# Patient Record
Sex: FEMALE | Race: WHITE | NOT HISPANIC OR LATINO | Employment: OTHER | URBAN - METROPOLITAN AREA
[De-identification: names, ages, dates, MRNs, and addresses within clinical notes are randomized per-mention and may not be internally consistent; named-entity substitution may affect disease eponyms.]

---

## 2017-01-03 ENCOUNTER — TELEPHONE (OUTPATIENT)
Dept: INTERNAL MEDICINE | Facility: CLINIC | Age: 67
End: 2017-01-03

## 2017-01-03 DIAGNOSIS — E55.9 VITAMIN D DEFICIENCY: ICD-10-CM

## 2017-01-03 DIAGNOSIS — Z12.31 ENCOUNTER FOR SCREENING MAMMOGRAM FOR BREAST CANCER: ICD-10-CM

## 2017-01-03 DIAGNOSIS — E78.5 DYSLIPIDEMIA: ICD-10-CM

## 2017-01-03 DIAGNOSIS — Z83.3 FAMILY HISTORY OF TYPE 2 DIABETES MELLITUS: ICD-10-CM

## 2017-01-03 DIAGNOSIS — E03.8 SUBCLINICAL HYPOTHYROIDISM: Primary | ICD-10-CM

## 2017-01-03 NOTE — TELEPHONE ENCOUNTER
----- Message from Misa Mcgowan sent at 1/3/2017  8:30 AM CST -----  Contact: self  [phone]  Doctor appointment and lab have been scheduled.  Please link lab orders to the lab appointment.  Date of doctor appointment:  01/11  Physical or EP:  EPP  Date of lab appointment:  01/05    Comments: Pt would also like to know if you can put in an order for her yearly mammogram

## 2017-01-05 ENCOUNTER — TELEPHONE (OUTPATIENT)
Dept: INTERNAL MEDICINE | Facility: CLINIC | Age: 67
End: 2017-01-05

## 2017-01-05 ENCOUNTER — PATIENT MESSAGE (OUTPATIENT)
Dept: INTERNAL MEDICINE | Facility: CLINIC | Age: 67
End: 2017-01-05

## 2017-01-05 ENCOUNTER — LAB VISIT (OUTPATIENT)
Dept: LAB | Facility: HOSPITAL | Age: 67
End: 2017-01-05
Attending: INTERNAL MEDICINE
Payer: MEDICARE

## 2017-01-05 DIAGNOSIS — E78.5 DYSLIPIDEMIA: ICD-10-CM

## 2017-01-05 DIAGNOSIS — E03.8 SUBCLINICAL HYPOTHYROIDISM: ICD-10-CM

## 2017-01-05 DIAGNOSIS — R73.03 PRE-DIABETES: Primary | ICD-10-CM

## 2017-01-05 DIAGNOSIS — E55.9 VITAMIN D DEFICIENCY: ICD-10-CM

## 2017-01-05 LAB
25(OH)D3+25(OH)D2 SERPL-MCNC: 25 NG/ML
ALBUMIN SERPL BCP-MCNC: 3.9 G/DL
ALP SERPL-CCNC: 79 U/L
ALT SERPL W/O P-5'-P-CCNC: 19 U/L
ANION GAP SERPL CALC-SCNC: 12 MMOL/L
AST SERPL-CCNC: 24 U/L
BASOPHILS # BLD AUTO: 0.08 K/UL
BASOPHILS NFR BLD: 1.5 %
BILIRUB SERPL-MCNC: 0.6 MG/DL
BUN SERPL-MCNC: 9 MG/DL
CALCIUM SERPL-MCNC: 9.4 MG/DL
CHLORIDE SERPL-SCNC: 104 MMOL/L
CHOLEST/HDLC SERPL: 5 {RATIO}
CO2 SERPL-SCNC: 23 MMOL/L
CREAT SERPL-MCNC: 0.7 MG/DL
DIFFERENTIAL METHOD: ABNORMAL
EOSINOPHIL # BLD AUTO: 0.5 K/UL
EOSINOPHIL NFR BLD: 9.4 %
ERYTHROCYTE [DISTWIDTH] IN BLOOD BY AUTOMATED COUNT: 12.7 %
EST. GFR  (AFRICAN AMERICAN): >60 ML/MIN/1.73 M^2
EST. GFR  (NON AFRICAN AMERICAN): >60 ML/MIN/1.73 M^2
GLUCOSE SERPL-MCNC: 93 MG/DL
HCT VFR BLD AUTO: 41.4 %
HDL/CHOLESTEROL RATIO: 19.8 %
HDLC SERPL-MCNC: 222 MG/DL
HDLC SERPL-MCNC: 44 MG/DL
HGB BLD-MCNC: 13.5 G/DL
LDLC SERPL CALC-MCNC: 152 MG/DL
LYMPHOCYTES # BLD AUTO: 2 K/UL
LYMPHOCYTES NFR BLD: 37.8 %
MCH RBC QN AUTO: 28.1 PG
MCHC RBC AUTO-ENTMCNC: 32.6 %
MCV RBC AUTO: 86 FL
MONOCYTES # BLD AUTO: 0.4 K/UL
MONOCYTES NFR BLD: 6.9 %
NEUTROPHILS # BLD AUTO: 2.4 K/UL
NEUTROPHILS NFR BLD: 44.4 %
NONHDLC SERPL-MCNC: 178 MG/DL
PLATELET # BLD AUTO: 200 K/UL
PMV BLD AUTO: 13.2 FL
POTASSIUM SERPL-SCNC: 3.8 MMOL/L
PROT SERPL-MCNC: 7.7 G/DL
RBC # BLD AUTO: 4.8 M/UL
SODIUM SERPL-SCNC: 139 MMOL/L
TRIGL SERPL-MCNC: 130 MG/DL
TSH SERPL DL<=0.005 MIU/L-ACNC: 3.1 UIU/ML
WBC # BLD AUTO: 5.4 K/UL

## 2017-01-05 PROCEDURE — 36415 COLL VENOUS BLD VENIPUNCTURE: CPT | Mod: PO

## 2017-01-05 PROCEDURE — 84443 ASSAY THYROID STIM HORMONE: CPT

## 2017-01-05 PROCEDURE — 80053 COMPREHEN METABOLIC PANEL: CPT

## 2017-01-05 PROCEDURE — 82306 VITAMIN D 25 HYDROXY: CPT

## 2017-01-05 PROCEDURE — 80061 LIPID PANEL: CPT

## 2017-01-05 PROCEDURE — 85025 COMPLETE CBC W/AUTO DIFF WBC: CPT

## 2017-01-05 NOTE — TELEPHONE ENCOUNTER
----- Message from Christine Lund sent at 1/5/2017  9:19 AM CST -----  Contact: Self/[phone]  Patient is calling to office to request orders for a B 12 and A1 C test. She states she is currently at [facility]. She would like to speak with someone in the office. Please call and advise.    Thank you!

## 2017-01-05 NOTE — TELEPHONE ENCOUNTER
Medicare won't pay for these tests with no medical necessity. Will wait and see what the standard labs show first.

## 2017-03-17 ENCOUNTER — OFFICE VISIT (OUTPATIENT)
Dept: INTERNAL MEDICINE | Facility: CLINIC | Age: 67
End: 2017-03-17
Payer: MEDICARE

## 2017-03-17 VITALS
BODY MASS INDEX: 23.32 KG/M2 | DIASTOLIC BLOOD PRESSURE: 65 MMHG | HEIGHT: 62 IN | HEART RATE: 77 BPM | WEIGHT: 126.75 LBS | SYSTOLIC BLOOD PRESSURE: 102 MMHG | OXYGEN SATURATION: 95 %

## 2017-03-17 DIAGNOSIS — E03.8 SUBCLINICAL HYPOTHYROIDISM: ICD-10-CM

## 2017-03-17 DIAGNOSIS — M81.0 OSTEOPOROSIS, UNSPECIFIED: Primary | ICD-10-CM

## 2017-03-17 DIAGNOSIS — E07.9 DISORDER OF THYROID: ICD-10-CM

## 2017-03-17 DIAGNOSIS — R73.02 IMPAIRED GLUCOSE TOLERANCE: ICD-10-CM

## 2017-03-17 DIAGNOSIS — E55.9 VITAMIN D INSUFFICIENCY: ICD-10-CM

## 2017-03-17 DIAGNOSIS — Z00.00 ANNUAL PHYSICAL EXAM: ICD-10-CM

## 2017-03-17 DIAGNOSIS — Z78.9 VEGETARIAN DIET: ICD-10-CM

## 2017-03-17 DIAGNOSIS — R73.03 PREDIABETES: ICD-10-CM

## 2017-03-17 DIAGNOSIS — Z12.31 ENCOUNTER FOR SCREENING MAMMOGRAM FOR BREAST CANCER: ICD-10-CM

## 2017-03-17 DIAGNOSIS — Z12.39 BREAST CANCER SCREENING: ICD-10-CM

## 2017-03-17 PROCEDURE — 99999 PR PBB SHADOW E&M-EST. PATIENT-LVL III: CPT | Mod: PBBFAC,,, | Performed by: INTERNAL MEDICINE

## 2017-03-17 PROCEDURE — 1157F ADVNC CARE PLAN IN RCRD: CPT | Mod: S$GLB,,, | Performed by: INTERNAL MEDICINE

## 2017-03-17 PROCEDURE — 99499 UNLISTED E&M SERVICE: CPT | Mod: S$GLB,,, | Performed by: INTERNAL MEDICINE

## 2017-03-17 PROCEDURE — 90471 IMMUNIZATION ADMIN: CPT | Mod: S$GLB,,, | Performed by: INTERNAL MEDICINE

## 2017-03-17 PROCEDURE — 1159F MED LIST DOCD IN RCRD: CPT | Mod: S$GLB,,, | Performed by: INTERNAL MEDICINE

## 2017-03-17 PROCEDURE — 1160F RVW MEDS BY RX/DR IN RCRD: CPT | Mod: S$GLB,,, | Performed by: INTERNAL MEDICINE

## 2017-03-17 PROCEDURE — 1126F AMNT PAIN NOTED NONE PRSNT: CPT | Mod: S$GLB,,, | Performed by: INTERNAL MEDICINE

## 2017-03-17 PROCEDURE — 99214 OFFICE O/P EST MOD 30 MIN: CPT | Mod: 25,S$GLB,, | Performed by: INTERNAL MEDICINE

## 2017-03-17 PROCEDURE — 90715 TDAP VACCINE 7 YRS/> IM: CPT | Mod: S$GLB,,, | Performed by: INTERNAL MEDICINE

## 2017-03-17 NOTE — PROGRESS NOTES
Portions of this note are generated with voice recognition software. Typographical errors may exist.     Subjective:      Patient ID: Koe Frias is a 66 y.o. female.    Chief Complaint: Annual Exam; Establish Care; and Mammogram    HPI:   66-year-old lady here for an annual physical examination and also to establish care with me.  Last seen in 2015  Was diagnosed with the prediabetes.  Patient says she is having adequate dietary control.  Also cause for regular walks.  Was also diagnosed with vitamin D insufficiency.  She takes calcium plus vitamin D3 supplements everyday.  She is a which he didn't but she says she has adequate intake of milk products.  No history of numbness or tingling in lower extremity.  No history of loss of balance.  No history of dizziness or chest pain.  Was diagnosed with subclinical hypothyroidism.  Not on any replacement therapy.  Had refused colonoscopy before.  Does still refuses colonoscopy.  Was diagnosed with osteoporosis  in 2014 when she had a DEXA scan.  She refuses prophylactic of osteoporosis.  She is on calcium and vitamin D.  Due for a DTaP vaccine.  Otherwise up-to-date on vaccination.  Shirlene a mammogram.    Family history positive for diabetes mellitus in her father.  Patient denies any chest pain or shortness of breath.  No history of dizziness or vertigo.  No history of nausea or vomiting.  Has a history of migraine headaches.  Not on any medications.  No history of hematemesis or melena.  No history of hematochezia.  No history of loss of strength or loss of sensation in any extremity.  No history of dysphagia or dysarthria.  No history of acute blurring of vision or diplopia.  No history of tinnitus or ear pain.  No history of sore throat or swollen glands.        Review of patient's allergies indicates:   Allergen Reactions    Sulfa (sulfonamide antibiotics) Rash     Past Medical History:   Diagnosis Date    Anemia     Osteoporosis     Subclinical  hypothyroidism      Past Surgical History:   Procedure Laterality Date    DILATION AND CURETTAGE OF UTERUS      postmenopausal bleeding    TUBAL LIGATION       Family History   Problem Relation Age of Onset    Diabetes Brother     Hypertension Brother     Glaucoma Brother     Lung cancer Mother     Uterine cancer Maternal Grandmother     Glaucoma Father      Social History     Social History    Marital status:      Spouse name: N/A    Number of children: 2    Years of education: N/A     Occupational History     [employer]     Social History Main Topics    Smoking status: Never Smoker    Smokeless tobacco: Not on file    Alcohol use No    Drug use: No    Sexual activity: Yes     Partners: Male     Birth control/ protection: None     Other Topics Concern    Not on file     Social History Narrative         Review of Systemsas above   Objective:     Physical Exam   Constitutional: She is oriented to person, place, and time. No distress.   HENT:   Head: Normocephalic and atraumatic.   Mouth/Throat: No oropharyngeal exudate.   Eyes: EOM are normal. Pupils are equal, round, and reactive to light. Right eye exhibits no discharge. Left eye exhibits no discharge.   Neck: Normal range of motion. Neck supple. No thyromegaly present.   Cardiovascular: Normal rate and regular rhythm.  Exam reveals no friction rub.    No murmur heard.  Pulmonary/Chest: Effort normal and breath sounds normal. No respiratory distress. She has no wheezes. She has no rales.   Abdominal: Soft. Bowel sounds are normal. She exhibits no distension. There is no tenderness.   Musculoskeletal: Normal range of motion. She exhibits no edema or deformity.   Neurological: She is alert and oriented to person, place, and time. No cranial nerve deficit.   Position sense normal     Skin: Skin is warm and dry. She is not diaphoretic.     Assessment:       Labs:  No results found for this or any previous visit (from the past  1008 hour(s)).  1. Osteoporosis, unspecified    2. Vegetarian diet    3. Subclinical hypothyroidism    4. Breast cancer screening    5. Annual physical exam    6. Disorder of thyroid     7. Subclinical hypothyroidism     8. Prediabetes    9. Impaired glucose tolerance     10. Encounter for screening mammogram for breast cancer     11. Vitamin D insufficiency      Patient refuses to be started biphosphonate's.  She is satisfied with taking her calcium and vitamin D.  We'll repeat a DEXA scan.  Has adequate intake of vitamin D12 through diet.  Will recheck TSH.  Ordered a mammogram.  We'll check the following labs for screening.  Check A1c said she had prediabetes.  Check vitamin D levels.  Follow-up in 3 months or earlier if required.  Orders Placed This Encounter   Procedures    Mammo Digital Screening Bilat with CAD    DXA Bone Density Spine And Hip    Tdap Vaccine    Comprehensive metabolic panel    TSH    T4, free    HEMOGLOBIN A1C    Vitamin D     50 minutes spent during this visit of which greater than 50% devoted to face-face counseling and coordination of care regarding diagnosis and management plan

## 2017-03-29 ENCOUNTER — HOSPITAL ENCOUNTER (OUTPATIENT)
Dept: RADIOLOGY | Facility: HOSPITAL | Age: 67
Discharge: HOME | End: 2017-03-29
Attending: INTERNAL MEDICINE
Payer: MEDICARE

## 2017-03-29 DIAGNOSIS — Z00.00 ANNUAL PHYSICAL EXAM: ICD-10-CM

## 2017-03-29 DIAGNOSIS — Z12.31 ENCOUNTER FOR SCREENING MAMMOGRAM FOR BREAST CANCER: ICD-10-CM

## 2017-03-29 PROCEDURE — 77067 SCR MAMMO BI INCL CAD: CPT | Mod: 26,,, | Performed by: RADIOLOGY

## 2017-03-29 PROCEDURE — 77067 SCR MAMMO BI INCL CAD: CPT | Mod: TC

## 2017-04-28 ENCOUNTER — HOSPITAL ENCOUNTER (OUTPATIENT)
Dept: RADIOLOGY | Facility: HOSPITAL | Age: 67
Discharge: HOME | End: 2017-04-28
Attending: INTERNAL MEDICINE
Payer: MEDICARE

## 2017-04-28 DIAGNOSIS — M81.0 OSTEOPOROSIS: ICD-10-CM

## 2017-06-14 ENCOUNTER — PATIENT OUTREACH (OUTPATIENT)
Dept: ADMINISTRATIVE | Facility: HOSPITAL | Age: 67
End: 2017-06-14

## 2017-08-23 ENCOUNTER — OFFICE VISIT (OUTPATIENT)
Dept: FAMILY MEDICINE | Facility: CLINIC | Age: 67
End: 2017-08-23
Payer: MEDICARE

## 2017-08-23 ENCOUNTER — LAB VISIT (OUTPATIENT)
Dept: LAB | Facility: HOSPITAL | Age: 67
End: 2017-08-23
Attending: FAMILY MEDICINE
Payer: MEDICARE

## 2017-08-23 VITALS
DIASTOLIC BLOOD PRESSURE: 83 MMHG | SYSTOLIC BLOOD PRESSURE: 125 MMHG | BODY MASS INDEX: 23.24 KG/M2 | OXYGEN SATURATION: 99 % | WEIGHT: 126.31 LBS | HEIGHT: 62 IN | HEART RATE: 63 BPM

## 2017-08-23 DIAGNOSIS — R20.0 NUMBNESS ON LEFT SIDE: ICD-10-CM

## 2017-08-23 DIAGNOSIS — R29.818 TRANSIENT NEUROLOGICAL SYMPTOMS: ICD-10-CM

## 2017-08-23 DIAGNOSIS — I95.9 HYPOTENSION, UNSPECIFIED HYPOTENSION TYPE: ICD-10-CM

## 2017-08-23 DIAGNOSIS — Z78.9 VEGETARIAN DIET: ICD-10-CM

## 2017-08-23 DIAGNOSIS — R79.89 ABNORMAL TSH: ICD-10-CM

## 2017-08-23 DIAGNOSIS — R20.0 NUMBNESS ON LEFT SIDE: Primary | ICD-10-CM

## 2017-08-23 DIAGNOSIS — G43.109 MIGRAINE WITH AURA AND WITHOUT STATUS MIGRAINOSUS, NOT INTRACTABLE: ICD-10-CM

## 2017-08-23 LAB
ALBUMIN SERPL BCP-MCNC: 3.7 G/DL
ALP SERPL-CCNC: 81 U/L
ALT SERPL W/O P-5'-P-CCNC: 16 U/L
ANION GAP SERPL CALC-SCNC: 7 MMOL/L
AST SERPL-CCNC: 18 U/L
BASOPHILS # BLD AUTO: 0.07 K/UL
BASOPHILS NFR BLD: 1.4 %
BILIRUB SERPL-MCNC: 0.6 MG/DL
BUN SERPL-MCNC: 9 MG/DL
CALCIUM SERPL-MCNC: 9.5 MG/DL
CHLORIDE SERPL-SCNC: 102 MMOL/L
CO2 SERPL-SCNC: 29 MMOL/L
CREAT SERPL-MCNC: 0.7 MG/DL
DIFFERENTIAL METHOD: ABNORMAL
EOSINOPHIL # BLD AUTO: 0.5 K/UL
EOSINOPHIL NFR BLD: 10.1 %
ERYTHROCYTE [DISTWIDTH] IN BLOOD BY AUTOMATED COUNT: 13.8 %
EST. GFR  (AFRICAN AMERICAN): >60 ML/MIN/1.73 M^2
EST. GFR  (NON AFRICAN AMERICAN): >60 ML/MIN/1.73 M^2
GLUCOSE SERPL-MCNC: 86 MG/DL
HCT VFR BLD AUTO: 39.6 %
HGB BLD-MCNC: 13 G/DL
LYMPHOCYTES # BLD AUTO: 2 K/UL
LYMPHOCYTES NFR BLD: 41 %
MCH RBC QN AUTO: 27.1 PG
MCHC RBC AUTO-ENTMCNC: 32.8 G/DL
MCV RBC AUTO: 83 FL
MONOCYTES # BLD AUTO: 0.3 K/UL
MONOCYTES NFR BLD: 6.6 %
NEUTROPHILS # BLD AUTO: 2 K/UL
NEUTROPHILS NFR BLD: 40.7 %
PLATELET # BLD AUTO: 186 K/UL
PMV BLD AUTO: 11.6 FL
POTASSIUM SERPL-SCNC: 4.1 MMOL/L
PROT SERPL-MCNC: 7.7 G/DL
RBC # BLD AUTO: 4.79 M/UL
SODIUM SERPL-SCNC: 138 MMOL/L
T4 FREE SERPL-MCNC: 1.05 NG/DL
THYROPEROXIDASE IGG SERPL-ACNC: <6 IU/ML
TSH SERPL DL<=0.005 MIU/L-ACNC: 4.16 UIU/ML
VIT B12 SERPL-MCNC: 498 PG/ML
WBC # BLD AUTO: 4.83 K/UL

## 2017-08-23 PROCEDURE — 99499 UNLISTED E&M SERVICE: CPT | Mod: S$GLB,,, | Performed by: FAMILY MEDICINE

## 2017-08-23 PROCEDURE — 1126F AMNT PAIN NOTED NONE PRSNT: CPT | Mod: S$GLB,,, | Performed by: FAMILY MEDICINE

## 2017-08-23 PROCEDURE — 80053 COMPREHEN METABOLIC PANEL: CPT

## 2017-08-23 PROCEDURE — 82607 VITAMIN B-12: CPT

## 2017-08-23 PROCEDURE — 36415 COLL VENOUS BLD VENIPUNCTURE: CPT

## 2017-08-23 PROCEDURE — 99215 OFFICE O/P EST HI 40 MIN: CPT | Mod: S$GLB,,, | Performed by: FAMILY MEDICINE

## 2017-08-23 PROCEDURE — 86376 MICROSOMAL ANTIBODY EACH: CPT

## 2017-08-23 PROCEDURE — 1159F MED LIST DOCD IN RCRD: CPT | Mod: S$GLB,,, | Performed by: FAMILY MEDICINE

## 2017-08-23 PROCEDURE — 85025 COMPLETE CBC W/AUTO DIFF WBC: CPT

## 2017-08-23 PROCEDURE — 3008F BODY MASS INDEX DOCD: CPT | Mod: S$GLB,,, | Performed by: FAMILY MEDICINE

## 2017-08-23 PROCEDURE — 99999 PR PBB SHADOW E&M-EST. PATIENT-LVL III: CPT | Mod: PBBFAC,,, | Performed by: FAMILY MEDICINE

## 2017-08-23 PROCEDURE — 84439 ASSAY OF FREE THYROXINE: CPT

## 2017-08-23 PROCEDURE — 84443 ASSAY THYROID STIM HORMONE: CPT

## 2017-08-23 RX ORDER — ALPRAZOLAM 1 MG/1
TABLET ORAL
Qty: 2 TABLET | Refills: 0 | Status: SHIPPED | OUTPATIENT
Start: 2017-08-23 | End: 2018-07-24

## 2017-08-23 NOTE — PROGRESS NOTES
" Office Visit    Patient Name: Keo Frias    : 1950  MRN: [id number]    Subjective:  Keo TURENR is a 67 y.o. female who presents today for:    Extremity Weakness (states she gets weakness in hands and feet intermittently)    67-year-old previous patient of Dr. Brynn Merino in last seen for annual physical 3/17/2017.  Relation at that time was overall unremarkable other than showing a TSH of 4.8 with normal T4 on blood work.  Has previous history of osteoporosis noted on DEXA scan from  but it seems that follow-up study was refused as she declines to treat osteoporosis with medication such as bisphosphonates.     She reports 2 episodes this year of 2 hours  candida of tingling and weakness either on the right or left side of her body, associated muscle weakness and potentially low blood pressure. No history of stroke.  Grandmother and aunt with history of stroke.  No symptoms at this time. Occasionally gets blurry vision-- eye exam from -- early cataracts but otherwise unremarkable. Has a history of migraines but no headaches associated with these episodes. Does feel a bit"out of it" during these episodes but overall can function-- no AMS.  Resting for a few hours tends to allow the symptoms to self resolve.     Today feels normal-- no complaints.      Past Medical History  Past Medical History:   Diagnosis Date    Anemia     Subclinical hypothyroidism        Past Surgical History  Past Surgical History:   Procedure Laterality Date    DILATION AND CURETTAGE OF UTERUS      postmenopausal bleeding    TUBAL LIGATION         Family History  Family History   Problem Relation Age of Onset    Diabetes Brother     Hypertension Brother     Glaucoma Brother     Glaucoma Father     Lung cancer Mother     Uterine cancer Maternal Grandmother        Social History  Social History     Social History    Marital status:      Spouse name: N/A    Number of children: 2    Years of education: N/A " "    Occupational History     [employer]     Social History Main Topics    Smoking status: Never Smoker    Smokeless tobacco: Never Used    Alcohol use No    Drug use: No    Sexual activity: Yes     Partners: Male     Birth control/ protection: None     Other Topics Concern    Not on file     Social History Narrative    No narrative on file       Current Medications  Medications reviewed and updated.     Allergies   Review of patient's allergies indicates:   Allergen Reactions    Sulfa (sulfonamide antibiotics) Rash       Review of Systems (Pertinent positives)  Review of Systems   Respiratory: Negative for shortness of breath.    Cardiovascular: Negative for chest pain.   Neurological: Positive for numbness and headaches.   Psychiatric/Behavioral: Negative for confusion.       /83   Pulse 63   Ht 5' 2" (1.575 m)   Wt 57.3 kg (126 lb 5.2 oz)   SpO2 99%   BMI 23.10 kg/m²     Physical Exam   Constitutional: She is oriented to person, place, and time. She appears well-developed and well-nourished. No distress.   HENT:   Head: Normocephalic and atraumatic.   Eyes: Conjunctivae and EOM are normal. Pupils are equal, round, and reactive to light.   Cardiovascular: Normal rate, regular rhythm and normal heart sounds.    Pulmonary/Chest: Effort normal and breath sounds normal.   Musculoskeletal: She exhibits no edema.   Neurological: She is alert and oriented to person, place, and time. She has normal strength and normal reflexes. No cranial nerve deficit. She displays a negative Romberg sign. Coordination (normal heel to shin, rapid alternating movements and finger to nose) and gait normal.   Skin: Skin is warm and dry.   Psychiatric: She has a normal mood and affect.   Vitals reviewed.        Assessment/Plan:  Keo Frias is a 67 y.o. female who presents today for :    Keo TURNER was seen today for extremity weakness.    Diagnoses and all orders for this visit:    Numbness on left " side  Comments:  rule out stroke and MS with MRI of brain and cervical spine with and without contrast  Orders:  -     MRI Brain W WO Contrast; Future  -     Comprehensive metabolic panel; Future  -     CBC auto differential; Future  -     MRI Cervical Spine W WO Cont; Future  -     Vitamin B12; Future    Transient neurological symptoms  -     MRI Brain W WO Contrast; Future  -     Comprehensive metabolic panel; Future  -     CBC auto differential; Future  -     MRI Cervical Spine W WO Cont; Future  -     Vitamin B12; Future    Abnormal TSH  Comments:  recheck today  Orders:  -     TSH; Future  -     T4, free; Future  -     THYROID PEROXIDASE ANTIBODY; Future    Hypotension, unspecified hypotension type  Comments:  intermittent, stay hydrated and check blood counts    Migraine with aura and without status migrainosus, not intractable    Vegetarian diet  Comments:  previous B12 deficiency but recently normal and will recheck today    Other orders  -     alprazolam (XANAX) 1 MG tablet; Take 1 mg 20 min prior to MRI            ICD-10-CM ICD-9-CM    1. Numbness on left side R20.0 782.0 MRI Brain W WO Contrast      Comprehensive metabolic panel      CBC auto differential      MRI Cervical Spine W WO Cont      Vitamin B12    rule out stroke and MS with MRI of brain and cervical spine with and without contrast   2. Transient neurological symptoms R29.818 781.99 MRI Brain W WO Contrast      Comprehensive metabolic panel      CBC auto differential      MRI Cervical Spine W WO Cont      Vitamin B12   3. Abnormal TSH R94.6 790.6 TSH      T4, free      THYROID PEROXIDASE ANTIBODY    recheck today   4. Hypotension, unspecified hypotension type I95.9 458.9     intermittent, stay hydrated and check blood counts   5. Migraine with aura and without status migrainosus, not intractable G43.109 346.00    6. Vegetarian diet Z78.9 V49.89     previous B12 deficiency but recently normal and will recheck today       Return for will follow  up with results of blood work and MRI, annual physicals in 3/2017.

## 2017-08-24 ENCOUNTER — TELEPHONE (OUTPATIENT)
Dept: FAMILY MEDICINE | Facility: CLINIC | Age: 67
End: 2017-08-24

## 2017-08-24 NOTE — TELEPHONE ENCOUNTER
----- Message from Keshia Matilda sent at 8/24/2017  3:12 PM CDT -----  Contact: self, [phone]  Patient called in returning your call. Please advise.

## 2017-08-24 NOTE — TELEPHONE ENCOUNTER
Called patient to review results: Labs all look good and don't indicate a cause for her numbness. Thyroid level looks fine and we will continue to monitor-- medication will likely NOT be needed. I will look for her upcoming MRI reports. Left a message requesting a call back.

## 2017-08-28 ENCOUNTER — HOSPITAL ENCOUNTER (OUTPATIENT)
Dept: RADIOLOGY | Facility: HOSPITAL | Age: 67
Discharge: HOME | End: 2017-08-28
Attending: FAMILY MEDICINE
Payer: MEDICARE

## 2017-08-28 ENCOUNTER — TELEPHONE (OUTPATIENT)
Dept: FAMILY MEDICINE | Facility: CLINIC | Age: 67
End: 2017-08-28

## 2017-08-28 DIAGNOSIS — R20.0 NUMBNESS ON LEFT SIDE: ICD-10-CM

## 2017-08-28 DIAGNOSIS — R29.818 TRANSIENT NEUROLOGICAL SYMPTOMS: ICD-10-CM

## 2017-08-28 PROCEDURE — 70553 MRI BRAIN STEM W/O & W/DYE: CPT | Mod: 26,,, | Performed by: RADIOLOGY

## 2017-08-28 PROCEDURE — 72156 MRI NECK SPINE W/O & W/DYE: CPT | Mod: TC

## 2017-08-28 PROCEDURE — 72156 MRI NECK SPINE W/O & W/DYE: CPT | Mod: 26,,, | Performed by: RADIOLOGY

## 2017-08-28 PROCEDURE — 70553 MRI BRAIN STEM W/O & W/DYE: CPT | Mod: TC

## 2017-08-28 PROCEDURE — A9585 GADOBUTROL INJECTION: HCPCS | Performed by: FAMILY MEDICINE

## 2017-08-28 PROCEDURE — 25500020 PHARM REV CODE 255: Performed by: FAMILY MEDICINE

## 2017-08-28 RX ORDER — GADOBUTROL 604.72 MG/ML
5.5 INJECTION INTRAVENOUS
Status: COMPLETED | OUTPATIENT
Start: 2017-08-28 | End: 2017-08-28

## 2017-08-28 RX ADMIN — GADOBUTROL 5.5 ML: 604.72 INJECTION INTRAVENOUS at 04:08

## 2017-08-28 NOTE — TELEPHONE ENCOUNTER
----- Message from Dora Carrera sent at 8/28/2017  8:13 AM CDT -----  Contact: [phone]  Patient states she has a schedule mri  Today and has questions

## 2017-08-28 NOTE — TELEPHONE ENCOUNTER
Patient calling to say she may not take her sedative for her MRI. I explained to her she does NOT have to take it if she chooses not to.

## 2017-08-30 ENCOUNTER — TELEPHONE (OUTPATIENT)
Dept: FAMILY MEDICINE | Facility: CLINIC | Age: 67
End: 2017-08-30

## 2017-08-30 DIAGNOSIS — R29.818 TRANSIENT NEUROLOGICAL SYMPTOMS: Primary | ICD-10-CM

## 2017-08-30 DIAGNOSIS — G45.9 TRANSIENT CEREBRAL ISCHEMIA, UNSPECIFIED TYPE: ICD-10-CM

## 2017-08-31 ENCOUNTER — TELEPHONE (OUTPATIENT)
Dept: FAMILY MEDICINE | Facility: CLINIC | Age: 67
End: 2017-08-31

## 2017-08-31 DIAGNOSIS — Z12.11 COLON CANCER SCREENING: ICD-10-CM

## 2017-08-31 NOTE — TELEPHONE ENCOUNTER
----- Message from Barbara Becerril sent at 8/31/2017  3:53 PM CDT -----  Contact: [phone] / self  Patient called in returning your call. Please advise.

## 2017-09-20 ENCOUNTER — CLINICAL SUPPORT (OUTPATIENT)
Dept: LAB | Facility: HOSPITAL | Age: 67
End: 2017-09-20
Attending: FAMILY MEDICINE
Payer: MEDICARE

## 2017-09-20 ENCOUNTER — HOSPITAL ENCOUNTER (OUTPATIENT)
Dept: RADIOLOGY | Facility: HOSPITAL | Age: 67
Discharge: HOME | End: 2017-09-20
Attending: FAMILY MEDICINE
Payer: MEDICARE

## 2017-09-20 DIAGNOSIS — G45.9 TRANSIENT CEREBRAL ISCHEMIA, UNSPECIFIED TYPE: ICD-10-CM

## 2017-09-20 DIAGNOSIS — R29.818 TRANSIENT NEUROLOGICAL SYMPTOMS: ICD-10-CM

## 2017-09-20 PROCEDURE — 93010 ELECTROCARDIOGRAM REPORT: CPT | Mod: ,,, | Performed by: INTERNAL MEDICINE

## 2017-09-20 PROCEDURE — 93005 ELECTROCARDIOGRAM TRACING: CPT

## 2017-09-20 PROCEDURE — 93010 EKG 12-LEAD: ICD-10-PCS | Mod: ,,, | Performed by: INTERNAL MEDICINE

## 2017-09-20 PROCEDURE — 93880 EXTRACRANIAL BILAT STUDY: CPT | Mod: TC

## 2017-09-20 PROCEDURE — 93880 EXTRACRANIAL BILAT STUDY: CPT | Mod: 26,,, | Performed by: RADIOLOGY

## 2017-09-22 ENCOUNTER — TELEPHONE (OUTPATIENT)
Dept: FAMILY MEDICINE | Facility: CLINIC | Age: 67
End: 2017-09-22

## 2017-09-22 NOTE — TELEPHONE ENCOUNTER
Called patient to review results: Please notify that EKG and carotid ultrasound are both unremarkable-- no concerns.-- Patient verbalized understanding.

## 2017-12-20 ENCOUNTER — TELEPHONE (OUTPATIENT)
Dept: FAMILY MEDICINE | Facility: CLINIC | Age: 67
End: 2017-12-20

## 2017-12-20 NOTE — TELEPHONE ENCOUNTER
----- Message from Malina Shine sent at 12/20/2017 12:49 PM CST -----  Contact: Self [phone]  Patient is calling to talk to nurse concerning questions on her medication and she is also traveling and she wants to know if she needs any injections. Please advice

## 2017-12-21 ENCOUNTER — TELEPHONE (OUTPATIENT)
Dept: FAMILY MEDICINE | Facility: CLINIC | Age: 67
End: 2017-12-21

## 2017-12-21 NOTE — TELEPHONE ENCOUNTER
She is due for HEP A, FLU and also we can send TYPHOID capsules and medicine for MALARIA - let me know if she wants now or wait till Dr. GIL morales

## 2017-12-21 NOTE — TELEPHONE ENCOUNTER
----- Message from Juliana Quezada sent at 12/20/2017  4:12 PM CST -----  Contact: [phone]/self  Pt states she's returning your call.  Please call and advise

## 2017-12-21 NOTE — TELEPHONE ENCOUNTER
Returned patient's call. She said she is going to [facility] 01/21/18. She would like to know what vaccines are required for leaving the country. Please advise.

## 2017-12-21 NOTE — TELEPHONE ENCOUNTER
----- Message from Tanya Sandoval sent at 12/21/2017  2:43 PM CST -----  Contact: [phone]/self  Patient returning their call. Please call and advise

## 2018-01-15 ENCOUNTER — PES CALL (OUTPATIENT)
Dept: ADMINISTRATIVE | Facility: CLINIC | Age: 68
End: 2018-01-15

## 2018-03-08 ENCOUNTER — PES CALL (OUTPATIENT)
Dept: ADMINISTRATIVE | Facility: CLINIC | Age: 68
End: 2018-03-08

## 2018-03-12 ENCOUNTER — OFFICE VISIT (OUTPATIENT)
Dept: FAMILY MEDICINE | Facility: CLINIC | Age: 68
End: 2018-03-12
Payer: MEDICARE

## 2018-03-12 VITALS
WEIGHT: 121.94 LBS | OXYGEN SATURATION: 96 % | DIASTOLIC BLOOD PRESSURE: 66 MMHG | BODY MASS INDEX: 22.44 KG/M2 | TEMPERATURE: 99 F | SYSTOLIC BLOOD PRESSURE: 109 MMHG | HEART RATE: 76 BPM | HEIGHT: 62 IN

## 2018-03-12 DIAGNOSIS — J30.2 ACUTE SEASONAL ALLERGIC RHINITIS, UNSPECIFIED TRIGGER: ICD-10-CM

## 2018-03-12 DIAGNOSIS — R19.7 DIARRHEA, UNSPECIFIED TYPE: ICD-10-CM

## 2018-03-12 DIAGNOSIS — J06.9 UPPER RESPIRATORY TRACT INFECTION, UNSPECIFIED TYPE: Primary | ICD-10-CM

## 2018-03-12 PROCEDURE — 99999 PR PBB SHADOW E&M-EST. PATIENT-LVL III: CPT | Mod: PBBFAC,,, | Performed by: FAMILY MEDICINE

## 2018-03-12 PROCEDURE — 99214 OFFICE O/P EST MOD 30 MIN: CPT | Mod: S$GLB,,, | Performed by: FAMILY MEDICINE

## 2018-03-12 RX ORDER — FLUTICASONE PROPIONATE 50 MCG
1 SPRAY, SUSPENSION (ML) NASAL DAILY
Qty: 1 BOTTLE | Refills: 2 | Status: SHIPPED | OUTPATIENT
Start: 2018-03-12 | End: 2018-07-24

## 2018-03-12 RX ORDER — MINERAL OIL
180 ENEMA (ML) RECTAL DAILY
Qty: 30 TABLET | Refills: 11 | Status: SHIPPED | OUTPATIENT
Start: 2018-03-12 | End: 2018-07-24

## 2018-03-12 NOTE — PROGRESS NOTES
Office Visit    Patient Name: Keo Frias    : 1950  MRN: [id number]    Subjective:  Keo TURNER is a 67 y.o. female who presents today for:    Nasal Congestion (stated that she went out of country 6 weeks ago and got on last Tuesday and after 2 days, symptoms started.); Chills; Cough; Diarrhea (stool is soft.); and Fatigue    67-year-old generally healthy patient of mine with no known history of underlying severe ALLERGIC rhinitis or chronic lung disease here today secondary to upper respiratory symptoms ongoing for about 1 week.  Of note, she recently returned 1 week ago from being out of the country for a prolonged time..  She went to [facility].  Her symptoms including nasal congestion, cough, chills, and nasal drip started shortly after arriving back home here in [place]. She is also having some mild diarrhea with soft stools (3 soft stools daily) and fatigue, but no n/v/f/c. Labs previously through me 2017 unremarkable. She did have some diarrhea that was more severe in [place] but no blood in stool.     She has tried some over the counter mucinex and herbal products with mild relief. Zyrtec seemed to help.     Past Medical History  Past Medical History:   Diagnosis Date    Anemia     Subclinical hypothyroidism        Past Surgical History  Past Surgical History:   Procedure Laterality Date    DILATION AND CURETTAGE OF UTERUS      postmenopausal bleeding    TUBAL LIGATION         Family History  Family History   Problem Relation Age of Onset    Diabetes Brother     Hypertension Brother     Glaucoma Brother     Glaucoma Father     Lung cancer Mother     Uterine cancer Maternal Grandmother        Social History  Social History     Social History    Marital status:      Spouse name: N/A    Number of children: 2    Years of education: N/A     Occupational History     [employer]     Social History Main Topics    Smoking status: Never Smoker    Smokeless tobacco: Never Used     "Alcohol use No    Drug use: No    Sexual activity: Yes     Partners: Male     Birth control/ protection: None     Other Topics Concern    Not on file     Social History Narrative    No narrative on file       Current Medications  Medications reviewed and updated.     Allergies   Review of patient's allergies indicates:   Allergen Reactions    Sulfa (sulfonamide antibiotics) Rash       Review of Systems (Pertinent positives)  Review of Systems   Constitutional: Negative for chills and fever.   HENT: Positive for congestion and postnasal drip.    Respiratory: Positive for cough. Negative for shortness of breath.    Cardiovascular: Negative for chest pain.   Gastrointestinal: Positive for diarrhea (soft stools). Negative for blood in stool, constipation, nausea and vomiting.   Allergic/Immunologic: Positive for environmental allergies.       /66 (BP Location: Left arm, Patient Position: Sitting)   Pulse 76   Temp 98.9 °F (37.2 °C) (Oral)   Ht 5' 2" (1.575 m)   Wt 55.3 kg (121 lb 14.6 oz)   SpO2 96%   BMI 22.30 kg/m²     Physical Exam   Constitutional: She is oriented to person, place, and time. She appears well-developed and well-nourished. No distress.   HENT:   Head: Normocephalic and atraumatic.   Right Ear: Tympanic membrane is scarred, perforated (chronic) and retracted. Tympanic membrane is not erythematous and not bulging.   Left Ear: Tympanic membrane normal. Tympanic membrane is not erythematous and not bulging.   Nose: Mucosal edema and rhinorrhea present.   Mouth/Throat: Posterior oropharyngeal erythema (mild with some clear PND) present. No oropharyngeal exudate or posterior oropharyngeal edema.   Eyes: Conjunctivae are normal.   Cardiovascular: Normal rate and regular rhythm.    Pulmonary/Chest: Effort normal and breath sounds normal.   Abdominal: Soft. Bowel sounds are normal. There is no tenderness. There is no rebound and no guarding.   Musculoskeletal: She exhibits no edema. "   Neurological: She is alert and oriented to person, place, and time.   Skin: Skin is warm and dry.   Psychiatric: She has a normal mood and affect.   Vitals reviewed.        Assessment/Plan:  Keo Frias is a 67 y.o. female who presents today for :    Keo TURNER was seen today for nasal congestion, chills, cough, diarrhea and fatigue.    Diagnoses and all orders for this visit:    Upper respiratory tract infection, unspecified type  -     fexofenadine (ALLEGRA) 180 MG tablet; Take 1 tablet (180 mg total) by mouth once daily.  -     fluticasone (FLONASE) 50 mcg/actuation nasal spray; 1 spray (50 mcg total) by Each Nare route once daily.    Acute seasonal allergic rhinitis, unspecified trigger  -     fexofenadine (ALLEGRA) 180 MG tablet; Take 1 tablet (180 mg total) by mouth once daily.  -     fluticasone (FLONASE) 50 mcg/actuation nasal spray; 1 spray (50 mcg total) by Each Nare route once daily.    Diarrhea, unspecified type  Comments:  mild symptoms, hydration and trial of probiotics            ICD-10-CM ICD-9-CM    1. Upper respiratory tract infection, unspecified type J06.9 465.9 fexofenadine (ALLEGRA) 180 MG tablet      fluticasone (FLONASE) 50 mcg/actuation nasal spray   2. Acute seasonal allergic rhinitis, unspecified trigger J30.2 477.8 fexofenadine (ALLEGRA) 180 MG tablet      fluticasone (FLONASE) 50 mcg/actuation nasal spray   3. Diarrhea, unspecified type R19.7 787.91     mild symptoms, hydration and trial of probiotics       There are no Patient Instructions on file for this visit.      Follow-up for return as needed for new concerns, annual exam august 2018.

## 2018-05-04 ENCOUNTER — TELEPHONE (OUTPATIENT)
Dept: FAMILY MEDICINE | Facility: CLINIC | Age: 68
End: 2018-05-04

## 2018-05-07 ENCOUNTER — TELEPHONE (OUTPATIENT)
Dept: FAMILY MEDICINE | Facility: CLINIC | Age: 68
End: 2018-05-07

## 2018-05-07 NOTE — TELEPHONE ENCOUNTER
----- Message from Mai Gage sent at 5/7/2018  8:33 AM CDT -----  No. [phone]   Patient returned your call.  She rescheduled her appointment for 7/24/18.   If possible, she would like the appointment sooner.    Please call.

## 2018-05-07 NOTE — TELEPHONE ENCOUNTER
Advised patient that unfortunately this is the soonest Dr Ogden has for annual.  Patient verbalized understanding.

## 2018-06-05 ENCOUNTER — PES CALL (OUTPATIENT)
Dept: ADMINISTRATIVE | Facility: CLINIC | Age: 68
End: 2018-06-05

## 2018-07-24 ENCOUNTER — OFFICE VISIT (OUTPATIENT)
Dept: FAMILY MEDICINE | Facility: CLINIC | Age: 68
End: 2018-07-24
Payer: MEDICARE

## 2018-07-24 ENCOUNTER — LAB VISIT (OUTPATIENT)
Dept: LAB | Facility: HOSPITAL | Age: 68
End: 2018-07-24
Attending: FAMILY MEDICINE
Payer: MEDICARE

## 2018-07-24 VITALS
WEIGHT: 126.13 LBS | OXYGEN SATURATION: 97 % | HEART RATE: 81 BPM | SYSTOLIC BLOOD PRESSURE: 110 MMHG | BODY MASS INDEX: 23.21 KG/M2 | DIASTOLIC BLOOD PRESSURE: 73 MMHG | HEIGHT: 62 IN

## 2018-07-24 DIAGNOSIS — H26.9 CATARACT OF BOTH EYES, UNSPECIFIED CATARACT TYPE: ICD-10-CM

## 2018-07-24 DIAGNOSIS — E55.9 VITAMIN D DEFICIENCY: ICD-10-CM

## 2018-07-24 DIAGNOSIS — G43.109 MIGRAINE WITH AURA AND WITHOUT STATUS MIGRAINOSUS, NOT INTRACTABLE: ICD-10-CM

## 2018-07-24 DIAGNOSIS — Z78.0 POSTMENOPAUSAL: ICD-10-CM

## 2018-07-24 DIAGNOSIS — M81.0 POSTMENOPAUSAL OSTEOPOROSIS: ICD-10-CM

## 2018-07-24 DIAGNOSIS — Z12.31 ENCOUNTER FOR SCREENING MAMMOGRAM FOR BREAST CANCER: ICD-10-CM

## 2018-07-24 DIAGNOSIS — J30.89 ALLERGIC RHINITIS DUE TO OTHER ALLERGIC TRIGGER, UNSPECIFIED SEASONALITY: ICD-10-CM

## 2018-07-24 DIAGNOSIS — Z79.899 MEDICATION MANAGEMENT: ICD-10-CM

## 2018-07-24 DIAGNOSIS — Z00.00 ROUTINE GENERAL MEDICAL EXAMINATION AT A HEALTH CARE FACILITY: ICD-10-CM

## 2018-07-24 DIAGNOSIS — Z12.11 COLON CANCER SCREENING: ICD-10-CM

## 2018-07-24 DIAGNOSIS — E03.8 SUBCLINICAL HYPOTHYROIDISM: ICD-10-CM

## 2018-07-24 DIAGNOSIS — H90.5 SENSORINEURAL HEARING LOSS (SNHL), UNSPECIFIED LATERALITY: ICD-10-CM

## 2018-07-24 DIAGNOSIS — Z00.00 ROUTINE GENERAL MEDICAL EXAMINATION AT A HEALTH CARE FACILITY: Primary | ICD-10-CM

## 2018-07-24 LAB
25(OH)D3+25(OH)D2 SERPL-MCNC: 30 NG/ML
ALBUMIN SERPL BCP-MCNC: 3.8 G/DL
ALP SERPL-CCNC: 75 U/L
ALT SERPL W/O P-5'-P-CCNC: 18 U/L
ANION GAP SERPL CALC-SCNC: 7 MMOL/L
AST SERPL-CCNC: 20 U/L
BASOPHILS # BLD AUTO: 0.08 K/UL
BASOPHILS NFR BLD: 1.6 %
BILIRUB SERPL-MCNC: 0.4 MG/DL
BUN SERPL-MCNC: 10 MG/DL
CALCIUM SERPL-MCNC: 9.1 MG/DL
CHLORIDE SERPL-SCNC: 107 MMOL/L
CHOLEST SERPL-MCNC: 221 MG/DL
CHOLEST/HDLC SERPL: 5 {RATIO}
CO2 SERPL-SCNC: 26 MMOL/L
CREAT SERPL-MCNC: 0.6 MG/DL
DIFFERENTIAL METHOD: ABNORMAL
EOSINOPHIL # BLD AUTO: 0.5 K/UL
EOSINOPHIL NFR BLD: 9.3 %
ERYTHROCYTE [DISTWIDTH] IN BLOOD BY AUTOMATED COUNT: 12.9 %
EST. GFR  (AFRICAN AMERICAN): >60 ML/MIN/1.73 M^2
EST. GFR  (NON AFRICAN AMERICAN): >60 ML/MIN/1.73 M^2
ESTIMATED AVG GLUCOSE: 120 MG/DL
GLUCOSE SERPL-MCNC: 90 MG/DL
HBA1C MFR BLD HPLC: 5.8 %
HCT VFR BLD AUTO: 42.1 %
HDLC SERPL-MCNC: 44 MG/DL
HDLC SERPL: 19.9 %
HGB BLD-MCNC: 13.4 G/DL
LDLC SERPL CALC-MCNC: 149 MG/DL
LYMPHOCYTES # BLD AUTO: 2.1 K/UL
LYMPHOCYTES NFR BLD: 42.3 %
MCH RBC QN AUTO: 27.3 PG
MCHC RBC AUTO-ENTMCNC: 31.8 G/DL
MCV RBC AUTO: 86 FL
MONOCYTES # BLD AUTO: 0.3 K/UL
MONOCYTES NFR BLD: 6.4 %
NEUTROPHILS # BLD AUTO: 2 K/UL
NEUTROPHILS NFR BLD: 40.2 %
NONHDLC SERPL-MCNC: 177 MG/DL
PLATELET # BLD AUTO: 187 K/UL
PMV BLD AUTO: 12.5 FL
POTASSIUM SERPL-SCNC: 3.9 MMOL/L
PROT SERPL-MCNC: 7.3 G/DL
RBC # BLD AUTO: 4.91 M/UL
SODIUM SERPL-SCNC: 140 MMOL/L
TRIGL SERPL-MCNC: 140 MG/DL
TSH SERPL DL<=0.005 MIU/L-ACNC: 3.67 UIU/ML
WBC # BLD AUTO: 4.85 K/UL

## 2018-07-24 PROCEDURE — 82306 VITAMIN D 25 HYDROXY: CPT

## 2018-07-24 PROCEDURE — 80053 COMPREHEN METABOLIC PANEL: CPT

## 2018-07-24 PROCEDURE — 85025 COMPLETE CBC W/AUTO DIFF WBC: CPT

## 2018-07-24 PROCEDURE — 99397 PER PM REEVAL EST PAT 65+ YR: CPT | Mod: S$GLB,,, | Performed by: FAMILY MEDICINE

## 2018-07-24 PROCEDURE — 80061 LIPID PANEL: CPT

## 2018-07-24 PROCEDURE — 99999 PR PBB SHADOW E&M-EST. PATIENT-LVL IV: CPT | Mod: PBBFAC,,, | Performed by: FAMILY MEDICINE

## 2018-07-24 PROCEDURE — 36415 COLL VENOUS BLD VENIPUNCTURE: CPT

## 2018-07-24 PROCEDURE — 84443 ASSAY THYROID STIM HORMONE: CPT

## 2018-07-24 PROCEDURE — 83036 HEMOGLOBIN GLYCOSYLATED A1C: CPT

## 2018-07-24 NOTE — PROGRESS NOTES
Office Visit    Patient Name: Keo Frias    : 1950  MRN: [id number]    Subjective:  Keo TURNER is a 68 y.o. female who presents today for:    Annual Exam    Keo Frias presents today for annual wellness exam and monitoring of chronic conditions: subclinical hypothyroidism, h/o anemia and vitamin D deficiency, migraine headaches, allergic rhinitis, postmenopausal osteoporosis.     She is postmenopausal.  She has previously seen GYN dr Christine and no longer needs paps (> 65 and no h/o abnormal).    They have been feeling overall well.  Still having some intermittent weakness/ numbness episodes, previously extensively evaluated with MRIs etc, and she reports no progressive neurologic symptoms or deficits. Also c/o some post nasal drainage, especially in the morning-- hasn't been taking her allegra    General lifestyle habits are as follows:  Diet is described as good-- good variety overall and avoids junk food, exercise is described as fair-- walks over 10,000 steps daily, sleep is described as GOOD, AT LEAST 7 HOURS AND no concerns. Weight is stable over the last year.     Immunizations: PREVNAR 13 2015, Pneumovax 23 3/25/2014, TDaP 3/17/2017, Zoster 2010    Screening Tests: mammogram 3/29/2017-- repeat ordered, DEXA ordered, Colonoscopy--ordered  , PAP no longer needed, Hep C screening  2015     Eye/Dental: Optometry [place] 2016 , Dental UTD-- due to go            Past Medical History  Past Medical History:   Diagnosis Date    Anemia     Subclinical hypothyroidism        Past Surgical History  Past Surgical History:   Procedure Laterality Date    DILATION AND CURETTAGE OF UTERUS      postmenopausal bleeding    TUBAL LIGATION         Family History  Family History   Problem Relation Age of Onset    Diabetes Brother     Hypertension Brother     Glaucoma Brother     Glaucoma Father     Lung cancer Mother     Uterine cancer Maternal Grandmother        Social History  Social History  "    Social History    Marital status:      Spouse name: N/A    Number of children: 2    Years of education: N/A     Occupational History     [employer] [employer]     Social History Main Topics    Smoking status: Never Smoker    Smokeless tobacco: Never Used    Alcohol use No    Drug use: No    Sexual activity: Yes     Partners: Male     Birth control/ protection: None     Other Topics Concern    Not on file     Social History Narrative    No narrative on file       Current Medications  Medications reviewed and updated.     Allergies   Review of patient's allergies indicates:   Allergen Reactions    Sulfa (sulfonamide antibiotics) Rash       Review of Systems (Pertinent positives)  Review of Systems   Constitutional: Negative for chills, fever and unexpected weight change.   HENT: Positive for postnasal drip. Negative for trouble swallowing.    Eyes: Negative for visual disturbance.   Respiratory: Negative for shortness of breath.    Cardiovascular: Negative for chest pain.   Gastrointestinal: Negative for blood in stool, constipation and diarrhea.   Genitourinary: Negative for dysuria.   Musculoskeletal: Negative for arthralgias and joint swelling.   Allergic/Immunologic: Positive for environmental allergies.   Neurological: Positive for weakness, numbness and headaches. Negative for syncope.   Psychiatric/Behavioral: The patient is nervous/anxious.        /73 (BP Location: Left arm, Patient Position: Sitting)   Pulse 81   Ht 5' 2" (1.575 m)   Wt 57.2 kg (126 lb 1.7 oz)   SpO2 97%   BMI 23.06 kg/m²     Physical Exam   Constitutional: She is oriented to person, place, and time. She appears well-developed and well-nourished. No distress.   HENT:   Head: Normocephalic and atraumatic.   Right Ear: Ear canal normal. Tympanic membrane is not erythematous and not bulging.   Left Ear: Ear canal normal. Tympanic membrane is not erythematous and not bulging.   Mouth/Throat: No oropharyngeal " exudate.   Eyes: Conjunctivae are normal.   Neck: Carotid bruit is not present. No thyroid mass and no thyromegaly present.   Cardiovascular: Normal rate, regular rhythm and normal heart sounds.    No murmur heard.  Pulses:       Dorsalis pedis pulses are 2+ on the right side, and 2+ on the left side.   Pulmonary/Chest: Effort normal and breath sounds normal. No respiratory distress.   Abdominal: Soft. Bowel sounds are normal. She exhibits no distension and no mass. There is no hepatosplenomegaly. There is no tenderness.   Musculoskeletal: Normal range of motion.   Lymphadenopathy:     She has no cervical adenopathy.   Neurological: She is alert and oriented to person, place, and time.   Skin: Skin is warm and dry. No rash noted.   Psychiatric: She has a normal mood and affect.   Vitals reviewed.        Assessment/Plan:  Keo Frias is a 68 y.o. female who presents today for :    Keo TURNER was seen today for annual exam.    Diagnoses and all orders for this visit:    Routine general medical examination at a health care facility  Comments:  Health Maintenance Reviewed: Labs, Mammogram, bone density colonoscopy all ordered. advised on diet/exercise, eye/dental exams  Orders:  -     Hemoglobin A1c; Future  -     Comprehensive metabolic panel; Future  -     Lipid panel; Future  -     CBC auto differential; Future  -     TSH; Future  -     Vitamin D; Future  -     DXA Bone Density Spine And Hip; Future  -     Mammo Digital Screening Bilat with CAD; Future  -     Case request GI: COLONOSCOPY    BMI 22.0-22.9, adult    Subclinical hypothyroidism  Comments:  negative antibodies, recheck tsh  Orders:  -     TSH; Future    Vitamin D deficiency  -     Vitamin D; Future    Sensorineural hearing loss (SNHL), unspecified laterality    Postmenopausal osteoporosis  Comments:  check vitamin D level and bone density    Migraine with aura and without status migrainosus, not intractable  Comments:  OCCULAR MIGRAINE SYMPTOMS-- self  limited, not needing medication, due for routine eye exam    Medication management  -     Hemoglobin A1c; Future  -     Comprehensive metabolic panel; Future  -     Lipid panel; Future  -     CBC auto differential; Future  -     TSH; Future  -     Vitamin D; Future    Allergic rhinitis due to other allergic trigger, unspecified seasonality    Encounter for screening mammogram for breast cancer  -     Mammo Digital Screening Bilat with CAD; Future    Postmenopausal  -     DXA Bone Density Spine And Hip; Future    Colon cancer screening  -     Case request GI: COLONOSCOPY    Cataract of both eyes, unspecified cataract type  -     Ambulatory referral to Optometry            ICD-10-CM ICD-9-CM    1. Routine general medical examination at a health care facility Z00.00 V70.0 Hemoglobin A1c      Comprehensive metabolic panel      Lipid panel      CBC auto differential      TSH      Vitamin D      DXA Bone Density Spine And Hip      Mammo Digital Screening Bilat with CAD      Case request GI: COLONOSCOPY    Health Maintenance Reviewed: Labs, Mammogram, bone density colonoscopy all ordered. advised on diet/exercise, eye/dental exams   2. BMI 22.0-22.9, adult Z68.22 V85.1    3. Subclinical hypothyroidism E03.9 244.8 TSH    negative antibodies, recheck tsh   4. Vitamin D deficiency E55.9 268.9 Vitamin D   5. Sensorineural hearing loss (SNHL), unspecified laterality H90.5 389.10    6. Postmenopausal osteoporosis M81.0 733.01     check vitamin D level and bone density   7. Migraine with aura and without status migrainosus, not intractable G43.109 346.00     OCCULAR MIGRAINE SYMPTOMS-- self limited, not needing medication, due for routine eye exam   8. Medication management Z79.899 V58.69 Hemoglobin A1c      Comprehensive metabolic panel      Lipid panel      CBC auto differential      TSH      Vitamin D   9. Allergic rhinitis due to other allergic trigger, unspecified seasonality J30.89 477.8    10. Encounter for screening  mammogram for breast cancer Z12.31 V76.12 Mammo Digital Screening Bilat with CAD   11. Postmenopausal Z78.0 V49.81 DXA Bone Density Spine And Hip   12. Colon cancer screening Z12.11 V76.51 Case request GI: COLONOSCOPY   13. Cataract of both eyes, unspecified cataract type H26.9 366.9 Ambulatory referral to Optometry       There are no Patient Instructions on file for this visit.      Follow-up in about 1 year (around 7/24/2019) for return as needed for new concerns.

## 2018-08-14 ENCOUNTER — OFFICE VISIT (OUTPATIENT)
Dept: OPTOMETRY | Facility: CLINIC | Age: 68
End: 2018-08-14
Payer: MEDICARE

## 2018-08-14 DIAGNOSIS — H52.7 REFRACTIVE ERROR: ICD-10-CM

## 2018-08-14 DIAGNOSIS — H25.13 NUCLEAR SCLEROSIS OF BOTH EYES: Primary | ICD-10-CM

## 2018-08-14 PROCEDURE — 92014 COMPRE OPH EXAM EST PT 1/>: CPT | Mod: S$GLB,,, | Performed by: OPTOMETRIST

## 2018-08-14 PROCEDURE — 99999 PR PBB SHADOW E&M-EST. PATIENT-LVL II: CPT | Mod: PBBFAC,,, | Performed by: OPTOMETRIST

## 2018-08-14 PROCEDURE — 92015 DETERMINE REFRACTIVE STATE: CPT | Mod: S$GLB,,, | Performed by: OPTOMETRIST

## 2018-08-14 NOTE — LETTER
August 14, 2018      Sharron Ogden MD  [address]  [address]  [place] [address]           [place] [facility]  [address] [facility] [address]  [place] [address]  Phone: [phone]  Fax: [phone]          Patient: Keo Frias   MR Number: [id number]   YOB: 1950   Date of Visit: 8/14/2018       Dear Dr. Sharron Ogden:    Thank you for referring Keo Frias to me for evaluation. Attached you will find relevant portions of my assessment and plan of care.    If you have questions, please do not hesitate to call me. I look forward to following Keo Frias along with you.    Sincerely,    Malick Mo, OD    Enclosure  CC:  No Recipients    If you would like to receive this communication electronically, please contact [email or web address] or [phone] to request more information on [facility] access.    For providers and/or their staff who would like to refer a patient to [facility], please contact us through our one-stop-shop provider referral line, [facility], at [phone].    If you feel you have received this communication in error or would no longer like to receive these types of communications, please e-mail [email or web address]

## 2018-08-14 NOTE — PROGRESS NOTES
JUAN FRANCISCO BRUMFIELD 01/2016 Wears OTC +1.50 readers, seem to work fine.  Distance seems   fine without glasses. Some trouble with night driving. Patient defers   refraction today.  Not using any drops.    Last edited by Brittanie Kemp on 8/14/2018  8:56 AM. (History)            Assessment /Plan     For exam results, see Encounter Report.    Nuclear sclerosis of both eyes    Refractive error      1. Educated pt on presence of cataracts and effects on vision. No surgery at this time. Recheck in one year.  2. Spec Rx given. Different lens options discussed with patient. RTC 1 year full exam.

## 2018-08-15 ENCOUNTER — HOSPITAL ENCOUNTER (OUTPATIENT)
Dept: RADIOLOGY | Facility: HOSPITAL | Age: 68
Discharge: HOME | End: 2018-08-15
Attending: FAMILY MEDICINE
Payer: MEDICARE

## 2018-08-15 DIAGNOSIS — Z00.00 ROUTINE GENERAL MEDICAL EXAMINATION AT A HEALTH CARE FACILITY: ICD-10-CM

## 2018-08-15 DIAGNOSIS — Z78.0 POSTMENOPAUSAL: ICD-10-CM

## 2018-08-15 DIAGNOSIS — Z12.31 ENCOUNTER FOR SCREENING MAMMOGRAM FOR BREAST CANCER: ICD-10-CM

## 2018-08-15 PROCEDURE — 77067 SCR MAMMO BI INCL CAD: CPT | Mod: TC

## 2018-08-15 PROCEDURE — 77067 SCR MAMMO BI INCL CAD: CPT | Mod: 26,,, | Performed by: RADIOLOGY

## 2018-08-15 PROCEDURE — 77080 DXA BONE DENSITY AXIAL: CPT | Mod: TC

## 2018-08-15 PROCEDURE — 77080 DXA BONE DENSITY AXIAL: CPT | Mod: 26,,, | Performed by: RADIOLOGY

## 2018-08-16 ENCOUNTER — TELEPHONE (OUTPATIENT)
Dept: FAMILY MEDICINE | Facility: CLINIC | Age: 68
End: 2018-08-16

## 2018-08-17 ENCOUNTER — TELEPHONE (OUTPATIENT)
Dept: GASTROENTEROLOGY | Facility: CLINIC | Age: 68
End: 2018-08-17

## 2018-08-20 ENCOUNTER — TELEPHONE (OUTPATIENT)
Dept: GASTROENTEROLOGY | Facility: CLINIC | Age: 68
End: 2018-08-20

## 2018-08-20 NOTE — TELEPHONE ENCOUNTER
Spoke with patient about scheduling a Colonoscopy. Patient want the procedure done in November. Patient is going to call back in late September early October.

## 2018-08-20 NOTE — TELEPHONE ENCOUNTER
----- Message from Malina Shine sent at 8/20/2018  8:47 AM CDT -----  Contact: Self [phone]  Patient Returning Your Phone Call

## 2018-10-31 ENCOUNTER — TELEPHONE (OUTPATIENT)
Dept: ENDOSCOPY | Facility: HOSPITAL | Age: 68
End: 2018-10-31

## 2018-10-31 DIAGNOSIS — Z12.11 SPECIAL SCREENING FOR MALIGNANT NEOPLASMS, COLON: Primary | ICD-10-CM

## 2018-10-31 RX ORDER — POLYETHYLENE GLYCOL 3350, SODIUM SULFATE ANHYDROUS, SODIUM BICARBONATE, SODIUM CHLORIDE, POTASSIUM CHLORIDE 236; 22.74; 6.74; 5.86; 2.97 G/4L; G/4L; G/4L; G/4L; G/4L
4 POWDER, FOR SOLUTION ORAL ONCE
Qty: 4000 ML | Refills: 0 | Status: SHIPPED | OUTPATIENT
Start: 2018-10-31 | End: 2018-10-31

## 2018-11-19 ENCOUNTER — IMMUNIZATION (OUTPATIENT)
Dept: PHARMACY | Facility: CLINIC | Age: 68
End: 2018-11-19
Payer: MEDICARE

## 2018-12-07 ENCOUNTER — ANESTHESIA EVENT (OUTPATIENT)
Dept: ENDOSCOPY | Facility: HOSPITAL | Age: 68
End: 2018-12-07
Payer: MEDICARE

## 2018-12-07 ENCOUNTER — ANESTHESIA (OUTPATIENT)
Dept: ENDOSCOPY | Facility: HOSPITAL | Age: 68
End: 2018-12-07
Payer: MEDICARE

## 2018-12-07 ENCOUNTER — HOSPITAL ENCOUNTER (OUTPATIENT)
Facility: HOSPITAL | Age: 68
Discharge: HOME | End: 2018-12-07
Attending: INTERNAL MEDICINE | Admitting: INTERNAL MEDICINE
Payer: MEDICARE

## 2018-12-07 VITALS
BODY MASS INDEX: 23.37 KG/M2 | RESPIRATION RATE: 18 BRPM | DIASTOLIC BLOOD PRESSURE: 84 MMHG | WEIGHT: 127 LBS | HEIGHT: 62 IN | SYSTOLIC BLOOD PRESSURE: 166 MMHG | TEMPERATURE: 98 F | OXYGEN SATURATION: 100 % | HEART RATE: 74 BPM

## 2018-12-07 DIAGNOSIS — Z12.11 COLON CANCER SCREENING: Primary | ICD-10-CM

## 2018-12-07 DIAGNOSIS — Z12.11 SCREENING FOR MALIGNANT NEOPLASM OF COLON: ICD-10-CM

## 2018-12-07 PROCEDURE — G0121 COLON CA SCRN NOT HI RSK IND: HCPCS | Performed by: INTERNAL MEDICINE

## 2018-12-07 PROCEDURE — 25000003 PHARM REV CODE 250: Performed by: INTERNAL MEDICINE

## 2018-12-07 PROCEDURE — G0121 COLON CA SCRN NOT HI RSK IND: HCPCS | Mod: ,,, | Performed by: INTERNAL MEDICINE

## 2018-12-07 PROCEDURE — 37000009 HC ANESTHESIA EA ADD 15 MINS: Performed by: INTERNAL MEDICINE

## 2018-12-07 PROCEDURE — E9220 PRA ENDO ANESTHESIA: HCPCS | Mod: ,,, | Performed by: NURSE ANESTHETIST, CERTIFIED REGISTERED

## 2018-12-07 PROCEDURE — 37000008 HC ANESTHESIA 1ST 15 MINUTES: Performed by: INTERNAL MEDICINE

## 2018-12-07 PROCEDURE — 63600175 PHARM REV CODE 636 W HCPCS: Performed by: NURSE ANESTHETIST, CERTIFIED REGISTERED

## 2018-12-07 RX ORDER — PROPOFOL 10 MG/ML
VIAL (ML) INTRAVENOUS
Status: DISCONTINUED | OUTPATIENT
Start: 2018-12-07 | End: 2018-12-07

## 2018-12-07 RX ORDER — SODIUM CHLORIDE 9 MG/ML
INJECTION, SOLUTION INTRAVENOUS CONTINUOUS
Status: DISCONTINUED | OUTPATIENT
Start: 2018-12-07 | End: 2018-12-07 | Stop reason: HOSPADM

## 2018-12-07 RX ORDER — LIDOCAINE HCL/PF 100 MG/5ML
SYRINGE (ML) INTRAVENOUS
Status: DISCONTINUED | OUTPATIENT
Start: 2018-12-07 | End: 2018-12-07

## 2018-12-07 RX ADMIN — PROPOFOL 100 MG: 10 INJECTION, EMULSION INTRAVENOUS at 09:12

## 2018-12-07 RX ADMIN — PROPOFOL 50 MG: 10 INJECTION, EMULSION INTRAVENOUS at 09:12

## 2018-12-07 RX ADMIN — SODIUM CHLORIDE: 0.9 INJECTION, SOLUTION INTRAVENOUS at 07:12

## 2018-12-07 RX ADMIN — LIDOCAINE HYDROCHLORIDE 100 MG: 20 INJECTION, SOLUTION INTRAVENOUS at 09:12

## 2018-12-07 NOTE — H&P
Short Stay Endoscopy History and Physical    PCP - Sharron Ogden MD    Procedure - Colonoscopy  Sedation: GA  ASA - per anesthesia  Mallampati - per anesthesia  History of Anesthesia problems - no  Family history Anesthesia problems -  no     HPI:  This is a 68 y.o. female here for evaluation of : Screening for CRC    Reflux - no  Dysphagia - no  Abdominal pain - no  Diarrhea - no    ROS:  Constitutional: No fevers, chills, No weight loss  ENT: No allergies  CV: No chest pain  Pulm: No cough, No shortness of breath  Ophtho: No vision changes  GI: see HPI  Medical History:  has a past medical history of Anemia, Cataract, and Subclinical hypothyroidism.    Surgical History:  has a past surgical history that includes Tubal ligation and Dilation and curettage of uterus.    Family History: family history includes Cancer in her mother; Cataracts in her father; Diabetes in her brother; Glaucoma in her brother and father; Hypertension in her brother; Lung cancer in her mother; Retinal detachment in her father; Stroke in her maternal grandmother and paternal grandmother; Uterine cancer in her maternal grandmother.. Otherwise no colon cancer, inflammatory bowel disease, or GI malignancies.    Social History:  reports that  has never smoked. she has never used smokeless tobacco. She reports that she does not drink alcohol or use drugs.    Review of patient's allergies indicates:   Allergen Reactions    Sulfa (sulfonamide antibiotics) Rash       Medications:   Medications Prior to Admission   Medication Sig Dispense Refill Last Dose    ERGOCALCIFEROL, VITAMIN D2, (VITAMIN D ORAL) Take by mouth.   Past Week at Unknown time    multivitamin-iron-folic acid Tab Take 1 tablet by mouth once daily.   Past Week at Unknown time    influenza (FLUZONE HIGH-DOSE) 180 mcg/0.5 mL vaccine Inject into the muscle by [facility] 0.5 mL 0        Objective Findings:    Vital Signs: Per nursing notes.    Physical Exam:  General Appearance: Well  appearing in no acute distress  Head:   Normocephalic, without obvious abnormality  Eyes:    No scleral icterus  Airway: Open  Neck: No restriction in mobility  Lungs: CTA bilaterally in anterior and posterior fields, no wheezes, no crackles.  Heart:  Regular rate and rhythm, S1, S2 normal, no murmurs heard  Abdomen: Soft, non tender, non distended      Labs:  Lab Results   Component Value Date    WBC 4.85 07/24/2018    HGB 13.4 07/24/2018    HCT 42.1 07/24/2018     07/24/2018    CHOL 221 (H) 07/24/2018    TRIG 140 07/24/2018    HDL 44 07/24/2018    ALT 18 07/24/2018    AST 20 07/24/2018     07/24/2018    K 3.9 07/24/2018     07/24/2018    CREATININE 0.6 07/24/2018    BUN 10 07/24/2018    CO2 26 07/24/2018    TSH 3.665 07/24/2018    HGBA1C 5.8 (H) 07/24/2018         I have explained the risks and benefits of endoscopy procedures to the patient including but not limited to bleeding, perforation, infection, and death.    Thank you so much for allowing me to participate in the care of Keo Gaston MD

## 2018-12-07 NOTE — ANESTHESIA PREPROCEDURE EVALUATION
12/07/2018  Keo Frias is a 68 y.o., female.    Anesthesia Evaluation    I have reviewed the Patient Summary Reports.     I have reviewed the Medications.     Review of Systems  Anesthesia Hx:  Denies Hx of Anesthetic complications  Neg history of prior surgery. Denies Family Hx of Anesthesia complications.   Denies Personal Hx of Anesthesia complications.   Social:  Non-Smoker, No Alcohol Use    Hematology/Oncology:  Hematology Normal   Oncology Normal     EENT/Dental:EENT/Dental Normal   Cardiovascular:  Cardiovascular Normal Exercise tolerance: good     Pulmonary:  Pulmonary Normal    Renal/:  Renal/ Normal     Hepatic/GI:  Hepatic/GI Normal Bowel Prep.    Musculoskeletal:  Musculoskeletal Normal    Neurological:  Neurology Normal    Endocrine:  Endocrine Normal    Dermatological:  Skin Normal    Psych:  Psychiatric Normal           Physical Exam  General:  Well nourished    Airway/Jaw/Neck:  Airway Findings: Mouth Opening: Normal Tongue: Normal  General Airway Assessment: Adult  Mallampati: III  Improves to II with phonation.  TM Distance: Normal, at least 6 cm     Eyes/Ears/Nose:  EYES/EARS/NOSE FINDINGS: Normal   Dental:  Dental Findings: In tact   Chest/Lungs:  Chest/Lungs Findings: Clear to auscultation, Normal Respiratory Rate     Heart/Vascular:  Heart Findings: Rate: Normal  Rhythm: Regular Rhythm  Sounds: Normal  Heart murmur: negative       Mental Status:  Mental Status Findings:  Cooperative, Alert and Oriented         Anesthesia Plan  Type of Anesthesia, risks & benefits discussed:  Anesthesia Type:  general  Patient's Preference: General  Intra-op Monitoring Plan: standard ASA monitors  Intra-op Monitoring Plan Comments:   Post Op Pain Control Plan:   Post Op Pain Control Plan Comments:   Induction:   IV  Beta Blocker:  Patient is not currently on a Beta-Blocker (No further documentation  required).       Informed Consent: Patient understands risks and agrees with Anesthesia plan.  Questions answered. Anesthesia consent signed with patient.  ASA Score: 2     Day of Surgery Review of History & Physical:    H&P update referred to the surgeon.         Ready For Surgery From Anesthesia Perspective.

## 2018-12-07 NOTE — [employer] PATIENT INSTRUCTIONS
Discharge Summary/Instructions after an Endoscopic Procedure  Patient Name: Keo Frias  Patient MRN: [id number]  Patient YOB: 1950 Friday, December 07, 2018  Bhargav Gaston MD  RESTRICTIONS:  During your procedure today, you received medications for sedation.  These   medications may affect your judgment, balance and coordination.  Therefore,   for 24 hours, you have the following restrictions:   - DO NOT drive a car, operate machinery, make legal/financial decisions,   sign important papers or drink alcohol.    ACTIVITY:  Today: no heavy lifting, straining or running due to procedural   sedation/anesthesia.  The following day: return to full activity including work.  DIET:  Eat and drink normally unless instructed otherwise.     TREATMENT FOR COMMON SIDE EFFECTS:  - Mild abdominal pain, nausea, belching, bloating or excessive gas:  rest,   eat lightly and use a heating pad.  - Sore Throat: treat with throat lozenges and/or gargle with warm salt   water.  - Because air was used during the procedure, expelling large amounts of air   from your rectum or belching is normal.  - If a bowel prep was taken, you may not have a bowel movement for 1-3 days.    This is normal.  SYMPTOMS TO WATCH FOR AND REPORT TO YOUR PHYSICIAN:  1. Abdominal pain or bloating, other than gas cramps.  2. Chest pain.  3. Back pain.  4. Signs of infection such as: chills or fever occurring within 24 hours   after the procedure.  5. Rectal bleeding, which would show as bright red, maroon, or black stools.   (A tablespoon of blood from the rectum is not serious, especially if   hemorrhoids are present.)  6. Vomiting.  7. Weakness or dizziness.  GO DIRECTLY TO THE NEAREST EMERGENCY ROOM IF YOU HAVE ANY OF THE FOLLOWING:      Difficulty breathing              Chills and/or fever over 101 F   Persistent vomiting and/or vomiting blood   Severe abdominal pain   Severe chest pain   Black, tarry stools   Bleeding- more than one  tablespoon   Any other symptom or condition that you feel may need urgent attention  Your doctor recommends these additional instructions:  If any biopsies were taken, your doctors clinic will contact you in 1 to 2   weeks with any results.  - Patient has a contact number available for emergencies.  The signs and   symptoms of potential delayed complications were discussed with the   patient.  Return to normal activities tomorrow.  Written discharge   instructions were provided to the patient.   - Discharge patient to home.   - Resume previous diet.   - Continue present medications.   - Repeat colonoscopy is not recommended for screening purposes.   For questions, problems or results please call your physician - Bhargav Gaston MD at Work:  [phone].  [facility], EMERGENCY ROOM PHONE NUMBER: [phone]  IF A COMPLICATION OR EMERGENCY SITUATION ARISES AND YOU ARE UNABLE TO REACH   YOUR PHYSICIAN - GO DIRECTLY TO THE EMERGENCY ROOM.  Bhargav Gaston MD  12/7/2018 9:54:31 AM  This report has been verified and signed electronically.  [employer]

## 2018-12-07 NOTE — PLAN OF CARE
Plan of care discussed with patient. All questions and concerns addressed and answered. Free of pain or distress. PIV removed without complications. VS stable. Procedure report and discharge instructions gone over and patient aware of restrictions and when to resume medications. Patient in agreement.

## 2018-12-07 NOTE — TRANSFER OF CARE
"Anesthesia Transfer of Care Note    Patient: Keo Frias    Procedure(s) Performed: Procedure(s) (LRB):  COLONOSCOPY (N/A)    Patient location: PACU    Anesthesia Type: general    Transport from OR: Transported from OR on room air with adequate spontaneous ventilation    Post pain: adequate analgesia    Post assessment: no apparent anesthetic complications and tolerated procedure well    Post vital signs: stable    Level of consciousness: responds to stimulation and sedated    Nausea/Vomiting: no nausea/vomiting    Complications: none    Transfer of care protocol was followed      Last vitals:   Visit Vitals  BP (!) 146/65 (BP Location: Left arm, Patient Position: Lying)   Pulse 71   Temp 36.5 °C (97.7 °F) (Temporal)   Resp 18   Ht 5' 2" (1.575 m)   Wt 57.6 kg (127 lb)   LMP  (LMP Unknown)   SpO2 100%   Breastfeeding? No   BMI 23.23 kg/m²     "

## 2018-12-07 NOTE — ANESTHESIA POSTPROCEDURE EVALUATION
"Anesthesia Post Evaluation    Patient: Keo Frias    Procedure(s) Performed: Procedure(s) (LRB):  COLONOSCOPY (N/A)    Final Anesthesia Type: general  Patient location during evaluation: PACU  Patient participation: Yes- Able to Participate  Level of consciousness: awake and alert and oriented  Post-procedure vital signs: reviewed and stable  Pain management: adequate  Airway patency: patent  PONV status at discharge: No PONV  Anesthetic complications: no      Cardiovascular status: blood pressure returned to baseline  Respiratory status: unassisted, room air and spontaneous ventilation  Hydration status: euvolemic  Follow-up not needed.        Visit Vitals  /70   Pulse 69   Temp 36.4 °C (97.5 °F) (Tympanic)   Resp 16   Ht 5' 2" (1.575 m)   Wt 57.6 kg (127 lb)   LMP  (LMP Unknown)   SpO2 99%   Breastfeeding? No   BMI 23.23 kg/m²       Pain/Kapil Score: Pain Assessment Performed: Yes (12/7/2018 10:10 AM)  Presence of Pain: denies (12/7/2018 10:10 AM)  Kapil Score: 10 (12/7/2018 10:10 AM)        "

## 2018-12-14 ENCOUNTER — TELEPHONE (OUTPATIENT)
Dept: ENDOSCOPY | Facility: HOSPITAL | Age: 68
End: 2018-12-14

## 2019-01-11 ENCOUNTER — OFFICE VISIT (OUTPATIENT)
Dept: FAMILY MEDICINE | Facility: CLINIC | Age: 69
End: 2019-01-11
Payer: MEDICARE

## 2019-01-11 VITALS
WEIGHT: 123.25 LBS | BODY MASS INDEX: 22.68 KG/M2 | OXYGEN SATURATION: 99 % | HEART RATE: 63 BPM | HEIGHT: 62 IN | DIASTOLIC BLOOD PRESSURE: 78 MMHG | TEMPERATURE: 98 F | SYSTOLIC BLOOD PRESSURE: 159 MMHG

## 2019-01-11 DIAGNOSIS — I49.9 IRREGULAR HEART BEAT: ICD-10-CM

## 2019-01-11 DIAGNOSIS — R03.0 ELEVATED BLOOD PRESSURE READING: Primary | ICD-10-CM

## 2019-01-11 DIAGNOSIS — R09.89 LABILE BLOOD PRESSURE: ICD-10-CM

## 2019-01-11 DIAGNOSIS — R73.09 ELEVATED HEMOGLOBIN A1C: ICD-10-CM

## 2019-01-11 PROCEDURE — 99214 PR OFFICE/OUTPT VISIT, EST, LEVL IV, 30-39 MIN: ICD-10-PCS | Mod: S$GLB,,, | Performed by: FAMILY MEDICINE

## 2019-01-11 PROCEDURE — 99999 PR PBB SHADOW E&M-EST. PATIENT-LVL III: ICD-10-PCS | Mod: PBBFAC,,, | Performed by: FAMILY MEDICINE

## 2019-01-11 PROCEDURE — 99214 OFFICE O/P EST MOD 30 MIN: CPT | Mod: S$GLB,,, | Performed by: FAMILY MEDICINE

## 2019-01-11 PROCEDURE — 1101F PT FALLS ASSESS-DOCD LE1/YR: CPT | Mod: CPTII,S$GLB,, | Performed by: FAMILY MEDICINE

## 2019-01-11 PROCEDURE — 99999 PR PBB SHADOW E&M-EST. PATIENT-LVL III: CPT | Mod: PBBFAC,,, | Performed by: FAMILY MEDICINE

## 2019-01-11 PROCEDURE — 1101F PR PT FALLS ASSESS DOC 0-1 FALLS W/OUT INJ PAST YR: ICD-10-PCS | Mod: CPTII,S$GLB,, | Performed by: FAMILY MEDICINE

## 2019-01-11 NOTE — PROGRESS NOTES
Office Visit    Patient Name: Keo Frias    : 1950  MRN: [id number]    Subjective:  Keo TURNER is a 68 y.o. female who presents today for:    Hypertension    69 yo female last seen by me 2018 for annual exam and advised 1 year follow up, sooner if concerns, here today for UC visit secondary to concern for elevated blood pressure readings.  She does not have a history of hypertension and has never been on blood pressure medications.  Her blood pressure was noted to be elevated 2018 when she presented for her colonoscopy and it is again elevated to 159/78 today .  Her colonoscopy was ultimately unremarkable in she does not need further colonoscopies for screening purposes.    Morning blood pressure readings are generally 135-160 and evening readings are generally / 50s systolic. She overall feels physically the same throughout the day, but sometimes when pressures are low she is more fatigued.  No headaches, CP, SOB, no lightheadedness or dizziness. No leg swelling.  Good exercise tolerance. Walks most days for 30- minutes at a mild- moderate pace with her dog.  Feels good with exertion in general.  She takes her blood pressures on a home machine and of note, her home machine is starting to at times teller she has a irregular heartbeat.  She has never had this alert on her machine previously, and her  is continuing to use the same machine and not having this come up.  She started getting the irregular heartbeat notification is a around the same time that she started noticing some increased lability of her blood pressures.  She is not physically aware of any palpitations, however, at times she is as per above fatigued.    She does snore at night but does not awake with headaches and has good energy level.  There has been no previous concern for sleep apnea.        Past Medical History  Past Medical History:   Diagnosis Date    Anemia     Cataract     Subclinical hypothyroidism         Past Surgical History  Past Surgical History:   Procedure Laterality Date    COLONOSCOPY N/A 12/7/2018    Performed by Bhargav Gaston MD at [facility] (4TH FLR)    DILATION AND CURETTAGE OF UTERUS      postmenopausal bleeding    TUBAL LIGATION         Family History  Family History   Problem Relation Age of Onset    Diabetes Brother     Hypertension Brother     Glaucoma Brother     Glaucoma Father     Cataracts Father     Retinal detachment Father     Lung cancer Mother     Cancer Mother     Uterine cancer Maternal Grandmother     Stroke Maternal Grandmother     Stroke Paternal Grandmother     Amblyopia Neg Hx     Blindness Neg Hx     Macular degeneration Neg Hx     Strabismus Neg Hx     Thyroid disease Neg Hx        Social History  Social History     Socioeconomic History    Marital status:      Spouse name: Not on file    Number of children: 2    Years of education: Not on file    Highest education level: Not on file   Social Needs    Financial resource strain: Not on file    Food insecurity - worry: Not on file    Food insecurity - inability: Not on file    Transportation needs - medical: Not on file    Transportation needs - non-medical: Not on file   Occupational History     Employer: [employer]   Tobacco Use    Smoking status: Never Smoker    Smokeless tobacco: Never Used   Substance and Sexual Activity    Alcohol use: No    Drug use: No    Sexual activity: Yes     Partners: Male     Birth control/protection: None   Other Topics Concern    Not on file   Social History Narrative    Not on file       Current Medications  Medications reviewed and updated.     Allergies   Review of patient's allergies indicates:   Allergen Reactions    Sulfa (sulfonamide antibiotics) Rash       Review of Systems (Pertinent positives)  Review of Systems   Constitutional: Positive for fatigue. Negative for unexpected weight change.   Eyes: Negative for visual  "disturbance.   Respiratory: Negative for chest tightness and shortness of breath.    Cardiovascular: Negative for chest pain, palpitations and leg swelling.   Neurological: Negative for dizziness, light-headedness and headaches.       BP (!) 159/78 (BP Location: Left arm, Patient Position: Sitting, BP Method: Medium (Automatic))   Pulse 63   Temp 98.1 °F (36.7 °C) (Oral)   Ht 5' 2" (1.575 m)   Wt 55.9 kg (123 lb 3.8 oz)   LMP  (LMP Unknown)   SpO2 99%   BMI 22.54 kg/m²     Physical Exam   Constitutional: She is oriented to person, place, and time. She appears well-developed and well-nourished. No distress.   HENT:   Head: Normocephalic and atraumatic.   Eyes: Conjunctivae are normal.   Neck: Carotid bruit is not present.   Cardiovascular: Normal rate and regular rhythm.   Pulmonary/Chest: Effort normal and breath sounds normal.   Musculoskeletal: She exhibits no edema.   Neurological: She is alert and oriented to person, place, and time.   Skin: Skin is warm and dry.   Psychiatric: She has a normal mood and affect.   Vitals reviewed.        Assessment/Plan:  Keo Frias is a 68 y.o. female who presents today for :    Keo TURNER was seen today for hypertension.    Diagnoses and all orders for this visit:    Elevated blood pressure reading  Comments:  Morning blood pressures tend to be elevated, but on the same machine her evening blood pressures are very low.  Concern for starting BP agent given lability    Labile blood pressure  Comments:  Check morning cortisol as part of investigation for high morning BP readings.  Holter monitor given recent concern for irregular heartbeats.  Ongoing BP log.  Orders:  -     Holter monitor - 24 hour; Future    Irregular heart beat  Comments:  Check labs and 24 Holter monitor.  Normal sinus rhythm on exam today, but has been getting irregular heartbeat alerts on her blood pressure machine  Orders:  -     Holter monitor - 24 hour; Future  -     CORTISOL, 8AM; Future  -     " Comprehensive metabolic panel; Future  -     CBC auto differential; Future  -     TSH; Future  -     Magnesium; Future    Elevated hemoglobin A1c  Comments:  Patient wondering if labile sugars could be contributing to labile pressures.  She states she has had hypoglycemia in the past.  Most recent A1c 5.8  Orders:  -     CORTISOL, 8AM; Future  -     Hemoglobin A1c; Future            ICD-10-CM ICD-9-CM    1. Elevated blood pressure reading R03.0 796.2     Morning blood pressures tend to be elevated, but on the same machine her evening blood pressures are very low.  Concern for starting BP agent given lability   2. Labile blood pressure R09.89 796.2 Holter monitor - 24 hour    Check morning cortisol as part of investigation for high morning BP readings.  Holter monitor given recent concern for irregular heartbeats.  Ongoing BP log.   3. Irregular heart beat I49.9 427.9 Holter monitor - 24 hour      CORTISOL, 8AM      Comprehensive metabolic panel      CBC auto differential      TSH      Magnesium    Check labs and 24 Holter monitor.  Normal sinus rhythm on exam today, but has been getting irregular heartbeat alerts on her blood pressure machine   4. Elevated hemoglobin A1c R73.09 790.29 CORTISOL, 8AM      Hemoglobin A1c    Patient wondering if labile sugars could be contributing to labile pressures.  She states she has had hypoglycemia in the past.  Most recent A1c 5.8       There are no Patient Instructions on file for this visit.      Follow-up for 2 weeks-- BRING BLOOD PRESSURE LOG with morning and evening readings, to follow up on lab results.

## 2019-01-15 ENCOUNTER — HOSPITAL ENCOUNTER (OUTPATIENT)
Dept: CARDIOLOGY | Facility: HOSPITAL | Age: 69
Discharge: HOME | End: 2019-01-15
Attending: FAMILY MEDICINE
Payer: MEDICARE

## 2019-01-15 DIAGNOSIS — R09.89 LABILE BLOOD PRESSURE: ICD-10-CM

## 2019-01-15 DIAGNOSIS — I49.9 IRREGULAR HEART BEAT: ICD-10-CM

## 2019-01-15 PROCEDURE — 93226 XTRNL ECG REC<48 HR SCAN A/R: CPT

## 2019-01-15 PROCEDURE — 93227 XTRNL ECG REC<48 HR R&I: CPT | Mod: ,,, | Performed by: STUDENT IN AN ORGANIZED HEALTH CARE EDUCATION/TRAINING PROGRAM

## 2019-01-15 PROCEDURE — 93227 HOLTER MONITOR - 24 HOUR (CUPID ONLY): ICD-10-PCS | Mod: ,,, | Performed by: STUDENT IN AN ORGANIZED HEALTH CARE EDUCATION/TRAINING PROGRAM

## 2019-01-17 LAB
OHS CV EVENT MONITOR DAY: 23
OHS CV HOLTER LENGTH DECIMAL HOURS: 611
OHS CV HOLTER LENGTH HOURS: 59
OHS CV HOLTER LENGTH MINUTES: 0

## 2019-01-29 ENCOUNTER — OFFICE VISIT (OUTPATIENT)
Dept: FAMILY MEDICINE | Facility: CLINIC | Age: 69
End: 2019-01-29
Payer: MEDICARE

## 2019-01-29 VITALS
BODY MASS INDEX: 19.21 KG/M2 | WEIGHT: 122.38 LBS | DIASTOLIC BLOOD PRESSURE: 79 MMHG | OXYGEN SATURATION: 97 % | TEMPERATURE: 88 F | SYSTOLIC BLOOD PRESSURE: 119 MMHG | HEIGHT: 67 IN

## 2019-01-29 DIAGNOSIS — R06.83 SNORING: ICD-10-CM

## 2019-01-29 DIAGNOSIS — G47.19 EXCESSIVE DAYTIME SLEEPINESS: ICD-10-CM

## 2019-01-29 DIAGNOSIS — R09.89 LABILE BLOOD PRESSURE: Primary | ICD-10-CM

## 2019-01-29 PROCEDURE — 99214 PR OFFICE/OUTPT VISIT, EST, LEVL IV, 30-39 MIN: ICD-10-PCS | Mod: S$GLB,,, | Performed by: FAMILY MEDICINE

## 2019-01-29 PROCEDURE — 99999 PR PBB SHADOW E&M-EST. PATIENT-LVL III: ICD-10-PCS | Mod: PBBFAC,,, | Performed by: FAMILY MEDICINE

## 2019-01-29 PROCEDURE — 1101F PR PT FALLS ASSESS DOC 0-1 FALLS W/OUT INJ PAST YR: ICD-10-PCS | Mod: CPTII,S$GLB,, | Performed by: FAMILY MEDICINE

## 2019-01-29 PROCEDURE — 1101F PT FALLS ASSESS-DOCD LE1/YR: CPT | Mod: CPTII,S$GLB,, | Performed by: FAMILY MEDICINE

## 2019-01-29 PROCEDURE — 99999 PR PBB SHADOW E&M-EST. PATIENT-LVL III: CPT | Mod: PBBFAC,,, | Performed by: FAMILY MEDICINE

## 2019-01-29 PROCEDURE — 99214 OFFICE O/P EST MOD 30 MIN: CPT | Mod: S$GLB,,, | Performed by: FAMILY MEDICINE

## 2019-01-29 NOTE — PROGRESS NOTES
Office Visit    Patient Name: Keo Frias    : 1950  MRN: [id number]    Subjective:  Keo TURNER is a 68 y.o. female who presents today for:    Follow-up (2wk f/u, student)    67 yo generally healthy patient of mine with no significant past medical history here for follow up of office visit 2019. At that time she present ed with concerns for labile blood pressure-- had noted some elevated morning readings that dropped to low in the evenings-- and possible irregular heart beat. Her home BP machine has started to report irregular heart beat around the time that she started noting the labile blood pressures.  Labs were drawn on 1/15/2019 including AM cortisol, TSH, CMP, CBC, A1c, TSH, Magnesium and 24 hr holter monitor completed 1/15/19 all WNL.     Today she brings in an updated home BP log:  Continues to have high BPs in the AM up to 170s/90s but then by the evening BP drops to 100-110 systolic over 60s diastolic and one reading was 83/47.     She has been feeling overall well and not feeling much different regardless of if BP is high or low. No CP/SOB/ dizziness/headaches/ blurred vision. Exercise tolerance is good. Does report some snoring a takes a nap during the day but OTW no concern for sleep apnea.  No morning headaches or fatigue.     Annual Exam 18-- advised 1 yr f/u, sooner if concerns    Past Medical History  Past Medical History:   Diagnosis Date    Anemia     Cataract     Subclinical hypothyroidism        Past Surgical History  Past Surgical History:   Procedure Laterality Date    COLONOSCOPY N/A 2018    Performed by [facility]av Gaston MD at [facility] (4TH FLR)    DILATION AND CURETTAGE OF UTERUS      postmenopausal bleeding    TUBAL LIGATION         Family History  Family History   Problem Relation Age of Onset    Diabetes Brother     Hypertension Brother     Glaucoma Brother     Glaucoma Father     Cataracts Father     Retinal detachment Father     Lung cancer Mother      "Cancer Mother     Uterine cancer Maternal Grandmother     Stroke Maternal Grandmother     Stroke Paternal Grandmother     Amblyopia Neg Hx     Blindness Neg Hx     Macular degeneration Neg Hx     Strabismus Neg Hx     Thyroid disease Neg Hx        Social History  Social History     Socioeconomic History    Marital status:      Spouse name: Not on file    Number of children: 2    Years of education: Not on file    Highest education level: Not on file   Social Needs    Financial resource strain: Not on file    Food insecurity - worry: Not on file    Food insecurity - inability: Not on file    Transportation needs - medical: Not on file    Transportation needs - non-medical: Not on file   Occupational History     Employer: [employer]   Tobacco Use    Smoking status: Never Smoker    Smokeless tobacco: Never Used   Substance and Sexual Activity    Alcohol use: No    Drug use: No    Sexual activity: Yes     Partners: Male     Birth control/protection: None   Other Topics Concern    Not on file   Social History Narrative    Not on file       Current Medications  Medications reviewed and updated.     Allergies   Review of patient's allergies indicates:   Allergen Reactions    Sulfa (sulfonamide antibiotics) Rash       Review of Systems (Pertinent positives)  Review of Systems   Constitutional: Positive for fatigue (mild, takes naps during the day).   HENT:        +snoring   Eyes: Negative for visual disturbance.   Respiratory: Negative for shortness of breath.    Cardiovascular: Negative for chest pain.   Neurological: Negative for dizziness.   Psychiatric/Behavioral: Negative for sleep disturbance.       /79   Temp (!) 87.8 °F (31 °C)   Ht 5' 7" (1.702 m)   Wt 55.5 kg (122 lb 5.7 oz)   LMP  (LMP Unknown)   SpO2 97%   BMI 19.16 kg/m²     Physical Exam   Constitutional: She is oriented to person, place, and time. She appears well-developed and well-nourished. No " distress.   HENT:   Head: Normocephalic and atraumatic.   Eyes: Conjunctivae are normal.   Cardiovascular: Normal rate and regular rhythm.   Pulmonary/Chest: Effort normal and breath sounds normal.   Musculoskeletal: She exhibits no edema.   Neurological: She is alert and oriented to person, place, and time.   Skin: Skin is warm and dry.   Psychiatric: She has a normal mood and affect.   Vitals reviewed.        Assessment/Plan:  Keo Frias is a 68 y.o. female who presents today for :    Keo TURNER was seen today for follow-up.    Diagnoses and all orders for this visit:    Labile blood pressure  -     Ambulatory consult to Sleep Disorders    Snoring  -     Ambulatory consult to Sleep Disorders    Excessive daytime sleepiness  -     Ambulatory consult to Sleep Disorders            ICD-10-CM ICD-9-CM    1. Labile blood pressure R09.89 796.2 Ambulatory consult to Sleep Disorders   2. Snoring R06.83 786.09 Ambulatory consult to Sleep Disorders   3. Excessive daytime sleepiness G47.19 780.54 Ambulatory consult to Sleep Disorders       Patient Instructions   Referral to sleep medicine for rule out of obstructive sleep apnea contributing to elevated morning pressures. Continue home monitoring and follow up if numbers/ readings are worsening or any concerns.  Will recheck cholesterol at time of annual wellness visit.         Follow-up in about 6 months (around 7/29/2019) for return as needed for new concerns.

## 2019-03-18 ENCOUNTER — OFFICE VISIT (OUTPATIENT)
Dept: SLEEP MEDICINE | Facility: CLINIC | Age: 69
End: 2019-03-18
Payer: MEDICARE

## 2019-03-18 VITALS
SYSTOLIC BLOOD PRESSURE: 136 MMHG | WEIGHT: 122.19 LBS | HEART RATE: 74 BPM | DIASTOLIC BLOOD PRESSURE: 74 MMHG | BODY MASS INDEX: 19.18 KG/M2 | HEIGHT: 67 IN

## 2019-03-18 DIAGNOSIS — G47.30 SLEEP APNEA, UNSPECIFIED TYPE: Primary | ICD-10-CM

## 2019-03-18 PROCEDURE — 99999 PR PBB SHADOW E&M-EST. PATIENT-LVL III: CPT | Mod: PBBFAC,HCNC,, | Performed by: PSYCHIATRY & NEUROLOGY

## 2019-03-18 PROCEDURE — 99999 PR PBB SHADOW E&M-EST. PATIENT-LVL III: ICD-10-PCS | Mod: PBBFAC,HCNC,, | Performed by: PSYCHIATRY & NEUROLOGY

## 2019-03-18 PROCEDURE — 1101F PT FALLS ASSESS-DOCD LE1/YR: CPT | Mod: HCNC,CPTII,S$GLB, | Performed by: PSYCHIATRY & NEUROLOGY

## 2019-03-18 PROCEDURE — 99204 PR OFFICE/OUTPT VISIT, NEW, LEVL IV, 45-59 MIN: ICD-10-PCS | Mod: HCNC,S$GLB,, | Performed by: PSYCHIATRY & NEUROLOGY

## 2019-03-18 PROCEDURE — 1101F PR PT FALLS ASSESS DOC 0-1 FALLS W/OUT INJ PAST YR: ICD-10-PCS | Mod: HCNC,CPTII,S$GLB, | Performed by: PSYCHIATRY & NEUROLOGY

## 2019-03-18 PROCEDURE — 99204 OFFICE O/P NEW MOD 45 MIN: CPT | Mod: HCNC,S$GLB,, | Performed by: PSYCHIATRY & NEUROLOGY

## 2019-03-18 NOTE — PROGRESS NOTES
"   Keo Frias  was seen at the request of  Sharron Ogden MD for sleep evaluation.    03/18/2019 INITIAL HISTORY OF PRESENT ILLNESS:  Keo Frias is a 68 y.o. female is here to be evaluated for a sleep disorder.       CHIEF COMPLAINT:      The patient's complaints include occasional snoring - sleeps alone; excessive daytime sleepiness  interrupted sleep since 6 months ago.    Keo Frias denied   witnessed breathing pauses and  gasping for air in sleep.     No significant recent change in activity or lifestyle.  \  BP recent elevations to 160/80  in AM. Not on medications.    Reports  excessive daytime sleepiness since mid - twenties - "fell asleep standing"  then sleepiness has improved.    EPWORTH SLEEPINESS SCALE 3/18/2019   Sitting and reading 3   Watching TV 2   Sitting, inactive in a public place (e.g. a theatre or a meeting) 2   As a passenger in a car for an hour without a break 1   Lying down to rest in the afternoon when circumstances permit 3   Sitting and talking to someone 1   Sitting quietly after a lunch without alcohol 2   In a car, while stopped for a few minutes in traffic 1   Total score 15       PHQ9 10/23/2014   Total Score 1     No flowsheet data found.      SLEEP ROUTINE AND LIFESTYLE 03/18/2019 :  Sleep Clinic New Patient 3/18/2019   What time do you go to bed on a week day? (Give a range) 10:30 PM   What time do you go to bed on a day off? (Give a range) 10:30 PM   How long does it take you to fall asleep? (Give a range) Immediately   On average, how many times per night do you wake up? 0-1   How long does it take you to fall back into sleep? (Give a range) 1 hr   What time do you wake up to start your day on a week day? (Give a range) 4:00 AM   What time do you wake up to start your day on a day off? (Give a range) 4:00 AM   What time do you get out of bed? (Give a range) 4:05 AM   On average, how many hours do you sleep? 8 hrs   On average, how many naps do you take per day? 1 "   Rate your sleep quality from 0 to 5 (0-poor, 5-great). 4   Have you experienced:  Weight loss?   How much weight have you lost or gained (in lbs.) in the last year? 5-6 lbs   On average, how many times per night do you go to the bathroom?  1   Have you ever had a sleep study/CPAP machine/surgery for sleep apnea? No   Have you ever had a CPAP machine for sleep apnea? No   Have you ever had surgery for sleep apnea? No       Sleep Clinic ROS  3/18/2019   Difficulty breathing through the nose?  No   Sore throat or dry mouth in the morning? Yes   Irregular or very fast heart beat?  Sometimes   Shortness of breath?  Sometimes   Acid reflux? Sometimes   Body aches and pains?  No   Morning headaches? Sometimes   Dizziness? Sometimes   Mood changes?  Sometimes   Do you exercise?  Yes   Do you feel like moving your legs a lot?  Sometimes          Denies symptoms concerning for parasomnia    Social History:      Occupation:retired - used to working  [employer] lab  Bed partner: no  Exercise routine: walking in the mornings  Caffeine:  none  No smoking, not drinking ETOH     PREVIOUS SLEEP STUDIES:     None      DME:       PAST MEDICAL HISTORY:    Active Ambulatory Problems     Diagnosis Date Noted    Postmenopausal osteoporosis 03/25/2014    Migraine headache with aura 10/23/2014    Vitamin D deficiency 10/23/2014    Vegetarian diet 10/23/2014    Otalgia of both ears 08/16/2016    Sensorineural hearing loss 08/16/2016     Resolved Ambulatory Problems     Diagnosis Date Noted    Iron deficiency anemia 03/25/2014    Subclinical hypothyroidism 03/25/2014     Past Medical History:   Diagnosis Date    Anemia     Cataract     Subclinical hypothyroidism                 PAST SURGICAL HISTORY:    Past Surgical History:   Procedure Laterality Date    COLONOSCOPY N/A 12/7/2018    [facility] at [facility] ([facility])    DILATION AND CURETTAGE OF UTERUS      postmenopausal bleeding    TUBAL LIGATION    "        FAMILY HISTORY:                Family History   Problem Relation Age of Onset    Diabetes Brother     Hypertension Brother     Glaucoma Brother     Glaucoma Father     Cataracts Father     Retinal detachment Father     Lung cancer Mother     Cancer Mother     Uterine cancer Maternal Grandmother     Stroke Maternal Grandmother     Stroke Paternal Grandmother     Amblyopia Neg Hx     Blindness Neg Hx     Macular degeneration Neg Hx     Strabismus Neg Hx     Thyroid disease Neg Hx        SOCIAL HISTORY:          Tobacco:   Social History     Tobacco Use   Smoking Status Never Smoker   Smokeless Tobacco Never Used       alcohol use:    Social History     Substance and Sexual Activity   Alcohol Use No                   ALLERGIES:    Review of patient's allergies indicates:   Allergen Reactions    Sulfa (sulfonamide antibiotics) Rash       CURRENT MEDICATIONS:    Current Outpatient Medications   Medication Sig Dispense Refill    ERGOCALCIFEROL, VITAMIN D2, (VITAMIN D ORAL) Take by mouth.      multivitamin-iron-folic acid Tab Take 1 tablet by mouth once daily.       No current facility-administered medications for this visit.                       PHYSICAL EXAM:  /74   Pulse 74   Ht 5' 7" (1.702 m)   Wt 55.4 kg (122 lb 3.2 oz)   LMP  (LMP Unknown)   BMI 19.14 kg/m²   GENERAL: Normal development, well groomed.  HEENT:   HEENT:  Conjunctivae are non-erythematous; Pupils equal, round, and reactive to light; Nose is symmetrical; Nasal mucosa is pink and moist; Septum is midline; Inferior turbinates are hypertrophied; Nasal airflow is normal; Posterior pharynx is pink; Modified Mallampati:III-IV; Posterior palate is low; Tonsils not visualized; Uvula is wide and elongated;Tongue is enlarged; Dentition is fair; No TMJ tenderness; Jaw opening and protrusion without click and without discomfort.  NECK: Supple. Neck circumference is 15 inches. No thyromegaly. No palpable nodes.     SKIN: On " "face and neck: No abrasions, no rashes, no lesions.  No subcutaneous nodules are palpable.  RESPIRATORY: Chest is clear to auscultation.  Normal chest expansion and non-labored breathing at rest.  CARDIOVASCULAR: Normal S1, S2.  No murmurs, gallops or rubs. No carotid bruits bilaterally.  No edema. No clubbing. No cyanosis.    NEURO: Oriented to time, place and person. Normal attention span and concentration. Gait normal.    PSYCH: Affect is full. Mood is normal  MUSCULOSKELETAL: Moves 4 extremities. Gait normal.         Using My [facility]: not actively      ASSESSMENT:    1. Sleep Apnea NEC. The patient symptomatically has  excessive daytime sleepiness, snoring and interrupted sleep  with exam findings of "a crowded oral airway and elevated body mass index. The patient has medical co-morbidities of migraine, which can be worsened by KUNAL. This warrants further investigation for possible obstructive sleep apnea.          PLAN:    Diagnostic: Polysomnogram in lab. The nature of this procedure and its indication was discussed with the patient. she would  like to come discuss PSG results.    Weight loss strategies were discussed in detail          More than 15 minutes of this 30 minutes visit was spent in counseling: during our discussion today, we talked about the etiology of  KUNAL as well as the potential ramifications of untreated sleep apnea, which could include daytime sleepiness, hypertension, heart disease and/or stroke.  We discussed potential treatment options, which could include weight loss, body positioning, continuous positive airway pressure (CPAP), or referral for surgical consideration. Meanwhile, she  is urged to avoid supine sleep, weight gain and alcoholic beverages since all of these can worsen KUNAL.     Precautions: The patient was advised to abstain from driving should he feel sleepy or drowsy.    Follow up: MD/NP  after the sleep study has been completed.     Thank you for allowing me the opportunity " to participate in the care of your patient.    This visit summary will be sent to referring provider via [employer]

## 2019-03-18 NOTE — LETTER
March 20, 2019      Sharron Ogden MD  [address]  [address]  [facility] [address]           [facility]  [address] [facility] [address]  Phone: [phone]  Fax: [phone]          Patient: Keo Frias   MR Number: [id number]   YOB: 1950   Date of Visit: 3/18/2019       Dear Dr. Sharron Ogden:    Thank you for referring Keo Frias to me for evaluation. Attached you will find relevant portions of my assessment and plan of care.    If you have questions, please do not hesitate to call me. I look forward to following Keo Frias along with you.    Sincerely,    Yasmin Vallejo MD    Enclosure  CC:  No Recipients    If you would like to receive this communication electronically, please contact [email or web address] or [phone] to request more information on [employer] access.    For providers and/or their staff who would like to refer a patient to [facility], please contact us through our one-stop-shop provider referral line, [facility], at [phone].    If you feel you have received this communication in error or would no longer like to receive these types of communications, please e-mail [email or web address]

## 2019-03-18 NOTE — PATIENT INSTRUCTIONS
Consider Migrelief for migraine prevention/    SLEEP LAB ([facility] or [facility]) will contact you to schedulethe sleep study. Their number is [phone] (ext 2). Please call them if you do not hear from them in 10 business days from now.  The [facility] is located on 7th floor of the [facility]; [facility] is located in [facility].    SLEEP CLINIC (my assistant) will call you when the sleep study results are ready - if you have not heard from us by 2 weeks from the date of the study, please call [phone] (ext 1) or you can use [facility] to contact me.    You are advised to abstain from driving should you feel sleepy or drowsy.]

## 2019-03-22 ENCOUNTER — TELEPHONE (OUTPATIENT)
Dept: SLEEP MEDICINE | Facility: OTHER | Age: 69
End: 2019-03-22

## 2019-03-26 ENCOUNTER — HOSPITAL ENCOUNTER (OUTPATIENT)
Dept: SLEEP MEDICINE | Facility: HOSPITAL | Age: 69
Discharge: HOME | End: 2019-03-26
Attending: PSYCHIATRY & NEUROLOGY
Payer: MEDICARE

## 2019-03-26 DIAGNOSIS — G47.30 SLEEP APNEA, UNSPECIFIED TYPE: ICD-10-CM

## 2019-03-26 DIAGNOSIS — G47.20 DYSFUNCTIONS OF SLEEP STAGES OR AROUSAL FROM SLEEP: ICD-10-CM

## 2019-03-26 PROCEDURE — 95810 POLYSOM 6/> YRS 4/> PARAM: CPT | Mod: 26,52,, | Performed by: PSYCHIATRY & NEUROLOGY

## 2019-03-26 PROCEDURE — 95810 POLYSOM 6/> YRS 4/> PARAM: CPT | Mod: HCNC

## 2019-03-26 PROCEDURE — 95810 PR POLYSOMNOGRAPHY, 4 OR MORE: ICD-10-PCS | Mod: 26,52,, | Performed by: PSYCHIATRY & NEUROLOGY

## 2019-03-27 NOTE — PROGRESS NOTES
This is a Screen study for 68 year old Keo Frias  The procedure was explained to the patient upon arrival. This included the set-up process and what to expect during the night.  It was also explained to the patient that the test will be interpreted by a physician and the results will be sent to her PCP.  Her EKG appeared to be NSR.  Occasional soft snoring observed. Sleep disorder breathing most significant in supine sleep.  She did not meet the medicare criteria for a split night study.   A thank you letter was given to the patient upon leaving the sleep lab that explains the next steps and the treatment if needed.

## 2019-04-09 ENCOUNTER — TELEPHONE (OUTPATIENT)
Dept: SLEEP MEDICINE | Facility: CLINIC | Age: 69
End: 2019-04-09

## 2019-04-09 NOTE — TELEPHONE ENCOUNTER
[place],    Please inform the patient that her study was negative for sleep apnea.  If she is still feeling tired/sleepy during the day, please schedule follow up to discuss other potential caused of excessive daytime sleepiness.      Thanks for catching her missing study on the list!

## 2019-04-09 NOTE — TELEPHONE ENCOUNTER
----- Message from Freddy Davidson sent at 4/9/2019 10:49 AM CDT -----  Contact: RACHAEL GRIGGS [[id number]]  Name of Who is Calling: RACHAEL GRIGGS [[id number]]      What is the request in detail: Patient would like to speak with staff in regards to sleep study results. Please advise      Can the clinic reply by [facility]: no      What Number to Call Back if not in [facility]: [phone]

## 2019-04-10 NOTE — TELEPHONE ENCOUNTER
No problem!    Talked to pt and she said she's still having a little daytime sleepiness, but she's going to continue to do what you instructed her to do and if she have any problems than she will schedule a f/u.

## 2019-05-07 DIAGNOSIS — I50.9 ACUTE HEART FAILURE, UNSPECIFIED HEART FAILURE TYPE: Primary | ICD-10-CM

## 2019-06-17 ENCOUNTER — OFFICE VISIT (OUTPATIENT)
Dept: URGENT CARE | Facility: CLINIC | Age: 69
End: 2019-06-17
Payer: MEDICARE

## 2019-06-17 VITALS
OXYGEN SATURATION: 99 % | HEIGHT: 67 IN | BODY MASS INDEX: 19.15 KG/M2 | WEIGHT: 122 LBS | RESPIRATION RATE: 18 BRPM | TEMPERATURE: 98 F | HEART RATE: 74 BPM | DIASTOLIC BLOOD PRESSURE: 70 MMHG | SYSTOLIC BLOOD PRESSURE: 122 MMHG

## 2019-06-17 DIAGNOSIS — H92.01 OTALGIA OF RIGHT EAR: ICD-10-CM

## 2019-06-17 DIAGNOSIS — H72.91 TYMPANIC MEMBRANE PERFORATION, RIGHT: Primary | ICD-10-CM

## 2019-06-17 PROCEDURE — 99214 PR OFFICE/OUTPT VISIT, EST, LEVL IV, 30-39 MIN: ICD-10-PCS | Mod: S$GLB,,, | Performed by: PHYSICIAN ASSISTANT

## 2019-06-17 PROCEDURE — 99214 OFFICE O/P EST MOD 30 MIN: CPT | Mod: S$GLB,,, | Performed by: PHYSICIAN ASSISTANT

## 2019-06-17 PROCEDURE — 1101F PR PT FALLS ASSESS DOC 0-1 FALLS W/OUT INJ PAST YR: ICD-10-PCS | Mod: CPTII,S$GLB,, | Performed by: PHYSICIAN ASSISTANT

## 2019-06-17 PROCEDURE — 1101F PT FALLS ASSESS-DOCD LE1/YR: CPT | Mod: CPTII,S$GLB,, | Performed by: PHYSICIAN ASSISTANT

## 2019-06-17 NOTE — PATIENT INSTRUCTIONS
Eardrum Rupture (Perforation)    Your eardrum is a thin membrane between your outer and middle ear. Sound waves entering your ear cause the membrane to vibrate. This helps you hear. An injury or infection can cause your eardrum to tear (rupture). This creates a hole (perforation) that may affect your hearing.  Causes of eardrum perforation  Causes of a ruptured eardrum include:  · Pressure from an ear infection  · Putting an object such as a cotton swab or pencil into the ear  · A very loud noise such as a gunshot close to the ear  · Rapid changes in air pressure. These can happen during scuba diving or traveling at high altitudes.  · A slap or blow to the ear  When to go to the emergency room (ER)  Seek medical care right away if you:  · Have severe pain, bleeding, or ringing in your ear.  · Lose your hearing suddenly.  · Become very dizzy for no reason.  · Have an object lodged in your ear.  A ruptured eardrum from an ear infection usually isn't an emergency. In fact, the rupture often relieves pressure and pain. It usually heals within hours or days. But you should have the ear looked at by a healthcare provider within 24 hours.  What to expect in the ER  Your ear will be examined. Treatment will depend on how severe the damage is. Small holes often heal on their own. A small patch may be placed over a minor eardrum tear. Large tears may need to be repaired during an operation. If you are very dizzy or have severe hearing loss, you are likely to stay in the hospital for treatment for one or more days.  Don't clean inside the ear canal with cotton swabs or any other object.   Date Last Reviewed: 10/1/2016  © 3933-9708 [employer]. [address] [place] [address], [place] [address]. All rights reserved. This information is not intended as a substitute for professional medical care. Always follow your healthcare professional's instructions.      Follow-up with ENT as scheduled    Please follow up with your  Primary care provider within 2-5 days if your signs and symptoms have not resolved or worsen.     If your condition worsens or fails to improve we recommend that you receive another evaluation at the emergency room immediately or contact your primary medical clinic to discuss your concerns.   You must understand that you have received an Urgent Care treatment only and that you may be released before all of your medical problems are known or treated. You, the patient, will arrange for follow up care as instructed.     RED FLAGS/WARNING SYMPTOMS DISCUSSED WITH PATIENT THAT WOULD WARRANT EMERGENT MEDICAL ATTENTION. PATIENT VERBALIZED UNDERSTANDING.

## 2019-06-17 NOTE — PROGRESS NOTES
"Subjective:       Patient ID: Keo Frias is a 68 y.o. female.    Vitals:  height is 5' 7" (1.702 m) and weight is 55.3 kg (122 lb). Her temperature is 98.4 °F (36.9 °C). Her blood pressure is 122/70 and her pulse is 74. Her respiration is 18 and oxygen saturation is 99%.     Chief Complaint: Otalgia (rt ear pain)    Patient reports right ear pain for the past week.  She states that this is a recurring problem, and a former doctor of hers told her to use a 50/50 solution of vinegar to alcohol.  She tried this recently and says that it did not help relieve her symptoms, although it does usually relieved her symptoms.  She denies any fever, coughing, or sore throat.    Otalgia    There is pain in the right ear. This is a new problem. The current episode started 1 to 4 weeks ago. The problem occurs every few hours. The problem has been unchanged. There has been no fever. The pain is at a severity of 2/10. The pain is mild. Pertinent negatives include no coughing, diarrhea, headaches, rash, sore throat or vomiting. She has tried ear drops for the symptoms. The treatment provided mild relief.       Constitution: Negative for chills, fatigue and fever.   HENT: Positive for ear pain. Negative for congestion and sore throat.    Neck: Negative for painful lymph nodes.   Cardiovascular: Negative for chest pain and leg swelling.   Eyes: Negative for double vision and blurred vision.   Respiratory: Negative for cough and shortness of breath.    Gastrointestinal: Negative for nausea, vomiting and diarrhea.   Genitourinary: Negative for dysuria, frequency, urgency and history of kidney stones.   Musculoskeletal: Negative for joint pain, joint swelling, muscle cramps and muscle ache.   Skin: Negative for color change, pale, rash and bruising.   Allergic/Immunologic: Negative for seasonal allergies.   Neurological: Negative for dizziness, history of vertigo, light-headedness, passing out and headaches.   Hematologic/Lymphatic: " Negative for swollen lymph nodes.   Psychiatric/Behavioral: Negative for nervous/anxious, sleep disturbance and depression. The patient is not nervous/anxious.        Objective:      Physical Exam   Constitutional: She is oriented to person, place, and time. She appears well-developed and well-nourished. She is cooperative.  Non-toxic appearance. She does not appear ill. No distress.   HENT:   Head: Normocephalic and atraumatic.   Right Ear: Hearing, external ear and ear canal normal. There is tenderness. Tympanic membrane is perforated. Tympanic membrane is not injected and not erythematous.   Left Ear: Hearing, tympanic membrane, external ear and ear canal normal.   Ears:    Nose: Nose normal. No mucosal edema, rhinorrhea or nasal deformity. No epistaxis. Right sinus exhibits no maxillary sinus tenderness and no frontal sinus tenderness. Left sinus exhibits no maxillary sinus tenderness and no frontal sinus tenderness.   Mouth/Throat: Uvula is midline, oropharynx is clear and moist and mucous membranes are normal. No trismus in the jaw. Normal dentition. No uvula swelling. No oropharyngeal exudate or posterior oropharyngeal erythema. Tonsils are 1+ on the right. Tonsils are 1+ on the left. No tonsillar exudate.   One area of perforation are noticed of the right tympanic membrane.  No drainage or oozing seen present.  No injection or erythema noted. No tenderness of tragus or pinna to palpation.   Eyes: Conjunctivae and lids are normal. Right eye exhibits no discharge. Left eye exhibits no discharge. No scleral icterus.   Sclera clear bilat   Neck: Trachea normal, normal range of motion, full passive range of motion without pain and phonation normal. Neck supple.   Cardiovascular: Normal rate, regular rhythm, normal heart sounds, intact distal pulses and normal pulses. Exam reveals no gallop and no friction rub.   No murmur heard.  Pulmonary/Chest: Effort normal and breath sounds normal. No stridor. No respiratory  distress. She has no wheezes. She has no rales. She exhibits no tenderness.   Abdominal: Normal appearance. She exhibits no pulsatile midline mass.   Musculoskeletal: Normal range of motion. She exhibits no edema or deformity.   Lymphadenopathy:        Head (right side): No submental, no submandibular, no tonsillar, no preauricular, no posterior auricular and no occipital adenopathy present.        Head (left side): No submental, no submandibular, no tonsillar, no preauricular, no posterior auricular and no occipital adenopathy present.     She has no cervical adenopathy.        Right cervical: No superficial cervical, no deep cervical and no posterior cervical adenopathy present.       Left cervical: No superficial cervical, no deep cervical and no posterior cervical adenopathy present.   Neurological: She is alert and oriented to person, place, and time. She exhibits normal muscle tone. Coordination normal.   Skin: Skin is warm, dry and intact. She is not diaphoretic. No pallor.   Psychiatric: She has a normal mood and affect. Her speech is normal and behavior is normal. Judgment and thought content normal. Cognition and memory are normal.   Nursing note and vitals reviewed.      Assessment:       1. Tympanic membrane perforation, right    2. Otalgia of right ear        Plan:         Tympanic membrane perforation, right  -     Ambulatory referral to ENT    Otalgia of right ear  -     Ambulatory referral to ENT          Eardrum Rupture (Perforation)    Your eardrum is a thin membrane between your outer and middle ear. Sound waves entering your ear cause the membrane to vibrate. This helps you hear. An injury or infection can cause your eardrum to tear (rupture). This creates a hole (perforation) that may affect your hearing.  Causes of eardrum perforation  Causes of a ruptured eardrum include:  · Pressure from an ear infection  · Putting an object such as a cotton swab or pencil into the ear  · A very loud noise  such as a gunshot close to the ear  · Rapid changes in air pressure. These can happen during scuba diving or traveling at high altitudes.  · A slap or blow to the ear  When to go to the emergency room (ER)  Seek medical care right away if you:  · Have severe pain, bleeding, or ringing in your ear.  · Lose your hearing suddenly.  · Become very dizzy for no reason.  · Have an object lodged in your ear.  A ruptured eardrum from an ear infection usually isn't an emergency. In fact, the rupture often relieves pressure and pain. It usually heals within hours or days. But you should have the ear looked at by a healthcare provider within 24 hours.  What to expect in the ER  Your ear will be examined. Treatment will depend on how severe the damage is. Small holes often heal on their own. A small patch may be placed over a minor eardrum tear. Large tears may need to be repaired during an operation. If you are very dizzy or have severe hearing loss, you are likely to stay in the hospital for treatment for one or more days.  Don't clean inside the ear canal with cotton swabs or any other object.   Date Last Reviewed: 10/1/2016  © 0620-3888 [employer]. [address] [place] [address], [place] [address]. All rights reserved. This information is not intended as a substitute for professional medical care. Always follow your healthcare professional's instructions.      Follow-up with ENT as scheduled    Please follow up with your Primary care provider within 2-5 days if your signs and symptoms have not resolved or worsen.     If your condition worsens or fails to improve we recommend that you receive another evaluation at the emergency room immediately or contact your primary medical clinic to discuss your concerns.   You must understand that you have received an Urgent Care treatment only and that you may be released before all of your medical problems are known or treated. You, the patient, will arrange for follow up care as  instructed.     RED FLAGS/WARNING SYMPTOMS DISCUSSED WITH PATIENT THAT WOULD WARRANT EMERGENT MEDICAL ATTENTION. PATIENT VERBALIZED UNDERSTANDING.

## 2019-06-20 ENCOUNTER — OFFICE VISIT (OUTPATIENT)
Dept: OTOLARYNGOLOGY | Facility: CLINIC | Age: 69
End: 2019-06-20
Payer: MEDICARE

## 2019-06-20 ENCOUNTER — CLINICAL SUPPORT (OUTPATIENT)
Dept: AUDIOLOGY | Facility: CLINIC | Age: 69
End: 2019-06-20
Payer: MEDICARE

## 2019-06-20 VITALS — BODY MASS INDEX: 19.13 KG/M2 | WEIGHT: 122.13 LBS

## 2019-06-20 DIAGNOSIS — H90.A22 SENSORINEURAL HEARING LOSS (SNHL) OF LEFT EAR WITH RESTRICTED HEARING OF RIGHT EAR: ICD-10-CM

## 2019-06-20 DIAGNOSIS — H66.91 CHRONIC OTITIS MEDIA OF RIGHT EAR: ICD-10-CM

## 2019-06-20 DIAGNOSIS — H60.8X3 CHRONIC ECZEMATOUS OTITIS EXTERNA OF BOTH EARS: ICD-10-CM

## 2019-06-20 DIAGNOSIS — H90.A31 MIXED CONDUCTIVE AND SENSORINEURAL HEARING LOSS OF RIGHT EAR WITH RESTRICTED HEARING OF LEFT EAR: Primary | ICD-10-CM

## 2019-06-20 DIAGNOSIS — H91.13 PRESBYCUSIS OF BOTH EARS: Primary | ICD-10-CM

## 2019-06-20 PROCEDURE — 99214 PR OFFICE/OUTPT VISIT, EST, LEVL IV, 30-39 MIN: ICD-10-PCS | Mod: 25,HCNC,S$GLB, | Performed by: OTOLARYNGOLOGY

## 2019-06-20 PROCEDURE — 92557 PR COMPREHENSIVE HEARING TEST: ICD-10-PCS | Mod: HCNC,S$GLB,, | Performed by: AUDIOLOGIST

## 2019-06-20 PROCEDURE — 99999 PR PBB SHADOW E&M-EST. PATIENT-LVL II: CPT | Mod: PBBFAC,HCNC,, | Performed by: OTOLARYNGOLOGY

## 2019-06-20 PROCEDURE — 1101F PT FALLS ASSESS-DOCD LE1/YR: CPT | Mod: HCNC,CPTII,S$GLB, | Performed by: OTOLARYNGOLOGY

## 2019-06-20 PROCEDURE — 99214 OFFICE O/P EST MOD 30 MIN: CPT | Mod: 25,HCNC,S$GLB, | Performed by: OTOLARYNGOLOGY

## 2019-06-20 PROCEDURE — 99999 PR PBB SHADOW E&M-EST. PATIENT-LVL I: CPT | Mod: PBBFAC,HCNC,,

## 2019-06-20 PROCEDURE — G0268 REMOVAL OF IMPACTED WAX MD: HCPCS | Mod: HCNC,S$GLB,, | Performed by: OTOLARYNGOLOGY

## 2019-06-20 PROCEDURE — 92557 COMPREHENSIVE HEARING TEST: CPT | Mod: HCNC,S$GLB,, | Performed by: AUDIOLOGIST

## 2019-06-20 PROCEDURE — 92567 PR TYMPA2METRY: ICD-10-PCS | Mod: HCNC,S$GLB,, | Performed by: AUDIOLOGIST

## 2019-06-20 PROCEDURE — 92567 TYMPANOMETRY: CPT | Mod: HCNC,S$GLB,, | Performed by: AUDIOLOGIST

## 2019-06-20 PROCEDURE — G0268 PR REMOVAL OF IMPACTED WAX MD: ICD-10-PCS | Mod: HCNC,S$GLB,, | Performed by: OTOLARYNGOLOGY

## 2019-06-20 PROCEDURE — 99999 PR PBB SHADOW E&M-EST. PATIENT-LVL II: ICD-10-PCS | Mod: PBBFAC,HCNC,, | Performed by: OTOLARYNGOLOGY

## 2019-06-20 PROCEDURE — 99999 PR PBB SHADOW E&M-EST. PATIENT-LVL I: ICD-10-PCS | Mod: PBBFAC,HCNC,,

## 2019-06-20 PROCEDURE — 1101F PR PT FALLS ASSESS DOC 0-1 FALLS W/OUT INJ PAST YR: ICD-10-PCS | Mod: HCNC,CPTII,S$GLB, | Performed by: OTOLARYNGOLOGY

## 2019-06-20 NOTE — LETTER
June 20, 2019      Janae Marquez PA-C  [address]  [place] [id number]           [facility] - Otorhinolaryngology  [address]  [facility] [address]  Phone: [phone]  Fax: [phone]          Patient: Keo Frias   MR Number: [id number]   YOB: 1950   Date of Visit: 6/20/2019       Dear Janae Marquez:    Thank you for referring Keo Frias to me for evaluation. Attached you will find relevant portions of my assessment and plan of care.    If you have questions, please do not hesitate to call me. I look forward to following Keo Frias along with you.    Sincerely,    Brandon Lema MD    Enclosure  CC:  No Recipients    If you would like to receive this communication electronically, please contact [email or web address] or [phone] to request more information on [facility] Link access.    For providers and/or their staff who would like to refer a patient to [facility], please contact us through our one-stop-shop provider referral line, [facility], at [phone].    If you feel you have received this communication in error or would no longer like to receive these types of communications, please e-mail [email or web address]

## 2019-06-20 NOTE — PROGRESS NOTES
Ms. Frias was seen in the clinic today for a hearing evaluation.      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 A speech reception threshold was obtained at 50 dBHL in the right ear and 20 dBHL in the left ear. Speech discrimination was 92% in the right ear and 96% in the left ear.    Recommendations:  1. Otologic evaluation  2. Annual hearing evaluation  3. Noise protection  4. Hearing aid consultation pending medical intervention

## 2019-06-20 NOTE — PROGRESS NOTES
Subjective:       Patient ID: Keo Frias is a 68 y.o. female.    Chief Complaint: Otalgia (bilateral)    HPI: Hx of B ear itching, HL AD>AS.    Using hero/alc qtts AD with pain.    ? Hx of R TM perf.    Past Medical History: Patient has a past medical history of Anemia, Cataract, Sleep apnea, and Subclinical hypothyroidism.    Past Surgical History: Patient has a past surgical history that includes Tubal ligation; Dilation and curettage of uterus; and Colonoscopy (N/A, 12/7/2018).    Social History: Patient reports that she has never smoked. She has never used smokeless tobacco. She reports that she does not drink alcohol or use drugs.    Family History: family history includes Cancer in her mother; Cataracts in her father; Diabetes in her brother; Glaucoma in her brother and father; Hypertension in her brother; Lung cancer in her mother; Retinal detachment in her father; Stroke in her maternal grandmother and paternal grandmother; Uterine cancer in her maternal grandmother.    Medications:   Current Outpatient Medications   Medication Sig    ERGOCALCIFEROL, VITAMIN D2, (VITAMIN D ORAL) Take by mouth.    multivitamin-iron-folic acid Tab Take 1 tablet by mouth once daily.     No current facility-administered medications for this visit.        Allergies: Patient is allergic to sulfa (sulfonamide antibiotics).    Review of Systems   Constitutional: Negative for appetite change, chills, fatigue and fever.   HENT: Positive for ear pain and hearing loss. Negative for congestion, ear discharge, facial swelling, postnasal drip, rhinorrhea, sore throat, tinnitus and trouble swallowing.    Eyes: Negative for photophobia, pain, discharge, redness, itching and visual disturbance.   Respiratory: Negative for apnea, cough, choking, chest tightness, shortness of breath, wheezing and stridor.    Cardiovascular: Negative for chest pain and palpitations.   Gastrointestinal: Negative for abdominal pain, constipation, diarrhea,  nausea and vomiting.   Musculoskeletal: Negative for arthralgias, gait problem, joint swelling, myalgias, neck pain and neck stiffness.   Skin: Negative for color change, pallor, rash and wound.   Neurological: Negative for dizziness, tremors, seizures, syncope, facial asymmetry, speech difficulty, weakness, light-headedness, numbness and headaches.   Hematological: Negative for adenopathy. Does not bruise/bleed easily.   Psychiatric/Behavioral: Negative for agitation, behavioral problems, confusion, decreased concentration, dysphoric mood, hallucinations, sleep disturbance and suicidal ideas. The patient is not nervous/anxious and is not hyperactive.        Objective:      Physical Exam   Constitutional: She appears well-developed and well-nourished. No distress.   HENT:   Right Ear: External ear normal. No lacerations. No drainage, swelling or tenderness. No foreign bodies. No mastoid tenderness. Tympanic membrane is scarred and perforated. Tympanic membrane is not injected, not erythematous, not retracted and not bulging. Tympanic membrane mobility is abnormal. No middle ear effusion. No hemotympanum. Decreased hearing is noted.   Left Ear: External ear normal. No lacerations. No drainage, swelling or tenderness. No foreign bodies. No mastoid tenderness. Tympanic membrane is not injected, not scarred, not perforated, not erythematous, not retracted and not bulging. Tympanic membrane mobility is normal.  No middle ear effusion. No hemotympanum. Decreased hearing is noted.   Ears:        B CI.    Procedure Note:    Patient was brought to the minor procedure room and using the operating microscope the right ear canal  was cleaned of ceruminous debris. There was a significant cerumen impaction.  The forceps and suction were both used to perform this. Tympanic membrane intact. Pt tolerated well. There were no complications.    Procedure Note:    The patient was brought to the minor procedure room and placed under the  operating microscope. Using a combination of suction, curettes and cup forceps the patient's cerumen impaction was removed. The tympanic membrane was evaluated and was unremarkable. The patient tolerated the procedure well. There were no complications.    R TM with perf with debris adhered to it, ? Sec aq chol.    B NAHUM.           Eyes: Right eye exhibits normal extraocular motion and no nystagmus. Left eye exhibits normal extraocular motion and no nystagmus.   Neurological: She has normal strength. No cranial nerve deficit or sensory deficit. She displays a negative Romberg sign. Coordination and gait normal.   Skin: She is not diaphoretic.               Assessment:       1. Presbycusis of both ears    2. Chronic otitis media of right ear     3. B NAHUM  Plan:         cortaid cream AU.    Needs R T plasty/ declined      Wants to follow for now.      RTC 3 mos.

## 2019-06-25 ENCOUNTER — LAB VISIT (OUTPATIENT)
Dept: LAB | Facility: HOSPITAL | Age: 69
End: 2019-06-25
Attending: INTERNAL MEDICINE
Payer: MEDICARE

## 2019-06-25 DIAGNOSIS — I50.9 ACUTE HEART FAILURE, UNSPECIFIED HEART FAILURE TYPE: ICD-10-CM

## 2019-06-25 LAB
ALBUMIN SERPL BCP-MCNC: 3.7 G/DL (ref 3.5–5.2)
ALP SERPL-CCNC: 71 U/L (ref 55–135)
ALT SERPL W/O P-5'-P-CCNC: 16 U/L (ref 10–44)
ANION GAP SERPL CALC-SCNC: 7 MMOL/L (ref 8–16)
AST SERPL-CCNC: 21 U/L (ref 10–40)
BILIRUB SERPL-MCNC: 0.6 MG/DL (ref 0.1–1)
BNP SERPL-MCNC: 58 PG/ML (ref 0–99)
BUN SERPL-MCNC: 10 MG/DL (ref 8–23)
CALCIUM SERPL-MCNC: 9.4 MG/DL (ref 8.7–10.5)
CHLORIDE SERPL-SCNC: 104 MMOL/L (ref 95–110)
CO2 SERPL-SCNC: 27 MMOL/L (ref 23–29)
CREAT SERPL-MCNC: 0.7 MG/DL (ref 0.5–1.4)
EST. GFR  (AFRICAN AMERICAN): >60 ML/MIN/1.73 M^2
EST. GFR  (NON AFRICAN AMERICAN): >60 ML/MIN/1.73 M^2
GLUCOSE SERPL-MCNC: 88 MG/DL (ref 70–110)
MAGNESIUM SERPL-MCNC: 1.9 MG/DL (ref 1.6–2.6)
POTASSIUM SERPL-SCNC: 4.2 MMOL/L (ref 3.5–5.1)
PROT SERPL-MCNC: 7.1 G/DL (ref 6–8.4)
SODIUM SERPL-SCNC: 138 MMOL/L (ref 136–145)

## 2019-06-25 PROCEDURE — 83880 ASSAY OF NATRIURETIC PEPTIDE: CPT | Mod: HCNC

## 2019-06-25 PROCEDURE — 83735 ASSAY OF MAGNESIUM: CPT | Mod: HCNC

## 2019-06-25 PROCEDURE — 36415 COLL VENOUS BLD VENIPUNCTURE: CPT | Mod: HCNC

## 2019-06-25 PROCEDURE — 80053 COMPREHEN METABOLIC PANEL: CPT | Mod: HCNC

## 2019-06-28 ENCOUNTER — OFFICE VISIT (OUTPATIENT)
Dept: TRANSPLANT | Facility: CLINIC | Age: 69
End: 2019-06-28
Payer: MEDICARE

## 2019-06-28 VITALS
HEIGHT: 62 IN | BODY MASS INDEX: 22.45 KG/M2 | WEIGHT: 122 LBS | SYSTOLIC BLOOD PRESSURE: 110 MMHG | HEART RATE: 66 BPM | DIASTOLIC BLOOD PRESSURE: 68 MMHG

## 2019-06-28 DIAGNOSIS — G43.109 MIGRAINE WITH AURA AND WITHOUT STATUS MIGRAINOSUS, NOT INTRACTABLE: ICD-10-CM

## 2019-06-28 DIAGNOSIS — R03.0 WHITE COAT SYNDROME WITHOUT HYPERTENSION: ICD-10-CM

## 2019-06-28 PROCEDURE — 99499 RISK ADDL DX/OHS AUDIT: ICD-10-PCS | Mod: HCNC,S$GLB,, | Performed by: INTERNAL MEDICINE

## 2019-06-28 PROCEDURE — 99999 PR PBB SHADOW E&M-EST. PATIENT-LVL III: ICD-10-PCS | Mod: PBBFAC,HCNC,, | Performed by: INTERNAL MEDICINE

## 2019-06-28 PROCEDURE — 1101F PR PT FALLS ASSESS DOC 0-1 FALLS W/OUT INJ PAST YR: ICD-10-PCS | Mod: HCNC,CPTII,S$GLB, | Performed by: INTERNAL MEDICINE

## 2019-06-28 PROCEDURE — 99499 UNLISTED E&M SERVICE: CPT | Mod: HCNC,S$GLB,, | Performed by: INTERNAL MEDICINE

## 2019-06-28 PROCEDURE — 99999 PR PBB SHADOW E&M-EST. PATIENT-LVL III: CPT | Mod: PBBFAC,HCNC,, | Performed by: INTERNAL MEDICINE

## 2019-06-28 PROCEDURE — 99204 PR OFFICE/OUTPT VISIT, NEW, LEVL IV, 45-59 MIN: ICD-10-PCS | Mod: HCNC,S$GLB,, | Performed by: INTERNAL MEDICINE

## 2019-06-28 PROCEDURE — 99204 OFFICE O/P NEW MOD 45 MIN: CPT | Mod: HCNC,S$GLB,, | Performed by: INTERNAL MEDICINE

## 2019-06-28 PROCEDURE — 1101F PT FALLS ASSESS-DOCD LE1/YR: CPT | Mod: HCNC,CPTII,S$GLB, | Performed by: INTERNAL MEDICINE

## 2019-06-28 NOTE — PROGRESS NOTES
"Subjective:    Patient ID:  Keo Frias is a 68 y.o. female who presents for evaluation of Congestive Heart Failure    HPI   Healthy [place] female with history of migranes since age 13  who comes in with asymptomatic hypotension after she eats for few weeks in April 2019 Since that time the symptoms have resolved Separately she has had episodes of paroxsymal HTN in clinic (white coat?)     Review of Systems   Constitution: Negative for decreased appetite, weight gain and weight loss.   Eyes: Positive for blurred vision (associated with migranes ).   Cardiovascular: Negative for chest pain, dyspnea on exertion, leg swelling, near-syncope, orthopnea and palpitations.   Respiratory: Negative for cough and shortness of breath.    Musculoskeletal: Negative for myalgias.   Gastrointestinal: Negative for jaundice.        Objective:    Physical Exam   Constitutional: She appears well-developed and well-nourished. No distress.   /68 (BP Location: Left arm, Patient Position: Sitting, BP Method: Medium (Automatic))   Pulse 66   Ht 5' 2" (1.575 m)   Wt 55.3 kg (122 lb 0.4 oz)   LMP  (LMP Unknown)   BMI 22.32 kg/m²      HENT:   Head: Normocephalic and atraumatic. Head is without abrasion and without contusion.   Right Ear: External ear normal.   Left Ear: External ear normal.   Nose: Nose normal. No epistaxis.   Mouth/Throat: Oropharynx is clear and moist. Mucous membranes are not cyanotic.   Eyes: Pupils are equal, round, and reactive to light. Conjunctivae and EOM are normal.   Neck: Normal range of motion. Neck supple. No tracheal deviation present.   Cardiovascular: Normal rate, regular rhythm, normal heart sounds and normal pulses. Exam reveals no gallop.   No murmur heard.  Pulmonary/Chest: Effort normal and breath sounds normal. No stridor. No respiratory distress. She has no wheezes.   Abdominal: Soft. Normal appearance, normal aorta and bowel sounds are normal. She exhibits no distension. There is no " tenderness.   Musculoskeletal: She exhibits no edema or tenderness.   Neurological: She is alert. She has normal strength and normal reflexes. She exhibits normal muscle tone.   Skin: Skin is warm. No rash noted. No erythema.   Psychiatric: She has a normal mood and affect. Her speech is normal and behavior is normal. Judgment and thought content normal. Cognition and memory are normal.     Lab Results   Component Value Date    BNP 58 06/25/2019     06/25/2019    K 4.2 06/25/2019    MG 1.9 06/25/2019     06/25/2019    CO2 27 06/25/2019    BUN 10 06/25/2019    CREATININE 0.7 06/25/2019    GLU 88 06/25/2019    HGBA1C 5.7 (H) 01/15/2019    AST 21 06/25/2019    ALT 16 06/25/2019    ALBUMIN 3.7 06/25/2019    PROT 7.1 06/25/2019    BILITOT 0.6 06/25/2019    CHOL 221 (H) 07/24/2018    HDL 44 07/24/2018    LDLCALC 149.0 07/24/2018    TRIG 140 07/24/2018       Magnesium   Date Value Ref Range Status   06/25/2019 1.9 1.6 - 2.6 mg/dL Final       Lab Results   Component Value Date    WBC 4.90 01/15/2019    HGB 13.1 01/15/2019    HCT 41.4 01/15/2019    MCV 86 01/15/2019     01/15/2019         BNP   Date Value Ref Range Status   06/25/2019 58 0 - 99 pg/mL Final     Comment:     Values of less than 100 pg/ml are consistent with non-CHF populations.             Assessment:       1. White coat syndrome without hypertension    2. Migraine with aura and without status migrainosus, not intractable         Plan:       Problem List Items Addressed This Visit     Migraine headache with aura    Overview     Associated with blurry vision, lasts ~30minutes. Relieved with rest and darkness (pt does not use any medications for headaches)         Current Assessment & Plan     Continue to manage symptomatic seems to be improving over time          White coat syndrome without hypertension    Current Assessment & Plan     Consider referral to digital HTN program

## 2019-07-01 DIAGNOSIS — I10 WHITE COAT SYNDROME WITH HYPERTENSION: Primary | ICD-10-CM

## 2019-07-05 ENCOUNTER — PATIENT MESSAGE (OUTPATIENT)
Dept: ADMINISTRATIVE | Facility: OTHER | Age: 69
End: 2019-07-05

## 2019-07-09 ENCOUNTER — TELEPHONE (OUTPATIENT)
Dept: FAMILY MEDICINE | Facility: CLINIC | Age: 69
End: 2019-07-09

## 2019-07-09 ENCOUNTER — OFFICE VISIT (OUTPATIENT)
Dept: URGENT CARE | Facility: CLINIC | Age: 69
End: 2019-07-09
Payer: MEDICARE

## 2019-07-09 VITALS
HEART RATE: 72 BPM | BODY MASS INDEX: 22.45 KG/M2 | TEMPERATURE: 98 F | WEIGHT: 122 LBS | SYSTOLIC BLOOD PRESSURE: 200 MMHG | HEIGHT: 62 IN | OXYGEN SATURATION: 99 % | RESPIRATION RATE: 16 BRPM | DIASTOLIC BLOOD PRESSURE: 100 MMHG

## 2019-07-09 DIAGNOSIS — I10 WHITE COAT SYNDROME WITH HYPERTENSION: Primary | ICD-10-CM

## 2019-07-09 DIAGNOSIS — I10 ESSENTIAL HYPERTENSION: Primary | ICD-10-CM

## 2019-07-09 DIAGNOSIS — R09.89 LABILE BLOOD PRESSURE: ICD-10-CM

## 2019-07-09 PROCEDURE — 3080F DIAST BP >= 90 MM HG: CPT | Mod: CPTII,S$GLB,, | Performed by: FAMILY MEDICINE

## 2019-07-09 PROCEDURE — 99213 PR OFFICE/OUTPT VISIT, EST, LEVL III, 20-29 MIN: ICD-10-PCS | Mod: S$GLB,,, | Performed by: FAMILY MEDICINE

## 2019-07-09 PROCEDURE — 1101F PR PT FALLS ASSESS DOC 0-1 FALLS W/OUT INJ PAST YR: ICD-10-PCS | Mod: CPTII,S$GLB,, | Performed by: FAMILY MEDICINE

## 2019-07-09 PROCEDURE — 3077F PR MOST RECENT SYSTOLIC BLOOD PRESSURE >= 140 MM HG: ICD-10-PCS | Mod: CPTII,S$GLB,, | Performed by: FAMILY MEDICINE

## 2019-07-09 PROCEDURE — 3077F SYST BP >= 140 MM HG: CPT | Mod: CPTII,S$GLB,, | Performed by: FAMILY MEDICINE

## 2019-07-09 PROCEDURE — 1101F PT FALLS ASSESS-DOCD LE1/YR: CPT | Mod: CPTII,S$GLB,, | Performed by: FAMILY MEDICINE

## 2019-07-09 PROCEDURE — 3080F PR MOST RECENT DIASTOLIC BLOOD PRESSURE >= 90 MM HG: ICD-10-PCS | Mod: CPTII,S$GLB,, | Performed by: FAMILY MEDICINE

## 2019-07-09 PROCEDURE — 99213 OFFICE O/P EST LOW 20 MIN: CPT | Mod: S$GLB,,, | Performed by: FAMILY MEDICINE

## 2019-07-09 RX ORDER — LISINOPRIL 5 MG/1
5 TABLET ORAL DAILY
Qty: 90 TABLET | Refills: 3 | Status: SHIPPED | OUTPATIENT
Start: 2019-07-09 | End: 2019-07-24

## 2019-07-09 NOTE — PROGRESS NOTES
"Subjective:       Patient ID: Keo Frias is a 68 y.o. female.    Vitals:  height is 5' 2" (1.575 m) and weight is 55.3 kg (122 lb). Her temperature is 97.5 °F (36.4 °C). Her blood pressure is 200/100 (abnormal) and her pulse is 72. Her respiration is 16 and oxygen saturation is 99%.     Chief Complaint: Hypertension    Pt with no Hx of HTN has c/o high B/P, states has hx of migraine, and gets occasional HA, this am was walking with DOG and started having aura, came back home and checked BP was 200/110.no associated nausea, vomiting or dizzyness, no weakness or chest pain  Reviewing chart shows, BP in good range      Hypertension   This is a new problem. The current episode started today. The problem has been gradually worsening since onset. The problem is uncontrolled. Associated symptoms include blurred vision and headaches. Pertinent negatives include no anxiety, chest pain, malaise/fatigue, neck pain, orthopnea, palpitations, peripheral edema, PND, shortness of breath or sweats. There are no associated agents to hypertension. There are no known risk factors for coronary artery disease. Past treatments include nothing. There are no compliance problems.  There is no history of angina, kidney disease, CAD/MI, CVA, heart failure, left ventricular hypertrophy, PVD or retinopathy. There is no history of chronic renal disease, coarctation of the aorta, hyperaldosteronism, hypercortisolism, hyperparathyroidism, a hypertension causing med, pheochromocytoma, renovascular disease, sleep apnea or a thyroid problem.       Constitution: Negative for chills, fatigue and fever.   HENT: Negative for congestion and sore throat.    Neck: Negative for neck pain and painful lymph nodes.   Cardiovascular: Negative for chest pain, leg swelling and palpitations.   Eyes: Positive for blurred vision. Negative for double vision.   Respiratory: Negative for cough and shortness of breath.    Gastrointestinal: Negative for nausea, vomiting " and diarrhea.   Genitourinary: Negative for dysuria, frequency, urgency and history of kidney stones.   Musculoskeletal: Negative for joint pain, joint swelling, muscle cramps and muscle ache.   Skin: Negative for color change, pale, rash and bruising.   Allergic/Immunologic: Negative for seasonal allergies.   Neurological: Positive for headaches. Negative for dizziness, history of vertigo, light-headedness and passing out.   Hematologic/Lymphatic: Negative for swollen lymph nodes.   Psychiatric/Behavioral: Negative for nervous/anxious, sleep disturbance and depression. The patient is not nervous/anxious.        Objective:      Physical Exam   Constitutional: She is oriented to person, place, and time. She appears well-developed and well-nourished. She is cooperative.  Non-toxic appearance. She does not appear ill.   HENT:   Head: Normocephalic and atraumatic.   Right Ear: Hearing, tympanic membrane, external ear and ear canal normal.   Left Ear: Hearing, tympanic membrane, external ear and ear canal normal.   Nose: Nose normal. No mucosal edema, rhinorrhea or nasal deformity. No epistaxis. Right sinus exhibits no maxillary sinus tenderness and no frontal sinus tenderness. Left sinus exhibits no maxillary sinus tenderness and no frontal sinus tenderness.   Mouth/Throat: Uvula is midline, oropharynx is clear and moist and mucous membranes are normal. No trismus in the jaw. Normal dentition. No uvula swelling. No posterior oropharyngeal erythema.   Eyes: Conjunctivae and lids are normal. Right eye exhibits no discharge. Left eye exhibits no discharge. No scleral icterus.   Sclera clear bilat   Neck: Trachea normal, normal range of motion, full passive range of motion without pain and phonation normal. Neck supple. No JVD present. No tracheal deviation present.   Cardiovascular: Normal rate, regular rhythm, normal heart sounds, intact distal pulses and normal pulses. Exam reveals no gallop and no friction rub.   No  murmur heard.  Pulmonary/Chest: Effort normal and breath sounds normal. No stridor. No respiratory distress. She has no wheezes. She has no rales.   Abdominal: Soft. Normal appearance and bowel sounds are normal. She exhibits no distension, no pulsatile midline mass and no mass. There is no tenderness.   Musculoskeletal: Normal range of motion. She exhibits no edema or deformity.   Lymphadenopathy:     She has no cervical adenopathy.   Neurological: She is alert and oriented to person, place, and time. She exhibits normal muscle tone. Coordination normal.   Skin: Skin is warm, dry and intact. She is not diaphoretic. No pallor.   Psychiatric: She has a normal mood and affect. Her speech is normal and behavior is normal. Judgment and thought content normal. Cognition and memory are normal.   Nursing note and vitals reviewed.      Assessment:       1. Essential hypertension        Plan:         Essential hypertension    Other orders  -     lisinopril (PRINIVIL,ZESTRIL) 5 MG tablet; Take 1 tablet (5 mg total) by mouth once daily.  Dispense: 90 tablet; Refill: 3        Since BP recheck, manual 195/90 will start on low dose of Lisinopril and follow in one week, RTC if increase headache or dizzyness or chest pain

## 2019-07-11 ENCOUNTER — PATIENT MESSAGE (OUTPATIENT)
Dept: ADMINISTRATIVE | Facility: OTHER | Age: 69
End: 2019-07-11

## 2019-07-11 ENCOUNTER — PATIENT OUTREACH (OUTPATIENT)
Dept: OTHER | Facility: OTHER | Age: 69
End: 2019-07-11

## 2019-07-11 NOTE — PROGRESS NOTES
1st attempt to complete enrollment call. No answer, left voicemail.       Last 5 Patient Entered Readings                                      Current 30 Day Average: 116/72     Recent Readings 7/11/2019 7/11/2019 7/11/2019 7/11/2019 7/11/2019    SBP (mmHg) 125 125 142 133 125    DBP (mmHg) 79 73 80 77 74    Pulse 62 62 64 61 60

## 2019-07-11 NOTE — PROGRESS NOTES
Last 5 Patient Entered Readings                                      Current 30 Day Average: 116/72     Recent Readings 7/11/2019 7/11/2019 7/11/2019 7/11/2019 7/11/2019    SBP (mmHg) 125 125 142 133 125    DBP (mmHg) 79 73 80 77 74    Pulse 62 62 64 61 60

## 2019-07-11 NOTE — PROGRESS NOTES
Digital Medicine Enrollment Call    Introduced Mrs. Keo Frias to Digital Medicine.     Discussed program expectations and requirements.    Introduced digital medicine care team.     Reviewed the importance of self-monitoring for digital medicine participation.     Reviewed that the Digital Medicine team is not available for emergencies and instructed the patient to call 911 or [facility] ([phone] or [phone]) if one arises.          Last 5 Patient Entered Readings                                      Current 30 Day Average: 116/72     Recent Readings 7/11/2019 7/11/2019 7/11/2019 7/11/2019 7/11/2019    SBP (mmHg) 125 125 142 133 125    DBP (mmHg) 79 73 80 77 74    Pulse 62 62 64 61 60

## 2019-07-11 NOTE — LETTER
July 11, 2019     Keo Frias  [address]  [place] [address]       Dear Keo TURNER,    Welcome to [facility]! Our goal is to make care effective, proactive and convenient by using data you send us from home to better treat your chronic conditions.          My name is Kiki Ventura, and I am your dedicated Digital Medicine clinician. As an expert in medication management, I will help ensure that the medications you are taking continue to provide the intended benefits and help you reach your goals. You can reach me directly at [phone] or by sending me a message directly through your [facility] account.      I am Pao Beck and I will be your health . My job is to help you identify lifestyle changes to improve your disease control. We will talk about nutrition, exercise, and other ways you may be able to adjust your current habits to better your health. Additionally, we will help ensure you are completing the tests and screenings that are necessary to help manage your conditions. You can reach me directly at [phone] or by sending me a message directly through your [facility] account.    Most importantly, YOU are at the center of this team. Together, we will work to improve your overall health and encourage you to meet your goals for a healthier lifestyle.     What we expect from YOU:  · Please take frequent home blood pressure measurements. We ask that you take at least 1 blood pressure reading per week, but more information will better help us get you know you. Be sure you rest for a few minutes before taking the reading in a quiet, comfortable place.     Be available to receive phone calls or [facility] messages, when appropriate, from your care team. Please let us know if there are any specific days or times that work best for us to reach you via phone.     Complete routine tests and screenings. Dont worry, we will help keep you on track!           What you should  expect from your Digital Medicine Care Team:   We will work with you to create a personalized plan of care and provide you with encouragement and education, including regarding lifestyle changes, that could help you manage your disease states.     We will adjust your current medications, if needed, and continue to monitor your long-term progress.     We will provide you and your physician with monthly progress reports after you have been in the program for more than 30 days.     We will send you reminders through [facility] and text messages to help ensure you do not miss any testing deadlines to help manage your disease states.    You will be able to reach us by phone or through your [facility] account by clicking our names under Care Team on the right side of the home screen.    I look forward to working with you to achieve your blood pressure goals!    We look forward to working with you to help manage your health,    Sincerely,    Your Digital Medicine Team    Please visit our websites to learn more:   · Hypertension: [email or web address]      Remember, we are not available for emergencies. If you have an emergency, please contact your doctors office directly or call [facility] ([phone] or [phone]) or [phone].

## 2019-07-18 ENCOUNTER — PATIENT OUTREACH (OUTPATIENT)
Dept: OTHER | Facility: OTHER | Age: 69
End: 2019-07-18

## 2019-07-18 ENCOUNTER — TELEPHONE (OUTPATIENT)
Dept: FAMILY MEDICINE | Facility: CLINIC | Age: 69
End: 2019-07-18

## 2019-07-18 NOTE — PROGRESS NOTES
HPI:  Called Ms. Keo Frias to introduce myself as clinician for hypertension digital medicine program. HC received low blood pressure alert and stated that patient was concerned about blood pressure and whether or not to take medication.    Patient reports compliance to medication regimen with no complaints. Concerned today that blood pressure is low, unsure if she should take lisinopril.     No changes to correlate to low reading. Patient feels that she is well hydrated, drinks 4-5 12 oz glasses of water daily. She cooks with cinnamon, cloves and other [place] spices.    Last 5 Patient Entered Readings                                      Current 30 Day Average: 108/62     Recent Readings 7/18/2019 7/18/2019 7/18/2019 7/18/2019 7/18/2019    SBP (mmHg) 87 92 100 87 91    DBP (mmHg) 54 52 60 52 53    Pulse 64 64 66 74 72        Patient denies s/s of hypotension (lightheadedness, dizziness, nausea, fatigue) associated with low readings. Instructed patient to inform me if this occurs, patient confirms understanding.    Assessment:  Patient's current 30-day average is at goal of <130/80 mmHg.  Patient's blood pressure is occasionally running low - Advised to measure blood pressure first before taking medication. If systolic is greater than 110, okay to take. Today's latest blood pressure reading is 87/54.    Plan:  Do not take lisinopril today. Re-measure tomorrow and resume when systolic blood pressure is greater than 110. Consider reducing dose of lisinopril if low blood pressure continues to occur.  Patients health , Pao Beck, will follow-up as scheduled.    I will continue to monitor regularly and will follow-up in 4 weeks, sooner if blood pressure begins to trend upward or downward.     Current medication regimen:  Hypertension Medications             lisinopril (PRINIVIL,ZESTRIL) 5 MG tablet Take 1 tablet (5 mg total) by mouth once daily.        Patient has my contact information and knows to call  with any concerns or clinical changes.

## 2019-07-18 NOTE — PROGRESS NOTES
"Last 5 Patient Entered Readings                                      Current 30 Day Average: 107/64     Recent Readings 7/18/2019 7/18/2019 7/18/2019 7/18/2019 7/18/2019    SBP (mmHg) 79 77 79 86 84    DBP (mmHg) 43 49 48 52 53    Pulse 77 81 68 68 70        Digital Medicine: Health  Introduction    Introduced Mrs. Keo Frias to Digital Medicine. Discussed health  role and recommended lifestyle modifications.    Lifestyle Assessment:  Current Dietary Habits(i.e. low sodium, food labels, dining out): Mrs. Frias did not have any questions regarding her diet routine. Encouraged patient to have a salty snack and wait a few minutes before retaking low BP reading. Agreed to discuss dietary habits and goals at next outreach.    Exercise: Deferred. Agreed to discuss activity goals with patient at next outreach.    Alcohol/Tobacco: Deferred    Medication Adherence: has been compliant with the medicaiton regimen. Ms. Crowley is "feeling okay" despite her low BP readings but has questions whether she should be taking her medication or not. She reports her BP is usually "high in the mornings and low in the evenings after dinner" but is concerned with her recent low BP readings. Discussed proper BP reading technique with patient and will reach out to MAXIME Hester regarding her medication routine. Will discuss the size of her BP cuff at next outreach along with continuing to monitor low BP readings and symptoms.     Reviewed AHA/AACE recommendations:  Limit sodium intake to <2000mg/day  Recommended CHO intake, 45-65% of daily caloric intake  Perform 150 minutes of physical activity per week    Reviewed the importance of self-monitoring, medication adherence, and that the health  can be used as a resource for lifestyle modifications to help reduce or maintain a healthy lifestyle.  Reviewed that the Digital Medicine team is not available for emergencies and instructed the patient to call 911 or [facility] " ([phone] or [phone]) if one arises.

## 2019-07-18 NOTE — TELEPHONE ENCOUNTER
----- Message from Shanon Calvillo sent at 7/18/2019  4:07 PM CDT -----  Contact: Self/ [phone]  Patient is requesting a callback regarding low BP for the last 2 days.    Please call.

## 2019-07-19 ENCOUNTER — TELEPHONE (OUTPATIENT)
Dept: FAMILY MEDICINE | Facility: CLINIC | Age: 69
End: 2019-07-19

## 2019-07-19 NOTE — TELEPHONE ENCOUNTER
----- Message from Shanon Calvillo sent at 7/18/2019  4:07 PM CDT -----  Contact: Self/ [phone]  Patient is requesting a callback regarding low BP for the last 2 days.    Please call.

## 2019-07-19 NOTE — TELEPHONE ENCOUNTER
----- Message from Shanon Calvillo sent at 7/18/2019  4:07 PM CDT -----  Contact: Self/ [phone]  Patient is requesting a callback regarding low BP for the last 2 days.    Please call.

## 2019-07-19 NOTE — TELEPHONE ENCOUNTER
Returned pt phone call, pt stated that she was having low blood pressure 79/45 yesterday but didn't have any symptoms. This morning it was 145/65. Pt stated that she is wondering how would you like for her to take her medication beings that last night they told her not to take it due to the low pressure. Please Advise.

## 2019-07-20 NOTE — TELEPHONE ENCOUNTER
I sent this to patient on my chart on Saturday-- please touch base with her Monday to ensure she got this massage/ see how she is doing thanks. The message I sent her is below:       Bharath Crowley- I would advise that you try taking 2.5 mg of lisinopril or 1/2 pill in the morning and 1/2 at night.    Please cut your pill in 1/2 and let us know how this is working. I know it's hard to cut the pills so if the 1/2 in the morning and 1/2 in the evening helps you then I can change your prescription to lisinopril 2.5 mg twice daily, dr Ogden

## 2019-07-22 ENCOUNTER — TELEPHONE (OUTPATIENT)
Dept: FAMILY MEDICINE | Facility: CLINIC | Age: 69
End: 2019-07-22

## 2019-07-22 NOTE — TELEPHONE ENCOUNTER
Called pt she stated she got the message, she stated that she started taking half last night and it seems to be working good.

## 2019-07-24 ENCOUNTER — PATIENT OUTREACH (OUTPATIENT)
Dept: OTHER | Facility: OTHER | Age: 69
End: 2019-07-24

## 2019-07-24 DIAGNOSIS — I10 WHITE COAT SYNDROME WITH HYPERTENSION: Primary | ICD-10-CM

## 2019-07-24 RX ORDER — LISINOPRIL 5 MG/1
TABLET ORAL
Qty: 90 TABLET | Refills: 1
Start: 2019-07-24 | End: 2019-07-26 | Stop reason: ALTCHOICE

## 2019-07-24 NOTE — PROGRESS NOTES
Last 5 Patient Entered Readings                                      Current 30 Day Average: 108/62     Recent Readings 7/24/2019 7/24/2019 7/24/2019 7/24/2019 7/24/2019    SBP (mmHg) 123 96 94 71 73    DBP (mmHg) 66 54 57 44 42    Pulse 63 70 70 74 78        Received low blood pressure alert today of 71/44. Called patient to check-in. Patient reports that she feels fine. Blood pressure was fine this morning then dropped after she ate breakfast. She reports that blood pressure drops after every meal and states that she has post-prandial hypotension. Blood pressure was back to normal 2 hours later, resolved by eating a salty pickle and drinking water. She has had multiple episodes of low blood pressure in the past week. Now, taking 1.25 mg lisinopril at night. Will continue to monitor readings.

## 2019-07-26 ENCOUNTER — PATIENT OUTREACH (OUTPATIENT)
Dept: OTHER | Facility: OTHER | Age: 69
End: 2019-07-26

## 2019-07-26 ENCOUNTER — TELEPHONE (OUTPATIENT)
Dept: FAMILY MEDICINE | Facility: CLINIC | Age: 69
End: 2019-07-26

## 2019-07-26 NOTE — TELEPHONE ENCOUNTER
----- Message from Kacie LoD sent at 7/26/2019 12:26 PM CDT -----  Hi Dr. Ogden,    Keo Frias continues to have low BP readings. Patient states she is well hydrated with last check-in. She reduced her lisinopril down to 1.25 mg. BP today looks fine but haven't been able to connect with her to see if she took the low dose of lisinopril.     How do you feel about stopping the lisinopril completely due to the repeated low BP readings?    Kind regards,  Kiki Venutra, PharmD

## 2019-07-26 NOTE — PROGRESS NOTES
HPI:  Ms. Keo Frias returned call for hypertension digital medicine follow-up. Received low BP alert for patient again today, submitted a reading of 86/53 at 7/25/2019  9:11 PM. . Patient has repeated low blood pressure alerts. She reports that her blood pressure drops after she eats a meal. Due to several low BP alerts over the past weeks and in reviewing her readings, Dr. Ogden and I agreed that lisinopril is not needed for her BP management at this time. She should continue to measure and be monitored by program.    Patient reports she had not taken lisinopril yet for today. Her last reported reading is 97/54. She was advised to stop taking lisinopril for her blood pressure but to continue to measure her BP daily for monitoring in the program. If needed, medication can be restarted.    She did express concerns for BP if she has a migraine headache and BP rises. She was advised to seek medical care at ED or Urgent Care if BP >180/110 and accompanied by hypertensive s/s or migraine. Patient expressed understanding with repeat back.    Last 5 Patient Entered Readings                                      Current 30 Day Average: 107/62     Recent Readings 7/26/2019 7/26/2019 7/26/2019 7/26/2019 7/26/2019    SBP (mmHg) 97 99 111 131 132    DBP (mmHg) 54 59 58 70 70    Pulse 64 62 62 66 59        Patient denies s/s of hypotension (lightheadedness, dizziness, nausea, fatigue) associated with low readings. Instructed patient to inform me if this continues to occur, patient confirms understanding.    Assessment:  Patient's current 30-day average is at goal of <130/80 mmHg. Episodes of hypertension believed to be 2/2 migraines. Lisinopril is being discontinued due to multiple hypotensive alerts.     Plan:  Stop lisinopril 1.25 mg daily.   Patient will continue to measure readings for monitoring by program. Medication can be restarted if needed.   Patients health , Pao Beck, will follow-up as scheduled.     I will continue to monitor regularly and will follow-up in 2 weeks, sooner if blood pressure begins to trend upward or downward.     Current medication regimen:   None    Patient has my contact information and knows to call with any concerns or clinical changes.

## 2019-07-26 NOTE — TELEPHONE ENCOUNTER
I am fine with stopping the lisinopril-I was in favor of not starting her on blood pressure medications due to how labile her readings are with at times very low readings. Shes was advised on starting something by another doctor who evaluated her due to some recent elevated readings, and I agreed to a trial.     I would like to monitor and see how she does off of medication and only reconsider starting at if she has persistent elevation in the future, thank you

## 2019-07-29 ENCOUNTER — PES CALL (OUTPATIENT)
Dept: ADMINISTRATIVE | Facility: CLINIC | Age: 69
End: 2019-07-29

## 2019-08-08 ENCOUNTER — PATIENT OUTREACH (OUTPATIENT)
Dept: OTHER | Facility: OTHER | Age: 69
End: 2019-08-08

## 2019-08-08 NOTE — PROGRESS NOTES
Last 5 Patient Entered Readings                                      Current 30 Day Average: 111/65     Recent Readings 8/8/2019 8/8/2019 8/8/2019 8/7/2019 8/7/2019    SBP (mmHg) 103 137 140 135 144    DBP (mmHg) 63 77 76 68 76    Pulse 78 61 61 66 74      Returned phone call to patient. Patient left voice mail to check-in to see if there were any concerns with her blood pressure readings. On 7/26, we discontinued lisinopril 1.25 mg due to frequent episodes of hypotension with s/s. Patient was scheduled for a phone call on tomorrow for 2 week check-in. Reviewed readings, no major concerns noted. Her blood pressure is higher in the morning upon wakening, systolic ranging between 130's to 160. Patient states that she is calm and not stressed or anxious but she often mentions family history of stroke and high blood pressure during calls. She measures blood pressure after morning meditation. She has not had any episodes of extremely low hypotension since stopping lisinopril. She also reports that she has not had any headaches or migraines since stopping medication. And her episodes of postprandial hypotension that she previously reported has also improved. Encouraged patient to continue measuring blood pressure and submitting readings for monitoring in the program. If we see a need to start medication again, we can do so but consider low dose of a different medication. She has an appointment with her PCP on 9/6/19 for her annual wellness exam.

## 2019-08-13 ENCOUNTER — PATIENT OUTREACH (OUTPATIENT)
Dept: ADMINISTRATIVE | Facility: OTHER | Age: 69
End: 2019-08-13

## 2019-08-15 ENCOUNTER — HOSPITAL ENCOUNTER (EMERGENCY)
Facility: HOSPITAL | Age: 69
Discharge: HOME | End: 2019-08-15
Attending: EMERGENCY MEDICINE
Payer: MEDICARE

## 2019-08-15 ENCOUNTER — OFFICE VISIT (OUTPATIENT)
Dept: OTOLARYNGOLOGY | Facility: CLINIC | Age: 69
End: 2019-08-15
Payer: MEDICARE

## 2019-08-15 VITALS
SYSTOLIC BLOOD PRESSURE: 201 MMHG | WEIGHT: 120 LBS | RESPIRATION RATE: 16 BRPM | HEIGHT: 62 IN | DIASTOLIC BLOOD PRESSURE: 91 MMHG | TEMPERATURE: 98 F | OXYGEN SATURATION: 99 % | BODY MASS INDEX: 22.08 KG/M2 | HEART RATE: 72 BPM

## 2019-08-15 VITALS
WEIGHT: 124.13 LBS | HEART RATE: 64 BPM | HEIGHT: 62 IN | SYSTOLIC BLOOD PRESSURE: 120 MMHG | DIASTOLIC BLOOD PRESSURE: 70 MMHG | BODY MASS INDEX: 22.84 KG/M2

## 2019-08-15 DIAGNOSIS — R03.0 ELEVATED BLOOD PRESSURE, SITUATIONAL: ICD-10-CM

## 2019-08-15 DIAGNOSIS — H60.8X3 CHRONIC ECZEMATOUS OTITIS EXTERNA OF BOTH EARS: Primary | ICD-10-CM

## 2019-08-15 DIAGNOSIS — I10 ASYMPTOMATIC HYPERTENSION: Primary | ICD-10-CM

## 2019-08-15 DIAGNOSIS — H66.91 CHRONIC OTITIS MEDIA OF RIGHT EAR: ICD-10-CM

## 2019-08-15 DIAGNOSIS — H72.91 TYMPANIC MEMBRANE PERFORATION, RIGHT: ICD-10-CM

## 2019-08-15 PROCEDURE — 93010 EKG 12-LEAD: ICD-10-PCS | Mod: HCNC,,, | Performed by: INTERNAL MEDICINE

## 2019-08-15 PROCEDURE — 1101F PR PT FALLS ASSESS DOC 0-1 FALLS W/OUT INJ PAST YR: ICD-10-PCS | Mod: HCNC,CPTII,S$GLB, | Performed by: OTOLARYNGOLOGY

## 2019-08-15 PROCEDURE — 99999 PR PBB SHADOW E&M-EST. PATIENT-LVL III: ICD-10-PCS | Mod: PBBFAC,HCNC,, | Performed by: OTOLARYNGOLOGY

## 2019-08-15 PROCEDURE — 93005 ELECTROCARDIOGRAM TRACING: CPT | Mod: HCNC

## 2019-08-15 PROCEDURE — 92504 EAR MICROSCOPY EXAMINATION: CPT | Mod: HCNC,S$GLB,, | Performed by: OTOLARYNGOLOGY

## 2019-08-15 PROCEDURE — 99214 OFFICE O/P EST MOD 30 MIN: CPT | Mod: 25,HCNC,S$GLB, | Performed by: OTOLARYNGOLOGY

## 2019-08-15 PROCEDURE — 99999 PR PBB SHADOW E&M-EST. PATIENT-LVL III: CPT | Mod: PBBFAC,HCNC,, | Performed by: OTOLARYNGOLOGY

## 2019-08-15 PROCEDURE — 25000003 PHARM REV CODE 250: Mod: HCNC | Performed by: EMERGENCY MEDICINE

## 2019-08-15 PROCEDURE — 99214 PR OFFICE/OUTPT VISIT, EST, LEVL IV, 30-39 MIN: ICD-10-PCS | Mod: 25,HCNC,S$GLB, | Performed by: OTOLARYNGOLOGY

## 2019-08-15 PROCEDURE — 93010 ELECTROCARDIOGRAM REPORT: CPT | Mod: HCNC,,, | Performed by: INTERNAL MEDICINE

## 2019-08-15 PROCEDURE — 92504 PR EAR MICROSCOPY EXAMINATION: ICD-10-PCS | Mod: HCNC,S$GLB,, | Performed by: OTOLARYNGOLOGY

## 2019-08-15 PROCEDURE — 99283 EMERGENCY DEPT VISIT LOW MDM: CPT | Mod: 25,HCNC

## 2019-08-15 PROCEDURE — 1101F PT FALLS ASSESS-DOCD LE1/YR: CPT | Mod: HCNC,CPTII,S$GLB, | Performed by: OTOLARYNGOLOGY

## 2019-08-15 RX ORDER — HYDRALAZINE HYDROCHLORIDE 25 MG/1
25 TABLET, FILM COATED ORAL
Status: DISCONTINUED | OUTPATIENT
Start: 2019-08-15 | End: 2019-08-16 | Stop reason: HOSPADM

## 2019-08-15 RX ORDER — LISINOPRIL 10 MG/1
10 TABLET ORAL
Status: COMPLETED | OUTPATIENT
Start: 2019-08-15 | End: 2019-08-15

## 2019-08-15 RX ORDER — LISINOPRIL 5 MG/1
5 TABLET ORAL DAILY PRN
COMMUNITY
End: 2019-08-21 | Stop reason: DRUGHIGH

## 2019-08-15 RX ORDER — NYSTATIN AND TRIAMCINOLONE ACETONIDE 100000; 1 [USP'U]/G; MG/G
OINTMENT TOPICAL DAILY
Qty: 15 G | Refills: 0 | Status: SHIPPED | OUTPATIENT
Start: 2019-08-15 | End: 2019-09-03

## 2019-08-15 RX ADMIN — LISINOPRIL 10 MG: 10 TABLET ORAL at 11:08

## 2019-08-15 NOTE — PROGRESS NOTES
Chief Complaint   Patient presents with    Ear Fullness     itching in bilateral ears    Perforated Tympanic Membrane     right ear   .     HPI: Keo Frias is 69 y.o. female who presents for evaluation of bilateral ear ithcing.  Symptoms include itching in both ears and mild ache which is intermittent.  Symptoms began several months ago and are gradually worsening since that time. Patient denies otorrhea or post auricular pain. She admits to to hearing loss. She did see Dr. Lema for similar complaints who noted right TM Perforation and recommended tympanoplasty; however, she declined this option.       Past Medical History:   Diagnosis Date    Anemia     Cataract     Sleep apnea     Subclinical hypothyroidism     White coat syndrome with hypertension      Social History     Socioeconomic History    Marital status:      Spouse name: Not on file    Number of children: 2    Years of education: Not on file    Highest education level: Not on file   Occupational History     Employer: [employer]   Social Needs    Financial resource strain: Not on file    Food insecurity:     Worry: Not on file     Inability: Not on file    Transportation needs:     Medical: Not on file     Non-medical: Not on file   Tobacco Use    Smoking status: Never Smoker    Smokeless tobacco: Never Used   Substance and Sexual Activity    Alcohol use: No    Drug use: No    Sexual activity: Yes     Partners: Male     Birth control/protection: None   Lifestyle    Physical activity:     Days per week: Not on file     Minutes per session: Not on file    Stress: Not on file   Relationships    Social connections:     Talks on phone: Not on file     Gets together: Not on file     Attends Mandaen service: Not on file     Active member of club or organization: Not on file     Attends meetings of clubs or organizations: Not on file     Relationship status: Not on file   Other Topics Concern    Not on file    Social History Narrative    Not on file     Past Surgical History:   Procedure Laterality Date    COLONOSCOPY N/A 12/7/2018    Performed by Bhargav Gaston [facility] at [facility] (4[facility] FLR)    DILATION AND CURETTAGE OF UTERUS      postmenopausal bleeding    TUBAL LIGATION       Family History   Problem Relation Age of Onset    Diabetes Brother     Hypertension Brother     Glaucoma Brother     Glaucoma Father     Cataracts Father     Retinal detachment Father     Lung cancer Mother     Cancer Mother     Uterine cancer Maternal Grandmother     Stroke Maternal Grandmother     Stroke Paternal Grandmother     Amblyopia Neg Hx     Blindness Neg Hx     Macular degeneration Neg Hx     Strabismus Neg Hx     Thyroid disease Neg Hx            Review of Systems  General: negative for chills, fever or weight loss  Psychological: negative for mood changes or depression  Ophthalmic: negative for blurry vision, photophobia or eye pain  ENT: see HPI  Respiratory: no cough, shortness of breath, or wheezing  Cardiovascular: no chest pain or dyspnea on exertion  Gastrointestinal: no abdominal pain, change in bowel habits, or black/ bloody stools  Musculoskeletal: negative for gait disturbance or muscular weakness  Neurological: no syncope or seizures; no ataxia  Dermatological: negative for puritis,  rash and jaundice  Hematologic/lymphatic: no easy bruising, no new lumps or bumps      Physical Exam:    Vitals:    08/15/19 1019   BP: 120/70   Pulse: 64       Constitutional: Well appearing / communicating without difficutly.  NAD.  Eyes: EOM I Bilaterally  Head/Face: Normocephalic.  Negative paranasal sinus pressure/tenderness.  Salivary glands WNL.  House Brackmann I Bilaterally.    Right Ear: Auricle normal appearance. External Auditory Canal within normal limits no lesions or masses,TM with perforation and debris present. TM mobility noted. Decreased hearing is noted.   Left Ear: Auricle normal appearance. External  Auditory Canal within normal limits no lesions or masses,TM w/o masses/lesions/perforations. TM mobility noted. Decreased hearing is noted.   Nose: No gross nasal septal deviation. Inferior Turbinates 3+ bilaterally. No septal perforation. No masses/lesions. External nasal skin appears normal without masses/lesions.  Oral Cavity: Gingiva/lips within normal limits.  Dentition/gingiva healthy appearing. Mucus membranes moist. Floor of mouth soft, no masses palpated. Oral Tongue mobile. Hard Palate appears normal.    Oropharynx: Base of tongue appears normal. No masses/lesions noted. Tonsillar fossa/pharyngeal wall without lesions. Posterior oropharynx WNL.  Soft palate without masses. Midline uvula.   Neck/Lymphatic: No LAD I-VI bilaterally.  No thyromegaly.  No masses noted on exam.    Mirror laryngoscopy/nasopharyngoscopy: Active gag reflex.  Unable to perform.    Neuro/Psychiatric: AOx3.  Normal mood and affect.   Cardiovascular: Normal carotid pulses bilaterally, no increasing jugular venous distention noted at cervical region bilaterally.    Respiratory: Normal respiratory effort, no stridor, no retractions noted.    See separate procedure note for binocular microscopy.     Diagnostic testing reviewed:       Assessment:    ICD-10-CM ICD-9-CM    1. Chronic eczematous otitis externa of both ears H60.8X3 380.23    2. Chronic otitis media of right ear H66.91 382.9    3. Tympanic membrane perforation, right H72.91 384.20      The primary encounter diagnosis was Chronic eczematous otitis externa of both ears. Diagnoses of Chronic otitis media of right ear and Tympanic membrane perforation, right were also pertinent to this visit.      Plan:  No orders of the defined types were placed in this encounter.    mycolog ointment to bilateral ears daily.       She is reconsidering having tympanoplasty. Recommend follow up with Dr. Lema for right tympanoplasty.       Taina Ramsey MD

## 2019-08-15 NOTE — PROCEDURES
Procedures     Binocular Microscopy    Indication:  Tympanic membrane perforation    Additional detail was required for the otoscopic examination of the right ear.  The operating microscope was used to directly visualize the tympanic membrane and middle ear landmarks.  Findings:  Canal skin:  cerumen and debris present which was debrided.   TM:  dry perforation present   Ossicles:  normal   Effusion:  none     The patient tolerated the procedure well.

## 2019-08-16 NOTE — ED PROVIDER NOTES
Encounter Date: 8/15/2019       History     Chief Complaint   Patient presents with    Hypertension     Pt. c/o elevated BP at home in the 190s systolic. Pt. only takes her BP medications when her BP is over 150 systolic. Denies c/o headache or dizziness.      69-year-old female presents emergency department for evaluation of hypertension.  Denies any symptoms.  States she was checking her blood pressure at home and noticed it was elevated.  States she has blood pressure medicine prescribed, but her blood pressure sometimes goes too low so she was told to stop taking it.  She presents for advice on what to do about her blood pressure.  Denies any headache, lightheadedness, dizziness, chest pain, shortness of breath, focal numbness or weakness, vision changes, nausea, vomiting, fever.        Review of patient's allergies indicates:   Allergen Reactions    Sulfa (sulfonamide antibiotics) Rash     Past Medical History:   Diagnosis Date    Anemia     Cataract     Sleep apnea     Subclinical hypothyroidism     White coat syndrome with hypertension      Past Surgical History:   Procedure Laterality Date    COLONOSCOPY N/A 12/7/2018    Performed by [facility] at [facility] (4TH FLR)    DILATION AND CURETTAGE OF UTERUS      postmenopausal bleeding    TUBAL LIGATION       Family History   Problem Relation Age of Onset    Diabetes Brother     Hypertension Brother     Glaucoma Brother     Glaucoma Father     Cataracts Father     Retinal detachment Father     Lung cancer Mother     Cancer Mother     Uterine cancer Maternal Grandmother     Stroke Maternal Grandmother     Stroke Paternal Grandmother     Amblyopia Neg Hx     Blindness Neg Hx     Macular degeneration Neg Hx     Strabismus Neg Hx     Thyroid disease Neg Hx      Social History     Tobacco Use    Smoking status: Never Smoker    Smokeless tobacco: Never Used   Substance Use Topics    Alcohol use: No    Drug use: No     Review of  Systems   Constitutional: Negative for chills, fatigue and fever.   HENT: Negative for congestion, sore throat and voice change.    Eyes: Negative for photophobia, pain and redness.   Respiratory: Negative for cough, choking and shortness of breath.    Cardiovascular: Negative for chest pain, palpitations and leg swelling.   Gastrointestinal: Negative for abdominal pain, diarrhea, nausea and vomiting.   Genitourinary: Negative for dysuria, frequency and urgency.   Musculoskeletal: Negative for back pain, neck pain and neck stiffness.   Neurological: Negative for light-headedness, numbness and headaches.   All other systems reviewed and are negative.      Physical Exam     Initial Vitals [08/15/19 2234]   BP Pulse Resp Temp SpO2   (!) 198/98 70 16 98.5 °F (36.9 °C) 98 %      MAP       --         Physical Exam    Nursing note and vitals reviewed.  Constitutional: She appears well-developed and well-nourished. No distress.   HENT:   Head: Normocephalic and atraumatic.   Mouth/Throat: Oropharynx is clear and moist.   Eyes: Conjunctivae and EOM are normal.   Neck: Normal range of motion. Neck supple. No tracheal deviation present.   Cardiovascular: Normal rate and intact distal pulses.   Pulmonary/Chest: No respiratory distress.   Musculoskeletal: Normal range of motion. She exhibits no tenderness.   Neurological: She is alert and oriented to person, place, and time. She has normal strength. GCS score is 15. GCS eye subscore is 4. GCS verbal subscore is 5. GCS motor subscore is 6.   Skin: Skin is warm and dry. Capillary refill takes less than 2 seconds.         ED Course   Procedures  Labs Reviewed - No data to display  EKG Readings: (Independently Interpreted)   Initial Reading: No STEMI. Previous EKG: Compared with most recent EKG Previous EKG Date: 9/20/17 (Minimal change). Rhythm: Normal Sinus Rhythm. Heart Rate: 77. Ectopy: No Ectopy. Conduction: Normal. ST Segments: Normal ST Segments. T Waves: Normal. Axis:  Normal.       Imaging Results    None          Medical Decision Making:   Initial Assessment:   69-year-old female presents emergency department complaining of hypertension  Differential Diagnosis:   Asymptomatic versus urgent versus emergent hypertension  ED Management:  Patient has no symptoms.  I have given her an extra dose of her lisinopril and instructed her on keeping track of her blood pressure, prompt follow-up with her primary care physician, reasons to return to the emergency room.  She and her  expressed good understanding and are comfortable with discharge at this time.                      Clinical Impression:   .    ICD-10-CM ICD-9-CM   1. Asymptomatic hypertension I10 401.9   2. Elevated blood pressure, situational R03.0 796.2         Disposition:   Disposition: Discharged                        Wilfrido Singleton MD  08/15/19 2357       Wilfrido Singleton MD  08/16/19 0000

## 2019-08-16 NOTE — ED TRIAGE NOTES
Pt. To the ER with c/o elevated BP in the 190 s systolic at home tonight. Pt. Denies c/o chest pain, dizziness or shortness of breath. Respirations are even and non labored. Skin is PWD.

## 2019-08-19 ENCOUNTER — PATIENT OUTREACH (OUTPATIENT)
Dept: OTHER | Facility: OTHER | Age: 69
End: 2019-08-19

## 2019-08-19 NOTE — PROGRESS NOTES
Last 5 Patient Entered Readings                                      Current 30 Day Average: 111/65     Recent Readings 8/19/2019 8/19/2019 8/19/2019 8/19/2019 8/18/2019    SBP (mmHg) 111 135 132 141 95    DBP (mmHg) 67 68 70 79 55    Pulse 62 60 60 64 72        Received low blood pressure alert for reading of 88/51 at 8/17/2019  8:26 PM. Patient reports that she is feeling fine today. On Thursday, she experienced an elevated blood pressure before bedtime with no s/sx of SOB, CP, headache, blurred vision. She took half-tablet (2.5 mg) lisinopril which did not resolve her blood pressure so she took another quarter-tablet (1.25 mg) lisinopril which was also not effective. She decided to seek ED for care. At the ED, she was given 5 mg lisinopril and discharged. She has not taken lisinopril since Thursday's event. Her BP ran low Friday through Sunday after taking lisinopril (8.75 mg total). Counseled her to continue taking lisinopril as needed only: Take 1 tablet if SBP is 150-160 or DBP is >110. Will continue to monitor readings and request PCP to refer her to cardiology for evaluation.    Hypertension Medications             lisinopril (PRINIVIL,ZESTRIL) 5 MG tablet Take 5 mg by mouth once daily. Patient taking differently: Take 1 tablet if systolic pressure (top number) is greater than 150-160, or if diastolic pressure (bottom number) is greater than 110.

## 2019-08-19 NOTE — PROGRESS NOTES
Last 5 Patient Entered Readings                                      Current 30 Day Average: 111/65     Recent Readings 8/19/2019 8/19/2019 8/19/2019 8/19/2019 8/18/2019    SBP (mmHg) 111 135 132 141 95    DBP (mmHg) 67 68 70 79 55    Pulse 62 60 60 64 72      Digital Medicine: Health  Follow Up  Left voicemail to follow up with Mrs. Keo Frias.  Current BP average 111/65 mmHg is at goal <130/80

## 2019-08-20 ENCOUNTER — PATIENT OUTREACH (OUTPATIENT)
Dept: OTHER | Facility: OTHER | Age: 69
End: 2019-08-20

## 2019-08-20 ENCOUNTER — TELEPHONE (OUTPATIENT)
Dept: FAMILY MEDICINE | Facility: CLINIC | Age: 69
End: 2019-08-20

## 2019-08-20 DIAGNOSIS — R09.89 LABILE BLOOD PRESSURE: ICD-10-CM

## 2019-08-20 DIAGNOSIS — I10 HYPERTENSION, UNSPECIFIED TYPE: Primary | ICD-10-CM

## 2019-08-20 NOTE — TELEPHONE ENCOUNTER
----- Message from Malina Kimbrough sent at 8/20/2019  9:05 AM CDT -----  Contact: Self [phone]  Patient is calling to talk to nurse in regards to her blood pressure is elevated and she was seen in the ER last Thursday.

## 2019-08-20 NOTE — TELEPHONE ENCOUNTER
Returned pt phone call, pt stated that her blood pressures have been high. Pt states that in the morning around 6am it was normal range 126, but by 7AM it spiked up to 176 top number. Pt denies any symptoms. Please Advise.

## 2019-08-20 NOTE — TELEPHONE ENCOUNTER
----- Message from Kiki Ventura PharmD sent at 8/20/2019  9:55 AM CDT -----  Alvina Ogden,    Keo Frias had another episode this morning. She took 5 mg of lisinopril and reports feeling fine. Spiked BP resolved to last reading of 108/60. She expressed being interested in seeing a cardiologist at [facility] and wants to see Ayah Faust MD located at [facility] main [facility] on [facility].     Are you able to refer her to Dr. Faust for an appointment?    Thanks,  adg

## 2019-08-20 NOTE — PROGRESS NOTES
Last 5 Patient Entered Readings                                      Current 30 Day Average: 112/65     Recent Readings 8/20/2019 8/20/2019 8/20/2019 8/20/2019 8/20/2019    SBP (mmHg) 108 115 147 178 128    DBP (mmHg) 60 68 77 91 72    Pulse 77 76 65 68 62        Patient called to say she took lisinopril 5 mg tablet today. She had pinch in head and eye that made her measure her blood pressure to find it was elevated. She took lisinopril 5 mg and blood pressure spike resolved to last reading of 108/60. Discussed concerns with random blood pressure spikes with patient. Counseled that she can take up to 40 mg of lisinopril for blood pressure if needed. Advised that if blood pressure is >180/110 with headaches, blurred vision, SOB and CP she should seek ED for care. Patient reports that she feels fine, denies s/sx of hypertension. She also shared that she is interested in seeing a cardiologist named Ayah Faust MD located at [facility] main [facility] on [facility]. I will work with patient to try and obtain her an appointment. I will continue to monitor readings and f/u in 4 weeks or sooner if needed.

## 2019-08-21 ENCOUNTER — TELEPHONE (OUTPATIENT)
Dept: FAMILY MEDICINE | Facility: CLINIC | Age: 69
End: 2019-08-21

## 2019-08-21 DIAGNOSIS — I10 WHITE COAT SYNDROME WITH HYPERTENSION: Primary | ICD-10-CM

## 2019-08-21 RX ORDER — LISINOPRIL 2.5 MG/1
2.5 TABLET ORAL 2 TIMES DAILY
Qty: 60 TABLET | Refills: 11 | Status: SHIPPED | OUTPATIENT
Start: 2019-08-21 | End: 2019-08-28 | Stop reason: ALTCHOICE

## 2019-08-21 NOTE — TELEPHONE ENCOUNTER
I would advise Lisinopril 2.5 mg AM and  2.5 mg PM.     Continue home log     I have placed a referral to Cardiology Dr Ayah Faust per her request.

## 2019-08-21 NOTE — TELEPHONE ENCOUNTER
Returned pt phone call, to let her know dr vega, pt states that she would need a new RX for 2.5 MG because when she breaks her current 5mg it turned into dust. Please Advise.

## 2019-08-22 ENCOUNTER — TELEPHONE (OUTPATIENT)
Dept: FAMILY MEDICINE | Facility: CLINIC | Age: 69
End: 2019-08-22

## 2019-08-22 NOTE — TELEPHONE ENCOUNTER
That sounds good, she should generally take the 2.5 mg twice daily but should not take it if the top number is <100. Thanks

## 2019-08-22 NOTE — TELEPHONE ENCOUNTER
Pt called wanted to let us know that around this time her bp is 94/52 and wanted to know if she should take it. I let her know not to and asked if she was feeling okay. Pt stated she feels fine.

## 2019-08-22 NOTE — TELEPHONE ENCOUNTER
----- Message from Maricarmen Woodward sent at 8/22/2019  9:38 AM CDT -----  Contact: [phone]-self  Patient requesting a callback concerning directions of her medication. Please call.

## 2019-08-23 ENCOUNTER — PATIENT OUTREACH (OUTPATIENT)
Dept: ADMINISTRATIVE | Facility: HOSPITAL | Age: 69
End: 2019-08-23

## 2019-08-23 ENCOUNTER — PATIENT MESSAGE (OUTPATIENT)
Dept: OTHER | Facility: OTHER | Age: 69
End: 2019-08-23

## 2019-08-23 DIAGNOSIS — E78.5 HYPERLIPIDEMIA, UNSPECIFIED HYPERLIPIDEMIA TYPE: Primary | ICD-10-CM

## 2019-08-23 DIAGNOSIS — Z12.31 SCREENING MAMMOGRAM, ENCOUNTER FOR: ICD-10-CM

## 2019-08-23 DIAGNOSIS — Z13.220 SCREENING CHOLESTEROL LEVEL: ICD-10-CM

## 2019-08-25 ENCOUNTER — NURSE TRIAGE (OUTPATIENT)
Dept: ADMINISTRATIVE | Facility: CLINIC | Age: 69
End: 2019-08-25

## 2019-08-25 ENCOUNTER — HOSPITAL ENCOUNTER (EMERGENCY)
Facility: HOSPITAL | Age: 69
Discharge: HOME | End: 2019-08-25
Attending: EMERGENCY MEDICINE
Payer: MEDICARE

## 2019-08-25 VITALS
BODY MASS INDEX: 22.45 KG/M2 | DIASTOLIC BLOOD PRESSURE: 86 MMHG | HEART RATE: 79 BPM | TEMPERATURE: 98 F | HEIGHT: 62 IN | SYSTOLIC BLOOD PRESSURE: 192 MMHG | OXYGEN SATURATION: 100 % | WEIGHT: 122 LBS | RESPIRATION RATE: 40 BRPM

## 2019-08-25 DIAGNOSIS — R20.2 TINGLING OF FACE: ICD-10-CM

## 2019-08-25 DIAGNOSIS — R51.9 ACUTE NONINTRACTABLE HEADACHE, UNSPECIFIED HEADACHE TYPE: Primary | ICD-10-CM

## 2019-08-25 DIAGNOSIS — R00.2 PALPITATIONS: ICD-10-CM

## 2019-08-25 DIAGNOSIS — R07.9 CHEST PAIN: ICD-10-CM

## 2019-08-25 DIAGNOSIS — R09.89 LABILE BLOOD PRESSURE: ICD-10-CM

## 2019-08-25 LAB
ALBUMIN SERPL BCP-MCNC: 4 G/DL (ref 3.5–5.2)
ALP SERPL-CCNC: 74 U/L (ref 55–135)
ALT SERPL W/O P-5'-P-CCNC: 16 U/L (ref 10–44)
ANION GAP SERPL CALC-SCNC: 12 MMOL/L (ref 8–16)
AST SERPL-CCNC: 24 U/L (ref 10–40)
BASOPHILS # BLD AUTO: 0.04 K/UL (ref 0–0.2)
BASOPHILS NFR BLD: 0.6 % (ref 0–1.9)
BILIRUB SERPL-MCNC: 0.6 MG/DL (ref 0.1–1)
BNP SERPL-MCNC: 94 PG/ML (ref 0–99)
BUN SERPL-MCNC: 8 MG/DL (ref 8–23)
CALCIUM SERPL-MCNC: 9.5 MG/DL (ref 8.7–10.5)
CHLORIDE SERPL-SCNC: 100 MMOL/L (ref 95–110)
CO2 SERPL-SCNC: 23 MMOL/L (ref 23–29)
CREAT SERPL-MCNC: 0.6 MG/DL (ref 0.5–1.4)
DIFFERENTIAL METHOD: ABNORMAL
EOSINOPHIL # BLD AUTO: 0.3 K/UL (ref 0–0.5)
EOSINOPHIL NFR BLD: 4.6 % (ref 0–8)
ERYTHROCYTE [DISTWIDTH] IN BLOOD BY AUTOMATED COUNT: 13.3 % (ref 11.5–14.5)
EST. GFR  (AFRICAN AMERICAN): >60 ML/MIN/1.73 M^2
EST. GFR  (NON AFRICAN AMERICAN): >60 ML/MIN/1.73 M^2
GLUCOSE SERPL-MCNC: 95 MG/DL (ref 70–110)
HCT VFR BLD AUTO: 41.8 % (ref 37–48.5)
HGB BLD-MCNC: 13.9 G/DL (ref 12–16)
LYMPHOCYTES # BLD AUTO: 1.7 K/UL (ref 1–4.8)
LYMPHOCYTES NFR BLD: 25.8 % (ref 18–48)
MCH RBC QN AUTO: 28 PG (ref 27–31)
MCHC RBC AUTO-ENTMCNC: 33.3 G/DL (ref 32–36)
MCV RBC AUTO: 84 FL (ref 82–98)
MONOCYTES # BLD AUTO: 0.3 K/UL (ref 0.3–1)
MONOCYTES NFR BLD: 3.7 % (ref 4–15)
NEUTROPHILS # BLD AUTO: 4.4 K/UL (ref 1.8–7.7)
NEUTROPHILS NFR BLD: 65.3 % (ref 38–73)
PLATELET # BLD AUTO: 189 K/UL (ref 150–350)
PMV BLD AUTO: 12.2 FL (ref 9.2–12.9)
POTASSIUM SERPL-SCNC: 4.1 MMOL/L (ref 3.5–5.1)
PROT SERPL-MCNC: 7.5 G/DL (ref 6–8.4)
RBC # BLD AUTO: 4.96 M/UL (ref 4–5.4)
SODIUM SERPL-SCNC: 135 MMOL/L (ref 136–145)
TROPONIN I SERPL DL<=0.01 NG/ML-MCNC: <0.006 NG/ML (ref 0–0.03)
WBC # BLD AUTO: 6.75 K/UL (ref 3.9–12.7)

## 2019-08-25 PROCEDURE — 84484 ASSAY OF TROPONIN QUANT: CPT | Mod: HCNC

## 2019-08-25 PROCEDURE — 83880 ASSAY OF NATRIURETIC PEPTIDE: CPT | Mod: HCNC

## 2019-08-25 PROCEDURE — 25000003 PHARM REV CODE 250: Mod: HCNC | Performed by: NURSE PRACTITIONER

## 2019-08-25 PROCEDURE — 80053 COMPREHEN METABOLIC PANEL: CPT | Mod: HCNC

## 2019-08-25 PROCEDURE — 93005 ELECTROCARDIOGRAM TRACING: CPT | Mod: HCNC

## 2019-08-25 PROCEDURE — 85025 COMPLETE CBC W/AUTO DIFF WBC: CPT | Mod: HCNC

## 2019-08-25 PROCEDURE — 99285 EMERGENCY DEPT VISIT HI MDM: CPT | Mod: 25,HCNC

## 2019-08-25 RX ORDER — ALPRAZOLAM 0.25 MG/1
0.25 TABLET ORAL
Status: COMPLETED | OUTPATIENT
Start: 2019-08-25 | End: 2019-08-25

## 2019-08-25 RX ORDER — ONDANSETRON 2 MG/ML
4 INJECTION INTRAMUSCULAR; INTRAVENOUS
Status: DISCONTINUED | OUTPATIENT
Start: 2019-08-25 | End: 2019-08-25 | Stop reason: HOSPADM

## 2019-08-25 RX ORDER — ALPRAZOLAM 0.25 MG/1
0.25 TABLET ORAL 2 TIMES DAILY PRN
Qty: 8 TABLET | Refills: 0 | Status: SHIPPED | OUTPATIENT
Start: 2019-08-25 | End: 2019-08-28 | Stop reason: DRUGHIGH

## 2019-08-25 RX ORDER — ASPIRIN 325 MG
325 TABLET ORAL
Status: COMPLETED | OUTPATIENT
Start: 2019-08-25 | End: 2019-08-25

## 2019-08-25 RX ADMIN — ALPRAZOLAM 0.25 MG: 0.25 TABLET ORAL at 02:08

## 2019-08-25 RX ADMIN — ASPIRIN 325 MG ORAL TABLET 325 MG: 325 PILL ORAL at 11:08

## 2019-08-25 NOTE — ED NOTES
Pt reports chest pain that started around 0645 this morning; associated symptoms were lip tingling and an indigestion type feeling in her chest. She reports that she normally takes BP medicine daily and that her BP's last night and today were very labile. She reports that when her BP was elevated she took an extra dose of her medication today and was advised to go to the ED for evaluation. Pt denies any SOB or chest pain at this time.

## 2019-08-25 NOTE — ED NOTES
Pt refused ondansetron and states that she is no longer nauseous at this time. Pt also updated on plan of care and awaiting Dr. Resendiz's assessment and repeat Troponin.

## 2019-08-25 NOTE — ED PROVIDER NOTES
"Encounter Date: 8/25/2019    SCRIBE #1 NOTE: I, Reji Sorensen, am scribing for, and in the presence of,  Dr. Resendiz. I have scribed the following portions of the note - the APC attestation.       History     Chief Complaint   Patient presents with    Hypertension      palpitations. pt took lisinipril this morning; denies shortness of breath.    Headache     pressure that started today at 0645     The patient is a 69 year-old female with a past medical history of subclinical hypothyroidism, anemia, sleep apnea, and white coat hypertension who presents to the ED complaining of elevated blood pressure readings at home beginning this morning at 0645.  She reports intermittent tingling to her lips and intermittent palpitations that she has never experienced in the past.  Those symptoms have since resolved.  She reports that the palpitations lasted for approximately 30 minutes.  She also began to feel a headache that she characterized as "pressure and heaviness" at the top of her head. The headache symptoms are worse with activity.  No other numbness, weakness, or tingling.  No visual changes.  She does not take any blood thinning medication.  No fevers, chills, shortness of breath, nausea, vomiting, or neck pain. She reports that she did have some fleeting "chest discomfort that felt like acid" that has also since resolved. She has not seen cardiology in the past, but has an appointment to see a cardiologist for the first time on 08/12/19.  She reports taking her prescribed lisinopril this morning, but no other medications.    The history is provided by the patient and a relative.     Review of patient's allergies indicates:   Allergen Reactions    Sulfa (sulfonamide antibiotics) Rash     Past Medical History:   Diagnosis Date    Anemia     Cataract     Sleep apnea     Subclinical hypothyroidism     White coat syndrome with hypertension      Past Surgical History:   Procedure Laterality Date    COLONOSCOPY N/A " "12/7/2018    Performed by Bhargav Gaston MD at [facility] (4TH FLR)    DILATION AND CURETTAGE OF UTERUS      postmenopausal bleeding    TUBAL LIGATION       Family History   Problem Relation Age of Onset    Diabetes Brother     Hypertension Brother     Glaucoma Brother     Glaucoma Father     Cataracts Father     Retinal detachment Father     Lung cancer Mother     Cancer Mother     Uterine cancer Maternal Grandmother     Stroke Maternal Grandmother     Stroke Paternal Grandmother     Amblyopia Neg Hx     Blindness Neg Hx     Macular degeneration Neg Hx     Strabismus Neg Hx     Thyroid disease Neg Hx      Social History     Tobacco Use    Smoking status: Never Smoker    Smokeless tobacco: Never Used   Substance Use Topics    Alcohol use: No    Drug use: No     Review of Systems   Constitutional: Negative for chills and fever.   HENT: Negative for sore throat.    Eyes: Negative for visual disturbance.   Respiratory: Negative for shortness of breath.    Cardiovascular: Positive for palpitations. Negative for chest pain.        Chest discomfort   Gastrointestinal: Negative for abdominal pain, nausea and vomiting.   Genitourinary: Negative for dysuria.   Musculoskeletal: Negative for back pain and gait problem.   Skin: Negative for rash.   Neurological: Positive for headaches. Negative for weakness.        "Tingling to lips"   Hematological: Does not bruise/bleed easily.       Physical Exam     Initial Vitals [08/25/19 1049]   BP Pulse Resp Temp SpO2   (!) 199/81 75 20 99.1 °F (37.3 °C) 98 %      MAP       --         Physical Exam    Nursing note and vitals reviewed.  Constitutional: She appears well-developed and well-nourished.  Non-toxic appearance. No distress.   The patient is alert, oriented, and speaking in complete sentences.  No apparent distress.   HENT:   Head: Normocephalic and atraumatic.   Right Ear: Hearing, tympanic membrane, external ear and ear canal normal.   Left Ear: " Hearing, tympanic membrane, external ear and ear canal normal.   Nose: Nose normal.   Mouth/Throat: Uvula is midline, oropharynx is clear and moist and mucous membranes are normal.   Eyes: Conjunctivae and EOM are normal. Pupils are equal, round, and reactive to light. Right eye exhibits normal extraocular motion. Left eye exhibits normal extraocular motion. Right pupil is round and reactive. Left pupil is round and reactive. Pupils are equal.   Neck: Full passive range of motion without pain. Neck supple. No neck rigidity.   No meningismus   Cardiovascular: Normal rate, normal heart sounds and normal pulses. Exam reveals no gallop and no friction rub.    No murmur heard.  Pulses:       Radial pulses are 2+ on the right side, and 2+ on the left side.        Dorsalis pedis pulses are 2+ on the right side, and 2+ on the left side.   No lower extremity edema   Pulmonary/Chest: Effort normal. No respiratory distress. She has decreased breath sounds (Slightly decreased bilaterally). She has no wheezes. She has no rhonchi. She has no rales.   Abdominal: Soft. Bowel sounds are normal. She exhibits no distension. There is no tenderness. There is no rigidity, no rebound and no guarding.   Musculoskeletal: Normal range of motion.   Neurological: She is alert and oriented to person, place, and time. She has normal strength. No cranial nerve deficit. She exhibits normal muscle tone. She displays no seizure activity. Gait normal. GCS eye subscore is 4. GCS verbal subscore is 5. GCS motor subscore is 6.   Patient reports slight sensory deficit to left lower extremity that is of unknown chronicity.  Muscular strength is normal and equal bilaterally. No pronator drift.  Patient is ambulatory with a steady gait.  Finger-to-nose and heel-to-shin are both within normal limits.   Skin: Skin is warm, dry and intact. Capillary refill takes less than 2 seconds. No rash noted.   Psychiatric: She has a normal mood and affect. Her speech is  normal and behavior is normal. Judgment and thought content normal. Cognition and memory are normal.         ED Course   Procedures  Labs Reviewed   CBC W/ AUTO DIFFERENTIAL - Abnormal; Notable for the following components:       Result Value    Mono% 3.7 (*)     All other components within normal limits   COMPREHENSIVE METABOLIC PANEL - Abnormal; Notable for the following components:    Sodium 135 (*)     All other components within normal limits   TROPONIN I   B-TYPE NATRIURETIC PEPTIDE   TROPONIN I     EKG Readings: (Independently Interpreted)   Initial Reading: No STEMI. Rhythm: Normal Sinus Rhythm. Heart Rate: 71. Ectopy: No Ectopy. Conduction: Normal. ST Segments: Normal ST Segments.       Imaging Results          X-Ray Chest AP Portable (Final result)  Result time 08/25/19 13:15:53    Final result by Vijay Montalvo MD (08/25/19 13:15:53)                 Impression:      No acute findings.      Electronically signed by: Vijay Montalvo MD  Date:    08/25/2019  Time:    13:15             Narrative:    EXAMINATION:  XR CHEST AP PORTABLE    CLINICAL HISTORY:  Chest Pain;    TECHNIQUE:  Single frontal view of the chest was performed.    COMPARISON:  Chest x-ray from 11/06/2006    FINDINGS:  EKG leads overlie chest obscuring detail.  Lungs are hyperexpanded similar to the prior study.  No focal consolidation.  Faint opacity projecting about the right lower lung zone is felt to relate to confluence of vascular shadows.  Aortic atherosclerosis is noted.  Cardiomediastinal silhouette is not enlarged and stable in appearance.  Osseous structures are unchanged.                               CT Head Without Contrast (Final result)  Result time 08/25/19 12:20:30    Final result by Markos Rios MD (08/25/19 12:20:30)                 Impression:      No acute abnormality.      Electronically signed by: Markos Rios MD  Date:    08/25/2019  Time:    12:20             Narrative:    EXAMINATION:  CT HEAD WITHOUT  CONTRAST    CLINICAL HISTORY:  Focal neuro deficit, new, fixed or worsening, <6 hours;    TECHNIQUE:  Low dose axial CT images obtained throughout the head without intravenous contrast. Sagittal and coronal reconstructions were performed.    COMPARISON:  August 28, 2017    FINDINGS:  Intracranial compartment:    Ventricles and sulci are normal in size for age without evidence of hydrocephalus. No extra-axial blood or fluid collections.    The brain parenchyma appears normal. No parenchymal mass, hemorrhage, edema or major vascular distribution infarct.    Skull/extracranial contents (limited evaluation): No fracture. Mastoid air cells and paranasal sinuses are essentially clear.                                 Medical Decision Making:   History:   Old Medical Records: I decided to obtain old medical records.  Clinical Tests:   Lab Tests: Ordered and Reviewed  Radiological Study: Ordered and Reviewed  Medical Tests: Ordered and Reviewed  ED Management:  ECG does not demonstrate evidence of acute ischemia or dysrhythmia.    I independently reviewed and interpreted labs which are unrevealing.  No leukocytosis.  Troponin and BNP are both negative.  All labs are essentially within normal limits.    Chest x-ray is clear.    CT of head is negative.    Based on history and physical exam, as well as diagnostic results, I considered but do not suspect CVA, ICH, meningitis, pneumonia, pneumothorax, STEMI, or other emergent cause of the patient's symptoms at this time.  We have discussed this case with Dr. Bob who will review this documentation and may be able to see her in clinic sooner than 3 weeks which is when they are currently scheduled to see Cardiology.  The patient is amenable to this plan.  All questions answered.  Strict return precautions have been discussed.  Case discussed with supervising physician who agrees with plan.              Attending Attestation:     Physician Attestation Statement for NP/PA:   I  have conducted a face to face encounter with this patient in addition to the NP/PA, due to Medical Complexity    Other NP/PA Attestation Additions:      Medical Decision Making: Saw the patient in conjunction with Vanita Bautista.  There is no evience of any CVA-like symptoms, and the patient has a history of anxiety and frets about her BP. Physical exam is unremarkable; no focal neurological deficits. Discussed with Dr. Bob, who will see the patient in the morning.                    Clinical Impression:       ICD-10-CM ICD-9-CM   1. Acute nonintractable headache, unspecified headache type R51 784.0   2. Palpitations R00.2 785.1   3. Chest pain R07.9 786.50   4. Tingling of face R20.2 782.0   5. Labile blood pressure R09.89 796.2                                Vanita Bautista NP  08/25/19 [id number]

## 2019-08-25 NOTE — DISCHARGE INSTRUCTIONS
Thank you for allowing me to care for you today.  I hope our treatment plan will make you feel better in the next few days.  In order for me to take better care of my future patients and improve our Emergency Department, I would appreciate if you can provide us with feedback.  In the next few days, you may receive a survey in the mail.  If you do, it would mean a great deal to me if you would please take the time to complete it.    Thank you and I hope you feel better.  Vanita Bautista NP

## 2019-08-25 NOTE — TELEPHONE ENCOUNTER
Reason for Disposition   [1] Systolic BP  >= 160 OR Diastolic >= 100 AND [2] cardiac or neurologic symptoms (e.g., chest pain, difficulty breathing, unsteady gait, blurred vision)    Additional Information   Negative: Difficult to awaken or acting confused (e.g., disoriented, slurred speech)   Negative: Severe difficulty breathing (e.g., struggling for each breath, speaks in single words)   Negative: [1] Weakness of the face, arm or leg on one side of the body AND [2] new onset   Negative: [1] Numbness (i.e., loss of sensation) of the face, arm or leg on one side of the body AND [2] new onset   Negative: [1] Chest pain lasts > 5 minutes AND [2] history of heart disease  (i.e., heart attack, bypass surgery, angina, angioplasty, CHF)   Negative: [1] Chest pain AND [2] took nitrogylcerin AND [3] pain was not relieved   Negative: Sounds like a life-threatening emergency to the triager    Protocols used: HIGH BLOOD PRESSURE-A-AH    Pt states this am BP spiked at approx 6 am this morning of 190/98. Pt states she took her lisinopril 2.5 mg twice this am, last time at approx 8 am this morning. Current IK=750/88. Pt advised per protocol and pt verbalizes understanding. While trying to book appointment tomorrow pt states she began to feel dizzy and ZN=739/96. Pt advised to ED per protocol and pt verbalizes understanding.

## 2019-08-26 ENCOUNTER — OFFICE VISIT (OUTPATIENT)
Dept: CARDIOLOGY | Facility: CLINIC | Age: 69
End: 2019-08-26
Payer: MEDICARE

## 2019-08-26 ENCOUNTER — PATIENT OUTREACH (OUTPATIENT)
Dept: OTHER | Facility: OTHER | Age: 69
End: 2019-08-26

## 2019-08-26 VITALS
DIASTOLIC BLOOD PRESSURE: 84 MMHG | HEART RATE: 64 BPM | OXYGEN SATURATION: 97 % | SYSTOLIC BLOOD PRESSURE: 138 MMHG | HEIGHT: 62 IN | WEIGHT: 120.56 LBS | BODY MASS INDEX: 22.19 KG/M2

## 2019-08-26 DIAGNOSIS — E78.00 HYPERCHOLESTEREMIA: ICD-10-CM

## 2019-08-26 DIAGNOSIS — R09.89 LABILE HYPERTENSION: Primary | ICD-10-CM

## 2019-08-26 PROCEDURE — 99999 PR PBB SHADOW E&M-EST. PATIENT-LVL III: ICD-10-PCS | Mod: PBBFAC,HCNC,, | Performed by: INTERNAL MEDICINE

## 2019-08-26 PROCEDURE — 99204 PR OFFICE/OUTPT VISIT, NEW, LEVL IV, 45-59 MIN: ICD-10-PCS | Mod: HCNC,S$GLB,, | Performed by: INTERNAL MEDICINE

## 2019-08-26 PROCEDURE — 1101F PR PT FALLS ASSESS DOC 0-1 FALLS W/OUT INJ PAST YR: ICD-10-PCS | Mod: HCNC,CPTII,S$GLB, | Performed by: INTERNAL MEDICINE

## 2019-08-26 PROCEDURE — 1101F PT FALLS ASSESS-DOCD LE1/YR: CPT | Mod: HCNC,CPTII,S$GLB, | Performed by: INTERNAL MEDICINE

## 2019-08-26 PROCEDURE — 99999 PR PBB SHADOW E&M-EST. PATIENT-LVL III: CPT | Mod: PBBFAC,HCNC,, | Performed by: INTERNAL MEDICINE

## 2019-08-26 PROCEDURE — 99204 OFFICE O/P NEW MOD 45 MIN: CPT | Mod: HCNC,S$GLB,, | Performed by: INTERNAL MEDICINE

## 2019-08-26 RX ORDER — CAPTOPRIL 12.5 MG/1
6.25 TABLET ORAL 2 TIMES DAILY PRN
Qty: 45 TABLET | Refills: 3 | Status: SHIPPED | OUTPATIENT
Start: 2019-08-26 | End: 2019-08-26 | Stop reason: SDUPTHER

## 2019-08-26 RX ORDER — CAPTOPRIL 12.5 MG/1
6.25 TABLET ORAL
Qty: 45 TABLET | Refills: 3 | Status: SHIPPED | OUTPATIENT
Start: 2019-08-26 | End: 2019-12-06

## 2019-08-26 NOTE — PROGRESS NOTES
Last 5 Patient Entered Readings                                      Current 30 Day Average: 112/67     Recent Readings 8/26/2019 8/26/2019 8/26/2019 8/26/2019 8/25/2019    SBP (mmHg) 126 151 135 118 117    DBP (mmHg) 77 64 67 66 66    Pulse 84 72 71 64 74        8/26/19: Received ALERT for high blood pressure reading of 196/98 at 8/25/2019  7:58 AM. Patient visited ED due to high blood pressure and headache. ED gave her alprazolam and discharged.

## 2019-08-26 NOTE — PROGRESS NOTES
"Subjective:   Patient ID:  Keo Frias is a 69 y.o. female is a new patient who presents for evaluation of Palpitations and Hospital Follow Up    HPI:   Non smoker  Brother had MI and CABG in 60s, another.     The 10-year ASCVD risk score (Vassarsusan LANGSTON Jr., et al., 2013) is: 15.6%    Values used to calculate the score:      Age: 69 years      Sex: Female      Is Non- : No      Diabetic: No      Tobacco smoker: No      Systolic Blood Pressure: 138 mmHg      Is BP treated: Yes      HDL Cholesterol: 44 mg/dL      Total Cholesterol: 221 mg/dL      Patient Active Problem List   Diagnosis    Postmenopausal osteoporosis    Migraine headache with aura    Vitamin D deficiency    Vegetarian diet    Otalgia of both ears    Sensorineural hearing loss    Dysfunctions of sleep stages or arousal from sleep    White coat syndrome with hypertension     /84   Pulse 64   Ht 5' 2" (1.575 m)   Wt 54.7 kg (120 lb 9.5 oz)   LMP  (LMP Unknown)   SpO2 97%   BMI 22.06 kg/m²   Body mass index is 22.06 kg/m².  Estimated Creatinine Clearance: 69 mL/min (based on SCr of 0.6 mg/dL).    Lab Results   Component Value Date     (L) 08/25/2019    K 4.1 08/25/2019     08/25/2019    CO2 23 08/25/2019    BUN 8 08/25/2019    CREATININE 0.6 08/25/2019    GLU 95 08/25/2019    HGBA1C 5.7 (H) 01/15/2019    MG 1.9 06/25/2019    AST 24 08/25/2019    ALT 16 08/25/2019    ALBUMIN 4.0 08/25/2019    PROT 7.5 08/25/2019    BILITOT 0.6 08/25/2019    WBC 6.75 08/25/2019    HGB 13.9 08/25/2019    HCT 41.8 08/25/2019    MCV 84 08/25/2019     08/25/2019    TSH 3.285 01/15/2019    CHOL 221 (H) 07/24/2018    HDL 44 07/24/2018    LDLCALC 149.0 07/24/2018    TRIG 140 07/24/2018       Current Outpatient Medications   Medication Sig    ALPRAZolam (XANAX) 0.25 MG tablet Take 1 tablet (0.25 mg total) by mouth 2 (two) times daily as needed for Anxiety.    ERGOCALCIFEROL, VITAMIN D2, (VITAMIN D ORAL) Take by mouth.    " lisinopril (PRINIVIL,ZESTRIL) 2.5 MG tablet Take 1 tablet (2.5 mg total) by mouth 2 (two) times daily. (Patient taking differently: Take 2.5 mg by mouth 2 (two) times daily. Pt States she takes 1 tab a day)    multivitamin-iron-folic acid Tab Take 1 tablet by mouth once daily.    nystatin-triamcinolone (MYCOLOG) ointment Apply topically once daily.     No current facility-administered medications for this visit.        ROS    Objective:   Physical Exam    Assessment:     No diagnosis found.    Plan:

## 2019-08-27 ENCOUNTER — LAB VISIT (OUTPATIENT)
Dept: LAB | Facility: HOSPITAL | Age: 69
End: 2019-08-27
Attending: INTERNAL MEDICINE
Payer: MEDICARE

## 2019-08-27 DIAGNOSIS — E78.00 HYPERCHOLESTEREMIA: ICD-10-CM

## 2019-08-27 LAB
CHOLEST SERPL-MCNC: 202 MG/DL (ref 120–199)
CHOLEST/HDLC SERPL: 4 {RATIO} (ref 2–5)
HDLC SERPL-MCNC: 51 MG/DL (ref 40–75)
HDLC SERPL: 25.2 % (ref 20–50)
LDLC SERPL CALC-MCNC: 133 MG/DL (ref 63–159)
NONHDLC SERPL-MCNC: 151 MG/DL
TRIGL SERPL-MCNC: 90 MG/DL (ref 30–150)

## 2019-08-27 PROCEDURE — 80061 LIPID PANEL: CPT | Mod: HCNC

## 2019-08-27 PROCEDURE — 36415 COLL VENOUS BLD VENIPUNCTURE: CPT | Mod: HCNC

## 2019-08-28 ENCOUNTER — OFFICE VISIT (OUTPATIENT)
Dept: PSYCHIATRY | Facility: CLINIC | Age: 69
End: 2019-08-28
Payer: MEDICARE

## 2019-08-28 VITALS
WEIGHT: 121.25 LBS | HEART RATE: 80 BPM | SYSTOLIC BLOOD PRESSURE: 140 MMHG | DIASTOLIC BLOOD PRESSURE: 68 MMHG | BODY MASS INDEX: 22.18 KG/M2

## 2019-08-28 DIAGNOSIS — F41.9 ANXIETY DISORDER, UNSPECIFIED TYPE: Primary | ICD-10-CM

## 2019-08-28 PROCEDURE — 99999 PR PBB SHADOW E&M-EST. PATIENT-LVL II: CPT | Mod: PBBFAC,HCNC,, | Performed by: PSYCHIATRY & NEUROLOGY

## 2019-08-28 PROCEDURE — 99499 UNLISTED E&M SERVICE: CPT | Mod: HCNC,S$GLB,, | Performed by: PSYCHIATRY & NEUROLOGY

## 2019-08-28 PROCEDURE — 90791 PSYCH DIAGNOSTIC EVALUATION: CPT | Mod: HCNC,S$GLB,, | Performed by: PSYCHIATRY & NEUROLOGY

## 2019-08-28 PROCEDURE — 90791 PR PSYCHIATRIC DIAGNOSTIC EVALUATION: ICD-10-PCS | Mod: HCNC,S$GLB,, | Performed by: PSYCHIATRY & NEUROLOGY

## 2019-08-28 PROCEDURE — 99499 RISK ADDL DX/OHS AUDIT: ICD-10-PCS | Mod: HCNC,S$GLB,, | Performed by: PSYCHIATRY & NEUROLOGY

## 2019-08-28 PROCEDURE — 99999 PR PBB SHADOW E&M-EST. PATIENT-LVL II: ICD-10-PCS | Mod: PBBFAC,HCNC,, | Performed by: PSYCHIATRY & NEUROLOGY

## 2019-08-28 RX ORDER — BUSPIRONE HYDROCHLORIDE 5 MG/1
5 TABLET ORAL 2 TIMES DAILY
Qty: 60 TABLET | Refills: 3 | Status: SHIPPED | OUTPATIENT
Start: 2019-08-28 | End: 2019-12-19

## 2019-08-28 RX ORDER — ALPRAZOLAM 0.25 MG/1
0.25 TABLET ORAL DAILY PRN
Qty: 15 TABLET | Refills: 0 | Status: SHIPPED | OUTPATIENT
Start: 2019-08-28 | End: 2019-09-17

## 2019-08-28 NOTE — PROGRESS NOTES
"Last 5 Patient Entered Readings                                      Current 30 Day Average: 114/68     Recent Readings 8/28/2019 8/27/2019 8/27/2019 8/27/2019 8/27/2019    SBP (mmHg) 156 124 151 153 143    DBP (mmHg) 82 70 87 82 75    Pulse 79 86 76 73 95        Patient reports she visited ED on 8/25/19 for blood pressure spike. In ED blood pressure was recorded as 199/81. She was given alprazolam 0.25 mg and discharged. On 8/26, she had an office visit with cardiology, Thelma Sesay MD. Her blood pressure at this time was 138/84. The Cardiologist prescribed captopril 6.25 mg as needed for BP>180 mmHg, 3 tablets max per day. Per the patient, she also recommended patient see Psychologist as she feels her blood pressure issues are anxiety-related. Confirmed she has that appointment today at 2pm. She is walking regularly for exercise and anxiety relief. She reports that she took lisinopril 2.5 mg yesterday morning and states "It did not do anything for my blood pressure." She has not had either medication today. Counseled patient to not take both medications as it is duplicate therapy. Lisinopril will be discontinued from her medication regimen. Extra tablets should be discarded. Also requested patient reduce frequency of measuring blood pressure in order to help decrease some of her anxiety. Asked that she measure every 2-3 days and if she doesn't feel. Will continue to monitor readings and follow-up with patient. Current 30-day average of 114/68 meets goal of <130/80 mmHg.    Hypertension Medications             captopril (CAPOTEN) 12.5 MG tablet Take 0.5 tablets (6.25 mg total) by mouth as needed. Take 0.5 tablet as needed for BP> 180 mmHg. Can take 3 tablets in a day maximum.         "

## 2019-08-28 NOTE — PROGRESS NOTES
Outpatient Psychiatry Initial Visit (MD/NP)    8/28/2019    Keo Frias, a 69 y.o. female, for initial evaluation visit.  Patient is referred by Sharron Ogden MD       Reason for Encounter: Referral for treatment    Complaints:  She has been experiencing generalized anxiety. Patient was referred due to concerns re her rise in blood pressure, which is partially due to intermittent anxiety episodes that come unpredictably. She had been treated for a similar problem in 1990, successfully at the time with a tricyclic medication. Her primary care physician thought it could be related to anxiety as a cause of her spikes in blood pressure. Ms Frias denies depressive symptoms of any kind. She has no history of psych hospitalizations. She is in excellent self control and hopeful re the future. She is originally from [facility] and has been in the  for forty years. She has worked as a  at [employer]. She is  and has two children. She is treated for hypertension. She denies any thoughts or intent of harming herself or others and has never tried to harm herself. She has no signs of symptoms of psychosis, has a sense of humor and is able to enjoy herself.    Current Medications:  Medication List with Changes/Refills   New Medications    ALPRAZOLAM (XANAX) 0.25 MG TABLET    Take 1 tablet (0.25 mg total) by mouth daily as needed for Anxiety.    BUSPIRONE (BUSPAR) 5 MG TAB    Take 1 tablet (5 mg total) by mouth 2 (two) times daily.   Current Medications    CAPTOPRIL (CAPOTEN) 12.5 MG TABLET    Take 0.5 tablets (6.25 mg total) by mouth as needed. Take 0.5 tablet as needed for BP> 180 mmHg. Can take 3 tablets in a day maximum.    ERGOCALCIFEROL, VITAMIN D2, (VITAMIN D ORAL)    Take by mouth.    MULTIVITAMIN-IRON-FOLIC ACID TAB    Take 1 tablet by mouth once daily.    NYSTATIN-TRIAMCINOLONE (MYCOLOG) OINTMENT    Apply topically once daily.   Discontinued Medications    ALPRAZOLAM (XANAX) 0.25 MG TABLET    Take 1  "tablet (0.25 mg total) by mouth 2 (two) times daily as needed for Anxiety.        Stressors:  Concerns re "spikes"  In her blood pressure.    History:     Past Psychiatric History:   Previous therapy: yes  Previous psychiatric treatment and medication trials: yes  Previous psychiatric hospitalizations: no.  Previous diagnoses: yes Anxiety related.  Previous suicide attempts: no  History of violence: no  Currently in treatment with myself.Education: technical college  Other pertinent history: None  Depression screening was performed with standardized tool: Not Screened - Not Eligible/Appropriate    Substance Abuse History:  Recreational drugs: Does not use recreational drugs  Use of alcohol: denied  Use of caffeine: denies use  Tobacco use: no  Legal consequences of chemical use: no  Patient feels she ought to cut down on drinking and/or drug use: no  Patient has been annoyed by others criticizing her drinking or drug use: no  Patient has felt bad or guilty about drinking or drug use:no  Patient has had a drink or used drugs as an eye opener first thing in the morning to steady nerves, get rid of a hangover or get the day started: no  Use of OTC medications: vitamins    The following portions of the patient's history were reviewed and updated as appropriate: allergies, current medications, past family history, past medical history, past social history, past surgical history and problem list.    Record Review: moderate      Review Of Systems:     Medical Review Of Systems:  ROS     Psychiatric Review Of Systems:  Sleep: yes trouble falling asleep at timesAppetite changes: no  Weight changes: no  Energy: no  Interest/pleasure/anhedonia: no  Somatic symptoms: no  Libido: no  Anxiety/panic: yes  Guilty/hopeless: no  Self-injurious behavior/risky behavior: no  Any drugs: no  Alcohol: no     Current Evaluation:       Mental Status Evaluation:  Appearance:  unremarkable, age appropriate, well dressed, neatly groomed "   Behavior:  normal, cooperative   Speech:  no latency; no press, spontaneous   Mood:  euthymic, anxious   Affect:  congruent and appropriate   Thought Process:  normal and logical   Thought Content:  normal, no suicidality, no homicidality, delusions, or paranoia   Sensorium:  grossly intact   Cognition:  grossly intact   Insight:  intact   Judgment:  behavior is adequate to circumstances     SIGECAPS  Sleep:   Interest:   Guilt:   Energy:   Concentration:   Appetite:   Psychomotor agitation:   Suicidal intentions: no  Suicidal plan: no    Physical/Somatic Complaints  The patient lists: Spikes of blood pressure    Functioning in Relationships:  Spouse/partner:   Peers:   Employers:       Assessment - Diagnosis - Goals:     No diagnosis found.    Treatment Goals:  Specify outcomes written in observable, behavioral terms:   Anxiety: reducing physical symptoms of anxiety Help stabilize blood pressure with anti-anxiety medication    Treatment Plan/Recommendations: No additional recommendations at this time.  Follow-up plan for depression was discussed with patient. Patient agreed to continue prn use of 0.25mg Xanax tabs,and to start buspirone 5mg bid. I reviewed with her the side effects of her medications and advised she could call if she had problems with her meds or clinical condition. She agreed to her next appointment in about three weeks.    Review with patient: Treatment plan reviewed with the patient.  Medication risks/benefit reviewed with the patient    Markos Saab Jr

## 2019-08-29 ENCOUNTER — LAB VISIT (OUTPATIENT)
Dept: LAB | Facility: HOSPITAL | Age: 69
End: 2019-08-29
Attending: FAMILY MEDICINE
Payer: MEDICARE

## 2019-08-29 ENCOUNTER — TELEPHONE (OUTPATIENT)
Dept: CARDIOLOGY | Facility: CLINIC | Age: 69
End: 2019-08-29

## 2019-08-29 DIAGNOSIS — R09.89 LABILE HYPERTENSION: ICD-10-CM

## 2019-08-29 PROCEDURE — 83835 ASSAY OF METANEPHRINES: CPT | Mod: HCNC

## 2019-08-29 NOTE — TELEPHONE ENCOUNTER
----- Message from Thelma Sesay MD sent at 8/29/2019  2:52 PM CDT -----  Cholesterol levels are ok but they can be better. Continue dietary modification. plz explain to the patient to call answering service in the evening in cases of questions and there is a doctor on call always.

## 2019-09-01 LAB
COLLECT DURATION TIME SPEC: 24 HR
CREAT 24H UR-MRATE: 656 MG/D (ref 500–1400)
CREAT UR-MCNC: 15 MG/DL
METANEPH 24H UR-MCNC: 20 UG/L
METANEPH 24H UR-MRATE: 88 UG/D (ref 39–143)
METANEPH+NORMETANEPH UR-IMP: NORMAL
METANEPH/CREAT 24H UR: 133 UG/G CRT (ref 0–300)
NORMETANEPHRINE 24H UR-MCNC: 41 UG/L
NORMETANEPHRINE 24H UR-MRATE: 179 UG/D (ref 109–393)
NORMETANEPHRINE/CREAT 24H UR: 273 UG/G CRT (ref 0–400)
SPECIMEN VOL ?TM UR: 4375 ML

## 2019-09-03 ENCOUNTER — PATIENT OUTREACH (OUTPATIENT)
Dept: OTHER | Facility: OTHER | Age: 69
End: 2019-09-03

## 2019-09-03 ENCOUNTER — NURSE TRIAGE (OUTPATIENT)
Dept: ADMINISTRATIVE | Facility: CLINIC | Age: 69
End: 2019-09-03

## 2019-09-03 ENCOUNTER — TELEPHONE (OUTPATIENT)
Dept: FAMILY MEDICINE | Facility: CLINIC | Age: 69
End: 2019-09-03

## 2019-09-03 ENCOUNTER — HOSPITAL ENCOUNTER (EMERGENCY)
Facility: HOSPITAL | Age: 69
Discharge: HOME | End: 2019-09-03
Attending: EMERGENCY MEDICINE
Payer: MEDICARE

## 2019-09-03 ENCOUNTER — TELEPHONE (OUTPATIENT)
Dept: CARDIOLOGY | Facility: CLINIC | Age: 69
End: 2019-09-03

## 2019-09-03 VITALS
HEART RATE: 70 BPM | DIASTOLIC BLOOD PRESSURE: 82 MMHG | OXYGEN SATURATION: 99 % | TEMPERATURE: 98 F | BODY MASS INDEX: 22.26 KG/M2 | HEIGHT: 62 IN | WEIGHT: 121 LBS | SYSTOLIC BLOOD PRESSURE: 152 MMHG | RESPIRATION RATE: 16 BRPM

## 2019-09-03 DIAGNOSIS — I10 HYPERTENSION: ICD-10-CM

## 2019-09-03 PROCEDURE — 99284 EMERGENCY DEPT VISIT MOD MDM: CPT | Mod: 25,HCNC

## 2019-09-03 PROCEDURE — 25000003 PHARM REV CODE 250: Mod: HCNC | Performed by: EMERGENCY MEDICINE

## 2019-09-03 PROCEDURE — 93005 ELECTROCARDIOGRAM TRACING: CPT | Mod: HCNC

## 2019-09-03 RX ORDER — AMLODIPINE BESYLATE 5 MG/1
2.5 TABLET ORAL
Qty: 1 TABLET | Refills: 0 | Status: SHIPPED | OUTPATIENT
Start: 2019-09-03 | End: 2019-09-04 | Stop reason: DRUGHIGH

## 2019-09-03 RX ORDER — AMLODIPINE BESYLATE 5 MG/1
5 TABLET ORAL
Status: COMPLETED | OUTPATIENT
Start: 2019-09-03 | End: 2019-09-03

## 2019-09-03 RX ADMIN — AMLODIPINE BESYLATE 5 MG: 5 TABLET ORAL at 06:09

## 2019-09-03 NOTE — ED PROVIDER NOTES
Encounter Date: 9/3/2019    SCRIBE #1 NOTE: I, Alberta Corral, am scribing for, and in the presence of,  Dr. Hernadez. I have scribed the entire note.       History     Chief Complaint   Patient presents with    Hypertension     c/o htn since 1200 today, with bp 180/90. states bp has been varying for the past few days. takes bp medications as needed. Pt also c/o mild headache and facial discomfort that has been intermittent today     Keo Frias is a 69 y.o. female who  has a past medical history of Anemia, Anxiety disorder (8/28/2019), Cataract, Sleep apnea, Subclinical hypothyroidism, and White coat syndrome with hypertension.    The patient presents to the ED due to HTN.   Patient reports she took Captopril for HTN at 0800 today, which she is prescribed to take as needed.  She noted that by 1000 her blood pressure was 98/59, but at 1200 it spiked to 170 systolic.  Earlier she reports to have had a headache which is gone now.  Other negative symptoms include CP, SOB, n/v, and no notable pain.  She has not experienced any recent stress or anxiety.  The patient also took Buspirone before taking her blood pressure at 1000.    The history is provided by a relative, the spouse and the patient.     Review of patient's allergies indicates:   Allergen Reactions    Sulfa (sulfonamide antibiotics) Rash     Past Medical History:   Diagnosis Date    Anemia     Anxiety disorder 8/28/2019    Cataract     Sleep apnea     Subclinical hypothyroidism     White coat syndrome with hypertension      Past Surgical History:   Procedure Laterality Date    COLONOSCOPY N/A 12/7/2018    Performed by Bhargav Gaston [facility] at [facility] ([facility] FLR)    DILATION AND CURETTAGE OF UTERUS      postmenopausal bleeding    TUBAL LIGATION       Family History   Problem Relation Age of Onset    Diabetes Brother     Hypertension Brother     Glaucoma Brother     Glaucoma Father     Cataracts Father     Retinal detachment Father     Lung cancer  Mother     Cancer Mother     Uterine cancer Maternal Grandmother     Stroke Maternal Grandmother     Stroke Paternal Grandmother     Amblyopia Neg Hx     Blindness Neg Hx     Macular degeneration Neg Hx     Strabismus Neg Hx     Thyroid disease Neg Hx      Social History     Tobacco Use    Smoking status: Never Smoker    Smokeless tobacco: Never Used   Substance Use Topics    Alcohol use: No    Drug use: No     Review of Systems   Constitutional: Negative for chills and fever.   HENT: Negative for sore throat.    Respiratory: Negative for cough and shortness of breath.    Cardiovascular: Negative for chest pain.   Gastrointestinal: Negative for nausea and vomiting.   Genitourinary: Negative for dysuria, frequency and urgency.   Musculoskeletal: Negative for back pain, neck pain and neck stiffness.   Skin: Negative for rash and wound.   Neurological: Positive for headaches. Negative for syncope and weakness.   Hematological: Does not bruise/bleed easily.   Psychiatric/Behavioral: Negative for agitation, behavioral problems and confusion. The patient is not nervous/anxious.        Physical Exam     Initial Vitals [09/03/19 1652]   BP Pulse Resp Temp SpO2   (!) 154/81 87 20 99 °F (37.2 °C) 97 %      MAP       --         Physical Exam    Constitutional:  Non-toxic appearance. No distress.   HENT:   Head: Normocephalic and atraumatic.   Eyes: Conjunctivae and EOM are normal. Pupils are equal, round, and reactive to light. Right eye exhibits no nystagmus. Left eye exhibits no nystagmus.   Neck: Neck supple.   Cardiovascular: Regular rhythm, S1 normal and S2 normal.   No murmur heard.  Pulmonary/Chest: Breath sounds normal. No respiratory distress. She has no wheezes. She has no rales.   Abdominal: Soft. She exhibits no distension. There is no tenderness.   Neurological: She is alert. No cranial nerve deficit. GCS eye subscore is 4. GCS verbal subscore is 5. GCS motor subscore is 6.   CN 2-12 intact  Finger to  nose within normal limits  Negative romberg  Gait normal  Equal strength to bilateral upper extremities, SILT  Equal strength to bilateral lower extremities, SILT     Skin: Skin is warm. No rash noted.   Psychiatric: Her behavior is normal.   Appears anxious         ED Course   Procedures  Labs Reviewed - No data to display  EKG Readings: (Independently Interpreted)   Initial Reading: No STEMI. Rhythm: Normal Sinus Rhythm. Heart Rate: 79.       Imaging Results          CT Head Without Contrast (Final result)  Result time 09/03/19 19:39:32    Final result by Lily Barreto MD (09/03/19 19:39:32)                 Impression:      No acute intracranial abnormality detected.      Electronically signed by: Lily Barreto  Date:    09/03/2019  Time:    19:39             Narrative:    EXAMINATION:  CT OF THE HEAD WITHOUT    CLINICAL HISTORY:  Headache, acute, norm neuro exam;    TECHNIQUE:  5 mm unenhanced axial images were obtained from the skull base to the vertex.    COMPARISON:  08/25/2019    FINDINGS:  The ventricles, basal cisterns, and cortical sulci are within normal limits for patient's stated age. There is no acute intracranial hemorrhage, territorial infarct or mass effect, or midline shift. In the visualized paranasal sinuses and mastoid air cells, there is mild mucoperiosteal thickening.  There is hyperostosis frontalis                                 Medical Decision Making:   Differential Diagnosis:   Differential Diagnosis includes, but is not limited to:  Hypertensive encephalopathy, CVA/TIA, intracranial hemorrhage, MI/ACS, aortic/carotid artery dissection, AAA, hypertensive nephropathy, medication non-compliance, anxiety, drug abuse/intoxication, essential hypertension    Clinical Tests:   Medical Tests: Ordered and Reviewed  ED Management:  68 yo female with labile blood pressure and intermitent headache    CT head negative for acute pathology today    Blood pressure improved after  amlodipine    Will try 2.5mg amlodpine daily for sustained blood pressure control and recommend she reduce captopril     Patient with pcp follow up tomorrow. I do not suspect acute life threatening illness today.    Return precautions for numbness weakness headache fever or any other concern.    After taking into careful account the historical factors and physical exam findings of the patient's presentation today, in conjunction with the empirical and objective data obtained on ED workup, no acute emergent medical condition has been identified. The patient appears to be low risk for an emergent medical condition and I feel it is safe and appropriate at this time for the patient to be discharged to follow-up as detailed in their discharge instructions for reevaluation and possible continued outpatient workup and management. I have discussed the specifics of the workup with the patient and the patient has verbalized understanding of the details of the workup, the diagnosis, the treatment plan, and the need for outpatient follow-up.  Although the patient has no emergent etiology today this does not preclude the development of an emergent condition so in addition, I have advised the patient that they can return to the ED and/or activate EMS at any time with worsening of their symptoms, change of their symptoms, or with any other medical complaint.  The patient remained comfortable and stable during their visit in the ED.  Discharge and follow-up instructions discussed with the patient who expressed understanding and willingness to comply with my recommendations.                       ED Course as of Sep 05 1336   Tue Sep 03, 2019   0518 This is a 69-year-old female who presents with high blood pressure.  She is currently asymptomatic her neuro exam is nonfocal.  She has had a history of labile blood pressures.  She is overall well-appearing her blood pressures normalized here.    [RN]   7599 Patient reports having  increased blood pressure feeling weak and dizzy.  She is requesting CT head to evaluate for acute intracranial abnormality given her recent headache and recurrence of dizziness with standing    [RN]      ED Course User Index  [RN] Levi Hernadez Jr., MD     Clinical Impression:       ICD-10-CM ICD-9-CM   1. Hypertension I10 401.9               Scribe attestation I, Levi Hernadez,  personally performed the services described in this documentation. All medical record entries made by the scribe were at my direction and in my presence.  I have reviewed the chart and agree that the record reflects my personal performance and is accurate and complete. Levi Hernadez M.D. 1:39 PM09/05/2019                   Levi Hernadez Jr., MD  09/05/19 1339       Levi Hernadez Jr., MD  09/05/19 1341

## 2019-09-03 NOTE — TELEPHONE ENCOUNTER
Pt states her BP has been fluctuating, she states it is now 183/90, she states she has taken her medication and had lightheadedness and her lips felt as if she were having an allergic reaction, advised her to go to the ER for assessment, caller said okay but I am not sure she will go as she was told the same thing a couple of hours ago.    Reason for Disposition   Systolic BP >= 160 OR Diastolic >= 100, and any cardiac or neurologic symptoms (e.g., chest pain, difficulty breathing, unsteady gait, blurred vision)    Additional Information   Negative: Pregnant > 20 weeks and new hand or face swelling   Negative: Pregnant > 20 weeks and BP > 140/90   Negative: Sounds like a life-threatening emergency to the triager    Protocols used: HIGH BLOOD PRESSURE-A-OH

## 2019-09-03 NOTE — ED NOTES
Pt reported that she had a headache earlier today toward the occipital lobe when her BP was elevated. Pt denied any headache or vision changes during assessment. Dr. Hernadez at bedside for reassessment and pt states that when she stood up to walk to the bathroom she felt weak and had a slight headache to the left side of her head.

## 2019-09-03 NOTE — TELEPHONE ENCOUNTER
Blood pressure 180/90's, not feeling well, very nauseated and dizzy/lightheaded.  Advised ED, dtr refused, they have been going to the ED on a weekly basis, and ED does nothing, sends her home and tells her to f/u with PCP.  dtr feels meds need to be adjusted.  They would like to be seen in the office today, please.    Reason for Disposition   Systolic BP >= 160 OR Diastolic >= 100, and any cardiac or neurologic symptoms (e.g., chest pain, difficulty breathing, unsteady gait, blurred vision)    Additional Information   Negative: Sounds like a life-threatening emergency to the triager   Negative: Pregnant > 20 weeks and new hand or face swelling   Negative: Pregnant > 20 weeks and BP > 140/90    Protocols used: HIGH BLOOD PRESSURE-A-OH

## 2019-09-03 NOTE — TELEPHONE ENCOUNTER
----- Message from Madison Kovacs sent at 9/3/2019  9:11 AM CDT -----  Contact: pt [phone]  Pt would like a call from the nurse to talk about her Rx Catopril 6.25 mg and her pressure spiking on Sunday. Her pressure was 187/99 heart rate 99  LOV 8/26/19 Dr. brasher    Thanks

## 2019-09-03 NOTE — TELEPHONE ENCOUNTER
141-170 BP running this week, Sunday night /99 and HR 99. Patient took Captopril post and as well as yesterday. Lisinopril discontinued at last visit per patient. Would like approval to take 1/2 tablet twice day regularly or other recommendation.  Message routed to Dr. Sesay. Patient aware Dr. Sesay out of office today. Would prefer call back from Dr. Sesay.

## 2019-09-03 NOTE — PROGRESS NOTES
"Last 5 Patient Entered Readings                                      Current 30 Day Average: 119/69     Recent Readings 9/3/2019 9/3/2019 9/3/2019 9/3/2019 9/3/2019    SBP (mmHg) 179 171 98 122 142    DBP (mmHg) 88 93 59 70 84    Pulse 96 69 71 77 68      [facility]: Health  Follow Up  Spoke with patient's daughter because patient is currently experiencing Hypertensive symptoms. Patient's daughter states she is feeling light headed and fatigued and "laying in bed with a rag on her head." Informed patient to call [facility] on-call or go to the Emergency Room. Patient's daughter states she does not want to take her to the emergency room or urgent care due to long waits in the waiting room. Patient will call [facility] on-call.  Will follow-up next week about hypertensive symptoms.       "

## 2019-09-03 NOTE — ED NOTES
Pt is anxious about labile BP. Pt reports that she does have a doctor's appt tomorrow with her PCP and was going to follow up with MD concerning her current BP issue. Pt was recently seen in the ED for labile BP's and was d/c home with a prescription for alprazolam to help control anxiety. Pt denies any chest pain, SOB, headache, or vision changes.

## 2019-09-03 NOTE — TELEPHONE ENCOUNTER
Advised patient that Dr Ogden is not here on Tuesday afternoons.  Scheduled patient for 9/4/2019 at 3:40om.  Patient verbalized understanding.

## 2019-09-04 ENCOUNTER — OFFICE VISIT (OUTPATIENT)
Dept: FAMILY MEDICINE | Facility: CLINIC | Age: 69
End: 2019-09-04
Payer: MEDICARE

## 2019-09-04 VITALS
BODY MASS INDEX: 21.91 KG/M2 | WEIGHT: 119.06 LBS | DIASTOLIC BLOOD PRESSURE: 86 MMHG | SYSTOLIC BLOOD PRESSURE: 142 MMHG | HEART RATE: 74 BPM | OXYGEN SATURATION: 98 % | HEIGHT: 62 IN

## 2019-09-04 DIAGNOSIS — F41.9 ANXIETY DISORDER, UNSPECIFIED TYPE: ICD-10-CM

## 2019-09-04 DIAGNOSIS — I10 WHITE COAT SYNDROME WITH HYPERTENSION: Primary | ICD-10-CM

## 2019-09-04 DIAGNOSIS — R09.89 LABILE BLOOD PRESSURE: ICD-10-CM

## 2019-09-04 DIAGNOSIS — Z79.899 MEDICATION MANAGEMENT: ICD-10-CM

## 2019-09-04 PROCEDURE — 99999 PR PBB SHADOW E&M-EST. PATIENT-LVL IV: CPT | Mod: PBBFAC,HCNC,, | Performed by: FAMILY MEDICINE

## 2019-09-04 PROCEDURE — 3077F PR MOST RECENT SYSTOLIC BLOOD PRESSURE >= 140 MM HG: ICD-10-PCS | Mod: HCNC,CPTII,S$GLB, | Performed by: FAMILY MEDICINE

## 2019-09-04 PROCEDURE — 99999 PR PBB SHADOW E&M-EST. PATIENT-LVL IV: ICD-10-PCS | Mod: PBBFAC,HCNC,, | Performed by: FAMILY MEDICINE

## 2019-09-04 PROCEDURE — 3079F DIAST BP 80-89 MM HG: CPT | Mod: HCNC,CPTII,S$GLB, | Performed by: FAMILY MEDICINE

## 2019-09-04 PROCEDURE — 3079F PR MOST RECENT DIASTOLIC BLOOD PRESSURE 80-89 MM HG: ICD-10-PCS | Mod: HCNC,CPTII,S$GLB, | Performed by: FAMILY MEDICINE

## 2019-09-04 PROCEDURE — 1101F PT FALLS ASSESS-DOCD LE1/YR: CPT | Mod: HCNC,CPTII,S$GLB, | Performed by: FAMILY MEDICINE

## 2019-09-04 PROCEDURE — 3077F SYST BP >= 140 MM HG: CPT | Mod: HCNC,CPTII,S$GLB, | Performed by: FAMILY MEDICINE

## 2019-09-04 PROCEDURE — 99214 OFFICE O/P EST MOD 30 MIN: CPT | Mod: HCNC,S$GLB,, | Performed by: FAMILY MEDICINE

## 2019-09-04 PROCEDURE — 99214 PR OFFICE/OUTPT VISIT, EST, LEVL IV, 30-39 MIN: ICD-10-PCS | Mod: HCNC,S$GLB,, | Performed by: FAMILY MEDICINE

## 2019-09-04 PROCEDURE — 1101F PR PT FALLS ASSESS DOC 0-1 FALLS W/OUT INJ PAST YR: ICD-10-PCS | Mod: HCNC,CPTII,S$GLB, | Performed by: FAMILY MEDICINE

## 2019-09-04 RX ORDER — AMLODIPINE BESYLATE 2.5 MG/1
2.5 TABLET ORAL NIGHTLY
Qty: 30 TABLET | Refills: 11 | Status: SHIPPED | OUTPATIENT
Start: 2019-09-04 | End: 2019-09-17

## 2019-09-04 NOTE — PATIENT INSTRUCTIONS
1. Start nightly amlodipine 2.5 mg, and ok to also take captopril 6.25 mg as needed for BP >200 or >180 with symptoms    2. Keep blood pressure log on OMRON cuff with AM and PM pressures written out to bring to appointment with Cardiology Dr Faust    3. Have ultrasound of the heart and kidneys (call our office by Friday if have not been contacted to schedule.    4. Will advise further regarding anxiety medication after message sent to psychiatrist.     5.  Schedule annual exam in 3 months.  Will update health maintenance at that time after blood pressures have hopefully stabilized.

## 2019-09-04 NOTE — PROGRESS NOTES
Office Visit    Patient Name: Keo Frias    : 1950  MRN: [id number]    Subjective:  Keo is a 69 y.o. female who presents today for:    Hypertension (x3 months blood pressure has been fluctuating up and down. highest reading documented in ER notes.)    69-year-old patient of mine with past medical history of white coat syndrome with hypertension who presents today for issues with increasingly labile blood pressures.    She has been followed by me, Cardiology, digital hypertension monitoring program and has been noted to have vastly variable SBP readings that range from + SBP.  She was seen in the ER yesterday due to elevated systolic blood pressure over 200 accompanied by headaches, malaise.    CT head 9/3/2019: No acute intracranial abnormality detected.    She had a TIA workup by me in 2017 that included an unremarkable MRI of the brain, unremarkable carotid ultrasound, unremarkable EKG at that time.  Multiple EKGs including since including unremarkable EKG during ER evaluation 2019.  Has had an a.m. cortisol checked by me which was normal.  Recently had urine catecholamines which were normal.  Has not had an ultrasound of her renal arteries or CT scan of the abdomen and pelvis for further the evaluation of the adrenals though workup thus far has not pointed to adrenal cause of her blood pressure abnormalities.    She was started on amlodipine 2.5 mg daily and does feel a bit better on this medication compared to what she was feeling on the lisinopril.  She today she denies chest pain/shortness of breath/dizziness/lightheadedness.  She is not having any headaches currently.  She is wondering what blood pressure cuff she should use to check her blood pressure as she seems to get different readings depending on if she uses her OMRON cuff from the pharmacy vs her digital hypertension cuff from the [facility].     She did see a psychiatrist (Dr Saab 19) who started her on BuSpar b.i.d. along with  Xanax p.r.n to address potential underlying anxiety component either contributing to her elevated blood pressures or as a reaction from her elevated blood pressures., though she really does not take the Xanax.  She feels that the BuSpar is potentially starting to have some affect but she feels like it is not a stable 1-notes some lability of her mood and she is not sure if this is related to her blood pressure spikes or to mood spikes correlating with BuSpar dosing.    Past Medical History  Past Medical History:   Diagnosis Date    Anemia     Anxiety disorder 8/28/2019    Cataract     Sleep apnea     Subclinical hypothyroidism     White coat syndrome with hypertension        Past Surgical History  Past Surgical History:   Procedure Laterality Date    COLONOSCOPY N/A 12/7/2018    Performed by [facility] at [facility] END[facility] (4TH FLR)    DILATION AND CURETTAGE OF UTERUS      postmenopausal bleeding    TUBAL LIGATION         Family History  Family History   Problem Relation Age of Onset    Diabetes Brother     Hypertension Brother     Glaucoma Brother     Glaucoma Father     Cataracts Father     Retinal detachment Father     Lung cancer Mother     Cancer Mother     Uterine cancer Maternal Grandmother     Stroke Maternal Grandmother     Stroke Paternal Grandmother     Amblyopia Neg Hx     Blindness Neg Hx     Macular degeneration Neg Hx     Strabismus Neg Hx     Thyroid disease Neg Hx        Social History  Social History     Socioeconomic History    Marital status:      Spouse name: Not on file    Number of children: 2    Years of education: Not on file    Highest education level: Not on file   Occupational History     Employer: [employer]   Social Needs    Financial resource strain: Not on file    Food insecurity:     Worry: Not on file     Inability: Not on file    Transportation needs:     Medical: Not on file     Non-medical: Not on file   Tobacco Use     "Smoking status: Never Smoker    Smokeless tobacco: Never Used   Substance and Sexual Activity    Alcohol use: No    Drug use: No    Sexual activity: Yes     Partners: Male     Birth control/protection: None   Lifestyle    Physical activity:     Days per week: Not on file     Minutes per session: Not on file    Stress: Not on file   Relationships    Social connections:     Talks on phone: Not on file     Gets together: Not on file     Attends Synagogue service: Not on file     Active member of club or organization: Not on file     Attends meetings of clubs or organizations: Not on file     Relationship status: Not on file   Other Topics Concern    Not on file   Social History Narrative    Not on file       Current Medications  Medications reviewed and updated.     Allergies   Review of patient's allergies indicates:   Allergen Reactions    Sulfa (sulfonamide antibiotics) Rash       Review of Systems (Pertinent positives)  Review of Systems   Constitutional: Positive for fatigue.   Respiratory: Negative for cough and shortness of breath.    Cardiovascular: Negative for chest pain, palpitations and leg swelling.   Genitourinary: Negative for urgency.   Neurological: Positive for headaches (off and on-- at time scorrelating with BP elevation ). Negative for dizziness and syncope.       BP (!) 142/86 (BP Location: Left arm, Patient Position: Sitting)   Pulse 74   Ht 5' 2" (1.575 m)   Wt 54 kg (119 lb 0.8 oz)   LMP  (LMP Unknown)   SpO2 98%   BMI 21.77 kg/m²     Physical Exam   Constitutional: She is oriented to person, place, and time. She appears well-developed and well-nourished. No distress.   HENT:   Head: Normocephalic and atraumatic.   Eyes: Conjunctivae are normal.   Cardiovascular: Normal rate and regular rhythm.   Pulmonary/Chest: Effort normal and breath sounds normal.   Musculoskeletal: She exhibits no edema.   Neurological: She is alert and oriented to person, place, and time.   Skin: Skin is " warm and dry.   Psychiatric: She has a normal mood and affect.   Vitals reviewed.        Assessment/Plan:  Keo Frias is a 69 y.o. female who presents today for :    Keo was seen today for hypertension.    Diagnoses and all orders for this visit:    White coat syndrome with hypertension  Comments:  ongoing HTN w/u. s/p unremarkable head CT, EKGs, troponin, urine catecholamines, ordering heart echo & renal artery ultrasound. Blood pressures extremely labile  Orders:  -     Echo Color Flow Doppler? Yes; Future  -     amLODIPine (NORVASC) 2.5 MG tablet; Take 1 tablet (2.5 mg total) by mouth every evening.  -     Cancel: VAS US Renal Artery Stenosis Comp; Future  -     US Renal Artery Stenosis Hyperten (xpd); Future    Labile blood pressure  Comments:  Trial of changing to amlodipine 2.5QHS.  Keep BP log of evening and a.m. blood pressures to bring to cardiology visit.  Can still take captopril 6.25 mg prn   Orders:  -     Echo Color Flow Doppler? Yes; Future  -     amLODIPine (NORVASC) 2.5 MG tablet; Take 1 tablet (2.5 mg total) by mouth every evening.  -     Cancel: VAS US Renal Artery Stenosis Comp; Future  -     US Renal Artery Stenosis Hyperten (xpd); Future    Medication management    Anxiety disorder, unspecified type  Comments:  Interesting patient was started on BuSpar over an SSRI-will message Psychiatry as she is noticing some mood lability on BuSpar about possibly changing to SSRI            ICD-10-CM ICD-9-CM    1. White coat syndrome with hypertension I10 401.9 Echo Color Flow Doppler? Yes      amLODIPine (NORVASC) 2.5 MG tablet      US Renal Artery Stenosis Hyperten (xpd)      CANCELED: VAS US Renal Artery Stenosis Comp    ongoing HTN w/u. s/p unremarkable head CT, EKGs, troponin, urine catecholamines, ordering heart echo & renal artery ultrasound. Blood pressures extremely labile   2. Labile blood pressure R09.89 796.2 Echo Color Flow Doppler? Yes      amLODIPine (NORVASC) 2.5 MG tablet      US Renal  Artery Stenosis Hyperten (xpd)      CANCELED: VAS US Renal Artery Stenosis Comp    Trial of changing to amlodipine 2.5QHS.  Keep BP log of evening and a.m. blood pressures to bring to cardiology visit.  Can still take captopril 6.25 mg prn    3. Medication management Z79.899 V58.69    4. Anxiety disorder, unspecified type F41.9 300.00     Interesting patient was started on BuSpar over an SSRI-will message Psychiatry as she is noticing some mood lability on BuSpar about possibly changing to SSRI       Patient Instructions   1. Start nightly amlodipine 2.5 mg, and ok to also take captopril 6.25 mg as needed for BP >200 or >180 with symptoms    2. Keep blood pressure log on OMRON cuff with AM and PM pressures written out to bring to appointment with Cardiology Dr Faust    3. Have ultrasound of the heart and kidneys (call our office by Friday if have not been contacted to schedule.    4. Will advise further regarding anxiety medication after message sent to psychiatrist.     5.  Schedule annual exam in 3 months.  Will update health maintenance at that time after blood pressures have hopefully stabilized.        Follow up in about 3 months (around 12/4/2019) for return as needed for new concerns.

## 2019-09-05 ENCOUNTER — TELEPHONE (OUTPATIENT)
Dept: CARDIOLOGY | Facility: CLINIC | Age: 69
End: 2019-09-05

## 2019-09-05 NOTE — TELEPHONE ENCOUNTER
----- Message from Thelma Sesay MD sent at 9/5/2019  8:48 AM CDT -----  plz let pt. Know that urine test was normal.

## 2019-09-05 NOTE — TELEPHONE ENCOUNTER
Patient advised by Digital medicine to go to ER. Patient follow up with PCP post ER with med changes made.

## 2019-09-06 ENCOUNTER — TELEPHONE (OUTPATIENT)
Dept: FAMILY MEDICINE | Facility: CLINIC | Age: 69
End: 2019-09-06

## 2019-09-06 NOTE — TELEPHONE ENCOUNTER
----- Message from Maricarmen Woodward sent at 9/6/2019 12:01 PM CDT -----  Contact: [phone]-SELF  Patient is requesting a callback to reschedule test. Please call.

## 2019-09-10 ENCOUNTER — TELEPHONE (OUTPATIENT)
Dept: FAMILY MEDICINE | Facility: CLINIC | Age: 69
End: 2019-09-10

## 2019-09-10 NOTE — TELEPHONE ENCOUNTER
----- Message from Juliana Quezada sent at 9/10/2019  1:21 PM CDT -----  Contact: [phone]/SELF  Patient calling to speak with you concerning her heart ultrasound orders being incorrect   Please call back to assist at [phone]

## 2019-09-11 ENCOUNTER — TELEPHONE (OUTPATIENT)
Dept: FAMILY MEDICINE | Facility: CLINIC | Age: 69
End: 2019-09-11

## 2019-09-11 DIAGNOSIS — I10 WHITE COAT SYNDROME WITH HYPERTENSION: Primary | ICD-10-CM

## 2019-09-11 NOTE — TELEPHONE ENCOUNTER
----- Message from Jazz Ogden sent at 9/11/2019  9:38 AM CDT -----  Contact: [phone]/self  Patient called in returning your call. Please advise.

## 2019-09-11 NOTE — TELEPHONE ENCOUNTER
Please have Ying talk or message  me about this as I have recently entered echocardiogram orders that looked just like this in te [facility] issues, I do not know how to change them to anything that would work better, I will for this to Ying , thank you

## 2019-09-12 ENCOUNTER — TELEPHONE (OUTPATIENT)
Dept: FAMILY MEDICINE | Facility: CLINIC | Age: 69
End: 2019-09-12

## 2019-09-12 NOTE — TELEPHONE ENCOUNTER
Called pt to ask what was wrong with the order. We were able to figure it out, pt was confused that the echo and heart ultrasound were the same.

## 2019-09-12 NOTE — PROGRESS NOTES
Digital Medicine: Clinician Follow-Up    Called patient for hypertension follow-up. Patient reports that she feels much better and readings are doing much better. She started amlodipine 2.5 mg once every evening as instructed on 9/4/19. Hypotension after meal time has stopped. She reports she has not had to take Captopril but did take total dose of 5 mg in amlodipine with higher reading on 9/10/19.  She does express concerns that iHealth blood cuff reads higher than Omron blood pressure cuff that she is also using at home. Patient was instructed to measure blood pressure using Omron blood pressure cuff by PCP after reporting concerns of variations in her readings. She is seeing Psychiatry for anxiety disorder and reports that this is going well also.    The history is provided by the patient. No  was used.   Hypertension   This is a chronic problem. The current episode started more than 1 year ago. The problem occurs intermittently. The problem has been waxing and waning. Associated symptoms include headaches. The symptoms are aggravated by stress. She has tried relaxation for the symptoms. The treatment provided no relief.           Sleep Apnea  Patient not previously diagnosed with KUNAL and     Medication Affordability  Patient is currently not having problems affording medications      INTERVENTION(S)  reviewed monitoring technique and encouragement/support    PLAN  patient verbalizes understanding and patient amenable to changes    Reviewed blood pressure readings: Requested she no longer take back to back to back readings for blood pressure anymore. Limit to taking 2 readings at a time if she feels an additional reading is needed on iHealth device.  Instructed patient to take iHealth device to [facility] for evaluation to determine if it should be exchange for another one due to complaint of higher readings.   She is also measuring blood pressure using Omron device. Asked her to place those  measurements into the daniel manually to record noting that they are from the Omron device.   Continue amlodipine 2.5 mg once every evening.  Will follow-up in 2 weeks.      There are no preventive care reminders to display for this patient.    Last 5 Patient Entered Readings                                      Current 30 Day Average: 123/69     Recent Readings 9/12/2019 9/12/2019 9/12/2019 9/12/2019 9/12/2019    SBP (mmHg) 127 140 110 99 101    DBP (mmHg) 70 72 66 53 64    Pulse 68 70 70 69 71             Hypertension Medications             amLODIPine (NORVASC) 2.5 MG tablet Take 1 tablet (2.5 mg total) by mouth every evening.    captopril (CAPOTEN) 12.5 MG tablet Take 0.5 tablets (6.25 mg total) by mouth as needed. Take 0.5 tablet as needed for BP> 180 mmHg. Can take 3 tablets in a day maximum.

## 2019-09-13 ENCOUNTER — HOSPITAL ENCOUNTER (OUTPATIENT)
Dept: CARDIOLOGY | Facility: CLINIC | Age: 69
Discharge: HOME | End: 2019-09-13
Attending: FAMILY MEDICINE
Payer: MEDICARE

## 2019-09-13 ENCOUNTER — HOSPITAL ENCOUNTER (OUTPATIENT)
Dept: RADIOLOGY | Facility: HOSPITAL | Age: 69
Discharge: HOME | End: 2019-09-13
Attending: FAMILY MEDICINE
Payer: MEDICARE

## 2019-09-13 VITALS
BODY MASS INDEX: 21.9 KG/M2 | DIASTOLIC BLOOD PRESSURE: 82 MMHG | HEIGHT: 62 IN | HEART RATE: 81 BPM | SYSTOLIC BLOOD PRESSURE: 158 MMHG | WEIGHT: 119 LBS

## 2019-09-13 DIAGNOSIS — I10 WHITE COAT SYNDROME WITH HYPERTENSION: ICD-10-CM

## 2019-09-13 DIAGNOSIS — R09.89 LABILE BLOOD PRESSURE: ICD-10-CM

## 2019-09-13 LAB
ASCENDING AORTA: 2.32 CM
AV INDEX (PROSTH): 0.84
AV MEAN GRADIENT: 4 MMHG
AV PEAK GRADIENT: 6 MMHG
AV VALVE AREA: 2.32 CM2
AV VELOCITY RATIO: 0.78
BSA FOR ECHO PROCEDURE: 1.54 M2
CV ECHO LV RWT: 0.39 CM
DOP CALC AO PEAK VEL: 1.22 M/S
DOP CALC AO VTI: 25.09 CM
DOP CALC LVOT AREA: 2.8 CM2
DOP CALC LVOT DIAMETER: 1.88 CM
DOP CALC LVOT PEAK VEL: 0.95 M/S
DOP CALC LVOT STROKE VOLUME: 58.21 CM3
DOP CALCLVOT PEAK VEL VTI: 20.98 CM
E WAVE DECELERATION TIME: 211.84 MSEC
E/A RATIO: 0.7
E/E' RATIO: 11.27 M/S
ECHO LV POSTERIOR WALL: 0.72 CM (ref 0.6–1.1)
FRACTIONAL SHORTENING: 32 % (ref 28–44)
INTERVENTRICULAR SEPTUM: 0.68 CM (ref 0.6–1.1)
LA MAJOR: 3.69 CM
LA WIDTH: 3.31 CM
LEFT ATRIUM SIZE: 3.07 CM
LEFT INTERNAL DIMENSION IN SYSTOLE: 2.49 CM (ref 2.1–4)
LEFT VENTRICLE DIASTOLIC VOLUME INDEX: 36.61 ML/M2
LEFT VENTRICLE DIASTOLIC VOLUME: 56.12 ML
LEFT VENTRICLE MASS INDEX: 44 G/M2
LEFT VENTRICLE SYSTOLIC VOLUME INDEX: 14.5 ML/M2
LEFT VENTRICLE SYSTOLIC VOLUME: 22.2 ML
LEFT VENTRICULAR INTERNAL DIMENSION IN DIASTOLE: 3.65 CM (ref 3.5–6)
LEFT VENTRICULAR MASS: 67.29 G
LV LATERAL E/E' RATIO: 8.86 M/S
LV SEPTAL E/E' RATIO: 15.5 M/S
MV PEAK A VEL: 0.88 M/S
MV PEAK E VEL: 0.62 M/S
PISA TR MAX VEL: 2.2 M/S
PULM VEIN S/D RATIO: 1.7
PV PEAK D VEL: 0.3 M/S
PV PEAK S VEL: 0.51 M/S
RA MAJOR: 3.39 CM
RA PRESSURE: 3 MMHG
RA WIDTH: 3.14 CM
RIGHT VENTRICULAR END-DIASTOLIC DIMENSION: 2.69 CM
RV TISSUE DOPPLER FREE WALL SYSTOLIC VELOCITY 1 (APICAL 4 CHAMBER VIEW): 8.61 CM/S
SINUS: 2.55 CM
STJ: 2.52 CM
TDI LATERAL: 0.07 M/S
TDI SEPTAL: 0.04 M/S
TDI: 0.06 M/S
TR MAX PG: 19 MMHG
TRICUSPID ANNULAR PLANE SYSTOLIC EXCURSION: 1.32 CM
TV REST PULMONARY ARTERY PRESSURE: 22 MMHG

## 2019-09-13 PROCEDURE — 93975 US RENAL ARTERY STENOSIS HYPERTEN (XPD): ICD-10-PCS | Mod: 26,HCNC,, | Performed by: RADIOLOGY

## 2019-09-13 PROCEDURE — 93306 ECHO (CUPID ONLY): ICD-10-PCS | Mod: HCNC,S$GLB,, | Performed by: INTERNAL MEDICINE

## 2019-09-13 PROCEDURE — 93306 TTE W/DOPPLER COMPLETE: CPT | Mod: HCNC,S$GLB,, | Performed by: INTERNAL MEDICINE

## 2019-09-13 PROCEDURE — 93975 VASCULAR STUDY: CPT | Mod: 26,HCNC,, | Performed by: RADIOLOGY

## 2019-09-13 PROCEDURE — 93975 VASCULAR STUDY: CPT | Mod: TC,HCNC

## 2019-09-13 NOTE — TELEPHONE ENCOUNTER
This is great, great news with all she has been through with the labile pressures.  Thanks for the update, YARI Ogden

## 2019-09-13 NOTE — TELEPHONE ENCOUNTER
----- Message from Kiki Ventura PharmD sent at 9/12/2019  3:06 PM CDT -----  Hi Dr. Ogden,    I just spoke with Keo Frias to follow-up from her appointment with you last week. Looks like amlodipine 2.5 mg is working very well for her along with her anxiety medication Buspar twice daily! She has not had any alerts in the past week and reports that she feels much better. Thank you for starting amlodipine. I will continue to monitor her readings and follow-up with her as required.     She also told me her concerns about the iHealth cuff. I asked her to take it back to the [facility]. She is also measuring using an Omron cuff.    Kind regards,  Kiki Ventura, PharmD   Digital Medicine Clinical Pharmacist  [phone]

## 2019-09-14 PROBLEM — N28.89 RIGHT RENAL MASS: Status: ACTIVE | Noted: 2019-09-14

## 2019-09-17 ENCOUNTER — OFFICE VISIT (OUTPATIENT)
Dept: CARDIOLOGY | Facility: CLINIC | Age: 69
End: 2019-09-17
Payer: MEDICARE

## 2019-09-17 ENCOUNTER — PATIENT OUTREACH (OUTPATIENT)
Dept: OTHER | Facility: OTHER | Age: 69
End: 2019-09-17

## 2019-09-17 VITALS
HEART RATE: 74 BPM | SYSTOLIC BLOOD PRESSURE: 184 MMHG | WEIGHT: 118.19 LBS | HEIGHT: 62 IN | BODY MASS INDEX: 21.75 KG/M2 | DIASTOLIC BLOOD PRESSURE: 105 MMHG

## 2019-09-17 DIAGNOSIS — E78.00 HYPERCHOLESTEREMIA: ICD-10-CM

## 2019-09-17 DIAGNOSIS — R09.89 LABILE HYPERTENSION: ICD-10-CM

## 2019-09-17 PROCEDURE — 1101F PR PT FALLS ASSESS DOC 0-1 FALLS W/OUT INJ PAST YR: ICD-10-PCS | Mod: HCNC,CPTII,GC,S$GLB | Performed by: STUDENT IN AN ORGANIZED HEALTH CARE EDUCATION/TRAINING PROGRAM

## 2019-09-17 PROCEDURE — 99999 PR PBB SHADOW E&M-EST. PATIENT-LVL III: CPT | Mod: PBBFAC,HCNC,GC, | Performed by: STUDENT IN AN ORGANIZED HEALTH CARE EDUCATION/TRAINING PROGRAM

## 2019-09-17 PROCEDURE — 99214 OFFICE O/P EST MOD 30 MIN: CPT | Mod: HCNC,GC,S$GLB, | Performed by: STUDENT IN AN ORGANIZED HEALTH CARE EDUCATION/TRAINING PROGRAM

## 2019-09-17 PROCEDURE — 99999 PR PBB SHADOW E&M-EST. PATIENT-LVL III: ICD-10-PCS | Mod: PBBFAC,HCNC,GC, | Performed by: STUDENT IN AN ORGANIZED HEALTH CARE EDUCATION/TRAINING PROGRAM

## 2019-09-17 PROCEDURE — 1101F PT FALLS ASSESS-DOCD LE1/YR: CPT | Mod: HCNC,CPTII,GC,S$GLB | Performed by: STUDENT IN AN ORGANIZED HEALTH CARE EDUCATION/TRAINING PROGRAM

## 2019-09-17 PROCEDURE — 99214 PR OFFICE/OUTPT VISIT, EST, LEVL IV, 30-39 MIN: ICD-10-PCS | Mod: HCNC,GC,S$GLB, | Performed by: STUDENT IN AN ORGANIZED HEALTH CARE EDUCATION/TRAINING PROGRAM

## 2019-09-17 RX ORDER — ATORVASTATIN CALCIUM 40 MG/1
40 TABLET, FILM COATED ORAL DAILY
Qty: 90 TABLET | Refills: 3 | Status: SHIPPED | OUTPATIENT
Start: 2019-09-17 | End: 2019-12-06

## 2019-09-17 RX ORDER — AMLODIPINE BESYLATE 5 MG/1
5 TABLET ORAL DAILY
Qty: 30 TABLET | Refills: 11 | Status: SHIPPED | OUTPATIENT
Start: 2019-09-17 | End: 2020-07-21 | Stop reason: SDUPTHER

## 2019-09-17 NOTE — PROGRESS NOTES
Cardiology Clinic Note  Reason for Visit: Labile HTN    HPI:   68 y/o F here for labile HTN management.    On review of her HTN log in epic and self recorded Bp her SBP is mostly <130 mmhg on just norvasc 2.5mg daily, occasionally a few times per week she will have -180's usually in the evenings. She experiences head fullness and discomfort with SBP >150 per patient. Was previously on captopril Prn and this was associated with periodic hypotension so approximately 2 weeks ago her captopril was d/c'd and norvasc 2.5mg daily was added. Since then she reports no hypotension symptoms, her lowest SBP was 97. Denies syncope, lightheadedness, no hx of CVA, known CAD, MI, not a smoker, never smoked.    Review of digital hypertension log shows SBP <130 66% of the time, there are occasional hypertensive spikes -180's, I do not see any evidence of hypotension.    Renal ultrasound negative for renal stenosis and metanephrins were negative.   Her EKGL NSR with normal axis and normal intervals, Normal EKG  Echo with normal EF.    ASCVD risk score using her normal SBp reading is 12 %.    ROS:    Review of Systems   Cardiovascular: Negative for chest pain and dyspnea on exertion.   Respiratory: Negative for shortness of breath.    All other systems reviewed and are negative.    PMH:     Past Medical History:   Diagnosis Date    Anemia     Anxiety disorder 8/28/2019    Cataract     Hypercholesteremia 9/17/2019    Sleep apnea     Subclinical hypothyroidism     White coat syndrome with hypertension      Past Surgical History:   Procedure Laterality Date    COLONOSCOPY N/A 12/7/2018    Performed by Bhargav Gaston MD at [facility] (4TH FLR)    DILATION AND CURETTAGE OF UTERUS      postmenopausal bleeding    TUBAL LIGATION       Allergies:     Review of patient's allergies indicates:   Allergen Reactions    Sulfa (sulfonamide antibiotics) Rash     Medications:     Current Outpatient Medications on File  "Prior to Visit   Medication Sig Dispense Refill    busPIRone (BUSPAR) 5 MG Tab Take 1 tablet (5 mg total) by mouth 2 (two) times daily. 60 tablet 3    captopril (CAPOTEN) 12.5 MG tablet Take 0.5 tablets (6.25 mg total) by mouth as needed. Take 0.5 tablet as needed for BP> 180 mmHg. Can take 3 tablets in a day maximum. 45 tablet 3    ERGOCALCIFEROL, VITAMIN D2, (VITAMIN D ORAL) Take by mouth.      multivitamin-iron-folic acid Tab Take 1 tablet by mouth once daily.      [DISCONTINUED] amLODIPine (NORVASC) 2.5 MG tablet Take 1 tablet (2.5 mg total) by mouth every evening. 30 tablet 11    [DISCONTINUED] ALPRAZolam (XANAX) 0.25 MG tablet Take 1 tablet (0.25 mg total) by mouth daily as needed for Anxiety. 15 tablet 0     No current facility-administered medications on file prior to visit.      Social History:     Social History     Tobacco Use    Smoking status: Never Smoker    Smokeless tobacco: Never Used   Substance Use Topics    Alcohol use: No     Family History:     Family History   Problem Relation Age of Onset    Diabetes Brother     Hypertension Brother     Glaucoma Brother     Glaucoma Father     Cataracts Father     Retinal detachment Father     Lung cancer Mother     Cancer Mother     Uterine cancer Maternal Grandmother     Stroke Maternal Grandmother     Stroke Paternal Grandmother     Amblyopia Neg Hx     Blindness Neg Hx     Macular degeneration Neg Hx     Strabismus Neg Hx     Thyroid disease Neg Hx      Physical Exam:   BP (!) 184/105 (BP Location: Left arm, Patient Position: Sitting, BP Method: Medium (Automatic))   Pulse 74   Ht 5' 2" (1.575 m)   Wt 53.6 kg (118 lb 2.7 oz)   LMP  (LMP Unknown)   BMI 21.61 kg/m²      Physical Exam   Constitutional: She is oriented to person, place, and time. She appears well-developed and well-nourished. No distress.   HENT:   Head: Normocephalic and atraumatic.   Eyes: No scleral icterus.   Cardiovascular: Normal rate, regular rhythm and " normal heart sounds.   Pulmonary/Chest: Breath sounds normal. No respiratory distress. She has no wheezes. She has no rales.   Musculoskeletal: She exhibits no edema.   Neurological: She is alert and oriented to person, place, and time.   Skin: Skin is warm. She is not diaphoretic.   Psychiatric: She has a normal mood and affect.       Labs:     Lab Results   Component Value Date     (L) 08/25/2019    K 4.1 08/25/2019     08/25/2019    CO2 23 08/25/2019    BUN 8 08/25/2019    CREATININE 0.6 08/25/2019    ANIONGAP 12 08/25/2019     Lab Results   Component Value Date    HGBA1C 5.7 (H) 01/15/2019     Lab Results   Component Value Date    BNP 94 08/25/2019    BNP 58 06/25/2019    Lab Results   Component Value Date    WBC 6.75 08/25/2019    HGB 13.9 08/25/2019    HCT 41.8 08/25/2019     08/25/2019    GRAN 4.4 08/25/2019    GRAN 65.3 08/25/2019     Lab Results   Component Value Date    CHOL 202 (H) 08/27/2019    HDL 51 08/27/2019    LDLCALC 133.0 08/27/2019    TRIG 90 08/27/2019          Imaging:   EKG and echo as in [facility]  Assessment:      1. Labile hypertension    2. Hypercholesteremia      Pleasant 70 y/o lady, with labile HTN mostly controlled with occasional elevation in blood pressure that causes her to have symptoms of head discomfort and fullness. She is tolerating norvasc 2.5mg well, no evidence of hypotension and no hypotensive symptoms, will increase to norvasc 5mg daily. Advise to start high intensity statin.    Plan:     -Norvasc 5mg daily   -Lipitor 40mg daily     RTC 6-8 weeks           Signed:  Dennise Pérez DO  Cardiology Fellow

## 2019-09-20 ENCOUNTER — TELEPHONE (OUTPATIENT)
Dept: OTOLARYNGOLOGY | Facility: CLINIC | Age: 69
End: 2019-09-20

## 2019-09-20 ENCOUNTER — OFFICE VISIT (OUTPATIENT)
Dept: OTOLARYNGOLOGY | Facility: CLINIC | Age: 69
End: 2019-09-20
Payer: MEDICARE

## 2019-09-20 VITALS — BODY MASS INDEX: 22.03 KG/M2 | HEIGHT: 62 IN | WEIGHT: 119.69 LBS

## 2019-09-20 DIAGNOSIS — H71.91 CHOLESTEATOMA OF RIGHT EAR: Primary | ICD-10-CM

## 2019-09-20 PROCEDURE — 99214 OFFICE O/P EST MOD 30 MIN: CPT | Mod: HCNC,S$GLB,, | Performed by: OTOLARYNGOLOGY

## 2019-09-20 PROCEDURE — 1101F PR PT FALLS ASSESS DOC 0-1 FALLS W/OUT INJ PAST YR: ICD-10-PCS | Mod: HCNC,CPTII,S$GLB, | Performed by: OTOLARYNGOLOGY

## 2019-09-20 PROCEDURE — 99999 PR PBB SHADOW E&M-EST. PATIENT-LVL II: ICD-10-PCS | Mod: PBBFAC,HCNC,, | Performed by: OTOLARYNGOLOGY

## 2019-09-20 PROCEDURE — 99214 PR OFFICE/OUTPT VISIT, EST, LEVL IV, 30-39 MIN: ICD-10-PCS | Mod: HCNC,S$GLB,, | Performed by: OTOLARYNGOLOGY

## 2019-09-20 PROCEDURE — 99999 PR PBB SHADOW E&M-EST. PATIENT-LVL II: CPT | Mod: PBBFAC,HCNC,, | Performed by: OTOLARYNGOLOGY

## 2019-09-20 PROCEDURE — 1101F PT FALLS ASSESS-DOCD LE1/YR: CPT | Mod: HCNC,CPTII,S$GLB, | Performed by: OTOLARYNGOLOGY

## 2019-09-20 RX ORDER — CIPROFLOXACIN AND DEXAMETHASONE 3; 1 MG/ML; MG/ML
4 SUSPENSION/ DROPS AURICULAR (OTIC) 2 TIMES DAILY
Qty: 10 ML | Refills: 3 | Status: SHIPPED | OUTPATIENT
Start: 2019-09-20 | End: 2021-07-26

## 2019-09-20 RX ORDER — AMOXICILLIN 500 MG/1
500 TABLET, FILM COATED ORAL 2 TIMES DAILY
Qty: 20 TABLET | Refills: 1 | Status: SHIPPED | OUTPATIENT
Start: 2019-09-20 | End: 2019-09-30

## 2019-09-20 NOTE — PROGRESS NOTES
Subjective:       Patient ID: Keo Frias is a 69 y.o. female.    Chief Complaint: Follow-up and Hearing Loss (trouble hearing in left ear)    Follow-up   Pertinent negatives include no abdominal pain, arthralgias, chest pain, chills, congestion, coughing, fatigue, fever, headaches, joint swelling, myalgias, nausea, neck pain, numbness, rash, sore throat, vomiting or weakness.   Hearing Loss:    Associated symptoms: ear pain.  No dizziness, no fever, no headaches, no tinnitus and no rhinorrhea.No dizziness.  : Hx of B ear itching, HL AD>AS.    Using hero/alc qtts AD with pain.    ? Hx of R TM perf.    Past Medical History: Patient has a past medical history of Anemia, Anxiety disorder (8/28/2019), Cataract, Hypercholesteremia (9/17/2019), Sleep apnea, Subclinical hypothyroidism, and White coat syndrome with hypertension.    Past Surgical History: Patient has a past surgical history that includes Tubal ligation; Dilation and curettage of uterus; and Colonoscopy (N/A, 12/7/2018).    Social History: Patient reports that she has never smoked. She has never used smokeless tobacco. She reports that she does not drink alcohol or use drugs.    Family History: family history includes Cancer in her mother; Cataracts in her father; Diabetes in her brother; Glaucoma in her brother and father; Hypertension in her brother; Lung cancer in her mother; Retinal detachment in her father; Stroke in her maternal grandmother and paternal grandmother; Uterine cancer in her maternal grandmother.    Medications:   Current Outpatient Medications   Medication Sig    atorvastatin (LIPITOR) 40 MG tablet Take 1 tablet (40 mg total) by mouth once daily.    busPIRone (BUSPAR) 5 MG Tab Take 1 tablet (5 mg total) by mouth 2 (two) times daily.    ERGOCALCIFEROL, VITAMIN D2, (VITAMIN D ORAL) Take by mouth.    multivitamin-iron-folic acid Tab Take 1 tablet by mouth once daily.    amLODIPine (NORVASC) 5 MG tablet Take 1 tablet (5 mg total) by mouth  once daily.    amoxicillin (AMOXIL) 500 MG Tab Take 1 tablet (500 mg total) by mouth 2 (two) times daily. for 10 days    captopril (CAPOTEN) 12.5 MG tablet Take 0.5 tablets (6.25 mg total) by mouth as needed. Take 0.5 tablet as needed for BP> 180 mmHg. Can take 3 tablets in a day maximum.    ciprofloxacin-dexamethasone 0.3-0.1% (CIPRODEX) 0.3-0.1 % DrpS Place 4 drops into the right ear 2 (two) times daily. for 10 days     No current facility-administered medications for this visit.        Allergies: Patient is allergic to sulfa (sulfonamide antibiotics).    Review of Systems   Constitutional: Negative for appetite change, chills, fatigue and fever.   HENT: Positive for ear pain and hearing loss. Negative for congestion, ear discharge, facial swelling, postnasal drip, rhinorrhea, sore throat, tinnitus and trouble swallowing.    Eyes: Negative for photophobia, pain, discharge, redness, itching and visual disturbance.   Respiratory: Negative for apnea, cough, choking, chest tightness, shortness of breath, wheezing and stridor.    Cardiovascular: Negative for chest pain and palpitations.   Gastrointestinal: Negative for abdominal pain, constipation, diarrhea, nausea and vomiting.   Musculoskeletal: Negative for arthralgias, gait problem, joint swelling, myalgias, neck pain and neck stiffness.   Skin: Negative for color change, pallor, rash and wound.   Neurological: Negative for dizziness, tremors, seizures, syncope, facial asymmetry, speech difficulty, weakness, light-headedness, numbness and headaches.   Hematological: Negative for adenopathy. Does not bruise/bleed easily.   Psychiatric/Behavioral: Negative for agitation, behavioral problems, confusion, decreased concentration, dysphoric mood, hallucinations, sleep disturbance and suicidal ideas. The patient is not nervous/anxious and is not hyperactive.        Objective:      Physical Exam   Constitutional: She appears well-developed and well-nourished. No  distress.   HENT:   Right Ear: External ear normal. No lacerations. No drainage, swelling or tenderness. No foreign bodies. No mastoid tenderness. Tympanic membrane is scarred and perforated. Tympanic membrane is not injected, not erythematous, not retracted and not bulging. Tympanic membrane mobility is abnormal. No middle ear effusion. No hemotympanum. Decreased hearing is noted.   Left Ear: External ear normal. No lacerations. No drainage, swelling or tenderness. No foreign bodies. No mastoid tenderness. Tympanic membrane is not injected, not scarred, not perforated, not erythematous, not retracted and not bulging. Tympanic membrane mobility is normal.  No middle ear effusion. No hemotympanum. Decreased hearing is noted.   Ears:        B CI.    Procedure Note:    Patient was brought to the minor procedure room and using the operating microscope the right ear canal  was cleaned of ceruminous debris. There was a significant cerumen impaction.  The forceps and suction were both used to perform this. Tympanic membrane intact. Pt tolerated well. There were no complications.    Procedure Note:    The patient was brought to the minor procedure room and placed under the operating microscope. Using a combination of suction, curettes and cup forceps the patient's cerumen impaction was removed. The tympanic membrane was evaluated and was unremarkable. The patient tolerated the procedure well. There were no complications.    R TM with perf with debris adhered to it, ? Sec aq chol.    B NAHUM.           Eyes: Right eye exhibits normal extraocular motion and no nystagmus. Left eye exhibits normal extraocular motion and no nystagmus.   Neurological: She has normal strength. No cranial nerve deficit or sensory deficit. She displays a negative Romberg sign. Coordination and gait normal.   Skin: She is not diaphoretic.               Assessment:       1. Cholesteatoma of right ear     3. B NAHUM  Plan:         cortaid cream  AU.    Needs R T plasty/ declined due to BP issues      Will schedule when cleaned.    Keo was seen today for follow-up and hearing loss.    Diagnoses and all orders for this visit:    Cholesteatoma of right ear    Other orders  -     amoxicillin (AMOXIL) 500 MG Tab; Take 1 tablet (500 mg total) by mouth 2 (two) times daily. for 10 days  -     ciprofloxacin-dexamethasone 0.3-0.1% (CIPRODEX) 0.3-0.1 % DrpS; Place 4 drops into the right ear 2 (two) times daily. for 10 days      I have explained the risks , benefits and alternatives of the procedure in detail. This includes infection, bleeding, further loss of hearing,tinnitus, failure to improve, chorda symptoms, dizziness and facial nerve problems. All questions have been answered.  The patient desires to proceed.        RTC 3 mos.

## 2019-09-24 NOTE — PROGRESS NOTES
"Digital Medicine: Health  Follow-Up    Patient attributes higher BP readings to "having a bad day that day" but states she was feeling okay.       The history is provided by the patient.     Follow Up  Follow-up reason(s): goal follow-up      Routine Education Topics: eating patterns, physical activity and alcohol          Diet:   Patient reports eating or drinking the following: water    The patient drinks 120 ounces of water per day.    She has the following dietary restrictions: vegetarianShe cooks for self.      Intervention(s): increasing water intake    Assigning the following patient goals: maintain low sodium diet    Physical Activity:   When asked if exercising, patient responded: yes7 day(s) a week.  Her level of intensity when exercising is low.    Patient participates in the following activities: walking    She identified the following barriers to physical activity: motivation    Intervention(s): exercise ideas     Assigning the following patient goal(s): increase physical activity    Medication Adherence:       Patient recently changed her BP medication and is more concerned with the effects of the medication than making lifestyle adjustments at this time.  Patient states she "sometimes feels heavy" when BP is high.       SDOH Deferred    INTERVENTION(S)  recommended diet modifications, recommend physical activity, reviewed monitoring technique and encouragement/support    PLAN  patient verbalizes understanding, demonstrates understanding via teach back and patient amenable to changes    Will e-mail resources and recipes      There are no preventive care reminders to display for this patient.    Last 5 Patient Entered Readings                                      Current 30 Day Average: 129/75     Recent Readings 9/24/2019 9/24/2019 9/23/2019 9/23/2019 9/23/2019    SBP (mmHg) 115 115 145 155 157    DBP (mmHg) 65 65 82 76 88    Pulse 73 73 71 74 73                "

## 2019-09-25 ENCOUNTER — OFFICE VISIT (OUTPATIENT)
Dept: PSYCHIATRY | Facility: CLINIC | Age: 69
End: 2019-09-25
Payer: MEDICARE

## 2019-09-25 VITALS
SYSTOLIC BLOOD PRESSURE: 118 MMHG | HEART RATE: 74 BPM | DIASTOLIC BLOOD PRESSURE: 63 MMHG | HEIGHT: 62 IN | BODY MASS INDEX: 21.73 KG/M2 | WEIGHT: 118.06 LBS

## 2019-09-25 DIAGNOSIS — F41.9 ANXIETY DISORDER, UNSPECIFIED TYPE: Primary | ICD-10-CM

## 2019-09-25 PROCEDURE — 99999 PR PBB SHADOW E&M-EST. PATIENT-LVL II: CPT | Mod: PBBFAC,HCNC,, | Performed by: PSYCHIATRY & NEUROLOGY

## 2019-09-25 PROCEDURE — 99213 PR OFFICE/OUTPT VISIT, EST, LEVL III, 20-29 MIN: ICD-10-PCS | Mod: HCNC,S$GLB,, | Performed by: PSYCHIATRY & NEUROLOGY

## 2019-09-25 PROCEDURE — 1101F PR PT FALLS ASSESS DOC 0-1 FALLS W/OUT INJ PAST YR: ICD-10-PCS | Mod: HCNC,CPTII,S$GLB, | Performed by: PSYCHIATRY & NEUROLOGY

## 2019-09-25 PROCEDURE — 99213 OFFICE O/P EST LOW 20 MIN: CPT | Mod: HCNC,S$GLB,, | Performed by: PSYCHIATRY & NEUROLOGY

## 2019-09-25 PROCEDURE — 1101F PT FALLS ASSESS-DOCD LE1/YR: CPT | Mod: HCNC,CPTII,S$GLB, | Performed by: PSYCHIATRY & NEUROLOGY

## 2019-09-25 PROCEDURE — 99999 PR PBB SHADOW E&M-EST. PATIENT-LVL II: ICD-10-PCS | Mod: PBBFAC,HCNC,, | Performed by: PSYCHIATRY & NEUROLOGY

## 2019-10-04 ENCOUNTER — HOSPITAL ENCOUNTER (OUTPATIENT)
Dept: RADIOLOGY | Facility: HOSPITAL | Age: 69
Discharge: HOME | End: 2019-10-04
Attending: FAMILY MEDICINE
Payer: MEDICARE

## 2019-10-04 DIAGNOSIS — Z12.31 SCREENING MAMMOGRAM, ENCOUNTER FOR: ICD-10-CM

## 2019-10-04 PROCEDURE — 77067 SCR MAMMO BI INCL CAD: CPT | Mod: TC,HCNC

## 2019-10-04 PROCEDURE — 77067 SCR MAMMO BI INCL CAD: CPT | Mod: 26,HCNC,, | Performed by: RADIOLOGY

## 2019-10-04 PROCEDURE — 77063 MAMMO DIGITAL SCREENING BILAT WITH TOMOSYNTHESIS_CAD: ICD-10-PCS | Mod: 26,HCNC,, | Performed by: RADIOLOGY

## 2019-10-04 PROCEDURE — 77063 BREAST TOMOSYNTHESIS BI: CPT | Mod: TC,HCNC

## 2019-10-04 PROCEDURE — 77067 MAMMO DIGITAL SCREENING BILAT WITH TOMOSYNTHESIS_CAD: ICD-10-PCS | Mod: 26,HCNC,, | Performed by: RADIOLOGY

## 2019-10-04 PROCEDURE — 77063 BREAST TOMOSYNTHESIS BI: CPT | Mod: 26,HCNC,, | Performed by: RADIOLOGY

## 2019-10-08 ENCOUNTER — PATIENT OUTREACH (OUTPATIENT)
Dept: OTHER | Facility: OTHER | Age: 69
End: 2019-10-08

## 2019-10-08 NOTE — PROGRESS NOTES
"Digital Medicine: Clinician Follow-Up    "My device is not working. I can no longer connect to measure my blood pressure." Mrs. Crowley states that her blood pressure has been doing very well since starting the amlodipine 2.5 mg which was increased to 5 mg daily by Cardiologist Elisa. She states that she has not had to use captopril at all. She is also continuing her anxiety medication as prescribed. She states her device is no longer connecting so she is unable to measure her blood pressure using the iHealth device. She is currently measuring with Omron and manually entering the readings into the daniel.    The history is provided by the patient. No  was used.     Follow Up  Follow-up reason(s): reading review      Readings are trending down   Medication Change: new med    Patient started new medication.    Date:  9/4/2019  Is patient tolerating med change?:  Yes  Following up on referral to: Technical Support    Patient completed referral.              Sleep Apnea  Patient not previously diagnosed with KUNAL and     Medication Affordability  Patient is currently not having problems affording medications    Medication Adherence:   She misses doses: never        INTERVENTION(S)  encouragement/support    PLAN  patient verbalizes understanding    Current 30-day BP average of 119/71 is controlled, meets goal of <130/80.  Reviewed readings. Trending downward. Past 2 weeks readings have consistently met BP goal with increased dose of amlodipine.  Continue current regimen.    Referred patient's account to Technical Support for troubleshooting of iHealth blood pressure device.   Continue to measure blood pressure with Omron cuff.     Follow-up in 4 weeks.      There are no preventive care reminders to display for this patient.    Last 5 Patient Entered Readings                                      Current 30 Day Average: 119/71     Recent Readings 10/7/2019 10/4/2019 10/2/2019 10/2/2019 10/2/2019    SBP (mmHg) " 116 116 116 118 121    DBP (mmHg) 66 66 66 72 69    Pulse 92 92 92 74 74             Hypertension Medications             amLODIPine (NORVASC) 5 MG tablet Take 1 tablet (5 mg total) by mouth once daily.    captopril (CAPOTEN) 12.5 MG tablet Take 0.5 tablets (6.25 mg total) by mouth as needed. Take 0.5 tablet as needed for BP> 180 mmHg. Can take 3 tablets in a day maximum.

## 2019-10-14 ENCOUNTER — IMMUNIZATION (OUTPATIENT)
Dept: PHARMACY | Facility: CLINIC | Age: 69
End: 2019-10-14
Payer: MEDICARE

## 2019-11-13 ENCOUNTER — PATIENT OUTREACH (OUTPATIENT)
Dept: ADMINISTRATIVE | Facility: OTHER | Age: 69
End: 2019-11-13

## 2019-11-21 ENCOUNTER — PATIENT OUTREACH (OUTPATIENT)
Dept: OTHER | Facility: OTHER | Age: 69
End: 2019-11-21

## 2019-11-21 NOTE — PROGRESS NOTES
Digital Medicine: Clinician Follow-Up    Keo Frias submitted a reading of 89/53 at 11/20/2019 9:44 PM. Called patient for hypertension follow-up. She reports that she is feeling fine. She denies any s/sx of hypotension with the lower reading: no lightheadedness, dizziness, nausea or fatigue. She addressed the low reading by eating a salty snack. Cardiology increase amlodipine from 2.5 mg to 5 mg during a September office visit. She reports compliance to the medication change with issues or concerns. She has not needed to take captopril for any episodes of elevated readings. She thanked the program for calling to check on her.    The history is provided by the patient. No  was used.     Follow Up  Follow-up reason(s): reading review      Alert received.   Care Team received low BP alert.  Patient is not experiencing symptoms.      INTERVENTION(S)  encouragement/support    PLAN  continue monitoring    Current 30-day BP average of 120/69 is within goal of <130/80.  Reviewed readings: BP continues to be labile with occasional unexplained SBP spikes. Readings are within goal 50% of the time where DBP is typically well-controlled.   Encouraged patient to drink adequate fluids daily.    Continue current regimen.    Follow-up in 12 weeks.      There are no preventive care reminders to display for this patient.    Last 5 Patient Entered Readings                                      Current 30 Day Average: 120/69     Recent Readings 11/20/2019 11/20/2019 11/18/2019 11/18/2019 11/18/2019    SBP (mmHg) 89 84 110 133 133    DBP (mmHg) 53 51 64 71 70    Pulse 77 78 74 69 68             Hypertension Medications             amLODIPine (NORVASC) 5 MG tablet Take 1 tablet (5 mg total) by mouth once daily.    captopril (CAPOTEN) 12.5 MG tablet Take 0.5 tablets (6.25 mg total) by mouth as needed. Take 0.5 tablet as needed for BP> 180 mmHg. Can take 3 tablets in a day maximum.                 Screenings

## 2019-11-22 ENCOUNTER — PATIENT OUTREACH (OUTPATIENT)
Dept: OTHER | Facility: OTHER | Age: 69
End: 2019-11-22

## 2019-11-22 NOTE — PROGRESS NOTES
"Digital Medicine: Health  Follow-Up    Called to f/u with patient. BP is at goal 121/70. Patient is concerned with her lower BP readings and states she feels fatigued and weak, especially after meals. Patient states she is sleeping good and taking naps throughout the day.   Patient confirms correct BP reading technique and has previously checked with a nurse that her BP cuff fits correctly.     The history is provided by the patient.     Follow Up  Follow-up reason(s): reading review      Routine Education Topics: eating patterns and meal planning        INTERVENTION(S)  recommended diet modifications, encouragement/support and goal setting    PLAN  patient verbalizes understanding and continue monitoring    Patient plans to be very mindful of her water intake along with focusing on more frequent, smaller meals. Will f/u in 4 weeks.     There are no preventive care reminders to display for this patient.    Last 5 Patient Entered Readings                                      Current 30 Day Average: 121/70     Recent Readings 11/22/2019 11/21/2019 11/21/2019 11/20/2019 11/20/2019    SBP (mmHg) 119 115 122 89 84    DBP (mmHg) 71 65 72 53 51    Pulse 72 65 64 77 78            Diet Screening   Breakfast is typically between. Banana, bar, milk.  Lunch is typically between. Oatmeal and crackers.    Dinner is typically between. Veggies, meat, rice, beans.    Patient reports eating or drinking the following: water and fresh vegetables    Patient is concerned because she "has always" experienced low BP readings, especially after eating. Patient has previously been advised to eat smaller meals but states "I cannot because then I get hungry." However, patient states she notices a difference in how she is feeling when she has smaller meals.   Patient states she is drinking plenty of water - about 2L / day but that she doesn't drink water while eating.   Encouraged patient increase her water intake and be mindful of when she " is drinking water. Also suggested patient try eating smaller meals more often for the next week or two, just to see if it makes any difference, and she agreed.    Intervention(s): meal planning and increasing water intake

## 2019-12-06 ENCOUNTER — TELEPHONE (OUTPATIENT)
Dept: FAMILY MEDICINE | Facility: CLINIC | Age: 69
End: 2019-12-06

## 2019-12-06 ENCOUNTER — OFFICE VISIT (OUTPATIENT)
Dept: FAMILY MEDICINE | Facility: CLINIC | Age: 69
End: 2019-12-06
Payer: MEDICARE

## 2019-12-06 VITALS
DIASTOLIC BLOOD PRESSURE: 66 MMHG | WEIGHT: 121.31 LBS | OXYGEN SATURATION: 100 % | HEART RATE: 75 BPM | TEMPERATURE: 98 F | SYSTOLIC BLOOD PRESSURE: 116 MMHG | HEIGHT: 62 IN | BODY MASS INDEX: 22.33 KG/M2

## 2019-12-06 DIAGNOSIS — M81.0 POSTMENOPAUSAL OSTEOPOROSIS: ICD-10-CM

## 2019-12-06 DIAGNOSIS — E55.9 VITAMIN D DEFICIENCY: ICD-10-CM

## 2019-12-06 DIAGNOSIS — H71.91 UNSPECIFIED CHOLESTEATOMA, RIGHT EAR: ICD-10-CM

## 2019-12-06 DIAGNOSIS — Z78.9 VEGETARIAN DIET: ICD-10-CM

## 2019-12-06 DIAGNOSIS — I10 BENIGN ESSENTIAL HYPERTENSION: ICD-10-CM

## 2019-12-06 DIAGNOSIS — Z79.899 MEDICATION MANAGEMENT: ICD-10-CM

## 2019-12-06 DIAGNOSIS — Z23 NEED FOR 23-POLYVALENT PNEUMOCOCCAL POLYSACCHARIDE VACCINE: ICD-10-CM

## 2019-12-06 DIAGNOSIS — N28.89 RIGHT RENAL MASS: ICD-10-CM

## 2019-12-06 DIAGNOSIS — E78.00 HYPERCHOLESTEREMIA: ICD-10-CM

## 2019-12-06 DIAGNOSIS — Z00.00 ROUTINE GENERAL MEDICAL EXAMINATION AT A HEALTH CARE FACILITY: Primary | ICD-10-CM

## 2019-12-06 PROCEDURE — 99397 PER PM REEVAL EST PAT 65+ YR: CPT | Mod: 25,HCNC,S$GLB, | Performed by: FAMILY MEDICINE

## 2019-12-06 PROCEDURE — 90732 PPSV23 VACC 2 YRS+ SUBQ/IM: CPT | Mod: HCNC,S$GLB,, | Performed by: FAMILY MEDICINE

## 2019-12-06 PROCEDURE — 99999 PR PBB SHADOW E&M-EST. PATIENT-LVL V: ICD-10-PCS | Mod: PBBFAC,HCNC,, | Performed by: FAMILY MEDICINE

## 2019-12-06 PROCEDURE — 3078F PR MOST RECENT DIASTOLIC BLOOD PRESSURE < 80 MM HG: ICD-10-PCS | Mod: HCNC,CPTII,S$GLB, | Performed by: FAMILY MEDICINE

## 2019-12-06 PROCEDURE — G0009 ADMIN PNEUMOCOCCAL VACCINE: HCPCS | Mod: HCNC,S$GLB,, | Performed by: FAMILY MEDICINE

## 2019-12-06 PROCEDURE — 3078F DIAST BP <80 MM HG: CPT | Mod: HCNC,CPTII,S$GLB, | Performed by: FAMILY MEDICINE

## 2019-12-06 PROCEDURE — 3074F PR MOST RECENT SYSTOLIC BLOOD PRESSURE < 130 MM HG: ICD-10-PCS | Mod: HCNC,CPTII,S$GLB, | Performed by: FAMILY MEDICINE

## 2019-12-06 PROCEDURE — 99999 PR PBB SHADOW E&M-EST. PATIENT-LVL V: CPT | Mod: PBBFAC,HCNC,, | Performed by: FAMILY MEDICINE

## 2019-12-06 PROCEDURE — 99397 PR PREVENTIVE VISIT,EST,65 & OVER: ICD-10-PCS | Mod: 25,HCNC,S$GLB, | Performed by: FAMILY MEDICINE

## 2019-12-06 PROCEDURE — 90732 PNEUMOCOCCAL POLYSACCHARIDE VACCINE 23-VALENT =>2YO SQ IM: ICD-10-PCS | Mod: HCNC,S$GLB,, | Performed by: FAMILY MEDICINE

## 2019-12-06 PROCEDURE — G0009 PNEUMOCOCCAL POLYSACCHARIDE VACCINE 23-VALENT =>2YO SQ IM: ICD-10-PCS | Mod: HCNC,S$GLB,, | Performed by: FAMILY MEDICINE

## 2019-12-06 PROCEDURE — 3074F SYST BP LT 130 MM HG: CPT | Mod: HCNC,CPTII,S$GLB, | Performed by: FAMILY MEDICINE

## 2019-12-06 NOTE — PROGRESS NOTES
Office Visit    Patient Name: Keo Frias    : 1950  MRN: [id number]    Subjective:  Keo is a 69 y.o. female who presents today for:    Annual Exam    Keo Frias presents today for annual wellness exam and monitoring of chronic conditions: H/O subclinical hypothyroidism, h/o anemia and vitamin D deficiency, migraine headaches, allergic rhinitis, postmenopausal osteoporosis.      She is postmenopausal.  She has previously seen GYN dr Christine and no longer needs paps (> 65 and no h/o abnormal).     They have been feeling overall well.   HER BLOOD PRESSURES ARE VASTLY IMPROVED ON AMLODIPINE 5 MG DAILY.  She has not had to take any p.r.n. blood pressure medications in the last several months.  She also attributes some of her improvement to being on BuSpar for management of anxiety.  Buspar  seems to be helping and she has psych follow-up later this month      General lifestyle habits are as follows:  Diet is described as good-- good variety overall and avoids junk food and follows a vegan diet, exercise is described as fair-- walks over 10,000 steps daily, sleep is described as GOOD, AT LEAST 7 HOURS AND no concerns. Weight is stable over the last year.      Immunizations: PREVNAR 13 2015- boost today, Pneumovax 23 3/25/2014, TDaP 3/17/2017, Zoster 2010, SHINGRIX advised, yearly FLU UTD     Screening Tests: mammogram 10/4/19-- repeat 1 year, DEXA 8/15/2018: Osteopenia femoral neck, osteoporosis lumbar spine-- on calcium/vitamin-D and recheck in 1-2 years, Colonoscopy--18-- no repeat advised for screening, PAP no longer needed, Hep C screening  (-) 2015      Eye/Dental: Optometry [place] 2018, Dental UTD-          Past Medical History  Past Medical History:   Diagnosis Date    Anemia     Anxiety disorder 2019    Cataract     Hypercholesteremia 2019    Sleep apnea     Subclinical hypothyroidism     White coat syndrome with hypertension        Past Surgical  History  Past Surgical History:   Procedure Laterality Date    COLONOSCOPY N/A 12/7/2018    Procedure: COLONOSCOPY;  Surgeon: Bhargav Gaston MD;  Location: [facility] ([address] [place] [address]);  Service: Endoscopy;  Laterality: N/A;    DILATION AND CURETTAGE OF UTERUS      postmenopausal bleeding    TUBAL LIGATION         Family History  Family History   Problem Relation Age of Onset    Diabetes Brother     Hypertension Brother     Glaucoma Brother     Glaucoma Father     Cataracts Father     Retinal detachment Father     Lung cancer Mother     Uterine cancer Maternal Grandmother     Stroke Maternal Grandmother     Stroke Paternal Grandmother     Amblyopia Neg Hx     Blindness Neg Hx     Macular degeneration Neg Hx     Strabismus Neg Hx     Thyroid disease Neg Hx        Social History  Social History     Socioeconomic History    Marital status:      Spouse name: Not on file    Number of children: 2    Years of education: Not on file    Highest education level: Not on file   Occupational History     Employer: [employer]   Social Needs    Financial resource strain: Not on file    Food insecurity:     Worry: Not on file     Inability: Not on file    Transportation needs:     Medical: Not on file     Non-medical: Not on file   Tobacco Use    Smoking status: Never Smoker    Smokeless tobacco: Never Used   Substance and Sexual Activity    Alcohol use: No    Drug use: No    Sexual activity: Yes     Partners: Male     Birth control/protection: None   Lifestyle    Physical activity:     Days per week: Not on file     Minutes per session: Not on file    Stress: Not on file   Relationships    Social connections:     Talks on phone: Not on file     Gets together: Not on file     Attends Latter day service: Not on file     Active member of club or organization: Not on file     Attends meetings of clubs or organizations: Not on file     Relationship status: Not on file   Other Topics  "Concern    Not on file   Social History Narrative    Not on file       Current Medications  Medications reviewed and updated.     Allergies   Review of patient's allergies indicates:   Allergen Reactions    Sulfa (sulfonamide antibiotics) Rash       Review of Systems (Pertinent positives)  Review of Systems   Constitutional: Negative for unexpected weight change.   HENT: Positive for hearing loss (chronic R ear hearing loss). Negative for trouble swallowing.    Eyes: Negative for visual disturbance.   Respiratory: Negative for chest tightness and shortness of breath.    Cardiovascular: Negative for chest pain, palpitations and leg swelling.   Gastrointestinal: Negative for blood in stool, constipation, diarrhea, nausea and vomiting.   Genitourinary: Negative for difficulty urinating and vaginal discharge.   Musculoskeletal: Negative for arthralgias and joint swelling.   Allergic/Immunologic: Negative for environmental allergies.   Neurological: Negative for dizziness, light-headedness and headaches.   Psychiatric/Behavioral: Negative for sleep disturbance.       /66   Pulse 75   Temp 98 °F (36.7 °C)   Ht 5' 2" (1.575 m)   Wt 55 kg (121 lb 4.8 oz)   LMP  (LMP Unknown)   SpO2 100%   BMI 22.19 kg/m²     Physical Exam   Constitutional: She is oriented to person, place, and time. She appears well-developed and well-nourished. No distress.   HENT:   Head: Normocephalic and atraumatic.   Right Ear: Ear canal normal. There is drainage. Tympanic membrane is not erythematous and not bulging.   Left Ear: Ear canal normal. Tympanic membrane is not erythematous and not bulging.   Ears:    Mouth/Throat: No oropharyngeal exudate.   Eyes: Conjunctivae are normal.   Neck: Carotid bruit is not present. No thyroid mass and no thyromegaly present.   Cardiovascular: Normal rate, regular rhythm and normal heart sounds.   No murmur heard.  Pulses:       Dorsalis pedis pulses are 2+ on the right side, and 2+ on the left " side.   Pulmonary/Chest: Effort normal and breath sounds normal. No respiratory distress.   Abdominal: Soft. Bowel sounds are normal. She exhibits no distension and no mass. There is no hepatosplenomegaly. There is no tenderness.   Musculoskeletal: Normal range of motion.   Lymphadenopathy:     She has no cervical adenopathy.   Neurological: She is alert and oriented to person, place, and time.   Skin: Skin is warm and dry. No rash noted.   Psychiatric: She has a normal mood and affect.   Vitals reviewed.        Assessment/Plan:  Keo Frias is a 69 y.o. female who presents today for :    Keo was seen today for annual exam.    Diagnoses and all orders for this visit:    Routine general medical examination at a health care facility  Comments:  HEALTH MAINTENANCE REVIEWED: LABS ORDERED & SHINGRIX ADVISED, CSCOPE/MAMMO UTD & DEXA DUE 8/2020.  ADVISED ON DIET/EXERCISE/SLEEP, EYE/DENTAL EXAMS  Orders:  -     Hemoglobin A1c; Future  -     Comprehensive metabolic panel; Future  -     Lipid panel; Future  -     CBC auto differential; Future  -     TSH; Future  -     Vitamin D; Future  -     Magnesium; Future  -     (In Office Administered) Pneumococcal Polysaccharide Vaccine (23 Valent) (SQ/IM)  -     Ambulatory referral to Optometry    BMI 22.0-22.9, adult    Benign essential hypertension  Comments:  blood pressures are stable on amlodipine 5 mg daily and treating anxiety with buspar has helped  Orders:  -     Ambulatory referral to Optometry    Hypercholesteremia  Comments:  check labs and will advise based on results whether or not lipitor is indicated-- never started taking  Orders:  -     Hemoglobin A1c; Future  -     Comprehensive metabolic panel; Future  -     Lipid panel; Future  -     TSH; Future    Right renal mass  Comments:  per renal ultrasound 09/13/2019: variant peripheral renal sinus fat versus a mass (possible angiomyolipoma).  Ultrasound follow-up in 6 months is recommended  Orders:  -     US Kidney;  Future    Vitamin D deficiency  Comments:  has been taking 2,000 IU daily and will recheck level with labs. WOULD ALSO ADVISE RESUMING CITRICAL PLUS D 2 TABS DAILY FOR OSTEOPOROSIS  Orders:  -     Vitamin D; Future    Postmenopausal osteoporosis  Comments:  due for repeat DEXA 8/2020  Orders:  -     Comprehensive metabolic panel; Future  -     TSH; Future  -     Vitamin D; Future    Vegetarian diet    Medication management  -     Hemoglobin A1c; Future  -     Comprehensive metabolic panel; Future  -     Lipid panel; Future  -     CBC auto differential; Future  -     TSH; Future  -     Vitamin D; Future  -     Magnesium; Future    Need for 23-polyvalent pneumococcal polysaccharide vaccine  -     (In Office Administered) Pneumococcal Polysaccharide Vaccine (23 Valent) (SQ/IM)    Unspecified cholesteatoma, right ear  Comments:  needs to follow up with ENT Dr Lema  Orders:  -     Ambulatory referral to ENT            ICD-10-CM ICD-9-CM    1. Routine general medical examination at a health care facility Z00.00 V70.0 Hemoglobin A1c      Comprehensive metabolic panel      Lipid panel      CBC auto differential      TSH      Vitamin D      Magnesium      (In Office Administered) Pneumococcal Polysaccharide Vaccine (23 Valent) (SQ/IM)      Ambulatory referral to Optometry    HEALTH MAINTENANCE REVIEWED: LABS ORDERED & SHINGRIX ADVISED, CSCOPE/MAMMO UTD & DEXA DUE 8/2020.  ADVISED ON DIET/EXERCISE/SLEEP, EYE/DENTAL EXAMS   2. BMI 22.0-22.9, adult Z68.22 V85.1    3. Benign essential hypertension I10 401.1 Ambulatory referral to Optometry    blood pressures are stable on amlodipine 5 mg daily and treating anxiety with buspar has helped   4. Hypercholesteremia E78.00 272.0 Hemoglobin A1c      Comprehensive metabolic panel      Lipid panel      TSH    check labs and will advise based on results whether or not lipitor is indicated-- never started taking   5. Right renal mass N28.89 593.9 US Kidney    per renal ultrasound  09/13/2019: variant peripheral renal sinus fat versus a mass (possible angiomyolipoma).  Ultrasound follow-up in 6 months is recommended   6. Vitamin D deficiency E55.9 268.9 Vitamin D    has been taking 2,000 IU daily and will recheck level with labs. WOULD ALSO ADVISE RESUMING CITRICAL PLUS D 2 TABS DAILY FOR OSTEOPOROSIS   7. Postmenopausal osteoporosis M81.0 733.01 Comprehensive metabolic panel      TSH      Vitamin D    due for repeat DEXA 8/2020   8. Vegetarian diet Z78.9 V49.89    9. Medication management Z79.899 V58.69 Hemoglobin A1c      Comprehensive metabolic panel      Lipid panel      CBC auto differential      TSH      Vitamin D      Magnesium   10. Need for 23-polyvalent pneumococcal polysaccharide vaccine Z23 V03.82 (In Office Administered) Pneumococcal Polysaccharide Vaccine (23 Valent) (SQ/IM)   11. Unspecified cholesteatoma, right ear H71.91 385.30 Ambulatory referral to ENT    needs to follow up with ENT Dr Lema       Patient Instructions   Has been taking 2,000 IU daily and will recheck level with labs.     WOULD ALSO ADVISE RESUMING CITRICAL PLUS D 2TABS DAILY FOR OSTEOPOROSIS    ADVISE LOOKING INTO NEW 2-PART, NON-LIVE SHINGRIX SHINGLES VACCINE THROUGH YOUR LOCAL PHARMACY.           Follow up in about 6 months (around 6/6/2020) for to follow up on lab results, return as needed for new concerns.

## 2019-12-06 NOTE — PATIENT INSTRUCTIONS
Has been taking 2,000 IU daily and will recheck level with labs.     WOULD ALSO ADVISE RESUMING CITRICAL PLUS D 2TABS DAILY FOR OSTEOPOROSIS    ADVISE LOOKING INTO NEW 2-PART, NON-LIVE SHINGRIX SHINGLES VACCINE THROUGH YOUR LOCAL PHARMACY.

## 2019-12-13 ENCOUNTER — LAB VISIT (OUTPATIENT)
Dept: LAB | Facility: HOSPITAL | Age: 69
End: 2019-12-13
Attending: FAMILY MEDICINE
Payer: MEDICARE

## 2019-12-13 DIAGNOSIS — Z79.899 MEDICATION MANAGEMENT: ICD-10-CM

## 2019-12-13 DIAGNOSIS — E55.9 VITAMIN D DEFICIENCY: ICD-10-CM

## 2019-12-13 DIAGNOSIS — E78.00 HYPERCHOLESTEREMIA: ICD-10-CM

## 2019-12-13 DIAGNOSIS — M81.0 POSTMENOPAUSAL OSTEOPOROSIS: ICD-10-CM

## 2019-12-13 DIAGNOSIS — Z00.00 ROUTINE GENERAL MEDICAL EXAMINATION AT A HEALTH CARE FACILITY: ICD-10-CM

## 2019-12-13 LAB
25(OH)D3+25(OH)D2 SERPL-MCNC: 41 NG/ML (ref 30–96)
ALBUMIN SERPL BCP-MCNC: 3.6 G/DL (ref 3.5–5.2)
ALP SERPL-CCNC: 78 U/L (ref 55–135)
ALT SERPL W/O P-5'-P-CCNC: 16 U/L (ref 10–44)
ANION GAP SERPL CALC-SCNC: 8 MMOL/L (ref 8–16)
AST SERPL-CCNC: 20 U/L (ref 10–40)
BASOPHILS # BLD AUTO: 0.05 K/UL (ref 0–0.2)
BASOPHILS NFR BLD: 1 % (ref 0–1.9)
BILIRUB SERPL-MCNC: 0.5 MG/DL (ref 0.1–1)
BUN SERPL-MCNC: 7 MG/DL (ref 8–23)
CALCIUM SERPL-MCNC: 9.1 MG/DL (ref 8.7–10.5)
CHLORIDE SERPL-SCNC: 104 MMOL/L (ref 95–110)
CHOLEST SERPL-MCNC: 182 MG/DL (ref 120–199)
CHOLEST/HDLC SERPL: 3.7 {RATIO} (ref 2–5)
CO2 SERPL-SCNC: 27 MMOL/L (ref 23–29)
CREAT SERPL-MCNC: 0.6 MG/DL (ref 0.5–1.4)
DIFFERENTIAL METHOD: ABNORMAL
EOSINOPHIL # BLD AUTO: 0.4 K/UL (ref 0–0.5)
EOSINOPHIL NFR BLD: 7.9 % (ref 0–8)
ERYTHROCYTE [DISTWIDTH] IN BLOOD BY AUTOMATED COUNT: 13 % (ref 11.5–14.5)
EST. GFR  (AFRICAN AMERICAN): >60 ML/MIN/1.73 M^2
EST. GFR  (NON AFRICAN AMERICAN): >60 ML/MIN/1.73 M^2
ESTIMATED AVG GLUCOSE: 120 MG/DL (ref 68–131)
GLUCOSE SERPL-MCNC: 85 MG/DL (ref 70–110)
HBA1C MFR BLD HPLC: 5.8 % (ref 4–5.6)
HCT VFR BLD AUTO: 38.6 % (ref 37–48.5)
HDLC SERPL-MCNC: 49 MG/DL (ref 40–75)
HDLC SERPL: 26.9 % (ref 20–50)
HGB BLD-MCNC: 12.3 G/DL (ref 12–16)
LDLC SERPL CALC-MCNC: 115 MG/DL (ref 63–159)
LYMPHOCYTES # BLD AUTO: 1.9 K/UL (ref 1–4.8)
LYMPHOCYTES NFR BLD: 37.6 % (ref 18–48)
MAGNESIUM SERPL-MCNC: 1.9 MG/DL (ref 1.6–2.6)
MCH RBC QN AUTO: 27.7 PG (ref 27–31)
MCHC RBC AUTO-ENTMCNC: 31.9 G/DL (ref 32–36)
MCV RBC AUTO: 87 FL (ref 82–98)
MONOCYTES # BLD AUTO: 0.4 K/UL (ref 0.3–1)
MONOCYTES NFR BLD: 8.7 % (ref 4–15)
NEUTROPHILS # BLD AUTO: 2.3 K/UL (ref 1.8–7.7)
NEUTROPHILS NFR BLD: 44.8 % (ref 38–73)
NONHDLC SERPL-MCNC: 133 MG/DL
PLATELET # BLD AUTO: 228 K/UL (ref 150–350)
PMV BLD AUTO: 11.2 FL (ref 9.2–12.9)
POTASSIUM SERPL-SCNC: 3.8 MMOL/L (ref 3.5–5.1)
PROT SERPL-MCNC: 7 G/DL (ref 6–8.4)
RBC # BLD AUTO: 4.44 M/UL (ref 4–5.4)
SODIUM SERPL-SCNC: 139 MMOL/L (ref 136–145)
TRIGL SERPL-MCNC: 90 MG/DL (ref 30–150)
TSH SERPL DL<=0.005 MIU/L-ACNC: 2.92 UIU/ML (ref 0.4–4)
WBC # BLD AUTO: 5.05 K/UL (ref 3.9–12.7)

## 2019-12-13 PROCEDURE — 36415 COLL VENOUS BLD VENIPUNCTURE: CPT | Mod: HCNC

## 2019-12-13 PROCEDURE — 82306 VITAMIN D 25 HYDROXY: CPT | Mod: HCNC

## 2019-12-13 PROCEDURE — 80061 LIPID PANEL: CPT | Mod: HCNC

## 2019-12-13 PROCEDURE — 83036 HEMOGLOBIN GLYCOSYLATED A1C: CPT | Mod: HCNC

## 2019-12-13 PROCEDURE — 83735 ASSAY OF MAGNESIUM: CPT | Mod: HCNC

## 2019-12-13 PROCEDURE — 84443 ASSAY THYROID STIM HORMONE: CPT | Mod: HCNC

## 2019-12-13 PROCEDURE — 85025 COMPLETE CBC W/AUTO DIFF WBC: CPT | Mod: HCNC

## 2019-12-13 PROCEDURE — 80053 COMPREHEN METABOLIC PANEL: CPT | Mod: HCNC

## 2019-12-15 ENCOUNTER — TELEPHONE (OUTPATIENT)
Dept: FAMILY MEDICINE | Facility: CLINIC | Age: 69
End: 2019-12-15

## 2019-12-15 DIAGNOSIS — E55.9 VITAMIN D DEFICIENCY: ICD-10-CM

## 2019-12-15 DIAGNOSIS — I10 BENIGN ESSENTIAL HYPERTENSION: Primary | ICD-10-CM

## 2019-12-15 DIAGNOSIS — R79.89 ABNORMAL TSH: ICD-10-CM

## 2019-12-15 PROBLEM — R09.89 LABILE HYPERTENSION: Status: RESOLVED | Noted: 2019-09-17 | Resolved: 2019-12-15

## 2019-12-16 ENCOUNTER — TELEPHONE (OUTPATIENT)
Dept: FAMILY MEDICINE | Facility: CLINIC | Age: 69
End: 2019-12-16

## 2019-12-16 NOTE — TELEPHONE ENCOUNTER
----- Message from Sharron Ogden MD sent at 12/15/2019  7:22 AM CST -----  Keo- your labs all look good and there are no concerns. I would advise continuing current medications and vitamins and Ana Cristina will contact you to schedule your next set of labs for in 6 months, prior to a 6 month follow up, dr Ogden

## 2019-12-19 RX ORDER — BUSPIRONE HYDROCHLORIDE 5 MG/1
TABLET ORAL
Qty: 60 TABLET | Refills: 3 | Status: SHIPPED | OUTPATIENT
Start: 2019-12-19 | End: 2019-12-26 | Stop reason: SDUPTHER

## 2019-12-23 ENCOUNTER — PATIENT OUTREACH (OUTPATIENT)
Dept: OTHER | Facility: OTHER | Age: 69
End: 2019-12-23

## 2019-12-26 ENCOUNTER — OFFICE VISIT (OUTPATIENT)
Dept: PSYCHIATRY | Facility: CLINIC | Age: 69
End: 2019-12-26
Payer: MEDICARE

## 2019-12-26 VITALS
BODY MASS INDEX: 22.78 KG/M2 | DIASTOLIC BLOOD PRESSURE: 66 MMHG | SYSTOLIC BLOOD PRESSURE: 121 MMHG | HEART RATE: 80 BPM | WEIGHT: 124.56 LBS

## 2019-12-26 DIAGNOSIS — F41.9 ANXIETY DISORDER, UNSPECIFIED TYPE: Primary | ICD-10-CM

## 2019-12-26 PROCEDURE — 99213 OFFICE O/P EST LOW 20 MIN: CPT | Mod: HCNC,S$GLB,, | Performed by: PSYCHIATRY & NEUROLOGY

## 2019-12-26 PROCEDURE — 3074F PR MOST RECENT SYSTOLIC BLOOD PRESSURE < 130 MM HG: ICD-10-PCS | Mod: HCNC,CPTII,S$GLB, | Performed by: PSYCHIATRY & NEUROLOGY

## 2019-12-26 PROCEDURE — 3078F DIAST BP <80 MM HG: CPT | Mod: HCNC,CPTII,S$GLB, | Performed by: PSYCHIATRY & NEUROLOGY

## 2019-12-26 PROCEDURE — 99999 PR PBB SHADOW E&M-EST. PATIENT-LVL II: CPT | Mod: PBBFAC,HCNC,, | Performed by: PSYCHIATRY & NEUROLOGY

## 2019-12-26 PROCEDURE — 1159F MED LIST DOCD IN RCRD: CPT | Mod: HCNC,S$GLB,, | Performed by: PSYCHIATRY & NEUROLOGY

## 2019-12-26 PROCEDURE — 3074F SYST BP LT 130 MM HG: CPT | Mod: HCNC,CPTII,S$GLB, | Performed by: PSYCHIATRY & NEUROLOGY

## 2019-12-26 PROCEDURE — 99213 PR OFFICE/OUTPT VISIT, EST, LEVL III, 20-29 MIN: ICD-10-PCS | Mod: HCNC,S$GLB,, | Performed by: PSYCHIATRY & NEUROLOGY

## 2019-12-26 PROCEDURE — 1101F PR PT FALLS ASSESS DOC 0-1 FALLS W/OUT INJ PAST YR: ICD-10-PCS | Mod: HCNC,CPTII,S$GLB, | Performed by: PSYCHIATRY & NEUROLOGY

## 2019-12-26 PROCEDURE — 3078F PR MOST RECENT DIASTOLIC BLOOD PRESSURE < 80 MM HG: ICD-10-PCS | Mod: HCNC,CPTII,S$GLB, | Performed by: PSYCHIATRY & NEUROLOGY

## 2019-12-26 PROCEDURE — 1101F PT FALLS ASSESS-DOCD LE1/YR: CPT | Mod: HCNC,CPTII,S$GLB, | Performed by: PSYCHIATRY & NEUROLOGY

## 2019-12-26 PROCEDURE — 1159F PR MEDICATION LIST DOCUMENTED IN MEDICAL RECORD: ICD-10-PCS | Mod: HCNC,S$GLB,, | Performed by: PSYCHIATRY & NEUROLOGY

## 2019-12-26 PROCEDURE — 99999 PR PBB SHADOW E&M-EST. PATIENT-LVL II: ICD-10-PCS | Mod: PBBFAC,HCNC,, | Performed by: PSYCHIATRY & NEUROLOGY

## 2019-12-26 RX ORDER — BUSPIRONE HYDROCHLORIDE 5 MG/1
5 TABLET ORAL 2 TIMES DAILY
Qty: 180 TABLET | Refills: 1 | Status: SHIPPED | OUTPATIENT
Start: 2019-12-26 | End: 2020-06-23 | Stop reason: SDUPTHER

## 2019-12-26 NOTE — PROGRESS NOTES
"Outpatient Psychiatry Follow-Up Visit (MD/NP)    12/26/2019    Clinical Status of Patient:  Outpatient (Ambulatory)    Chief Complaint:  Ms Frias is a 69 y.o. female who presents today for follow-up of her anxiety.     Met with patient at the office.      Interval History and Content of Current Session:  General impression:As on her last visit, Ms Frias continues to do quite well. She is upbeat, cheerful, and thinking very positively. She is in good self control and has no thoughts of harming herself or others, and has no signs or symptoms of saundra, depression, or psychosis. She is pleased with he current condition and looking forward to the new year. She may have a minor surgery on her right ear early next year, but stated more than once during the interview that " I am much better."      Target symptoms:anxiety    Musculoskeletal: Ms Frias has a normal gait and no ataxia. She has no tremors or dyskinesia.        Compliance:  Pt reports she is taking medications as directed    Side effects:  None at this time.    Risk Parameters:  Patient reports no suicidal ideation  Patient reports no homicidal ideation  Patient reports no self-injurious behavior  Patient reports no violent behavior    Patient's Response to Intervention:  The patient's response to intervention is accepting.    Progress Toward Goals and Other Mental Status Changes:  The patient's progress toward goals is good.    Vitals: Most recent vital signs, dated today werereviewed.     Mental Status Evaluation     Appearance:  Neatly dressed and groomed   Behavior:  cooperative   Speech:  normal   Mood:  upbeat   Affect:  Consistent with mood   Thought Process:  logical   Thought Content:  organized   Sensorium:  Grossly intact   Attention Span & Concentration Good focus   Cognition:  Grossly intact   Insight:  Understands condition   Judgment:  Appropriate to her condition         Diagnosis:Anxiety Disorder, Unspecified        Plan:(Medication and Therapy " Recommendation)  · Continue Buspar 5 mg bid. I reviewed the side effects of her medicine and advised she could call if she had problems with her medicine or clinical condition.    Additional Notes: Supportive psychotherapy provided during this session.    Return to Clinic: 6 months

## 2020-01-06 ENCOUNTER — OFFICE VISIT (OUTPATIENT)
Dept: OTOLARYNGOLOGY | Facility: CLINIC | Age: 70
End: 2020-01-06
Payer: MEDICARE

## 2020-01-06 ENCOUNTER — CLINICAL SUPPORT (OUTPATIENT)
Dept: AUDIOLOGY | Facility: CLINIC | Age: 70
End: 2020-01-06
Payer: MEDICARE

## 2020-01-06 VITALS — BODY MASS INDEX: 22.71 KG/M2 | HEIGHT: 62 IN | WEIGHT: 123.44 LBS

## 2020-01-06 DIAGNOSIS — H66.91 CHRONIC OTITIS MEDIA OF RIGHT EAR: ICD-10-CM

## 2020-01-06 DIAGNOSIS — H90.A11 CONDUCTIVE HEARING LOSS OF RIGHT EAR WITH RESTRICTED HEARING OF LEFT EAR: ICD-10-CM

## 2020-01-06 DIAGNOSIS — H90.A31 MIXED CONDUCTIVE AND SENSORINEURAL HEARING LOSS OF RIGHT EAR WITH RESTRICTED HEARING OF LEFT EAR: Primary | ICD-10-CM

## 2020-01-06 DIAGNOSIS — H71.91 CHOLESTEATOMA OF RIGHT EAR: Primary | ICD-10-CM

## 2020-01-06 PROCEDURE — 99999 PR PBB SHADOW E&M-EST. PATIENT-LVL II: CPT | Mod: PBBFAC,HCNC,, | Performed by: OTOLARYNGOLOGY

## 2020-01-06 PROCEDURE — 1101F PT FALLS ASSESS-DOCD LE1/YR: CPT | Mod: HCNC,CPTII,S$GLB, | Performed by: OTOLARYNGOLOGY

## 2020-01-06 PROCEDURE — 1101F PR PT FALLS ASSESS DOC 0-1 FALLS W/OUT INJ PAST YR: ICD-10-PCS | Mod: HCNC,CPTII,S$GLB, | Performed by: OTOLARYNGOLOGY

## 2020-01-06 PROCEDURE — 1159F PR MEDICATION LIST DOCUMENTED IN MEDICAL RECORD: ICD-10-PCS | Mod: HCNC,S$GLB,, | Performed by: OTOLARYNGOLOGY

## 2020-01-06 PROCEDURE — 99213 OFFICE O/P EST LOW 20 MIN: CPT | Mod: HCNC,S$GLB,, | Performed by: OTOLARYNGOLOGY

## 2020-01-06 PROCEDURE — 99999 PR PBB SHADOW E&M-EST. PATIENT-LVL II: ICD-10-PCS | Mod: PBBFAC,HCNC,, | Performed by: OTOLARYNGOLOGY

## 2020-01-06 PROCEDURE — 92557 PR COMPREHENSIVE HEARING TEST: ICD-10-PCS | Mod: HCNC,S$GLB,, | Performed by: AUDIOLOGIST

## 2020-01-06 PROCEDURE — 1126F PR PAIN SEVERITY QUANTIFIED, NO PAIN PRESENT: ICD-10-PCS | Mod: HCNC,S$GLB,, | Performed by: OTOLARYNGOLOGY

## 2020-01-06 PROCEDURE — 1159F MED LIST DOCD IN RCRD: CPT | Mod: HCNC,S$GLB,, | Performed by: OTOLARYNGOLOGY

## 2020-01-06 PROCEDURE — 92557 COMPREHENSIVE HEARING TEST: CPT | Mod: HCNC,S$GLB,, | Performed by: AUDIOLOGIST

## 2020-01-06 PROCEDURE — 1126F AMNT PAIN NOTED NONE PRSNT: CPT | Mod: HCNC,S$GLB,, | Performed by: OTOLARYNGOLOGY

## 2020-01-06 PROCEDURE — 99213 PR OFFICE/OUTPT VISIT, EST, LEVL III, 20-29 MIN: ICD-10-PCS | Mod: HCNC,S$GLB,, | Performed by: OTOLARYNGOLOGY

## 2020-01-06 NOTE — PROGRESS NOTES
Subjective:       Patient ID: Keo Frias is a 69 y.o. female.    Chief Complaint: Follow-up (Trouble hearing out of the right ear)    Follow-up   Pertinent negatives include no abdominal pain, arthralgias, chest pain, chills, congestion, coughing, fatigue, fever, headaches, joint swelling, myalgias, nausea, neck pain, numbness, rash, sore throat, vomiting or weakness.   Hearing Loss:    Associated symptoms: ear pain.  No dizziness, no fever, no headaches, no tinnitus and no rhinorrhea.No dizziness.  : Hx of B ear itching, HL AD>AS.    Using hero/alc qtts AD with pain.    ? Hx of R TM perf.    Past Medical History: Patient has a past medical history of Anemia, Anxiety disorder (8/28/2019), Cataract, Hypercholesteremia (9/17/2019), Sleep apnea, Subclinical hypothyroidism, and White coat syndrome with hypertension.    Past Surgical History: Patient has a past surgical history that includes Tubal ligation; Dilation and curettage of uterus; and Colonoscopy (N/A, 12/7/2018).    Social History: Patient reports that she has never smoked. She has never used smokeless tobacco. She reports that she does not drink alcohol or use drugs.    Family History: family history includes Cataracts in her father; Diabetes in her brother; Glaucoma in her brother and father; Hypertension in her brother; Lung cancer in her mother; Retinal detachment in her father; Stroke in her maternal grandmother and paternal grandmother; Uterine cancer in her maternal grandmother.    Medications:   Current Outpatient Medications   Medication Sig    amLODIPine (NORVASC) 5 MG tablet Take 1 tablet (5 mg total) by mouth once daily.    busPIRone (BUSPAR) 5 MG Tab Take 1 tablet (5 mg total) by mouth 2 (two) times daily.    ERGOCALCIFEROL, VITAMIN D2, (VITAMIN D ORAL) Take by mouth.    multivitamin-iron-folic acid Tab Take 1 tablet by mouth once daily.     No current facility-administered medications for this visit.        Allergies: Patient is allergic to  sulfa (sulfonamide antibiotics).    Review of Systems   Constitutional: Negative for appetite change, chills, fatigue and fever.   HENT: Positive for ear pain and hearing loss. Negative for congestion, ear discharge, facial swelling, postnasal drip, rhinorrhea, sore throat, tinnitus and trouble swallowing.    Eyes: Negative for photophobia, pain, discharge, redness, itching and visual disturbance.   Respiratory: Negative for apnea, cough, choking, chest tightness, shortness of breath, wheezing and stridor.    Cardiovascular: Negative for chest pain and palpitations.   Gastrointestinal: Negative for abdominal pain, constipation, diarrhea, nausea and vomiting.   Musculoskeletal: Negative for arthralgias, gait problem, joint swelling, myalgias, neck pain and neck stiffness.   Skin: Negative for color change, pallor, rash and wound.   Neurological: Negative for dizziness, tremors, seizures, syncope, facial asymmetry, speech difficulty, weakness, light-headedness, numbness and headaches.   Hematological: Negative for adenopathy. Does not bruise/bleed easily.   Psychiatric/Behavioral: Negative for agitation, behavioral problems, confusion, decreased concentration, dysphoric mood, hallucinations, sleep disturbance and suicidal ideas. The patient is not nervous/anxious and is not hyperactive.        Objective:      Physical Exam   Constitutional: She appears well-developed and well-nourished. No distress.   HENT:   Right Ear: External ear normal. No lacerations. No drainage, swelling or tenderness. No foreign bodies. No mastoid tenderness. Tympanic membrane is scarred and perforated. Tympanic membrane is not injected, not erythematous, not retracted and not bulging. Tympanic membrane mobility is abnormal. No middle ear effusion. No hemotympanum. Decreased hearing is noted.   Left Ear: External ear normal. No lacerations. No drainage, swelling or tenderness. No foreign bodies. No mastoid tenderness. Tympanic membrane is not  injected, not scarred, not perforated, not erythematous, not retracted and not bulging. Tympanic membrane mobility is normal.  No middle ear effusion. No hemotympanum. Decreased hearing is noted.   Ears:        B CI.    Procedure Note:    Patient was brought to the minor procedure room and using the operating microscope the right ear canal  was cleaned of ceruminous debris. There was a significant cerumen impaction.  The forceps and suction were both used to perform this. Tympanic membrane intact. Pt tolerated well. There were no complications.    Procedure Note:    The patient was brought to the minor procedure room and placed under the operating microscope. Using a combination of suction, curettes and cup forceps the patient's cerumen impaction was removed. The tympanic membrane was evaluated and was unremarkable. The patient tolerated the procedure well. There were no complications.    R TM with perf with debris adhered to it, ? Sec aq chol.    B NAHUM.           Eyes: Right eye exhibits normal extraocular motion and no nystagmus. Left eye exhibits normal extraocular motion and no nystagmus.   Neurological: She has normal strength. No cranial nerve deficit or sensory deficit. She displays a negative Romberg sign. Coordination and gait normal.   Skin: She is not diaphoretic.                   Assessment:       1. Cholesteatoma of right ear    2. Chronic otitis media of right ear    3. Conductive hearing loss of right ear with restricted hearing of left ear     3. B NAHUM  Plan:         cortaid cream AU.    Discussed Right Tympanomastoidectomy with patient. Intact canal wall vs. Canal wall down depending on the findings at surgery. Discussed possibility of long term tube placement, need for cavity, meatoplasty, cavity care, long term follow up, need for secondary procedures. Risks, complications, alternatives and goals reviewed including possible infection, bleeding, hearing loss, chronic drainage, facial nerve  problems, dural injury and other potential problems.         Will schedule when cleaned.    Keo was seen today for follow-up.    Diagnoses and all orders for this visit:    Cholesteatoma of right ear    Chronic otitis media of right ear    Conductive hearing loss of right ear with restricted hearing of left ear      I have explained the risks , benefits and alternatives of the procedure in detail. This includes infection, bleeding, further loss of hearing,tinnitus, failure to improve, chorda symptoms, dizziness and facial nerve problems. All questions have been answered.  The patient desires to proceed.

## 2020-01-06 NOTE — PROGRESS NOTES
Keo Frias was seen today for a post op hearing evaluation.     Pure tone audiometry revealed a moderately severe mixed hearing loss for the right ear; mild high frequency for the left ear  SRT and PTA are in good agreement bilaterally.  Good speech discrimination for the right ear: Excellent for the left ear   Tympanometry: DNT    Recommendations:  1. Otologic Evaluation  2. Repeat audiogram as needed  3. Hearing Protection  4. Hearing Aid Consult pending medical management of the right ear

## 2020-01-08 NOTE — PROGRESS NOTES
Digital Medicine: Health  Follow-Up    Patient is feeling good at this time with no questions concerning her BP readings, medication or lifestyle routine. Patient is pleased with her BP readings and states she has not made any changes to her diet habits or exercise routine. Encouraged lifestyle habits and agreed to f/u in 6-8 weeks. Patient confirms that she has my phone number if she needs anything.    The history is provided by the patient.     Follow Up  Follow-up reason(s): reading review        INTERVENTION(S)  encouragement/support, denied further coaching, denied resources, denied support and denied questions    PLAN  patient verbalizes understanding and continue monitoring    There are no preventive care reminders to display for this patient.    Last 5 Patient Entered Readings                                      Current 30 Day Average: 113/67     Recent Readings 1/7/2020 1/6/2020 1/6/2020 1/6/2020 1/6/2020    SBP (mmHg) 115 125 126 143 140    DBP (mmHg) 68 66 62 51 74    Pulse 79 72 75 76 79            Diet Screening   She has the following dietary restrictions: low sodium diet

## 2020-02-27 ENCOUNTER — PES CALL (OUTPATIENT)
Dept: ADMINISTRATIVE | Facility: CLINIC | Age: 70
End: 2020-02-27

## 2020-03-04 ENCOUNTER — PATIENT OUTREACH (OUTPATIENT)
Dept: OTHER | Facility: OTHER | Age: 70
End: 2020-03-04

## 2020-03-24 ENCOUNTER — PATIENT OUTREACH (OUTPATIENT)
Dept: OTHER | Facility: OTHER | Age: 70
End: 2020-03-24

## 2020-03-24 NOTE — PROGRESS NOTES
"Digital Medicine: Clinician Follow-Up    Patient returned missed phone call for hypertension follow-up. Keo Frias submitted a reading of 87/52 at 3/23/2020 10:46 PM. Mrs. Crowley denies any s/sx of hypotension with lower reading - no lightheadedness, nausea or fatigue. "I think I had a little less salt on yesterday. I ate a few baked potato chips to bring it back up." She confirms taking amlodipine 5 mg daily for blood pressure management. No additional questions or concerns at this time.     The history is provided by the patient. No  was used.     Follow Up  Follow-up reason(s): reading review and routine education      Alert received.   Care Team received low BP alert.  Patient is not experiencing symptoms.      INTERVENTION(S)  encouragement/support    PLAN  continue monitoring    Current 30-day BP avg of 105/63 is well-controlled and meets goal of <130/80.    Continue current regimen.    Follow-up in 12 weeks or sooner if needed.       There are no preventive care reminders to display for this patient.    Last 5 Patient Entered Readings                                      Current 30 Day Average: 105/63     Recent Readings 3/24/2020 3/24/2020 3/24/2020 3/23/2020 3/23/2020    SBP (mmHg) 101 87 99 86 87    DBP (mmHg) 55 47 56 51 52    Pulse 73 71 72 74 75             Hypertension Medications             amLODIPine (NORVASC) 5 MG tablet Take 1 tablet (5 mg total) by mouth once daily.                 Screenings  "

## 2020-03-31 NOTE — PROGRESS NOTES
Digital Medicine: Health  Follow-Up    Quick check-in with Ms. Crowley. BP at goal 109/62. Patient is doing good at this time and does not have any questions concerning BP readings, medication or lifestyle routine.    Ms. Crowley confirms she's staying home and feeling good during this COVID situation.    The history is provided by the patient.     Follow Up  Follow-up reason(s): reading review      Readings are trending down   Routine Education Topics: eating patterns      INTERVENTION(S)  recommended diet modifications, encouragement/support, denied further coaching and denied questions    PLAN  patient verbalizes understanding and continue monitoring      There are no preventive care reminders to display for this patient.    Last 5 Patient Entered Readings                                      Current 30 Day Average: 109/62     Recent Readings 3/31/2020 3/29/2020 3/28/2020 3/27/2020 3/25/2020    SBP (mmHg) 113 109 118 113 113    DBP (mmHg) 68 64 71 63 67    Pulse 62 78 71 69 69            Diet Screening   No change to diet.  She has the following dietary restrictions: low sodium diet    Reminded patient to stay hydrated to avoid low BP readings and she confirms understanding.    Intervention(s): increasing water intake    Physical Activity Screening   No change to exercise routine.

## 2020-05-31 PROCEDURE — 99454 PR REMOTE MNTR, PHYS PARAM, INITIAL, EA 30 DAYS: ICD-10-PCS | Mod: S$GLB,,, | Performed by: FAMILY MEDICINE

## 2020-05-31 PROCEDURE — 99454 REM MNTR PHYSIOL PARAM 16-30: CPT | Mod: S$GLB,,, | Performed by: FAMILY MEDICINE

## 2020-06-01 ENCOUNTER — PATIENT OUTREACH (OUTPATIENT)
Dept: OTHER | Facility: OTHER | Age: 70
End: 2020-06-01

## 2020-06-03 ENCOUNTER — PATIENT OUTREACH (OUTPATIENT)
Dept: ADMINISTRATIVE | Facility: HOSPITAL | Age: 70
End: 2020-06-03

## 2020-06-05 ENCOUNTER — TELEPHONE (OUTPATIENT)
Dept: FAMILY MEDICINE | Facility: CLINIC | Age: 70
End: 2020-06-05

## 2020-06-05 NOTE — TELEPHONE ENCOUNTER
----- Message from Malina Kimbrough sent at 6/5/2020  9:18 AM CDT -----  Contact: Self [phone]  Patient is calling to talk to nurse in regards to her appointment in June.

## 2020-06-12 ENCOUNTER — OFFICE VISIT (OUTPATIENT)
Dept: PSYCHIATRY | Facility: CLINIC | Age: 70
End: 2020-06-12
Payer: MEDICARE

## 2020-06-12 DIAGNOSIS — F41.9 ANXIETY DISORDER, UNSPECIFIED TYPE: Primary | ICD-10-CM

## 2020-06-12 PROCEDURE — 1159F PR MEDICATION LIST DOCUMENTED IN MEDICAL RECORD: ICD-10-PCS | Mod: HCNC,95,, | Performed by: PSYCHIATRY & NEUROLOGY

## 2020-06-12 PROCEDURE — 1101F PR PT FALLS ASSESS DOC 0-1 FALLS W/OUT INJ PAST YR: ICD-10-PCS | Mod: HCNC,CPTII,95, | Performed by: PSYCHIATRY & NEUROLOGY

## 2020-06-12 PROCEDURE — 99212 PR OFFICE/OUTPT VISIT, EST, LEVL II, 10-19 MIN: ICD-10-PCS | Mod: HCNC,95,, | Performed by: PSYCHIATRY & NEUROLOGY

## 2020-06-12 PROCEDURE — 99212 OFFICE O/P EST SF 10 MIN: CPT | Mod: HCNC,95,, | Performed by: PSYCHIATRY & NEUROLOGY

## 2020-06-12 PROCEDURE — 1101F PT FALLS ASSESS-DOCD LE1/YR: CPT | Mod: HCNC,CPTII,95, | Performed by: PSYCHIATRY & NEUROLOGY

## 2020-06-12 PROCEDURE — 1159F MED LIST DOCD IN RCRD: CPT | Mod: HCNC,95,, | Performed by: PSYCHIATRY & NEUROLOGY

## 2020-06-12 NOTE — PROGRESS NOTES
"Outpatient Psychiatry Follow-Up Visit (MD/NP)    06/12/2020 The patient location is: home  The chief complaint leading to consultation is: anxiety    Visit type: audiovisual    Face to Face time with patient: 10"  20 minutes of total time spent on the encounter, which includes face to face time and non-face to face time preparing to see the patient (eg, review of tests), Obtaining and/or reviewing separately obtained history, Documenting clinical information in the electronic or other health record, Independently interpreting results (not separately reported) and communicating results to the patient/family/caregiver, or Care coordination (not separately reported).         Each patient to whom he or she provides medical services by telemedicine is:  (1) informed of the relationship between the physician and patient and the respective role of any other health care provider with respect to management of the patient; and (2) notified that he or she may decline to receive medical services by telemedicine and may withdraw from such care at any time.    Notes: See below    Clinical Status of Patient:  Outpatient (Ambulatory)    Chief Complaint: Patient seen for anxiety.     Interval History and Content of Current Session: Patient is doing quite well at this time. She is upbeat and pleased with her current life, and doing well dealing with the virus pandemic. Patient is in good self control, and has no thoughts of harming herself or others at this time. She repeated "I am doing great". She has no symptoms of depression, saundra, or psychosis and is sleeping and eating well.She walks twice daily for exercise. She asked to taper off the Buspar and was given a schedule to do so. She indicated she would make another appointment in the future if her anxiety returned.    Compliance:  good    Side effects: none  Musculoskeletal: patient had no abnormal motor movements of any kind.     Risk Parameters:not danger to self or others at " this time.    Patient's Response to Intervention:accepting    Progress Toward Goals and Other Mental Status Changes:quite good   Vital signs this date were not reviewed.     Mental Status Evaluation     Appearance:  Neatly dressed and groomed   Behavior:  cooperative                             Speech normal   Mood:  euthymic   Affect:  euthymic   Thought Process:  linear, logical   Thought Content:  organizednormal   Sensorium:  alert and oriented to person, place, time and situation   Attention Span & Concentration able to focus   Cognition:  Grossly intactgrossly intact   Insight:  has awareness of illness   Judgment:  behavior is adequate to circumstances       Diagnosis:    Anxiety disorder, unspecified type [F41.9]      I reviewed the side effects of the patient's medicines and advised a call if the patient had problems with the medicine or clinical condition.    Plan:(Medication and Therapy Recommendation)  Additional Notes: Patient agrees to taper off the Buspar as scheduled, and to make another appointment in the future if the anxiety returns.  Return to Clinic:As needed

## 2020-06-23 RX ORDER — BUSPIRONE HYDROCHLORIDE 5 MG/1
5 TABLET ORAL 2 TIMES DAILY
Qty: 180 TABLET | Refills: 1 | Status: SHIPPED | OUTPATIENT
Start: 2020-06-23 | End: 2021-03-10

## 2020-06-23 NOTE — PROGRESS NOTES
06/23/2020: Patient called to say that her migraine headaches returned when she tapered off the Buspar. I advised to return to Buspar 5 mg once or twice daily again,and reviewed the side effects of the medicine. Patient is in good self control and has no thoughts of harming herself or others at this time.

## 2020-07-07 ENCOUNTER — PATIENT OUTREACH (OUTPATIENT)
Dept: ADMINISTRATIVE | Facility: HOSPITAL | Age: 70
End: 2020-07-07

## 2020-07-20 ENCOUNTER — TELEPHONE (OUTPATIENT)
Dept: FAMILY MEDICINE | Facility: CLINIC | Age: 70
End: 2020-07-20

## 2020-07-20 NOTE — TELEPHONE ENCOUNTER
----- Message from Keshia Grey sent at 7/20/2020  9:18 AM CDT -----  Patient requests to speak with you about rescheduling tomorrow's virtual appt otherwise states she will skip it. Please advise, no appt available until 1/13/2021.    Phone: [phone]

## 2020-07-20 NOTE — TELEPHONE ENCOUNTER
Spoke to pt and stated that she will keep her appt with Dr. Ogden on tomorrow. Did not want to wait until January to be seen.

## 2020-07-21 ENCOUNTER — OFFICE VISIT (OUTPATIENT)
Dept: FAMILY MEDICINE | Facility: CLINIC | Age: 70
End: 2020-07-21
Payer: MEDICARE

## 2020-07-21 DIAGNOSIS — F41.9 ANXIETY DISORDER, UNSPECIFIED TYPE: ICD-10-CM

## 2020-07-21 DIAGNOSIS — M81.0 POSTMENOPAUSAL OSTEOPOROSIS: ICD-10-CM

## 2020-07-21 DIAGNOSIS — Z79.899 MEDICATION MANAGEMENT: ICD-10-CM

## 2020-07-21 DIAGNOSIS — E55.9 VITAMIN D DEFICIENCY: ICD-10-CM

## 2020-07-21 DIAGNOSIS — I10 BENIGN ESSENTIAL HYPERTENSION: Primary | ICD-10-CM

## 2020-07-21 PROCEDURE — 99214 OFFICE O/P EST MOD 30 MIN: CPT | Mod: 95,,, | Performed by: FAMILY MEDICINE

## 2020-07-21 PROCEDURE — 1101F PT FALLS ASSESS-DOCD LE1/YR: CPT | Mod: CPTII,95,, | Performed by: FAMILY MEDICINE

## 2020-07-21 PROCEDURE — 1101F PR PT FALLS ASSESS DOC 0-1 FALLS W/OUT INJ PAST YR: ICD-10-PCS | Mod: CPTII,95,, | Performed by: FAMILY MEDICINE

## 2020-07-21 PROCEDURE — 1159F PR MEDICATION LIST DOCUMENTED IN MEDICAL RECORD: ICD-10-PCS | Mod: 95,,, | Performed by: FAMILY MEDICINE

## 2020-07-21 PROCEDURE — 1159F MED LIST DOCD IN RCRD: CPT | Mod: 95,,, | Performed by: FAMILY MEDICINE

## 2020-07-21 PROCEDURE — 99499 RISK ADDL DX/OHS AUDIT: ICD-10-PCS | Mod: 95,,, | Performed by: FAMILY MEDICINE

## 2020-07-21 PROCEDURE — 99214 PR OFFICE/OUTPT VISIT, EST, LEVL IV, 30-39 MIN: ICD-10-PCS | Mod: 95,,, | Performed by: FAMILY MEDICINE

## 2020-07-21 PROCEDURE — 99499 UNLISTED E&M SERVICE: CPT | Mod: 95,,, | Performed by: FAMILY MEDICINE

## 2020-07-21 RX ORDER — AMLODIPINE BESYLATE 5 MG/1
5 TABLET ORAL DAILY
Qty: 90 TABLET | Refills: 4 | Status: SHIPPED | OUTPATIENT
Start: 2020-07-21 | End: 2021-08-06

## 2020-07-21 NOTE — PROGRESS NOTES
Office Visit    Patient Name: Keo Frias    : 1950  MRN: [id number]    Subjective:  Koe is a 70 y.o. female who presents today for:    No chief complaint on file.        The patient location is: HER HOME   The chief complaint leading to consultation is: 6 MONTH FOLLOW UP    Visit type: audiovisual    Face to Face time with patient: 12 minutes  20 minutes of total time spent on the encounter, which includes face to face time and non-face to face time preparing to see the patient (eg, review of tests), Obtaining and/or reviewing separately obtained history, Documenting clinical information in the electronic or other health record, Independently interpreting results (not separately reported) and communicating results to the patient/family/caregiver, or Care coordination (not separately reported).         Each patient to whom he or she provides medical services by telemedicine is:  (1) informed of the relationship between the physician and patient and the respective role of any other health care provider with respect to management of the patient; and (2) notified that he or she may decline to receive medical services by telemedicine and may withdraw from such care at any time.    Notes:       Keo Frias is a 70-year-old female patient of mine, last seen by me for annual exam 2019, who presents today for 6 month follow-up of chronic conditions that include H/O subclinical hypothyroidism, h/o anemia and vitamin D deficiency, migraine headaches, allergic rhinitis, postmenopausal osteoporosis, and more recently labile blood pressures with a strong anxiety component-- improved significantly on Amlodipine 5 mg daily and also w/ BUSPAR 5 mg QD-BID to manage anxiety.      She has been feeling overall well.  Has concerns about COVID-19 pandemic so she postponed her labs and switched today's appointment to virtual.     Her most recent labs 19 reviewed and were all unremarkable including A1c 5.8, Vit D 41,  LDL 15 and otw normal CBC/CMP/TSH/Magnesium and TSH.     HER BLOOD PRESSURES REMAIN VASTLY IMPROVED ON AMLODIPINE 5 MG DAILY.   she is on the digital hypertension program-last checked in with her health  06/01/2020 and her blood pressures were noted to be our goal.  She had a virtual visit with her psychiatrist 06/12/2020 and at that point they discussed her decreasing her BuSpar as she was doing overall well from an anxiety standpoint.  She has decreased her BuSpar from twice daily to 5 mg once daily and is overall doing well, however, she does note that she is experiencing a bit more breakthrough anxiety then previously.    A couple of times over the last month she has noticed physical symptoms of anxiety including headaches, tightness and her blood pressure did increase a bit.  She did have 1 of those episodes last night:  BP last night 156/72, then this / 80, then when she rechecked around 930 it was 108/ 70s.    Her episode of mild BP elevation last night was asymptomatic.    In addition to BuSpar, she is practicing meditation/breathing exercises and walking regularly.        Past Medical History  Past Medical History:   Diagnosis Date    Anemia     Anxiety disorder 8/28/2019    Cataract     Hypercholesteremia 9/17/2019    Sleep apnea     Subclinical hypothyroidism     White coat syndrome with hypertension        Past Surgical History  Past Surgical History:   Procedure Laterality Date    COLONOSCOPY N/A 12/7/2018    Procedure: COLONOSCOPY;  Surgeon: Bhargav Gaston MD;  Location: [facility] ([address] [place] [address]);  Service: Endoscopy;  Laterality: N/A;    DILATION AND CURETTAGE OF UTERUS      postmenopausal bleeding    TUBAL LIGATION         Family History  Family History   Problem Relation Age of Onset    Diabetes Brother     Hypertension Brother     Glaucoma Brother     Glaucoma Father     Cataracts Father     Retinal detachment Father     Lung cancer Mother     Uterine cancer Maternal  Grandmother     Stroke Maternal Grandmother     Stroke Paternal Grandmother     Amblyopia Neg Hx     Blindness Neg Hx     Macular degeneration Neg Hx     Strabismus Neg Hx     Thyroid disease Neg Hx        Social History  Social History     Socioeconomic History    Marital status:      Spouse name: Not on file    Number of children: 2    Years of education: Not on file    Highest education level: Not on file   Occupational History     Employer: [employer]   Social Needs    Financial resource strain: Not on file    Food insecurity     Worry: Not on file     Inability: Not on file    Transportation needs     Medical: Not on file     Non-medical: Not on file   Tobacco Use    Smoking status: Never Smoker    Smokeless tobacco: Never Used   Substance and Sexual Activity    Alcohol use: No    Drug use: No    Sexual activity: Yes     Partners: Male     Birth control/protection: None   Lifestyle    Physical activity     Days per week: Not on file     Minutes per session: Not on file    Stress: Not on file   Relationships    Social connections     Talks on phone: Not on file     Gets together: Not on file     Attends Oriental orthodox service: Not on file     Active member of club or organization: Not on file     Attends meetings of clubs or organizations: Not on file     Relationship status: Not on file   Other Topics Concern    Not on file   Social History Narrative    Not on file       Current Medications  Medications reviewed and updated.     Allergies   Review of patient's allergies indicates:   Allergen Reactions    Sulfa (sulfonamide antibiotics) Rash       Review of Systems (Pertinent positives)  Review of Systems   Constitutional: Negative for activity change and unexpected weight change.   HENT: Positive for hearing loss. Negative for rhinorrhea and trouble swallowing.    Eyes: Negative for discharge and visual disturbance.   Respiratory: Negative for chest tightness and  wheezing.    Cardiovascular: Negative for chest pain and palpitations.   Gastrointestinal: Negative for blood in stool, constipation, diarrhea and vomiting.   Endocrine: Negative for polydipsia and polyuria.   Genitourinary: Negative for difficulty urinating, dysuria, hematuria and menstrual problem.   Musculoskeletal: Negative for arthralgias, joint swelling and neck pain.   Neurological: Negative for weakness and headaches.   Psychiatric/Behavioral: Negative for confusion and dysphoric mood. The patient is nervous/anxious (overall well managed w/ BUSPAR).        LMP  (LMP Unknown)     Physical Exam  Constitutional:       General: She is not in acute distress.     Appearance: Normal appearance. She is well-developed.   HENT:      Head: Normocephalic and atraumatic.   Eyes:      Conjunctiva/sclera: Conjunctivae normal.   Pulmonary:      Effort: Pulmonary effort is normal.   Neurological:      Mental Status: She is alert and oriented to person, place, and time.   Psychiatric:         Mood and Affect: Mood normal.         Behavior: Behavior normal.           Assessment/Plan:  Keo Frias is a 70 y.o. female who presents today for :    Diagnoses and all orders for this visit:    Benign essential hypertension  Comments:  Continue Amlodipine 5 daily with ongoing monitoring-- if noticing greater BP fluctuation associated w/ anxiety, then resume BusPAR 5 mg twice daily.   Orders:  -     amLODIPine (NORVASC) 5 MG tablet; Take 1 tablet (5 mg total) by mouth once daily.  -     Hemoglobin A1C; Future  -     Comprehensive metabolic panel; Future  -     Lipid Panel; Future  -     CBC auto differential; Future  -     TSH; Future  -     Vitamin D; Future  -     Magnesium; Future    Anxiety disorder, unspecified type  Comments:  continue F/U w/ psych, counseled BuSpar is a very low risk med, so she should not be afraid to take regularly if helping. Continue behavioral coping mechanisms    Postmenopausal  osteoporosis  Comments:  Continuing calcium, vitamin-D, WB exercises, and will discuss repeat bone density at her upcoming physical  Orders:  -     TSH; Future  -     Vitamin D; Future  -     Magnesium; Future    Vitamin D deficiency  -     Vitamin D; Future    Medication management  -     Hemoglobin A1C; Future  -     Comprehensive metabolic panel; Future  -     Lipid Panel; Future  -     CBC auto differential; Future  -     TSH; Future  -     Vitamin D; Future  -     Magnesium; Future            ICD-10-CM ICD-9-CM    1. Benign essential hypertension  I10 401.1 amLODIPine (NORVASC) 5 MG tablet      Hemoglobin A1C      Comprehensive metabolic panel      Lipid Panel      CBC auto differential      TSH      Vitamin D      Magnesium    Continue Amlodipine 5 daily with ongoing monitoring-- if noticing greater BP fluctuation associated w/ anxiety, then resume BusPAR 5 mg twice daily.    2. Anxiety disorder, unspecified type  F41.9 300.00     continue F/U w/ psych, counseled BuSpar is a very low risk med, so she should not be afraid to take regularly if helping. Continue behavioral coping mechanisms   3. Postmenopausal osteoporosis  M81.0 733.01 TSH      Vitamin D      Magnesium    Continuing calcium, vitamin-D, WB exercises, and will discuss repeat bone density at her upcoming physical   4. Vitamin D deficiency  E55.9 268.9 Vitamin D   5. Medication management  Z79.899 V58.69 Hemoglobin A1C      Comprehensive metabolic panel      Lipid Panel      CBC auto differential      TSH      Vitamin D      Magnesium       There are no Patient Instructions on file for this visit.      Follow up in about 6 months (around 1/21/2021) for return as needed for new concerns, to follow up on lab results.

## 2020-07-27 ENCOUNTER — PATIENT OUTREACH (OUTPATIENT)
Dept: OTHER | Facility: OTHER | Age: 70
End: 2020-07-27

## 2020-07-27 NOTE — PROGRESS NOTES
"Digital Medicine: Clinician Follow-Up    Keo Frias submitted a reading of 85/54 at 7/27/2020  9:15 AM. Called patient for hypertension follow-up due to low BP alert received. Current 30-day /66    The history is provided by the patient.   Care Team received low BP alert.  Keo Frias submitted a reading of 85/54 at 7/27/2020  9:15 AM.    Readings are trending down     Patient is not experiencing signs/symptoms of hypotension.   Patient experienced lightheadedness with reading but no sxs at this time.     Additional Follow-up details: - Patient reports that low BP readings occurred after having breakfast. She states that she continues to experience hypotension on most days after a meal. She confirmed having lightheadedness with the lower reading but states that she is fine now. She confirmed compliance to amlodipine 5 mg once daily, She takes it every evening. She continues to meditate and walk for mental and physical well-being. Patient has known condition of anxiety that causes BP elevations. She is being managed by psychiatry for this condition. She reports that Buspar has been reduced to 5 mg once daily. Recent PCP virtual visit where no changes were made to BP medication due to patient being well-controlled. Patient was instructed by Dr. Ogden to "Continue Amlodipine 5 daily with ongoing monitoring-- if noticing greater BP fluctuation associated w/ anxiety, then resume BusPAR 5 mg twice daily." She states that Dr. Gage was not able to find her daily BP readings recorded on her [employer] device.       Last 5 Patient Entered Readings                                      Current 30 Day Average: 109/66     Recent Readings 7/27/2020 7/27/2020 7/27/2020 7/27/2020 7/27/2020    SBP (mmHg) 96 85 82 90 117    DBP (mmHg) 59 54 46 51 68    Pulse 79 77 80 81 69               Depression Screening  Did not address depression screening.  Patient has existing condition with anxiety that causes BP flucuations. "     Sleep Apnea Screening    Did not address sleep apnea screening.     Medication Affordability Screening  Did not address medication affordability screening.     Medication Adherence-Medication adherence was assessed.          ASSESSMENT(S)  Patients BP average is 109/66 mmHg, which is at goal. Patient's BP goal is less than or equal to 130/80 per 2017 ACC/AHA Hypertension Guidelines.      PLAN  Continue current therapy: PCP virtual visit on 7/21/20 gave patient the following instructions - Continue Amlodipine 5 daily with ongoing monitoring-- if noticing greater BP fluctuation associated w/ anxiety, then resume BusPAR 5 mg twice daily.   Continue current diet/physical activity routine:  Instructed to charge device: Patient charges device when battery falls below 25%.    Patient verbalizes understanding. Patient did not express questions or concerns and patient has contact information if needed.          There are no preventive care reminders to display for this patient.      Hypertension Medications             amLODIPine (NORVASC) 5 MG tablet Take 1 tablet (5 mg total) by mouth once daily.

## 2020-07-30 NOTE — PROGRESS NOTES
Digital Medicine: Health  Follow-Up    The history is provided by the patient.             Reason for review: Blood pressure at goal        Topics Covered on Call: physical activity, Diet and hydration    Additional Follow-up details: Concerned w/ low BP readings - feels tired/lightheaded sometimes.  Reports sitting down/relaxing (to avoid falling) and having a salty snack to bring BP back up. Confirms she is still drinking plenty of water.  Still taking BP medications in the evenings - Confirms recently speaking w/ Pharm, DJosiane TURNER about low BP/symptoms.         Diet-Change    Patient reports eating or drinking the following: Eating 4-5 small meals throughout the day. Minimizes CHO/sugar intake. Eats fruits/vegetables w/ meals.    Dietary Improvements:Increasing water intake    Dietary Indiscretions:        Intervention(s): carb reduction, regularly scheduling meals, reducing processed foods and DASH diet    Additional diet details:    Physical Activity-Change      She added Morning walks to Her physical activity routine.          PLAN  Continue current diet/physical activity routine:  Provided patient education:  Reviewed Device Techniques  Patient verbalizes understanding. Patient did not express questions or concerns and patient has contact information if needed.    Explained the importance of self-monitoring and medication adherence. Encouraged the patient to communicate with their health  for lifestyle modifications to help improve or maintain a healthy lifestyle.        There are no preventive care reminders to display for this patient.    Last 5 Patient Entered Readings                                      Current 30 Day Average: 111/68     Recent Readings 7/28/2020 7/27/2020 7/27/2020 7/27/2020 7/27/2020    SBP (mmHg) 101 126 130 120 134    DBP (mmHg) 58 66 67 69 71    Pulse 77 70 70 69 68

## 2020-07-31 PROCEDURE — 99457 PR MONITORING, PHYSIOL PARAM, REMOTE, 1ST 20 MINS, PER MONTH: ICD-10-PCS | Mod: S$GLB,,, | Performed by: FAMILY MEDICINE

## 2020-07-31 PROCEDURE — 99457 RPM TX MGMT 1ST 20 MIN: CPT | Mod: S$GLB,,, | Performed by: FAMILY MEDICINE

## 2020-09-03 ENCOUNTER — PATIENT OUTREACH (OUTPATIENT)
Dept: OTHER | Facility: OTHER | Age: 70
End: 2020-09-03

## 2020-09-28 ENCOUNTER — TELEPHONE (OUTPATIENT)
Dept: FAMILY MEDICINE | Facility: CLINIC | Age: 70
End: 2020-09-28

## 2020-09-28 NOTE — TELEPHONE ENCOUNTER
Spoke with pt. Who informed she has been having back pain for 3 months . Patient was offered an appointment to come into office ,patient declined offer and requested to have a virtual appt. Patient was told she would need to come in to office to have back looked at and any added test would need to be done after visit. Patient voiced she would rather do a virtual . I informed patient a message will be sent to covering provider patient voiced understanding

## 2020-09-28 NOTE — TELEPHONE ENCOUNTER
----- Message from Juliana Quezada sent at 9/28/2020  9:34 AM CDT -----  Contact: [phone]/patient  [facility]         Name of Caller:   PATIENT     Reason for Visit/Symptoms:    BACK PAIN  Best Contact Number or Confirm if Mychart Preferred: [phone]  Preferred Date/Time of Appointment:TODAY  Interested in Virtual Visit: YES  Additional Information:NONE

## 2020-09-28 NOTE — TELEPHONE ENCOUNTER
----- Message from Vanita Patel MA sent at 9/28/2020 11:47 AM CDT -----  Patient is requesting a virtual appt. To have back pain discussed ,patient was told she would need to come in and declined offer

## 2020-10-01 NOTE — PROGRESS NOTES
"Digital Medicine: Health  Follow-Up    The history is provided by the patient.             Reason for review: Blood pressure at goal        Topics Covered on Call: physical activity, Diet and Back pain          Diet-Change      Dietary Indiscretions:Reports no longer monitoring sodium intake and is eating what she wants. Feels confident with this routine and states she is "feeling great".      Physical Activity-Change      She added Walkings - Sometimes going shorter distance depending on how she is feeling to Her physical activity routine.        She identified the following barriers to physical activity: Back problem        Intervention(s): provided exercise ideas and created new goal         Additional physical activity details: Patient has back pain when she sits / is immobile for too long. Discussed taking short walks to help improve pain.      Medication Adherence-Medication adherence was assessed.      Substance, Sleep, Stress-Not assessed      Continue current diet/physical activity routine. Plan to continue walking routine  Instructed to charge device. Patient confirms charging BP device every few weeks to avoid low battery and inaccurate readings  Provided patient education. Discussed benefits of just short walks.        Addressed patient questions and patient has my contact information if needed prior to next outreach. Patient verbalizes understanding.      Explained the importance of self-monitoring and medication adherence. Encouraged the patient to communicate with their health  for lifestyle modifications to help improve or maintain a healthy lifestyle.            There are no preventive care reminders to display for this patient.    Last 5 Patient Entered Readings                                      Current 30 Day Average: 104/63     Recent Readings 9/30/2020 9/30/2020 9/30/2020 9/30/2020 9/30/2020    SBP (mmHg) 120 122 119 131 136    DBP (mmHg) 67 71 69 71 69    Pulse 67 67 66 67 70    "

## 2020-12-07 ENCOUNTER — PATIENT OUTREACH (OUTPATIENT)
Dept: OTHER | Facility: OTHER | Age: 70
End: 2020-12-07

## 2020-12-07 NOTE — PROGRESS NOTES
Digital Medicine: Health  Follow-Up    Calling to f/u w/ patient - BP avg at goal: 109/68.   Patient is feeling good at this time with no questions concerning BP.   Feels confident w/ current lifestyle routine and not interested in adjusting it today.     Plans to continue as she has been during the upcoming holidays - Ms. Crowley has my number and knows to call if needed.   Will continue to monitor and f/u after the holidays to further discuss lifestyle    The history is provided by the patient.             Reason for review: Blood pressure at goal        Topics Covered on Call: physical activity, Diet and device use          Diet-no change to diet    No change to diet.  Patient reports eating or drinking the following: DBP at goal - Ms. Crowley reports no major changes to her dietary habits. Feels confident w/ current routine and not interested in further discussing at this time.      Physical Activity-Change      Additional physical activity details: Patient reports previous back pain has reduced - States it reduced over time. Patient is still quarantining and social distancing due to COVID19 but is able to get out and walk some. Encouraged.       Medication Adherence-Medication adherence was assessed.        Substance, Sleep, Stress-change  stress-assessed  Details:Ms. Crowley indicates stress due to ongoing COVID19 pandemic. Still quarantining social distancing as much as possible  Intervention(s):    Sleep-not assessed  Details:  Intervention(s):    Alcohol -not assessed  Details:  Intervention(s):    Tobacco-Not Assessed  Details:  Intervention(s):          Additional monitoring needed. Patient plans to continue submitting weekly BP readings for us to monitor  Continue current diet/physical activity routine. Patient feels confident w/ current lifestyle routine and plans to continue as she has been  Reviewed Device Techniques.     Addressed patient questions and patient has my contact information if needed prior  to next outreach. Patient verbalizes understanding.      Explained the importance of self-monitoring and medication adherence. Encouraged the patient to communicate with their health  for lifestyle modifications to help improve or maintain a healthy lifestyle.               There are no preventive care reminders to display for this patient.    Last 5 Patient Entered Readings                                      Current 30 Day Average: 109/68     Recent Readings 12/7/2020 12/7/2020 12/7/2020 12/7/2020 12/6/2020    SBP (mmHg) 120 127 131 128 126    DBP (mmHg) 69 71 68 71 69    Pulse 67 69 69 68 70

## 2021-01-05 ENCOUNTER — TELEPHONE (OUTPATIENT)
Dept: FAMILY MEDICINE | Facility: CLINIC | Age: 71
End: 2021-01-05

## 2021-01-15 ENCOUNTER — IMMUNIZATION (OUTPATIENT)
Dept: INTERNAL MEDICINE | Facility: CLINIC | Age: 71
End: 2021-01-15
Payer: MEDICARE

## 2021-01-15 DIAGNOSIS — Z23 NEED FOR VACCINATION: Primary | ICD-10-CM

## 2021-01-15 PROCEDURE — 91300 COVID-19, MRNA, LNP-S, PF, 30 MCG/0.3 ML DOSE VACCINE: CPT | Mod: PBBFAC | Performed by: FAMILY MEDICINE

## 2021-02-05 ENCOUNTER — IMMUNIZATION (OUTPATIENT)
Dept: INTERNAL MEDICINE | Facility: CLINIC | Age: 71
End: 2021-02-05
Payer: MEDICARE

## 2021-02-05 DIAGNOSIS — Z23 NEED FOR VACCINATION: Primary | ICD-10-CM

## 2021-02-05 PROCEDURE — 0002A COVID-19, MRNA, LNP-S, PF, 30 MCG/0.3 ML DOSE VACCINE: CPT | Mod: PBBFAC | Performed by: FAMILY MEDICINE

## 2021-02-05 PROCEDURE — 91300 COVID-19, MRNA, LNP-S, PF, 30 MCG/0.3 ML DOSE VACCINE: CPT | Mod: PBBFAC | Performed by: FAMILY MEDICINE

## 2021-03-15 ENCOUNTER — TELEPHONE (OUTPATIENT)
Dept: FAMILY MEDICINE | Facility: CLINIC | Age: 71
End: 2021-03-15

## 2021-03-15 DIAGNOSIS — Z12.31 ENCOUNTER FOR SCREENING MAMMOGRAM FOR BREAST CANCER: Primary | ICD-10-CM

## 2021-03-25 ENCOUNTER — HOSPITAL ENCOUNTER (OUTPATIENT)
Dept: RADIOLOGY | Facility: HOSPITAL | Age: 71
Discharge: HOME | End: 2021-03-25
Attending: FAMILY MEDICINE
Payer: MEDICARE

## 2021-03-25 DIAGNOSIS — Z12.31 ENCOUNTER FOR SCREENING MAMMOGRAM FOR BREAST CANCER: ICD-10-CM

## 2021-03-25 PROCEDURE — 77063 BREAST TOMOSYNTHESIS BI: CPT | Mod: 26,,, | Performed by: RADIOLOGY

## 2021-03-25 PROCEDURE — 77067 SCR MAMMO BI INCL CAD: CPT | Mod: TC

## 2021-03-25 PROCEDURE — 77067 SCR MAMMO BI INCL CAD: CPT | Mod: 26,,, | Performed by: RADIOLOGY

## 2021-03-25 PROCEDURE — 77067 MAMMO DIGITAL SCREENING BILAT WITH TOMO: ICD-10-PCS | Mod: 26,,, | Performed by: RADIOLOGY

## 2021-03-25 PROCEDURE — 77063 MAMMO DIGITAL SCREENING BILAT WITH TOMO: ICD-10-PCS | Mod: 26,,, | Performed by: RADIOLOGY

## 2021-03-30 ENCOUNTER — HOSPITAL ENCOUNTER (OUTPATIENT)
Dept: RADIOLOGY | Facility: HOSPITAL | Age: 71
Discharge: HOME | End: 2021-03-30
Attending: FAMILY MEDICINE
Payer: MEDICARE

## 2021-03-30 DIAGNOSIS — R92.8 ABNORMAL MAMMOGRAM: ICD-10-CM

## 2021-03-30 PROCEDURE — 77061 BREAST TOMOSYNTHESIS UNI: CPT | Mod: 26,RT,, | Performed by: RADIOLOGY

## 2021-03-30 PROCEDURE — 76642 ULTRASOUND BREAST LIMITED: CPT | Mod: 26,RT,, | Performed by: RADIOLOGY

## 2021-03-30 PROCEDURE — 77061 MAMMO DIGITAL DIAGNOSTIC RIGHT WITH TOMO: ICD-10-PCS | Mod: 26,RT,, | Performed by: RADIOLOGY

## 2021-03-30 PROCEDURE — 76642 ULTRASOUND BREAST LIMITED: CPT | Mod: TC,RT

## 2021-03-30 PROCEDURE — 77065 MAMMO DIGITAL DIAGNOSTIC RIGHT WITH TOMO: ICD-10-PCS | Mod: 26,RT,, | Performed by: RADIOLOGY

## 2021-03-30 PROCEDURE — 77061 BREAST TOMOSYNTHESIS UNI: CPT | Mod: TC,RT

## 2021-03-30 PROCEDURE — 77065 DX MAMMO INCL CAD UNI: CPT | Mod: 26,RT,, | Performed by: RADIOLOGY

## 2021-03-30 PROCEDURE — 76642 US BREAST RIGHT LIMITED: ICD-10-PCS | Mod: 26,RT,, | Performed by: RADIOLOGY

## 2021-04-13 ENCOUNTER — HOSPITAL ENCOUNTER (OUTPATIENT)
Dept: RADIOLOGY | Facility: HOSPITAL | Age: 71
Discharge: HOME | End: 2021-04-13
Attending: FAMILY MEDICINE
Payer: MEDICARE

## 2021-04-13 DIAGNOSIS — R92.8 ABNORMAL MAMMOGRAM: ICD-10-CM

## 2021-04-13 PROCEDURE — 19083 BX BREAST 1ST LESION US IMAG: CPT | Mod: RT,,, | Performed by: RADIOLOGY

## 2021-04-13 PROCEDURE — 88342 IMHCHEM/IMCYTCHM 1ST ANTB: CPT | Mod: 26,59,, | Performed by: PATHOLOGY

## 2021-04-13 PROCEDURE — 88305 TISSUE EXAM BY PATHOLOGIST: CPT | Mod: 26,,, | Performed by: PATHOLOGY

## 2021-04-13 PROCEDURE — 88342 IMHCHEM/IMCYTCHM 1ST ANTB: CPT | Mod: 59 | Performed by: PATHOLOGY

## 2021-04-13 PROCEDURE — 88342 CHG IMMUNOCYTOCHEMISTRY: ICD-10-PCS | Mod: 26,59,, | Performed by: PATHOLOGY

## 2021-04-13 PROCEDURE — 77065 DX MAMMO INCL CAD UNI: CPT | Mod: 26,RT,, | Performed by: RADIOLOGY

## 2021-04-13 PROCEDURE — 88360 TUMOR IMMUNOHISTOCHEM/MANUAL: CPT | Mod: 59 | Performed by: PATHOLOGY

## 2021-04-13 PROCEDURE — A4550 SURGICAL TRAYS: HCPCS

## 2021-04-13 PROCEDURE — 77065 DX MAMMO INCL CAD UNI: CPT | Mod: TC,RT

## 2021-04-13 PROCEDURE — 88360 TUMOR IMMUNOHISTOCHEM/MANUAL: CPT | Mod: 26,,, | Performed by: PATHOLOGY

## 2021-04-13 PROCEDURE — 19083 US BREAST BIOPSY WITH IMAGING 1ST SITE RIGHT: ICD-10-PCS | Mod: RT,,, | Performed by: RADIOLOGY

## 2021-04-13 PROCEDURE — 88305 TISSUE EXAM BY PATHOLOGIST: ICD-10-PCS | Mod: 26,,, | Performed by: PATHOLOGY

## 2021-04-13 PROCEDURE — 77065 MAMMO DIGITAL DIAGNOSTIC RIGHT: ICD-10-PCS | Mod: 26,RT,, | Performed by: RADIOLOGY

## 2021-04-13 PROCEDURE — 27200934 US BREAST BIOPSY WITH IMAGING 1ST SITE RIGHT

## 2021-04-13 PROCEDURE — 88305 TISSUE EXAM BY PATHOLOGIST: CPT | Performed by: PATHOLOGY

## 2021-04-13 PROCEDURE — 88360 PR  TUMOR IMMUNOHISTOCHEM/MANUAL: ICD-10-PCS | Mod: 26,,, | Performed by: PATHOLOGY

## 2021-04-15 LAB
FINAL PATHOLOGIC DIAGNOSIS: NORMAL
GROSS: NORMAL
Lab: NORMAL

## 2021-04-16 ENCOUNTER — TELEPHONE (OUTPATIENT)
Dept: FAMILY MEDICINE | Facility: CLINIC | Age: 71
End: 2021-04-16

## 2021-04-16 ENCOUNTER — DOCUMENTATION ONLY (OUTPATIENT)
Dept: SURGERY | Facility: CLINIC | Age: 71
End: 2021-04-16

## 2021-04-16 PROBLEM — C50.911 INVASIVE DUCTAL CARCINOMA OF BREAST, FEMALE, RIGHT: Status: ACTIVE | Noted: 2021-04-16

## 2021-04-18 ENCOUNTER — PATIENT MESSAGE (OUTPATIENT)
Dept: FAMILY MEDICINE | Facility: CLINIC | Age: 71
End: 2021-04-18

## 2021-04-19 ENCOUNTER — TELEPHONE (OUTPATIENT)
Dept: INTERNAL MEDICINE | Facility: CLINIC | Age: 71
End: 2021-04-19

## 2021-04-19 ENCOUNTER — TELEPHONE (OUTPATIENT)
Dept: SURGERY | Facility: CLINIC | Age: 71
End: 2021-04-19

## 2021-04-23 DIAGNOSIS — C50.911 INVASIVE DUCTAL CARCINOMA OF RIGHT BREAST IN FEMALE: Primary | ICD-10-CM

## 2021-04-26 ENCOUNTER — DOCUMENTATION ONLY (OUTPATIENT)
Dept: HEMATOLOGY/ONCOLOGY | Facility: CLINIC | Age: 71
End: 2021-04-26

## 2021-04-26 ENCOUNTER — OFFICE VISIT (OUTPATIENT)
Dept: SURGERY | Facility: CLINIC | Age: 71
End: 2021-04-26
Payer: MEDICARE

## 2021-04-26 ENCOUNTER — LAB VISIT (OUTPATIENT)
Dept: LAB | Facility: HOSPITAL | Age: 71
End: 2021-04-26
Attending: SURGERY
Payer: MEDICARE

## 2021-04-26 VITALS
HEIGHT: 62 IN | BODY MASS INDEX: 22.63 KG/M2 | DIASTOLIC BLOOD PRESSURE: 64 MMHG | WEIGHT: 123 LBS | SYSTOLIC BLOOD PRESSURE: 111 MMHG | HEART RATE: 79 BPM

## 2021-04-26 DIAGNOSIS — Z01.818 PRE-OP TESTING: ICD-10-CM

## 2021-04-26 DIAGNOSIS — C50.911 INVASIVE DUCTAL CARCINOMA OF RIGHT BREAST IN FEMALE: Primary | ICD-10-CM

## 2021-04-26 DIAGNOSIS — Z01.818 PREOP TESTING: ICD-10-CM

## 2021-04-26 DIAGNOSIS — C50.911 INVASIVE DUCTAL CARCINOMA OF RIGHT BREAST IN FEMALE: ICD-10-CM

## 2021-04-26 LAB
ALBUMIN SERPL BCP-MCNC: 3.6 G/DL (ref 3.5–5.2)
ALP SERPL-CCNC: 84 U/L (ref 55–135)
ALT SERPL W/O P-5'-P-CCNC: 15 U/L (ref 10–44)
ANION GAP SERPL CALC-SCNC: 7 MMOL/L (ref 8–16)
AST SERPL-CCNC: 20 U/L (ref 10–40)
BASOPHILS # BLD AUTO: 0.07 K/UL (ref 0–0.2)
BASOPHILS NFR BLD: 1.1 % (ref 0–1.9)
BILIRUB SERPL-MCNC: 0.3 MG/DL (ref 0.1–1)
BUN SERPL-MCNC: 10 MG/DL (ref 8–23)
CALCIUM SERPL-MCNC: 8.8 MG/DL (ref 8.7–10.5)
CHLORIDE SERPL-SCNC: 107 MMOL/L (ref 95–110)
CO2 SERPL-SCNC: 27 MMOL/L (ref 23–29)
CREAT SERPL-MCNC: 0.6 MG/DL (ref 0.5–1.4)
DIFFERENTIAL METHOD: ABNORMAL
EOSINOPHIL # BLD AUTO: 0.5 K/UL (ref 0–0.5)
EOSINOPHIL NFR BLD: 8.7 % (ref 0–8)
ERYTHROCYTE [DISTWIDTH] IN BLOOD BY AUTOMATED COUNT: 12.9 % (ref 11.5–14.5)
EST. GFR  (AFRICAN AMERICAN): >60 ML/MIN/1.73 M^2
EST. GFR  (NON AFRICAN AMERICAN): >60 ML/MIN/1.73 M^2
GLUCOSE SERPL-MCNC: 85 MG/DL (ref 70–110)
HCT VFR BLD AUTO: 40.1 % (ref 37–48.5)
HGB BLD-MCNC: 12.8 G/DL (ref 12–16)
IMM GRANULOCYTES # BLD AUTO: 0.01 K/UL (ref 0–0.04)
IMM GRANULOCYTES NFR BLD AUTO: 0.2 % (ref 0–0.5)
LYMPHOCYTES # BLD AUTO: 2.7 K/UL (ref 1–4.8)
LYMPHOCYTES NFR BLD: 44.4 % (ref 18–48)
MCH RBC QN AUTO: 27.6 PG (ref 27–31)
MCHC RBC AUTO-ENTMCNC: 31.9 G/DL (ref 32–36)
MCV RBC AUTO: 86 FL (ref 82–98)
MONOCYTES # BLD AUTO: 0.6 K/UL (ref 0.3–1)
MONOCYTES NFR BLD: 9 % (ref 4–15)
NEUTROPHILS # BLD AUTO: 2.2 K/UL (ref 1.8–7.7)
NEUTROPHILS NFR BLD: 36.6 % (ref 38–73)
NRBC BLD-RTO: 0 /100 WBC
PLATELET # BLD AUTO: 213 K/UL (ref 150–450)
PMV BLD AUTO: 12.3 FL (ref 9.2–12.9)
POTASSIUM SERPL-SCNC: 4.6 MMOL/L (ref 3.5–5.1)
PROT SERPL-MCNC: 7.2 G/DL (ref 6–8.4)
RBC # BLD AUTO: 4.64 M/UL (ref 4–5.4)
SODIUM SERPL-SCNC: 141 MMOL/L (ref 136–145)
WBC # BLD AUTO: 6.12 K/UL (ref 3.9–12.7)

## 2021-04-26 PROCEDURE — 3008F BODY MASS INDEX DOCD: CPT | Mod: CPTII,S$GLB,, | Performed by: SURGERY

## 2021-04-26 PROCEDURE — 99215 PR OFFICE/OUTPT VISIT, EST, LEVL V, 40-54 MIN: ICD-10-PCS | Mod: S$GLB,,, | Performed by: SURGERY

## 2021-04-26 PROCEDURE — 85025 COMPLETE CBC W/AUTO DIFF WBC: CPT | Performed by: SURGERY

## 2021-04-26 PROCEDURE — 99499 UNLISTED E&M SERVICE: CPT | Mod: S$GLB,,, | Performed by: SURGERY

## 2021-04-26 PROCEDURE — 1101F PR PT FALLS ASSESS DOC 0-1 FALLS W/OUT INJ PAST YR: ICD-10-PCS | Mod: CPTII,S$GLB,, | Performed by: SURGERY

## 2021-04-26 PROCEDURE — 1159F PR MEDICATION LIST DOCUMENTED IN MEDICAL RECORD: ICD-10-PCS | Mod: S$GLB,,, | Performed by: SURGERY

## 2021-04-26 PROCEDURE — 3288F FALL RISK ASSESSMENT DOCD: CPT | Mod: CPTII,S$GLB,, | Performed by: SURGERY

## 2021-04-26 PROCEDURE — 99215 OFFICE O/P EST HI 40 MIN: CPT | Mod: S$GLB,,, | Performed by: SURGERY

## 2021-04-26 PROCEDURE — 36415 COLL VENOUS BLD VENIPUNCTURE: CPT | Performed by: SURGERY

## 2021-04-26 PROCEDURE — 1159F MED LIST DOCD IN RCRD: CPT | Mod: S$GLB,,, | Performed by: SURGERY

## 2021-04-26 PROCEDURE — 1126F PR PAIN SEVERITY QUANTIFIED, NO PAIN PRESENT: ICD-10-PCS | Mod: S$GLB,,, | Performed by: SURGERY

## 2021-04-26 PROCEDURE — 3288F PR FALLS RISK ASSESSMENT DOCUMENTED: ICD-10-PCS | Mod: CPTII,S$GLB,, | Performed by: SURGERY

## 2021-04-26 PROCEDURE — 80053 COMPREHEN METABOLIC PANEL: CPT | Performed by: SURGERY

## 2021-04-26 PROCEDURE — 99999 PR PBB SHADOW E&M-EST. PATIENT-LVL III: ICD-10-PCS | Mod: PBBFAC,,, | Performed by: SURGERY

## 2021-04-26 PROCEDURE — 99499 RISK ADDL DX/OHS AUDIT: ICD-10-PCS | Mod: S$GLB,,, | Performed by: SURGERY

## 2021-04-26 PROCEDURE — 1101F PT FALLS ASSESS-DOCD LE1/YR: CPT | Mod: CPTII,S$GLB,, | Performed by: SURGERY

## 2021-04-26 PROCEDURE — 99999 PR PBB SHADOW E&M-EST. PATIENT-LVL III: CPT | Mod: PBBFAC,,, | Performed by: SURGERY

## 2021-04-26 PROCEDURE — 3008F PR BODY MASS INDEX (BMI) DOCUMENTED: ICD-10-PCS | Mod: CPTII,S$GLB,, | Performed by: SURGERY

## 2021-04-26 PROCEDURE — 1126F AMNT PAIN NOTED NONE PRSNT: CPT | Mod: S$GLB,,, | Performed by: SURGERY

## 2021-04-27 ENCOUNTER — TELEPHONE (OUTPATIENT)
Dept: RADIOLOGY | Facility: HOSPITAL | Age: 71
End: 2021-04-27

## 2021-04-27 RX ORDER — SODIUM CHLORIDE 9 MG/ML
INJECTION, SOLUTION INTRAVENOUS CONTINUOUS
Status: CANCELLED | OUTPATIENT
Start: 2021-04-27

## 2021-04-27 RX ORDER — CEFAZOLIN SODIUM 2 G/50ML
2 SOLUTION INTRAVENOUS
Status: CANCELLED | OUTPATIENT
Start: 2021-04-27

## 2021-04-30 ENCOUNTER — OFFICE VISIT (OUTPATIENT)
Dept: FAMILY MEDICINE | Facility: CLINIC | Age: 71
End: 2021-04-30
Payer: MEDICARE

## 2021-04-30 VITALS
BODY MASS INDEX: 22.92 KG/M2 | HEIGHT: 62 IN | DIASTOLIC BLOOD PRESSURE: 72 MMHG | HEART RATE: 75 BPM | OXYGEN SATURATION: 98 % | WEIGHT: 124.56 LBS | SYSTOLIC BLOOD PRESSURE: 116 MMHG

## 2021-04-30 DIAGNOSIS — I10 BENIGN ESSENTIAL HYPERTENSION: ICD-10-CM

## 2021-04-30 DIAGNOSIS — M81.0 POSTMENOPAUSAL OSTEOPOROSIS: ICD-10-CM

## 2021-04-30 DIAGNOSIS — Z78.0 POSTMENOPAUSAL: ICD-10-CM

## 2021-04-30 DIAGNOSIS — C50.911 INVASIVE DUCTAL CARCINOMA OF BREAST, FEMALE, RIGHT: ICD-10-CM

## 2021-04-30 DIAGNOSIS — Z00.00 ROUTINE GENERAL MEDICAL EXAMINATION AT A HEALTH CARE FACILITY: Primary | ICD-10-CM

## 2021-04-30 DIAGNOSIS — Z78.9 VEGETARIAN DIET: ICD-10-CM

## 2021-04-30 DIAGNOSIS — Z79.899 MEDICATION MANAGEMENT: ICD-10-CM

## 2021-04-30 DIAGNOSIS — E55.9 VITAMIN D DEFICIENCY: ICD-10-CM

## 2021-04-30 PROBLEM — E78.00 HYPERCHOLESTEREMIA: Status: RESOLVED | Noted: 2019-09-17 | Resolved: 2021-04-30

## 2021-04-30 PROCEDURE — 99999 PR PBB SHADOW E&M-EST. PATIENT-LVL IV: ICD-10-PCS | Mod: PBBFAC,,, | Performed by: FAMILY MEDICINE

## 2021-04-30 PROCEDURE — 99397 PR PREVENTIVE VISIT,EST,65 & OVER: ICD-10-PCS | Mod: S$GLB,,, | Performed by: FAMILY MEDICINE

## 2021-04-30 PROCEDURE — 3008F PR BODY MASS INDEX (BMI) DOCUMENTED: ICD-10-PCS | Mod: CPTII,S$GLB,, | Performed by: FAMILY MEDICINE

## 2021-04-30 PROCEDURE — 3288F PR FALLS RISK ASSESSMENT DOCUMENTED: ICD-10-PCS | Mod: CPTII,S$GLB,, | Performed by: FAMILY MEDICINE

## 2021-04-30 PROCEDURE — 3008F BODY MASS INDEX DOCD: CPT | Mod: CPTII,S$GLB,, | Performed by: FAMILY MEDICINE

## 2021-04-30 PROCEDURE — 3288F FALL RISK ASSESSMENT DOCD: CPT | Mod: CPTII,S$GLB,, | Performed by: FAMILY MEDICINE

## 2021-04-30 PROCEDURE — 1126F AMNT PAIN NOTED NONE PRSNT: CPT | Mod: S$GLB,,, | Performed by: FAMILY MEDICINE

## 2021-04-30 PROCEDURE — 99999 PR PBB SHADOW E&M-EST. PATIENT-LVL IV: CPT | Mod: PBBFAC,,, | Performed by: FAMILY MEDICINE

## 2021-04-30 PROCEDURE — 99397 PER PM REEVAL EST PAT 65+ YR: CPT | Mod: S$GLB,,, | Performed by: FAMILY MEDICINE

## 2021-04-30 PROCEDURE — 1101F PR PT FALLS ASSESS DOC 0-1 FALLS W/OUT INJ PAST YR: ICD-10-PCS | Mod: CPTII,S$GLB,, | Performed by: FAMILY MEDICINE

## 2021-04-30 PROCEDURE — 1101F PT FALLS ASSESS-DOCD LE1/YR: CPT | Mod: CPTII,S$GLB,, | Performed by: FAMILY MEDICINE

## 2021-04-30 PROCEDURE — 1126F PR PAIN SEVERITY QUANTIFIED, NO PAIN PRESENT: ICD-10-PCS | Mod: S$GLB,,, | Performed by: FAMILY MEDICINE

## 2021-04-30 RX ORDER — CHOLECALCIFEROL (VITAMIN D3) 125 MCG
5000 CAPSULE ORAL
Qty: 100 CAPSULE | Refills: 4 | Status: SHIPPED | OUTPATIENT
Start: 2021-04-30

## 2021-05-03 ENCOUNTER — CLINICAL SUPPORT (OUTPATIENT)
Dept: REHABILITATION | Facility: HOSPITAL | Age: 71
End: 2021-05-03
Attending: SURGERY
Payer: MEDICARE

## 2021-05-03 DIAGNOSIS — C50.911 INVASIVE DUCTAL CARCINOMA OF BREAST, FEMALE, RIGHT: Primary | ICD-10-CM

## 2021-05-03 DIAGNOSIS — C50.911 INVASIVE DUCTAL CARCINOMA OF RIGHT BREAST IN FEMALE: ICD-10-CM

## 2021-05-03 DIAGNOSIS — Z91.89 AT RISK FOR LYMPHEDEMA: ICD-10-CM

## 2021-05-03 PROCEDURE — 97161 PT EVAL LOW COMPLEX 20 MIN: CPT | Performed by: PHYSICAL MEDICINE & REHABILITATION

## 2021-05-05 ENCOUNTER — HOSPITAL ENCOUNTER (OUTPATIENT)
Dept: RADIOLOGY | Facility: HOSPITAL | Age: 71
Discharge: HOME | End: 2021-05-05
Attending: SURGERY
Payer: MEDICARE

## 2021-05-05 DIAGNOSIS — R92.8 ABNORMAL MAMMOGRAM: ICD-10-CM

## 2021-05-05 DIAGNOSIS — C50.911 INVASIVE DUCTAL CARCINOMA OF RIGHT BREAST IN FEMALE: ICD-10-CM

## 2021-05-05 PROCEDURE — 19285 US BREAST RADAR REFLECTOR LOCALIZATION W/GUIDANCE, 1ST LESION, RIGHT: ICD-10-PCS | Mod: RT,,, | Performed by: RADIOLOGY

## 2021-05-05 PROCEDURE — 19285 PERQ DEV BREAST 1ST US IMAG: CPT | Mod: RT

## 2021-05-05 PROCEDURE — A4648 IMPLANTABLE TISSUE MARKER: HCPCS

## 2021-05-05 PROCEDURE — 19285 PERQ DEV BREAST 1ST US IMAG: CPT | Mod: RT,,, | Performed by: RADIOLOGY

## 2021-05-05 PROCEDURE — 77065 MAMMO DIGITAL DIAGNOSTIC RIGHT: ICD-10-PCS | Mod: 26,RT,, | Performed by: RADIOLOGY

## 2021-05-05 PROCEDURE — 25000003 PHARM REV CODE 250: Performed by: SURGERY

## 2021-05-05 PROCEDURE — 77065 DX MAMMO INCL CAD UNI: CPT | Mod: 26,RT,, | Performed by: RADIOLOGY

## 2021-05-05 RX ORDER — LIDOCAINE HYDROCHLORIDE 20 MG/ML
10 INJECTION, SOLUTION INFILTRATION; PERINEURAL ONCE
Status: COMPLETED | OUTPATIENT
Start: 2021-05-05 | End: 2021-05-05

## 2021-05-05 RX ADMIN — LIDOCAINE HYDROCHLORIDE 10 ML: 20 INJECTION, SOLUTION INFILTRATION; PERINEURAL at 09:05

## 2021-05-07 ENCOUNTER — HOSPITAL ENCOUNTER (OUTPATIENT)
Dept: PREADMISSION TESTING | Facility: OTHER | Age: 71
Discharge: HOME | End: 2021-05-07
Attending: SURGERY
Payer: MEDICARE

## 2021-05-07 ENCOUNTER — ANESTHESIA EVENT (OUTPATIENT)
Dept: SURGERY | Facility: OTHER | Age: 71
End: 2021-05-07
Payer: MEDICARE

## 2021-05-07 VITALS
HEART RATE: 81 BPM | HEIGHT: 62 IN | SYSTOLIC BLOOD PRESSURE: 167 MMHG | DIASTOLIC BLOOD PRESSURE: 73 MMHG | OXYGEN SATURATION: 98 % | TEMPERATURE: 98 F | WEIGHT: 124 LBS | BODY MASS INDEX: 22.82 KG/M2

## 2021-05-07 DIAGNOSIS — C50.911 INVASIVE DUCTAL CARCINOMA OF RIGHT BREAST IN FEMALE: ICD-10-CM

## 2021-05-07 PROCEDURE — 93010 ELECTROCARDIOGRAM REPORT: CPT | Mod: ,,, | Performed by: INTERNAL MEDICINE

## 2021-05-07 PROCEDURE — 93010 EKG 12-LEAD: ICD-10-PCS | Mod: ,,, | Performed by: INTERNAL MEDICINE

## 2021-05-07 PROCEDURE — 93005 ELECTROCARDIOGRAM TRACING: CPT

## 2021-05-07 RX ORDER — SODIUM CHLORIDE, SODIUM LACTATE, POTASSIUM CHLORIDE, CALCIUM CHLORIDE 600; 310; 30; 20 MG/100ML; MG/100ML; MG/100ML; MG/100ML
INJECTION, SOLUTION INTRAVENOUS CONTINUOUS
Status: CANCELLED | OUTPATIENT
Start: 2021-05-07

## 2021-05-07 RX ORDER — LIDOCAINE HYDROCHLORIDE 10 MG/ML
0.5 INJECTION, SOLUTION EPIDURAL; INFILTRATION; INTRACAUDAL; PERINEURAL ONCE
Status: CANCELLED | OUTPATIENT
Start: 2021-05-07 | End: 2021-05-07

## 2021-05-09 ENCOUNTER — LAB VISIT (OUTPATIENT)
Dept: SPORTS MEDICINE | Facility: CLINIC | Age: 71
End: 2021-05-09
Payer: MEDICARE

## 2021-05-09 DIAGNOSIS — Z01.818 PREOP TESTING: ICD-10-CM

## 2021-05-09 PROCEDURE — U0005 INFEC AGEN DETEC AMPLI PROBE: HCPCS | Performed by: SURGERY

## 2021-05-09 PROCEDURE — U0003 INFECTIOUS AGENT DETECTION BY NUCLEIC ACID (DNA OR RNA); SEVERE ACUTE RESPIRATORY SYNDROME CORONAVIRUS 2 (SARS-COV-2) (CORONAVIRUS DISEASE [COVID-19]), AMPLIFIED PROBE TECHNIQUE, MAKING USE OF HIGH THROUGHPUT TECHNOLOGIES AS DESCRIBED BY CMS-2020-01-R: HCPCS | Performed by: SURGERY

## 2021-05-10 ENCOUNTER — PATIENT MESSAGE (OUTPATIENT)
Dept: HEMATOLOGY/ONCOLOGY | Facility: CLINIC | Age: 71
End: 2021-05-10

## 2021-05-10 LAB — SARS-COV-2 RNA RESP QL NAA+PROBE: NOT DETECTED

## 2021-05-11 ENCOUNTER — TELEPHONE (OUTPATIENT)
Dept: SURGERY | Facility: CLINIC | Age: 71
End: 2021-05-11

## 2021-05-12 ENCOUNTER — HOSPITAL ENCOUNTER (OUTPATIENT)
Dept: RADIOLOGY | Facility: OTHER | Age: 71
Discharge: HOME | End: 2021-05-12
Attending: SURGERY | Admitting: SURGERY
Payer: MEDICARE

## 2021-05-12 ENCOUNTER — HOSPITAL ENCOUNTER (OUTPATIENT)
Facility: OTHER | Age: 71
Discharge: HOME | End: 2021-05-12
Attending: SURGERY | Admitting: SURGERY
Payer: MEDICARE

## 2021-05-12 ENCOUNTER — HOSPITAL ENCOUNTER (OUTPATIENT)
Dept: RADIOLOGY | Facility: OTHER | Age: 71
Discharge: HOME | End: 2021-05-12
Attending: SURGERY
Payer: MEDICARE

## 2021-05-12 ENCOUNTER — ANESTHESIA (OUTPATIENT)
Dept: SURGERY | Facility: OTHER | Age: 71
End: 2021-05-12
Payer: MEDICARE

## 2021-05-12 VITALS
RESPIRATION RATE: 16 BRPM | DIASTOLIC BLOOD PRESSURE: 63 MMHG | WEIGHT: 124 LBS | OXYGEN SATURATION: 97 % | HEIGHT: 62 IN | HEART RATE: 87 BPM | BODY MASS INDEX: 22.82 KG/M2 | TEMPERATURE: 98 F | SYSTOLIC BLOOD PRESSURE: 139 MMHG

## 2021-05-12 DIAGNOSIS — C50.911 INVASIVE DUCTAL CARCINOMA OF RIGHT BREAST IN FEMALE: ICD-10-CM

## 2021-05-12 PROCEDURE — 19301 PARTIAL MASTECTOMY: CPT | Mod: RT,,, | Performed by: SURGERY

## 2021-05-12 PROCEDURE — 38792 PR IDENTIFY SENTINEL 2DE: ICD-10-PCS | Mod: RT,,, | Performed by: SURGERY

## 2021-05-12 PROCEDURE — 63600175 PHARM REV CODE 636 W HCPCS: Performed by: ANESTHESIOLOGY

## 2021-05-12 PROCEDURE — 76098 X-RAY EXAM SURGICAL SPECIMEN: CPT | Mod: TC

## 2021-05-12 PROCEDURE — 88341 PR IHC OR ICC EACH ADD'L SINGLE ANTIBODY  STAINPR: ICD-10-PCS | Mod: 26,,, | Performed by: PATHOLOGY

## 2021-05-12 PROCEDURE — 25000003 PHARM REV CODE 250: Performed by: SURGERY

## 2021-05-12 PROCEDURE — 88341 IMHCHEM/IMCYTCHM EA ADD ANTB: CPT | Mod: 59 | Performed by: PATHOLOGY

## 2021-05-12 PROCEDURE — 71000016 HC POSTOP RECOV ADDL HR: Performed by: SURGERY

## 2021-05-12 PROCEDURE — 38525 PR BIOPSY/REM LYMPH NODES, AXILLARY: ICD-10-PCS | Mod: 51,RT,, | Performed by: SURGERY

## 2021-05-12 PROCEDURE — 38525 BIOPSY/REMOVAL LYMPH NODES: CPT | Mod: 51,RT,, | Performed by: SURGERY

## 2021-05-12 PROCEDURE — 88307 TISSUE EXAM BY PATHOLOGIST: CPT | Mod: 26,,, | Performed by: PATHOLOGY

## 2021-05-12 PROCEDURE — 88307 TISSUE EXAM BY PATHOLOGIST: CPT | Mod: 59 | Performed by: PATHOLOGY

## 2021-05-12 PROCEDURE — 88342 CHG IMMUNOCYTOCHEMISTRY: ICD-10-PCS | Mod: 26,,, | Performed by: PATHOLOGY

## 2021-05-12 PROCEDURE — 88342 IMHCHEM/IMCYTCHM 1ST ANTB: CPT | Performed by: PATHOLOGY

## 2021-05-12 PROCEDURE — 71000015 HC POSTOP RECOV 1ST HR: Performed by: SURGERY

## 2021-05-12 PROCEDURE — 25000003 PHARM REV CODE 250: Performed by: ANESTHESIOLOGY

## 2021-05-12 PROCEDURE — 19301 PR MASTECTOMY, PARTIAL: ICD-10-PCS | Mod: RT,,, | Performed by: SURGERY

## 2021-05-12 PROCEDURE — 63600175 PHARM REV CODE 636 W HCPCS: Performed by: SURGERY

## 2021-05-12 PROCEDURE — 38792 RA TRACER ID OF SENTINL NODE: CPT | Mod: RT,,, | Performed by: SURGERY

## 2021-05-12 PROCEDURE — 76098 MAMMO BREAST SPECIMEN: ICD-10-PCS | Mod: 26,,, | Performed by: RADIOLOGY

## 2021-05-12 PROCEDURE — 37000009 HC ANESTHESIA EA ADD 15 MINS: Performed by: SURGERY

## 2021-05-12 PROCEDURE — 71000039 HC RECOVERY, EACH ADD'L HOUR: Performed by: SURGERY

## 2021-05-12 PROCEDURE — 71000033 HC RECOVERY, INTIAL HOUR: Performed by: SURGERY

## 2021-05-12 PROCEDURE — 76098 X-RAY EXAM SURGICAL SPECIMEN: CPT | Mod: 26,,, | Performed by: RADIOLOGY

## 2021-05-12 PROCEDURE — 36000707: Performed by: SURGERY

## 2021-05-12 PROCEDURE — 88342 IMHCHEM/IMCYTCHM 1ST ANTB: CPT | Mod: 26,,, | Performed by: PATHOLOGY

## 2021-05-12 PROCEDURE — 88307 PR  SURG PATH,LEVEL V: ICD-10-PCS | Mod: 26,,, | Performed by: PATHOLOGY

## 2021-05-12 PROCEDURE — 63600175 PHARM REV CODE 636 W HCPCS: Performed by: NURSE ANESTHETIST, CERTIFIED REGISTERED

## 2021-05-12 PROCEDURE — A9520 TC99 TILMANOCEPT DIAG 0.5MCI: HCPCS

## 2021-05-12 PROCEDURE — 25000003 PHARM REV CODE 250: Performed by: NURSE ANESTHETIST, CERTIFIED REGISTERED

## 2021-05-12 PROCEDURE — 37000008 HC ANESTHESIA 1ST 15 MINUTES: Performed by: SURGERY

## 2021-05-12 PROCEDURE — 36000706: Performed by: SURGERY

## 2021-05-12 PROCEDURE — 88341 IMHCHEM/IMCYTCHM EA ADD ANTB: CPT | Mod: 26,,, | Performed by: PATHOLOGY

## 2021-05-12 RX ORDER — MIDAZOLAM HYDROCHLORIDE 1 MG/ML
INJECTION INTRAMUSCULAR; INTRAVENOUS
Status: DISCONTINUED | OUTPATIENT
Start: 2021-05-12 | End: 2021-05-12

## 2021-05-12 RX ORDER — SODIUM CHLORIDE 0.9 % (FLUSH) 0.9 %
3 SYRINGE (ML) INJECTION
Status: DISCONTINUED | OUTPATIENT
Start: 2021-05-12 | End: 2021-05-12 | Stop reason: HOSPADM

## 2021-05-12 RX ORDER — PROPOFOL 10 MG/ML
VIAL (ML) INTRAVENOUS
Status: DISCONTINUED | OUTPATIENT
Start: 2021-05-12 | End: 2021-05-12

## 2021-05-12 RX ORDER — IBUPROFEN 600 MG/1
600 TABLET ORAL EVERY 6 HOURS PRN
Qty: 15 TABLET | Refills: 0 | Status: SHIPPED | OUTPATIENT
Start: 2021-05-12 | End: 2021-05-24

## 2021-05-12 RX ORDER — LIDOCAINE HYDROCHLORIDE 10 MG/ML
0.5 INJECTION, SOLUTION EPIDURAL; INFILTRATION; INTRACAUDAL; PERINEURAL ONCE
Status: DISCONTINUED | OUTPATIENT
Start: 2021-05-12 | End: 2021-05-12 | Stop reason: HOSPADM

## 2021-05-12 RX ORDER — PHENYLEPHRINE HYDROCHLORIDE 10 MG/ML
INJECTION INTRAVENOUS
Status: DISCONTINUED | OUTPATIENT
Start: 2021-05-12 | End: 2021-05-12

## 2021-05-12 RX ORDER — SODIUM CHLORIDE, SODIUM LACTATE, POTASSIUM CHLORIDE, CALCIUM CHLORIDE 600; 310; 30; 20 MG/100ML; MG/100ML; MG/100ML; MG/100ML
INJECTION, SOLUTION INTRAVENOUS CONTINUOUS
Status: DISCONTINUED | OUTPATIENT
Start: 2021-05-12 | End: 2021-05-12 | Stop reason: HOSPADM

## 2021-05-12 RX ORDER — HYDROMORPHONE HYDROCHLORIDE 2 MG/ML
0.4 INJECTION, SOLUTION INTRAMUSCULAR; INTRAVENOUS; SUBCUTANEOUS EVERY 5 MIN PRN
Status: DISCONTINUED | OUTPATIENT
Start: 2021-05-12 | End: 2021-05-12 | Stop reason: HOSPADM

## 2021-05-12 RX ORDER — BUPIVACAINE HYDROCHLORIDE 2.5 MG/ML
INJECTION, SOLUTION EPIDURAL; INFILTRATION; INTRACAUDAL
Status: DISCONTINUED | OUTPATIENT
Start: 2021-05-12 | End: 2021-05-12 | Stop reason: HOSPADM

## 2021-05-12 RX ORDER — SODIUM CHLORIDE 9 MG/ML
INJECTION, SOLUTION INTRAVENOUS CONTINUOUS
Status: DISCONTINUED | OUTPATIENT
Start: 2021-05-12 | End: 2021-05-12 | Stop reason: HOSPADM

## 2021-05-12 RX ORDER — FENTANYL CITRATE 50 UG/ML
INJECTION, SOLUTION INTRAMUSCULAR; INTRAVENOUS
Status: DISCONTINUED | OUTPATIENT
Start: 2021-05-12 | End: 2021-05-12

## 2021-05-12 RX ORDER — DEXAMETHASONE SODIUM PHOSPHATE 4 MG/ML
INJECTION, SOLUTION INTRA-ARTICULAR; INTRALESIONAL; INTRAMUSCULAR; INTRAVENOUS; SOFT TISSUE
Status: DISCONTINUED | OUTPATIENT
Start: 2021-05-12 | End: 2021-05-12

## 2021-05-12 RX ORDER — OXYCODONE HYDROCHLORIDE 5 MG/1
5 TABLET ORAL
Status: DISCONTINUED | OUTPATIENT
Start: 2021-05-12 | End: 2021-05-12 | Stop reason: HOSPADM

## 2021-05-12 RX ORDER — LIDOCAINE HYDROCHLORIDE 20 MG/ML
INJECTION INTRAVENOUS
Status: DISCONTINUED | OUTPATIENT
Start: 2021-05-12 | End: 2021-05-12

## 2021-05-12 RX ORDER — DIPHENHYDRAMINE HYDROCHLORIDE 50 MG/ML
12.5 INJECTION INTRAMUSCULAR; INTRAVENOUS EVERY 30 MIN PRN
Status: DISCONTINUED | OUTPATIENT
Start: 2021-05-12 | End: 2021-05-12 | Stop reason: HOSPADM

## 2021-05-12 RX ORDER — ONDANSETRON 2 MG/ML
INJECTION INTRAMUSCULAR; INTRAVENOUS
Status: DISCONTINUED | OUTPATIENT
Start: 2021-05-12 | End: 2021-05-12

## 2021-05-12 RX ORDER — ONDANSETRON 2 MG/ML
4 INJECTION INTRAMUSCULAR; INTRAVENOUS DAILY PRN
Status: DISCONTINUED | OUTPATIENT
Start: 2021-05-12 | End: 2021-05-12 | Stop reason: HOSPADM

## 2021-05-12 RX ORDER — CEFAZOLIN SODIUM 1 G/3ML
2 INJECTION, POWDER, FOR SOLUTION INTRAMUSCULAR; INTRAVENOUS
Status: COMPLETED | OUTPATIENT
Start: 2021-05-12 | End: 2021-05-12

## 2021-05-12 RX ADMIN — SODIUM CHLORIDE, SODIUM LACTATE, POTASSIUM CHLORIDE, AND CALCIUM CHLORIDE: 600; 310; 30; 20 INJECTION, SOLUTION INTRAVENOUS at 11:05

## 2021-05-12 RX ADMIN — ONDANSETRON HYDROCHLORIDE 4 MG: 2 INJECTION INTRAMUSCULAR; INTRAVENOUS at 11:05

## 2021-05-12 RX ADMIN — PROPOFOL 140 MG: 10 INJECTION, EMULSION INTRAVENOUS at 11:05

## 2021-05-12 RX ADMIN — FENTANYL CITRATE 50 MCG: 50 INJECTION, SOLUTION INTRAMUSCULAR; INTRAVENOUS at 11:05

## 2021-05-12 RX ADMIN — LIDOCAINE HYDROCHLORIDE 30 MG: 20 INJECTION, SOLUTION INTRAVENOUS at 11:05

## 2021-05-12 RX ADMIN — CEFAZOLIN 2 G: 330 INJECTION, POWDER, FOR SOLUTION INTRAMUSCULAR; INTRAVENOUS at 11:05

## 2021-05-12 RX ADMIN — PHENYLEPHRINE HYDROCHLORIDE 100 MCG: 10 INJECTION INTRAVENOUS at 11:05

## 2021-05-12 RX ADMIN — OXYCODONE 5 MG: 5 TABLET ORAL at 01:05

## 2021-05-12 RX ADMIN — MIDAZOLAM HYDROCHLORIDE 2 MG: 1 INJECTION, SOLUTION INTRAMUSCULAR; INTRAVENOUS at 11:05

## 2021-05-12 RX ADMIN — DEXAMETHASONE SODIUM PHOSPHATE 8 MG: 4 INJECTION, SOLUTION INTRAMUSCULAR; INTRAVENOUS at 11:05

## 2021-05-14 ENCOUNTER — TELEPHONE (OUTPATIENT)
Dept: SURGERY | Facility: CLINIC | Age: 71
End: 2021-05-14

## 2021-05-18 LAB
COMMENT: NORMAL
FINAL PATHOLOGIC DIAGNOSIS: NORMAL
GROSS: NORMAL
Lab: NORMAL

## 2021-05-24 ENCOUNTER — OFFICE VISIT (OUTPATIENT)
Dept: SURGERY | Facility: CLINIC | Age: 71
End: 2021-05-24
Payer: MEDICARE

## 2021-05-24 ENCOUNTER — DOCUMENTATION ONLY (OUTPATIENT)
Dept: HEMATOLOGY/ONCOLOGY | Facility: CLINIC | Age: 71
End: 2021-05-24

## 2021-05-24 VITALS
HEIGHT: 62 IN | SYSTOLIC BLOOD PRESSURE: 115 MMHG | HEART RATE: 70 BPM | DIASTOLIC BLOOD PRESSURE: 64 MMHG | WEIGHT: 121 LBS | BODY MASS INDEX: 22.26 KG/M2

## 2021-05-24 DIAGNOSIS — C50.911 INVASIVE DUCTAL CARCINOMA OF RIGHT BREAST IN FEMALE: Primary | ICD-10-CM

## 2021-05-24 PROCEDURE — 99024 POSTOP FOLLOW-UP VISIT: CPT | Mod: S$GLB,,, | Performed by: SURGERY

## 2021-05-24 PROCEDURE — 3008F PR BODY MASS INDEX (BMI) DOCUMENTED: ICD-10-PCS | Mod: CPTII,S$GLB,, | Performed by: SURGERY

## 2021-05-24 PROCEDURE — 1101F PT FALLS ASSESS-DOCD LE1/YR: CPT | Mod: CPTII,S$GLB,, | Performed by: SURGERY

## 2021-05-24 PROCEDURE — 99999 PR PBB SHADOW E&M-EST. PATIENT-LVL III: ICD-10-PCS | Mod: PBBFAC,,, | Performed by: SURGERY

## 2021-05-24 PROCEDURE — 3288F FALL RISK ASSESSMENT DOCD: CPT | Mod: CPTII,S$GLB,, | Performed by: SURGERY

## 2021-05-24 PROCEDURE — 99999 PR PBB SHADOW E&M-EST. PATIENT-LVL III: CPT | Mod: PBBFAC,,, | Performed by: SURGERY

## 2021-05-24 PROCEDURE — 1125F AMNT PAIN NOTED PAIN PRSNT: CPT | Mod: S$GLB,,, | Performed by: SURGERY

## 2021-05-24 PROCEDURE — 99024 PR POST-OP FOLLOW-UP VISIT: ICD-10-PCS | Mod: S$GLB,,, | Performed by: SURGERY

## 2021-05-24 PROCEDURE — 1125F PR PAIN SEVERITY QUANTIFIED, PAIN PRESENT: ICD-10-PCS | Mod: S$GLB,,, | Performed by: SURGERY

## 2021-05-24 PROCEDURE — 3008F BODY MASS INDEX DOCD: CPT | Mod: CPTII,S$GLB,, | Performed by: SURGERY

## 2021-05-24 PROCEDURE — 1101F PR PT FALLS ASSESS DOC 0-1 FALLS W/OUT INJ PAST YR: ICD-10-PCS | Mod: CPTII,S$GLB,, | Performed by: SURGERY

## 2021-05-24 PROCEDURE — 3288F PR FALLS RISK ASSESSMENT DOCUMENTED: ICD-10-PCS | Mod: CPTII,S$GLB,, | Performed by: SURGERY

## 2021-06-01 ENCOUNTER — DOCUMENTATION ONLY (OUTPATIENT)
Dept: HEMATOLOGY/ONCOLOGY | Facility: CLINIC | Age: 71
End: 2021-06-01

## 2021-06-01 ENCOUNTER — TUMOR BOARD CONFERENCE (OUTPATIENT)
Dept: SURGERY | Facility: CLINIC | Age: 71
End: 2021-06-01

## 2021-06-01 ENCOUNTER — PATIENT MESSAGE (OUTPATIENT)
Dept: SURGERY | Facility: CLINIC | Age: 71
End: 2021-06-01

## 2021-06-02 ENCOUNTER — DOCUMENTATION ONLY (OUTPATIENT)
Dept: HEMATOLOGY/ONCOLOGY | Facility: CLINIC | Age: 71
End: 2021-06-02

## 2021-06-02 ENCOUNTER — TELEPHONE (OUTPATIENT)
Dept: HEMATOLOGY/ONCOLOGY | Facility: CLINIC | Age: 71
End: 2021-06-02

## 2021-06-02 ENCOUNTER — OFFICE VISIT (OUTPATIENT)
Dept: HEMATOLOGY/ONCOLOGY | Facility: CLINIC | Age: 71
End: 2021-06-02
Payer: MEDICARE

## 2021-06-02 VITALS
SYSTOLIC BLOOD PRESSURE: 136 MMHG | HEART RATE: 74 BPM | RESPIRATION RATE: 18 BRPM | WEIGHT: 123.69 LBS | DIASTOLIC BLOOD PRESSURE: 77 MMHG | BODY MASS INDEX: 22.76 KG/M2 | HEIGHT: 62 IN

## 2021-06-02 DIAGNOSIS — C50.411 MALIGNANT NEOPLASM OF UPPER-OUTER QUADRANT OF RIGHT BREAST IN FEMALE, ESTROGEN RECEPTOR POSITIVE: Primary | ICD-10-CM

## 2021-06-02 DIAGNOSIS — Z17.0 MALIGNANT NEOPLASM OF UPPER-OUTER QUADRANT OF RIGHT BREAST IN FEMALE, ESTROGEN RECEPTOR POSITIVE: Primary | ICD-10-CM

## 2021-06-02 PROCEDURE — 3008F BODY MASS INDEX DOCD: CPT | Mod: CPTII,S$GLB,, | Performed by: INTERNAL MEDICINE

## 2021-06-02 PROCEDURE — 99999 PR PBB SHADOW E&M-EST. PATIENT-LVL III: CPT | Mod: PBBFAC,,, | Performed by: INTERNAL MEDICINE

## 2021-06-02 PROCEDURE — 1159F PR MEDICATION LIST DOCUMENTED IN MEDICAL RECORD: ICD-10-PCS | Mod: S$GLB,,, | Performed by: INTERNAL MEDICINE

## 2021-06-02 PROCEDURE — 3008F PR BODY MASS INDEX (BMI) DOCUMENTED: ICD-10-PCS | Mod: CPTII,S$GLB,, | Performed by: INTERNAL MEDICINE

## 2021-06-02 PROCEDURE — 1101F PT FALLS ASSESS-DOCD LE1/YR: CPT | Mod: CPTII,S$GLB,, | Performed by: INTERNAL MEDICINE

## 2021-06-02 PROCEDURE — 99205 OFFICE O/P NEW HI 60 MIN: CPT | Mod: S$GLB,,, | Performed by: INTERNAL MEDICINE

## 2021-06-02 PROCEDURE — 1159F MED LIST DOCD IN RCRD: CPT | Mod: S$GLB,,, | Performed by: INTERNAL MEDICINE

## 2021-06-02 PROCEDURE — 3288F PR FALLS RISK ASSESSMENT DOCUMENTED: ICD-10-PCS | Mod: CPTII,S$GLB,, | Performed by: INTERNAL MEDICINE

## 2021-06-02 PROCEDURE — 99205 PR OFFICE/OUTPT VISIT, NEW, LEVL V, 60-74 MIN: ICD-10-PCS | Mod: S$GLB,,, | Performed by: INTERNAL MEDICINE

## 2021-06-02 PROCEDURE — 1101F PR PT FALLS ASSESS DOC 0-1 FALLS W/OUT INJ PAST YR: ICD-10-PCS | Mod: CPTII,S$GLB,, | Performed by: INTERNAL MEDICINE

## 2021-06-02 PROCEDURE — 3288F FALL RISK ASSESSMENT DOCD: CPT | Mod: CPTII,S$GLB,, | Performed by: INTERNAL MEDICINE

## 2021-06-02 PROCEDURE — 1126F PR PAIN SEVERITY QUANTIFIED, NO PAIN PRESENT: ICD-10-PCS | Mod: S$GLB,,, | Performed by: INTERNAL MEDICINE

## 2021-06-02 PROCEDURE — 99999 PR PBB SHADOW E&M-EST. PATIENT-LVL III: ICD-10-PCS | Mod: PBBFAC,,, | Performed by: INTERNAL MEDICINE

## 2021-06-02 PROCEDURE — 1126F AMNT PAIN NOTED NONE PRSNT: CPT | Mod: S$GLB,,, | Performed by: INTERNAL MEDICINE

## 2021-06-04 ENCOUNTER — TELEPHONE (OUTPATIENT)
Dept: SURGERY | Facility: CLINIC | Age: 71
End: 2021-06-04

## 2021-06-11 ENCOUNTER — OFFICE VISIT (OUTPATIENT)
Dept: RADIATION ONCOLOGY | Facility: CLINIC | Age: 71
End: 2021-06-11
Payer: MEDICARE

## 2021-06-11 VITALS
TEMPERATURE: 98 F | HEIGHT: 62 IN | DIASTOLIC BLOOD PRESSURE: 67 MMHG | BODY MASS INDEX: 22.84 KG/M2 | SYSTOLIC BLOOD PRESSURE: 144 MMHG | HEART RATE: 71 BPM | WEIGHT: 124.13 LBS | RESPIRATION RATE: 16 BRPM

## 2021-06-11 DIAGNOSIS — C50.411 MALIGNANT NEOPLASM OF UPPER-OUTER QUADRANT OF RIGHT BREAST IN FEMALE, ESTROGEN RECEPTOR POSITIVE: Primary | ICD-10-CM

## 2021-06-11 DIAGNOSIS — Z17.0 MALIGNANT NEOPLASM OF UPPER-OUTER QUADRANT OF RIGHT BREAST IN FEMALE, ESTROGEN RECEPTOR POSITIVE: Primary | ICD-10-CM

## 2021-06-11 PROCEDURE — 1126F PR PAIN SEVERITY QUANTIFIED, NO PAIN PRESENT: ICD-10-PCS | Mod: S$GLB,,, | Performed by: RADIOLOGY

## 2021-06-11 PROCEDURE — 3288F PR FALLS RISK ASSESSMENT DOCUMENTED: ICD-10-PCS | Mod: CPTII,S$GLB,, | Performed by: RADIOLOGY

## 2021-06-11 PROCEDURE — 99204 OFFICE O/P NEW MOD 45 MIN: CPT | Mod: S$GLB,,, | Performed by: RADIOLOGY

## 2021-06-11 PROCEDURE — 99204 PR OFFICE/OUTPT VISIT, NEW, LEVL IV, 45-59 MIN: ICD-10-PCS | Mod: S$GLB,,, | Performed by: RADIOLOGY

## 2021-06-11 PROCEDURE — 1101F PR PT FALLS ASSESS DOC 0-1 FALLS W/OUT INJ PAST YR: ICD-10-PCS | Mod: CPTII,S$GLB,, | Performed by: RADIOLOGY

## 2021-06-11 PROCEDURE — 1101F PT FALLS ASSESS-DOCD LE1/YR: CPT | Mod: CPTII,S$GLB,, | Performed by: RADIOLOGY

## 2021-06-11 PROCEDURE — 1126F AMNT PAIN NOTED NONE PRSNT: CPT | Mod: S$GLB,,, | Performed by: RADIOLOGY

## 2021-06-11 PROCEDURE — 3008F BODY MASS INDEX DOCD: CPT | Mod: CPTII,S$GLB,, | Performed by: RADIOLOGY

## 2021-06-11 PROCEDURE — 1159F MED LIST DOCD IN RCRD: CPT | Mod: S$GLB,,, | Performed by: RADIOLOGY

## 2021-06-11 PROCEDURE — 1159F PR MEDICATION LIST DOCUMENTED IN MEDICAL RECORD: ICD-10-PCS | Mod: S$GLB,,, | Performed by: RADIOLOGY

## 2021-06-11 PROCEDURE — 3288F FALL RISK ASSESSMENT DOCD: CPT | Mod: CPTII,S$GLB,, | Performed by: RADIOLOGY

## 2021-06-11 PROCEDURE — 99999 PR PBB SHADOW E&M-EST. PATIENT-LVL III: ICD-10-PCS | Mod: PBBFAC,,, | Performed by: RADIOLOGY

## 2021-06-11 PROCEDURE — 3008F PR BODY MASS INDEX (BMI) DOCUMENTED: ICD-10-PCS | Mod: CPTII,S$GLB,, | Performed by: RADIOLOGY

## 2021-06-11 PROCEDURE — 99999 PR PBB SHADOW E&M-EST. PATIENT-LVL III: CPT | Mod: PBBFAC,,, | Performed by: RADIOLOGY

## 2021-06-24 ENCOUNTER — HOSPITAL ENCOUNTER (OUTPATIENT)
Dept: RADIATION THERAPY | Facility: HOSPITAL | Age: 71
Discharge: HOME | End: 2021-06-24
Attending: RADIOLOGY
Payer: MEDICARE

## 2021-06-24 PROCEDURE — 77334 RADIATION TREATMENT AID(S): CPT | Mod: 26,,, | Performed by: RADIOLOGY

## 2021-06-24 PROCEDURE — 77263 THER RADIOLOGY TX PLNG CPLX: CPT | Mod: ,,, | Performed by: RADIOLOGY

## 2021-06-24 PROCEDURE — 77290 THER RAD SIMULAJ FIELD CPLX: CPT | Mod: 26,,, | Performed by: RADIOLOGY

## 2021-06-24 PROCEDURE — 77014 HC CT GUIDANCE RADIATION THERAPY FLDS PLACEMENT: CPT | Mod: TC | Performed by: RADIOLOGY

## 2021-06-24 PROCEDURE — 77334 PR  RADN TREATMENT AID(S) COMPLX: ICD-10-PCS | Mod: 26,,, | Performed by: RADIOLOGY

## 2021-06-24 PROCEDURE — 77290 THER RAD SIMULAJ FIELD CPLX: CPT | Mod: TC | Performed by: RADIOLOGY

## 2021-06-24 PROCEDURE — 77290 PR  SET RADN THERAPY FIELD COMPLEX: ICD-10-PCS | Mod: 26,,, | Performed by: RADIOLOGY

## 2021-06-24 PROCEDURE — 77334 RADIATION TREATMENT AID(S): CPT | Mod: TC | Performed by: RADIOLOGY

## 2021-06-24 PROCEDURE — 77263 PR  RADIATION THERAPY PLAN COMPLEX: ICD-10-PCS | Mod: ,,, | Performed by: RADIOLOGY

## 2021-06-29 PROCEDURE — 77334 PR  RADN TREATMENT AID(S) COMPLX: ICD-10-PCS | Mod: 26,,, | Performed by: RADIOLOGY

## 2021-06-29 PROCEDURE — 77300 PR RADIATION THERAPY,DOSIMETRY PLAN: ICD-10-PCS | Mod: 26,,, | Performed by: RADIOLOGY

## 2021-06-29 PROCEDURE — 77295 PR 3D RADIOTHERAPY PLAN: ICD-10-PCS | Mod: 26,,, | Performed by: RADIOLOGY

## 2021-06-29 PROCEDURE — 77295 3-D RADIOTHERAPY PLAN: CPT | Mod: TC | Performed by: RADIOLOGY

## 2021-06-29 PROCEDURE — 77334 RADIATION TREATMENT AID(S): CPT | Mod: TC | Performed by: RADIOLOGY

## 2021-06-29 PROCEDURE — 77300 RADIATION THERAPY DOSE PLAN: CPT | Mod: TC | Performed by: RADIOLOGY

## 2021-06-29 PROCEDURE — 77334 RADIATION TREATMENT AID(S): CPT | Mod: 26,,, | Performed by: RADIOLOGY

## 2021-06-29 PROCEDURE — 77295 3-D RADIOTHERAPY PLAN: CPT | Mod: 26,,, | Performed by: RADIOLOGY

## 2021-06-29 PROCEDURE — 77300 RADIATION THERAPY DOSE PLAN: CPT | Mod: 26,,, | Performed by: RADIOLOGY

## 2021-07-01 ENCOUNTER — APPOINTMENT (OUTPATIENT)
Dept: RADIATION THERAPY | Facility: OTHER | Age: 71
End: 2021-07-01
Attending: RADIOLOGY
Payer: MEDICARE

## 2021-07-01 ENCOUNTER — HOSPITAL ENCOUNTER (OUTPATIENT)
Dept: RADIATION THERAPY | Facility: HOSPITAL | Age: 71
Discharge: HOME | End: 2021-07-01
Attending: RADIOLOGY
Payer: MEDICARE

## 2021-07-06 PROCEDURE — G6002 PR STEREOSCOPIC XRAY GUIDE FOR RADIATION TX DELIV: ICD-10-PCS | Mod: 26,,, | Performed by: RADIOLOGY

## 2021-07-06 PROCEDURE — 77417 THER RADIOLOGY PORT IMAGE(S): CPT | Performed by: RADIOLOGY

## 2021-07-06 PROCEDURE — 77412 RADIATION TX DELIVERY LVL 3: CPT | Performed by: RADIOLOGY

## 2021-07-06 PROCEDURE — 77387 GUIDANCE FOR RADJ TX DLVR: CPT | Mod: TC | Performed by: RADIOLOGY

## 2021-07-06 PROCEDURE — G6002 STEREOSCOPIC X-RAY GUIDANCE: HCPCS | Mod: 26,,, | Performed by: RADIOLOGY

## 2021-07-07 ENCOUNTER — DOCUMENTATION ONLY (OUTPATIENT)
Dept: RADIATION ONCOLOGY | Facility: CLINIC | Age: 71
End: 2021-07-07

## 2021-07-07 PROCEDURE — G6002 PR STEREOSCOPIC XRAY GUIDE FOR RADIATION TX DELIV: ICD-10-PCS | Mod: 26,,, | Performed by: RADIOLOGY

## 2021-07-07 PROCEDURE — 77387 GUIDANCE FOR RADJ TX DLVR: CPT | Mod: TC | Performed by: RADIOLOGY

## 2021-07-07 PROCEDURE — 77412 RADIATION TX DELIVERY LVL 3: CPT | Performed by: RADIOLOGY

## 2021-07-07 PROCEDURE — G6002 STEREOSCOPIC X-RAY GUIDANCE: HCPCS | Mod: 26,,, | Performed by: RADIOLOGY

## 2021-07-08 PROCEDURE — G6002 STEREOSCOPIC X-RAY GUIDANCE: HCPCS | Mod: 26,,, | Performed by: RADIOLOGY

## 2021-07-08 PROCEDURE — 77412 RADIATION TX DELIVERY LVL 3: CPT | Performed by: RADIOLOGY

## 2021-07-08 PROCEDURE — G6002 PR STEREOSCOPIC XRAY GUIDE FOR RADIATION TX DELIV: ICD-10-PCS | Mod: 26,,, | Performed by: RADIOLOGY

## 2021-07-08 PROCEDURE — 77387 GUIDANCE FOR RADJ TX DLVR: CPT | Mod: TC | Performed by: RADIOLOGY

## 2021-07-09 PROCEDURE — G6002 STEREOSCOPIC X-RAY GUIDANCE: HCPCS | Mod: 26,,, | Performed by: RADIOLOGY

## 2021-07-09 PROCEDURE — 77412 RADIATION TX DELIVERY LVL 3: CPT | Performed by: RADIOLOGY

## 2021-07-09 PROCEDURE — 77387 GUIDANCE FOR RADJ TX DLVR: CPT | Mod: TC | Performed by: RADIOLOGY

## 2021-07-09 PROCEDURE — G6002 PR STEREOSCOPIC XRAY GUIDE FOR RADIATION TX DELIV: ICD-10-PCS | Mod: 26,,, | Performed by: RADIOLOGY

## 2021-07-12 PROCEDURE — 77412 RADIATION TX DELIVERY LVL 3: CPT | Performed by: RADIOLOGY

## 2021-07-12 PROCEDURE — 77387 GUIDANCE FOR RADJ TX DLVR: CPT | Mod: TC | Performed by: RADIOLOGY

## 2021-07-12 PROCEDURE — G6002 STEREOSCOPIC X-RAY GUIDANCE: HCPCS | Mod: 26,,, | Performed by: RADIOLOGY

## 2021-07-12 PROCEDURE — 77336 RADIATION PHYSICS CONSULT: CPT | Performed by: RADIOLOGY

## 2021-07-12 PROCEDURE — G6002 PR STEREOSCOPIC XRAY GUIDE FOR RADIATION TX DELIV: ICD-10-PCS | Mod: 26,,, | Performed by: RADIOLOGY

## 2021-07-13 ENCOUNTER — DOCUMENTATION ONLY (OUTPATIENT)
Dept: RADIATION ONCOLOGY | Facility: CLINIC | Age: 71
End: 2021-07-13

## 2021-07-13 PROCEDURE — 77417 THER RADIOLOGY PORT IMAGE(S): CPT | Performed by: RADIOLOGY

## 2021-07-13 PROCEDURE — 77412 RADIATION TX DELIVERY LVL 3: CPT | Performed by: RADIOLOGY

## 2021-07-14 PROCEDURE — G6002 PR STEREOSCOPIC XRAY GUIDE FOR RADIATION TX DELIV: ICD-10-PCS | Mod: 26,,, | Performed by: RADIOLOGY

## 2021-07-14 PROCEDURE — G6002 STEREOSCOPIC X-RAY GUIDANCE: HCPCS | Mod: 26,,, | Performed by: RADIOLOGY

## 2021-07-14 PROCEDURE — 77412 RADIATION TX DELIVERY LVL 3: CPT | Performed by: RADIOLOGY

## 2021-07-14 PROCEDURE — 77387 GUIDANCE FOR RADJ TX DLVR: CPT | Mod: TC | Performed by: RADIOLOGY

## 2021-07-15 PROCEDURE — 77412 RADIATION TX DELIVERY LVL 3: CPT | Performed by: RADIOLOGY

## 2021-07-16 PROCEDURE — 77412 RADIATION TX DELIVERY LVL 3: CPT | Performed by: RADIOLOGY

## 2021-07-16 PROCEDURE — 77387 GUIDANCE FOR RADJ TX DLVR: CPT | Mod: TC | Performed by: RADIOLOGY

## 2021-07-16 PROCEDURE — G6002 PR STEREOSCOPIC XRAY GUIDE FOR RADIATION TX DELIV: ICD-10-PCS | Mod: 26,,, | Performed by: RADIOLOGY

## 2021-07-16 PROCEDURE — G6002 STEREOSCOPIC X-RAY GUIDANCE: HCPCS | Mod: 26,,, | Performed by: RADIOLOGY

## 2021-07-19 PROCEDURE — G6002 STEREOSCOPIC X-RAY GUIDANCE: HCPCS | Mod: 26,,, | Performed by: RADIOLOGY

## 2021-07-19 PROCEDURE — G6002 PR STEREOSCOPIC XRAY GUIDE FOR RADIATION TX DELIV: ICD-10-PCS | Mod: 26,,, | Performed by: RADIOLOGY

## 2021-07-19 PROCEDURE — 77412 RADIATION TX DELIVERY LVL 3: CPT | Performed by: RADIOLOGY

## 2021-07-19 PROCEDURE — 77387 GUIDANCE FOR RADJ TX DLVR: CPT | Mod: TC | Performed by: RADIOLOGY

## 2021-07-19 PROCEDURE — 77336 RADIATION PHYSICS CONSULT: CPT | Performed by: RADIOLOGY

## 2021-07-20 ENCOUNTER — DOCUMENTATION ONLY (OUTPATIENT)
Dept: RADIATION ONCOLOGY | Facility: CLINIC | Age: 71
End: 2021-07-20

## 2021-07-20 PROCEDURE — G6002 STEREOSCOPIC X-RAY GUIDANCE: HCPCS | Mod: 26,,, | Performed by: RADIOLOGY

## 2021-07-20 PROCEDURE — 77412 RADIATION TX DELIVERY LVL 3: CPT | Performed by: RADIOLOGY

## 2021-07-20 PROCEDURE — G6002 PR STEREOSCOPIC XRAY GUIDE FOR RADIATION TX DELIV: ICD-10-PCS | Mod: 26,,, | Performed by: RADIOLOGY

## 2021-07-20 PROCEDURE — 77387 GUIDANCE FOR RADJ TX DLVR: CPT | Mod: TC | Performed by: RADIOLOGY

## 2021-07-21 PROCEDURE — 77417 THER RADIOLOGY PORT IMAGE(S): CPT | Performed by: RADIOLOGY

## 2021-07-21 PROCEDURE — 77412 RADIATION TX DELIVERY LVL 3: CPT | Performed by: RADIOLOGY

## 2021-07-21 PROCEDURE — 77387 GUIDANCE FOR RADJ TX DLVR: CPT | Mod: TC | Performed by: RADIOLOGY

## 2021-07-21 PROCEDURE — G6002 STEREOSCOPIC X-RAY GUIDANCE: HCPCS | Mod: 26,,, | Performed by: RADIOLOGY

## 2021-07-21 PROCEDURE — G6002 PR STEREOSCOPIC XRAY GUIDE FOR RADIATION TX DELIV: ICD-10-PCS | Mod: 26,,, | Performed by: RADIOLOGY

## 2021-07-22 PROCEDURE — 77412 RADIATION TX DELIVERY LVL 3: CPT | Performed by: RADIOLOGY

## 2021-07-22 PROCEDURE — 77387 GUIDANCE FOR RADJ TX DLVR: CPT | Mod: TC | Performed by: RADIOLOGY

## 2021-07-22 PROCEDURE — G6002 STEREOSCOPIC X-RAY GUIDANCE: HCPCS | Mod: 26,,, | Performed by: RADIOLOGY

## 2021-07-22 PROCEDURE — G6002 PR STEREOSCOPIC XRAY GUIDE FOR RADIATION TX DELIV: ICD-10-PCS | Mod: 26,,, | Performed by: RADIOLOGY

## 2021-07-23 PROCEDURE — 77412 RADIATION TX DELIVERY LVL 3: CPT | Performed by: RADIOLOGY

## 2021-07-23 PROCEDURE — G6002 PR STEREOSCOPIC XRAY GUIDE FOR RADIATION TX DELIV: ICD-10-PCS | Mod: 26,,, | Performed by: RADIOLOGY

## 2021-07-23 PROCEDURE — 77387 GUIDANCE FOR RADJ TX DLVR: CPT | Mod: TC | Performed by: RADIOLOGY

## 2021-07-23 PROCEDURE — G6002 STEREOSCOPIC X-RAY GUIDANCE: HCPCS | Mod: 26,,, | Performed by: RADIOLOGY

## 2021-07-26 PROCEDURE — 77412 RADIATION TX DELIVERY LVL 3: CPT | Performed by: RADIOLOGY

## 2021-07-26 PROCEDURE — G6002 PR STEREOSCOPIC XRAY GUIDE FOR RADIATION TX DELIV: ICD-10-PCS | Mod: 26,,, | Performed by: RADIOLOGY

## 2021-07-26 PROCEDURE — 77336 RADIATION PHYSICS CONSULT: CPT | Performed by: RADIOLOGY

## 2021-07-26 PROCEDURE — G6002 STEREOSCOPIC X-RAY GUIDANCE: HCPCS | Mod: 26,,, | Performed by: RADIOLOGY

## 2021-07-26 PROCEDURE — 77387 GUIDANCE FOR RADJ TX DLVR: CPT | Mod: TC | Performed by: RADIOLOGY

## 2021-07-27 ENCOUNTER — DOCUMENTATION ONLY (OUTPATIENT)
Dept: RADIATION ONCOLOGY | Facility: CLINIC | Age: 71
End: 2021-07-27

## 2021-07-27 PROCEDURE — G6002 PR STEREOSCOPIC XRAY GUIDE FOR RADIATION TX DELIV: ICD-10-PCS | Mod: 26,,, | Performed by: RADIOLOGY

## 2021-07-27 PROCEDURE — G6002 STEREOSCOPIC X-RAY GUIDANCE: HCPCS | Mod: 26,,, | Performed by: RADIOLOGY

## 2021-07-27 PROCEDURE — 77412 RADIATION TX DELIVERY LVL 3: CPT | Performed by: RADIOLOGY

## 2021-07-27 PROCEDURE — 77387 GUIDANCE FOR RADJ TX DLVR: CPT | Mod: TC | Performed by: RADIOLOGY

## 2021-07-29 ENCOUNTER — PATIENT MESSAGE (OUTPATIENT)
Dept: HEMATOLOGY/ONCOLOGY | Facility: CLINIC | Age: 71
End: 2021-07-29

## 2021-07-29 ENCOUNTER — TELEPHONE (OUTPATIENT)
Dept: HEMATOLOGY/ONCOLOGY | Facility: CLINIC | Age: 71
End: 2021-07-29

## 2021-07-30 ENCOUNTER — PATIENT MESSAGE (OUTPATIENT)
Dept: HEMATOLOGY/ONCOLOGY | Facility: CLINIC | Age: 71
End: 2021-07-30

## 2021-07-30 DIAGNOSIS — Z17.0 MALIGNANT NEOPLASM OF UPPER-OUTER QUADRANT OF RIGHT BREAST IN FEMALE, ESTROGEN RECEPTOR POSITIVE: Primary | ICD-10-CM

## 2021-07-30 DIAGNOSIS — C50.411 MALIGNANT NEOPLASM OF UPPER-OUTER QUADRANT OF RIGHT BREAST IN FEMALE, ESTROGEN RECEPTOR POSITIVE: Primary | ICD-10-CM

## 2021-07-30 RX ORDER — LETROZOLE 2.5 MG/1
2.5 TABLET, FILM COATED ORAL DAILY
Qty: 30 TABLET | Refills: 11 | Status: SHIPPED | OUTPATIENT
Start: 2021-07-30 | End: 2021-08-29

## 2021-08-10 ENCOUNTER — PATIENT MESSAGE (OUTPATIENT)
Dept: PSYCHIATRY | Facility: CLINIC | Age: 71
End: 2021-08-10

## 2021-08-10 ENCOUNTER — TELEPHONE (OUTPATIENT)
Dept: RADIATION ONCOLOGY | Facility: CLINIC | Age: 71
End: 2021-08-10

## 2021-09-01 ENCOUNTER — TELEPHONE (OUTPATIENT)
Dept: RADIATION ONCOLOGY | Facility: CLINIC | Age: 71
End: 2021-09-01

## 2021-09-09 ENCOUNTER — TELEPHONE (OUTPATIENT)
Dept: HEMATOLOGY/ONCOLOGY | Facility: CLINIC | Age: 71
End: 2021-09-09

## 2021-09-13 ENCOUNTER — OFFICE VISIT (OUTPATIENT)
Dept: FAMILY MEDICINE | Facility: CLINIC | Age: 71
End: 2021-09-13
Payer: MEDICARE

## 2021-09-13 VITALS
WEIGHT: 123.44 LBS | OXYGEN SATURATION: 98 % | DIASTOLIC BLOOD PRESSURE: 64 MMHG | HEIGHT: 62 IN | BODY MASS INDEX: 22.71 KG/M2 | HEART RATE: 81 BPM | SYSTOLIC BLOOD PRESSURE: 118 MMHG

## 2021-09-13 DIAGNOSIS — Z85.3 HISTORY OF RIGHT BREAST CANCER: ICD-10-CM

## 2021-09-13 DIAGNOSIS — F41.9 ANXIETY DISORDER, UNSPECIFIED TYPE: ICD-10-CM

## 2021-09-13 DIAGNOSIS — H71.91 CHOLESTEATOMA OF RIGHT EAR: ICD-10-CM

## 2021-09-13 DIAGNOSIS — I10 BENIGN ESSENTIAL HYPERTENSION: Primary | ICD-10-CM

## 2021-09-13 DIAGNOSIS — M62.830 BACK MUSCLE SPASM: ICD-10-CM

## 2021-09-13 DIAGNOSIS — G89.29 CHRONIC RIGHT-SIDED LOW BACK PAIN WITHOUT SCIATICA: ICD-10-CM

## 2021-09-13 DIAGNOSIS — M54.50 CHRONIC RIGHT-SIDED LOW BACK PAIN WITHOUT SCIATICA: ICD-10-CM

## 2021-09-13 DIAGNOSIS — R42 VERTIGO: ICD-10-CM

## 2021-09-13 PROCEDURE — 1160F PR REVIEW ALL MEDS BY PRESCRIBER/CLIN PHARMACIST DOCUMENTED: ICD-10-PCS | Mod: CPTII,S$GLB,, | Performed by: FAMILY MEDICINE

## 2021-09-13 PROCEDURE — 3078F DIAST BP <80 MM HG: CPT | Mod: CPTII,S$GLB,, | Performed by: FAMILY MEDICINE

## 2021-09-13 PROCEDURE — 1159F PR MEDICATION LIST DOCUMENTED IN MEDICAL RECORD: ICD-10-PCS | Mod: CPTII,S$GLB,, | Performed by: FAMILY MEDICINE

## 2021-09-13 PROCEDURE — 99999 PR PBB SHADOW E&M-EST. PATIENT-LVL IV: CPT | Mod: PBBFAC,,, | Performed by: FAMILY MEDICINE

## 2021-09-13 PROCEDURE — 1159F MED LIST DOCD IN RCRD: CPT | Mod: CPTII,S$GLB,, | Performed by: FAMILY MEDICINE

## 2021-09-13 PROCEDURE — 3288F FALL RISK ASSESSMENT DOCD: CPT | Mod: CPTII,S$GLB,, | Performed by: FAMILY MEDICINE

## 2021-09-13 PROCEDURE — 1101F PR PT FALLS ASSESS DOC 0-1 FALLS W/OUT INJ PAST YR: ICD-10-PCS | Mod: CPTII,S$GLB,, | Performed by: FAMILY MEDICINE

## 2021-09-13 PROCEDURE — 99214 PR OFFICE/OUTPT VISIT, EST, LEVL IV, 30-39 MIN: ICD-10-PCS | Mod: S$GLB,,, | Performed by: FAMILY MEDICINE

## 2021-09-13 PROCEDURE — 99499 RISK ADDL DX/OHS AUDIT: ICD-10-PCS | Mod: HCNC,S$GLB,, | Performed by: FAMILY MEDICINE

## 2021-09-13 PROCEDURE — 1160F RVW MEDS BY RX/DR IN RCRD: CPT | Mod: CPTII,S$GLB,, | Performed by: FAMILY MEDICINE

## 2021-09-13 PROCEDURE — 99214 OFFICE O/P EST MOD 30 MIN: CPT | Mod: S$GLB,,, | Performed by: FAMILY MEDICINE

## 2021-09-13 PROCEDURE — 3074F PR MOST RECENT SYSTOLIC BLOOD PRESSURE < 130 MM HG: ICD-10-PCS | Mod: CPTII,S$GLB,, | Performed by: FAMILY MEDICINE

## 2021-09-13 PROCEDURE — 3044F HG A1C LEVEL LT 7.0%: CPT | Mod: CPTII,S$GLB,, | Performed by: FAMILY MEDICINE

## 2021-09-13 PROCEDURE — 99999 PR PBB SHADOW E&M-EST. PATIENT-LVL IV: ICD-10-PCS | Mod: PBBFAC,,, | Performed by: FAMILY MEDICINE

## 2021-09-13 PROCEDURE — 99499 UNLISTED E&M SERVICE: CPT | Mod: HCNC,S$GLB,, | Performed by: FAMILY MEDICINE

## 2021-09-13 PROCEDURE — 3078F PR MOST RECENT DIASTOLIC BLOOD PRESSURE < 80 MM HG: ICD-10-PCS | Mod: CPTII,S$GLB,, | Performed by: FAMILY MEDICINE

## 2021-09-13 PROCEDURE — 3008F BODY MASS INDEX DOCD: CPT | Mod: CPTII,S$GLB,, | Performed by: FAMILY MEDICINE

## 2021-09-13 PROCEDURE — 3288F PR FALLS RISK ASSESSMENT DOCUMENTED: ICD-10-PCS | Mod: CPTII,S$GLB,, | Performed by: FAMILY MEDICINE

## 2021-09-13 PROCEDURE — 1125F AMNT PAIN NOTED PAIN PRSNT: CPT | Mod: CPTII,S$GLB,, | Performed by: FAMILY MEDICINE

## 2021-09-13 PROCEDURE — 3074F SYST BP LT 130 MM HG: CPT | Mod: CPTII,S$GLB,, | Performed by: FAMILY MEDICINE

## 2021-09-13 PROCEDURE — 1125F PR PAIN SEVERITY QUANTIFIED, PAIN PRESENT: ICD-10-PCS | Mod: CPTII,S$GLB,, | Performed by: FAMILY MEDICINE

## 2021-09-13 PROCEDURE — 1101F PT FALLS ASSESS-DOCD LE1/YR: CPT | Mod: CPTII,S$GLB,, | Performed by: FAMILY MEDICINE

## 2021-09-13 PROCEDURE — 3044F PR MOST RECENT HEMOGLOBIN A1C LEVEL <7.0%: ICD-10-PCS | Mod: CPTII,S$GLB,, | Performed by: FAMILY MEDICINE

## 2021-09-13 PROCEDURE — 3008F PR BODY MASS INDEX (BMI) DOCUMENTED: ICD-10-PCS | Mod: CPTII,S$GLB,, | Performed by: FAMILY MEDICINE

## 2021-09-13 RX ORDER — AMLODIPINE BESYLATE 2.5 MG/1
2.5 TABLET ORAL DAILY
Qty: 90 TABLET | Refills: 4 | Status: SHIPPED | OUTPATIENT
Start: 2021-09-13 | End: 2022-05-06 | Stop reason: ALTCHOICE

## 2021-09-29 ENCOUNTER — PATIENT MESSAGE (OUTPATIENT)
Dept: PSYCHIATRY | Facility: CLINIC | Age: 71
End: 2021-09-29

## 2021-09-30 ENCOUNTER — IMMUNIZATION (OUTPATIENT)
Dept: INTERNAL MEDICINE | Facility: CLINIC | Age: 71
End: 2021-09-30
Payer: MEDICARE

## 2021-09-30 DIAGNOSIS — Z23 NEED FOR VACCINATION: Primary | ICD-10-CM

## 2021-09-30 PROCEDURE — 0003A COVID-19, MRNA, LNP-S, PF, 30 MCG/0.3 ML DOSE VACCINE: CPT | Mod: HCNC,PBBFAC | Performed by: FAMILY MEDICINE

## 2021-09-30 PROCEDURE — 91300 COVID-19, MRNA, LNP-S, PF, 30 MCG/0.3 ML DOSE VACCINE: CPT | Mod: HCNC,PBBFAC | Performed by: FAMILY MEDICINE

## 2021-10-20 ENCOUNTER — CLINICAL SUPPORT (OUTPATIENT)
Dept: REHABILITATION | Facility: HOSPITAL | Age: 71
End: 2021-10-20
Attending: FAMILY MEDICINE
Payer: MEDICARE

## 2021-10-20 DIAGNOSIS — M62.830 BACK MUSCLE SPASM: ICD-10-CM

## 2021-10-20 DIAGNOSIS — G89.29 CHRONIC RIGHT-SIDED LOW BACK PAIN WITHOUT SCIATICA: ICD-10-CM

## 2021-10-20 DIAGNOSIS — M62.89 MUSCLE TONE INCREASED: ICD-10-CM

## 2021-10-20 DIAGNOSIS — M54.50 CHRONIC RIGHT-SIDED LOW BACK PAIN WITHOUT SCIATICA: ICD-10-CM

## 2021-10-20 PROCEDURE — 97161 PT EVAL LOW COMPLEX 20 MIN: CPT | Mod: HCNC,PN

## 2021-11-09 ENCOUNTER — CLINICAL SUPPORT (OUTPATIENT)
Dept: REHABILITATION | Facility: HOSPITAL | Age: 71
End: 2021-11-09
Payer: MEDICARE

## 2021-11-09 DIAGNOSIS — G89.29 CHRONIC RIGHT-SIDED LOW BACK PAIN WITHOUT SCIATICA: ICD-10-CM

## 2021-11-09 DIAGNOSIS — M54.50 CHRONIC RIGHT-SIDED LOW BACK PAIN WITHOUT SCIATICA: ICD-10-CM

## 2021-11-09 DIAGNOSIS — M62.89 MUSCLE TONE INCREASED: ICD-10-CM

## 2021-11-09 PROCEDURE — 97112 NEUROMUSCULAR REEDUCATION: CPT | Mod: HCNC,PN

## 2021-11-15 ENCOUNTER — HOSPITAL ENCOUNTER (OUTPATIENT)
Dept: RADIOLOGY | Facility: HOSPITAL | Age: 71
Discharge: HOME | End: 2021-11-15
Attending: FAMILY MEDICINE
Payer: MEDICARE

## 2021-11-15 DIAGNOSIS — Z78.0 POSTMENOPAUSAL: ICD-10-CM

## 2021-11-15 PROCEDURE — 77080 DXA BONE DENSITY AXIAL: CPT | Mod: 26,HCNC,, | Performed by: RADIOLOGY

## 2021-11-15 PROCEDURE — 77080 DXA BONE DENSITY AXIAL: CPT | Mod: TC,HCNC

## 2021-11-15 PROCEDURE — 77080 DEXA BONE DENSITY SPINE HIP: ICD-10-PCS | Mod: 26,HCNC,, | Performed by: RADIOLOGY

## 2021-11-16 ENCOUNTER — OFFICE VISIT (OUTPATIENT)
Dept: HEMATOLOGY/ONCOLOGY | Facility: CLINIC | Age: 71
End: 2021-11-16
Payer: MEDICARE

## 2021-11-16 ENCOUNTER — OFFICE VISIT (OUTPATIENT)
Dept: SURGERY | Facility: CLINIC | Age: 71
End: 2021-11-16
Payer: MEDICARE

## 2021-11-16 VITALS
HEIGHT: 62 IN | SYSTOLIC BLOOD PRESSURE: 144 MMHG | WEIGHT: 121 LBS | BODY MASS INDEX: 22.26 KG/M2 | HEART RATE: 77 BPM | DIASTOLIC BLOOD PRESSURE: 66 MMHG

## 2021-11-16 VITALS
TEMPERATURE: 97 F | SYSTOLIC BLOOD PRESSURE: 167 MMHG | RESPIRATION RATE: 18 BRPM | OXYGEN SATURATION: 96 % | BODY MASS INDEX: 22.55 KG/M2 | HEART RATE: 82 BPM | DIASTOLIC BLOOD PRESSURE: 70 MMHG | WEIGHT: 122.56 LBS | HEIGHT: 62 IN

## 2021-11-16 DIAGNOSIS — Z17.0 MALIGNANT NEOPLASM OF UPPER-OUTER QUADRANT OF RIGHT BREAST IN FEMALE, ESTROGEN RECEPTOR POSITIVE: Primary | ICD-10-CM

## 2021-11-16 DIAGNOSIS — Z98.890 STATUS POST RIGHT BREAST LUMPECTOMY: ICD-10-CM

## 2021-11-16 DIAGNOSIS — C50.411 MALIGNANT NEOPLASM OF UPPER-OUTER QUADRANT OF RIGHT BREAST IN FEMALE, ESTROGEN RECEPTOR POSITIVE: Primary | ICD-10-CM

## 2021-11-16 DIAGNOSIS — Z12.31 ENCOUNTER FOR SCREENING MAMMOGRAM FOR BREAST CANCER: ICD-10-CM

## 2021-11-16 DIAGNOSIS — Z85.3 PERSONAL HISTORY OF BREAST CANCER: Primary | ICD-10-CM

## 2021-11-16 PROCEDURE — 3078F DIAST BP <80 MM HG: CPT | Mod: HCNC,CPTII,S$GLB, | Performed by: PHYSICIAN ASSISTANT

## 2021-11-16 PROCEDURE — 3077F PR MOST RECENT SYSTOLIC BLOOD PRESSURE >= 140 MM HG: ICD-10-PCS | Mod: HCNC,CPTII,S$GLB, | Performed by: INTERNAL MEDICINE

## 2021-11-16 PROCEDURE — 1101F PT FALLS ASSESS-DOCD LE1/YR: CPT | Mod: HCNC,CPTII,S$GLB, | Performed by: PHYSICIAN ASSISTANT

## 2021-11-16 PROCEDURE — 1101F PR PT FALLS ASSESS DOC 0-1 FALLS W/OUT INJ PAST YR: ICD-10-PCS | Mod: HCNC,CPTII,S$GLB, | Performed by: PHYSICIAN ASSISTANT

## 2021-11-16 PROCEDURE — 99213 PR OFFICE/OUTPT VISIT, EST, LEVL III, 20-29 MIN: ICD-10-PCS | Mod: HCNC,S$GLB,, | Performed by: INTERNAL MEDICINE

## 2021-11-16 PROCEDURE — 1159F MED LIST DOCD IN RCRD: CPT | Mod: HCNC,CPTII,S$GLB, | Performed by: PHYSICIAN ASSISTANT

## 2021-11-16 PROCEDURE — 99999 PR PBB SHADOW E&M-EST. PATIENT-LVL III: ICD-10-PCS | Mod: PBBFAC,HCNC,, | Performed by: INTERNAL MEDICINE

## 2021-11-16 PROCEDURE — 1160F PR REVIEW ALL MEDS BY PRESCRIBER/CLIN PHARMACIST DOCUMENTED: ICD-10-PCS | Mod: HCNC,CPTII,S$GLB, | Performed by: INTERNAL MEDICINE

## 2021-11-16 PROCEDURE — 3008F PR BODY MASS INDEX (BMI) DOCUMENTED: ICD-10-PCS | Mod: HCNC,CPTII,S$GLB, | Performed by: INTERNAL MEDICINE

## 2021-11-16 PROCEDURE — 3077F SYST BP >= 140 MM HG: CPT | Mod: HCNC,CPTII,S$GLB, | Performed by: PHYSICIAN ASSISTANT

## 2021-11-16 PROCEDURE — 3044F PR MOST RECENT HEMOGLOBIN A1C LEVEL <7.0%: ICD-10-PCS | Mod: HCNC,CPTII,S$GLB, | Performed by: INTERNAL MEDICINE

## 2021-11-16 PROCEDURE — 3008F PR BODY MASS INDEX (BMI) DOCUMENTED: ICD-10-PCS | Mod: HCNC,CPTII,S$GLB, | Performed by: PHYSICIAN ASSISTANT

## 2021-11-16 PROCEDURE — 1160F PR REVIEW ALL MEDS BY PRESCRIBER/CLIN PHARMACIST DOCUMENTED: ICD-10-PCS | Mod: HCNC,CPTII,S$GLB, | Performed by: PHYSICIAN ASSISTANT

## 2021-11-16 PROCEDURE — 1160F RVW MEDS BY RX/DR IN RCRD: CPT | Mod: HCNC,CPTII,S$GLB, | Performed by: PHYSICIAN ASSISTANT

## 2021-11-16 PROCEDURE — 1126F PR PAIN SEVERITY QUANTIFIED, NO PAIN PRESENT: ICD-10-PCS | Mod: HCNC,CPTII,S$GLB, | Performed by: INTERNAL MEDICINE

## 2021-11-16 PROCEDURE — 3008F BODY MASS INDEX DOCD: CPT | Mod: HCNC,CPTII,S$GLB, | Performed by: INTERNAL MEDICINE

## 2021-11-16 PROCEDURE — 3044F PR MOST RECENT HEMOGLOBIN A1C LEVEL <7.0%: ICD-10-PCS | Mod: HCNC,CPTII,S$GLB, | Performed by: PHYSICIAN ASSISTANT

## 2021-11-16 PROCEDURE — 3078F PR MOST RECENT DIASTOLIC BLOOD PRESSURE < 80 MM HG: ICD-10-PCS | Mod: HCNC,CPTII,S$GLB, | Performed by: INTERNAL MEDICINE

## 2021-11-16 PROCEDURE — 3077F PR MOST RECENT SYSTOLIC BLOOD PRESSURE >= 140 MM HG: ICD-10-PCS | Mod: HCNC,CPTII,S$GLB, | Performed by: PHYSICIAN ASSISTANT

## 2021-11-16 PROCEDURE — 1159F MED LIST DOCD IN RCRD: CPT | Mod: HCNC,CPTII,S$GLB, | Performed by: INTERNAL MEDICINE

## 2021-11-16 PROCEDURE — 3078F DIAST BP <80 MM HG: CPT | Mod: HCNC,CPTII,S$GLB, | Performed by: INTERNAL MEDICINE

## 2021-11-16 PROCEDURE — 3044F HG A1C LEVEL LT 7.0%: CPT | Mod: HCNC,CPTII,S$GLB, | Performed by: INTERNAL MEDICINE

## 2021-11-16 PROCEDURE — 1125F PR PAIN SEVERITY QUANTIFIED, PAIN PRESENT: ICD-10-PCS | Mod: HCNC,CPTII,S$GLB, | Performed by: PHYSICIAN ASSISTANT

## 2021-11-16 PROCEDURE — 1159F PR MEDICATION LIST DOCUMENTED IN MEDICAL RECORD: ICD-10-PCS | Mod: HCNC,CPTII,S$GLB, | Performed by: PHYSICIAN ASSISTANT

## 2021-11-16 PROCEDURE — 99213 OFFICE O/P EST LOW 20 MIN: CPT | Mod: HCNC,S$GLB,, | Performed by: PHYSICIAN ASSISTANT

## 2021-11-16 PROCEDURE — 99213 PR OFFICE/OUTPT VISIT, EST, LEVL III, 20-29 MIN: ICD-10-PCS | Mod: HCNC,S$GLB,, | Performed by: PHYSICIAN ASSISTANT

## 2021-11-16 PROCEDURE — 3044F HG A1C LEVEL LT 7.0%: CPT | Mod: HCNC,CPTII,S$GLB, | Performed by: PHYSICIAN ASSISTANT

## 2021-11-16 PROCEDURE — 1160F RVW MEDS BY RX/DR IN RCRD: CPT | Mod: HCNC,CPTII,S$GLB, | Performed by: INTERNAL MEDICINE

## 2021-11-16 PROCEDURE — 99999 PR PBB SHADOW E&M-EST. PATIENT-LVL III: CPT | Mod: PBBFAC,HCNC,, | Performed by: PHYSICIAN ASSISTANT

## 2021-11-16 PROCEDURE — 3288F FALL RISK ASSESSMENT DOCD: CPT | Mod: HCNC,CPTII,S$GLB, | Performed by: PHYSICIAN ASSISTANT

## 2021-11-16 PROCEDURE — 1159F PR MEDICATION LIST DOCUMENTED IN MEDICAL RECORD: ICD-10-PCS | Mod: HCNC,CPTII,S$GLB, | Performed by: INTERNAL MEDICINE

## 2021-11-16 PROCEDURE — 3288F PR FALLS RISK ASSESSMENT DOCUMENTED: ICD-10-PCS | Mod: HCNC,CPTII,S$GLB, | Performed by: PHYSICIAN ASSISTANT

## 2021-11-16 PROCEDURE — 99999 PR PBB SHADOW E&M-EST. PATIENT-LVL III: ICD-10-PCS | Mod: PBBFAC,HCNC,, | Performed by: PHYSICIAN ASSISTANT

## 2021-11-16 PROCEDURE — 1126F AMNT PAIN NOTED NONE PRSNT: CPT | Mod: HCNC,CPTII,S$GLB, | Performed by: INTERNAL MEDICINE

## 2021-11-16 PROCEDURE — 99213 OFFICE O/P EST LOW 20 MIN: CPT | Mod: HCNC,S$GLB,, | Performed by: INTERNAL MEDICINE

## 2021-11-16 PROCEDURE — 3008F BODY MASS INDEX DOCD: CPT | Mod: HCNC,CPTII,S$GLB, | Performed by: PHYSICIAN ASSISTANT

## 2021-11-16 PROCEDURE — 99999 PR PBB SHADOW E&M-EST. PATIENT-LVL III: CPT | Mod: PBBFAC,HCNC,, | Performed by: INTERNAL MEDICINE

## 2021-11-16 PROCEDURE — 3078F PR MOST RECENT DIASTOLIC BLOOD PRESSURE < 80 MM HG: ICD-10-PCS | Mod: HCNC,CPTII,S$GLB, | Performed by: PHYSICIAN ASSISTANT

## 2021-11-16 PROCEDURE — 3077F SYST BP >= 140 MM HG: CPT | Mod: HCNC,CPTII,S$GLB, | Performed by: INTERNAL MEDICINE

## 2021-11-16 PROCEDURE — 1125F AMNT PAIN NOTED PAIN PRSNT: CPT | Mod: HCNC,CPTII,S$GLB, | Performed by: PHYSICIAN ASSISTANT

## 2021-11-16 RX ORDER — LETROZOLE 2.5 MG/1
TABLET, FILM COATED ORAL
COMMUNITY
Start: 2021-10-22 | End: 2022-05-18

## 2021-12-02 ENCOUNTER — DOCUMENTATION ONLY (OUTPATIENT)
Dept: REHABILITATION | Facility: HOSPITAL | Age: 71
End: 2021-12-02
Payer: MEDICARE

## 2022-03-08 ENCOUNTER — PATIENT MESSAGE (OUTPATIENT)
Dept: HEMATOLOGY/ONCOLOGY | Facility: CLINIC | Age: 72
End: 2022-03-08
Payer: MEDICARE

## 2022-03-21 ENCOUNTER — TELEPHONE (OUTPATIENT)
Dept: HEMATOLOGY/ONCOLOGY | Facility: CLINIC | Age: 72
End: 2022-03-21
Payer: MEDICARE

## 2022-03-21 NOTE — TELEPHONE ENCOUNTER
Returned pt call. appt rescheduled per pt request. Mammogram previously scheduled to 3/30/22. Pt agreeable to plan and verbalized understanding.             ----- Message from Sima Patel sent at 3/21/2022  4:16 PM CDT -----  Regarding: Appt  Contact: [phone]  Patient Keo calling. Patient would like to schedule an appt with doctor the first week in May. No available appts are showing. Please call patient to schedule at [phone]        Thank You

## 2022-03-30 ENCOUNTER — HOSPITAL ENCOUNTER (OUTPATIENT)
Dept: RADIOLOGY | Facility: HOSPITAL | Age: 72
Discharge: HOME | End: 2022-03-30
Attending: PHYSICIAN ASSISTANT
Payer: MEDICARE

## 2022-03-30 DIAGNOSIS — Z98.890 STATUS POST RIGHT BREAST LUMPECTOMY: ICD-10-CM

## 2022-03-30 DIAGNOSIS — Z85.3 PERSONAL HISTORY OF BREAST CANCER: ICD-10-CM

## 2022-03-30 DIAGNOSIS — Z12.31 ENCOUNTER FOR SCREENING MAMMOGRAM FOR BREAST CANCER: ICD-10-CM

## 2022-03-30 PROCEDURE — 77063 MAMMO DIGITAL SCREENING BILAT WITH TOMO: ICD-10-PCS | Mod: 26,,, | Performed by: RADIOLOGY

## 2022-03-30 PROCEDURE — 77063 BREAST TOMOSYNTHESIS BI: CPT | Mod: 26,,, | Performed by: RADIOLOGY

## 2022-03-30 PROCEDURE — 77067 SCR MAMMO BI INCL CAD: CPT | Mod: 26,,, | Performed by: RADIOLOGY

## 2022-03-30 PROCEDURE — 77067 SCR MAMMO BI INCL CAD: CPT | Mod: TC

## 2022-03-30 PROCEDURE — 77067 MAMMO DIGITAL SCREENING BILAT WITH TOMO: ICD-10-PCS | Mod: 26,,, | Performed by: RADIOLOGY

## 2022-03-30 PROCEDURE — 77063 BREAST TOMOSYNTHESIS BI: CPT | Mod: TC

## 2022-04-30 ENCOUNTER — PATIENT MESSAGE (OUTPATIENT)
Dept: FAMILY MEDICINE | Facility: CLINIC | Age: 72
End: 2022-04-30
Payer: MEDICARE

## 2022-04-30 DIAGNOSIS — I10 BENIGN ESSENTIAL HYPERTENSION: ICD-10-CM

## 2022-04-30 DIAGNOSIS — M81.0 POSTMENOPAUSAL OSTEOPOROSIS: Primary | ICD-10-CM

## 2022-04-30 DIAGNOSIS — Z79.899 MEDICATION MANAGEMENT: ICD-10-CM

## 2022-05-02 NOTE — TELEPHONE ENCOUNTER
I have placed all requested lab orders except for vitamin-D-due to current restrictions on vitamin-D testing due to COVID test tube shortages, cannot order this test as her level last year looked perfect.  I have placed orders for the other labs and we will discuss at her upcoming visit.  She should continue her same dose of vitamin-D that she has been taking, thanks

## 2022-05-03 ENCOUNTER — LAB VISIT (OUTPATIENT)
Dept: LAB | Facility: HOSPITAL | Age: 72
End: 2022-05-03
Attending: FAMILY MEDICINE
Payer: MEDICARE

## 2022-05-03 DIAGNOSIS — Z79.899 MEDICATION MANAGEMENT: ICD-10-CM

## 2022-05-03 DIAGNOSIS — I10 BENIGN ESSENTIAL HYPERTENSION: ICD-10-CM

## 2022-05-03 DIAGNOSIS — M81.0 POSTMENOPAUSAL OSTEOPOROSIS: ICD-10-CM

## 2022-05-03 LAB
ALBUMIN SERPL BCP-MCNC: 4 G/DL (ref 3.5–5.2)
ALP SERPL-CCNC: 86 U/L (ref 55–135)
ALT SERPL W/O P-5'-P-CCNC: 18 U/L (ref 10–44)
ANION GAP SERPL CALC-SCNC: 13 MMOL/L (ref 8–16)
AST SERPL-CCNC: 24 U/L (ref 10–40)
BASOPHILS # BLD AUTO: 0.08 K/UL (ref 0–0.2)
BASOPHILS NFR BLD: 1.5 % (ref 0–1.9)
BILIRUB SERPL-MCNC: 0.6 MG/DL (ref 0.1–1)
BUN SERPL-MCNC: 9 MG/DL (ref 8–23)
CALCIUM SERPL-MCNC: 9.9 MG/DL (ref 8.7–10.5)
CHLORIDE SERPL-SCNC: 102 MMOL/L (ref 95–110)
CHOLEST SERPL-MCNC: 204 MG/DL (ref 120–199)
CHOLEST/HDLC SERPL: 4.4 {RATIO} (ref 2–5)
CO2 SERPL-SCNC: 23 MMOL/L (ref 23–29)
CREAT SERPL-MCNC: 0.6 MG/DL (ref 0.5–1.4)
DIFFERENTIAL METHOD: NORMAL
EOSINOPHIL # BLD AUTO: 0.4 K/UL (ref 0–0.5)
EOSINOPHIL NFR BLD: 7.7 % (ref 0–8)
ERYTHROCYTE [DISTWIDTH] IN BLOOD BY AUTOMATED COUNT: 12.2 % (ref 11.5–14.5)
EST. GFR  (AFRICAN AMERICAN): >60 ML/MIN/1.73 M^2
EST. GFR  (NON AFRICAN AMERICAN): >60 ML/MIN/1.73 M^2
ESTIMATED AVG GLUCOSE: 117 MG/DL (ref 68–131)
GLUCOSE SERPL-MCNC: 84 MG/DL (ref 70–110)
HBA1C MFR BLD: 5.7 % (ref 4–5.6)
HCT VFR BLD AUTO: 41.4 % (ref 37–48.5)
HDLC SERPL-MCNC: 46 MG/DL (ref 40–75)
HDLC SERPL: 22.5 % (ref 20–50)
HGB BLD-MCNC: 13.3 G/DL (ref 12–16)
IMM GRANULOCYTES # BLD AUTO: 0.01 K/UL (ref 0–0.04)
IMM GRANULOCYTES NFR BLD AUTO: 0.2 % (ref 0–0.5)
LDLC SERPL CALC-MCNC: 134.6 MG/DL (ref 63–159)
LYMPHOCYTES # BLD AUTO: 1.5 K/UL (ref 1–4.8)
LYMPHOCYTES NFR BLD: 28.3 % (ref 18–48)
MAGNESIUM SERPL-MCNC: 1.9 MG/DL (ref 1.6–2.6)
MCH RBC QN AUTO: 27.8 PG (ref 27–31)
MCHC RBC AUTO-ENTMCNC: 32.1 G/DL (ref 32–36)
MCV RBC AUTO: 86 FL (ref 82–98)
MONOCYTES # BLD AUTO: 0.4 K/UL (ref 0.3–1)
MONOCYTES NFR BLD: 7.7 % (ref 4–15)
NEUTROPHILS # BLD AUTO: 3 K/UL (ref 1.8–7.7)
NEUTROPHILS NFR BLD: 54.6 % (ref 38–73)
NONHDLC SERPL-MCNC: 158 MG/DL
NRBC BLD-RTO: 0 /100 WBC
PLATELET # BLD AUTO: 175 K/UL (ref 150–450)
PLATELET BLD QL SMEAR: NORMAL
PMV BLD AUTO: NORMAL FL (ref 9.2–12.9)
POTASSIUM SERPL-SCNC: 4 MMOL/L (ref 3.5–5.1)
PROT SERPL-MCNC: 7.8 G/DL (ref 6–8.4)
RBC # BLD AUTO: 4.79 M/UL (ref 4–5.4)
SODIUM SERPL-SCNC: 138 MMOL/L (ref 136–145)
TRIGL SERPL-MCNC: 117 MG/DL (ref 30–150)
TSH SERPL DL<=0.005 MIU/L-ACNC: 3.51 UIU/ML (ref 0.4–4)
VIT B12 SERPL-MCNC: 314 PG/ML (ref 210–950)
WBC # BLD AUTO: 5.44 K/UL (ref 3.9–12.7)

## 2022-05-03 PROCEDURE — 82607 VITAMIN B-12: CPT | Performed by: FAMILY MEDICINE

## 2022-05-03 PROCEDURE — 36415 COLL VENOUS BLD VENIPUNCTURE: CPT | Performed by: FAMILY MEDICINE

## 2022-05-03 PROCEDURE — 84443 ASSAY THYROID STIM HORMONE: CPT | Performed by: FAMILY MEDICINE

## 2022-05-03 PROCEDURE — 80053 COMPREHEN METABOLIC PANEL: CPT | Performed by: FAMILY MEDICINE

## 2022-05-03 PROCEDURE — 85025 COMPLETE CBC W/AUTO DIFF WBC: CPT | Performed by: FAMILY MEDICINE

## 2022-05-03 PROCEDURE — 80061 LIPID PANEL: CPT | Performed by: FAMILY MEDICINE

## 2022-05-03 PROCEDURE — 83735 ASSAY OF MAGNESIUM: CPT | Performed by: FAMILY MEDICINE

## 2022-05-03 PROCEDURE — 83036 HEMOGLOBIN GLYCOSYLATED A1C: CPT | Performed by: FAMILY MEDICINE

## 2022-05-06 ENCOUNTER — OFFICE VISIT (OUTPATIENT)
Dept: FAMILY MEDICINE | Facility: CLINIC | Age: 72
End: 2022-05-06
Payer: MEDICARE

## 2022-05-06 VITALS
WEIGHT: 121.94 LBS | OXYGEN SATURATION: 97 % | HEIGHT: 62 IN | HEART RATE: 78 BPM | BODY MASS INDEX: 22.44 KG/M2 | DIASTOLIC BLOOD PRESSURE: 71 MMHG | SYSTOLIC BLOOD PRESSURE: 112 MMHG | TEMPERATURE: 98 F

## 2022-05-06 DIAGNOSIS — Z79.899 MEDICATION MANAGEMENT: ICD-10-CM

## 2022-05-06 DIAGNOSIS — H90.5 SENSORINEURAL HEARING LOSS (SNHL), UNSPECIFIED LATERALITY: ICD-10-CM

## 2022-05-06 DIAGNOSIS — Z23 NEED FOR SHINGLES VACCINE: ICD-10-CM

## 2022-05-06 DIAGNOSIS — C50.411 MALIGNANT NEOPLASM OF UPPER-OUTER QUADRANT OF RIGHT BREAST IN FEMALE, ESTROGEN RECEPTOR POSITIVE: ICD-10-CM

## 2022-05-06 DIAGNOSIS — E55.9 VITAMIN D DEFICIENCY: ICD-10-CM

## 2022-05-06 DIAGNOSIS — M81.0 POSTMENOPAUSAL OSTEOPOROSIS: ICD-10-CM

## 2022-05-06 DIAGNOSIS — Z17.0 MALIGNANT NEOPLASM OF UPPER-OUTER QUADRANT OF RIGHT BREAST IN FEMALE, ESTROGEN RECEPTOR POSITIVE: ICD-10-CM

## 2022-05-06 DIAGNOSIS — I10 BENIGN ESSENTIAL HYPERTENSION: ICD-10-CM

## 2022-05-06 DIAGNOSIS — Z79.811 AROMATASE INHIBITOR USE: ICD-10-CM

## 2022-05-06 DIAGNOSIS — Z00.00 ROUTINE GENERAL MEDICAL EXAMINATION AT A HEALTH CARE FACILITY: Primary | ICD-10-CM

## 2022-05-06 PROCEDURE — 99397 PER PM REEVAL EST PAT 65+ YR: CPT | Mod: S$GLB,,, | Performed by: FAMILY MEDICINE

## 2022-05-06 PROCEDURE — 1159F MED LIST DOCD IN RCRD: CPT | Mod: CPTII,S$GLB,, | Performed by: FAMILY MEDICINE

## 2022-05-06 PROCEDURE — 3078F DIAST BP <80 MM HG: CPT | Mod: CPTII,S$GLB,, | Performed by: FAMILY MEDICINE

## 2022-05-06 PROCEDURE — 3044F HG A1C LEVEL LT 7.0%: CPT | Mod: CPTII,S$GLB,, | Performed by: FAMILY MEDICINE

## 2022-05-06 PROCEDURE — 3074F PR MOST RECENT SYSTOLIC BLOOD PRESSURE < 130 MM HG: ICD-10-PCS | Mod: CPTII,S$GLB,, | Performed by: FAMILY MEDICINE

## 2022-05-06 PROCEDURE — 3008F BODY MASS INDEX DOCD: CPT | Mod: CPTII,S$GLB,, | Performed by: FAMILY MEDICINE

## 2022-05-06 PROCEDURE — 3074F SYST BP LT 130 MM HG: CPT | Mod: CPTII,S$GLB,, | Performed by: FAMILY MEDICINE

## 2022-05-06 PROCEDURE — 99999 PR PBB SHADOW E&M-EST. PATIENT-LVL IV: ICD-10-PCS | Mod: PBBFAC,,, | Performed by: FAMILY MEDICINE

## 2022-05-06 PROCEDURE — 3008F PR BODY MASS INDEX (BMI) DOCUMENTED: ICD-10-PCS | Mod: CPTII,S$GLB,, | Performed by: FAMILY MEDICINE

## 2022-05-06 PROCEDURE — 3044F PR MOST RECENT HEMOGLOBIN A1C LEVEL <7.0%: ICD-10-PCS | Mod: CPTII,S$GLB,, | Performed by: FAMILY MEDICINE

## 2022-05-06 PROCEDURE — 99397 PR PREVENTIVE VISIT,EST,65 & OVER: ICD-10-PCS | Mod: S$GLB,,, | Performed by: FAMILY MEDICINE

## 2022-05-06 PROCEDURE — 1101F PR PT FALLS ASSESS DOC 0-1 FALLS W/OUT INJ PAST YR: ICD-10-PCS | Mod: CPTII,S$GLB,, | Performed by: FAMILY MEDICINE

## 2022-05-06 PROCEDURE — 99999 PR PBB SHADOW E&M-EST. PATIENT-LVL IV: CPT | Mod: PBBFAC,,, | Performed by: FAMILY MEDICINE

## 2022-05-06 PROCEDURE — 3288F FALL RISK ASSESSMENT DOCD: CPT | Mod: CPTII,S$GLB,, | Performed by: FAMILY MEDICINE

## 2022-05-06 PROCEDURE — 1160F RVW MEDS BY RX/DR IN RCRD: CPT | Mod: CPTII,S$GLB,, | Performed by: FAMILY MEDICINE

## 2022-05-06 PROCEDURE — 3078F PR MOST RECENT DIASTOLIC BLOOD PRESSURE < 80 MM HG: ICD-10-PCS | Mod: CPTII,S$GLB,, | Performed by: FAMILY MEDICINE

## 2022-05-06 PROCEDURE — 1160F PR REVIEW ALL MEDS BY PRESCRIBER/CLIN PHARMACIST DOCUMENTED: ICD-10-PCS | Mod: CPTII,S$GLB,, | Performed by: FAMILY MEDICINE

## 2022-05-06 PROCEDURE — 3288F PR FALLS RISK ASSESSMENT DOCUMENTED: ICD-10-PCS | Mod: CPTII,S$GLB,, | Performed by: FAMILY MEDICINE

## 2022-05-06 PROCEDURE — 1101F PT FALLS ASSESS-DOCD LE1/YR: CPT | Mod: CPTII,S$GLB,, | Performed by: FAMILY MEDICINE

## 2022-05-06 PROCEDURE — 1126F PR PAIN SEVERITY QUANTIFIED, NO PAIN PRESENT: ICD-10-PCS | Mod: CPTII,S$GLB,, | Performed by: FAMILY MEDICINE

## 2022-05-06 PROCEDURE — 1159F PR MEDICATION LIST DOCUMENTED IN MEDICAL RECORD: ICD-10-PCS | Mod: CPTII,S$GLB,, | Performed by: FAMILY MEDICINE

## 2022-05-06 PROCEDURE — 1126F AMNT PAIN NOTED NONE PRSNT: CPT | Mod: CPTII,S$GLB,, | Performed by: FAMILY MEDICINE

## 2022-05-06 NOTE — PATIENT INSTRUCTIONS
ADVISE LOOKING INTO NEW 2-PART, NON-LIVE SHINGRIX SHINGLES VACCINE THROUGH YOUR LOCAL PHARMACY.       ADVISE every 6 month PROLIA shots with calcium and vit D to treat Osteoporosis.    Consider adding ALIGN probiotic daily to help with gas bloating along with increased water consumption.

## 2022-05-06 NOTE — PROGRESS NOTES
Office Visit    Patient Name: Keo Frias    : 1950  MRN: [id number]    Subjective:  Keo is a 71 y.o. female who presents today for:    Annual Exam (Patient is over due for shingles vaccine, and covid vaccine booster. )    Keo Frias presents today for annual wellness exam and monitoring of chronic conditions: H/O subclinical hypothyroidism w/ (-) TPO antibodies 17, h/o anemia and vitamin D deficiency, migraine headaches, allergic rhinitis,  osteoporosis.   Recently diagnosed with invasive ductal carcinoma of the right breast and had a partial mastectomy w/ (-) SN BX May 12, 2021 per breast surgeon Dr. Cadena.      She has been feeling overall well.     BP stable on amlodipine 2.5 mg daily-- has had some recent lower readings.   She also attributes some of her improvement to being on BuSpar for management of anxiety.   No recent dizziness and migraines improved on Migraine diet.     Labs prior to office visit 2022 unremarkable including normal CBC, CMP, TSH, normal magnesium/B12, vitamin-D not checked this year secondary to lab restrictions. A1c stable at 5.7. .      General lifestyle habits are as follows:    Diet: good-- good variety overall and avoids junk food and follows a vegetarian diet. Drinks water regularly.   Exercise: fair-- walks daily about 30 minutes  Sleep: GOOD, AT LEAST 8 HOURS and no concerns.   Weight: stable over the last year-- BMI 22.      Immunizations: PREVNAR 13 2015, Pneumovax 23 2019, TDaP 3/17/2017, Zoster 2010, SHINGRIX advised, yearly FLU UTD 10/16/20 , COVID-19 VACCINE COMPLETED 21 w. PFIZER BOOSTER 21     Screening Tests: mammogram 3/30/22-- Repeat 1 year (s/p  R Partial Mastectomy 21), DEXA 11/15/21 stable significant osteoporosis lumbar spine-- PROLIA advised & repeat 2 years, Colonoscopy--18-- no repeat advised for screening, PAP no longer needed (> 65 and no h/o abnormal)., Hep C screening  (-) 2015  "     Eye/Dental: [facility]-- DUE, dental UTD but eye due-- she will schedule.            PAST MEDICAL HISTORY, SURGICAL/SOCIAL/FAMILY HISTORY REVIEWED AS PER CHART, WITH PERTINENT FINDINGS INCLUDED IN HISTORY SECTION OF NOTE.     Current Medications    Medication List with Changes/Refills   Current Medications    CALCIUM CARBONATE (CALCIUM 500 ORAL)    Take 1 tablet by mouth.    CHOLECALCIFEROL, VITAMIN D3, 125 MCG (5,000 UNIT) CAPSULE    Take 1 capsule (5,000 Units total) by mouth daily with breakfast.    CIPRODEX 0.3-0.1 % DRPS    PLACE 4 DROPS INTO THE RIGHT EAR 2 (TWO) TIMES DAILY. FOR 10 DAYS    LETROZOLE (FEMARA) 2.5 MG TAB        MULTIVITAMIN-IRON-FOLIC ACID TAB    Take 1 tablet by mouth once daily.   Discontinued Medications    AMLODIPINE (NORVASC) 2.5 MG TABLET    Take 1 tablet (2.5 mg total) by mouth once daily.       Allergies   Review of patient's allergies indicates:   Allergen Reactions    Sinus allergy Other (See Comments)     Headaches, migranes    Sulfa (sulfonamide antibiotics) Rash         Review of Systems (Pertinent positives)  Review of Systems   Constitutional: Negative for unexpected weight change.   HENT: Negative for dental problem.    Eyes: Negative for visual disturbance.   Respiratory: Negative for chest tightness and shortness of breath.    Cardiovascular: Negative for chest pain, palpitations and leg swelling.   Gastrointestinal: Negative for constipation and diarrhea.   Genitourinary: Negative for difficulty urinating.   Musculoskeletal: Positive for back pain (chronic stable).   Allergic/Immunologic: Positive for environmental allergies.   Neurological: Negative for dizziness, light-headedness and headaches.   Psychiatric/Behavioral: Negative for sleep disturbance. The patient is not nervous/anxious.        /71 (BP Location: Left arm, Patient Position: Sitting)   Pulse 78   Temp 98.4 °F (36.9 °C)   Ht 5' 2" (1.575 m)   Wt 55.3 kg (121 lb 14.6 oz)   LMP  (LMP " Unknown)   SpO2 97%   BMI 22.30 kg/m²     Physical Exam  Vitals reviewed.   Constitutional:       General: She is not in acute distress.     Appearance: Normal appearance. She is well-developed.   HENT:      Head: Normocephalic and atraumatic.      Right Ear: Ear canal normal. Tympanic membrane is not erythematous or bulging.      Left Ear: Ear canal normal. Tympanic membrane is not erythematous or bulging.      Nose: Nose normal.      Mouth/Throat:      Mouth: Mucous membranes are moist.      Pharynx: No oropharyngeal exudate.   Eyes:      Extraocular Movements: Extraocular movements intact.      Conjunctiva/sclera: Conjunctivae normal.   Neck:      Thyroid: No thyroid mass or thyromegaly.      Vascular: No carotid bruit.   Cardiovascular:      Rate and Rhythm: Normal rate and regular rhythm.      Pulses:           Dorsalis pedis pulses are 2+ on the right side and 2+ on the left side.      Heart sounds: Normal heart sounds. No murmur heard.  Pulmonary:      Effort: Pulmonary effort is normal. No respiratory distress.      Breath sounds: Normal breath sounds.   Abdominal:      General: Bowel sounds are normal. There is no distension.      Palpations: Abdomen is soft. There is no mass.      Tenderness: There is no abdominal tenderness.   Musculoskeletal:         General: Normal range of motion.      Right lower leg: No edema.      Left lower leg: No edema.   Lymphadenopathy:      Cervical: No cervical adenopathy.   Skin:     General: Skin is warm and dry.      Findings: No rash.   Neurological:      General: No focal deficit present.      Mental Status: She is alert and oriented to person, place, and time.   Psychiatric:         Mood and Affect: Mood normal.         Behavior: Behavior normal.           Assessment/Plan:  Keo Frias is a 71 y.o. female who presents today for :        ICD-10-CM ICD-9-CM    1. Routine general medical examination at a health care facility  Z00.00 V70.0    2. BMI 22.0-22.9, adult   Z68.22 V85.1    3. Malignant neoplasm of upper-outer quadrant of right breast in female, estrogen receptor positive  C50.411 174.4     Z17.0 V86.0    4. Aromatase inhibitor use  Z79.811 V07.52    5. Benign essential hypertension  I10 401.1    6. Postmenopausal osteoporosis  M81.0 733.01    7. Vitamin D deficiency  E55.9 268.9    8. Sensorineural hearing loss (SNHL), unspecified laterality  H90.5 389.10    9. Medication management  Z79.899 V58.69    10. Need for shingles vaccine  Z23 V04.89      ADVISED ON DIET/EXERCISE/SLEEP, ROUTINE EYE/DENTAL EXAMS, AND THE IMPORTANCE OF KEEPING UP WITH APPROPRIATE SCREENING TESTS BASED ON AGE AND RISK FACTORS.  Immunizations up-to-date except Shingrix advised. COVID-19 Pfizer booster 9/30/21,  DEXA 11/15/21: Osteoporosis-     HTN: Has been Controlled on Amlodipine 2.5 mg daily but BP is now low-normal and she would like to try stopping. OK TO STOP AMLODIPINE AND MONITOR BLOOD PRESSURE OFF THE MEDICINE.      ANXIETY: Previously taking BuSpar 5 mg daily-- NOW STABLE OFF BUSPAR. Also utilizes behavioral coping mechanisms like meditation.      R Breast Cancer: seen by heme/onc Dr Garcia 11/16/21.   R Lumpectomy 5/12/21, SN Bx (-) Radiation completed 7/27/21.  Now on Femara 2.5 mg daily  Unremarkable Mammogram 3/30/22-- repeat Bilateral Screening Mammo 1 year  Will follow up with Dr Garcia 7/12/22     OSTEOPOROSIS: Most recent DEXA 11/15/21 with significant OP (T Score -3.3) of the lumbar spine and moderate osteopenia of the hips.   Now on aromatase inhibitor with further risk for decreased bone density.   **PROLIA Advised & Therapy plan created-- infusion center contacted.   Vit D 72 3/25/21 on Vit D3 5,00 IU daily + Calcium 500, vit D not rechecked with labs due to current lab restrictions.     R LOW BACK PAIN: s/p PT With improvement, continue HEP and PRN topical therapy such as icy Hot, heat.     VERTIGO WITH R CHOLESTEATOMA:  no current vertigo or drainage, ENT referral declined.        Patient Instructions   ADVISE LOOKING INTO NEW 2-PART, NON-LIVE SHINGRIX SHINGLES VACCINE THROUGH YOUR LOCAL PHARMACY.       ADVISE every 6 month PROLIA shots with calcium and vit D to treat Osteoporosis.    Consider adding ALIGN probiotic daily to help with gas bloating along with increased water consumption.         Follow up in about 6 months (around 11/6/2022) for return as needed for new concerns.

## 2022-05-10 ENCOUNTER — PATIENT MESSAGE (OUTPATIENT)
Dept: FAMILY MEDICINE | Facility: CLINIC | Age: 72
End: 2022-05-10
Payer: MEDICARE

## 2022-05-10 DIAGNOSIS — I10 BENIGN ESSENTIAL HYPERTENSION: ICD-10-CM

## 2022-05-10 DIAGNOSIS — I10 BENIGN ESSENTIAL HYPERTENSION: Primary | ICD-10-CM

## 2022-05-10 RX ORDER — AMLODIPINE BESYLATE 2.5 MG/1
TABLET ORAL
Qty: 90 TABLET | Refills: 0 | OUTPATIENT
Start: 2022-05-10

## 2022-05-10 RX ORDER — AMLODIPINE BESYLATE 2.5 MG/1
2.5 TABLET ORAL DAILY
Qty: 90 TABLET | Refills: 4 | Status: SHIPPED | OUTPATIENT
Start: 2022-05-10 | End: 2022-05-19 | Stop reason: SDUPTHER

## 2022-05-10 NOTE — TELEPHONE ENCOUNTER
[facility] Note  Quick DC. Inappropriate Request   Refill request requires further review by MD: NO   Medication Therapy Plan: Pharmacy is requesting new script(s) for the following medications without required information, (sig/ frequency/qty/etc)     [facility] action(s):  Quick Discontinue      Duplicate Pended Encounter(s)/ Last Prescribed Details:    Pharmacies have been requesting medications for patients without required information, (sig, frequency, qty, etc.). In addition, requests are sent for medication(s) pt. are currently not taking, and medications patients have never taken.    We have spoken to the pharmacies about these request types and advised their teams previously that we are unable to assess these New Script requests and require all details for these requests. This is a known issue and has been reported.        Medication related problems are not assessed for QDC.   Medication Reconciliation Completed? NO Were there pending details that required adjustment? NO     Automatic Epic Generated Protocol Data Below:   Requested Prescriptions   Pending Prescriptions Disp Refills    amLODIPine (NORVASC) 2.5 MG tablet [Pharmacy Med Name: AMLODIPINE  2.5MG TAB] 90 tablet 0              Appointments      Date Provider   Last Visit   5/6/2022 Sharron Ogden MD   Next Visit   Visit date not found Sharron Ogden MD        Note composed:12:49 PM 05/10/2022

## 2022-05-16 ENCOUNTER — PATIENT MESSAGE (OUTPATIENT)
Dept: HEMATOLOGY/ONCOLOGY | Facility: CLINIC | Age: 72
End: 2022-05-16
Payer: MEDICARE

## 2022-05-16 ENCOUNTER — TELEPHONE (OUTPATIENT)
Dept: HEMATOLOGY/ONCOLOGY | Facility: CLINIC | Age: 72
End: 2022-05-16
Payer: MEDICARE

## 2022-05-17 ENCOUNTER — TELEPHONE (OUTPATIENT)
Dept: HEMATOLOGY/ONCOLOGY | Facility: CLINIC | Age: 72
End: 2022-05-17

## 2022-05-17 ENCOUNTER — OFFICE VISIT (OUTPATIENT)
Dept: HEMATOLOGY/ONCOLOGY | Facility: CLINIC | Age: 72
End: 2022-05-17
Payer: MEDICARE

## 2022-05-17 VITALS
DIASTOLIC BLOOD PRESSURE: 75 MMHG | OXYGEN SATURATION: 98 % | RESPIRATION RATE: 16 BRPM | BODY MASS INDEX: 22.23 KG/M2 | HEIGHT: 62 IN | SYSTOLIC BLOOD PRESSURE: 156 MMHG | WEIGHT: 120.81 LBS | HEART RATE: 79 BPM | TEMPERATURE: 98 F

## 2022-05-17 DIAGNOSIS — C50.411 MALIGNANT NEOPLASM OF UPPER-OUTER QUADRANT OF RIGHT BREAST IN FEMALE, ESTROGEN RECEPTOR POSITIVE: Primary | ICD-10-CM

## 2022-05-17 DIAGNOSIS — Z17.0 MALIGNANT NEOPLASM OF UPPER-OUTER QUADRANT OF RIGHT BREAST IN FEMALE, ESTROGEN RECEPTOR POSITIVE: Primary | ICD-10-CM

## 2022-05-17 DIAGNOSIS — M81.0 AGE-RELATED OSTEOPOROSIS WITHOUT CURRENT PATHOLOGICAL FRACTURE: ICD-10-CM

## 2022-05-17 PROCEDURE — 1159F MED LIST DOCD IN RCRD: CPT | Mod: CPTII,S$GLB,, | Performed by: INTERNAL MEDICINE

## 2022-05-17 PROCEDURE — 3044F PR MOST RECENT HEMOGLOBIN A1C LEVEL <7.0%: ICD-10-PCS | Mod: CPTII,S$GLB,, | Performed by: INTERNAL MEDICINE

## 2022-05-17 PROCEDURE — 99999 PR PBB SHADOW E&M-EST. PATIENT-LVL III: ICD-10-PCS | Mod: PBBFAC,,, | Performed by: INTERNAL MEDICINE

## 2022-05-17 PROCEDURE — 3008F PR BODY MASS INDEX (BMI) DOCUMENTED: ICD-10-PCS | Mod: CPTII,S$GLB,, | Performed by: INTERNAL MEDICINE

## 2022-05-17 PROCEDURE — 1159F PR MEDICATION LIST DOCUMENTED IN MEDICAL RECORD: ICD-10-PCS | Mod: CPTII,S$GLB,, | Performed by: INTERNAL MEDICINE

## 2022-05-17 PROCEDURE — 3077F PR MOST RECENT SYSTOLIC BLOOD PRESSURE >= 140 MM HG: ICD-10-PCS | Mod: CPTII,S$GLB,, | Performed by: INTERNAL MEDICINE

## 2022-05-17 PROCEDURE — 3044F HG A1C LEVEL LT 7.0%: CPT | Mod: CPTII,S$GLB,, | Performed by: INTERNAL MEDICINE

## 2022-05-17 PROCEDURE — 3008F BODY MASS INDEX DOCD: CPT | Mod: CPTII,S$GLB,, | Performed by: INTERNAL MEDICINE

## 2022-05-17 PROCEDURE — 99999 PR PBB SHADOW E&M-EST. PATIENT-LVL III: CPT | Mod: PBBFAC,,, | Performed by: INTERNAL MEDICINE

## 2022-05-17 PROCEDURE — 1101F PR PT FALLS ASSESS DOC 0-1 FALLS W/OUT INJ PAST YR: ICD-10-PCS | Mod: CPTII,S$GLB,, | Performed by: INTERNAL MEDICINE

## 2022-05-17 PROCEDURE — 3077F SYST BP >= 140 MM HG: CPT | Mod: CPTII,S$GLB,, | Performed by: INTERNAL MEDICINE

## 2022-05-17 PROCEDURE — 1126F AMNT PAIN NOTED NONE PRSNT: CPT | Mod: CPTII,S$GLB,, | Performed by: INTERNAL MEDICINE

## 2022-05-17 PROCEDURE — 3288F FALL RISK ASSESSMENT DOCD: CPT | Mod: CPTII,S$GLB,, | Performed by: INTERNAL MEDICINE

## 2022-05-17 PROCEDURE — 3078F PR MOST RECENT DIASTOLIC BLOOD PRESSURE < 80 MM HG: ICD-10-PCS | Mod: CPTII,S$GLB,, | Performed by: INTERNAL MEDICINE

## 2022-05-17 PROCEDURE — 99499 UNLISTED E&M SERVICE: CPT | Mod: S$GLB,,, | Performed by: INTERNAL MEDICINE

## 2022-05-17 PROCEDURE — 1101F PT FALLS ASSESS-DOCD LE1/YR: CPT | Mod: CPTII,S$GLB,, | Performed by: INTERNAL MEDICINE

## 2022-05-17 PROCEDURE — 1126F PR PAIN SEVERITY QUANTIFIED, NO PAIN PRESENT: ICD-10-PCS | Mod: CPTII,S$GLB,, | Performed by: INTERNAL MEDICINE

## 2022-05-17 PROCEDURE — 99499 RISK ADDL DX/OHS AUDIT: ICD-10-PCS | Mod: S$GLB,,, | Performed by: INTERNAL MEDICINE

## 2022-05-17 PROCEDURE — 3288F PR FALLS RISK ASSESSMENT DOCUMENTED: ICD-10-PCS | Mod: CPTII,S$GLB,, | Performed by: INTERNAL MEDICINE

## 2022-05-17 PROCEDURE — 99213 PR OFFICE/OUTPT VISIT, EST, LEVL III, 20-29 MIN: ICD-10-PCS | Mod: S$GLB,,, | Performed by: INTERNAL MEDICINE

## 2022-05-17 PROCEDURE — 3078F DIAST BP <80 MM HG: CPT | Mod: CPTII,S$GLB,, | Performed by: INTERNAL MEDICINE

## 2022-05-17 PROCEDURE — 99213 OFFICE O/P EST LOW 20 MIN: CPT | Mod: S$GLB,,, | Performed by: INTERNAL MEDICINE

## 2022-05-17 NOTE — PROGRESS NOTES
Subjective:       Patient ID: Keo Frias is a 71 y.o. female.    Chief Complaint: No chief complaint on file.    HPI 71-year-old female seen for diagnosis of right breast cancer.  She is on adjuvant endocrine therapy with letrozole..    She is doing well without specific complaints.    History:  Screening mammogram on March 25, 2021 showed a focal asymmetry in the upper-outer quadrant of the right breast.    Follow-up diagnostic mammogram ultrasound was performed on March 30, 2021.  The mammogram confirmed the focal asymmetry in the upper-outer quadrant of the right breast an ultrasound showed an 8 x 5 x 4 mm irregular hypoechoic mass in that area.  There was no evidence of abnormal axillary lymphadenopathy.    On April 13, 2021 biopsy was performed which showed infiltrating ductal carcinoma with lobular features and associated intermediate grade solid DCIS.  The invasive cancer was intermediate grade (histologic grade 3, nuclear grade 2, mitotic index 2).  Tumor cells were 95% ER positive, 10% MO positive HER2 was negative and Ki-67 was 20%.    On May 12, 2021 right breast lumpectomy and sentinel lymph node biopsy was performed.  That showed a 9 mm intermediate grade invasive carcinoma with extensive DCIS measuring 1.9 cm which was intermediate grade.  [place] lymph node was negative.  Final pathological stage IA-T1 B N0.    Radiation completed 7/27/21.    Letrozole started  Review of Systems   Constitutional: Negative.    HENT: Negative.    Respiratory: Negative.    Gastrointestinal: Negative.    Genitourinary: Negative.    Musculoskeletal: Positive for arthralgias (mild). Negative for back pain.   Integumentary:  Negative.   Neurological: Negative.    Psychiatric/Behavioral: The patient is not nervous/anxious.          Objective:      Physical Exam  Vitals reviewed.   Constitutional:       General: She is not in acute distress.     Appearance: Normal appearance.   Cardiovascular:      Rate and Rhythm: Normal  rate and regular rhythm.   Pulmonary:      Effort: Pulmonary effort is normal. No respiratory distress.      Breath sounds: Normal breath sounds. No wheezing or rales.   Chest:   Breasts:      Right: No mass, nipple discharge, skin change, axillary adenopathy or supraclavicular adenopathy.      Left: Normal. No axillary adenopathy or supraclavicular adenopathy.         Abdominal:      Palpations: Abdomen is soft. There is no mass.      Tenderness: There is no abdominal tenderness.   Lymphadenopathy:      Cervical: No cervical adenopathy.      Upper Body:      Right upper body: No supraclavicular or axillary adenopathy.      Left upper body: No supraclavicular or axillary adenopathy.   Skin:     Findings: No rash.   Neurological:      Mental Status: She is alert and oriented to person, place, and time.   Psychiatric:         Mood and Affect: Mood normal.         Behavior: Behavior normal.         Thought Content: Thought content normal.         Judgment: Judgment normal.         Assessment:     Mammogram 3/30/22  -negative    Labs 5/3 - unremarkable  1. Malignant neoplasm of upper-outer quadrant of right breast in female, estrogen receptor positive        Plan:    Prolia 5/23    Continue endocrine therapy.    Surgery in 3 M  RTC 6 M with CMP for Prolia      Route Chart for Scheduling    Med Onc Chart Routing      Follow up with physician 6 months. And Prolia appt here   Follow up with BRENDA    Labs CMP   Lab interval:     Imaging    Pharmacy appointment    Other referrals            Therapy Plan Information  Medications  denosumab (PROLIA) injection 60 mg  60 mg, Subcutaneous, Every 26 weeks

## 2022-05-17 NOTE — TELEPHONE ENCOUNTER
Returned pt call.  Pt noted that she was booked mistakenly for labs that she does not need on 5/31. appt cancelled, pt verbalized understanding and gratitude. Due for follow up and prolia again in 6 mo.             ----- Message from Shirlene Nogueira sent at 5/17/2022  1:36 PM CDT -----  Regarding: pt  Pt is calling to speak with the nurse pt was seen this morning pt said its a mistake pt said she spoke with the doctor today and was told she didn't need lab work can you please call pt at [phone].

## 2022-05-18 DIAGNOSIS — I10 BENIGN ESSENTIAL HYPERTENSION: ICD-10-CM

## 2022-05-18 RX ORDER — AMLODIPINE BESYLATE 2.5 MG/1
TABLET ORAL
Qty: 90 TABLET | Refills: 0 | OUTPATIENT
Start: 2022-05-18

## 2022-05-19 RX ORDER — AMLODIPINE BESYLATE 2.5 MG/1
2.5 TABLET ORAL DAILY
Qty: 90 TABLET | Refills: 4 | Status: SHIPPED | OUTPATIENT
Start: 2022-05-19 | End: 2022-12-20

## 2022-05-19 NOTE — TELEPHONE ENCOUNTER
No new care gaps identified.  [facility] Embedded Care Gaps. Reference number: [id number]. 5/19/2022   10:10:22 AM BRIENT

## 2022-05-23 ENCOUNTER — INFUSION (OUTPATIENT)
Dept: INFUSION THERAPY | Facility: HOSPITAL | Age: 72
End: 2022-05-23
Attending: FAMILY MEDICINE
Payer: MEDICARE

## 2022-05-23 VITALS — HEIGHT: 62 IN | WEIGHT: 120.81 LBS | BODY MASS INDEX: 22.23 KG/M2

## 2022-05-23 DIAGNOSIS — M81.0 AGE RELATED OSTEOPOROSIS: Primary | ICD-10-CM

## 2022-05-23 PROCEDURE — 63600175 PHARM REV CODE 636 W HCPCS: Mod: JG | Performed by: FAMILY MEDICINE

## 2022-05-23 PROCEDURE — 96372 THER/PROPH/DIAG INJ SC/IM: CPT

## 2022-05-23 RX ADMIN — DENOSUMAB 60 MG: 60 INJECTION SUBCUTANEOUS at 10:05

## 2022-10-01 ENCOUNTER — NURSE TRIAGE (OUTPATIENT)
Dept: ADMINISTRATIVE | Facility: CLINIC | Age: 72
End: 2022-10-01
Payer: MEDICARE

## 2022-10-02 NOTE — TELEPHONE ENCOUNTER
Spoke with Dr. Paige. Patient chart info obtained. Medication list shared and GFR. Dr. Paige has approved paxlovid. I added order to medications and left message on Answer machine at pharmacy and I have informed patient.

## 2022-10-02 NOTE — TELEPHONE ENCOUNTER
9/30 with sore throat + COVID today    diagnosed 9/29  positive.  Patient is requesting oral antiviral.  Message sent to Dr. Kumari on call physician:  Good evening Dr. Kumari. Mrs. Frias is calling to request oral anti viral. She tested + for COVID via home testing on today. Her symptoms began last night with a sore throat. Her ,daughter and granddaughter have also all tested positive. Please advise. Thank you [place]  After further review Dr. Kumari is not on call for this service. I did notify him of my error.     Reason for Disposition   HIGH RISK patient (e.g., age > 64 years, diabetes, heart or lung disease, weak immune system)    Additional Information   Negative: Severe difficulty breathing (e.g., struggling for each breath, speaks in single words)   Negative: Difficult to awaken or acting confused (e.g., disoriented, slurred speech)   Negative: Bluish (or gray) lips or face now   Negative: Shock suspected (e.g., cold/pale/clammy skin, too weak to stand, low BP, rapid pulse)   Negative: Sounds like a life-threatening emergency to the triager   Negative: [1] COVID-19 suspected (e.g., cough, fever, shortness of breath) AND [2] mild symptoms AND [3] public health department recommends testing   Negative: [1] COVID-19 exposure AND [2] no symptoms   Negative: COVID-19 and Breastfeeding, questions about   Negative: SEVERE or constant chest pain (Exception: mild central chest pain, present only when coughing)   Negative: MODERATE difficulty breathing (e.g., speaks in phrases, SOB even at rest, pulse 100-120)   Negative: Patient sounds very sick or weak to the triager   Negative: MILD difficulty breathing (e.g., minimal/no SOB at rest, SOB with walking, pulse <100)   Negative: Chest pain   Negative: Fever > 103 F (39.4 C)   Negative: [1] Fever > 101 F (38.3 C) AND [2] age > 60   Negative: [1] Fever > 100.0 F (37.8 C) AND [2] bedridden (e.g., nursing home patient, CVA, chronic illness,  recovering from surgery)    Protocols used: Coronavirus (COVID-19) - Diagnosed or [id number]

## 2022-10-04 ENCOUNTER — PATIENT MESSAGE (OUTPATIENT)
Dept: FAMILY MEDICINE | Facility: CLINIC | Age: 72
End: 2022-10-04
Payer: MEDICARE

## 2022-10-05 NOTE — PROGRESS NOTES
Medical Progress Note      Date Of Service: 10/5/2022    Reason for F/U: Knee pain    Subjective:  Subjective   Mr. Morales is doing okay, but continues to have pain in his left knee. He reports that he was living in a van out '[place]' on [address] [facility] prior to being at \[facility]". He is not willing to go to a homeless shelter.     ROS:   As per subjective otherwise negative.     Objective     I/O's  No intake or output data in the 24 hours ending 10/05/22 0932    Last Recorded Vitals  Blood pressure 131/74, pulse (!) 50, temperature 97.7 °F (36.5 °C), temperature source Oral, resp. rate 18, height 6' (1.829 m), weight 105.6 kg (232 lb 12.9 oz), SpO2 98 %.  Body mass index is 31.57 kg/m².  Temp:  [97.7 °F (36.5 °C)-98.2 °F (36.8 °C)] 97.7 °F (36.5 °C)  Heart Rate:  [50-63] 50  Resp:  [16-18] 18  BP: (119-131)/(70-78) 131/74     SpO2 Readings from Last 3 Encounters:   10/05/22 98%        Physical Exam  Constitutional:       General: He is not in acute distress.     Comments: Sitting up in bed   HENT:      Head: Normocephalic.      Mouth/Throat:      Mouth: Mucous membranes are moist.      Pharynx: Oropharynx is clear.   Eyes:      Extraocular Movements: Extraocular movements intact.      Conjunctiva/sclera: Conjunctivae normal.   Cardiovascular:      Rate and Rhythm: Normal rate and regular rhythm.      Heart sounds: No murmur heard.  Pulmonary:      Comments: Lungs are clear to auscultations, respirations are non-labored, breath sounds equal  Abdominal:      General: Bowel sounds are normal. There is no distension.      Palpations: Abdomen is soft.      Tenderness: There is no abdominal tenderness.   Skin:     General: Skin is warm and moist.      Findings: No rash.   Neurological:      General: No focal deficit present.      Mental Status: He is alert. Mental status is at baseline.      Comments: Speech clear and coherent   Psychiatric:      Comments: Cooperative, appropriate mood and affect         Labs  "Outpatient Psychiatry Follow-Up Visit (MD/NP)    09/25/2019    Clinical Status of Patient:  Outpatient (Ambulatory)    Chief Complaint:  Ms Frias is a 69 y.o. female who presents today for follow-up of anxiety.     Met with patient at the office.      Interval History and Content of Current Session:  General impression:Ms Frias's anxiety is significantly better. She has no current side effects from the Buspar, and she indicated she is back to herself at this time. She is cheerful, optimistic, and described herself as "doing better". She is in good self control and not depressed. She is more positive in her outlook. Also, the fact that her blood pressure is improved has also helped with her anxiety. She was quite alert and responsive. She is pleased with her current status. She has no thoughts of harming herself at this time.    Target symptoms:Continued reduction of anxiety.      Compliance:  Pt reports she is taking medications as directed    Side effects:  None reported. She had a brief interval of blurred vision in the recent past, but that is now not present.    Risk Parameters:  Patient reports no suicidal ideation  Patient reports no homicidal ideation  Patient reports no self-injurious behavior  Patient reports no violent behavior    Patient's Response to Intervention:  The patient's response to intervention is quite good and accepting..    Progress Toward Goals and Other Mental Status Changes:  The patient's progress toward goals is significantly better..    Vitals: Most recent vital sign today were checked and very stable as per the rooming report.    Mental Status Evaluation     Appearance:  Neatly dressed and groomed   Behavior:  Appropriate and good self control   Speech:  Normal volume, projection, and speed   Mood:  Upebeat   Affect:  Consistent with mood   Thought Process:  Well organized. No signs of psychosis   Thought Content:  Appropriate to inteview   Sensorium:  clear   Attention Span & "   Recent Results (from the past 24 hour(s))   Basic Metabolic Panel    Collection Time: 10/05/22  5:06 AM   Result Value Ref Range    Fasting Status      Sodium 137 135 - 145 mmol/L    Potassium 3.8 3.4 - 5.1 mmol/L    Chloride 102 97 - 110 mmol/L    Carbon Dioxide 27 21 - 32 mmol/L    Anion Gap 12 7 - 19 mmol/L    Glucose 85 70 - 99 mg/dL    BUN 17 6 - 20 mg/dL    Creatinine 0.72 0.67 - 1.17 mg/dL    Glomerular Filtration Rate >90 >=60    BUN/ Creatinine Ratio 24 7 - 25    Calcium 8.7 8.4 - 10.2 mg/dL       Imaging  XR KNEE 3 VIEWS BILATERAL   Final Result   1.    Moderate to advanced tricompartmental osteoarthritis of the bilateral   knees greatest in the medial compartments with bone-on-bone joint space   narrowing.  Small effusions bilaterally.         Electronically Signed by: ILANA SALES MD    Signed on: 9/30/2022 3:26 PM          XR WRIST MIN 3 VIEWS LEFT   Final Result   Addendum 1 of 1   Addendum:   Dictation error in the body of the report.  Images obtained are of the    left   wrist and impression should read \"narrowing in the left wrist\" rather than   the right.      Electronically Signed by: NICOLE MENDEZ MD    Signed on: 10/1/2022 6:14 AM          Final   Areas of advanced joint space narrowing in the right wrist with erosions   versus subchondral cysts and a few small osteophytes.  Findings are   nonspecific.  Differential considerations include osteoarthritis, but   cannot exclude an underlying inflammatory arthropathy, crystalline   arthropathy, or septic arthritis.  Mild diffuse soft tissue swelling about   the wrist.  No comparison exams are available to assess for change.    Correlate with the clinical presentation.         Electronically Signed by: ILANA SALES MD    Signed on: 9/30/2022 3:19 PM                Cultures  Microbiology Results  (Last 10 results in the past 7 days)    Specimen   Gram Smear   Culture Result   Status       09/30/22  7070         No organisms seen.  [P]             Concentration EXcellent   Cognition:  inrtact   Insight:  Very good   Judgment:  Appropriate to situation.         Diagnosis:Anxiety Disorder          Plan:(Medication and Therapy Recommendation)  · Continue Buspar 5mg bid. I reviewed the side effects of the medicine with her and advised she could call if she had difficulty with her medicine or clinical condition.  Additional Notes: Supportive psychotherapy provided during this session.    Return to Clinic: 3 months   Many Polymorphonuclear cells.  [P]            Slide prepared by [employer].  [P]                 [P] - Preliminary Result                  Assessment & Plan      Patient Active Problem List   Diagnosis   • Polyarthropathy       Assessment:  Mr. Morales is a 60 y/o with a history of Rheumatoid arthritis, Schizoaffective disorder and alcohol use disorder who presented with knee pain and swelling.     Left Knee Effusion  - Procedure 10/5: Knee Aspiration  - Consult Ortho, Dr. Salas, appreciate recs-- can follow up outpatient if no evidence of infection  - Culture pending   - Antibiotics discontinued yesterday  - Continue Home gabapentin  - Norco prn pain with docusate-senna for bowel regimen    Rheumatoid Arthritis with polyarthropathy  - Elevated ESR/CRP  - XR knees 9/30:   Moderate to advanced tricompartmental osteoarthritis of the bilateral  knees greatest in the medial compartments with bone-on-bone joint space narrowing.  - XR left wrist 9/30: Areas of advanced joint space narrowing in the right wrist with erosions  versus subchondral cysts and a few small osteophytes  - Consult Rheum, Dr. Mckinley, appreciate recs  - Elevated Rheumatoid Factor, and CCP antibody  - Lyme Antibodies pending  - Continue new Methotrexate with Folic acid   - Continue New Prednisone    Social  - Homeless  - Consult PT/OT -- recommend use of cane and home health    Right Knee Effusion: Elevated WBC count, predominantly neutrophils. Procedure 9/30- Aspiration. Culture- NGTD  Normocytic anemia: Stable around 10, likely anemia of chronic disease given RA.   Questionable history of Gout: Uric acid normal.  Allopurinol on hold.   Alcohol Use Disorder, in remission: Encourage continued cessation.  Schizoaffective Disorder: Doesn't seem to be taking listed Haldol, Celexa and Depakote.   Obesity:  on diet and exercise with goal of weight loss    Current Plan  - Await Cell counts from left knee    Regular diet, SLIV  Lovenox  Dispo: Continue  Inpatient care, medically ready for discharge if no evidence of septic arthritis.  Code Status    Code Status: Full Resuscitation      Laurel Cardenas MD  [facility] Hospitalist  10/5/2022 9:32 AM    Primary Care Physician  No Pcp

## 2022-10-11 ENCOUNTER — NURSE TRIAGE (OUTPATIENT)
Dept: ADMINISTRATIVE | Facility: CLINIC | Age: 72
End: 2022-10-11
Payer: MEDICARE

## 2022-10-12 NOTE — TELEPHONE ENCOUNTER
Informed pt that Dr. Ogden agree with the nurse/triage advice already given for case of Paxlovid rebound COVID, however, if her lips look blue but she is not having any shortness of breath and her oxygen level is fine, and this is not of concern. Pt verbalized understanding.

## 2022-10-12 NOTE — TELEPHONE ENCOUNTER
Pt calling reporting persistent COVID symptoms. First developed 10/1, startd paxlovid, completed 10/6. Symptoms resolved and tested negative. Then past 2 days developed runny nose and low grade fever and retested positive. Family member also with pt. Reporting her lips look blue. Spo2 97% and pt denies CP or SOB. NAD over the phone. Due to blue lips advised caller to call 911. VU but unsure what caller will do.     Also advised to re isolate, 5 full days from symptoms onset (x 2 days ago). VU

## 2022-10-12 NOTE — TELEPHONE ENCOUNTER
Pt calling reporting persistent COVID symptoms. First developed 10/1, startd paxlovid, completed 10/6. Symptoms resolved and tested negative. Then past 2 days developed runny nose and low grade fever and retested negative. Family member also with pt. Reporting her lips look blue. Spo2 97% and pt denies CP or SOB. NAD over the phone. Due to blue lips advised caller to call 911. VU but unsure what caller will do.     Also advised to re isolate, 5 full days from symptoms onset (x 2 days ago). VU

## 2022-10-12 NOTE — TELEPHONE ENCOUNTER
Has already spoken with a triage. Wanting to know if she can take nyquil for her symptoms for COVID since she is a hypertensive. States pharmacist advised her to contact her physician. On call provider Dr. Reardon contacted. MD advised one tylenol for 99.5 temp if requesting and Zyrtec for runny nose. Advised not to take nyquil. Follow up with PCP in the morning for further medication guidelines.  Reason for Disposition   [1] Caller has URGENT medicine question about med that PCP or specialist prescribed AND [2] triager unable to answer question    Protocols used: Medication Question Call-A-

## 2022-10-12 NOTE — TELEPHONE ENCOUNTER
I agree with the nurse/triage advice already given for case of Paxlovid rebound COVID, however, if her lips look blue but she is not having any shortness of breath and her oxygen level is fine, and this is not of concern.

## 2022-11-10 ENCOUNTER — LAB VISIT (OUTPATIENT)
Dept: LAB | Facility: HOSPITAL | Age: 72
End: 2022-11-10
Attending: INTERNAL MEDICINE
Payer: MEDICARE

## 2022-11-10 DIAGNOSIS — M81.0 AGE-RELATED OSTEOPOROSIS WITHOUT CURRENT PATHOLOGICAL FRACTURE: ICD-10-CM

## 2022-11-10 LAB
ALBUMIN SERPL BCP-MCNC: 4 G/DL (ref 3.5–5.2)
ALP SERPL-CCNC: 60 U/L (ref 55–135)
ALT SERPL W/O P-5'-P-CCNC: 19 U/L (ref 10–44)
ANION GAP SERPL CALC-SCNC: 11 MMOL/L (ref 8–16)
AST SERPL-CCNC: 24 U/L (ref 10–40)
BILIRUB SERPL-MCNC: 0.6 MG/DL (ref 0.1–1)
BUN SERPL-MCNC: 8 MG/DL (ref 8–23)
CALCIUM SERPL-MCNC: 9.1 MG/DL (ref 8.7–10.5)
CHLORIDE SERPL-SCNC: 106 MMOL/L (ref 95–110)
CO2 SERPL-SCNC: 24 MMOL/L (ref 23–29)
CREAT SERPL-MCNC: 0.7 MG/DL (ref 0.5–1.4)
EST. GFR  (NO RACE VARIABLE): >60 ML/MIN/1.73 M^2
GLUCOSE SERPL-MCNC: 118 MG/DL (ref 70–110)
POTASSIUM SERPL-SCNC: 3.9 MMOL/L (ref 3.5–5.1)
PROT SERPL-MCNC: 7.9 G/DL (ref 6–8.4)
SODIUM SERPL-SCNC: 141 MMOL/L (ref 136–145)

## 2022-11-10 PROCEDURE — 36415 COLL VENOUS BLD VENIPUNCTURE: CPT | Performed by: INTERNAL MEDICINE

## 2022-11-10 PROCEDURE — 80053 COMPREHEN METABOLIC PANEL: CPT | Performed by: INTERNAL MEDICINE

## 2022-11-15 ENCOUNTER — OFFICE VISIT (OUTPATIENT)
Dept: HEMATOLOGY/ONCOLOGY | Facility: CLINIC | Age: 72
End: 2022-11-15
Payer: MEDICARE

## 2022-11-15 ENCOUNTER — TELEPHONE (OUTPATIENT)
Dept: INFUSION THERAPY | Facility: HOSPITAL | Age: 72
End: 2022-11-15
Payer: MEDICARE

## 2022-11-15 VITALS
WEIGHT: 118.81 LBS | SYSTOLIC BLOOD PRESSURE: 112 MMHG | HEIGHT: 62 IN | TEMPERATURE: 98 F | DIASTOLIC BLOOD PRESSURE: 61 MMHG | RESPIRATION RATE: 16 BRPM | BODY MASS INDEX: 21.86 KG/M2 | HEART RATE: 89 BPM | OXYGEN SATURATION: 98 %

## 2022-11-15 DIAGNOSIS — M81.0 AGE-RELATED OSTEOPOROSIS WITHOUT CURRENT PATHOLOGICAL FRACTURE: ICD-10-CM

## 2022-11-15 DIAGNOSIS — C50.411 MALIGNANT NEOPLASM OF UPPER-OUTER QUADRANT OF RIGHT BREAST IN FEMALE, ESTROGEN RECEPTOR POSITIVE: Primary | ICD-10-CM

## 2022-11-15 DIAGNOSIS — Z17.0 MALIGNANT NEOPLASM OF UPPER-OUTER QUADRANT OF RIGHT BREAST IN FEMALE, ESTROGEN RECEPTOR POSITIVE: Primary | ICD-10-CM

## 2022-11-15 PROCEDURE — 1101F PT FALLS ASSESS-DOCD LE1/YR: CPT | Mod: CPTII,S$GLB,, | Performed by: INTERNAL MEDICINE

## 2022-11-15 PROCEDURE — 3288F FALL RISK ASSESSMENT DOCD: CPT | Mod: CPTII,S$GLB,, | Performed by: INTERNAL MEDICINE

## 2022-11-15 PROCEDURE — 3078F PR MOST RECENT DIASTOLIC BLOOD PRESSURE < 80 MM HG: ICD-10-PCS | Mod: CPTII,S$GLB,, | Performed by: INTERNAL MEDICINE

## 2022-11-15 PROCEDURE — 3074F PR MOST RECENT SYSTOLIC BLOOD PRESSURE < 130 MM HG: ICD-10-PCS | Mod: CPTII,S$GLB,, | Performed by: INTERNAL MEDICINE

## 2022-11-15 PROCEDURE — 1159F MED LIST DOCD IN RCRD: CPT | Mod: CPTII,S$GLB,, | Performed by: INTERNAL MEDICINE

## 2022-11-15 PROCEDURE — 3008F BODY MASS INDEX DOCD: CPT | Mod: CPTII,S$GLB,, | Performed by: INTERNAL MEDICINE

## 2022-11-15 PROCEDURE — 3044F HG A1C LEVEL LT 7.0%: CPT | Mod: CPTII,S$GLB,, | Performed by: INTERNAL MEDICINE

## 2022-11-15 PROCEDURE — 3074F SYST BP LT 130 MM HG: CPT | Mod: CPTII,S$GLB,, | Performed by: INTERNAL MEDICINE

## 2022-11-15 PROCEDURE — 3008F PR BODY MASS INDEX (BMI) DOCUMENTED: ICD-10-PCS | Mod: CPTII,S$GLB,, | Performed by: INTERNAL MEDICINE

## 2022-11-15 PROCEDURE — 99999 PR PBB SHADOW E&M-EST. PATIENT-LVL III: ICD-10-PCS | Mod: PBBFAC,,, | Performed by: INTERNAL MEDICINE

## 2022-11-15 PROCEDURE — 99999 PR PBB SHADOW E&M-EST. PATIENT-LVL III: CPT | Mod: PBBFAC,,, | Performed by: INTERNAL MEDICINE

## 2022-11-15 PROCEDURE — 1125F AMNT PAIN NOTED PAIN PRSNT: CPT | Mod: CPTII,S$GLB,, | Performed by: INTERNAL MEDICINE

## 2022-11-15 PROCEDURE — 1125F PR PAIN SEVERITY QUANTIFIED, PAIN PRESENT: ICD-10-PCS | Mod: CPTII,S$GLB,, | Performed by: INTERNAL MEDICINE

## 2022-11-15 PROCEDURE — 3044F PR MOST RECENT HEMOGLOBIN A1C LEVEL <7.0%: ICD-10-PCS | Mod: CPTII,S$GLB,, | Performed by: INTERNAL MEDICINE

## 2022-11-15 PROCEDURE — 1101F PR PT FALLS ASSESS DOC 0-1 FALLS W/OUT INJ PAST YR: ICD-10-PCS | Mod: CPTII,S$GLB,, | Performed by: INTERNAL MEDICINE

## 2022-11-15 PROCEDURE — 3288F PR FALLS RISK ASSESSMENT DOCUMENTED: ICD-10-PCS | Mod: CPTII,S$GLB,, | Performed by: INTERNAL MEDICINE

## 2022-11-15 PROCEDURE — 1159F PR MEDICATION LIST DOCUMENTED IN MEDICAL RECORD: ICD-10-PCS | Mod: CPTII,S$GLB,, | Performed by: INTERNAL MEDICINE

## 2022-11-15 PROCEDURE — 99213 PR OFFICE/OUTPT VISIT, EST, LEVL III, 20-29 MIN: ICD-10-PCS | Mod: S$GLB,,, | Performed by: INTERNAL MEDICINE

## 2022-11-15 PROCEDURE — 99213 OFFICE O/P EST LOW 20 MIN: CPT | Mod: S$GLB,,, | Performed by: INTERNAL MEDICINE

## 2022-11-15 PROCEDURE — 3078F DIAST BP <80 MM HG: CPT | Mod: CPTII,S$GLB,, | Performed by: INTERNAL MEDICINE

## 2022-11-15 NOTE — PROGRESS NOTES
Subjective:       Patient ID: Keo Frias is a 72 y.o. female.    Chief Complaint: No chief complaint on file.    HPI 72-year-old female seen for diagnosis of right breast cancer.  She is on adjuvant endocrine therapy with letrozole..  Also on Prolia for osteoporosis.    She is feeling well without any new issues.  Had a very mild case of COVID in October.    History:  Screening mammogram on March 25, 2021 showed a focal asymmetry in the upper-outer quadrant of the right breast.    Follow-up diagnostic mammogram ultrasound was performed on March 30, 2021.  The mammogram confirmed the focal asymmetry in the upper-outer quadrant of the right breast an ultrasound showed an 8 x 5 x 4 mm irregular hypoechoic mass in that area.  There was no evidence of abnormal axillary lymphadenopathy.    On April 13, 2021 biopsy was performed which showed infiltrating ductal carcinoma with lobular features and associated intermediate grade solid DCIS.  The invasive cancer was intermediate grade (histologic grade 3, nuclear grade 2, mitotic index 2).  Tumor cells were 95% ER positive, 10% AR positive HER2 was negative and Ki-67 was 20%.    On May 12, 2021 right breast lumpectomy and sentinel lymph node biopsy was performed.  That showed a 9 mm intermediate grade invasive carcinoma with extensive DCIS measuring 1.9 cm which was intermediate grade.  [place] lymph node was negative.  Final pathological stage IA-T1 B N0.    Radiation completed 7/27/21.    Letrozole started July 2021.    Mammogram 3/30/22  -negative  Review of Systems   Constitutional: Negative.    HENT: Negative.     Respiratory: Negative.     Gastrointestinal: Negative.    Genitourinary: Negative.    Musculoskeletal:  Positive for arthralgias (mild). Negative for back pain.   Integumentary:  Negative.   Neurological: Negative.    Psychiatric/Behavioral:  The patient is not nervous/anxious.        Objective:      Physical Exam  Vitals reviewed.   Constitutional:        General: She is not in acute distress.     Appearance: Normal appearance.   Cardiovascular:      Rate and Rhythm: Normal rate and regular rhythm.   Pulmonary:      Effort: Pulmonary effort is normal. No respiratory distress.      Breath sounds: Normal breath sounds. No wheezing or rales.   Chest:   Breasts:     Right: No mass, nipple discharge or skin change.      Left: Normal.       Abdominal:      Palpations: Abdomen is soft. There is no mass.      Tenderness: There is no abdominal tenderness.   Lymphadenopathy:      Cervical: No cervical adenopathy.      Upper Body:      Right upper body: No supraclavicular or axillary adenopathy.      Left upper body: No supraclavicular or axillary adenopathy.   Skin:     Findings: No rash.   Neurological:      Mental Status: She is alert and oriented to person, place, and time.   Psychiatric:         Mood and Affect: Mood normal.         Behavior: Behavior normal.         Thought Content: Thought content normal.         Judgment: Judgment normal.       Assessment:       1. Malignant neoplasm of upper-outer quadrant of right breast in female, estrogen receptor positive        Plan:    Prolia every 6 M.  Due for mammograms in March  Continue endocrine therapy.    Route Chart for Scheduling    Med Onc Chart Routing      Follow up with physician 6 months.   Follow up with BRENDA    Infusion scheduling note    Injection scheduling note due for Prolia   Labs CMP   Lab interval:     Imaging    Pharmacy appointment    Other referrals          Therapy Plan Information  Medications  denosumab (PROLIA) injection 60 mg  60 mg, Subcutaneous, Every 26 weeks

## 2022-11-25 ENCOUNTER — INFUSION (OUTPATIENT)
Dept: INFUSION THERAPY | Facility: HOSPITAL | Age: 72
End: 2022-11-25
Attending: FAMILY MEDICINE
Payer: MEDICARE

## 2022-11-25 VITALS — BODY MASS INDEX: 21.86 KG/M2 | HEIGHT: 62 IN | WEIGHT: 118.81 LBS

## 2022-11-25 DIAGNOSIS — M81.0 AGE-RELATED OSTEOPOROSIS WITHOUT CURRENT PATHOLOGICAL FRACTURE: Primary | ICD-10-CM

## 2022-11-25 PROCEDURE — 96372 THER/PROPH/DIAG INJ SC/IM: CPT

## 2022-11-25 PROCEDURE — 63600175 PHARM REV CODE 636 W HCPCS: Mod: JG | Performed by: FAMILY MEDICINE

## 2022-11-25 RX ADMIN — DENOSUMAB 60 MG: 60 INJECTION SUBCUTANEOUS at 11:11

## 2022-11-25 NOTE — NURSING
Pt tolerated Prolia injection well. Education provided on taking calcium and vitamin D supplements, along with no dental work 3 months prior to and 3 months after injection. Pt verbalized understanding. Pt will call to schedule next appointment in 6 months.

## 2022-12-18 ENCOUNTER — PATIENT MESSAGE (OUTPATIENT)
Dept: FAMILY MEDICINE | Facility: CLINIC | Age: 72
End: 2022-12-18
Payer: MEDICARE

## 2022-12-19 ENCOUNTER — PATIENT MESSAGE (OUTPATIENT)
Dept: FAMILY MEDICINE | Facility: CLINIC | Age: 72
End: 2022-12-19
Payer: MEDICARE

## 2022-12-20 ENCOUNTER — OFFICE VISIT (OUTPATIENT)
Dept: FAMILY MEDICINE | Facility: CLINIC | Age: 72
End: 2022-12-20
Payer: MEDICARE

## 2022-12-20 ENCOUNTER — TELEPHONE (OUTPATIENT)
Dept: FAMILY MEDICINE | Facility: CLINIC | Age: 72
End: 2022-12-20
Payer: MEDICARE

## 2022-12-20 VITALS
HEART RATE: 85 BPM | OXYGEN SATURATION: 99 % | WEIGHT: 118.38 LBS | DIASTOLIC BLOOD PRESSURE: 82 MMHG | HEIGHT: 62 IN | BODY MASS INDEX: 21.79 KG/M2 | SYSTOLIC BLOOD PRESSURE: 146 MMHG

## 2022-12-20 DIAGNOSIS — F06.4 ANXIETY DISORDER DUE TO KNOWN PHYSIOLOGICAL CONDITION: Primary | ICD-10-CM

## 2022-12-20 DIAGNOSIS — I10 BENIGN ESSENTIAL HYPERTENSION: ICD-10-CM

## 2022-12-20 PROCEDURE — 99214 PR OFFICE/OUTPT VISIT, EST, LEVL IV, 30-39 MIN: ICD-10-PCS | Mod: HCNC,S$GLB,, | Performed by: INTERNAL MEDICINE

## 2022-12-20 PROCEDURE — 3288F PR FALLS RISK ASSESSMENT DOCUMENTED: ICD-10-PCS | Mod: HCNC,CPTII,S$GLB, | Performed by: INTERNAL MEDICINE

## 2022-12-20 PROCEDURE — 3079F PR MOST RECENT DIASTOLIC BLOOD PRESSURE 80-89 MM HG: ICD-10-PCS | Mod: HCNC,CPTII,S$GLB, | Performed by: INTERNAL MEDICINE

## 2022-12-20 PROCEDURE — 3079F DIAST BP 80-89 MM HG: CPT | Mod: HCNC,CPTII,S$GLB, | Performed by: INTERNAL MEDICINE

## 2022-12-20 PROCEDURE — 99999 PR PBB SHADOW E&M-EST. PATIENT-LVL III: CPT | Mod: PBBFAC,HCNC,, | Performed by: INTERNAL MEDICINE

## 2022-12-20 PROCEDURE — 1101F PT FALLS ASSESS-DOCD LE1/YR: CPT | Mod: HCNC,CPTII,S$GLB, | Performed by: INTERNAL MEDICINE

## 2022-12-20 PROCEDURE — 3077F PR MOST RECENT SYSTOLIC BLOOD PRESSURE >= 140 MM HG: ICD-10-PCS | Mod: HCNC,CPTII,S$GLB, | Performed by: INTERNAL MEDICINE

## 2022-12-20 PROCEDURE — 3008F BODY MASS INDEX DOCD: CPT | Mod: HCNC,CPTII,S$GLB, | Performed by: INTERNAL MEDICINE

## 2022-12-20 PROCEDURE — 1101F PR PT FALLS ASSESS DOC 0-1 FALLS W/OUT INJ PAST YR: ICD-10-PCS | Mod: HCNC,CPTII,S$GLB, | Performed by: INTERNAL MEDICINE

## 2022-12-20 PROCEDURE — 3008F PR BODY MASS INDEX (BMI) DOCUMENTED: ICD-10-PCS | Mod: HCNC,CPTII,S$GLB, | Performed by: INTERNAL MEDICINE

## 2022-12-20 PROCEDURE — 3044F PR MOST RECENT HEMOGLOBIN A1C LEVEL <7.0%: ICD-10-PCS | Mod: HCNC,CPTII,S$GLB, | Performed by: INTERNAL MEDICINE

## 2022-12-20 PROCEDURE — 1126F PR PAIN SEVERITY QUANTIFIED, NO PAIN PRESENT: ICD-10-PCS | Mod: HCNC,CPTII,S$GLB, | Performed by: INTERNAL MEDICINE

## 2022-12-20 PROCEDURE — 1160F PR REVIEW ALL MEDS BY PRESCRIBER/CLIN PHARMACIST DOCUMENTED: ICD-10-PCS | Mod: HCNC,CPTII,S$GLB, | Performed by: INTERNAL MEDICINE

## 2022-12-20 PROCEDURE — 3288F FALL RISK ASSESSMENT DOCD: CPT | Mod: HCNC,CPTII,S$GLB, | Performed by: INTERNAL MEDICINE

## 2022-12-20 PROCEDURE — 1159F PR MEDICATION LIST DOCUMENTED IN MEDICAL RECORD: ICD-10-PCS | Mod: HCNC,CPTII,S$GLB, | Performed by: INTERNAL MEDICINE

## 2022-12-20 PROCEDURE — 3077F SYST BP >= 140 MM HG: CPT | Mod: HCNC,CPTII,S$GLB, | Performed by: INTERNAL MEDICINE

## 2022-12-20 PROCEDURE — 99214 OFFICE O/P EST MOD 30 MIN: CPT | Mod: HCNC,S$GLB,, | Performed by: INTERNAL MEDICINE

## 2022-12-20 PROCEDURE — 1160F RVW MEDS BY RX/DR IN RCRD: CPT | Mod: HCNC,CPTII,S$GLB, | Performed by: INTERNAL MEDICINE

## 2022-12-20 PROCEDURE — 1159F MED LIST DOCD IN RCRD: CPT | Mod: HCNC,CPTII,S$GLB, | Performed by: INTERNAL MEDICINE

## 2022-12-20 PROCEDURE — 3044F HG A1C LEVEL LT 7.0%: CPT | Mod: HCNC,CPTII,S$GLB, | Performed by: INTERNAL MEDICINE

## 2022-12-20 PROCEDURE — 99999 PR PBB SHADOW E&M-EST. PATIENT-LVL III: ICD-10-PCS | Mod: PBBFAC,HCNC,, | Performed by: INTERNAL MEDICINE

## 2022-12-20 PROCEDURE — 1126F AMNT PAIN NOTED NONE PRSNT: CPT | Mod: HCNC,CPTII,S$GLB, | Performed by: INTERNAL MEDICINE

## 2022-12-20 RX ORDER — BUSPIRONE HYDROCHLORIDE 5 MG/1
5 TABLET ORAL DAILY PRN
Qty: 30 TABLET | Refills: 1 | Status: SHIPPED | OUTPATIENT
Start: 2022-12-20 | End: 2023-01-25 | Stop reason: SDUPTHER

## 2022-12-20 RX ORDER — AMLODIPINE BESYLATE 2.5 MG/1
5 TABLET ORAL DAILY
Qty: 90 TABLET | Refills: 0
Start: 2022-12-20 | End: 2023-01-25 | Stop reason: SDUPTHER

## 2022-12-20 RX ORDER — BUSPIRONE HYDROCHLORIDE 5 MG/1
5 TABLET ORAL 2 TIMES DAILY
Qty: 60 TABLET | Refills: 11 | Status: SHIPPED | OUTPATIENT
Start: 2022-12-20 | End: 2022-12-20

## 2022-12-20 NOTE — PROGRESS NOTES
Subjective:       Patient ID: Keo Frias is a 72 y.o. female.    Chief Complaint: Hypertension      Hypertension    Keo Frias is a 72 y.o. female with chronic conditions of hypertension, subclinical hypothyroidism, anemia, vitamin-D deficiency, osteoporosis, anxiety, recently diagnosed with right breast cancer who presents today for concerns with episodes of high blood pressure.    Reports she has been monitoring her blood pressure and noted sometimes her blood pressure has been elevated.  Adjusted her diet as she thought may have been associated to sodium and noted again her blood pressure was elevated.  Took 1 extra dose of amlodipine that day and later her blood pressure was better.  She continue monitoring and in a couple of days her blood pressure was again elevated and decided to increase her medication to 5 mg daily which she usually takes in the evening.  Next morning her blood pressures seem okay but after breakfast went down (89/77, 90/50s).  When the blood pressure is low may feel sleepy at times.  She drank fluids and had some salt and blood pressure improved.  Continues to have low normal blood pressures in the morning and appears to feel okay.      Her random elevations of the blood pressure could be associated to some anxiety.  BuSpar helped in the past.  Denies chest pains, palpitations, shortness of breath.  When her blood pressure is high feels warm.  She is followed by the done medicine but she had questions last Friday and try calling with no answer.    Reports she has been watching her diet.  Exercises daily with walks and meditation.          Health Maintenance:  Health Maintenance   Topic Date Due    Mammogram  03/30/2023    Lipid Panel  05/03/2023    DEXA Scan  11/15/2023    TETANUS VACCINE  03/17/2027    Hepatitis C Screening  Completed       Review of Systems   Endocrine:        Reports she usually feels warm when blood pressure is elevated   All other systems reviewed and are  negative.   Past Medical History:   Diagnosis Date    Anemia     Anxiety disorder 8/28/2019    Cataract     Chronic right-sided low back pain without sciatica 10/20/2021    Hypercholesteremia 9/17/2019    Invasive ductal carcinoma of breast, female, right 4/16/2021    Migraine with vision problems     Subclinical hypothyroidism     White coat syndrome with hypertension        Past Surgical History:   Procedure Laterality Date    COLON SURGERY      COLONOSCOPY N/A 12/7/2018    Procedure: COLONOSCOPY;  Surgeon: Bhargav Gaston MD;  Location: [facility] ENDO ([address] [place] [address]);  Service: Endoscopy;  Laterality: N/A;    DILATION AND CURETTAGE OF UTERUS      postmenopausal bleeding    MASTECTOMY, PARTIAL Right 5/12/2021    Procedure: MASTECTOMY, PARTIAL-Right with radiological marker;  Surgeon: Vivian Cadena MD;  Location: [facility];  Service: General;  Laterality: Right;    SENTINEL LYMPH NODE BIOPSY Right 5/12/2021    Procedure: BIOPSY, LYMPH NODE, SENTINEL-Right;  Surgeon: Vivian Cadena MD;  Location: [facility] OR;  Service: General;  Laterality: Right;    TUBAL LIGATION         Family History   Problem Relation Age of Onset    Diabetes Brother     Hypertension Brother     Glaucoma Brother     Glaucoma Father     Cataracts Father     Retinal detachment Father     Lung cancer Mother     Uterine cancer Maternal Grandmother     Stroke Maternal Grandmother     Stroke Paternal Grandmother     Amblyopia Neg Hx     Blindness Neg Hx     Macular degeneration Neg Hx     Strabismus Neg Hx     Thyroid disease Neg Hx        Social History     Socioeconomic History    Marital status:     Number of children: 2   Occupational History     Employer: [employer]   Tobacco Use    Smoking status: Never    Smokeless tobacco: Never   Substance and Sexual Activity    Alcohol use: No    Drug use: Never    Sexual activity: Yes     Partners: Male     Birth control/protection: None       Current Outpatient Medications   Medication Sig Dispense  Refill    calcium carbonate (CALCIUM 500 ORAL) Take 1 tablet by mouth.      cholecalciferol, vitamin D3, 125 mcg (5,000 unit) capsule Take 1 capsule (5,000 Units total) by mouth daily with breakfast. 100 capsule 4    letrozole (FEMARA) 2.5 mg Tab TAKE 1 TABLET BY MOUTH EVERY DAY 90 tablet 3    multivitamin-iron-folic acid Tab Take 1 tablet by mouth once daily.      amLODIPine (NORVASC) 2.5 MG tablet Take 2 tablets (5 mg total) by mouth once daily. 90 tablet 0    busPIRone (BUSPAR) 5 MG Tab Take 1 tablet (5 mg total) by mouth daily as needed. 30 tablet 1    CIPRODEX 0.3-0.1 % DrpS PLACE 4 DROPS INTO THE RIGHT EAR 2 (TWO) TIMES DAILY. FOR 10 DAYS (Patient not taking: Reported on 12/20/2022) 7.5 mL 5    flu vac 2022 [id number],PF, (FLUAD QUAD 2022-23,65Y UP,,PF,) 60 mcg (15 mcg x 4)/0.5 mL Syrg Inject into the muscle. (Patient not taking: Reported on 12/20/2022) 0.5 mL 0     No current facility-administered medications for this visit.       Review of patient's allergies indicates:   Allergen Reactions    Sinus allergy Other (See Comments)     Headaches, migranes    Sulfa (sulfonamide antibiotics) Rash         Objective:       Last 3 sets of Vitals    Vitals - 1 value per visit 11/25/2022 12/20/2022 12/20/2022   SYSTOLIC - - 146   DIASTOLIC - - 82   Pulse - - 85   Temp - - -   Resp - - -   SPO2 - - 99   Weight (lb) 118.83 - 118.39   Weight (kg) 53.9 - 53.7   Height 62 - 62   BMI (Calculated) 21.7 - 21.6   VISIT REPORT - - -   Pain Score  0 0 -   Some encounter information is confidential and restricted. Go to Review Flowsheets activity to see all data.   Some recent data might be hidden   Physical Exam  Constitutional:       General: She is not in acute distress.  HENT:      Head: Normocephalic.      Right Ear: Tympanic membrane, ear canal and external ear normal.      Left Ear: Tympanic membrane, ear canal and external ear normal.      Nose: Nose normal.      Mouth/Throat:      Mouth: Mucous membranes are moist.    Eyes:      General: No scleral icterus.     Extraocular Movements: Extraocular movements intact.      Conjunctiva/sclera: Conjunctivae normal.   Neck:      Vascular: No carotid bruit.      Comments: No goiter.  Cardiovascular:      Rate and Rhythm: Normal rate and regular rhythm.      Pulses: Normal pulses.      Heart sounds: Normal heart sounds.   Pulmonary:      Effort: Pulmonary effort is normal.      Breath sounds: Normal breath sounds.   Abdominal:      General: Bowel sounds are normal. There is no distension.      Palpations: Abdomen is soft. There is no mass.      Tenderness: There is no abdominal tenderness.   Musculoskeletal:         General: No swelling.   Lymphadenopathy:      Cervical: No cervical adenopathy.   Skin:     General: Skin is warm and dry.   Neurological:      General: No focal deficit present.      Mental Status: She is alert and oriented to person, place, and time.   Psychiatric:         Mood and Affect: Mood normal.         Behavior: Behavior normal.         CBC:  Recent Labs   Lab 03/25/21  0705 04/26/21  1619 05/03/22  0808   WBC 5.93 6.12 5.44   RBC 4.79 4.64 4.79   Hemoglobin 13.2 12.8 13.3   Hematocrit 41.0 40.1 41.4   Platelets 199 213 175   MCV 86 86 86   MCH 27.6 27.6 27.8   MCHC 32.2 31.9 L 32.1     CMP:  Recent Labs   Lab 04/26/21  1619 05/03/22  0808 11/10/22  0756   Glucose 85 84 118 H   Calcium 8.8 9.9 9.1   Albumin 3.6 4.0 4.0   Total Protein 7.2 7.8 7.9   Sodium 141 138 141   Potassium 4.6 4.0 3.9   CO2 27 23 24   Chloride 107 102 106   BUN 10 9 8   Creatinine 0.6 0.6 0.7   Alkaline Phosphatase 84 86 60   ALT 15 18 19   AST 20 24 24   Total Bilirubin 0.3 0.6 0.6     URINALYSIS:       LIPIDS:  Recent Labs   Lab 03/25/21  0705 05/03/22  0808   TSH 6.802 H 3.512   HDL 47 46   Cholesterol 189 204 H   Triglycerides 118 117   LDL Cholesterol 118.4 134.6   HDL/Cholesterol Ratio 24.9 22.5   Non-HDL Cholesterol 142 158   Total Cholesterol/HDL Ratio 4.0 4.4     TSH:  Recent Labs    Lab 03/25/21  0705 05/03/22  0808   TSH 6.802 H 3.512       A1C:  Recent Labs   Lab 03/25/21  0705 05/03/22  0808   Hemoglobin A1C 5.7 H 5.7 H       Imaging:  Mammo Digital Screening Bilat w/ Jose  Narrative: Result:   Mammo Digital Screening Bilat w/ Jose     History:  Patient is 71 y.o. and is seen for a screening mammogram.    Films Compared:  Prior images (if available) were compared.     Findings:  This procedure was performed using tomosynthesis. Computer-aided detection   was utilized in the interpretation of this examination.  The breasts have scattered areas of fibroglandular density.     Right  There are post-surgical findings from a previous lumpectomy seen in the   right breast. There has been no interval development of a suspicious mass,   microcalcification, or architectural distortion.     Left  There is no evidence of suspicious masses, calcifications, or other   abnormal findings in the left breast.  Impression: Bilateral  There is no mammographic evidence of malignancy.    BI-RADS Category:   Overall: 2 - Benign     Recommendation:  Routine mammogram in 1 year is recommended.      Assessment:       1. Anxiety disorder due to known physiological condition    2. Benign essential hypertension              Plan:       1. Anxiety disorder due to known physiological condition  -    anxiety possibly associated to episodes of high blood pressure.  She is tolerating present treatment but blood pressure maybe sometimes in the low side she is open to start trazodone  busPIRone (BUSPAR) 5 MG Tab; Take 1 tablet (5 mg total) by mouth daily as needed.  Dispense: 30 tablet; Refill: 1    2. Benign essential hypertension  -     will increase amlodipine to 5 mg daily amLODIPine (NORVASC) 2.5 MG tablet; Take 2 tablets (5 mg total) by mouth once daily.  Dispense: 90 tablet; Refill: 0  - she is followed by the medicine.       Health Maintenance Due   Topic Date Due    Shingles Vaccine (1 of 2) 07/08/2010    COVID-19  Vaccine (4 - Booster for Pfizer series) 11/25/2021            Return to clinic as scheduled.    Ro Hernandez MD  [facility]  Disclaimer:  This note has been generated using voice-recognition software. There may be grammatical or spelling errors that have been missed during proof-reading

## 2022-12-20 NOTE — TELEPHONE ENCOUNTER
----- Message from Ramin Sherman MA sent at 12/19/2022  4:54 PM CST -----    ----- Message -----  From: Cathy Conner  Sent: 12/19/2022  10:22 AM CST  To: Melvi JOSEPH Staff    Type:  Needs Medical Advice    Who Called: Pt  Symptoms (please be specific): Fluctuating blood pressure  How long has patient had these symptoms:  two weeks  Pharmacy name and phone #:    Would the patient rather a call back or a response via [facility]? call  Best Call Back Number: [phone]  Additional Information: Pt blood pressure has been fluctuation for the past two weeks.  Would like to get a call back.

## 2022-12-20 NOTE — TELEPHONE ENCOUNTER
Called pt about her blood pressure. I asked pt if she has had Blurred vision - pt stated no. I asked pt if she has had headaches - pt stated no but her head feels heavy. I asked ot if she has had fatigue - pt stated yes when her bp gets low. I asked pt if she has had leg swelling - pt stated no. I asked pt if she could give me her bp reading from this morning. Pt stated that she took it 2x and it was 96/65 and 96/59. I asked pt if she took her bp last night. Pt stated yes and it was 182/105. I told pt that she would have to come in for an appointment. We had an availability for today at 1:30 with a colleague of Dr. Johns. Pt stated she could not come in today. I put her on hold and talked with Es. Es told me that if she could not come in today that she would have to go to the ED. I asked Es if she could speak with her about the ED. Es agreed and did. Es handed me back the phone and pt asked if she can schedule an appointment with Dr. Ogden. I told pt I would but she would not have anything available until next year. Pt stated she understood. Would be coming in today for 1:30pm but wanted a FU with Dr. Ogden. I was able to schedule her an appointment in January.

## 2022-12-20 NOTE — TELEPHONE ENCOUNTER
Spoke to pt and advised her to come in for an appt today for the elevated BP or to go to the ER. Pt stated that she would come in for an appt today.

## 2023-01-10 ENCOUNTER — PATIENT MESSAGE (OUTPATIENT)
Dept: HEMATOLOGY/ONCOLOGY | Facility: CLINIC | Age: 73
End: 2023-01-10
Payer: MEDICARE

## 2023-01-25 ENCOUNTER — OFFICE VISIT (OUTPATIENT)
Dept: FAMILY MEDICINE | Facility: CLINIC | Age: 73
End: 2023-01-25
Payer: MEDICARE

## 2023-01-25 VITALS
OXYGEN SATURATION: 98 % | HEIGHT: 62 IN | BODY MASS INDEX: 21.51 KG/M2 | DIASTOLIC BLOOD PRESSURE: 72 MMHG | SYSTOLIC BLOOD PRESSURE: 104 MMHG | HEART RATE: 75 BPM | WEIGHT: 116.88 LBS | TEMPERATURE: 98 F

## 2023-01-25 DIAGNOSIS — I10 BENIGN ESSENTIAL HYPERTENSION: Primary | ICD-10-CM

## 2023-01-25 DIAGNOSIS — R79.9 ABNORMAL BLOOD CHEMISTRY: ICD-10-CM

## 2023-01-25 DIAGNOSIS — Z78.9 VEGETARIAN DIET: ICD-10-CM

## 2023-01-25 DIAGNOSIS — F41.8 SITUATIONAL ANXIETY: ICD-10-CM

## 2023-01-25 DIAGNOSIS — Z79.899 MEDICATION MANAGEMENT: ICD-10-CM

## 2023-01-25 DIAGNOSIS — M81.0 AGE-RELATED OSTEOPOROSIS WITHOUT CURRENT PATHOLOGICAL FRACTURE: ICD-10-CM

## 2023-01-25 PROCEDURE — 99999 PR PBB SHADOW E&M-EST. PATIENT-LVL III: CPT | Mod: PBBFAC,,, | Performed by: FAMILY MEDICINE

## 2023-01-25 PROCEDURE — 1126F PR PAIN SEVERITY QUANTIFIED, NO PAIN PRESENT: ICD-10-PCS | Mod: CPTII,S$GLB,, | Performed by: FAMILY MEDICINE

## 2023-01-25 PROCEDURE — 3078F DIAST BP <80 MM HG: CPT | Mod: CPTII,S$GLB,, | Performed by: FAMILY MEDICINE

## 2023-01-25 PROCEDURE — 3008F BODY MASS INDEX DOCD: CPT | Mod: CPTII,S$GLB,, | Performed by: FAMILY MEDICINE

## 2023-01-25 PROCEDURE — 3008F PR BODY MASS INDEX (BMI) DOCUMENTED: ICD-10-PCS | Mod: CPTII,S$GLB,, | Performed by: FAMILY MEDICINE

## 2023-01-25 PROCEDURE — 3074F SYST BP LT 130 MM HG: CPT | Mod: CPTII,S$GLB,, | Performed by: FAMILY MEDICINE

## 2023-01-25 PROCEDURE — 1101F PR PT FALLS ASSESS DOC 0-1 FALLS W/OUT INJ PAST YR: ICD-10-PCS | Mod: CPTII,S$GLB,, | Performed by: FAMILY MEDICINE

## 2023-01-25 PROCEDURE — 1159F MED LIST DOCD IN RCRD: CPT | Mod: CPTII,S$GLB,, | Performed by: FAMILY MEDICINE

## 2023-01-25 PROCEDURE — 3288F FALL RISK ASSESSMENT DOCD: CPT | Mod: CPTII,S$GLB,, | Performed by: FAMILY MEDICINE

## 2023-01-25 PROCEDURE — 1126F AMNT PAIN NOTED NONE PRSNT: CPT | Mod: CPTII,S$GLB,, | Performed by: FAMILY MEDICINE

## 2023-01-25 PROCEDURE — 99999 PR PBB SHADOW E&M-EST. PATIENT-LVL III: ICD-10-PCS | Mod: PBBFAC,,, | Performed by: FAMILY MEDICINE

## 2023-01-25 PROCEDURE — 1160F PR REVIEW ALL MEDS BY PRESCRIBER/CLIN PHARMACIST DOCUMENTED: ICD-10-PCS | Mod: CPTII,S$GLB,, | Performed by: FAMILY MEDICINE

## 2023-01-25 PROCEDURE — 3078F PR MOST RECENT DIASTOLIC BLOOD PRESSURE < 80 MM HG: ICD-10-PCS | Mod: CPTII,S$GLB,, | Performed by: FAMILY MEDICINE

## 2023-01-25 PROCEDURE — 1101F PT FALLS ASSESS-DOCD LE1/YR: CPT | Mod: CPTII,S$GLB,, | Performed by: FAMILY MEDICINE

## 2023-01-25 PROCEDURE — 3288F PR FALLS RISK ASSESSMENT DOCUMENTED: ICD-10-PCS | Mod: CPTII,S$GLB,, | Performed by: FAMILY MEDICINE

## 2023-01-25 PROCEDURE — 3074F PR MOST RECENT SYSTOLIC BLOOD PRESSURE < 130 MM HG: ICD-10-PCS | Mod: CPTII,S$GLB,, | Performed by: FAMILY MEDICINE

## 2023-01-25 PROCEDURE — 99214 OFFICE O/P EST MOD 30 MIN: CPT | Mod: S$GLB,,, | Performed by: FAMILY MEDICINE

## 2023-01-25 PROCEDURE — 1159F PR MEDICATION LIST DOCUMENTED IN MEDICAL RECORD: ICD-10-PCS | Mod: CPTII,S$GLB,, | Performed by: FAMILY MEDICINE

## 2023-01-25 PROCEDURE — 99214 PR OFFICE/OUTPT VISIT, EST, LEVL IV, 30-39 MIN: ICD-10-PCS | Mod: S$GLB,,, | Performed by: FAMILY MEDICINE

## 2023-01-25 PROCEDURE — 1160F RVW MEDS BY RX/DR IN RCRD: CPT | Mod: CPTII,S$GLB,, | Performed by: FAMILY MEDICINE

## 2023-01-25 RX ORDER — AMLODIPINE BESYLATE 2.5 MG/1
2.5 TABLET ORAL DAILY
Qty: 90 TABLET | Refills: 4 | Status: SHIPPED | OUTPATIENT
Start: 2023-01-25 | End: 2023-05-20

## 2023-01-25 RX ORDER — BUSPIRONE HYDROCHLORIDE 5 MG/1
5 TABLET ORAL 3 TIMES DAILY PRN
Qty: 60 TABLET | Refills: 11 | Status: SHIPPED | OUTPATIENT
Start: 2023-01-25 | End: 2023-09-20 | Stop reason: ALTCHOICE

## 2023-01-25 NOTE — PROGRESS NOTES
Office Visit    Patient Name: Keo Frias    : 1950  MRN: [id number]    Subjective:  Keo is a 72 y.o. female who presents today for:    Follow-up (htn)    MOST RECENT OFFICE VISIT WITH ME-PHYSICAL 2022   SEEN BY DR. ROA 22- AMLODIPINE INCREASED TO 5 MG DAILY, BUSPAR 5 PRN ADDVISED      Keo Frias presents today for follow up of BP and anxiety.   Chronic conditions: H/O subclinical hypothyroidism w/ (-) TPO antibodies 17, h/o anemia and vitamin D deficiency, migraine headaches, allergic rhinitis,  osteoporosis.   Recently diagnosed with invasive ductal carcinoma of the right breast and had a partial mastectomy w/ (-) SN BX May 12, 2021 per breast surgeon Dr. Cadena.   Following with heme/onc Dr Garcia 11/15/22.      She has been feeling overall well.     Feeling better on buspirone 5 mg nightly after dinner.   Takes 2 2.5 mg amlodipine after dinner as well.   BIGGEST SOURCE OF ANXIETY IS WHEN BP ELEVATES-- SHE GETS VERY NERVOUS WITH BP ELEVATION.     LOG REVIEWED:   HIGHEST READINGS ON 5 MG AMLODIPINE ARE SBPs in the 160s but the majority are 140s or lower and at times SBP is in the low 80s-- feels tires with the low SBP readings but not orthostatic.     Has slight anxiety in the evenings  No recent dizziness and migraines improved on Migraine diet.      Labs prior to office visit 2022 unremarkable including normal CBC, CMP, TSH, normal magnesium/B12, vitamin-D not checked secondary to lab restrictions. A1c stable at 5.7. .      General lifestyle habits are as follows:    Diet: good-- good variety overall and avoids junk food and follows a vegetarian diet. Limits salt and sugar. Drinks water regularly.   Exercise: fair-- walks daily about 30 minutes  Sleep: GOOD, 6 hours nightly with nap generally during the day  Weight: slight decline from BMI 22-->21       PAST MEDICAL HISTORY, SURGICAL/SOCIAL/FAMILY HISTORY REVIEWED AS PER CHART, WITH PERTINENT FINDINGS INCLUDED IN HISTORY  SECTION OF NOTE.     Current Medications    Medication List with Changes/Refills   Current Medications    CALCIUM CARBONATE (CALCIUM 500 ORAL)    Take 1 tablet by mouth.    CHOLECALCIFEROL, VITAMIN D3, 125 MCG (5,000 UNIT) CAPSULE    Take 1 capsule (5,000 Units total) by mouth daily with breakfast.    CIPRODEX 0.3-0.1 % DRPS    PLACE 4 DROPS INTO THE RIGHT EAR 2 (TWO) TIMES DAILY. FOR 10 DAYS    LETROZOLE (FEMARA) 2.5 MG TAB    TAKE 1 TABLET BY MOUTH EVERY DAY    MULTIVITAMIN-IRON-FOLIC ACID TAB    Take 1 tablet by mouth once daily.   Changed and/or Refilled Medications    Modified Medication Previous Medication    AMLODIPINE (NORVASC) 2.5 MG TABLET amLODIPine (NORVASC) 2.5 MG tablet       Take 1 tablet (2.5 mg total) by mouth once daily.    Take 2 tablets (5 mg total) by mouth once daily.    BUSPIRONE (BUSPAR) 5 MG TAB busPIRone (BUSPAR) 5 MG Tab       Take 1 tablet (5 mg total) by mouth 3 (three) times daily as needed (anxiety).    Take 1 tablet (5 mg total) by mouth daily as needed.   Discontinued Medications    FLU VAC 2022 [id number],PF, (FLUAD QUAD 2022-23,65Y UP,,PF,) 60 MCG (15 MCG X 4)/0.5 ML SYRG    Inject into the muscle.       Allergies   Review of patient's allergies indicates:   Allergen Reactions    Sinus allergy Other (See Comments)     Headaches, migranes    Sulfa (sulfonamide antibiotics) Rash         Review of Systems (Pertinent positives)  Review of Systems   Constitutional:  Negative for unexpected weight change.   Eyes:  Negative for visual disturbance.   Respiratory:  Negative for chest tightness and shortness of breath.    Cardiovascular:  Negative for chest pain, palpitations and leg swelling.   Neurological:  Negative for dizziness, light-headedness and headaches.   Psychiatric/Behavioral:  The patient is nervous/anxious (intermittent, overall doing well from anxiety standpoint).      /72 (BP Location: Right arm, Patient Position: Sitting, BP Method: Medium (Manual))   Pulse 75    "Temp 97.9 °F (36.6 °C) (Oral)   Ht 5' 2" (1.575 m)   Wt 53 kg (116 lb 13.5 oz)   LMP  (LMP Unknown)   SpO2 98%   BMI 21.37 kg/m²     Physical Exam  Vitals reviewed.   Constitutional:       General: She is not in acute distress.     Appearance: Normal appearance. She is well-developed.   HENT:      Head: Normocephalic and atraumatic.   Eyes:      Conjunctiva/sclera: Conjunctivae normal.   Cardiovascular:      Rate and Rhythm: Normal rate and regular rhythm.   Pulmonary:      Effort: Pulmonary effort is normal.      Breath sounds: Normal breath sounds.   Musculoskeletal:      Right lower leg: No edema.      Left lower leg: No edema.   Skin:     General: Skin is warm and dry.   Neurological:      General: No focal deficit present.      Mental Status: She is alert and oriented to person, place, and time.   Psychiatric:         Mood and Affect: Mood normal.         Behavior: Behavior normal.         Assessment/Plan:  Keo Frias is a 72 y.o. female who presents today for :        ICD-10-CM ICD-9-CM    1. Benign essential hypertension  I10 401.1 amLODIPine (NORVASC) 2.5 MG tablet      Hemoglobin A1C      Comprehensive Metabolic Panel      Lipid Panel      CBC Auto Differential      TSH      Vitamin D      Vitamin B12      Magnesium      Ferritin      2. Situational anxiety  F41.8 300.09 busPIRone (BUSPAR) 5 MG Tab      3. Age-related osteoporosis without current pathological fracture  M81.0 733.01       4. Medication management  Z79.899 V58.69 Hemoglobin A1C      Comprehensive Metabolic Panel      Lipid Panel      CBC Auto Differential      TSH      Vitamin D      Vitamin B12      Magnesium      Ferritin      5. Vegetarian diet  Z78.9 V49.89 Hemoglobin A1C      Comprehensive Metabolic Panel      Lipid Panel      CBC Auto Differential      TSH      Vitamin D      Vitamin B12      Magnesium      Ferritin      6. Abnormal blood chemistry  R79.9 790.6 Ferritin        Review of blood pressure log shows blood pressure is " generally low to normal range, occasional elevation in the evenings to max SBP in the 160s.  Has several readings in the low 80s systolic.      Because the low reading have advised cutting back to 1 2.5 mg amlodipine, but have advised taking it in the morning instead of in the evening as her blood pressures tend to elevate when it seems that the amlodipine is may be starting to wear off.    Continue regular exercise, low-salt diet, blood pressure log-advise not to over check her blood pressure as this breathes more anxiety and then her blood pressure elevates as a result.      Follow-up for repeat labs, annual physical in May, sooner if concerns      There are no Patient Instructions on file for this visit.      Follow up in about 3 months (around 5/1/2023) for to follow up on lab results, return as needed for new concerns.

## 2023-02-04 ENCOUNTER — PATIENT MESSAGE (OUTPATIENT)
Dept: HEMATOLOGY/ONCOLOGY | Facility: CLINIC | Age: 73
End: 2023-02-04
Payer: MEDICARE

## 2023-02-06 DIAGNOSIS — C50.411 MALIGNANT NEOPLASM OF UPPER-OUTER QUADRANT OF RIGHT BREAST IN FEMALE, ESTROGEN RECEPTOR POSITIVE: Primary | ICD-10-CM

## 2023-02-06 DIAGNOSIS — Z12.31 ENCOUNTER FOR SCREENING MAMMOGRAM FOR MALIGNANT NEOPLASM OF BREAST: ICD-10-CM

## 2023-02-06 DIAGNOSIS — Z17.0 MALIGNANT NEOPLASM OF UPPER-OUTER QUADRANT OF RIGHT BREAST IN FEMALE, ESTROGEN RECEPTOR POSITIVE: Primary | ICD-10-CM

## 2023-02-07 DIAGNOSIS — Z00.00 ENCOUNTER FOR MEDICARE ANNUAL WELLNESS EXAM: ICD-10-CM

## 2023-02-08 ENCOUNTER — PATIENT MESSAGE (OUTPATIENT)
Dept: HEMATOLOGY/ONCOLOGY | Facility: CLINIC | Age: 73
End: 2023-02-08
Payer: MEDICARE

## 2023-02-09 DIAGNOSIS — Z00.00 ENCOUNTER FOR MEDICARE ANNUAL WELLNESS EXAM: ICD-10-CM

## 2023-03-22 ENCOUNTER — TELEPHONE (OUTPATIENT)
Dept: FAMILY MEDICINE | Facility: CLINIC | Age: 73
End: 2023-03-22
Payer: MEDICARE

## 2023-04-06 ENCOUNTER — HOSPITAL ENCOUNTER (OUTPATIENT)
Dept: RADIOLOGY | Facility: HOSPITAL | Age: 73
Discharge: HOME | End: 2023-04-06
Attending: INTERNAL MEDICINE
Payer: MEDICARE

## 2023-04-06 DIAGNOSIS — Z17.0 MALIGNANT NEOPLASM OF UPPER-OUTER QUADRANT OF RIGHT BREAST IN FEMALE, ESTROGEN RECEPTOR POSITIVE: ICD-10-CM

## 2023-04-06 DIAGNOSIS — C50.411 MALIGNANT NEOPLASM OF UPPER-OUTER QUADRANT OF RIGHT BREAST IN FEMALE, ESTROGEN RECEPTOR POSITIVE: ICD-10-CM

## 2023-04-06 DIAGNOSIS — Z12.31 ENCOUNTER FOR SCREENING MAMMOGRAM FOR MALIGNANT NEOPLASM OF BREAST: ICD-10-CM

## 2023-04-06 PROCEDURE — 77067 MAMMO DIGITAL SCREENING BILAT WITH TOMO: ICD-10-PCS | Mod: 26,HCNC,, | Performed by: RADIOLOGY

## 2023-04-06 PROCEDURE — 77063 BREAST TOMOSYNTHESIS BI: CPT | Mod: 26,HCNC,, | Performed by: RADIOLOGY

## 2023-04-06 PROCEDURE — 77067 SCR MAMMO BI INCL CAD: CPT | Mod: 26,HCNC,, | Performed by: RADIOLOGY

## 2023-04-06 PROCEDURE — 77067 SCR MAMMO BI INCL CAD: CPT | Mod: TC,HCNC

## 2023-04-06 PROCEDURE — 77063 MAMMO DIGITAL SCREENING BILAT WITH TOMO: ICD-10-PCS | Mod: 26,HCNC,, | Performed by: RADIOLOGY

## 2023-05-03 ENCOUNTER — LAB VISIT (OUTPATIENT)
Dept: LAB | Facility: HOSPITAL | Age: 73
End: 2023-05-03
Payer: MEDICARE

## 2023-05-03 DIAGNOSIS — I10 BENIGN ESSENTIAL HYPERTENSION: ICD-10-CM

## 2023-05-03 DIAGNOSIS — R79.9 ABNORMAL BLOOD CHEMISTRY: ICD-10-CM

## 2023-05-03 DIAGNOSIS — Z78.9 VEGETARIAN DIET: ICD-10-CM

## 2023-05-03 DIAGNOSIS — Z79.899 MEDICATION MANAGEMENT: ICD-10-CM

## 2023-05-03 LAB
25(OH)D3+25(OH)D2 SERPL-MCNC: 82 NG/ML (ref 30–96)
ALBUMIN SERPL BCP-MCNC: 3.8 G/DL (ref 3.5–5.2)
ALP SERPL-CCNC: 56 U/L (ref 55–135)
ALT SERPL W/O P-5'-P-CCNC: 14 U/L (ref 10–44)
ANION GAP SERPL CALC-SCNC: 9 MMOL/L (ref 8–16)
AST SERPL-CCNC: 21 U/L (ref 10–40)
BASOPHILS # BLD AUTO: 0.08 K/UL (ref 0–0.2)
BASOPHILS NFR BLD: 1.5 % (ref 0–1.9)
BILIRUB SERPL-MCNC: 0.4 MG/DL (ref 0.1–1)
BUN SERPL-MCNC: 10 MG/DL (ref 8–23)
CALCIUM SERPL-MCNC: 9.3 MG/DL (ref 8.7–10.5)
CHLORIDE SERPL-SCNC: 103 MMOL/L (ref 95–110)
CHOLEST SERPL-MCNC: 186 MG/DL (ref 120–199)
CHOLEST/HDLC SERPL: 3.8 {RATIO} (ref 2–5)
CO2 SERPL-SCNC: 26 MMOL/L (ref 23–29)
CREAT SERPL-MCNC: 0.6 MG/DL (ref 0.5–1.4)
DIFFERENTIAL METHOD: ABNORMAL
EOSINOPHIL # BLD AUTO: 0.5 K/UL (ref 0–0.5)
EOSINOPHIL NFR BLD: 10.3 % (ref 0–8)
ERYTHROCYTE [DISTWIDTH] IN BLOOD BY AUTOMATED COUNT: 12.8 % (ref 11.5–14.5)
EST. GFR  (NO RACE VARIABLE): >60 ML/MIN/1.73 M^2
ESTIMATED AVG GLUCOSE: 117 MG/DL (ref 68–131)
FERRITIN SERPL-MCNC: 53 NG/ML (ref 20–300)
GLUCOSE SERPL-MCNC: 89 MG/DL (ref 70–110)
HBA1C MFR BLD: 5.7 % (ref 4–5.6)
HCT VFR BLD AUTO: 39.8 % (ref 37–48.5)
HDLC SERPL-MCNC: 49 MG/DL (ref 40–75)
HDLC SERPL: 26.3 % (ref 20–50)
HGB BLD-MCNC: 12.9 G/DL (ref 12–16)
IMM GRANULOCYTES # BLD AUTO: 0.01 K/UL (ref 0–0.04)
IMM GRANULOCYTES NFR BLD AUTO: 0.2 % (ref 0–0.5)
LDLC SERPL CALC-MCNC: 120.8 MG/DL (ref 63–159)
LYMPHOCYTES # BLD AUTO: 2.1 K/UL (ref 1–4.8)
LYMPHOCYTES NFR BLD: 40.2 % (ref 18–48)
MAGNESIUM SERPL-MCNC: 1.9 MG/DL (ref 1.6–2.6)
MCH RBC QN AUTO: 28.2 PG (ref 27–31)
MCHC RBC AUTO-ENTMCNC: 32.4 G/DL (ref 32–36)
MCV RBC AUTO: 87 FL (ref 82–98)
MONOCYTES # BLD AUTO: 0.5 K/UL (ref 0.3–1)
MONOCYTES NFR BLD: 8.7 % (ref 4–15)
NEUTROPHILS # BLD AUTO: 2 K/UL (ref 1.8–7.7)
NEUTROPHILS NFR BLD: 39.1 % (ref 38–73)
NONHDLC SERPL-MCNC: 137 MG/DL
NRBC BLD-RTO: 0 /100 WBC
PLATELET # BLD AUTO: 200 K/UL (ref 150–450)
PMV BLD AUTO: 12.5 FL (ref 9.2–12.9)
POTASSIUM SERPL-SCNC: 4.2 MMOL/L (ref 3.5–5.1)
PROT SERPL-MCNC: 7.6 G/DL (ref 6–8.4)
RBC # BLD AUTO: 4.58 M/UL (ref 4–5.4)
SODIUM SERPL-SCNC: 138 MMOL/L (ref 136–145)
T4 FREE SERPL-MCNC: 1.08 NG/DL (ref 0.71–1.51)
TRIGL SERPL-MCNC: 81 MG/DL (ref 30–150)
TSH SERPL DL<=0.005 MIU/L-ACNC: 4.39 UIU/ML (ref 0.4–4)
VIT B12 SERPL-MCNC: 425 PG/ML (ref 210–950)
WBC # BLD AUTO: 5.17 K/UL (ref 3.9–12.7)

## 2023-05-03 PROCEDURE — 83735 ASSAY OF MAGNESIUM: CPT | Mod: HCNC | Performed by: FAMILY MEDICINE

## 2023-05-03 PROCEDURE — 82728 ASSAY OF FERRITIN: CPT | Mod: HCNC | Performed by: FAMILY MEDICINE

## 2023-05-03 PROCEDURE — 82306 VITAMIN D 25 HYDROXY: CPT | Mod: HCNC | Performed by: FAMILY MEDICINE

## 2023-05-03 PROCEDURE — 80053 COMPREHEN METABOLIC PANEL: CPT | Mod: HCNC | Performed by: FAMILY MEDICINE

## 2023-05-03 PROCEDURE — 36415 COLL VENOUS BLD VENIPUNCTURE: CPT | Mod: HCNC | Performed by: FAMILY MEDICINE

## 2023-05-03 PROCEDURE — 80061 LIPID PANEL: CPT | Mod: HCNC | Performed by: FAMILY MEDICINE

## 2023-05-03 PROCEDURE — 84439 ASSAY OF FREE THYROXINE: CPT | Mod: HCNC | Performed by: FAMILY MEDICINE

## 2023-05-03 PROCEDURE — 85025 COMPLETE CBC W/AUTO DIFF WBC: CPT | Mod: HCNC | Performed by: FAMILY MEDICINE

## 2023-05-03 PROCEDURE — 84443 ASSAY THYROID STIM HORMONE: CPT | Mod: HCNC | Performed by: FAMILY MEDICINE

## 2023-05-03 PROCEDURE — 82607 VITAMIN B-12: CPT | Mod: HCNC | Performed by: FAMILY MEDICINE

## 2023-05-03 PROCEDURE — 83036 HEMOGLOBIN GLYCOSYLATED A1C: CPT | Mod: HCNC | Performed by: FAMILY MEDICINE

## 2023-05-07 PROBLEM — Z79.811 AROMATASE INHIBITOR USE: Status: ACTIVE | Noted: 2023-05-07

## 2023-05-08 ENCOUNTER — OFFICE VISIT (OUTPATIENT)
Dept: FAMILY MEDICINE | Facility: CLINIC | Age: 73
End: 2023-05-08
Payer: MEDICARE

## 2023-05-08 ENCOUNTER — PATIENT MESSAGE (OUTPATIENT)
Dept: PHARMACY | Facility: CLINIC | Age: 73
End: 2023-05-08
Payer: MEDICARE

## 2023-05-08 ENCOUNTER — PATIENT MESSAGE (OUTPATIENT)
Dept: HEMATOLOGY/ONCOLOGY | Facility: CLINIC | Age: 73
End: 2023-05-08
Payer: MEDICARE

## 2023-05-08 VITALS
HEART RATE: 77 BPM | TEMPERATURE: 99 F | OXYGEN SATURATION: 98 % | HEIGHT: 62 IN | DIASTOLIC BLOOD PRESSURE: 46 MMHG | BODY MASS INDEX: 21.46 KG/M2 | SYSTOLIC BLOOD PRESSURE: 100 MMHG | WEIGHT: 116.63 LBS

## 2023-05-08 DIAGNOSIS — Z79.899 MEDICATION MANAGEMENT: ICD-10-CM

## 2023-05-08 DIAGNOSIS — N28.89 RIGHT RENAL MASS: ICD-10-CM

## 2023-05-08 DIAGNOSIS — H71.91 CHOLESTEATOMA OF RIGHT EAR: ICD-10-CM

## 2023-05-08 DIAGNOSIS — C50.411 MALIGNANT NEOPLASM OF UPPER-OUTER QUADRANT OF RIGHT BREAST IN FEMALE, ESTROGEN RECEPTOR POSITIVE: ICD-10-CM

## 2023-05-08 DIAGNOSIS — I10 BENIGN ESSENTIAL HYPERTENSION: ICD-10-CM

## 2023-05-08 DIAGNOSIS — Z78.9 VEGETARIAN DIET: ICD-10-CM

## 2023-05-08 DIAGNOSIS — Z79.811 AROMATASE INHIBITOR USE: ICD-10-CM

## 2023-05-08 DIAGNOSIS — F41.8 SITUATIONAL ANXIETY: ICD-10-CM

## 2023-05-08 DIAGNOSIS — M81.0 AGE-RELATED OSTEOPOROSIS WITHOUT CURRENT PATHOLOGICAL FRACTURE: ICD-10-CM

## 2023-05-08 DIAGNOSIS — Z00.00 ROUTINE GENERAL MEDICAL EXAMINATION AT A HEALTH CARE FACILITY: Primary | ICD-10-CM

## 2023-05-08 DIAGNOSIS — Z17.0 MALIGNANT NEOPLASM OF UPPER-OUTER QUADRANT OF RIGHT BREAST IN FEMALE, ESTROGEN RECEPTOR POSITIVE: ICD-10-CM

## 2023-05-08 DIAGNOSIS — E55.9 VITAMIN D DEFICIENCY: ICD-10-CM

## 2023-05-08 DIAGNOSIS — R73.03 PREDIABETES: ICD-10-CM

## 2023-05-08 DIAGNOSIS — Z01.00 ROUTINE EYE EXAM: ICD-10-CM

## 2023-05-08 DIAGNOSIS — Z23 NEED FOR SHINGLES VACCINE: ICD-10-CM

## 2023-05-08 PROCEDURE — 1159F PR MEDICATION LIST DOCUMENTED IN MEDICAL RECORD: ICD-10-PCS | Mod: HCNC,CPTII,S$GLB, | Performed by: FAMILY MEDICINE

## 2023-05-08 PROCEDURE — 99999 PR PBB SHADOW E&M-EST. PATIENT-LVL V: ICD-10-PCS | Mod: PBBFAC,HCNC,, | Performed by: FAMILY MEDICINE

## 2023-05-08 PROCEDURE — 3044F HG A1C LEVEL LT 7.0%: CPT | Mod: HCNC,CPTII,S$GLB, | Performed by: FAMILY MEDICINE

## 2023-05-08 PROCEDURE — 3044F PR MOST RECENT HEMOGLOBIN A1C LEVEL <7.0%: ICD-10-PCS | Mod: HCNC,CPTII,S$GLB, | Performed by: FAMILY MEDICINE

## 2023-05-08 PROCEDURE — 1126F PR PAIN SEVERITY QUANTIFIED, NO PAIN PRESENT: ICD-10-PCS | Mod: HCNC,CPTII,S$GLB, | Performed by: FAMILY MEDICINE

## 2023-05-08 PROCEDURE — 99999 PR PBB SHADOW E&M-EST. PATIENT-LVL V: CPT | Mod: PBBFAC,HCNC,, | Performed by: FAMILY MEDICINE

## 2023-05-08 PROCEDURE — 3288F FALL RISK ASSESSMENT DOCD: CPT | Mod: HCNC,CPTII,S$GLB, | Performed by: FAMILY MEDICINE

## 2023-05-08 PROCEDURE — 3074F SYST BP LT 130 MM HG: CPT | Mod: HCNC,CPTII,S$GLB, | Performed by: FAMILY MEDICINE

## 2023-05-08 PROCEDURE — 3008F PR BODY MASS INDEX (BMI) DOCUMENTED: ICD-10-PCS | Mod: HCNC,CPTII,S$GLB, | Performed by: FAMILY MEDICINE

## 2023-05-08 PROCEDURE — 1101F PR PT FALLS ASSESS DOC 0-1 FALLS W/OUT INJ PAST YR: ICD-10-PCS | Mod: HCNC,CPTII,S$GLB, | Performed by: FAMILY MEDICINE

## 2023-05-08 PROCEDURE — 1126F AMNT PAIN NOTED NONE PRSNT: CPT | Mod: HCNC,CPTII,S$GLB, | Performed by: FAMILY MEDICINE

## 2023-05-08 PROCEDURE — 1101F PT FALLS ASSESS-DOCD LE1/YR: CPT | Mod: HCNC,CPTII,S$GLB, | Performed by: FAMILY MEDICINE

## 2023-05-08 PROCEDURE — 99397 PER PM REEVAL EST PAT 65+ YR: CPT | Mod: HCNC,S$GLB,, | Performed by: FAMILY MEDICINE

## 2023-05-08 PROCEDURE — 3078F DIAST BP <80 MM HG: CPT | Mod: HCNC,CPTII,S$GLB, | Performed by: FAMILY MEDICINE

## 2023-05-08 PROCEDURE — 3008F BODY MASS INDEX DOCD: CPT | Mod: HCNC,CPTII,S$GLB, | Performed by: FAMILY MEDICINE

## 2023-05-08 PROCEDURE — 3288F PR FALLS RISK ASSESSMENT DOCUMENTED: ICD-10-PCS | Mod: HCNC,CPTII,S$GLB, | Performed by: FAMILY MEDICINE

## 2023-05-08 PROCEDURE — 3078F PR MOST RECENT DIASTOLIC BLOOD PRESSURE < 80 MM HG: ICD-10-PCS | Mod: HCNC,CPTII,S$GLB, | Performed by: FAMILY MEDICINE

## 2023-05-08 PROCEDURE — 1160F RVW MEDS BY RX/DR IN RCRD: CPT | Mod: HCNC,CPTII,S$GLB, | Performed by: FAMILY MEDICINE

## 2023-05-08 PROCEDURE — 3074F PR MOST RECENT SYSTOLIC BLOOD PRESSURE < 130 MM HG: ICD-10-PCS | Mod: HCNC,CPTII,S$GLB, | Performed by: FAMILY MEDICINE

## 2023-05-08 PROCEDURE — 1160F PR REVIEW ALL MEDS BY PRESCRIBER/CLIN PHARMACIST DOCUMENTED: ICD-10-PCS | Mod: HCNC,CPTII,S$GLB, | Performed by: FAMILY MEDICINE

## 2023-05-08 PROCEDURE — 99397 PR PREVENTIVE VISIT,EST,65 & OVER: ICD-10-PCS | Mod: HCNC,S$GLB,, | Performed by: FAMILY MEDICINE

## 2023-05-08 PROCEDURE — 1159F MED LIST DOCD IN RCRD: CPT | Mod: HCNC,CPTII,S$GLB, | Performed by: FAMILY MEDICINE

## 2023-05-08 NOTE — PROGRESS NOTES
Office Visit    Patient Name: Keo Frias    : 1950  MRN: [id number]    Subjective:  Keo is a 72 y.o. female who presents today for:    Annual Exam    MOST RECENT OFFICE VISIT WITH ME 23  PHYSICAL 2022   SEEN BY DR. ROA 22- AMLODIPINE INCREASED TO 5 MG DAILY, BUSPAR 5 PRN ADVISED  *SHE IS BACK ON THE AMLODIPINE 2.5 DOWN FROM 5 MG.      Keo Frias presents today for annual physical with lab review and monitoring of chronic conditions.   Chronic conditions: labile BP associated with anxiety, H/O subclinical hypothyroidism w/ (-) TPO antibodies 17, h/o anemia and vitamin D deficiency, migraine headaches, allergic rhinitis,  osteoporosis.   Recently diagnosed with invasive ductal carcinoma of the right breast and had a partial mastectomy w/ (-) SN BX May 12, 2021 per breast surgeon Dr. Cadena.   Following with heme/onc Dr Garcia 11/15/22, F/U 5/15/23     She has been feeling overall well.     Feeling better on buspirone 5 mg nightly after dinner.   Takes 2.5 mg amlodipine AROUND 11 am  BIGGEST SOURCE OF ANXIETY IS WHEN BP ELEVATES-- SHE GETS VERY NERVOUS WITH BP ELEVATION.      Overall she is feeling well, still some symptoms with BP fluctuation.      Labs prior to office visit 2023 unremarkable including normal CBC, CMP, normal magnesium/B12, vitamin-D not checked secondary to lab restrictions. A1c stable at 5.7. .  Slight TSH elevation of 4.3 but free T4 WNL and h/o (-) TPO antibodies.      General lifestyle habits are as follows:    Diet: good-- good variety overall and avoids junk food and follows a vegetarian diet. Limits salt and sugar. Drinks water regularly.   Exercise: fair-- walks daily about 30 + minutes and has added some yoga  Sleep: GOOD, 6 hours nightly with nap generally during the day  Weight: STABLE at BMI 21.      Immunizations: PREVNAR 13 2015, Pneumovax 23 2019, TDaP 3/17/2017, Zoster 2010, SHINGRIX advised, yearly FLU UTD 22,  COVID-19 VACCINE COMPLETED 2/5/21 w. PFIZER BOOSTER 9/30/21 & OMICRON BOOSTER ADVISED      Screening Tests: mammogram 4/6/23- Repeat 1 year (s/p  R Partial Mastectomy 5/12/21), DEXA 11/15/21 stable significant osteoporosis lumbar spine-- PROLIA advised & repeat 2 years, Colonoscopy--12/7/18-- no repeat advised for screening, PAP no longer needed (> 65 and no h/o abnormal)., Hep C screening  (-) 11/16/2015      Eye/Dental: [facility]-- DUE, dental UTD but eye due-- she will schedule.         PAST MEDICAL HISTORY, SURGICAL/SOCIAL/FAMILY HISTORY REVIEWED AS PER CHART, WITH PERTINENT FINDINGS INCLUDED IN HISTORY SECTION OF NOTE.     Current Medications    Medication List with Changes/Refills   Current Medications    AMLODIPINE (NORVASC) 2.5 MG TABLET    Take 1 tablet (2.5 mg total) by mouth once daily.    BUSPIRONE (BUSPAR) 5 MG TAB    Take 1 tablet (5 mg total) by mouth 3 (three) times daily as needed (anxiety).    CALCIUM CARBONATE (CALCIUM 500 ORAL)    Take 1 tablet by mouth.    CHOLECALCIFEROL, VITAMIN D3, 125 MCG (5,000 UNIT) CAPSULE    Take 1 capsule (5,000 Units total) by mouth daily with breakfast.    CIPRODEX 0.3-0.1 % DRPS    PLACE 4 DROPS INTO THE RIGHT EAR 2 (TWO) TIMES DAILY. FOR 10 DAYS    LETROZOLE (FEMARA) 2.5 MG TAB    TAKE 1 TABLET ONE TIME DAILY    MULTIVITAMIN-IRON-FOLIC ACID TAB    Take 1 tablet by mouth once daily.       Allergies   Review of patient's allergies indicates:   Allergen Reactions    Sinus allergy Other (See Comments)     Headaches, migranes    Sulfa (sulfonamide antibiotics) Rash         Review of Systems (Pertinent positives)  Review of Systems   Constitutional:  Negative for unexpected weight change.   Eyes:  Negative for visual disturbance.   Respiratory:  Negative for chest tightness and shortness of breath.    Cardiovascular:  Negative for chest pain, palpitations and leg swelling.   Gastrointestinal:  Negative for constipation and diarrhea.   Genitourinary:  Negative  "for dysuria and urgency.   Neurological:  Positive for light-headedness. Negative for dizziness and headaches.   Psychiatric/Behavioral:  Negative for sleep disturbance. The patient is nervous/anxious (stable, managed).      BP (!) 100/46 (BP Location: Right arm, Patient Position: Sitting, BP Method: Medium (Manual))   Pulse 77   Temp 99 °F (37.2 °C) (Oral)   Ht 5' 2" (1.575 m)   Wt 52.9 kg (116 lb 10 oz)   LMP  (LMP Unknown)   SpO2 98%   BMI 21.33 kg/m²     Physical Exam  Vitals reviewed.   Constitutional:       General: She is not in acute distress.     Appearance: Normal appearance. She is well-developed.   HENT:      Head: Normocephalic and atraumatic.      Right Ear: Ear canal normal. Drainage (cholesteatoma) present. Tympanic membrane is perforated. Tympanic membrane is not erythematous or bulging.      Left Ear: Tympanic membrane and ear canal normal. Tympanic membrane is not erythematous or bulging.      Nose: Nose normal.      Mouth/Throat:      Mouth: Mucous membranes are moist.      Pharynx: No oropharyngeal exudate.   Eyes:      Extraocular Movements: Extraocular movements intact.      Conjunctiva/sclera: Conjunctivae normal.   Neck:      Thyroid: No thyroid mass or thyromegaly.      Vascular: No carotid bruit.   Cardiovascular:      Rate and Rhythm: Normal rate and regular rhythm.      Pulses:           Dorsalis pedis pulses are 2+ on the right side and 2+ on the left side.      Heart sounds: Normal heart sounds. No murmur heard.  Pulmonary:      Effort: Pulmonary effort is normal. No respiratory distress.      Breath sounds: Normal breath sounds.   Abdominal:      General: Bowel sounds are normal. There is no distension.      Palpations: Abdomen is soft. There is no mass.      Tenderness: There is no abdominal tenderness.   Musculoskeletal:         General: Normal range of motion.      Right lower leg: No edema.      Left lower leg: No edema.   Lymphadenopathy:      Cervical: No cervical " adenopathy.   Skin:     General: Skin is warm and dry.      Findings: No rash.   Neurological:      General: No focal deficit present.      Mental Status: She is alert and oriented to person, place, and time.   Psychiatric:         Mood and Affect: Mood normal.         Behavior: Behavior normal.         Assessment/Plan:  Keo Frias is a 72 y.o. female who presents today for :        ICD-10-CM ICD-9-CM    1. Routine general medical examination at a health care facility  Z00.00 V70.0       2. Benign essential hypertension  I10 401.1       3. Malignant neoplasm of upper-outer quadrant of right breast in female, estrogen receptor positive  C50.411 174.4     Z17.0 V86.0       4. Aromatase inhibitor use  Z79.811 V07.52       5. Age-related osteoporosis without current pathological fracture  M81.0 733.01       6. Vitamin D deficiency  E55.9 268.9       7. Vegetarian diet  Z78.9 V49.89       8. Situational anxiety  F41.8 300.09       9. Medication management  Z79.899 V58.69         ADVISED ON DIET/EXERCISE/SLEEP, ROUTINE EYE/DENTAL EXAMS, AND THE IMPORTANCE OF KEEPING UP WITH APPROPRIATE SCREENING TESTS BASED ON AGE AND RISK FACTORS.  Immunizations up-to-date except Shingrix advised.  She reports she had the 1st dose through the [facility] pharmacy but is not on file-will go down for 2nd dose today and try to have both doses uploaded.     COVID-19 Pfizer booster 9/30/21- omicron booster advised,  DEXA 11/15/21: Osteoporosis- repeat DEXA due 11/20/2023-has been receiving Prolia through heme/Onc.  Had yearly mammogram this month May-2023.     HTN:  Still with some lability, has some postprandial hypotension, overall doing well on amlodipine 2.5 mg daily and will continue with home monitoring.     ANXIETY:  Stable on BuSpar 5 mg nighty- with good use of alternative coping mechanisms.     R Breast Cancer: followed by heme/onc Dr Garcia   R Lumpectomy 5/12/21, SN Bx (-) Radiation completed 7/27/21.  Now on Femara 2.5 mg  daily  Unremarkable Mammogram 4/6/23-- repeat Bilateral Screening Mammo 1 year  Will follow up with Dr Garcia 5/15/23      OSTEOPOROSIS: Most recent DEXA 11/15/21 with significant OP (T Score -3.3) of the lumbar spine and moderate osteopenia of the hips.   Now on aromatase inhibitor with further risk for decreased bone density.   **PROLIA Advised & Therapy plan created-- NOW RECEIVING Q.6 MONTHS PER HEME/ONC  Vit D WNL on Vit D3 5,000 IU daily + Calcium 500      CHOLESTEATOMA:  Due for follow-up with ENT     HISTORY OF SMALL RIGHT INCIDENTAL RENAL MASS:  4 mm mass previously noted, likely angiomyolipoma, will order 1 follow-up ultrasound to ensure no concerning enlargement/change.    There are no Patient Instructions on file for this visit.      No follow-ups on file.

## 2023-05-08 NOTE — PATIENT INSTRUCTIONS
ADVISE LOOKING INTO NEW 2-PART, NON-LIVE SHINGRIX SHINGLES VACCINE THROUGH YOUR LOCAL PHARMACY.     ADVISED COVID-19 BOOSTER PRIOR TO FALL 2023

## 2023-05-10 ENCOUNTER — OFFICE VISIT (OUTPATIENT)
Dept: OTOLARYNGOLOGY | Facility: CLINIC | Age: 73
End: 2023-05-10
Payer: MEDICARE

## 2023-05-10 ENCOUNTER — CLINICAL SUPPORT (OUTPATIENT)
Dept: AUDIOLOGY | Facility: CLINIC | Age: 73
End: 2023-05-10
Payer: MEDICARE

## 2023-05-10 DIAGNOSIS — H90.A31 MIXED CONDUCTIVE AND SENSORINEURAL HEARING LOSS OF RIGHT EAR WITH RESTRICTED HEARING OF LEFT EAR: Primary | ICD-10-CM

## 2023-05-10 DIAGNOSIS — H90.11 CONDUCTIVE HEARING LOSS OF RIGHT EAR WITH UNRESTRICTED HEARING OF LEFT EAR: ICD-10-CM

## 2023-05-10 DIAGNOSIS — H71.91 CHOLESTEATOMA OF RIGHT EAR: Primary | ICD-10-CM

## 2023-05-10 DIAGNOSIS — H60.8X3 CHRONIC ECZEMATOUS OTITIS EXTERNA OF BOTH EARS: ICD-10-CM

## 2023-05-10 PROCEDURE — 92567 TYMPANOMETRY: CPT | Mod: HCNC,S$GLB,,

## 2023-05-10 PROCEDURE — 99205 OFFICE O/P NEW HI 60 MIN: CPT | Mod: HCNC,S$GLB,, | Performed by: OTOLARYNGOLOGY

## 2023-05-10 PROCEDURE — 3288F PR FALLS RISK ASSESSMENT DOCUMENTED: ICD-10-PCS | Mod: HCNC,CPTII,S$GLB, | Performed by: OTOLARYNGOLOGY

## 2023-05-10 PROCEDURE — 3044F HG A1C LEVEL LT 7.0%: CPT | Mod: HCNC,CPTII,S$GLB, | Performed by: OTOLARYNGOLOGY

## 2023-05-10 PROCEDURE — 3044F PR MOST RECENT HEMOGLOBIN A1C LEVEL <7.0%: ICD-10-PCS | Mod: HCNC,CPTII,S$GLB, | Performed by: OTOLARYNGOLOGY

## 2023-05-10 PROCEDURE — 1159F MED LIST DOCD IN RCRD: CPT | Mod: HCNC,CPTII,S$GLB, | Performed by: OTOLARYNGOLOGY

## 2023-05-10 PROCEDURE — 92504 PR EAR MICROSCOPY EXAMINATION: ICD-10-PCS | Mod: HCNC,S$GLB,, | Performed by: OTOLARYNGOLOGY

## 2023-05-10 PROCEDURE — 99999 PR PBB SHADOW E&M-EST. PATIENT-LVL II: ICD-10-PCS | Mod: PBBFAC,HCNC,, | Performed by: OTOLARYNGOLOGY

## 2023-05-10 PROCEDURE — 99205 PR OFFICE/OUTPT VISIT, NEW, LEVL V, 60-74 MIN: ICD-10-PCS | Mod: HCNC,S$GLB,, | Performed by: OTOLARYNGOLOGY

## 2023-05-10 PROCEDURE — 1101F PR PT FALLS ASSESS DOC 0-1 FALLS W/OUT INJ PAST YR: ICD-10-PCS | Mod: HCNC,CPTII,S$GLB, | Performed by: OTOLARYNGOLOGY

## 2023-05-10 PROCEDURE — 92557 PR COMPREHENSIVE HEARING TEST: ICD-10-PCS | Mod: HCNC,S$GLB,,

## 2023-05-10 PROCEDURE — 3288F FALL RISK ASSESSMENT DOCD: CPT | Mod: HCNC,CPTII,S$GLB, | Performed by: OTOLARYNGOLOGY

## 2023-05-10 PROCEDURE — 92567 PR TYMPA2METRY: ICD-10-PCS | Mod: HCNC,S$GLB,,

## 2023-05-10 PROCEDURE — 99999 PR PBB SHADOW E&M-EST. PATIENT-LVL II: CPT | Mod: PBBFAC,HCNC,, | Performed by: OTOLARYNGOLOGY

## 2023-05-10 PROCEDURE — 1101F PT FALLS ASSESS-DOCD LE1/YR: CPT | Mod: HCNC,CPTII,S$GLB, | Performed by: OTOLARYNGOLOGY

## 2023-05-10 PROCEDURE — 92557 COMPREHENSIVE HEARING TEST: CPT | Mod: HCNC,S$GLB,,

## 2023-05-10 PROCEDURE — 1159F PR MEDICATION LIST DOCUMENTED IN MEDICAL RECORD: ICD-10-PCS | Mod: HCNC,CPTII,S$GLB, | Performed by: OTOLARYNGOLOGY

## 2023-05-10 PROCEDURE — 92504 EAR MICROSCOPY EXAMINATION: CPT | Mod: HCNC,S$GLB,, | Performed by: OTOLARYNGOLOGY

## 2023-05-10 NOTE — PROGRESS NOTES
Keo Frias was seen today in the clinic for an audiologic evaluation.  Patient's main complaint was decreased hearing in the right ear. Mrs. Frias reported occasional irritation in the right ear. She reported history of one occurrence of dizziness which resolved with physical therapy. She denied any history of ear surgery. Mrs. Frias denied tinnitus, otalgia, recent dizziness, and history of noise exposure.    Tympanometry revealed Type B with a large ear canal volume in the right ear and Type A in the left ear.     Audiogram results revealed a severe mixed hearing loss in the right ear and borderline normal low frequency hearing with a mild to moderate sensorineural hearing loss (SNHL) in the left ear.      Speech reception thresholds were noted at 65 dBHL in the right ear and 20 dBHL in the left ear.    Speech discrimination scores were 88% in the right ear and 96% in the left ear.    Recommendations:  Otologic evaluation  Hearing aid consultation pending medical clearance  Annual audiogram or sooner if change perceived  Hearing protection in noise

## 2023-05-10 NOTE — PROGRESS NOTES
Subjective     Patient ID: Keo Frias is a 72 y.o. female.    Chief Complaint: Follow-up    HPI: Here for F/U.    Hx of R COM, perf, sec aq chol.    Has sig HL AD.C/O itching AU    No recent DC.    No sig tinn, dizz.    Past Medical History: Patient has a past medical history of Anemia, Anxiety disorder (8/28/2019), Cataract, Chronic right-sided low back pain without sciatica (10/20/2021), Hypercholesteremia (9/17/2019), Invasive ductal carcinoma of breast, female, right (4/16/2021), Migraine with vision problems, Subclinical hypothyroidism, and White coat syndrome with hypertension.    Past Surgical History: Patient has a past surgical history that includes Tubal ligation; Dilation and curettage of uterus; Colonoscopy (N/A, 12/7/2018); Colon surgery; Mastectomy, partial (Right, 5/12/2021); and [place] lymph node biopsy (Right, 5/12/2021).    Social History: Patient reports that she has never smoked. She has never used smokeless tobacco. She reports that she does not drink alcohol and does not use drugs.    Family History: family history includes Cataracts in her father; Diabetes in her brother; Glaucoma in her brother and father; Hypertension in her brother; Lung cancer in her mother; Retinal detachment in her father; Stroke in her maternal grandmother and paternal grandmother; Uterine cancer in her maternal grandmother.    Medications:   Current Outpatient Medications   Medication Sig    amLODIPine (NORVASC) 2.5 MG tablet Take 1 tablet (2.5 mg total) by mouth once daily.    busPIRone (BUSPAR) 5 MG Tab Take 1 tablet (5 mg total) by mouth 3 (three) times daily as needed (anxiety).    calcium carbonate (CALCIUM 500 ORAL) Take 1 tablet by mouth.    cholecalciferol, vitamin D3, 125 mcg (5,000 unit) capsule Take 1 capsule (5,000 Units total) by mouth daily with breakfast.    CIPRODEX 0.3-0.1 % DrpS PLACE 4 DROPS INTO THE RIGHT EAR 2 (TWO) TIMES DAILY. FOR 10 DAYS    letrozole (FEMARA) 2.5 mg Tab TAKE 1 TABLET ONE  TIME DAILY    multivitamin-iron-folic acid Tab Take 1 tablet by mouth once daily.     No current facility-administered medications for this visit.       Allergies: Patient is allergic to sinus allergy and sulfa (sulfonamide antibiotics).    Review of Systems   Constitutional:  Negative for appetite change, chills, fatigue and fever.   HENT:  Positive for hearing loss. Negative for nasal congestion, ear discharge, facial swelling, postnasal drip, rhinorrhea, sore throat, tinnitus and trouble swallowing.    Eyes:  Negative for photophobia, pain, discharge, redness, itching and visual disturbance.   Respiratory:  Negative for apnea, cough, choking, chest tightness, shortness of breath, wheezing and stridor.    Cardiovascular:  Negative for chest pain and palpitations.   Gastrointestinal:  Negative for abdominal pain, constipation, diarrhea, nausea and vomiting.   Musculoskeletal:  Negative for arthralgias, gait problem, joint swelling, myalgias, neck pain and neck stiffness.   Integumentary:  Negative for color change, pallor, rash and wound.   Neurological:  Negative for dizziness, tremors, seizures, syncope, facial asymmetry, speech difficulty, weakness, light-headedness, numbness and headaches.   Hematological:  Negative for adenopathy. Does not bruise/bleed easily.   Psychiatric/Behavioral:  Negative for agitation, behavioral problems, confusion, decreased concentration, dysphoric mood, hallucinations, sleep disturbance and suicidal ideas. The patient is not nervous/anxious and is not hyperactive.         Objective     Physical Exam  Vitals and nursing note reviewed.   Constitutional:       General: She is not in acute distress.     Appearance: Normal appearance. She is well-developed. She is not ill-appearing, toxic-appearing or diaphoretic.   HENT:      Head: Normocephalic and atraumatic. Not macrocephalic and not microcephalic. No raccoon eyes, Clay's sign, abrasion, contusion, right periorbital erythema, left  periorbital erythema or laceration. Hair is normal.      Right Ear: Ear canal normal. Decreased hearing noted. No laceration, drainage, swelling or tenderness. No middle ear effusion. No foreign body. No mastoid tenderness. No hemotympanum. Tympanic membrane is scarred and perforated. Tympanic membrane is not injected, erythematous, retracted or bulging. Tympanic membrane has decreased mobility.      Left Ear: Ear canal normal. No decreased hearing noted. No laceration, drainage, swelling or tenderness.  No middle ear effusion. No foreign body. No mastoid tenderness. No hemotympanum. Tympanic membrane is not injected, scarred, perforated, erythematous, retracted or bulging. Tympanic membrane has normal mobility.      Ears:        Comments:     Procedure note:    The patient was brought to the minor procedure room and placed in the supine position. The operating video microscope was brought on to the field and the right ear canal, tympanic membrane and other structures were visualized and shown the the patient and family. The patient tolerated the procedure well and there were no complications.       Nose: No nasal deformity, septal deviation, laceration, mucosal edema or rhinorrhea.      Right Sinus: No maxillary sinus tenderness.      Left Sinus: No maxillary sinus tenderness.   Eyes:      General: Lids are normal.      Conjunctiva/sclera: Conjunctivae normal.      Pupils: Pupils are equal, round, and reactive to light.   Neck:      Thyroid: No thyroid mass or thyromegaly.      Vascular: No JVD.      Trachea: No tracheal tenderness or tracheal deviation.   Cardiovascular:      Rate and Rhythm: Normal rate and regular rhythm.   Pulmonary:      Effort: Pulmonary effort is normal. No tachypnea, bradypnea, accessory muscle usage or respiratory distress.      Breath sounds: No stridor.   Abdominal:      Palpations: Abdomen is soft.   Musculoskeletal:         General: Normal range of motion.      Cervical back: Normal  range of motion and neck supple. No edema, erythema or rigidity. No muscular tenderness. Normal range of motion.   Lymphadenopathy:      Head:      Right side of head: No submental, submandibular, tonsillar, preauricular or posterior auricular adenopathy.      Left side of head: No submental, submandibular, tonsillar, preauricular or posterior auricular adenopathy.      Cervical: No cervical adenopathy.      Right cervical: No superficial, deep or posterior cervical adenopathy.     Left cervical: No superficial, deep or posterior cervical adenopathy.   Skin:     General: Skin is warm and dry.      Coloration: Skin is not pale.      Findings: No abrasion, bruising, burn, ecchymosis, erythema, laceration, lesion or rash.   Neurological:      Mental Status: She is alert and oriented to person, place, and time.      Cranial Nerves: No cranial nerve deficit.      Sensory: No sensory deficit.      Motor: No tremor, atrophy, abnormal muscle tone or seizure activity.   Psychiatric:         Speech: She is communicative. Speech is not rapid and pressured or slurred.         Behavior: Behavior normal. Behavior is cooperative.         Thought Content: Thought content normal.         Judgment: Judgment normal.          Assessment and Plan     Problem List Items Addressed This Visit    None  Visit Diagnoses       Cholesteatoma of right ear    -  Primary          Pt not int in surg Rx of HAE @ this time.    RTC 1 yr.    Water prec.

## 2023-05-12 ENCOUNTER — TELEPHONE (OUTPATIENT)
Dept: HEMATOLOGY/ONCOLOGY | Facility: CLINIC | Age: 73
End: 2023-05-12
Payer: MEDICARE

## 2023-05-12 NOTE — TELEPHONE ENCOUNTER
----- Message from Marifer Munroe sent at 5/11/2023  8:04 AM CDT -----  Regarding: Later Appt time  Contact: Pt  Pt is requesting a callback in regards to rescheduling appt for Mon 5/15. Pt is requesting a later time or moved to the following day Tues 5/16. Please adv pt        Confirmed contact below:   Contact Name:Keo Frias  Phone Number: [phone]

## 2023-05-16 ENCOUNTER — OFFICE VISIT (OUTPATIENT)
Dept: HEMATOLOGY/ONCOLOGY | Facility: CLINIC | Age: 73
End: 2023-05-16
Payer: MEDICARE

## 2023-05-16 VITALS
HEART RATE: 80 BPM | RESPIRATION RATE: 16 BRPM | SYSTOLIC BLOOD PRESSURE: 117 MMHG | WEIGHT: 118.63 LBS | DIASTOLIC BLOOD PRESSURE: 55 MMHG | HEIGHT: 62 IN | OXYGEN SATURATION: 95 % | TEMPERATURE: 98 F | BODY MASS INDEX: 21.83 KG/M2

## 2023-05-16 DIAGNOSIS — Z17.0 MALIGNANT NEOPLASM OF UPPER-OUTER QUADRANT OF RIGHT BREAST IN FEMALE, ESTROGEN RECEPTOR POSITIVE: Primary | ICD-10-CM

## 2023-05-16 DIAGNOSIS — C50.411 MALIGNANT NEOPLASM OF UPPER-OUTER QUADRANT OF RIGHT BREAST IN FEMALE, ESTROGEN RECEPTOR POSITIVE: Primary | ICD-10-CM

## 2023-05-16 PROCEDURE — 1101F PR PT FALLS ASSESS DOC 0-1 FALLS W/OUT INJ PAST YR: ICD-10-PCS | Mod: HCNC,CPTII,, | Performed by: INTERNAL MEDICINE

## 2023-05-16 PROCEDURE — 99999 PR PBB SHADOW E&M-EST. PATIENT-LVL III: CPT | Mod: PBBFAC,HCNC,, | Performed by: INTERNAL MEDICINE

## 2023-05-16 PROCEDURE — 3074F SYST BP LT 130 MM HG: CPT | Mod: HCNC,CPTII,, | Performed by: INTERNAL MEDICINE

## 2023-05-16 PROCEDURE — 3078F DIAST BP <80 MM HG: CPT | Mod: HCNC,CPTII,, | Performed by: INTERNAL MEDICINE

## 2023-05-16 PROCEDURE — 1159F MED LIST DOCD IN RCRD: CPT | Mod: HCNC,CPTII,, | Performed by: INTERNAL MEDICINE

## 2023-05-16 PROCEDURE — 3074F PR MOST RECENT SYSTOLIC BLOOD PRESSURE < 130 MM HG: ICD-10-PCS | Mod: HCNC,CPTII,, | Performed by: INTERNAL MEDICINE

## 2023-05-16 PROCEDURE — 3078F PR MOST RECENT DIASTOLIC BLOOD PRESSURE < 80 MM HG: ICD-10-PCS | Mod: HCNC,CPTII,, | Performed by: INTERNAL MEDICINE

## 2023-05-16 PROCEDURE — 99213 OFFICE O/P EST LOW 20 MIN: CPT | Mod: HCNC,,, | Performed by: INTERNAL MEDICINE

## 2023-05-16 PROCEDURE — 99213 PR OFFICE/OUTPT VISIT, EST, LEVL III, 20-29 MIN: ICD-10-PCS | Mod: HCNC,,, | Performed by: INTERNAL MEDICINE

## 2023-05-16 PROCEDURE — 1159F PR MEDICATION LIST DOCUMENTED IN MEDICAL RECORD: ICD-10-PCS | Mod: HCNC,CPTII,, | Performed by: INTERNAL MEDICINE

## 2023-05-16 PROCEDURE — 3288F FALL RISK ASSESSMENT DOCD: CPT | Mod: HCNC,CPTII,, | Performed by: INTERNAL MEDICINE

## 2023-05-16 PROCEDURE — 3008F BODY MASS INDEX DOCD: CPT | Mod: HCNC,CPTII,, | Performed by: INTERNAL MEDICINE

## 2023-05-16 PROCEDURE — 3008F PR BODY MASS INDEX (BMI) DOCUMENTED: ICD-10-PCS | Mod: HCNC,CPTII,, | Performed by: INTERNAL MEDICINE

## 2023-05-16 PROCEDURE — 1126F PR PAIN SEVERITY QUANTIFIED, NO PAIN PRESENT: ICD-10-PCS | Mod: HCNC,CPTII,, | Performed by: INTERNAL MEDICINE

## 2023-05-16 PROCEDURE — 99999 PR PBB SHADOW E&M-EST. PATIENT-LVL III: ICD-10-PCS | Mod: PBBFAC,HCNC,, | Performed by: INTERNAL MEDICINE

## 2023-05-16 PROCEDURE — 3288F PR FALLS RISK ASSESSMENT DOCUMENTED: ICD-10-PCS | Mod: HCNC,CPTII,, | Performed by: INTERNAL MEDICINE

## 2023-05-16 PROCEDURE — 3044F HG A1C LEVEL LT 7.0%: CPT | Mod: HCNC,CPTII,, | Performed by: INTERNAL MEDICINE

## 2023-05-16 PROCEDURE — 1126F AMNT PAIN NOTED NONE PRSNT: CPT | Mod: HCNC,CPTII,, | Performed by: INTERNAL MEDICINE

## 2023-05-16 PROCEDURE — 1101F PT FALLS ASSESS-DOCD LE1/YR: CPT | Mod: HCNC,CPTII,, | Performed by: INTERNAL MEDICINE

## 2023-05-16 PROCEDURE — 3044F PR MOST RECENT HEMOGLOBIN A1C LEVEL <7.0%: ICD-10-PCS | Mod: HCNC,CPTII,, | Performed by: INTERNAL MEDICINE

## 2023-05-16 NOTE — PROGRESS NOTES
Subjective:       Patient ID: Keo Frias is a 72 y.o. female.    Chief Complaint: No chief complaint on file.      HPI 72-year-old female seen for diagnosis of right breast cancer.  She is on adjuvant endocrine therapy with letrozole..  Also on Prolia for osteoporosis.    She is feeling well overall.  She is no unusual pain.  She has no shortness of breath.    When she eats she does experience a drop in her blood pressure at times which makes her feel weak.        History:  Screening mammogram on March 25, 2021 showed a focal asymmetry in the upper-outer quadrant of the right breast.    Follow-up diagnostic mammogram ultrasound was performed on March 30, 2021.  The mammogram confirmed the focal asymmetry in the upper-outer quadrant of the right breast an ultrasound showed an 8 x 5 x 4 mm irregular hypoechoic mass in that area.  There was no evidence of abnormal axillary lymphadenopathy.    On April 13, 2021 biopsy was performed which showed infiltrating ductal carcinoma with lobular features and associated intermediate grade solid DCIS.  The invasive cancer was intermediate grade (histologic grade 3, nuclear grade 2, mitotic index 2).  Tumor cells were 95% ER positive, 10% MI positive HER2 was negative and Ki-67 was 20%.    On May 12, 2021 right breast lumpectomy and sentinel lymph node biopsy was performed.  That showed a 9 mm intermediate grade invasive carcinoma with extensive DCIS measuring 1.9 cm which was intermediate grade.  [place] lymph node was negative.  Final pathological stage IA-T1 B N0.    Radiation completed 7/27/21.    Letrozole started July 2021.    Mammogram 3/30/22  -negative  Review of Systems   Constitutional: Negative.    HENT: Negative.     Respiratory: Negative.     Gastrointestinal: Negative.    Genitourinary: Negative.    Musculoskeletal:  Positive for arthralgias (mild). Negative for back pain.   Integumentary:  Negative.   Neurological: Negative.    Psychiatric/Behavioral:  The  patient is not nervous/anxious.        Objective:      Physical Exam  Vitals reviewed.   Constitutional:       General: She is not in acute distress.     Appearance: Normal appearance.   Cardiovascular:      Rate and Rhythm: Normal rate and regular rhythm.   Pulmonary:      Effort: Pulmonary effort is normal. No respiratory distress.      Breath sounds: Normal breath sounds. No wheezing or rales.   Chest:   Breasts:     Right: No mass, nipple discharge or skin change.      Left: Normal.       Abdominal:      Palpations: Abdomen is soft. There is no mass.      Tenderness: There is no abdominal tenderness.   Lymphadenopathy:      Cervical: No cervical adenopathy.      Upper Body:      Right upper body: No supraclavicular or axillary adenopathy.      Left upper body: No supraclavicular or axillary adenopathy.   Skin:     Findings: No rash.   Neurological:      Mental Status: She is alert and oriented to person, place, and time.   Psychiatric:         Mood and Affect: Mood normal.         Behavior: Behavior normal.         Thought Content: Thought content normal.         Judgment: Judgment normal.       Assessment:     Mammogram - 4/6/23 - negative  1. Malignant neoplasm of upper-outer quadrant of right breast in female, estrogen receptor positive        Plan:    Prolia every 6 M.  Due for DEXA in November.    Continue endocrine therapy.  RTC 4 M    Route Chart for Scheduling    Med Onc Chart Routing      Follow up with physician 4 months.   Follow up with BRENDA    Infusion scheduling note    Injection scheduling note Prolia in [place] - due now   Labs None   Scheduling:  Preferred lab:  Lab interval:     Imaging None      Pharmacy appointment    Other referrals           Therapy Plan Information  Medications  denosumab (PROLIA) injection 60 mg  60 mg, Subcutaneous, Every 26 weeks

## 2023-05-18 ENCOUNTER — TELEPHONE (OUTPATIENT)
Dept: INFUSION THERAPY | Facility: HOSPITAL | Age: 73
End: 2023-05-18
Payer: MEDICARE

## 2023-05-18 ENCOUNTER — TELEPHONE (OUTPATIENT)
Dept: HEMATOLOGY/ONCOLOGY | Facility: CLINIC | Age: 73
End: 2023-05-18
Payer: MEDICARE

## 2023-05-18 NOTE — TELEPHONE ENCOUNTER
Spoke with patient.  She is calling to schedule her next prolia on next Friday 5/26 in [place].        Message routed to [place] infusion

## 2023-05-18 NOTE — TELEPHONE ENCOUNTER
"----- Message from Margot Todd sent at 5/18/2023 12:10 PM CDT -----  Regarding: Pt advice  Contact: Pt  Pt requesting call back in regards to shot  stated pt needed.  Please call and adv         Confirmed contact below:   Contact Name:Josiane Frias  Phone Number: [phone]               Additional Notes:  "Thank you for all that you do for our patients"                                           "

## 2023-05-18 NOTE — TELEPHONE ENCOUNTER
----- Message from María Elena Berry sent at 5/18/2023 11:58 AM CDT -----  Type:  Needs Medical Advice    Who Called:  pt  Symptoms (please be specific):    How long has patient had these symptoms:    Pharmacy name and phone #:    Would the patient rather a call back or a response via [facility]?   Best Call Back Number: [phone]  Additional Information: pt wants to speak to the office about her labs that she is due

## 2023-05-18 NOTE — TELEPHONE ENCOUNTER
Confirmed injection appointment with the patient on 5/26 at 930a or after ultrasound is finished.

## 2023-05-20 DIAGNOSIS — I10 BENIGN ESSENTIAL HYPERTENSION: ICD-10-CM

## 2023-05-20 RX ORDER — AMLODIPINE BESYLATE 2.5 MG/1
TABLET ORAL
Qty: 90 TABLET | Refills: 3 | Status: SHIPPED | OUTPATIENT
Start: 2023-05-20 | End: 2023-08-09

## 2023-05-20 NOTE — TELEPHONE ENCOUNTER
No care due was identified.  [facility] Embedded Care Due Messages. Reference number: [id number].   5/20/2023 2:03:22 AM CDT

## 2023-05-20 NOTE — TELEPHONE ENCOUNTER
Refill Decision Note   Keo Frias  is requesting a refill authorization.  Brief Assessment and Rationale for Refill:  Approve     Medication Therapy Plan:       Medication Reconciliation Completed: No   Comments:     No Care Gaps recommended.     Note composed:10:22 AM 05/20/2023

## 2023-05-26 ENCOUNTER — HOSPITAL ENCOUNTER (OUTPATIENT)
Dept: RADIOLOGY | Facility: HOSPITAL | Age: 73
Discharge: HOME | End: 2023-05-26
Attending: FAMILY MEDICINE
Payer: MEDICARE

## 2023-05-26 ENCOUNTER — INFUSION (OUTPATIENT)
Dept: INFUSION THERAPY | Facility: HOSPITAL | Age: 73
End: 2023-05-26
Attending: INTERNAL MEDICINE
Payer: MEDICARE

## 2023-05-26 VITALS — HEIGHT: 62 IN | BODY MASS INDEX: 21.83 KG/M2 | WEIGHT: 118.63 LBS

## 2023-05-26 DIAGNOSIS — N28.89 RIGHT RENAL MASS: ICD-10-CM

## 2023-05-26 DIAGNOSIS — R73.03 PREDIABETES: ICD-10-CM

## 2023-05-26 DIAGNOSIS — M81.0 AGE-RELATED OSTEOPOROSIS WITHOUT CURRENT PATHOLOGICAL FRACTURE: Primary | ICD-10-CM

## 2023-05-26 DIAGNOSIS — I10 BENIGN ESSENTIAL HYPERTENSION: ICD-10-CM

## 2023-05-26 PROCEDURE — 76770 US EXAM ABDO BACK WALL COMP: CPT | Mod: 26,HCNC,, | Performed by: RADIOLOGY

## 2023-05-26 PROCEDURE — 76770 US EXAM ABDO BACK WALL COMP: CPT | Mod: TC,HCNC

## 2023-05-26 PROCEDURE — 76770 US RETROPERITONEAL COMPLETE: ICD-10-PCS | Mod: 26,HCNC,, | Performed by: RADIOLOGY

## 2023-05-26 PROCEDURE — 96372 THER/PROPH/DIAG INJ SC/IM: CPT | Mod: HCNC

## 2023-05-26 PROCEDURE — 63600175 PHARM REV CODE 636 W HCPCS: Mod: JZ,JG,HCNC | Performed by: INTERNAL MEDICINE

## 2023-05-26 RX ADMIN — DENOSUMAB 60 MG: 60 INJECTION SUBCUTANEOUS at 09:05

## 2023-05-26 NOTE — PLAN OF CARE
Patient here for Prolia Injection. VSS. Prolia 60mg SQ Left arm. Patient tolerated well. Next appointment card given. Reviewed AVS, Patient verbalized understanding. Ambulated per self home.

## 2023-05-30 ENCOUNTER — TELEPHONE (OUTPATIENT)
Dept: HEMATOLOGY/ONCOLOGY | Facility: CLINIC | Age: 73
End: 2023-05-30
Payer: MEDICARE

## 2023-05-30 NOTE — TELEPHONE ENCOUNTER
----- Message from Gabi Richey PA-C sent at 5/16/2023 11:47 AM CDT -----  Would you mind reaching out to this patient about coming in for a breast exam? She has not followed up with us since her surgery back in 2021. Thank you!  ----- Message -----  From: Tarun Garcia MD  Sent: 5/16/2023  11:31 AM CDT  To: Sharron Ogden MD, Gabi Richey PA-C

## 2023-07-09 ENCOUNTER — PATIENT MESSAGE (OUTPATIENT)
Dept: FAMILY MEDICINE | Facility: CLINIC | Age: 73
End: 2023-07-09

## 2023-08-04 ENCOUNTER — TELEPHONE (OUTPATIENT)
Dept: SURGERY | Facility: CLINIC | Age: 73
End: 2023-08-04

## 2023-08-04 NOTE — TELEPHONE ENCOUNTER
----- Message from Marifer Munroe sent at 8/4/2023  2:32 PM CDT -----  Regarding: Reschedule Upcoming Appt  Contact: Pt  Pt is requesting  a callback regarding upcoming appt on 9/18. Pt would like to reschedule for  11/14 around 9:00 am.   if possible. Please adv pt      Confirmed contact below:   Contact Name:Keo Frias  Phone Number: [phone]

## 2023-08-09 ENCOUNTER — OFFICE VISIT (OUTPATIENT)
Dept: FAMILY MEDICINE | Facility: CLINIC | Age: 73
End: 2023-08-09
Payer: MEDICARE

## 2023-08-09 VITALS
WEIGHT: 119.69 LBS | HEART RATE: 82 BPM | DIASTOLIC BLOOD PRESSURE: 78 MMHG | HEIGHT: 62 IN | SYSTOLIC BLOOD PRESSURE: 136 MMHG | OXYGEN SATURATION: 98 % | BODY MASS INDEX: 22.03 KG/M2

## 2023-08-09 DIAGNOSIS — I99.8 FLUCTUATING BLOOD PRESSURE: Primary | ICD-10-CM

## 2023-08-09 DIAGNOSIS — I10 BENIGN ESSENTIAL HYPERTENSION: ICD-10-CM

## 2023-08-09 PROCEDURE — 99214 PR OFFICE/OUTPT VISIT, EST, LEVL IV, 30-39 MIN: ICD-10-PCS | Mod: HCNC,S$GLB,, | Performed by: FAMILY MEDICINE

## 2023-08-09 PROCEDURE — 1160F RVW MEDS BY RX/DR IN RCRD: CPT | Mod: HCNC,CPTII,S$GLB, | Performed by: FAMILY MEDICINE

## 2023-08-09 PROCEDURE — 3008F BODY MASS INDEX DOCD: CPT | Mod: HCNC,CPTII,S$GLB, | Performed by: FAMILY MEDICINE

## 2023-08-09 PROCEDURE — 3288F FALL RISK ASSESSMENT DOCD: CPT | Mod: HCNC,CPTII,S$GLB, | Performed by: FAMILY MEDICINE

## 2023-08-09 PROCEDURE — 99999 PR PBB SHADOW E&M-EST. PATIENT-LVL III: ICD-10-PCS | Mod: PBBFAC,HCNC,, | Performed by: FAMILY MEDICINE

## 2023-08-09 PROCEDURE — 1126F AMNT PAIN NOTED NONE PRSNT: CPT | Mod: HCNC,CPTII,S$GLB, | Performed by: FAMILY MEDICINE

## 2023-08-09 PROCEDURE — 99214 OFFICE O/P EST MOD 30 MIN: CPT | Mod: HCNC,S$GLB,, | Performed by: FAMILY MEDICINE

## 2023-08-09 PROCEDURE — 1159F PR MEDICATION LIST DOCUMENTED IN MEDICAL RECORD: ICD-10-PCS | Mod: HCNC,CPTII,S$GLB, | Performed by: FAMILY MEDICINE

## 2023-08-09 PROCEDURE — 1101F PT FALLS ASSESS-DOCD LE1/YR: CPT | Mod: HCNC,CPTII,S$GLB, | Performed by: FAMILY MEDICINE

## 2023-08-09 PROCEDURE — 1101F PR PT FALLS ASSESS DOC 0-1 FALLS W/OUT INJ PAST YR: ICD-10-PCS | Mod: HCNC,CPTII,S$GLB, | Performed by: FAMILY MEDICINE

## 2023-08-09 PROCEDURE — 3288F PR FALLS RISK ASSESSMENT DOCUMENTED: ICD-10-PCS | Mod: HCNC,CPTII,S$GLB, | Performed by: FAMILY MEDICINE

## 2023-08-09 PROCEDURE — 3075F PR MOST RECENT SYSTOLIC BLOOD PRESS GE 130-139MM HG: ICD-10-PCS | Mod: HCNC,CPTII,S$GLB, | Performed by: FAMILY MEDICINE

## 2023-08-09 PROCEDURE — 3075F SYST BP GE 130 - 139MM HG: CPT | Mod: HCNC,CPTII,S$GLB, | Performed by: FAMILY MEDICINE

## 2023-08-09 PROCEDURE — 1126F PR PAIN SEVERITY QUANTIFIED, NO PAIN PRESENT: ICD-10-PCS | Mod: HCNC,CPTII,S$GLB, | Performed by: FAMILY MEDICINE

## 2023-08-09 PROCEDURE — 99999 PR PBB SHADOW E&M-EST. PATIENT-LVL III: CPT | Mod: PBBFAC,HCNC,, | Performed by: FAMILY MEDICINE

## 2023-08-09 PROCEDURE — 1159F MED LIST DOCD IN RCRD: CPT | Mod: HCNC,CPTII,S$GLB, | Performed by: FAMILY MEDICINE

## 2023-08-09 PROCEDURE — 1160F PR REVIEW ALL MEDS BY PRESCRIBER/CLIN PHARMACIST DOCUMENTED: ICD-10-PCS | Mod: HCNC,CPTII,S$GLB, | Performed by: FAMILY MEDICINE

## 2023-08-09 PROCEDURE — 3044F HG A1C LEVEL LT 7.0%: CPT | Mod: HCNC,CPTII,S$GLB, | Performed by: FAMILY MEDICINE

## 2023-08-09 PROCEDURE — 3078F PR MOST RECENT DIASTOLIC BLOOD PRESSURE < 80 MM HG: ICD-10-PCS | Mod: HCNC,CPTII,S$GLB, | Performed by: FAMILY MEDICINE

## 2023-08-09 PROCEDURE — 3044F PR MOST RECENT HEMOGLOBIN A1C LEVEL <7.0%: ICD-10-PCS | Mod: HCNC,CPTII,S$GLB, | Performed by: FAMILY MEDICINE

## 2023-08-09 PROCEDURE — 3078F DIAST BP <80 MM HG: CPT | Mod: HCNC,CPTII,S$GLB, | Performed by: FAMILY MEDICINE

## 2023-08-09 PROCEDURE — 3008F PR BODY MASS INDEX (BMI) DOCUMENTED: ICD-10-PCS | Mod: HCNC,CPTII,S$GLB, | Performed by: FAMILY MEDICINE

## 2023-08-09 RX ORDER — DENOSUMAB 60 MG/ML
INJECTION SUBCUTANEOUS
COMMUNITY
Start: 2023-05-26

## 2023-08-09 RX ORDER — LOSARTAN POTASSIUM 25 MG/1
25 TABLET ORAL DAILY
Qty: 30 TABLET | Refills: 11 | OUTPATIENT
Start: 2023-08-09 | End: 2023-08-29

## 2023-08-09 NOTE — PROGRESS NOTES
(Portions of this note were dictated using voice recognition software and may contain dictation related errors in spelling/grammar/syntax not found on text review)    CC:   Chief Complaint   Patient presents with    Follow-up     Wide range of low to high blood pressure; gets low after eating       HPI: 73 y.o. female   New to me, pt of Sharron Ogden MD  Presents for urgent care concern for elevated BP.  Medical history below including hypertension, hyperlipidemia, history of right breast cancer, anxiety, anemia, chronic low back pain.      She is on amlodipine 2.5 mg daily, down from 5 mg daily.  Saw her PCP last on 05/08/2023.  BP was low normal; has had issues with postprandial hypotension.    States usually BP in the morning is high, does not really feel bad as long as it is in the systolic is in the 140s or 150s but if it goes higher than that she will feel anxiety.  (On BuSpar which does overall help with anxiety) she does notice feeling fatigued when blood pressure goes low after eating.  Usually a few hours after it will come back up.  Denies any chest pain or shortness a breath.  Tries to drink water but admittedly does not drink any fluids when she eats.    Recent Readings 8/8/2023 8/8/2023 8/8/2023 8/8/2023 8/8/2023   SBP (mmHg) 142 152 150 156 100   DBP (mmHg) 74 72 79 84 52   Pulse 70 71 70 78 81   Patient Comments          Recent Readings 8/8/2023 8/8/2023 8/8/2023 8/8/2023 8/8/2023   SBP (mmHg) 105 99 112 102 95   DBP (mmHg) 56 55 62 56 60   Pulse 80 82 88 88 85   Patient Comments          Recent Readings 8/8/2023 8/8/2023 8/8/2023 8/8/2023 8/8/2023   SBP (mmHg) 110 121 160 162 168   DBP (mmHg) 64 67 87 80 85   Pulse 86 87 75 76 78   Patient Comments   Test at o bar       Recent Readings 8/8/2023 8/6/2023 8/6/2023 8/6/2023 8/6/2023   SBP (mmHg) 169 153 146 153 163   DBP (mmHg) 89 74 75 79 80   Pulse 78 75 74 75 74   Patient Comments Test at o bar         Recent Readings 8/6/2023 8/6/2023  8/6/2023 8/6/2023 8/6/2023   SBP (mmHg) 160 162 172 172 94   DBP (mmHg) 84 76 76 88 61   Pulse 74 74 74 75 75   Patient Comments          Recent Readings 8/6/2023 8/6/2023 8/6/2023 8/6/2023 8/6/2023   SBP (mmHg) 109 87 92 102 100   DBP (mmHg) 63 51 58 62 62   Pulse 77 75 84 87 89   Patient Comments          Recent Readings 8/6/2023 8/5/2023 8/5/2023 8/5/2023 8/5/2023   SBP (mmHg) 104 112 118 120 130   DBP (mmHg) 66 64 61 58 71   Pulse 91 77 77 81 83   Patient Comments          Recent Readings 8/5/2023 8/5/2023 8/5/2023 8/5/2023 8/5/2023   SBP (mmHg) 148 144 153 149 129   DBP (mmHg) 72 73 77 85 64   Pulse 66 65 66 66 75   Patient Comments          Recent Readings 8/5/2023 8/5/2023 8/5/2023 8/5/2023 8/5/2023   SBP (mmHg) 125 125 136 147 145   DBP (mmHg) 65 66 69 74 75   Pulse 74 73 73 74 74   Patient Comments          Recent Readings 8/5/2023 8/5/2023 8/5/2023 8/5/2023 8/5/2023   SBP (mmHg) 147 140 98 97 91   DBP (mmHg) 72 74 57 54 52   Pulse 75 76 74 74 71   Patient Comments          Recent Readings 8/5/2023 8/5/2023 8/5/2023 8/4/2023 8/4/2023   SBP (mmHg) 103 105 111 118 117   DBP (mmHg) 62 58 67 67 69   Pulse 73 72 74 71 78   Patient Comments          Recent Readings 8/4/2023 8/4/2023 8/4/2023 8/4/2023 8/4/2023   SBP (mmHg) 117 124 127 121 123   DBP (mmHg) 65 72 70 64 72   Pulse 70 70 69 69 71   Patient Comments          Recent Readings 8/4/2023 8/4/2023 8/4/2023 8/4/2023 8/4/2023   SBP (mmHg) 147 145 149 160 162   DBP (mmHg) 75 77 75 76 91   Pulse 73 74 74 76 78   Patient Comments          Recent Readings 8/4/2023 8/4/2023 8/4/2023 8/4/2023 8/3/2023   SBP (mmHg) 160 164 166 174 134   DBP (mmHg) 76 80 77 92 61   Pulse 72 72 72 75 75   Patient Comments          Recent Readings 8/3/2023 8/3/2023 8/3/2023 8/3/2023 8/3/2023   SBP (mmHg) 135 139 142 147 148   DBP (mmHg) 62 66 72 74 78   Pulse 77 75 75 66 74   Patient Comments          Recent Readings 8/3/2023 8/3/2023 8/3/2023 8/2/2023 8/2/2023   SBP (mmHg) 152 155 157  123 127   DBP (mmHg) 77 80 81 69 75   Pulse 75 73 76 76 76   Patient Comments          Recent Readings 8/2/2023 8/2/2023 8/2/2023 8/2/2023 8/2/2023   SBP (mmHg) 122 129 139 135 139   DBP (mmHg) 66 69 68 65 71   Pulse 69 68 69 70 70   Patient Comments          Recent Readings 8/2/2023 8/2/2023 8/2/2023 8/2/2023 8/1/2023   SBP (mmHg) 109 119 119 129 117   DBP (mmHg) 58 60 62 68 65   Pulse 72 73 75 75 82   Patient Comments          Recent Readings 8/1/2023 8/1/2023 8/1/2023 8/1/2023 8/1/2023   SBP (mmHg) 108 100 99 105 103   DBP (mmHg) 60 56 56 59 63   Pulse 76 73 72 74 77   Patient Comments          Recent Readings 8/1/2023 8/1/2023   SBP (mmHg) 105 102   DBP (mmHg) 63 57   Pulse 78 78   Patient Comments       Past Medical History:   Diagnosis Date    Anemia     Anxiety disorder 8/28/2019    Cataract     Chronic right-sided low back pain without sciatica 10/20/2021    Hypercholesteremia 9/17/2019    Invasive ductal carcinoma of breast, female, right 4/16/2021    Migraine with vision problems     Subclinical hypothyroidism     White coat syndrome with hypertension        Past Surgical History:   Procedure Laterality Date    COLON SURGERY      COLONOSCOPY N/A 12/7/2018    Procedure: COLONOSCOPY;  Surgeon: Bhargav Gaston MD;  Location: [address];  Service: Endoscopy;  Laterality: N/A;    DILATION AND CURETTAGE OF UTERUS      postmenopausal bleeding    MASTECTOMY, PARTIAL Right 5/12/2021    Procedure: MASTECTOMY, PARTIAL-Right with radiological marker;  Surgeon: Vivian Cadena MD;  Location: [facility];  Service: General;  Laterality: Right;    SENTINEL LYMPH NODE BIOPSY Right 5/12/2021    Procedure: BIOPSY, LYMPH NODE, SENTINEL-Right;  Surgeon: Vivian Cadena MD;  Location: [facility];  Service: General;  Laterality: Right;    TUBAL LIGATION         Family History   Problem Relation Age of Onset    Diabetes Brother     Hypertension Brother     Glaucoma Brother     Glaucoma Father     Cataracts Father     Retinal  "detachment Father     Lung cancer Mother     Uterine cancer Maternal Grandmother     Stroke Maternal Grandmother     Stroke Paternal Grandmother     Amblyopia Neg Hx     Blindness Neg Hx     Macular degeneration Neg Hx     Strabismus Neg Hx     Thyroid disease Neg Hx        Social History     Tobacco Use    Smoking status: Never    Smokeless tobacco: Never   Substance Use Topics    Alcohol use: No    Drug use: Never       Lab Results   Component Value Date    WBC 5.17 05/03/2023    HGB 12.9 05/03/2023    HCT 39.8 05/03/2023    MCV 87 05/03/2023     05/03/2023    CHOL 186 05/03/2023    TRIG 81 05/03/2023    HDL 49 05/03/2023    ALT 14 05/03/2023    AST 21 05/03/2023    BILITOT 0.4 05/03/2023    ALKPHOS 56 05/03/2023     05/03/2023    K 4.2 05/03/2023     05/03/2023    CREATININE 0.6 05/03/2023    ESTGFRAFRICA >60 05/03/2022    EGFRNONAA >60 05/03/2022    EGFRNORACEVR >60 05/03/2023    CALCIUM 9.3 05/03/2023    ALBUMIN 3.8 05/03/2023    BUN 10 05/03/2023    CO2 26 05/03/2023    TSH 4.387 (H) 05/03/2023    HGBA1C 5.7 (H) 05/03/2023    LDLCALC 120.8 05/03/2023    GLU 89 05/03/2023    AHUXASVV07VF 82 05/03/2023                 Vital signs reviewed  Vitals:    08/09/23 1131   BP: 136/78   BP Location: Left arm   Patient Position: Sitting   BP Method: Medium (Manual)   Pulse: 82   SpO2: 98%   Weight: 54.3 kg (119 lb 11.4 oz)   Height: 5' 2" (1.575 m)       Wt Readings from Last 4 Encounters:   08/09/23 54.3 kg (119 lb 11.4 oz)   05/26/23 53.8 kg (118 lb 9.7 oz)   05/16/23 53.8 kg (118 lb 9.7 oz)   05/08/23 52.9 kg (116 lb 10 oz)       PE:   APPEARANCE: Well nourished, well developed, in no acute distress.    HEAD: Normocephalic, atraumatic.  EYES:    Conjunctivae noninjected.  CHEST: Good inspiratory effort. Lungs clear to auscultation with no wheezes or crackles.  CARDIOVASCULAR: Normal S1, S2. No rubs, murmurs, or gallops.  ABDOMEN: Bowel sounds normal. Not distended. Soft. No tenderness or masses. No " organomegaly.  EXTREMITIES: No edema      IMPRESSION  1. Fluctuating blood pressure    2. Benign essential hypertension            PLAN  No orders of the defined types were placed in this encounter.    Medications Ordered This Encounter   Medications    losartan (COZAAR) 25 MG tablet     Sig: Take 1 tablet (25 mg total) by mouth once daily.     Dispense:  30 tablet     Refill:  11     .      Hypertension although with fluctuating blood pressures and postprandial hypotension.  On very low-dose amlodipine, 2.5 mg daily.  Still getting hypotensive after meals, although hypertensive in the morning.  Have advise making sure she drinks plenty of fluids with her meals.  Discussed changing to a different antihypertensive like losartan in place of amlodipine. Will have her do half of a 25 mg pill daily.  If this is not sufficient to control high readings, can go up to 25 mg daily.  She is planning to go to [facility] in a month.  I would advise her to update her PCP on blood pressure readings over the next few weeks and continue digital monitoring

## 2023-08-09 NOTE — Clinical Note
Sabi,  saw your patient today with fluctuating hypertension and postprandial hypotension.  Still getting fluctuating readings with low-dose 2.5 mg amlodipine.  I was going to try to switch her over to losartan half of a 25 mg pill instead in case some of the vasodilatory aspects with amlodipine may be affecting her postprandial hypotension.  Does state that she does not drink fluids with her meals and so have tried to have her start doing this.  Going to [facility] in a month so I had her just continue to check her readings and update you in the next couple weeks on how things are going.

## 2023-08-10 ENCOUNTER — PATIENT MESSAGE (OUTPATIENT)
Dept: FAMILY MEDICINE | Facility: CLINIC | Age: 73
End: 2023-08-10

## 2023-08-17 DIAGNOSIS — Z17.0 MALIGNANT NEOPLASM OF UPPER-OUTER QUADRANT OF RIGHT BREAST IN FEMALE, ESTROGEN RECEPTOR POSITIVE: ICD-10-CM

## 2023-08-17 DIAGNOSIS — C50.411 MALIGNANT NEOPLASM OF UPPER-OUTER QUADRANT OF RIGHT BREAST IN FEMALE, ESTROGEN RECEPTOR POSITIVE: ICD-10-CM

## 2023-08-18 ENCOUNTER — PATIENT MESSAGE (OUTPATIENT)
Dept: FAMILY MEDICINE | Facility: CLINIC | Age: 73
End: 2023-08-18

## 2023-08-18 RX ORDER — LETROZOLE 2.5 MG/1
2.5 TABLET, FILM COATED ORAL
Qty: 90 TABLET | Refills: 3 | Status: SHIPPED | OUTPATIENT
Start: 2023-08-18 | End: 2024-03-18

## 2023-08-22 ENCOUNTER — PATIENT MESSAGE (OUTPATIENT)
Dept: FAMILY MEDICINE | Facility: CLINIC | Age: 73
End: 2023-08-22

## 2023-08-28 ENCOUNTER — HOSPITAL ENCOUNTER (EMERGENCY)
Facility: HOSPITAL | Age: 73
Discharge: HOME | End: 2023-08-29
Attending: EMERGENCY MEDICINE
Payer: MEDICARE

## 2023-08-28 DIAGNOSIS — I49.1 PAC (PREMATURE ATRIAL CONTRACTION): Primary | ICD-10-CM

## 2023-08-28 DIAGNOSIS — I10 HYPERTENSION, UNSPECIFIED TYPE: ICD-10-CM

## 2023-08-28 LAB
ALBUMIN SERPL BCP-MCNC: 4.2 G/DL (ref 3.5–5.2)
ALP SERPL-CCNC: 69 U/L (ref 55–135)
ALT SERPL W/O P-5'-P-CCNC: 15 U/L (ref 10–44)
ANION GAP SERPL CALC-SCNC: 10 MMOL/L (ref 8–16)
AST SERPL-CCNC: 21 U/L (ref 10–40)
BASOPHILS # BLD AUTO: 0.09 K/UL (ref 0–0.2)
BASOPHILS NFR BLD: 1.4 % (ref 0–1.9)
BILIRUB SERPL-MCNC: 0.3 MG/DL (ref 0.1–1)
BNP SERPL-MCNC: 95 PG/ML (ref 0–99)
BUN SERPL-MCNC: 7 MG/DL (ref 8–23)
CALCIUM SERPL-MCNC: 9.6 MG/DL (ref 8.7–10.5)
CHLORIDE SERPL-SCNC: 101 MMOL/L (ref 95–110)
CO2 SERPL-SCNC: 24 MMOL/L (ref 23–29)
CREAT SERPL-MCNC: 0.7 MG/DL (ref 0.5–1.4)
DIFFERENTIAL METHOD: ABNORMAL
EOSINOPHIL # BLD AUTO: 0.6 K/UL (ref 0–0.5)
EOSINOPHIL NFR BLD: 8.6 % (ref 0–8)
ERYTHROCYTE [DISTWIDTH] IN BLOOD BY AUTOMATED COUNT: 12.7 % (ref 11.5–14.5)
EST. GFR  (NO RACE VARIABLE): >60 ML/MIN/1.73 M^2
GIANT PLATELETS BLD QL SMEAR: PRESENT
GLUCOSE SERPL-MCNC: 102 MG/DL (ref 70–110)
HCT VFR BLD AUTO: 41.6 % (ref 37–48.5)
HGB BLD-MCNC: 14.1 G/DL (ref 12–16)
IMM GRANULOCYTES # BLD AUTO: 0.01 K/UL (ref 0–0.04)
IMM GRANULOCYTES NFR BLD AUTO: 0.2 % (ref 0–0.5)
LYMPHOCYTES # BLD AUTO: 1.9 K/UL (ref 1–4.8)
LYMPHOCYTES NFR BLD: 28.6 % (ref 18–48)
MCH RBC QN AUTO: 28.7 PG (ref 27–31)
MCHC RBC AUTO-ENTMCNC: 33.9 G/DL (ref 32–36)
MCV RBC AUTO: 85 FL (ref 82–98)
MONOCYTES # BLD AUTO: 0.6 K/UL (ref 0.3–1)
MONOCYTES NFR BLD: 9.5 % (ref 4–15)
NEUTROPHILS # BLD AUTO: 3.4 K/UL (ref 1.8–7.7)
NEUTROPHILS NFR BLD: 51.7 % (ref 38–73)
NRBC BLD-RTO: 0 /100 WBC
PLATELET # BLD AUTO: 193 K/UL (ref 150–450)
PLATELET BLD QL SMEAR: ABNORMAL
PMV BLD AUTO: 11.1 FL (ref 9.2–12.9)
POTASSIUM SERPL-SCNC: 4.2 MMOL/L (ref 3.5–5.1)
PROT SERPL-MCNC: 7.9 G/DL (ref 6–8.4)
RBC # BLD AUTO: 4.92 M/UL (ref 4–5.4)
SODIUM SERPL-SCNC: 135 MMOL/L (ref 136–145)
TROPONIN I SERPL DL<=0.01 NG/ML-MCNC: 0.04 NG/ML (ref 0–0.03)
WBC # BLD AUTO: 6.53 K/UL (ref 3.9–12.7)

## 2023-08-28 PROCEDURE — 81000 URINALYSIS NONAUTO W/SCOPE: CPT | Mod: HCNC | Performed by: NURSE PRACTITIONER

## 2023-08-28 PROCEDURE — 99285 EMERGENCY DEPT VISIT HI MDM: CPT | Mod: 25,HCNC

## 2023-08-28 PROCEDURE — 85025 COMPLETE CBC W/AUTO DIFF WBC: CPT | Mod: HCNC | Performed by: NURSE PRACTITIONER

## 2023-08-28 PROCEDURE — 93010 ELECTROCARDIOGRAM REPORT: CPT | Mod: HCNC,,, | Performed by: INTERNAL MEDICINE

## 2023-08-28 PROCEDURE — 93005 ELECTROCARDIOGRAM TRACING: CPT | Mod: HCNC

## 2023-08-28 PROCEDURE — 83880 ASSAY OF NATRIURETIC PEPTIDE: CPT | Mod: HCNC | Performed by: NURSE PRACTITIONER

## 2023-08-28 PROCEDURE — 80053 COMPREHEN METABOLIC PANEL: CPT | Mod: HCNC | Performed by: NURSE PRACTITIONER

## 2023-08-28 PROCEDURE — 84484 ASSAY OF TROPONIN QUANT: CPT | Mod: HCNC | Performed by: NURSE PRACTITIONER

## 2023-08-28 PROCEDURE — 93010 EKG 12-LEAD: ICD-10-PCS | Mod: HCNC,,, | Performed by: INTERNAL MEDICINE

## 2023-08-29 ENCOUNTER — PATIENT MESSAGE (OUTPATIENT)
Dept: FAMILY MEDICINE | Facility: CLINIC | Age: 73
End: 2023-08-29

## 2023-08-29 ENCOUNTER — TELEPHONE (OUTPATIENT)
Dept: FAMILY MEDICINE | Facility: CLINIC | Age: 73
End: 2023-08-29

## 2023-08-29 VITALS
SYSTOLIC BLOOD PRESSURE: 159 MMHG | DIASTOLIC BLOOD PRESSURE: 71 MMHG | RESPIRATION RATE: 21 BRPM | BODY MASS INDEX: 21.03 KG/M2 | OXYGEN SATURATION: 97 % | HEART RATE: 79 BPM | WEIGHT: 115 LBS | TEMPERATURE: 98 F

## 2023-08-29 LAB
BACTERIA #/AREA URNS HPF: NORMAL /HPF
BILIRUB UR QL STRIP: NEGATIVE
CLARITY UR: CLEAR
COLOR UR: COLORLESS
GLUCOSE UR QL STRIP: NEGATIVE
HGB UR QL STRIP: ABNORMAL
KETONES UR QL STRIP: NEGATIVE
LEUKOCYTE ESTERASE UR QL STRIP: NEGATIVE
MICROSCOPIC COMMENT: NORMAL
NITRITE UR QL STRIP: NEGATIVE
PH UR STRIP: 7 [PH] (ref 5–8)
PROT UR QL STRIP: NEGATIVE
RBC #/AREA URNS HPF: 0 /HPF (ref 0–4)
SP GR UR STRIP: <1.005 (ref 1–1.03)
TROPONIN I SERPL DL<=0.01 NG/ML-MCNC: 0.03 NG/ML (ref 0–0.03)
URN SPEC COLLECT METH UR: ABNORMAL
UROBILINOGEN UR STRIP-ACNC: NEGATIVE EU/DL
WBC #/AREA URNS HPF: 0 /HPF (ref 0–5)

## 2023-08-29 PROCEDURE — 84484 ASSAY OF TROPONIN QUANT: CPT | Mod: HCNC | Performed by: NURSE PRACTITIONER

## 2023-08-29 RX ORDER — METOPROLOL SUCCINATE 50 MG/1
50 TABLET, EXTENDED RELEASE ORAL DAILY
Qty: 30 TABLET | Refills: 1 | Status: SHIPPED | OUTPATIENT
Start: 2023-08-29 | End: 2023-10-30 | Stop reason: SDUPTHER

## 2023-08-29 NOTE — ED NOTES
Patient reports feeling dizziness with nausea earlier.  Denies vomiting.  States she now notices a discomfort, that she denies being pain, but rather a tapping like she feels her heart rate by the upper sternum.       Pain:  Rated 0/10.     Psychosocial:  Patient is calm and cooperative.  Patients insight and judgement are appropriate to situation.  Appears clean, well maintained, with clothing appropriate to environment.  No evidence of delusions, hallucinations, or psychosis.     Neuro:  Eyes open spontaneously.  Awake, alert, oriented x 4.  Speech clear and appropriate.  Tolerating saliva secretions well.  Able to follow commands, demonstrating ability to actively and appropriately communicate within context of current conversation.  Symmetrical facial muscles.  Moving all extremities well with no noted weakness.  Adequate muscle tone present.    Movement is purposeful.  Gait is steady.      Airway:  Bilateral chest rise and fall.  RR regular and non-labored.        Circulatory:  Skin warm, dry, and pink.  Apical and radial pulses strong and irregular.  Capillary refill/skin blanching less than 3 seconds to distal of 4 extremities.  Placed on CM in Sinus Arrhythmia with PAC's.     Abdomen:  Abdomen soft and non-distended.        Urinary:  No related complaint.     Extremities:  No redness, heat, swelling, deformity, or pain.     Skin:  Intact with no bruising/discolorations noted.

## 2023-08-29 NOTE — FIRST PROVIDER EVALUATION
Emergency Department TeleTriage Encounter Note      CHIEF COMPLAINT    Chief Complaint   Patient presents with    Hypertension    Dizziness     Patient states that her blood pressure has been high today with systolic in the 190's. She reports some dizziness and nausea that started this evening. She has not had her evening meds. She denies chest pain, dyspnea. She has no motor deficits.        VITAL SIGNS   Initial Vitals [08/28/23 2008]   BP Pulse Resp Temp SpO2   (!) 188/88 99 18 98.1 °F (36.7 °C) 97 %      MAP       --            ALLERGIES    Review of patient's allergies indicates:   Allergen Reactions    Sinus allergy Other (See Comments)     Headaches, migranes    Sulfa (sulfonamide antibiotics) Rash       PROVIDER TRIAGE NOTE  Verbal consent for the teletriage evaluation was given by the patient at the start of the evaluation.  All efforts will be made to maintain patient's privacy during the evaluation.      This is a teletriage evaluation of a 73 y.o. female presenting to the ED with c/o fluctuating BP since changing her medication. Also c/o dizziness, nausea, and chest heaviness that started today.  Limited physical exam via telehealth: The patient is awake, alert, answering questions appropriately and is not in respiratory distress.  As the Teletriage provider, I performed an initial assessment and ordered appropriate labs and imaging studies, if any, to facilitate the patient's care once placed in the ED. Once a room is available, care and a full evaluation will be completed by an alternate ED provider.  Any additional orders and the final disposition will be determined by that provider.  All imaging and labs will not be followed-up by the Teletriage Team, including myself.          ORDERS  Labs Reviewed   CBC W/ AUTO DIFFERENTIAL   COMPREHENSIVE METABOLIC PANEL   TROPONIN I   B-TYPE NATRIURETIC PEPTIDE   URINALYSIS, REFLEX TO URINE CULTURE       ED Orders (720h ago, onward)      Start Ordered     Status  Ordering Provider    08/28/23 2339 08/28/23 2039  Troponin I #2  Once Timed         Ordered DEYANIRA CARBAJAL    08/28/23 2039 08/28/23 2039  Vital signs  Every 15 min         Ordered DEYANIRA CARBAJAL    08/28/23 2039 08/28/23 2039  Cardiac Monitoring - Adult  Continuous        Comments: Notify Physician If:    Ordered DEYANIRA CARBAJAL    08/28/23 2039 08/28/23 2039  Pulse Oximetry Continuous  Continuous         Ordered DEYANIRA CARBAJAL    08/28/23 2039 08/28/23 2039  Diet NPO  Diet effective now         Ordered DEYANIRA CARBAJAL    08/28/23 2039 08/28/23 2039  Saline lock IV  Once         Ordered DEYANIRA CARBAJAL    08/28/23 2039 08/28/23 2039  EKG 12-lead  Once        Comments: Do not perform if previously done during this visit/ triage    Ordered DEYANIRA CARBAJAL    08/28/23 2039 08/28/23 2039  CBC auto differential  STAT         Ordered DEYANIRA CARBAJAL    08/28/23 2039 08/28/23 2039  Comprehensive metabolic panel  STAT         Ordered DEYANIRA CARBAJAL    08/28/23 2039 08/28/23 2039  Troponin I #1  STAT         Ordered DEYANIRA CARBAJAL    08/28/23 2039 08/28/23 2039  B-Type natriuretic peptide (BNP)  STAT         Ordered DEYANIRA CARBAJAL    08/28/23 2039 08/28/23 2039  X-Ray Chest PA And Lateral  1 time imaging         Ordered DEYANIRA CARBAJAL    08/28/23 2039 08/28/23 2039  Urinalysis, Reflex to Urine Culture Urine, Clean Catch  STAT         Ordered DEYANIRA CARBAJAL              Virtual Visit Note: The provider triage portion of this emergency department evaluation and documentation was performed via [employer], a HIPAA-compliant telemedicine application, in concert with a tele-presenter in the room. A face to face patient evaluation with one of my colleagues will occur once the patient is placed in an emergency department room.      DISCLAIMER: This note was prepared with [employer] voice recognition transcription software. Garbled syntax, mangled pronouns, and other bizarre constructions may be attributed to that software system.

## 2023-08-29 NOTE — DISCHARGE INSTRUCTIONS
Thank you for coming in to see us at [facility]! It was nice to meet you, and I hope you feel better soon. Please feel free to return to the ER at any time should your symptoms get worse, or if you have different emergent concerns.    Our goal in the emergency department is to always give you outstanding care and exceptional service. You may receive a survey by mail or e-mail in the next week regarding your experience in our ED. We would greatly appreciate your completing and returning the survey. Your feedback provides us with a way to recognize our staff who give very good care and it helps us learn how to improve when your experience was below our aspiration of excellence.       Sincerely,    Viral Gonzalez MD  Medical Director  Emergency Department  [facility]

## 2023-08-29 NOTE — TELEPHONE ENCOUNTER
----- Message from Malina Kimbrough sent at 8/28/2023  2:05 PM CDT -----  Regarding: call back  Contact: [phone]  Type:  Sooner Apoointment Request    Caller is requesting a sooner appointment.  Caller declined first available appointment listed below.  Caller will not accept being placed on the waitlist and is requesting a message be sent to doctor.  Name of Caller: PT   When is the first available appointment? January   Symptoms: 1 month follow up   Would the patient rather a call back or a response via [facility]? Call back   Best Call Back Number: [phone]  Additional Information:

## 2023-08-30 NOTE — ED PROVIDER NOTES
"Encounter Date: 8/28/2023       History     Chief Complaint   Patient presents with    Hypertension    Dizziness     Patient states that her blood pressure has been high today with systolic in the 190's. She reports some dizziness and nausea that started this evening. She has not had her evening meds. She denies chest pain, dyspnea. She has no motor deficits.      HPI  This is a 73 y.o. female who has a past medical history of Anemia, Anxiety disorder (8/28/2019), Cataract, Chronic right-sided low back pain without sciatica (10/20/2021), Hypercholesteremia (9/17/2019), Invasive ductal carcinoma of breast, female, right (4/16/2021), Migraine with vision problems, Subclinical hypothyroidism, and White coat syndrome with hypertension.     The patient presents to the Emergency Department with palpitations.  Started today, feeling like heart beating funny.   Also noted high blood pressure today.  Blood pressure fluctuates. Had been low on amlodipine and was just switched to losartan.  Symptoms are associated with mild dizziness.  Pt denies chest pain.   Symptoms are aggravated by: nothing.  Symptoms are relieved by: nothing.   Patient has prior history of similar symptoms. Noted "irregular" when she would check her blood pressure, but notes that this was infrequent.    Review of patient's allergies indicates:   Allergen Reactions    Sinus allergy Other (See Comments)     Headaches, migranes    Sulfa (sulfonamide antibiotics) Rash     Past Medical History:   Diagnosis Date    Anemia     Anxiety disorder 8/28/2019    Cataract     Chronic right-sided low back pain without sciatica 10/20/2021    Hypercholesteremia 9/17/2019    Invasive ductal carcinoma of breast, female, right 4/16/2021    Migraine with vision problems     Subclinical hypothyroidism     White coat syndrome with hypertension      Past Surgical History:   Procedure Laterality Date    COLON SURGERY      COLONOSCOPY N/A 12/7/2018    Procedure: COLONOSCOPY;  " Surgeon: Bhargav Gaston MD;  Location: [facility] ENDO (4TH FLR);  Service: Endoscopy;  Laterality: N/A;    DILATION AND CURETTAGE OF UTERUS      postmenopausal bleeding    MASTECTOMY, PARTIAL Right 5/12/2021    Procedure: MASTECTOMY, PARTIAL-Right with radiological marker;  Surgeon: Vivian Cadena MD;  Location: [facility];  Service: General;  Laterality: Right;    SENTINEL LYMPH NODE BIOPSY Right 5/12/2021    Procedure: BIOPSY, LYMPH NODE, SENTINEL-Right;  Surgeon: Vivian Cadena MD;  Location: [facility];  Service: General;  Laterality: Right;    TUBAL LIGATION       Family History   Problem Relation Age of Onset    Diabetes Brother     Hypertension Brother     Glaucoma Brother     Glaucoma Father     Cataracts Father     Retinal detachment Father     Lung cancer Mother     Uterine cancer Maternal Grandmother     Stroke Maternal Grandmother     Stroke Paternal Grandmother     Amblyopia Neg Hx     Blindness Neg Hx     Macular degeneration Neg Hx     Strabismus Neg Hx     Thyroid disease Neg Hx      Social History     Tobacco Use    Smoking status: Never    Smokeless tobacco: Never   Substance Use Topics    Alcohol use: No    Drug use: Never     Review of Systems   All other systems reviewed and are negative.      Physical Exam     Initial Vitals [08/28/23 2008]   BP Pulse Resp Temp SpO2   (!) 188/88 99 18 98.1 °F (36.7 °C) 97 %      MAP       --         Physical Exam    Nursing note and vitals reviewed.  Constitutional: She appears well-developed and well-nourished. She is not diaphoretic. No distress.   HENT:   Head: Normocephalic and atraumatic.   Mouth/Throat: Oropharynx is clear and moist.   Eyes: Conjunctivae are normal.   Cardiovascular:  Normal rate and intact distal pulses.           irregular   Pulmonary/Chest: No respiratory distress.   Musculoskeletal:         General: Normal range of motion.     Neurological: She is alert and oriented to person, place, and time.   Skin: Skin is warm and dry. Capillary refill  takes less than 2 seconds. No rash noted. No erythema.   Psychiatric: She has a normal mood and affect.         ED Course   Procedures  Labs Reviewed   CBC W/ AUTO DIFFERENTIAL - Abnormal; Notable for the following components:       Result Value    Eos # 0.6 (*)     Eosinophil % 8.6 (*)     All other components within normal limits   COMPREHENSIVE METABOLIC PANEL - Abnormal; Notable for the following components:    Sodium 135 (*)     BUN 7 (*)     All other components within normal limits   TROPONIN I - Abnormal; Notable for the following components:    Troponin I 0.035 (*)     All other components within normal limits   URINALYSIS, REFLEX TO URINE CULTURE - Abnormal; Notable for the following components:    Color, UA Colorless (*)     Specific Gravity, UA <1.005 (*)     Occult Blood UA 1+ (*)     All other components within normal limits    Narrative:     Specimen Source->Urine   TROPONIN I - Abnormal; Notable for the following components:    Troponin I 0.028 (*)     All other components within normal limits   B-TYPE NATRIURETIC PEPTIDE   URINALYSIS MICROSCOPIC    Narrative:     Specimen Source->Urine        ECG Results              EKG 12-lead (Final result)  Result time 08/29/23 17:40:58      Final result by Interface, Lab In [facility] (08/29/23 17:40:58)                   Narrative:    Test Reason : R07.9,    Vent. Rate : 087 BPM     Atrial Rate : 087 BPM     P-R Int : 152 ms          QRS Dur : 064 ms      QT Int : 374 ms       P-R-T Axes : 000 050 047 degrees     QTc Int : 450 ms    Sinus rhythm with Premature atrial complexes  Baseline Artifact  Otherwise normal ECG  When compared with ECG of 07-MAY-2021 09:24,  Premature atrial complexes are now Present  Confirmed by Alex Sandra MD ([id number]) on 8/29/2023 5:40:53 PM    Referred By: [email or web address]   SELF           Confirmed By:Alex Sandra MD                                  Imaging Results               X-Ray Chest PA And Lateral (Final result)  Result time  08/28/23 23:20:29      Final result by Brandon Ahumada MD (08/28/23 23:20:29)                   Impression:      Nodular density of the right hemithorax, as discussed above, if previously unknown further evaluation with CT examination of the chest is recommended.    There is no additional evidence for acute intrathoracic process.    This report was flagged in Epic as abnormal.      Electronically signed by: Brandon Ahumada  Date:    08/28/2023  Time:    23:20               Narrative:    EXAMINATION:  XR CHEST PA AND LATERAL    CLINICAL HISTORY:  Chest Pain;    TECHNIQUE:  PA and lateral views of the chest were performed.    COMPARISON:  Chest radiograph August 25, 2019    FINDINGS:  Two views of the chest are submitted.  Monitoring leads overlie the patient.  The cardiomediastinal silhouette appears appropriate.    There is a somewhat vague nodular density seen at the mid right hemithorax, this projects at the location of the posterior right 7th rib.  There is appearance that may relate to a component of spiculation.  If previously unknown further evaluation with CT examination of the chest is recommended.    There is no additional evidence for confluent infiltrate or consolidation, significant pleural effusion or pneumothorax.    The osseous structures appear intact.  Chronic change noted.                                    X-Rays:   Independently Interpreted Readings:   Chest X-Ray: Normal heart size.  No infiltrates.  No acute abnormalities.     Medications - No data to display      Medical Decision Making  This is an emergent evaluation of a 73 y.o.female patient with presentation of palpitations, noted PACs on her EKG.  Denies any chest pain    Initial differentials include but are not limited to:  PACs, electrolyte abnormality, medication effect, less likely consider ACS, NSTEMI, pericarditis, pleural effusion, pulmonary pathology such as pneumonia.     Plan:  CBC, CMP, chest x-ray, EKG troponin,  BNP.      Problems Addressed:  Hypertension, unspecified type: chronic illness or injury with exacerbation, progression, or side effects of treatment     Details: Improved during ED stay  PAC (premature atrial contraction): acute illness or injury with systemic symptoms    Amount and/or Complexity of Data Reviewed  Labs: ordered. Decision-making details documented in ED Course.     Details: Second trop downward trending.  Radiology: ordered and independent interpretation performed.  ECG/medicine tests: ordered and independent interpretation performed. Decision-making details documented in ED Course.     Details: PACs    Risk  OTC drugs.  Prescription drug management.  Risk Details: I will switch losartan to Toprol-XL which will help with both the PACs as well as her blood pressure, especially considering patient's symptoms started when she began the losartan.    Discussed with patient and daughter at bedside.  Will have them follow-up with PCP in next week.  Start baby aspirin.                   ED Course as of 08/29/23 2223   Mon Aug 28, 2023   2223 Independent EKG Interpretation:  Sinus rhythm with PACs at 87 bpm, nl axis, nl intervals, no hypertrophy, no ST-T changes.  When comparing to 05/07/2021, PACs are now present  Impression:  No STEMI, abnormal     [NP]   2301 Troponin I(!): 0.035 [NP]   2301 WBC: 6.53 [NP]   2301 Hemoglobin: 14.1 [NP]   2301 Hematocrit: 41.6 [NP]   2301 Platelets: 193 [NP]   2301 BNP: 95 [NP]   2301 Sodium(!): 135 [NP]   2301 Potassium: 4.2 [NP]   2301 Chloride: 101 [NP]   2301 CO2: 24 [NP]   2301 Glucose: 102 [NP]   2301 BUN(!): 7 [NP]   2301 Creatinine: 0.7 [NP]   2301 Calcium: 9.6 [NP]   2302 BILIRUBIN TOTAL: 0.3 [NP]   2302 AST: 21 [NP]   2302 ALT: 15 [NP]   Tue Aug 29, 2023   0156 Troponin I(!): 0.028 [NP]      ED Course User Index  [NP] Viral Gonzalez MD                    Clinical Impression:   Final diagnoses:  [I10] Hypertension, unspecified type  [I49.1] PAC (premature atrial  contraction) (Primary)        ED Disposition Condition    Discharge Stable          ED Prescriptions       Medication Sig Dispense Start Date End Date Auth. Provider    metoprolol succinate (TOPROL-XL) 50 MG 24 hr tablet Take 1 tablet (50 mg total) by mouth once daily. 30 tablet 8/29/2023 10/28/2023 Viral Gonzalez MD          Follow-up Information       Follow up With Specialties Details Why Contact Info    Sharron Ogden MD Family Medicine Schedule an appointment as soon as possible for a visit   [address] [facility]  [address]  [facility] [address] [phone]                   Viral Gonzalez MD  08/29/23 7558

## 2023-08-31 ENCOUNTER — OFFICE VISIT (OUTPATIENT)
Dept: CARDIOLOGY | Facility: CLINIC | Age: 73
End: 2023-08-31
Payer: MEDICARE

## 2023-08-31 VITALS
HEART RATE: 73 BPM | BODY MASS INDEX: 21.59 KG/M2 | DIASTOLIC BLOOD PRESSURE: 70 MMHG | WEIGHT: 117.31 LBS | HEIGHT: 62 IN | SYSTOLIC BLOOD PRESSURE: 136 MMHG

## 2023-08-31 DIAGNOSIS — Z17.0 MALIGNANT NEOPLASM OF UPPER-OUTER QUADRANT OF RIGHT BREAST IN FEMALE, ESTROGEN RECEPTOR POSITIVE: ICD-10-CM

## 2023-08-31 DIAGNOSIS — R00.2 PALPITATIONS: ICD-10-CM

## 2023-08-31 DIAGNOSIS — R09.89 LABILE HYPERTENSION: Primary | ICD-10-CM

## 2023-08-31 DIAGNOSIS — I10 BENIGN ESSENTIAL HYPERTENSION: ICD-10-CM

## 2023-08-31 DIAGNOSIS — C50.411 MALIGNANT NEOPLASM OF UPPER-OUTER QUADRANT OF RIGHT BREAST IN FEMALE, ESTROGEN RECEPTOR POSITIVE: ICD-10-CM

## 2023-08-31 DIAGNOSIS — R06.02 SHORTNESS OF BREATH: ICD-10-CM

## 2023-08-31 DIAGNOSIS — E78.00 HYPERCHOLESTEREMIA: ICD-10-CM

## 2023-08-31 PROCEDURE — 3078F DIAST BP <80 MM HG: CPT | Mod: HCNC,CPTII,S$GLB, | Performed by: INTERNAL MEDICINE

## 2023-08-31 PROCEDURE — 1126F PR PAIN SEVERITY QUANTIFIED, NO PAIN PRESENT: ICD-10-PCS | Mod: HCNC,CPTII,S$GLB, | Performed by: INTERNAL MEDICINE

## 2023-08-31 PROCEDURE — 4010F PR ACE/ARB THEARPY RXD/TAKEN: ICD-10-PCS | Mod: HCNC,CPTII,S$GLB, | Performed by: INTERNAL MEDICINE

## 2023-08-31 PROCEDURE — 3044F HG A1C LEVEL LT 7.0%: CPT | Mod: HCNC,CPTII,S$GLB, | Performed by: INTERNAL MEDICINE

## 2023-08-31 PROCEDURE — 4010F ACE/ARB THERAPY RXD/TAKEN: CPT | Mod: HCNC,CPTII,S$GLB, | Performed by: INTERNAL MEDICINE

## 2023-08-31 PROCEDURE — 1126F AMNT PAIN NOTED NONE PRSNT: CPT | Mod: HCNC,CPTII,S$GLB, | Performed by: INTERNAL MEDICINE

## 2023-08-31 PROCEDURE — 3078F PR MOST RECENT DIASTOLIC BLOOD PRESSURE < 80 MM HG: ICD-10-PCS | Mod: HCNC,CPTII,S$GLB, | Performed by: INTERNAL MEDICINE

## 2023-08-31 PROCEDURE — 99205 OFFICE O/P NEW HI 60 MIN: CPT | Mod: HCNC,S$GLB,, | Performed by: INTERNAL MEDICINE

## 2023-08-31 PROCEDURE — 1101F PT FALLS ASSESS-DOCD LE1/YR: CPT | Mod: HCNC,CPTII,S$GLB, | Performed by: INTERNAL MEDICINE

## 2023-08-31 PROCEDURE — 99205 PR OFFICE/OUTPT VISIT, NEW, LEVL V, 60-74 MIN: ICD-10-PCS | Mod: HCNC,S$GLB,, | Performed by: INTERNAL MEDICINE

## 2023-08-31 PROCEDURE — 3008F PR BODY MASS INDEX (BMI) DOCUMENTED: ICD-10-PCS | Mod: HCNC,CPTII,S$GLB, | Performed by: INTERNAL MEDICINE

## 2023-08-31 PROCEDURE — 3075F SYST BP GE 130 - 139MM HG: CPT | Mod: HCNC,CPTII,S$GLB, | Performed by: INTERNAL MEDICINE

## 2023-08-31 PROCEDURE — 3044F PR MOST RECENT HEMOGLOBIN A1C LEVEL <7.0%: ICD-10-PCS | Mod: HCNC,CPTII,S$GLB, | Performed by: INTERNAL MEDICINE

## 2023-08-31 PROCEDURE — 3288F FALL RISK ASSESSMENT DOCD: CPT | Mod: HCNC,CPTII,S$GLB, | Performed by: INTERNAL MEDICINE

## 2023-08-31 PROCEDURE — 99999 PR PBB SHADOW E&M-EST. PATIENT-LVL III: CPT | Mod: PBBFAC,HCNC,, | Performed by: INTERNAL MEDICINE

## 2023-08-31 PROCEDURE — 99999 PR PBB SHADOW E&M-EST. PATIENT-LVL III: ICD-10-PCS | Mod: PBBFAC,HCNC,, | Performed by: INTERNAL MEDICINE

## 2023-08-31 PROCEDURE — 3288F PR FALLS RISK ASSESSMENT DOCUMENTED: ICD-10-PCS | Mod: HCNC,CPTII,S$GLB, | Performed by: INTERNAL MEDICINE

## 2023-08-31 PROCEDURE — 3075F PR MOST RECENT SYSTOLIC BLOOD PRESS GE 130-139MM HG: ICD-10-PCS | Mod: HCNC,CPTII,S$GLB, | Performed by: INTERNAL MEDICINE

## 2023-08-31 PROCEDURE — 3008F BODY MASS INDEX DOCD: CPT | Mod: HCNC,CPTII,S$GLB, | Performed by: INTERNAL MEDICINE

## 2023-08-31 PROCEDURE — 1101F PR PT FALLS ASSESS DOC 0-1 FALLS W/OUT INJ PAST YR: ICD-10-PCS | Mod: HCNC,CPTII,S$GLB, | Performed by: INTERNAL MEDICINE

## 2023-08-31 RX ORDER — CLONIDINE HYDROCHLORIDE 0.1 MG/1
0.1 TABLET ORAL DAILY PRN
Qty: 365 TABLET | Refills: 0 | Status: SHIPPED | OUTPATIENT
Start: 2023-08-31 | End: 2023-09-01 | Stop reason: SDUPTHER

## 2023-08-31 NOTE — PROGRESS NOTES
[facility]      Chief Complaint   Patient presents with    heart flutter     Tachycardia       Patient ID: Keo Frias is a 73 y.o. female with HTN, breast cancer, anxiety, who presents for an initial appointment.  Pertinent history/events are as follows:     -Pt presents for evaluation of HTN.    HPI:  Mrs. Frisa reports episodes of elevations of blood pressures with some periods of hypotension.  Currently taking metoprolol succinate 50 mg daily.  Also taking buspirone for anxiety.      Past Medical History:   Diagnosis Date    Anemia     Anxiety disorder 8/28/2019    Cataract     Chronic right-sided low back pain without sciatica 10/20/2021    Hypercholesteremia 9/17/2019    Invasive ductal carcinoma of breast, female, right 4/16/2021    Migraine with vision problems     Subclinical hypothyroidism     White coat syndrome with hypertension      Past Surgical History:   Procedure Laterality Date    COLON SURGERY      COLONOSCOPY N/A 12/7/2018    Procedure: COLONOSCOPY;  Surgeon: Bhargav Gaston MD;  Location: [address];  Service: Endoscopy;  Laterality: N/A;    DILATION AND CURETTAGE OF UTERUS      postmenopausal bleeding    MASTECTOMY, PARTIAL Right 5/12/2021    Procedure: MASTECTOMY, PARTIAL-Right with radiological marker;  Surgeon: Vivian Cadena MD;  Location: [facility];  Service: General;  Laterality: Right;    SENTINEL LYMPH NODE BIOPSY Right 5/12/2021    Procedure: BIOPSY, LYMPH NODE, SENTINEL-Right;  Surgeon: Vivian Cadena MD;  Location: [facility];  Service: General;  Laterality: Right;    TUBAL LIGATION       Social History     Socioeconomic History    Marital status:     Number of children: 2   Occupational History     Employer: [employer]   Tobacco Use    Smoking status: Never    Smokeless tobacco: Never   Substance and Sexual Activity    Alcohol use: No    Drug use: Never    Sexual activity: Yes     Partners: Male     Birth control/protection: None     Family  "History   Problem Relation Age of Onset    Diabetes Brother     Hypertension Brother     Glaucoma Brother     Glaucoma Father     Cataracts Father     Retinal detachment Father     Lung cancer Mother     Uterine cancer Maternal Grandmother     Stroke Maternal Grandmother     Stroke Paternal Grandmother     Amblyopia Neg Hx     Blindness Neg Hx     Macular degeneration Neg Hx     Strabismus Neg Hx     Thyroid disease Neg Hx        Review of patient's allergies indicates:   Allergen Reactions    Sinus allergy Other (See Comments)     Headaches, migranes    Sulfa (sulfonamide antibiotics) Rash       Medication List with Changes/Refills   Current Medications    BUSPIRONE (BUSPAR) 5 MG TAB    Take 1 tablet (5 mg total) by mouth 3 (three) times daily as needed (anxiety).    CALCIUM CARBONATE (CALCIUM 500 ORAL)    Take 1 tablet by mouth.    CHOLECALCIFEROL, VITAMIN D3, 125 MCG (5,000 UNIT) CAPSULE    Take 1 capsule (5,000 Units total) by mouth daily with breakfast.    CIPRODEX 0.3-0.1 % DRPS    PLACE 4 DROPS INTO THE RIGHT EAR 2 (TWO) TIMES DAILY. FOR 10 DAYS    LETROZOLE (FEMARA) 2.5 MG TAB    TAKE 1 TABLET BY MOUTH EVERY DAY    METOPROLOL SUCCINATE (TOPROL-XL) 50 MG 24 HR TABLET    Take 1 tablet (50 mg total) by mouth once daily.    MULTIVITAMIN-IRON-FOLIC ACID TAB    Take 1 tablet by mouth once daily.    PROLIA 60 MG/ML SYRG           Review of Systems  Constitution: Denies chills, fever, and sweats.  HENT: Denies headaches or blurry vision.  Cardiovascular: Denies chest pain or irregular heart beat.  Respiratory: Denies cough or shortness of breath.  Gastrointestinal: Denies abdominal pain, nausea, or vomiting.  Musculoskeletal: Denies muscle cramps.  Neurological: Denies dizziness or focal weakness.  Psychiatric/Behavioral: Normal mental status.  Hematologic/Lymphatic: Denies bleeding problem or easy bruising/bleeding.  Skin: Denies rash or suspicious lesions    Physical Examination  /70   Pulse 73   Ht 5' 2" " "(1.575 m)   Wt 53.2 kg (117 lb 4.6 oz)   LMP  (LMP Unknown)   BMI 21.45 kg/m²     Constitutional: No acute distress, conversant  HEENT: Sclera anicteric, Pupils equal, round and reactive to light, extraocular motions intact, Oropharynx clear  Neck: No JVD, no carotid bruits  Cardiovascular: regular rate and rhythm, no murmur, rubs or gallops, normal S1/S2  Pulmonary: Clear to auscultation bilaterally  Abdominal: Abdomen soft, nontender, nondistended, positive bowel sounds  Extremities: No lower extremity edema,   Pulses:  Carotid pulses are 2+ on the right side, and 2+ on the left side.  Radial pulses are 2+ on the right side, and 2+ on the left side.   Femoral pulses are 2+ on the right side, and 2+ on the left side.  Popliteal pulses are 2+ on the right side, and 2+ on the left side.   Dorsalis pedis pulses are 2+ on the right side, and 2+ on the left side.   Posterior tibial pulses are 2+ on the right side, and 2+ on the left side.    Skin: No ecchymosis, erythema, or ulcers  Psych: Alert and oriented x 3, appropriate affect  Neuro: CNII-XII intact, no focal deficits    Labs:  Most Recent Data  CBC:   Lab Results   Component Value Date    WBC 6.53 08/28/2023    HGB 14.1 08/28/2023    HCT 41.6 08/28/2023     08/28/2023    MCV 85 08/28/2023    RDW 12.7 08/28/2023     BMP:   Lab Results   Component Value Date     (L) 08/28/2023    K 4.2 08/28/2023     08/28/2023    CO2 24 08/28/2023    BUN 7 (L) 08/28/2023    CREATININE 0.7 08/28/2023     08/28/2023    CALCIUM 9.6 08/28/2023    MG 1.9 05/03/2023     LFTS;   Lab Results   Component Value Date    PROT 7.9 08/28/2023    ALBUMIN 4.2 08/28/2023    BILITOT 0.3 08/28/2023    AST 21 08/28/2023    ALKPHOS 69 08/28/2023    ALT 15 08/28/2023     COAGS: No results found for: "INR", "PROTIME", "PTT"  FLP:   Lab Results   Component Value Date    CHOL 186 05/03/2023    HDL 49 05/03/2023    LDLCALC 120.8 05/03/2023    TRIG 81 05/03/2023    CHOLHDL 26.3 " 05/03/2023     CARDIAC:   Lab Results   Component Value Date    TROPONINI 0.028 (H) 08/29/2023    BNP 95 08/28/2023       Imaging:    EKG 8/28/2023:  Sinus rhythm with Premature atrial complexes  Baseline Artifact    Echo 9/13/2019:  Normal left ventricular systolic function. The estimated ejection fraction is 65%  Normal LV diastolic function.  No wall motion abnormalities.  Normal right ventricular systolic function.  Mild tricuspid regurgitation.  The estimated PA systolic pressure is 22 mm Hg  Normal central venous pressure (3 mm Hg).    Assessment/Plan:  Keo Frias is a 73 y.o. female with HTN, breast cancer, anxiety, who presents for an initial appointment.    HTN- Blood pressure have been labile.  Continue metoprolol succinate 50 mg daily.  Recommend weaning off buspirone which can interact with metoprolol and cause hypotension.  Pt to take clonidine 0.1 mg prn for systolic greater than or equal to 160 mmHg.  Pt to keep log of blood pressure/heart rate bring in next visit for review.      2. Palpitations- Pt with risk factors for CAD.  Check exercise stress echo and 30 day event monitor.     3. HLD- Pt would like to wait to start a statin.  Continue with lifestyle modification.      Follow up in 6 weeks     Total duration of face to face visit time 45 minutes.  Total time spent counseling greater than fifty percent of total visit time.  Counseling included discussion regarding imaging findings, diagnosis, possibilities, treatment options, risks and benefits.  The patient had many questions regarding the options and long-term effects.    Esa Rodríguez MD, PhD  Interventional Cardiology

## 2023-09-01 ENCOUNTER — PATIENT MESSAGE (OUTPATIENT)
Dept: FAMILY MEDICINE | Facility: CLINIC | Age: 73
End: 2023-09-01

## 2023-09-01 DIAGNOSIS — F41.1 ANXIETY, GENERALIZED: Primary | ICD-10-CM

## 2023-09-01 DIAGNOSIS — R09.89 LABILE HYPERTENSION: ICD-10-CM

## 2023-09-01 RX ORDER — CLONIDINE HYDROCHLORIDE 0.1 MG/1
0.1 TABLET ORAL DAILY PRN
Qty: 90 TABLET | Refills: 3 | Status: SHIPPED | OUTPATIENT
Start: 2023-09-01 | End: 2024-08-31

## 2023-09-03 RX ORDER — ESCITALOPRAM OXALATE 10 MG/1
10 TABLET ORAL DAILY
Qty: 90 TABLET | Refills: 4 | Status: SHIPPED | OUTPATIENT
Start: 2023-09-03 | End: 2024-03-26 | Stop reason: DRUGHIGH

## 2023-09-05 ENCOUNTER — TELEPHONE (OUTPATIENT)
Dept: CARDIOLOGY | Facility: CLINIC | Age: 73
End: 2023-09-05

## 2023-09-05 ENCOUNTER — PATIENT MESSAGE (OUTPATIENT)
Dept: CARDIOLOGY | Facility: CLINIC | Age: 73
End: 2023-09-05

## 2023-09-05 NOTE — TELEPHONE ENCOUNTER
Spoke with pt's daughter in reference to the heart monitor appt scheduled in office day of visit and placed on wait list for earlier appt time. Pt's daughter verbalized understanding. Pt's daughter also asked if pt should continue the Asprin 81mg prescribed by ED doctor. I asked Dr. Rodríguez and he advised that she take the 81mg Asprin daily. Pt's notified and verbalized understanding.

## 2023-09-05 NOTE — TELEPHONE ENCOUNTER
----- Message from Milagros Ferguson sent at 9/5/2023  2:33 PM CDT -----  Regarding: Monitor  Patient daughter ( Renee) calling to find out when her mother heart monitor will be set up. Please call back @ [phone]. Thank you Milagros

## 2023-09-05 NOTE — TELEPHONE ENCOUNTER
I spoke with Mrs. Frias and she stated she is having to take the clonidine 0.1mg daily and sometimes twice a day due to high bp readings >160 SBP. Is it okay to take the clonidine more than once a day?     Mrs. Frias would like to speak with you.

## 2023-09-07 ENCOUNTER — PATIENT MESSAGE (OUTPATIENT)
Dept: FAMILY MEDICINE | Facility: CLINIC | Age: 73
End: 2023-09-07

## 2023-09-11 ENCOUNTER — PATIENT MESSAGE (OUTPATIENT)
Dept: CARDIOLOGY | Facility: CLINIC | Age: 73
End: 2023-09-11

## 2023-09-11 ENCOUNTER — NURSE TRIAGE (OUTPATIENT)
Dept: ADMINISTRATIVE | Facility: CLINIC | Age: 73
End: 2023-09-11

## 2023-09-12 ENCOUNTER — HOSPITAL ENCOUNTER (OUTPATIENT)
Facility: HOSPITAL | Age: 73
Discharge: HOME | End: 2023-09-13
Attending: STUDENT IN AN ORGANIZED HEALTH CARE EDUCATION/TRAINING PROGRAM | Admitting: STUDENT IN AN ORGANIZED HEALTH CARE EDUCATION/TRAINING PROGRAM
Payer: MEDICARE

## 2023-09-12 ENCOUNTER — PATIENT MESSAGE (OUTPATIENT)
Dept: CARDIOLOGY | Facility: CLINIC | Age: 73
End: 2023-09-12

## 2023-09-12 DIAGNOSIS — R93.89 ABNORMAL CHEST X-RAY: Primary | ICD-10-CM

## 2023-09-12 DIAGNOSIS — R03.0 ELEVATED BLOOD PRESSURE READING: ICD-10-CM

## 2023-09-12 DIAGNOSIS — K21.9 GASTROESOPHAGEAL REFLUX DISEASE, UNSPECIFIED WHETHER ESOPHAGITIS PRESENT: ICD-10-CM

## 2023-09-12 DIAGNOSIS — R07.9 CHEST PAIN: ICD-10-CM

## 2023-09-12 LAB
ALBUMIN SERPL BCP-MCNC: 3.8 G/DL (ref 3.5–5.2)
ALP SERPL-CCNC: 58 U/L (ref 55–135)
ALT SERPL W/O P-5'-P-CCNC: 13 U/L (ref 10–44)
ANION GAP SERPL CALC-SCNC: 12 MMOL/L (ref 8–16)
AST SERPL-CCNC: 18 U/L (ref 10–40)
BASOPHILS # BLD AUTO: 0.07 K/UL (ref 0–0.2)
BASOPHILS NFR BLD: 1.1 % (ref 0–1.9)
BILIRUB SERPL-MCNC: 0.3 MG/DL (ref 0.1–1)
BNP SERPL-MCNC: 253 PG/ML (ref 0–99)
BUN SERPL-MCNC: 7 MG/DL (ref 8–23)
CALCIUM SERPL-MCNC: 9.7 MG/DL (ref 8.7–10.5)
CHLORIDE SERPL-SCNC: 99 MMOL/L (ref 95–110)
CO2 SERPL-SCNC: 21 MMOL/L (ref 23–29)
CREAT SERPL-MCNC: 0.6 MG/DL (ref 0.5–1.4)
D DIMER PPP IA.FEU-MCNC: 0.3 MG/L FEU
DIFFERENTIAL METHOD: NORMAL
EOSINOPHIL # BLD AUTO: 0.4 K/UL (ref 0–0.5)
EOSINOPHIL NFR BLD: 7 % (ref 0–8)
ERYTHROCYTE [DISTWIDTH] IN BLOOD BY AUTOMATED COUNT: 12.7 % (ref 11.5–14.5)
EST. GFR  (NO RACE VARIABLE): >60 ML/MIN/1.73 M^2
GLUCOSE SERPL-MCNC: 93 MG/DL (ref 70–110)
HCT VFR BLD AUTO: 37.3 % (ref 37–48.5)
HGB BLD-MCNC: 12.7 G/DL (ref 12–16)
IMM GRANULOCYTES # BLD AUTO: 0.02 K/UL (ref 0–0.04)
IMM GRANULOCYTES NFR BLD AUTO: 0.3 % (ref 0–0.5)
LYMPHOCYTES # BLD AUTO: 1.9 K/UL (ref 1–4.8)
LYMPHOCYTES NFR BLD: 29.7 % (ref 18–48)
MCH RBC QN AUTO: 27.8 PG (ref 27–31)
MCHC RBC AUTO-ENTMCNC: 34 G/DL (ref 32–36)
MCV RBC AUTO: 82 FL (ref 82–98)
MONOCYTES # BLD AUTO: 0.5 K/UL (ref 0.3–1)
MONOCYTES NFR BLD: 7.3 % (ref 4–15)
NEUTROPHILS # BLD AUTO: 3.5 K/UL (ref 1.8–7.7)
NEUTROPHILS NFR BLD: 54.6 % (ref 38–73)
NRBC BLD-RTO: 0 /100 WBC
PLATELET # BLD AUTO: 202 K/UL (ref 150–450)
PMV BLD AUTO: 12.5 FL (ref 9.2–12.9)
POC CARDIAC TROPONIN I: 0.01 NG/ML (ref 0–0.08)
POTASSIUM SERPL-SCNC: 4 MMOL/L (ref 3.5–5.1)
PROT SERPL-MCNC: 7 G/DL (ref 6–8.4)
RBC # BLD AUTO: 4.57 M/UL (ref 4–5.4)
SAMPLE: NORMAL
SODIUM SERPL-SCNC: 132 MMOL/L (ref 136–145)
TROPONIN I SERPL DL<=0.01 NG/ML-MCNC: 0.01 NG/ML (ref 0–0.03)
TROPONIN I SERPL DL<=0.01 NG/ML-MCNC: 0.01 NG/ML (ref 0–0.03)
TROPONIN I SERPL DL<=0.01 NG/ML-MCNC: 0.02 NG/ML (ref 0–0.03)
WBC # BLD AUTO: 6.33 K/UL (ref 3.9–12.7)

## 2023-09-12 PROCEDURE — 83880 ASSAY OF NATRIURETIC PEPTIDE: CPT | Mod: HCNC

## 2023-09-12 PROCEDURE — 96361 HYDRATE IV INFUSION ADD-ON: CPT | Mod: HCNC

## 2023-09-12 PROCEDURE — 93010 ELECTROCARDIOGRAM REPORT: CPT | Mod: HCNC,,, | Performed by: INTERNAL MEDICINE

## 2023-09-12 PROCEDURE — 25000003 PHARM REV CODE 250: Mod: HCNC | Performed by: PHYSICIAN ASSISTANT

## 2023-09-12 PROCEDURE — G0378 HOSPITAL OBSERVATION PER HR: HCPCS | Mod: HCNC

## 2023-09-12 PROCEDURE — 96360 HYDRATION IV INFUSION INIT: CPT | Mod: HCNC

## 2023-09-12 PROCEDURE — 80053 COMPREHEN METABOLIC PANEL: CPT | Mod: HCNC

## 2023-09-12 PROCEDURE — 85025 COMPLETE CBC W/AUTO DIFF WBC: CPT | Mod: HCNC

## 2023-09-12 PROCEDURE — 84484 ASSAY OF TROPONIN QUANT: CPT | Mod: HCNC | Performed by: PHYSICIAN ASSISTANT

## 2023-09-12 PROCEDURE — 93005 ELECTROCARDIOGRAM TRACING: CPT | Mod: HCNC

## 2023-09-12 PROCEDURE — 85379 FIBRIN DEGRADATION QUANT: CPT | Mod: HCNC | Performed by: PHYSICIAN ASSISTANT

## 2023-09-12 PROCEDURE — 93010 EKG 12-LEAD: ICD-10-PCS | Mod: HCNC,,, | Performed by: INTERNAL MEDICINE

## 2023-09-12 PROCEDURE — 84484 ASSAY OF TROPONIN QUANT: CPT | Mod: 91,HCNC

## 2023-09-12 PROCEDURE — 99285 EMERGENCY DEPT VISIT HI MDM: CPT | Mod: 25,HCNC

## 2023-09-12 RX ORDER — LOSARTAN POTASSIUM 25 MG/1
25 TABLET ORAL ONCE
Status: COMPLETED | OUTPATIENT
Start: 2023-09-12 | End: 2023-09-12

## 2023-09-12 RX ORDER — ASPIRIN 325 MG
325 TABLET ORAL
Status: COMPLETED | OUTPATIENT
Start: 2023-09-12 | End: 2023-09-12

## 2023-09-12 RX ORDER — LETROZOLE 2.5 MG/1
2.5 TABLET, FILM COATED ORAL NIGHTLY
Status: DISCONTINUED | OUTPATIENT
Start: 2023-09-12 | End: 2023-09-13 | Stop reason: HOSPADM

## 2023-09-12 RX ADMIN — LETROZOLE 2.5 MG: 2.5 TABLET ORAL at 09:09

## 2023-09-12 RX ADMIN — ASPIRIN 325 MG ORAL TABLET 325 MG: 325 PILL ORAL at 08:09

## 2023-09-12 RX ADMIN — SODIUM CHLORIDE 500 ML: 9 INJECTION, SOLUTION INTRAVENOUS at 11:09

## 2023-09-12 RX ADMIN — LOSARTAN POTASSIUM 25 MG: 25 TABLET, FILM COATED ORAL at 10:09

## 2023-09-12 NOTE — ED PROVIDER NOTES
Encounter Date: 9/12/2023       History     Chief Complaint   Patient presents with    Chest Pain    Hypertension     Took home BP meds, and was switched meds recently      73-year-old female medical history of anxiety, hypercholesterol and hypertension presents to emergency department due to episode of indigestion last night as long with elevated blood pressures.  Patient has been experiencing indigestion of last 2 weeks per her daughter with most recent episode last night that lasted 30 minutes.  Patient reports when pain came on she took a dose of her clonidine.  The daughter reports while it decreased her blood pressure from 170s it however did remain low.  Patient's daughter who assists in providing history also reports patient was had medication changes recently over last 2 weeks.  She was previously on losartan however change to metoprolol, over the last week and a half she was also begin taking clonidine 0.1 p.r.n. patient was not endorsing any shortness a breath nausea vomiting.      Review of patient's allergies indicates:   Allergen Reactions    Sinus allergy Other (See Comments)     Headaches, migranes    Sulfa (sulfonamide antibiotics) Rash     Past Medical History:   Diagnosis Date    Anemia     Anxiety disorder 8/28/2019    Cataract     Chronic right-sided low back pain without sciatica 10/20/2021    Hypercholesteremia 9/17/2019    Invasive ductal carcinoma of breast, female, right 4/16/2021    Migraine with vision problems     Subclinical hypothyroidism     White coat syndrome with hypertension      Past Surgical History:   Procedure Laterality Date    COLON SURGERY      COLONOSCOPY N/A 12/7/2018    Procedure: COLONOSCOPY;  Surgeon: Bhargav Gaston MD;  Location: [address];  Service: Endoscopy;  Laterality: N/A;    DILATION AND CURETTAGE OF UTERUS      postmenopausal bleeding    MASTECTOMY, PARTIAL Right 5/12/2021    Procedure: MASTECTOMY, PARTIAL-Right with radiological marker;  Surgeon:  Vivian Cadena MD;  Location: [facility] OR;  Service: General;  Laterality: Right;    SENTINEL LYMPH NODE BIOPSY Right 5/12/2021    Procedure: BIOPSY, LYMPH NODE, SENTINEL-Right;  Surgeon: Vivian Cadena MD;  Location: [facility] OR;  Service: General;  Laterality: Right;    TUBAL LIGATION       Family History   Problem Relation Age of Onset    Diabetes Brother     Hypertension Brother     Glaucoma Brother     Glaucoma Father     Cataracts Father     Retinal detachment Father     Lung cancer Mother     Uterine cancer Maternal Grandmother     Stroke Maternal Grandmother     Stroke Paternal Grandmother     Amblyopia Neg Hx     Blindness Neg Hx     Macular degeneration Neg Hx     Strabismus Neg Hx     Thyroid disease Neg Hx      Social History     Tobacco Use    Smoking status: Never    Smokeless tobacco: Never   Substance Use Topics    Alcohol use: No    Drug use: Never     Review of Systems   Cardiovascular:  Positive for chest pain (Described as indigestion).   Gastrointestinal:  Negative for nausea and vomiting.       Physical Exam     Initial Vitals [09/12/23 1507]   BP Pulse Resp Temp SpO2   (!) 189/86 63 18 98.8 °F (37.1 °C) 98 %      MAP       --         Physical Exam    Vitals reviewed.  Constitutional: She appears well-developed and well-nourished.   HENT:   Head: Normocephalic and atraumatic.   Eyes: Conjunctivae and EOM are normal.   Neck:   Normal range of motion.  Cardiovascular:  Normal rate and normal heart sounds.           Pulmonary/Chest: No respiratory distress. She has no wheezes. She has no rales.   Abdominal: She exhibits no distension.   Musculoskeletal:         General: Normal range of motion.      Cervical back: Normal range of motion.     Neurological: She is alert and oriented to person, place, and time.   Skin: Skin is warm and dry.   Psychiatric: She has a normal mood and affect. Thought content normal.         ED Course   Procedures  Labs Reviewed   COMPREHENSIVE METABOLIC PANEL - Abnormal; Notable  for the following components:       Result Value    Sodium 132 (*)     CO2 21 (*)     BUN 7 (*)     All other components within normal limits   B-TYPE NATRIURETIC PEPTIDE - Abnormal; Notable for the following components:     (*)     All other components within normal limits   CBC W/ AUTO DIFFERENTIAL   TROPONIN I   TROPONIN I   TROPONIN ISTAT   D DIMER, QUANTITATIVE   TROPONIN I   POCT TROPONIN   POCT TROPONIN        ECG Results              EKG 12-lead (Final result)  Result time 09/12/23 16:17:21      Final result by Interface, Lab In [facility] (09/12/23 16:17:21)                   Narrative:    Test Reason : R07.9,    Vent. Rate : 063 BPM     Atrial Rate : 063 BPM     P-R Int : 154 ms          QRS Dur : 070 ms      QT Int : 410 ms       P-R-T Axes : 080 073 078 degrees     QTc Int : 419 ms    Normal sinus rhythm  Normal ECG  When compared with ECG of 28-AUG-2023 22:06,  Premature atrial complexes are no longer Present  Confirmed by Jacques SARAH MD ([id number]) on 9/12/2023 4:17:12 PM    Referred By: [email or web address]   SELF           Confirmed By:Jacques SARAH MD                                  Imaging Results              X-Ray Chest PA And Lateral (Final result)  Result time 09/12/23 17:48:24      Final result by Blayne Goff MD (09/12/23 17:48:24)                   Impression:      No acute intrathoracic process.    1.4 cm nodular density in the right upper lung zone.  Nonemergent follow-up with CT scan of the chest, as clinically warranted.      Electronically signed by: Blayne Goff MD  Date:    09/12/2023  Time:    17:48               Narrative:    EXAMINATION:  XR CHEST PA AND LATERAL    CLINICAL HISTORY:  Chest pain, unspecified    TECHNIQUE:  PA and lateral views of the chest were performed.    COMPARISON:  08/28/2023.    FINDINGS:  The trachea is unremarkable.  There are calcifications of the aortic knob.  The cardiomediastinal silhouette is within normal limits.  There is no evidence of free air beneath the  hemidiaphragms.  There is no evidence of a pneumothorax.  There is no evidence of pneumomediastinum.  There is a 1.4 cm nodular density in the right upper lung zone.  There is no focal consolidation.  The osseous structures are unremarkable.                                       Medications   letrozole tablet 2.5 mg (has no administration in time range)   aspirin tablet 325 mg (325 mg Oral Given 9/12/23 2019)     Medical Decision Making  Well-appearing 73-year-old female presents emergency department with elevated blood pressure.  She is also been experiencing these intermittent episodes of indigestion.  She is no history of GERD previously.  Patient had a similar presentation at [facility] with elevated troponin troponins however they were downtrending.  Today's EKG normal sinus 63 beats per minute.  While cardiac enzymes are normal BNP is mildly elevated.  Due to patient's heart score of 4 and recurrent episodes I believe it was beneficial for patient to be placed in observation overnight with follow up stress test.  Patient and family are agreeable to this plan she is asymptomatic in the emergency department.  Blood pressure is controlled.  I discussed with EDOU patient presentation your agreeable to plan to continue cardiac workup.    Pt discussed with supervising physician      Amount and/or Complexity of Data Reviewed  Labs: ordered.  Radiology: ordered.    Risk  OTC drugs.  Prescription drug management.      Additional MDM:   Heart Score:    History:          Moderately suspicious.  ECG:             Normal  Age:               >65 years  Risk factors: 1-2 risk factors  Troponin:       Less than or equal to normal limit  Heart Score = 4                ED Course as of 09/12/23 2054   Tue Sep 12, 2023   1907 EKG with NSR, rate 63, no STEMI. [NN]   1913 CXR  Impression:     No acute intrathoracic process.     1.4 cm nodular density in the right upper lung zone.  Nonemergent follow-up with CT scan of the chest,  as clinically warranted. [NN]   1914 Heart score 4, moderately suspicious [NN]   1914 No current CP [NN]      ED Course User Index  [NN] Sima Ledezma MD                      Clinical Impression:   Final diagnoses:  [R07.9] Chest pain        ED Disposition Condition    Observation Stable                Asha Aguilar PA-C  09/12/23 2054       Asha Aguilar PA-C  09/12/23 2055

## 2023-09-12 NOTE — TELEPHONE ENCOUNTER
Patient daughter calling on behalf of patient. States BP was 195/95 HR 71. Also states chest pain/abdominal pain/ chest tightness/neck tightness. Patient denies breathing difficulty but unsure of where discomfort is coming from. Advised patient of dispo to go to the ED. Verbalized understanding. Advised to call back if symptoms become worse or with further questions.        Reason for Disposition   [1] Systolic BP  >= 160 OR Diastolic >= 100 AND [2] cardiac (e.g., breathing difficulty, chest pain) or neurologic symptoms (e.g., new-onset blurred or double vision, unsteady gait)    Additional Information   Negative: Difficult to awaken or acting confused (e.g., disoriented, slurred speech)   Negative: SEVERE difficulty breathing (e.g., struggling for each breath, speaks in single words)   Negative: [1] Weakness of the face, arm or leg on one side of the body AND [2] new-onset   Negative: [1] Numbness (i.e., loss of sensation) of the face, arm or leg on one side of the body AND [2] new-onset   Negative: [1] Chest pain lasts > 5 minutes AND [2] history of heart disease (i.e., heart attack, bypass surgery, angina, angioplasty, CHF)   Negative: [1] Chest pain AND [2] took nitrogylcerin AND [3] pain was not relieved   Negative: Sounds like a life-threatening emergency to the triager    Protocols used: Blood Pressure - High-A-

## 2023-09-12 NOTE — TELEPHONE ENCOUNTER
I spoke with Mrs. Frias's daughter. They did not go to the ED last night. The daughter states that Mrs. Frias took a Tums and went to bed. Mrs. Frias is not currently having chest pain this morning. Instructed her daughter to take her to the ED if symptoms return. She verbalized understanding.

## 2023-09-12 NOTE — TELEPHONE ENCOUNTER
Pt stated she does not know whats going on. She said she took medication that another  Rxed her and her bp was elevated after. She stated that within 2 hours it went lower. Had pt check bp while on phone. It was 189/85.      I asked pt if she was experiencing a headache or blurred vision. Pt stated she was having both. I told pt it would be better for her to go to the ed to be treated because they have medication that they can give that would lower her bp. Pt stated now? I explained if she could it would be the best to go to the ed as soon as possible. Pt stated oh ok and ended the call.

## 2023-09-12 NOTE — ED TRIAGE NOTES
Patient presents to the ED with complaints of high BP. Pt. States she has been having neck stiffness, indigestion signs, and a headache that all started last night. Pt. Stated that she just started a new medication for her anxiety and is unsure if the meds are playing a role with each other.

## 2023-09-12 NOTE — TELEPHONE ENCOUNTER
"Called pt and told her that I sent a message to Dr. Watson letting her know that her bp has been elevated and she said:    "Agree with advise to go to ED as she has a Systolic close to 200 and blurred vision/headaches"    Pt stated that ok and thanked me for calling  "

## 2023-09-13 VITALS
DIASTOLIC BLOOD PRESSURE: 70 MMHG | SYSTOLIC BLOOD PRESSURE: 160 MMHG | WEIGHT: 116 LBS | OXYGEN SATURATION: 96 % | BODY MASS INDEX: 21.35 KG/M2 | RESPIRATION RATE: 16 BRPM | HEART RATE: 89 BPM | HEIGHT: 62 IN | TEMPERATURE: 99 F

## 2023-09-13 PROBLEM — R07.9 CHEST PAIN: Status: ACTIVE | Noted: 2023-09-13

## 2023-09-13 LAB
ANION GAP SERPL CALC-SCNC: 9 MMOL/L (ref 8–16)
ASCENDING AORTA: 2.67 CM
AV INDEX (PROSTH): 0.76
AV MEAN GRADIENT: 6 MMHG
AV PEAK GRADIENT: 11 MMHG
AV VALVE AREA BY VELOCITY RATIO: 1.63 CM²
AV VALVE AREA: 1.73 CM²
AV VELOCITY RATIO: 0.72
BASOPHILS # BLD AUTO: 0.09 K/UL (ref 0–0.2)
BASOPHILS NFR BLD: 1.3 % (ref 0–1.9)
BSA FOR ECHO PROCEDURE: 1.52 M2
BUN SERPL-MCNC: 6 MG/DL (ref 8–23)
CALCIUM SERPL-MCNC: 9 MG/DL (ref 8.7–10.5)
CHLORIDE SERPL-SCNC: 105 MMOL/L (ref 95–110)
CO2 SERPL-SCNC: 22 MMOL/L (ref 23–29)
CREAT SERPL-MCNC: 0.6 MG/DL (ref 0.5–1.4)
CV ECHO LV RWT: 0.32 CM
CV STRESS BASE HR: 74 BPM
DIASTOLIC BLOOD PRESSURE: 71 MMHG
DIFFERENTIAL METHOD: ABNORMAL
DOP CALC AO PEAK VEL: 1.66 M/S
DOP CALC AO VTI: 36.4 CM
DOP CALC LVOT AREA: 2.3 CM2
DOP CALC LVOT DIAMETER: 1.7 CM
DOP CALC LVOT PEAK VEL: 1.19 M/S
DOP CALC LVOT STROKE VOLUME: 63.05 CM3
DOP CALCLVOT PEAK VEL VTI: 27.79 CM
E WAVE DECELERATION TIME: 312.64 MSEC
E/A RATIO: 0.71
E/E' RATIO: 9.07 M/S
ECHO LV POSTERIOR WALL: 0.64 CM (ref 0.6–1.1)
EJECTION FRACTION: 65 %
EOSINOPHIL # BLD AUTO: 0.5 K/UL (ref 0–0.5)
EOSINOPHIL NFR BLD: 7.1 % (ref 0–8)
ERYTHROCYTE [DISTWIDTH] IN BLOOD BY AUTOMATED COUNT: 12.8 % (ref 11.5–14.5)
EST. GFR  (NO RACE VARIABLE): >60 ML/MIN/1.73 M^2
FRACTIONAL SHORTENING: 34 % (ref 28–44)
GLUCOSE SERPL-MCNC: 93 MG/DL (ref 70–110)
HCT VFR BLD AUTO: 43.4 % (ref 37–48.5)
HGB BLD-MCNC: 14.4 G/DL (ref 12–16)
IMM GRANULOCYTES # BLD AUTO: 0.07 K/UL (ref 0–0.04)
IMM GRANULOCYTES NFR BLD AUTO: 1 % (ref 0–0.5)
INTERVENTRICULAR SEPTUM: 0.53 CM (ref 0.6–1.1)
IVRT: 94.2 MSEC
LA MAJOR: 4.49 CM
LA MINOR: 4.82 CM
LA WIDTH: 3.34 CM
LEFT ATRIUM SIZE: 3.12 CM
LEFT ATRIUM VOLUME INDEX MOD: 22.7 ML/M2
LEFT ATRIUM VOLUME INDEX: 27.1 ML/M2
LEFT ATRIUM VOLUME MOD: 34.56 CM3
LEFT ATRIUM VOLUME: 41.18 CM3
LEFT INTERNAL DIMENSION IN SYSTOLE: 2.6 CM (ref 2.1–4)
LEFT VENTRICLE DIASTOLIC VOLUME INDEX: 44.8 ML/M2
LEFT VENTRICLE DIASTOLIC VOLUME: 68.09 ML
LEFT VENTRICLE MASS INDEX: 40 G/M2
LEFT VENTRICLE SYSTOLIC VOLUME INDEX: 16.2 ML/M2
LEFT VENTRICLE SYSTOLIC VOLUME: 24.59 ML
LEFT VENTRICULAR INTERNAL DIMENSION IN DIASTOLE: 3.95 CM (ref 3.5–6)
LEFT VENTRICULAR MASS: 60.99 G
LV LATERAL E/E' RATIO: 8.5 M/S
LV SEPTAL E/E' RATIO: 9.71 M/S
LYMPHOCYTES # BLD AUTO: 2.6 K/UL (ref 1–4.8)
LYMPHOCYTES NFR BLD: 36.3 % (ref 18–48)
MCH RBC QN AUTO: 27.8 PG (ref 27–31)
MCHC RBC AUTO-ENTMCNC: 33.2 G/DL (ref 32–36)
MCV RBC AUTO: 84 FL (ref 82–98)
MONOCYTES # BLD AUTO: 0.6 K/UL (ref 0.3–1)
MONOCYTES NFR BLD: 8.3 % (ref 4–15)
MV A" WAVE DURATION": 9.71 MSEC
MV PEAK A VEL: 0.96 M/S
MV PEAK E VEL: 0.68 M/S
MV STENOSIS PRESSURE HALF TIME: 90.67 MS
MV VALVE AREA P 1/2 METHOD: 2.43 CM2
NEUTROPHILS # BLD AUTO: 3.3 K/UL (ref 1.8–7.7)
NEUTROPHILS NFR BLD: 46 % (ref 38–73)
NRBC BLD-RTO: 0 /100 WBC
OHS CV CPX 1 MINUTE RECOVERY HEART RATE: 95 BPM
OHS CV CPX 85 PERCENT MAX PREDICTED HEART RATE MALE: 120
OHS CV CPX ESTIMATED METS: 6
OHS CV CPX MAX PREDICTED HEART RATE: 141
OHS CV CPX PATIENT IS FEMALE: 1
OHS CV CPX PATIENT IS MALE: 0
OHS CV CPX PEAK DIASTOLIC BLOOD PRESSURE: 85 MMHG
OHS CV CPX PEAK HEAR RATE: 110 BPM
OHS CV CPX PEAK RATE PRESSURE PRODUCT: ABNORMAL
OHS CV CPX PEAK SYSTOLIC BLOOD PRESSURE: 197 MMHG
OHS CV CPX PERCENT MAX PREDICTED HEART RATE ACHIEVED: 78
OHS CV CPX RATE PRESSURE PRODUCT PRESENTING: 9398
PISA TR MAX VEL: 2.81 M/S
PLATELET # BLD AUTO: 211 K/UL (ref 150–450)
PMV BLD AUTO: 12.2 FL (ref 9.2–12.9)
POTASSIUM SERPL-SCNC: 4 MMOL/L (ref 3.5–5.1)
PULM VEIN S/D RATIO: 1.47
PV PEAK D VEL: 0.47 M/S
PV PEAK S VEL: 0.69 M/S
RA MAJOR: 3.83 CM
RA PRESSURE ESTIMATED: 3 MMHG
RA WIDTH: 3.06 CM
RBC # BLD AUTO: 5.18 M/UL (ref 4–5.4)
RIGHT VENTRICULAR END-DIASTOLIC DIMENSION: 2.63 CM
RV TB RVSP: 6 MMHG
SINUS: 2.33 CM
SODIUM SERPL-SCNC: 136 MMOL/L (ref 136–145)
STJ: 2.09 CM
STRESS ECHO POST EXERCISE DUR MIN: 7 MINUTES
STRESS ECHO POST EXERCISE DUR SEC: 48 SECONDS
SYSTOLIC BLOOD PRESSURE: 127 MMHG
TDI LATERAL: 0.08 M/S
TDI SEPTAL: 0.07 M/S
TDI: 0.08 M/S
TR MAX PG: 32 MMHG
TRICUSPID ANNULAR PLANE SYSTOLIC EXCURSION: 1.78 CM
TV REST PULMONARY ARTERY PRESSURE: 35 MMHG
WBC # BLD AUTO: 7.2 K/UL (ref 3.9–12.7)
Z-SCORE OF LEFT VENTRICULAR DIMENSION IN END DIASTOLE: -1.2
Z-SCORE OF LEFT VENTRICULAR DIMENSION IN END SYSTOLE: -0.48

## 2023-09-13 PROCEDURE — 85025 COMPLETE CBC W/AUTO DIFF WBC: CPT | Mod: HCNC | Performed by: PHYSICIAN ASSISTANT

## 2023-09-13 PROCEDURE — G0378 HOSPITAL OBSERVATION PER HR: HCPCS | Mod: HCNC

## 2023-09-13 PROCEDURE — 80048 BASIC METABOLIC PNL TOTAL CA: CPT | Mod: HCNC | Performed by: PHYSICIAN ASSISTANT

## 2023-09-13 PROCEDURE — 63600175 PHARM REV CODE 636 W HCPCS: Mod: HCNC | Performed by: PHYSICIAN ASSISTANT

## 2023-09-13 PROCEDURE — 25000003 PHARM REV CODE 250: Mod: HCNC | Performed by: PHYSICIAN ASSISTANT

## 2023-09-13 RX ORDER — LOSARTAN POTASSIUM 25 MG/1
25 TABLET ORAL DAILY
Qty: 30 TABLET | Refills: 11 | Status: SHIPPED | OUTPATIENT
Start: 2023-09-13 | End: 2023-09-13 | Stop reason: SDUPTHER

## 2023-09-13 RX ORDER — PANTOPRAZOLE SODIUM 20 MG/1
20 TABLET, DELAYED RELEASE ORAL DAILY
Qty: 30 TABLET | Refills: 2 | Status: SHIPPED | OUTPATIENT
Start: 2023-09-13 | End: 2023-09-20 | Stop reason: ALTCHOICE

## 2023-09-13 RX ORDER — LOSARTAN POTASSIUM 25 MG/1
25 TABLET ORAL DAILY
Qty: 30 TABLET | Refills: 0 | Status: SHIPPED | OUTPATIENT
Start: 2023-09-13 | End: 2023-11-08 | Stop reason: SDUPTHER

## 2023-09-13 RX ORDER — CLONIDINE HYDROCHLORIDE 0.1 MG/1
0.1 TABLET ORAL
Status: COMPLETED | OUTPATIENT
Start: 2023-09-13 | End: 2023-09-13

## 2023-09-13 RX ORDER — HYDRALAZINE HYDROCHLORIDE 20 MG/ML
10 INJECTION INTRAMUSCULAR; INTRAVENOUS
Status: COMPLETED | OUTPATIENT
Start: 2023-09-13 | End: 2023-09-13

## 2023-09-13 RX ADMIN — CLONIDINE HYDROCHLORIDE 0.1 MG: 0.1 TABLET ORAL at 09:09

## 2023-09-13 RX ADMIN — HYDRALAZINE HYDROCHLORIDE 10 MG: 20 INJECTION INTRAMUSCULAR; INTRAVENOUS at 11:09

## 2023-09-13 RX ADMIN — SODIUM CHLORIDE, POTASSIUM CHLORIDE, SODIUM LACTATE AND CALCIUM CHLORIDE 500 ML: 600; 310; 30; 20 INJECTION, SOLUTION INTRAVENOUS at 12:09

## 2023-09-13 NOTE — PROGRESS NOTES
"ED Observation Unit  Progress Note      HPI   73-year-old female medical history of anxiety, hypercholesterol and HTN, breast cancer on Letrozole/Femara (aromatase inhibitor for about 2 years) s/p R lumpectomy in 2021, osteoprosis on prolia injections, presents to [facility] ED on 9/12/2023 for emergent evaluation of epigastric and chest pain.      Patient has been experiencing epigastric and sternal chest "gas" pain for 2 weeks that she has mainly when she lays down. Patient had an episode last night that lasted 30 minutes.  Patient reports when pain came on she took a dose of her clonidine.  The daughter reports while it decreased her blood pressure from 170s it however did remain low.  Patient denies onset of symptoms with exertion. She had palpitations when she presented to the ED on 8/23 that resolved. She occasionally has dizziness and weakness when she gets up to walk.      Patient's daughter who assists in providing history also reports patient was had medication changes recently over last 2 weeks.  She was previously on losartan however change to metoprolol, over the last week and a half she was also begin taking clonidine 0.1 mg prn. Patient denies fever, chills, SOB, nausea, and vomiting.      In the ED, BP elevated. Other vitals stable. ECG with NSR at 63 bpm. PACs seen on last ECG not apparent. Nonspecific T wave inversions noted. No ST changes. Normal intervals. No STEMI. Trop x2 neg. BNP at 253. CXR without cardiomegaly. No other acute processes. She has a 1.4 cm nodular density in the R upper lung zone. Non-emergent CT of chest recommended.      I reviewed the ED Provider Note dated 9/122023 prior to my evaluation of this patient.  I reviewed all labs and imaging performed in the Main ED, prior to patient being placed in Observation. Patient was placed in the ED Observation Unit for trending trops and stress ECHO.     Interval History   No acute events overnight. /96 prior to my evaluation.  Pt has no " complaints currently.  Avoiding beta-blockers prior to stress echo.  Will give patient clonidine and other medication as needed so patient can have echo stress test.     PMHx   Past Medical History:   Diagnosis Date    Anemia     Anxiety disorder 8/28/2019    Cataract     Chronic right-sided low back pain without sciatica 10/20/2021    Hypercholesteremia 9/17/2019    Invasive ductal carcinoma of breast, female, right 4/16/2021    Migraine with vision problems     Subclinical hypothyroidism     White coat syndrome with hypertension       Past Surgical History:   Procedure Laterality Date    COLON SURGERY      COLONOSCOPY N/A 12/7/2018    Procedure: COLONOSCOPY;  Surgeon: Bhargav Gaston MD;  Location: [facility] ENDO ([address] [place] [address]);  Service: Endoscopy;  Laterality: N/A;    DILATION AND CURETTAGE OF UTERUS      postmenopausal bleeding    MASTECTOMY, PARTIAL Right 5/12/2021    Procedure: MASTECTOMY, PARTIAL-Right with radiological marker;  Surgeon: Vivian Cadena MD;  Location: [facility];  Service: General;  Laterality: Right;    SENTINEL LYMPH NODE BIOPSY Right 5/12/2021    Procedure: BIOPSY, LYMPH NODE, SENTINEL-Right;  Surgeon: Vivian Cadena MD;  Location: [facility];  Service: General;  Laterality: Right;    TUBAL LIGATION          Family Hx   Family History   Problem Relation Age of Onset    Diabetes Brother     Hypertension Brother     Glaucoma Brother     Glaucoma Father     Cataracts Father     Retinal detachment Father     Lung cancer Mother     Uterine cancer Maternal Grandmother     Stroke Maternal Grandmother     Stroke Paternal Grandmother     Amblyopia Neg Hx     Blindness Neg Hx     Macular degeneration Neg Hx     Strabismus Neg Hx     Thyroid disease Neg Hx         Social Hx   Social History     Socioeconomic History    Marital status:     Number of children: 2   Occupational History     Employer: [employer]   Tobacco Use    Smoking status: Never    Smokeless tobacco: Never   Substance and  "Sexual Activity    Alcohol use: No    Drug use: Never    Sexual activity: Yes     Partners: Male     Birth control/protection: None        Vital Signs   Vitals:    09/13/23 0102 09/13/23 0446 09/13/23 0903 09/13/23 1506   BP: (!) 173/78 (!) 146/83 (!) 217/96 (!) 160/70   BP Location:  Left arm  Left arm  Comment: limb alert rt wrist   Patient Position:  Lying  Sitting   Pulse:  72 68 89   Resp:  18 17 16   Temp:  98.1 °F (36.7 °C) 98.6 °F (37 °C)    TempSrc:  Oral     SpO2:  96% 98% 96%   Weight:    52.6 kg (116 lb)   Height:    5' 2" (1.575 m)        Review of Systems  Review of Systems   Constitutional:  Negative for chills, diaphoresis and fever.   Eyes:  Negative for pain, discharge and redness.   Respiratory:  Negative for cough and shortness of breath.    Cardiovascular:  Negative for chest pain (resolved).   Gastrointestinal:  Negative for abdominal pain (resolved), diarrhea and vomiting.   Genitourinary:  Negative for hematuria.   Musculoskeletal:  Negative for back pain, falls and neck pain.   Skin:  Negative for rash.   Neurological:  Negative for dizziness, seizures, loss of consciousness and weakness.       Brief Physical Exam/Reassessment   Physical Exam  Constitutional:       General: She is not in acute distress.     Appearance: She is not ill-appearing, toxic-appearing or diaphoretic.   HENT:      Head: Atraumatic.      Right Ear: External ear normal.      Left Ear: External ear normal.      Nose: Nose normal.   Eyes:      Extraocular Movements: Extraocular movements intact.   Pulmonary:      Effort: No respiratory distress.      Breath sounds: No wheezing.   Abdominal:      General: There is no distension.   Musculoskeletal:         General: Normal range of motion.      Cervical back: Normal range of motion. No rigidity.   Skin:     Coloration: Skin is not jaundiced.      Findings: No rash.   Neurological:      General: No focal deficit present.      Mental Status: She is alert. Mental status is at " baseline.         Labs/Imaging   Labs Reviewed   COMPREHENSIVE METABOLIC PANEL - Abnormal; Notable for the following components:       Result Value    Sodium 132 (*)     CO2 21 (*)     BUN 7 (*)     All other components within normal limits   B-TYPE NATRIURETIC PEPTIDE - Abnormal; Notable for the following components:     (*)     All other components within normal limits   CBC W/ AUTO DIFFERENTIAL - Abnormal; Notable for the following components:    Immature Granulocytes 1.0 (*)     Immature Grans (Abs) 0.07 (*)     All other components within normal limits   BASIC METABOLIC PANEL - Abnormal; Notable for the following components:    CO2 22 (*)     BUN 6 (*)     All other components within normal limits   CBC W/ AUTO DIFFERENTIAL   TROPONIN I   TROPONIN I   TROPONIN ISTAT   TROPONIN I   D DIMER, QUANTITATIVE   POCT TROPONIN   POCT TROPONIN      Imaging Results              X-Ray Chest PA And Lateral (Final result)  Result time 09/12/23 17:48:24      Final result by Blayne Goff MD (09/12/23 17:48:24)                   Impression:      No acute intrathoracic process.    1.4 cm nodular density in the right upper lung zone.  Nonemergent follow-up with CT scan of the chest, as clinically warranted.      Electronically signed by: Blayne Goff MD  Date:    09/12/2023  Time:    17:48               Narrative:    EXAMINATION:  XR CHEST PA AND LATERAL    CLINICAL HISTORY:  Chest pain, unspecified    TECHNIQUE:  PA and lateral views of the chest were performed.    COMPARISON:  08/28/2023.    FINDINGS:  The trachea is unremarkable.  There are calcifications of the aortic knob.  The cardiomediastinal silhouette is within normal limits.  There is no evidence of free air beneath the hemidiaphragms.  There is no evidence of a pneumothorax.  There is no evidence of pneumomediastinum.  There is a 1.4 cm nodular density in the right upper lung zone.  There is no focal consolidation.  The osseous structures are unremarkable.                                        I reviewed all labs, imaging, EKGs.     Assessment/Plan:     Epigastric and sternal chest pain:  -ECG with NSR at 63 bpm. PACs seen on last ECG not apparent. Nonspecific T wave inversions noted. No ST changes. Normal intervals. No STEMI.   -Trop x3 neg.   -BNP at 253. CXR without cardiomegaly. No other acute processes. She has a 1.4 cm nodular density in the R upper lung zone. Non-emergent CT of chest recommended.   -Patient currently asymptomatic.   -D dimer ordered. Negative. Doubt PE.  -Treadmill stress echo ordered. Last had metoprolol at 0940 this morning. Will hold.      HTN:  - BP elevated to 217/96. Giving clonidine po and hydralazine IV to lower BP for stress ECHO test.  -Losartan d/c on 8/23/2023 and switched to metoprolol during ED visit for palpitations where ECG showed PACs.   -Last dose of BB at 0940 this morning. Will hold for stress echo test.   -Will restart Losartan 25 mg daily.     Right breast cancer:  -Continue Letrozole/Femara (aromatase inhibitor) daily.  -Follow up with Oncology    I have discussed this case with previous EDOU provider.

## 2023-09-13 NOTE — DISCHARGE INSTRUCTIONS
Your cardiac enzymes are negative. You stress test was negative for ischemia. Please cancel your stress test in October.   Please follow up closely with PCP or Oncology for nodule seen on chest x-ray.  You need a non-emergent CT of the chest which they can order for you.   Continue metoprolol daily. You can take losartan daily if you blood pressure remains high. If you blood pressure is still high after metoprolol and losartan daily, you can then try clonidine as needed.   Follow up with Oncology for follow up of breast cancer care.   Follow up with Cardiology.   Continue low salt diet. Stay hydrated.   Start protonix to see if it helps with symptoms. This could be acid reflux.  Return to the ER for new or worsening symptoms.   Future Appointments   Date Time Provider Department Center   10/10/2023  1:30 PM EXERCISE, STRESS ECHO [facility] ECHOSTR [facility]   10/10/2023  3:00 PM MONITOR, ARRHYTHMIA EVENT [facility] ARRHPRO [facility]   10/12/2023 11:00 AM Esa [facility]ríguez MD [facility]   11/3/2023  7:50 AM APPOINTMENT LAB, [facility] LAB [facility]   11/8/2023  8:30 AM Sharron Ogden MD [facility] MED [facility]   11/14/2023  9:00 AM Tarun Garcia MD [facility] HEMONC3 [facility]   11/15/2023 10:30 AM Gabi Richey PA-C [facility] BRSTSUR [facility]   11/30/2023  1:00 PM Post, Nikunj INTERIANO MD [facility] PSYCH [facility]   1/11/2024  9:30 AM Jose Manuel Hernadez OD [facility] OPTOMTY [place]     Imaging Results              X-Ray Chest PA And Lateral (Final result)  Result time 09/12/23 17:48:24      Final result by Blayne Goff MD (09/12/23 17:48:24)                   Impression:      No acute intrathoracic process.    1.4 cm nodular density in the right upper lung zone.  Nonemergent follow-up with CT scan of the chest, as clinically warranted.      Electronically signed by: Blayne Goff MD  Date:    09/12/2023  Time:    17:48               Narrative:    EXAMINATION:  XR CHEST PA AND LATERAL    CLINICAL HISTORY:  Chest  pain, unspecified    TECHNIQUE:  PA and lateral views of the chest were performed.    COMPARISON:  08/28/2023.    FINDINGS:  The trachea is unremarkable.  There are calcifications of the aortic knob.  The cardiomediastinal silhouette is within normal limits.  There is no evidence of free air beneath the hemidiaphragms.  There is no evidence of a pneumothorax.  There is no evidence of pneumomediastinum.  There is a 1.4 cm nodular density in the right upper lung zone.  There is no focal consolidation.  The osseous structures are unremarkable.

## 2023-09-13 NOTE — ED NOTES
Assumed c/o CP and next pain x1 day and hypertension. Pt reports relief of CP at this time. Resp even & unlabored, VSS, NAD. Denies Sob or other complaints. Reports compliance with all medications. Bed locked & in the lowest position, rails up x2, call button in reach. Daughter at the bedside. POC reviewed.  
LOC: The patient is awake, alert and aware of environment with an appropriate affect, the patient is oriented x 3 and speaking appropriately.   APPEARANCE: Patient appears comfortable and in no acute distress, patient is clean and well groomed.  HEENT: Head is normocephalic and sits midline on the shoulders, eyes are symmetrical with no blurry vision or glasses, Ears are clean, dry, intact, patent with no cerumen blocking the pathway, no hearing aids present, nose is free from clean, dry, intact, without any drainage, both nostrils are patent.   SKIN: The skin is warm and dry, color consistent with ethnicity, patient has normal skin turgor and moist mucus membranes, skin intact, no breakdown or bruising noted. No drains or devices.  MUSCULOSKELETAL: Patient moving all extremities spontaneously with AROM in all extremities, no swelling noted.  RESPIRATORY: Airway is open and patent, respirations are spontaneous, patient has a normal effort and rate, no accessory muscle use noted, pt placed on continuous pulse ox with O2 sats noted at 98% on room air, breath sounds are clear and equal bilaterally  CARDIAC: Pt placed on cardiac monitor. Patient has a normal rate and regular rhythm, no edema noted, capillary refill < 3 seconds.   GASTRO: Soft and non tender to palpation, no distention noted, normoactive bowel sounds present in all four quadrants. Pt states bowel movements have been regular.  : Pt denies any pain or frequency with urination.  NEURO: Pt opens eyes spontaneously, behavior appropriate to situation, follows commands, facial expression symmetrical, bilateral hand grasp equal and even, purposeful motor response noted, normal sensation in all extremities when touched with a finger, GABBIE noted  Peripheral Vascular: All pulses 2+, all extremities warm, no numbness or tingling to any extremities, BLE with trace of edema, tight and shiny  
On portable tele- NSR , HR 89.   
Provider notified of pt BP. Awaiting response.  
Pt ambulatory to restroom with steady gait noted. Returned to bed without incident. Bed locked & in the lowest position, rails up x2, call button in reach.  
Pt resting in stretcher with eyes closed. Resp even & unlabored. VSS, NADN. Bed locked & in the lowest position, rails up x2, call button in reach.  
Pt rounded on at this time. Resting in stretcher with eyes closed, resp even & unlabored, NADN. Bed locked & in the lowest position, rails up x2, call button in reach.    
Pt rounded on at this time. Resting in stretcher with eyes closed, resp even & unlabored, VSS, NADN. Bed locked & in the lowest position, rails up x2, call button in reach.    
other/pre eclampsia

## 2023-09-13 NOTE — PLAN OF CARE
[facility] - Emergency Dept  Initial Discharge Assessment       Primary Care Provider: Sharron Ogden MD    Admission Diagnosis: Chest pain [R07.9]    Admission Date: 9/12/2023  Expected Discharge Date:     Pt ambulated to the restroom; daughter Renee was at bedside and completed the assessment.      As per daughter pt is independent with her ambulation and ADL's and does not require assistance or equipment.    Pt has support of family and can d/c home with no needs when ready    Transition of Care Barriers: (P) None    Payor: HU[employer] MANAGED MEDICARE / Plan: HU[employer]NA TOTAL CARE ADVANTAGE / Product Type: Medicare Advantage /     Extended Emergency Contact Information  Primary Emergency Contact: Braxton Frias  Address: [address] [place] [address]           [place] [address] [place] [facility]  Home Phone: [phone]  Mobile Phone: [phone]  Relation: Spouse  Secondary Emergency Contact: Renee Frias  Mobile Phone: [phone]  Relation: Daughter    Discharge Plan A: (P) Home  Discharge Plan B: (P) Home      [id number] [place] - [address] AT [address] [facility]  [address]  [place] [address]  Phone: [phone] Fax: [phone]    [facility] [address] [facility]  [address] [facility] [address]  Phone: [phone] Fax: [phone]      Initial Assessment (most recent)       Adult Discharge Assessment - 09/13/23 0857          Discharge Assessment    Assessment Type Discharge Planning Assessment (P)      Confirmed/corrected address, phone number and insurance Yes (P)      Confirmed Demographics Correct on Facesheet (P)      Source of Information family (P)      If unable to respond/provide information was family/caregiver contacted? Other (see comments) (P)    pt ambulated to the restroom; daughter was at bedside and completed the assessment    People in Home spouse (P)      Facility Arrived From: home (P)      Do you  expect to return to your current living situation? Yes (P)      Do you have help at home or someone to help you manage your care at home? No (P)      Prior to hospitilization cognitive status: Alert/Oriented;No Deficits (P)      Current cognitive status: Alert/Oriented;No Deficits (P)      Home Accessibility stairs within home (P)      Number of Stairs, Within Home, Primary other (see comments) (P)    one flight    Home Layout Bathroom on 2nd floor;Bedroom on 2nd floor;Able to live on 1st floor (P)      Equipment Currently Used at Home none (P)      Do you currently have service(s) that help you manage your care at home? No (P)      Do you have any problems affording any of your prescribed medications? No (P)      Is the patient taking medications as prescribed? yes (P)      Who is going to help you get home at discharge? family/friends (P)      How do you get to doctors appointments? car, drives self;family or friend will provide (P)      Are you on dialysis? No (P)      Do you take coumadin? No (P)      DME Needed Upon Discharge  none (P)      Discharge Plan discussed with: Adult children (P)      Transition of Care Barriers None (P)      Discharge Plan A Home (P)      Discharge Plan B Home (P)         OTHER    Name(s) of People in Home spouse Braxton (P)                       Jannette Hare CD, MSW, LMSW, RSW   Case Management  [facility]  Email: [email or web address]

## 2023-09-13 NOTE — DISCHARGE SUMMARY
"ED Observation Unit  Discharge Summary        History of Present Illness:    73-year-old female medical history of anxiety, hypercholesterol and HTN, breast cancer on Letrozole/Femara (aromatase inhibitor for about 2 years) s/p R lumpectomy in 2021, osteoprosis on prolia injections, [place] [facility] [place]12/[place]23 for emergent evaluation of epigastric and chest pain.      Patient has been experiencing epigastric and sternal chest "gas" pain for 2 weeks that she has mainly when she lays down. Patient had an episode last night that lasted 30 minutes.  Patient reports when pain came on she took a dose of her clonidine.  The daughter reports while it decreased her blood pressure from 170s it however did remain low.  Patient denies onset of symptoms with exertion. She had palpitations when she presented to the ED on 8/23 that resolved. She occasionally has dizziness and weakness when she gets up to walk.      Patient's daughter who assists in providing history also reports patient was had medication changes recently over last 2 weeks.  She was previously on losartan however change to metoprolol, over the last week and a half she was also begin taking clonidine 0.1 mg prn. Patient denies fever, chills, SOB, nausea, and vomiting.      In the ED, BP elevated. Other vitals stable. ECG with NSR at 63 bpm. PACs seen on last ECG not apparent. Nonspecific T wave inversions noted. No ST changes. Normal intervals. No STEMI. Trop x2 neg. BNP at 253. CXR without cardiomegaly. No other acute processes. She has a 1.4 cm nodular density in the R upper lung zone. Non-emergent CT of chest recommended.      Observation Course:    3 trops negative and flat.   No acute events overnight. No new complaints while here.  /96 in the AM. Giving clonidine po and hydralazine IV to lower BP for stress ECHO test. Her BP lowered to 146/83 mmHg with symptoms so she was given another 500 ccs of IVF.   Stress ECHO negative for ischemia. EF of 65% " with normal diastolic function.  We discussed continue metoprolol daily in the AM. Take losartan at night if still high. Continue both, but can hold losartan at night if BP lower than 110 mmHg SBP. She then can take clonidine prn after if still high after both medications.   Start protonix daily. Symptoms may be due to GERD.  Follow up with PCP and Cardiology for reevaluation.   Follow up with Oncology for nodule seen on CXR today. Will need nonemergent CT scan.   All patient's and daughters questions answered.       Consultants:    none      Final diagnoses:  [R07.9] Chest pain  [R93.89] Abnormal chest x-ray (Primary)  [R03.0] Elevated blood pressure reading  [K21.9] Gastroesophageal reflux disease, unspecified whether esophagitis present      Discharge Condition: Good    Disposition: Home or Self Care     Time spent on the discharge of the patient including review of hospital course with the patient. reviewing discharge medications and arranging follow-up care 35 minutes.  Patient was seen and examined on the date of discharge and determined to be suitable for discharge.    Follow Up:  Future Appointments   Date Time Provider Department Center   10/10/2023  1:30 PM EXERCISE, STRESS ECHO [facility] ECHOSTR [facility]   10/10/2023  3:00 PM MONITOR, ARRHYTHMIA EVENT [facility] ARRHPRO [facility]   10/12/2023 11:00 AM Esa Rodríguez MD PhD [facility]   11/3/2023  7:50 AM APPOINTMENT LAB, [place] [facility] LAB [place] [facility]   11/8/2023  8:30 AM Sharron Ogden MD [facility] [place] [facility]   11/14/2023  9:00 AM Tarun Garcia MD [facility] HEMONC3 [facility] [facility]   11/15/2023 10:30 AM Gabi Richey PA-C [facility] BRSTSUR [facility]   11/30/2023  1:00 PM Post, Nikunj INTERIANO MD [facility] PSYCH [facility]   1/11/2024  9:30 AM Jose Manuel Hernadez OD [facility] OPTOMTY [place]        Medication List        START taking these medications      losartan 25 MG tablet  Commonly known as: COZAAR  Take 1 tablet (25 mg total) by mouth once daily.      pantoprazole 20 MG tablet  Commonly known as: PROTONIX  Take 1 tablet (20 mg total) by mouth once daily.            ASK your doctor about these medications      busPIRone 5 MG Tab  Commonly known as: BUSPAR  Take 1 tablet (5 mg total) by mouth 3 (three) times daily as needed (anxiety).     CALCIUM 500 ORAL     cholecalciferol (vitamin D3) 125 mcg (5,000 unit) capsule  Take 1 capsule (5,000 Units total) by mouth daily with breakfast.     CIPRODEX 0.3-0.1 % Drps  Generic drug: ciprofloxacin-dexAMETHasone 0.3-0.1%  PLACE 4 DROPS INTO THE RIGHT EAR 2 (TWO) TIMES DAILY. FOR 10 DAYS     cloNIDine 0.1 MG tablet  Commonly known as: CATAPRES  Take 1 tablet (0.1 mg total) by mouth daily as needed (for systolic greater than or equal to 160 mmHg).     EScitalopram oxalate 10 MG tablet  Commonly known as: LEXAPRO  Take 1 tablet (10 mg total) by mouth once daily.     letrozole 2.5 mg Tab  Commonly known as: FEMARA  TAKE 1 TABLET BY MOUTH EVERY DAY     metoprolol succinate 50 MG 24 hr tablet  Commonly known as: TOPROL-XL  Take 1 tablet (50 mg total) by mouth once daily.     multivitamin-iron-folic acid Tab     PROLIA 60 mg/mL Syrg  Generic drug: denosumab               Where to Get Your Medications        These medications were sent to [id number] - [place] - [address] [facility]  [address] [place] [address]      Phone: [phone]   losartan 25 MG tablet  pantoprazole 20 MG tablet        Follow-up Information       Schedule an appointment as soon as possible for a visit  with Esa Rodríguez MD PhD.    Specialties: Interventional Cardiology, Cardiology, Vascular Medicine  Contact information:  [address] [facility] [address]  [phone]               Schedule an appointment as soon as possible for a visit  with Tarun Garcia MD.    Specialties: Hematology and Oncology, Hematology  Contact information:  [facility] [facility]  [facility]  [address]  [phone]               Schedule an appointment as soon as possible for a visit  with Sharron Ogden MD.    Specialty: Family Medicine  Contact information:  [address]  [address]  [place] [address]  [phone]               [facility] - Emergency Dept.    Specialty: Emergency Medicine  Why: If symptoms worsen  Contact information:  [address]  [facility] [address] [phone]                         Future Appointments   Date Time Provider Department Center   10/10/2023  1:30 PM EXERCISE, STRESS ECHO [facility] ECHOSTR [facility]   10/10/2023  3:00 PM MONITOR, ARRHYTHMIA EVENT [facility] ARRHPRO [facility]   10/12/2023 11:00 AM Esa Rodríguez MD PhD [facility] PERVASC [facility]   11/3/2023  7:50 AM APPOINTMENT LAB, [facility] MOB [facility] [place] [facility]   11/8/2023  8:30 AM Sharron Ogden MD [facility]   11/14/2023  9:00 AM Tarun Garcia MD [facility] HEMONC3 [facility]   11/15/2023 10:30 AM Gabi Richey PA-C [facility] BRSTSUR [facility]   11/30/2023  1:00 PM Norma, Nikunj INTERIANO MD [facility] PSYCH [facility]   1/11/2024  9:30 AM Jose Manuel Hernadez, OD [facility] OPTOMTY [place]           Megan Yo PA-C  Emergent Department  [facility]  Spectralink #[id number] or #[phone]

## 2023-09-13 NOTE — NURSING
Alert and oriented x4. Reviewed AVS at bedside with patient and her daughter, answered all questions, patient verbalized understanding, IV removed without difficulty, NAD noted at this time

## 2023-09-13 NOTE — PROGRESS NOTES
"ED Observation Unit  Progress Note      HPI   73-year-old female medical history of anxiety, hypercholesterol and HTN, breast cancer on Letrozole/Femara (aromatase inhibitor for about 2 years) s/p R lumpectomy in 2021, osteoprosis on prolia injections, presents to [facility] ED on 9/12/2023 for emergent evaluation of epigastric and chest pain.      Patient has been experiencing epigastric and sternal chest "gas" pain for 2 weeks that she has mainly when she lays down. Patient had an episode last night that lasted 30 minutes.  Patient reports when pain came on she took a dose of her clonidine.  The daughter reports while it decreased her blood pressure from 170s it however did remain low.  Patient denies onset of symptoms with exertion. She had palpitations when she presented to the ED on 8/23 that resolved. She occasionally has dizziness and weakness when she gets up to walk.      Patient's daughter who assists in providing history also reports patient was had medication changes recently over last 2 weeks.  She was previously on losartan however change to metoprolol, over the last week and a half she was also begin taking clonidine 0.1 mg prn. Patient denies fever, chills, SOB, nausea, and vomiting.      In the ED, BP elevated. Other vitals stable. ECG with NSR at 63 bpm. PACs seen on last ECG not apparent. Nonspecific T wave inversions noted. No ST changes. Normal intervals. No STEMI. Trop x2 neg. BNP at 253. CXR without cardiomegaly. No other acute processes. She has a 1.4 cm nodular density in the R upper lung zone. Non-emergent CT of chest recommended.      I reviewed the ED Provider Note dated 9/122023 prior to my evaluation of this patient.  I reviewed all labs and imaging performed in the Main ED, prior to patient being placed in Observation. Patient was placed in the ED Observation Unit for trending trops and stress ECHO.    Interval History   No acute events overnight. /96 prior to my evaluation.  Pt has no " complaints currently.  Avoiding beta-blockers prior to stress echo.  Will give patient clonidine.     PMHx   Past Medical History:   Diagnosis Date    Anemia     Anxiety disorder 8/28/2019    Cataract     Chronic right-sided low back pain without sciatica 10/20/2021    Hypercholesteremia 9/17/2019    Invasive ductal carcinoma of breast, female, right 4/16/2021    Migraine with vision problems     Subclinical hypothyroidism     White coat syndrome with hypertension       Past Surgical History:   Procedure Laterality Date    COLON SURGERY      COLONOSCOPY N/A 12/7/2018    Procedure: COLONOSCOPY;  Surgeon: Bhargav Gaston MD;  Location: [facility] ENDO ([address] [place] [address]);  Service: Endoscopy;  Laterality: N/A;    DILATION AND CURETTAGE OF UTERUS      postmenopausal bleeding    MASTECTOMY, PARTIAL Right 5/12/2021    Procedure: MASTECTOMY, PARTIAL-Right with radiological marker;  Surgeon: Vivian Cadena MD;  Location: [facility];  Service: General;  Laterality: Right;    SENTINEL LYMPH NODE BIOPSY Right 5/12/2021    Procedure: BIOPSY, LYMPH NODE, SENTINEL-Right;  Surgeon: Vivian Cadena MD;  Location: [facility];  Service: General;  Laterality: Right;    TUBAL LIGATION          Family Hx   Family History   Problem Relation Age of Onset    Diabetes Brother     Hypertension Brother     Glaucoma Brother     Glaucoma Father     Cataracts Father     Retinal detachment Father     Lung cancer Mother     Uterine cancer Maternal Grandmother     Stroke Maternal Grandmother     Stroke Paternal Grandmother     Amblyopia Neg Hx     Blindness Neg Hx     Macular degeneration Neg Hx     Strabismus Neg Hx     Thyroid disease Neg Hx         Social Hx   Social History     Socioeconomic History    Marital status:     Number of children: 2   Occupational History     Employer: [employer]   Tobacco Use    Smoking status: Never    Smokeless tobacco: Never   Substance and Sexual Activity    Alcohol use: No    Drug use: Never    Sexual activity:  Yes     Partners: Male     Birth control/protection: None        Vital Signs   Vitals:    09/12/23 2105 09/13/23 0102 09/13/23 0446 09/13/23 0903   BP: (!) 193/86 (!) 173/78 (!) 146/83 (!) 217/96   BP Location: Left arm  Left arm    Patient Position: Lying  Lying    Pulse: 68  72 68   Resp: 19  18 17   Temp: 98.6 °F (37 °C)  98.1 °F (36.7 °C) 98.6 °F (37 °C)   TempSrc: Oral  Oral    SpO2: 98%  96% 98%   Weight:            Review of Systems  Review of Systems   Cardiovascular:  Positive for chest pain (none currently).       Brief Physical Exam/Reassessment   Physical Exam  Vitals reviewed.   Constitutional:       General: She is not in acute distress.     Appearance: She is not diaphoretic.   HENT:      Head: Normocephalic and atraumatic.   Cardiovascular:      Rate and Rhythm: Normal rate and regular rhythm.      Pulses:           Radial pulses are 2+ on the right side and 2+ on the left side.      Heart sounds: Heart sounds not distant. No murmur heard.     No friction rub. No gallop.   Pulmonary:      Effort: Pulmonary effort is normal. No respiratory distress.      Breath sounds: Normal breath sounds. No decreased breath sounds, wheezing, rhonchi or rales.   Abdominal:      Palpations: Abdomen is soft. There is no mass.      Tenderness: There is no abdominal tenderness. There is no guarding.   Musculoskeletal:      Cervical back: Neck supple.   Skin:     General: Skin is warm and dry.   Neurological:      Mental Status: She is alert.   Psychiatric:         Mood and Affect: Mood and affect normal.         Labs/Imaging   Labs Reviewed   COMPREHENSIVE METABOLIC PANEL - Abnormal; Notable for the following components:       Result Value    Sodium 132 (*)     CO2 21 (*)     BUN 7 (*)     All other components within normal limits   B-TYPE NATRIURETIC PEPTIDE - Abnormal; Notable for the following components:     (*)     All other components within normal limits   CBC W/ AUTO DIFFERENTIAL - Abnormal; Notable for  the following components:    Immature Granulocytes 1.0 (*)     Immature Grans (Abs) 0.07 (*)     All other components within normal limits   BASIC METABOLIC PANEL - Abnormal; Notable for the following components:    CO2 22 (*)     BUN 6 (*)     All other components within normal limits   CBC W/ AUTO DIFFERENTIAL   TROPONIN I   TROPONIN I   TROPONIN ISTAT   TROPONIN I   D DIMER, QUANTITATIVE   POCT TROPONIN   POCT TROPONIN      Imaging Results              X-Ray Chest PA And Lateral (Final result)  Result time 09/12/23 17:48:24      Final result by Blayne Goff MD (09/12/23 17:48:24)                   Impression:      No acute intrathoracic process.    1.4 cm nodular density in the right upper lung zone.  Nonemergent follow-up with CT scan of the chest, as clinically warranted.      Electronically signed by: Blayne Goff MD  Date:    09/12/2023  Time:    17:48               Narrative:    EXAMINATION:  XR CHEST PA AND LATERAL    CLINICAL HISTORY:  Chest pain, unspecified    TECHNIQUE:  PA and lateral views of the chest were performed.    COMPARISON:  08/28/2023.    FINDINGS:  The trachea is unremarkable.  There are calcifications of the aortic knob.  The cardiomediastinal silhouette is within normal limits.  There is no evidence of free air beneath the hemidiaphragms.  There is no evidence of a pneumothorax.  There is no evidence of pneumomediastinum.  There is a 1.4 cm nodular density in the right upper lung zone.  There is no focal consolidation.  The osseous structures are unremarkable.                                       I reviewed all labs, imaging, EKGs.      Assessment/Plan:     Epigastric and sternal chest pain:  -ECG with NSR at 63 bpm. PACs seen on last ECG not apparent. Nonspecific T wave inversions noted. No ST changes. Normal intervals. No STEMI.   -Trop x3 neg.   -BNP at 253. CXR without cardiomegaly. No other acute processes. She has a 1.4 cm nodular density in the R upper lung zone. Non-emergent CT  of chest recommended.   -Patient currently asymptomatic.   -D dimer ordered. Negative. Doubt PE.  -Treadmill stress echo ordered. Last had metoprolol at 0940 this morning. Will hold.      HTN:  - BP elevated to 217/96. Giving clonidine for BP control for stress echo.   -Losartan d/c on 8/23/2023 and switched to metoprolol during ED visit for palpitations where ECG showed PACs.   -Last dose of BB at 0940 this morning. Will hold for stress echo test.   -Will restart Losartan 25 mg daily.     Right breast cancer:  -Continue Letrozole/Femara (aromatase inhibitor) daily.  -Follow up with Oncology    I have discussed this case with previous EDOU provider.

## 2023-09-13 NOTE — NURSING
Alert and oriented x4, reviewed Avs at bedside with patient and her daughter, answered all questions, verbalized understanding, IV removed, patient belongings packed

## 2023-09-14 ENCOUNTER — PATIENT MESSAGE (OUTPATIENT)
Dept: CARDIOLOGY | Facility: CLINIC | Age: 73
End: 2023-09-14

## 2023-09-18 ENCOUNTER — PATIENT MESSAGE (OUTPATIENT)
Dept: CARDIOLOGY | Facility: CLINIC | Age: 73
End: 2023-09-18

## 2023-09-18 ENCOUNTER — PATIENT MESSAGE (OUTPATIENT)
Dept: FAMILY MEDICINE | Facility: CLINIC | Age: 73
End: 2023-09-18

## 2023-09-20 ENCOUNTER — OFFICE VISIT (OUTPATIENT)
Dept: FAMILY MEDICINE | Facility: CLINIC | Age: 73
End: 2023-09-20
Payer: MEDICARE

## 2023-09-20 ENCOUNTER — PATIENT MESSAGE (OUTPATIENT)
Dept: FAMILY MEDICINE | Facility: CLINIC | Age: 73
End: 2023-09-20

## 2023-09-20 VITALS
BODY MASS INDEX: 21.02 KG/M2 | SYSTOLIC BLOOD PRESSURE: 152 MMHG | HEIGHT: 62 IN | OXYGEN SATURATION: 98 % | WEIGHT: 114.19 LBS | HEART RATE: 65 BPM | TEMPERATURE: 99 F | DIASTOLIC BLOOD PRESSURE: 76 MMHG

## 2023-09-20 DIAGNOSIS — F41.8 SITUATIONAL ANXIETY: ICD-10-CM

## 2023-09-20 DIAGNOSIS — I10 BENIGN ESSENTIAL HYPERTENSION: Primary | ICD-10-CM

## 2023-09-20 DIAGNOSIS — R09.89 LABILE HYPERTENSION: ICD-10-CM

## 2023-09-20 DIAGNOSIS — R91.1 NODULE OF UPPER LOBE OF RIGHT LUNG: ICD-10-CM

## 2023-09-20 DIAGNOSIS — R79.89 ABNORMAL CBC: ICD-10-CM

## 2023-09-20 DIAGNOSIS — K21.9 GASTROESOPHAGEAL REFLUX DISEASE, UNSPECIFIED WHETHER ESOPHAGITIS PRESENT: ICD-10-CM

## 2023-09-20 DIAGNOSIS — R91.1 SOLITARY PULMONARY NODULE: ICD-10-CM

## 2023-09-20 PROCEDURE — 3008F PR BODY MASS INDEX (BMI) DOCUMENTED: ICD-10-PCS | Mod: HCNC,CPTII,S$GLB, | Performed by: FAMILY MEDICINE

## 2023-09-20 PROCEDURE — 3078F PR MOST RECENT DIASTOLIC BLOOD PRESSURE < 80 MM HG: ICD-10-PCS | Mod: HCNC,CPTII,S$GLB, | Performed by: FAMILY MEDICINE

## 2023-09-20 PROCEDURE — 99215 PR OFFICE/OUTPT VISIT, EST, LEVL V, 40-54 MIN: ICD-10-PCS | Mod: HCNC,S$GLB,, | Performed by: FAMILY MEDICINE

## 2023-09-20 PROCEDURE — 1101F PT FALLS ASSESS-DOCD LE1/YR: CPT | Mod: HCNC,CPTII,S$GLB, | Performed by: FAMILY MEDICINE

## 2023-09-20 PROCEDURE — 1101F PR PT FALLS ASSESS DOC 0-1 FALLS W/OUT INJ PAST YR: ICD-10-PCS | Mod: HCNC,CPTII,S$GLB, | Performed by: FAMILY MEDICINE

## 2023-09-20 PROCEDURE — 99215 OFFICE O/P EST HI 40 MIN: CPT | Mod: HCNC,S$GLB,, | Performed by: FAMILY MEDICINE

## 2023-09-20 PROCEDURE — 1160F PR REVIEW ALL MEDS BY PRESCRIBER/CLIN PHARMACIST DOCUMENTED: ICD-10-PCS | Mod: HCNC,CPTII,S$GLB, | Performed by: FAMILY MEDICINE

## 2023-09-20 PROCEDURE — 1160F RVW MEDS BY RX/DR IN RCRD: CPT | Mod: HCNC,CPTII,S$GLB, | Performed by: FAMILY MEDICINE

## 2023-09-20 PROCEDURE — 99999 PR PBB SHADOW E&M-EST. PATIENT-LVL IV: ICD-10-PCS | Mod: PBBFAC,HCNC,, | Performed by: FAMILY MEDICINE

## 2023-09-20 PROCEDURE — 1159F MED LIST DOCD IN RCRD: CPT | Mod: HCNC,CPTII,S$GLB, | Performed by: FAMILY MEDICINE

## 2023-09-20 PROCEDURE — 3288F FALL RISK ASSESSMENT DOCD: CPT | Mod: HCNC,CPTII,S$GLB, | Performed by: FAMILY MEDICINE

## 2023-09-20 PROCEDURE — 3288F PR FALLS RISK ASSESSMENT DOCUMENTED: ICD-10-PCS | Mod: HCNC,CPTII,S$GLB, | Performed by: FAMILY MEDICINE

## 2023-09-20 PROCEDURE — 3077F PR MOST RECENT SYSTOLIC BLOOD PRESSURE >= 140 MM HG: ICD-10-PCS | Mod: HCNC,CPTII,S$GLB, | Performed by: FAMILY MEDICINE

## 2023-09-20 PROCEDURE — 1126F AMNT PAIN NOTED NONE PRSNT: CPT | Mod: HCNC,CPTII,S$GLB, | Performed by: FAMILY MEDICINE

## 2023-09-20 PROCEDURE — 3078F DIAST BP <80 MM HG: CPT | Mod: HCNC,CPTII,S$GLB, | Performed by: FAMILY MEDICINE

## 2023-09-20 PROCEDURE — 3044F HG A1C LEVEL LT 7.0%: CPT | Mod: HCNC,CPTII,S$GLB, | Performed by: FAMILY MEDICINE

## 2023-09-20 PROCEDURE — 1159F PR MEDICATION LIST DOCUMENTED IN MEDICAL RECORD: ICD-10-PCS | Mod: HCNC,CPTII,S$GLB, | Performed by: FAMILY MEDICINE

## 2023-09-20 PROCEDURE — 1126F PR PAIN SEVERITY QUANTIFIED, NO PAIN PRESENT: ICD-10-PCS | Mod: HCNC,CPTII,S$GLB, | Performed by: FAMILY MEDICINE

## 2023-09-20 PROCEDURE — 3044F PR MOST RECENT HEMOGLOBIN A1C LEVEL <7.0%: ICD-10-PCS | Mod: HCNC,CPTII,S$GLB, | Performed by: FAMILY MEDICINE

## 2023-09-20 PROCEDURE — 3008F BODY MASS INDEX DOCD: CPT | Mod: HCNC,CPTII,S$GLB, | Performed by: FAMILY MEDICINE

## 2023-09-20 PROCEDURE — 99999 PR PBB SHADOW E&M-EST. PATIENT-LVL IV: CPT | Mod: PBBFAC,HCNC,, | Performed by: FAMILY MEDICINE

## 2023-09-20 PROCEDURE — 4010F ACE/ARB THERAPY RXD/TAKEN: CPT | Mod: HCNC,CPTII,S$GLB, | Performed by: FAMILY MEDICINE

## 2023-09-20 PROCEDURE — 4010F PR ACE/ARB THEARPY RXD/TAKEN: ICD-10-PCS | Mod: HCNC,CPTII,S$GLB, | Performed by: FAMILY MEDICINE

## 2023-09-20 PROCEDURE — 3077F SYST BP >= 140 MM HG: CPT | Mod: HCNC,CPTII,S$GLB, | Performed by: FAMILY MEDICINE

## 2023-09-20 RX ORDER — HYDROXYZINE HYDROCHLORIDE 25 MG/1
25 TABLET, FILM COATED ORAL 3 TIMES DAILY PRN
Qty: 10 TABLET | Refills: 1 | Status: SHIPPED | OUTPATIENT
Start: 2023-09-20 | End: 2023-11-08 | Stop reason: ALTCHOICE

## 2023-09-20 RX ORDER — PANTOPRAZOLE SODIUM 20 MG/1
20 TABLET, DELAYED RELEASE ORAL DAILY
Qty: 30 TABLET | Refills: 2 | Status: SHIPPED | OUTPATIENT
Start: 2023-09-20 | End: 2023-11-08 | Stop reason: ALTCHOICE

## 2023-09-20 NOTE — PROGRESS NOTES
Office Visit    Patient Name: Keo Frias    : 1950  MRN: [id number]    Subjective:  Keo is a 73 y.o. female who presents today for:    Follow-up (ER FOLLOW UP)    73-year-old female patient of mine with longstanding history of hypertension and difficulties with labile blood pressure readings, osteoporosis, generalized anxiety, who presents today for follow-up of blood pressure and ER visit on 2023.    She presented to the ER with elevated blood pressure and blurred vision.      Workup included BNP, CBC, troponin, D-dimer, EKG, chest x-ray all of which were unremarkable except for lesion on chest x-ray that requires follow-up.  Did have increased percentage of immature granulocytes but discussed that this could be a stress reaction.    Since discharge she is taking Toprol-XL 50 in the morning around 9:30-10AM, Cozaar 25 mg around 5 pm, clonidine 0.1 mg p.r.n..  She has been in touch with her cardiologist Dr. Rodríguez since discharge.      She is also changed over to Lexapro from BuSpar for management of concomitant anxiety that does seem to play a role with blood pressure elevation.    Has taken Clonidine 1 time since discharge.     She is not having any chest pain, blurred vision or headaches.   She has been sleeping overall well and prior to recent issues with BP she was walking for 1 hour regularly and feeling well.     She has been on Lexapro 1/2 of 10 mg daily for about 10 days. Stopped Buspar. No negative side effects.     Can not really tell if it is working yet but reassured her that it is too early to know.      Home blood pressure log reviewed-in the last day or 2 pressures have been overall in a good range.  Yesterday morning pressure was 116/61, afternoon blood pressure 148/71 and evening pressure 132/66.  This morning her pressure was 135/68.     She presents today with her daughter and they are interested in going to a family wedding in [facility]-she would be leaving in a few days to head  there if she is medically cleared.    CXR: 1.4 cm nodular density in the right upper lung zone.  Nonemergent follow-up with CT scan of the chest, as clinically warranted.    PAST MEDICAL HISTORY, SURGICAL/SOCIAL/FAMILY HISTORY REVIEWED AS PER CHART, WITH PERTINENT FINDINGS INCLUDED IN HISTORY SECTION OF NOTE.     Current Medications    Medication List with Changes/Refills   New Medications    HYDROXYZINE HCL (ATARAX) 25 MG TABLET    Take 1 tablet (25 mg total) by mouth 3 (three) times daily as needed for Anxiety (insomnia).   Current Medications    CALCIUM CARBONATE (CALCIUM 500 ORAL)    Take 1 tablet by mouth.    CHOLECALCIFEROL, VITAMIN D3, 125 MCG (5,000 UNIT) CAPSULE    Take 1 capsule (5,000 Units total) by mouth daily with breakfast.    CIPRODEX 0.3-0.1 % DRPS    PLACE 4 DROPS INTO THE RIGHT EAR 2 (TWO) TIMES DAILY. FOR 10 DAYS    CLONIDINE (CATAPRES) 0.1 MG TABLET    Take 1 tablet (0.1 mg total) by mouth daily as needed (for systolic greater than or equal to 160 mmHg).    ESCITALOPRAM OXALATE (LEXAPRO) 10 MG TABLET    Take 1 tablet (10 mg total) by mouth once daily.    LACTOBACILLUS RHAMNOSUS GG (CULTURELLE) 10 BILLION CELL CAPSULE    Take 1 capsule by mouth once daily.    LETROZOLE (FEMARA) 2.5 MG TAB    TAKE 1 TABLET BY MOUTH EVERY DAY    LOSARTAN (COZAAR) 25 MG TABLET    Take 1 tablet (25 mg total) by mouth once daily.    METOPROLOL SUCCINATE (TOPROL-XL) 50 MG 24 HR TABLET    Take 1 tablet (50 mg total) by mouth once daily.    MULTIVITAMIN-IRON-FOLIC ACID TAB    Take 1 tablet by mouth once daily.    PROLIA 60 MG/ML SYRG       Changed and/or Refilled Medications    Modified Medication Previous Medication    PANTOPRAZOLE (PROTONIX) 20 MG TABLET pantoprazole (PROTONIX) 20 MG tablet       Take 1 tablet (20 mg total) by mouth once daily.    Take 1 tablet (20 mg total) by mouth once daily.   Discontinued Medications    BUSPIRONE (BUSPAR) 5 MG TAB    Take 1 tablet (5 mg total) by mouth 3 (three) times daily as  "needed (anxiety).       Allergies   Review of patient's allergies indicates:   Allergen Reactions    Sinus allergy Other (See Comments)     Headaches, migranes    Sulfa (sulfonamide antibiotics) Rash         Review of Systems (Pertinent positives)  Review of Systems   Constitutional:  Negative for unexpected weight change.   Eyes:  Negative for visual disturbance.   Respiratory:  Negative for chest tightness and shortness of breath.    Cardiovascular:  Negative for chest pain, palpitations and leg swelling.   Gastrointestinal:  Positive for abdominal pain (With some gas and bloating).   Neurological:  Negative for dizziness, light-headedness and headaches.   Psychiatric/Behavioral:  Negative for sleep disturbance. The patient is nervous/anxious.        BP (!) 152/76 (BP Location: Left arm, Patient Position: Sitting, BP Method: Medium (Manual))   Pulse 65   Temp 99 °F (37.2 °C) (Oral)   Ht 5' 2" (1.575 m)   Wt 51.8 kg (114 lb 3.2 oz)   LMP  (LMP Unknown)   SpO2 98%   BMI 20.89 kg/m²     Physical Exam  Vitals reviewed.   Constitutional:       General: She is not in acute distress.     Appearance: Normal appearance. She is well-developed.   HENT:      Head: Normocephalic and atraumatic.   Eyes:      Conjunctiva/sclera: Conjunctivae normal.   Cardiovascular:      Rate and Rhythm: Normal rate and regular rhythm.   Pulmonary:      Effort: Pulmonary effort is normal.      Breath sounds: Normal breath sounds.   Musculoskeletal:      Right lower leg: No edema.      Left lower leg: No edema.   Skin:     General: Skin is warm and dry.   Neurological:      General: No focal deficit present.      Mental Status: She is alert and oriented to person, place, and time.   Psychiatric:         Mood and Affect: Mood normal.         Behavior: Behavior normal.           Assessment/Plan:  Keo Frias is a 73 y.o. female who presents today for :        ICD-10-CM ICD-9-CM    1. Benign essential hypertension  I10 401.1       2. Labile " hypertension  R09.89 401.9       3. Situational anxiety  F41.8 300.09 hydrOXYzine HCL (ATARAX) 25 MG tablet      4. Nodule of upper lobe of right lung  R91.1 793.11 CT Chest Without Contrast      5. Solitary pulmonary nodule  R91.1 793.11 CT Chest Without Contrast      6. Abnormal CBC  R79.89 790.6 CBC Auto Differential      7. Gastroesophageal reflux disease, unspecified whether esophagitis present  K21.9 530.81 pantoprazole (PROTONIX) 20 MG tablet        HYPERTENSION WITH LABILE BLOOD PRESSURES:  Doing well over the last few days on Toprol-XL 50 in the morning and losartan 25 in the evenings.  Clinically she feels well and has good exercise tolerance.  Has clonidine available-counseled that she does not need to take it unless her systolic pressures greater than 180.  Can bring blood pressure cuff and clonidine on her upcoming trip.    EKG, LABS, CHEST X-RAY OVERALL UNREMARKABLE PER ER EVAL EXCEPT FOR NODULE OF RIGHT LUNG THAT REQUIRES NONEMERGENT CT FOLLOW-UP.    FROM MY STANDPOINT SHE IS CLEARED TO GO ON UPCOMING TRIP TO [facility] FOR FAMILY WEDDING.  SHE WILL KEEP ME POSTED HOW SHE IS DOING OVER THE NEXT FEW DAYS.  HAS FOLLOW-UP WITH ME AND HER CARDIOLOGIST SHORTLY AFTER HER RETURN.    SITUATIONAL ANXIETY:  This does seem to factor into her blood pressure variation.  She was changed from BuSpar to Lexapro and is currently doing well on 5 mg daily.  She has a follow-up with me in about 6 weeks and at that time can better assess the effectiveness of the Lexapro.  Advised that she could increase to the full 10 mg tablet if she feels that her anxiety is suboptimally controlled and she is not having side effects.      INCREASED IMMATURE GRANULOCYTES ON CBC:  Overall not concerning as rest of CBC is normal, we will recheck CBC with her routine labs prior to office visit with me in about 6 weeks.      GERD:  Taking Protonix or Prilosec-advised she could take either, whichever 1 seems to be more effective.  Advised that  she continue taking this through her upcoming [place] [facility] as she will be more prone to reflux with eating different foods/a different eating schedule.  This will also help mitigate any chest pain that could be GI related so that it does not cause concern for cardiac pain.  Can take Gas-X as needed for gas and bloating.  If this remains an ongoing concern will address at her upcoming follow-up.    A total of 40 minutes was spent on this encounter that included explaining differentials, symptom counseling, review of recent results, and next steps of diagnosis and management plan, along with direct documentation of the encounter.      Patient Instructions   OK TO TAKE CLONIDINE 0.1 MG AS NEEDED EVERY HOUR IF BP REMAINS > 180.     TAKE PROTONIX OR PRILOSEC DAILY PRIOR TO BREAKFAST FOR PREVENTIN OF REFLUX AND THEN OK TO TAKE GAS X AS NEEDED      Follow up in about 6 weeks (around 11/1/2023) for to follow up on lab results, return as needed for new concerns.

## 2023-09-20 NOTE — PATIENT INSTRUCTIONS
OK TO TAKE CLONIDINE 0.1 MG AS NEEDED EVERY HOUR IF BP REMAINS > 180.     TAKE PROTONIX OR PRILOSEC DAILY PRIOR TO BREAKFAST FOR PREVENTIN OF REFLUX AND THEN OK TO TAKE GAS X AS NEEDED

## 2023-10-03 ENCOUNTER — TELEPHONE (OUTPATIENT)
Dept: CARDIOLOGY | Facility: CLINIC | Age: 73
End: 2023-10-03

## 2023-10-03 NOTE — TELEPHONE ENCOUNTER
----- Message from Milagros Ferguson sent at 10/3/2023  9:10 AM CDT -----  Regarding: Test  Patient calling to find out if she still need to take the test because she had it done in the er. Please call back @ [phone]. Thank you Milagros

## 2023-10-03 NOTE — TELEPHONE ENCOUNTER
I spoke with Mrs. Frias. She recently had an echo stress test while in the hospital 9/13/23. She was making sure she didn't need the echo stress test that is scheduled 10/10/23. I have canceled the stress test scheduled for 10/10/23.

## 2023-10-05 ENCOUNTER — HOSPITAL ENCOUNTER (OUTPATIENT)
Dept: RADIOLOGY | Facility: HOSPITAL | Age: 73
Discharge: HOME | End: 2023-10-05
Attending: FAMILY MEDICINE
Payer: MEDICARE

## 2023-10-05 ENCOUNTER — TELEPHONE (OUTPATIENT)
Dept: FAMILY MEDICINE | Facility: CLINIC | Age: 73
End: 2023-10-05

## 2023-10-05 ENCOUNTER — PATIENT MESSAGE (OUTPATIENT)
Dept: HEMATOLOGY/ONCOLOGY | Facility: CLINIC | Age: 73
End: 2023-10-05

## 2023-10-05 DIAGNOSIS — R91.1 SOLITARY PULMONARY NODULE: ICD-10-CM

## 2023-10-05 DIAGNOSIS — R91.1 NODULE OF UPPER LOBE OF RIGHT LUNG: ICD-10-CM

## 2023-10-05 DIAGNOSIS — R91.8 MASS OF RIGHT LUNG: ICD-10-CM

## 2023-10-05 PROCEDURE — 71250 CT THORAX DX C-: CPT | Mod: TC,HCNC

## 2023-10-05 PROCEDURE — 71250 CT THORAX DX C-: CPT | Mod: 26,HCNC,, | Performed by: RADIOLOGY

## 2023-10-05 PROCEDURE — 71250 CT CHEST WITHOUT CONTRAST: ICD-10-PCS | Mod: 26,HCNC,, | Performed by: RADIOLOGY

## 2023-10-06 ENCOUNTER — PATIENT MESSAGE (OUTPATIENT)
Dept: FAMILY MEDICINE | Facility: CLINIC | Age: 73
End: 2023-10-06

## 2023-10-06 ENCOUNTER — PATIENT MESSAGE (OUTPATIENT)
Dept: PULMONOLOGY | Facility: CLINIC | Age: 73
End: 2023-10-06

## 2023-10-10 ENCOUNTER — OFFICE VISIT (OUTPATIENT)
Dept: PULMONOLOGY | Facility: CLINIC | Age: 73
End: 2023-10-10
Payer: MEDICARE

## 2023-10-10 ENCOUNTER — CLINICAL SUPPORT (OUTPATIENT)
Dept: CARDIOLOGY | Facility: HOSPITAL | Age: 73
End: 2023-10-10
Attending: INTERNAL MEDICINE
Payer: MEDICARE

## 2023-10-10 VITALS
OXYGEN SATURATION: 98 % | WEIGHT: 115.31 LBS | SYSTOLIC BLOOD PRESSURE: 130 MMHG | HEIGHT: 62 IN | HEART RATE: 74 BPM | DIASTOLIC BLOOD PRESSURE: 78 MMHG | BODY MASS INDEX: 21.22 KG/M2

## 2023-10-10 DIAGNOSIS — R00.2 PALPITATIONS: ICD-10-CM

## 2023-10-10 DIAGNOSIS — R93.89 ABNORMAL CHEST CT: ICD-10-CM

## 2023-10-10 DIAGNOSIS — R91.8 MASS OF RIGHT LUNG: ICD-10-CM

## 2023-10-10 DIAGNOSIS — R91.1 NODULE OF UPPER LOBE OF RIGHT LUNG: ICD-10-CM

## 2023-10-10 PROCEDURE — 1101F PR PT FALLS ASSESS DOC 0-1 FALLS W/OUT INJ PAST YR: ICD-10-PCS | Mod: HCNC,CPTII,S$GLB, | Performed by: INTERNAL MEDICINE

## 2023-10-10 PROCEDURE — 93271 ECG/MONITORING AND ANALYSIS: CPT | Mod: HCNC

## 2023-10-10 PROCEDURE — 93270 REMOTE 30 DAY ECG REV/REPORT: CPT | Mod: HCNC

## 2023-10-10 PROCEDURE — 3078F DIAST BP <80 MM HG: CPT | Mod: HCNC,CPTII,S$GLB, | Performed by: INTERNAL MEDICINE

## 2023-10-10 PROCEDURE — 3044F HG A1C LEVEL LT 7.0%: CPT | Mod: HCNC,CPTII,S$GLB, | Performed by: INTERNAL MEDICINE

## 2023-10-10 PROCEDURE — 3008F PR BODY MASS INDEX (BMI) DOCUMENTED: ICD-10-PCS | Mod: HCNC,CPTII,S$GLB, | Performed by: INTERNAL MEDICINE

## 2023-10-10 PROCEDURE — 3075F SYST BP GE 130 - 139MM HG: CPT | Mod: HCNC,CPTII,S$GLB, | Performed by: INTERNAL MEDICINE

## 2023-10-10 PROCEDURE — 3288F FALL RISK ASSESSMENT DOCD: CPT | Mod: HCNC,CPTII,S$GLB, | Performed by: INTERNAL MEDICINE

## 2023-10-10 PROCEDURE — 3075F PR MOST RECENT SYSTOLIC BLOOD PRESS GE 130-139MM HG: ICD-10-PCS | Mod: HCNC,CPTII,S$GLB, | Performed by: INTERNAL MEDICINE

## 2023-10-10 PROCEDURE — 1101F PT FALLS ASSESS-DOCD LE1/YR: CPT | Mod: HCNC,CPTII,S$GLB, | Performed by: INTERNAL MEDICINE

## 2023-10-10 PROCEDURE — 1126F PR PAIN SEVERITY QUANTIFIED, NO PAIN PRESENT: ICD-10-PCS | Mod: HCNC,CPTII,S$GLB, | Performed by: INTERNAL MEDICINE

## 2023-10-10 PROCEDURE — 99999 PR PBB SHADOW E&M-EST. PATIENT-LVL IV: CPT | Mod: PBBFAC,HCNC,, | Performed by: INTERNAL MEDICINE

## 2023-10-10 PROCEDURE — 1126F AMNT PAIN NOTED NONE PRSNT: CPT | Mod: HCNC,CPTII,S$GLB, | Performed by: INTERNAL MEDICINE

## 2023-10-10 PROCEDURE — 93272 CARDIAC EVENT MONITOR (CUPID ONLY): ICD-10-PCS | Mod: ,,, | Performed by: INTERNAL MEDICINE

## 2023-10-10 PROCEDURE — 3008F BODY MASS INDEX DOCD: CPT | Mod: HCNC,CPTII,S$GLB, | Performed by: INTERNAL MEDICINE

## 2023-10-10 PROCEDURE — 99204 PR OFFICE/OUTPT VISIT, NEW, LEVL IV, 45-59 MIN: ICD-10-PCS | Mod: HCNC,S$GLB,, | Performed by: INTERNAL MEDICINE

## 2023-10-10 PROCEDURE — 4010F ACE/ARB THERAPY RXD/TAKEN: CPT | Mod: HCNC,CPTII,S$GLB, | Performed by: INTERNAL MEDICINE

## 2023-10-10 PROCEDURE — 3288F PR FALLS RISK ASSESSMENT DOCUMENTED: ICD-10-PCS | Mod: HCNC,CPTII,S$GLB, | Performed by: INTERNAL MEDICINE

## 2023-10-10 PROCEDURE — 93272 ECG/REVIEW INTERPRET ONLY: CPT | Mod: ,,, | Performed by: INTERNAL MEDICINE

## 2023-10-10 PROCEDURE — 4010F PR ACE/ARB THEARPY RXD/TAKEN: ICD-10-PCS | Mod: HCNC,CPTII,S$GLB, | Performed by: INTERNAL MEDICINE

## 2023-10-10 PROCEDURE — 99204 OFFICE O/P NEW MOD 45 MIN: CPT | Mod: HCNC,S$GLB,, | Performed by: INTERNAL MEDICINE

## 2023-10-10 PROCEDURE — 99999 PR PBB SHADOW E&M-EST. PATIENT-LVL IV: ICD-10-PCS | Mod: PBBFAC,HCNC,, | Performed by: INTERNAL MEDICINE

## 2023-10-10 PROCEDURE — 3044F PR MOST RECENT HEMOGLOBIN A1C LEVEL <7.0%: ICD-10-PCS | Mod: HCNC,CPTII,S$GLB, | Performed by: INTERNAL MEDICINE

## 2023-10-10 PROCEDURE — 3078F PR MOST RECENT DIASTOLIC BLOOD PRESSURE < 80 MM HG: ICD-10-PCS | Mod: HCNC,CPTII,S$GLB, | Performed by: INTERNAL MEDICINE

## 2023-10-12 ENCOUNTER — HOSPITAL ENCOUNTER (OUTPATIENT)
Dept: RADIOLOGY | Facility: HOSPITAL | Age: 73
Discharge: HOME | End: 2023-10-12
Attending: INTERNAL MEDICINE
Payer: MEDICARE

## 2023-10-12 DIAGNOSIS — R91.8 MASS OF RIGHT LUNG: ICD-10-CM

## 2023-10-12 PROCEDURE — 78815 PET IMAGE W/CT SKULL-THIGH: CPT | Mod: 26,PI,HCNC, | Performed by: STUDENT IN AN ORGANIZED HEALTH CARE EDUCATION/TRAINING PROGRAM

## 2023-10-12 PROCEDURE — A9552 F18 FDG: HCPCS | Mod: HCNC

## 2023-10-12 PROCEDURE — 78815 NM PET CT ROUTINE: ICD-10-PCS | Mod: 26,PI,HCNC, | Performed by: STUDENT IN AN ORGANIZED HEALTH CARE EDUCATION/TRAINING PROGRAM

## 2023-10-13 ENCOUNTER — PATIENT MESSAGE (OUTPATIENT)
Dept: PULMONOLOGY | Facility: CLINIC | Age: 73
End: 2023-10-13

## 2023-10-13 ENCOUNTER — TELEPHONE (OUTPATIENT)
Dept: PULMONOLOGY | Facility: CLINIC | Age: 73
End: 2023-10-13

## 2023-10-13 NOTE — TELEPHONE ENCOUNTER
I spoke with Ms Rodriguez daughter in regards to needing her mother test results. I informed her that a message was sent to Dr Isbell for review and advise. She verbalized understanding.

## 2023-10-13 NOTE — TELEPHONE ENCOUNTER
----- Message from Jake Burrows sent at 10/13/2023  1:51 PM CDT -----  Regarding: urgent test results review  Contact: pt  Pt daughter requesting urgent call back RE: would like to go over results before ends of the day    Confirmed contact below:  Contact Name:Keo Frias  Phone Number: [phone]

## 2023-10-15 ENCOUNTER — PATIENT MESSAGE (OUTPATIENT)
Dept: HEMATOLOGY/ONCOLOGY | Facility: CLINIC | Age: 73
End: 2023-10-15

## 2023-10-16 ENCOUNTER — TELEPHONE (OUTPATIENT)
Dept: HEMATOLOGY/ONCOLOGY | Facility: CLINIC | Age: 73
End: 2023-10-16

## 2023-10-16 NOTE — TELEPHONE ENCOUNTER
----- Message from Hannah Lombardi sent at 10/16/2023  9:47 AM CDT -----  Regarding: Appt  Contact: Renee [phone]  Renee/ daughter is calling to state pt needs a f/u biopsy please call

## 2023-10-16 NOTE — TELEPHONE ENCOUNTER
Spoke to patient and her daughter. She has a lung mass with lymphadenopathy on PET scan. Could be a lung primary or metastatic breast cancer, will not be sure until a biopsy. Dr. Isbell pulmonology agreed for biopsy per patient.  Message sent to Dr. Pete's team to get biopsy scheduled.

## 2023-10-22 NOTE — PROGRESS NOTES
Pulmonary & Critical Care Medicine   Consultation Note    Reason for Consultation: EBUS     HPI:  72 y/o female- longstanding history of hypertension and difficulties with labile blood pressure readings, osteoporosis, generalized anxiety    Follows with Dr. Garcia- Onc history as summarized   diagnostic mammogram ultrasound was performed on March 30, 2021.  The mammogram confirmed the focal asymmetry in the upper-outer quadrant of the right breast an ultrasound showed an 8 x 5 x 4 mm irregular hypoechoic mass in that area.  There was no evidence of abnormal axillary lymphadenopathy.     On April 13, 2021 biopsy was performed which showed infiltrating ductal carcinoma with lobular features and associated intermediate grade solid DCIS.  The invasive cancer was intermediate grade (histologic grade 3, nuclear grade 2, mitotic index 2).  Tumor cells were 95% ER positive, 10% VA positive HER2 was negative and Ki-67 was 20%.     On May 12, 2021 right breast lumpectomy and sentinel lymph node biopsy was performed.  That showed a 9 mm intermediate grade invasive carcinoma with extensive DCIS measuring 1.9 cm which was intermediate grade.  [place] lymph node was negative.  Final pathological stage IA-T1 B N0.     Radiation completed 7/27/21.     Letrozole started July 2021.     Mammogram 3/30/22  -negative    Seen in ED- Had CT imaging with new RLL lung nodule + adenopathy. Confirmed with NM PEt. To Me for biopsy consideration.       From respiratory standpoint. No SOB/Dyspnea.   + Cough. No hemoptysis. White mucous.         Additional Pulmonary History:   Occupational/Environmental Exposures: From [place]. In [place] since 1980.. [place] in labs [place] [employer] [place]   [place]ives [place].. New roof with ids..   Exposure to Animals/Pets: Has a macaw. 2 dogs.. No issues Macaw since 2006.   Travel History: Went to [facility] after image completed.   History of exposures to TB: Had BCG when child.. All x rays have been negative  Family History of Lung  Cancer: Mother had lung Ca.   Childhood history of Lung Disease:  OAC/Anti-PLT-- None    DM/weight loss MEds- None   PNA history- None   Chest surgery/Trauma- None   Tobacco use history- never   No Anesthesia issues.     Past Medical History:   Diagnosis Date    Anemia     Anxiety disorder 8/28/2019    Cataract     Chronic right-sided low back pain without sciatica 10/20/2021    Hypercholesteremia 9/17/2019    Invasive ductal carcinoma of breast, female, right 4/16/2021    Migraine with vision problems     Subclinical hypothyroidism     White coat syndrome with hypertension      Past Surgical History:   Procedure Laterality Date    COLON SURGERY      COLONOSCOPY N/A 12/7/2018    Procedure: COLONOSCOPY;  Surgeon: Bhargav Gaston MD;  Location: [address]);  Service: Endoscopy;  Laterality: N/A;    DILATION AND CURETTAGE OF UTERUS      postmenopausal bleeding    MASTECTOMY, PARTIAL Right 5/12/2021    Procedure: MASTECTOMY, PARTIAL-Right with radiological marker;  Surgeon: Vivian Cadena MD;  Location: [facility];  Service: General;  Laterality: Right;    SENTINEL LYMPH NODE BIOPSY Right 5/12/2021    Procedure: BIOPSY, LYMPH NODE, SENTINEL-Right;  Surgeon: Vivian Cadena MD;  Location: [facility];  Service: General;  Laterality: Right;    TUBAL LIGATION       Social History:   Social History     Socioeconomic History    Marital status:     Number of children: 2   Occupational History     Employer: [employer]   Tobacco Use    Smoking status: Never    Smokeless tobacco: Never   Substance and Sexual Activity    Alcohol use: No    Drug use: Never    Sexual activity: Yes     Partners: Male     Birth control/protection: None     Family History   Problem Relation Age of Onset    Diabetes Brother     Hypertension Brother     Glaucoma Brother     Glaucoma Father     Cataracts Father     Retinal detachment Father     Lung cancer Mother     Uterine cancer Maternal Grandmother     Stroke Maternal  "Grandmother     Stroke Paternal Grandmother     Amblyopia Neg Hx     Blindness Neg Hx     Macular degeneration Neg Hx     Strabismus Neg Hx     Thyroid disease Neg Hx      Drug Allergies:   Review of patient's allergies indicates:   Allergen Reactions    Sinus allergy Other (See Comments)     Headaches, migranes    Sulfa (sulfonamide antibiotics) Rash           Review of Systems:   A comprehensive 12-point review of systems was performed, and is negative except for those items mentioned above in the HPI section of this note.     Vital Signs:    /70 (BP Location: Left arm, Patient Position: Sitting, BP Method: Medium (Manual))   Pulse 70   Ht 5' 2" (1.575 m)   Wt 51.6 kg (113 lb 12.1 oz)   LMP  (LMP Unknown)   SpO2 97%   BMI 20.81 kg/m²      Physical Exam:   GEN- NAD AAOx3 Well Built, Well Appearing   HEENT- ATNC, PERRLA, EOMI, OP-Cl. No JVD, LAD or bruit noted. Trachea Midline.   CV- RRR No M/R/G  RESP- CTA-Bilateral   GI- S/NT/ND. Positive BS X 4. No HSM Noted  Ext- MAEW, No deformity. No edema or rashes noted.       Personal Review and Summary of Prior Diagnostics    Laboratory Studies: Reviewed      Latest Reference Range & Units Most Recent   WBC 3.90 - 12.70 K/uL 7.20  9/13/23 08:09   RBC 4.00 - 5.40 M/uL 5.18  9/13/23 08:09   Hemoglobin 12.0 - 16.0 g/dL 14.4  9/13/23 08:09   Hematocrit 37.0 - 48.5 % 43.4  9/13/23 08:09   MCV 82 - 98 fL 84  9/13/23 08:09   MCH 27.0 - 31.0 pg 27.8  9/13/23 08:09   MCHC 32.0 - 36.0 g/dL 33.2  9/13/23 08:09   RDW 11.5 - 14.5 % 12.8  9/13/23 08:09   Platelet Count 150 - 450 K/uL 211  9/13/23 08:09   MPV 9.2 - 12.9 fL 12.2  9/13/23 08:09   Platelet Estimate  Appears normal  8/28/23 22:01   Gran % 38.0 - 73.0 % 46.0  9/13/23 08:09   Lymph % 18.0 - 48.0 % 36.3  9/13/23 08:09   Lymphs 25 - 40 % 49 (H)  11/5/07 08:20   Mono % 4.0 - 15.0 % 8.3  9/13/23 08:09   Monocytes 0 - 10 % 4  11/5/07 08:20   Eosinophil % 0.0 - 8.0 % 7.1  9/13/23 08:09   Eosinophils 0 - 8 % 8  11/5/07 " 08:20   Basophil % 0.0 - 1.9 % 1.3  9/13/23 08:09   Basophils 0 - 2 % 1  11/5/07 08:20   Immature Granulocytes 0.0 - 0.5 % 1.0 (H)  9/13/23 08:09   Gran # (ANC) 1.8 - 7.7 K/uL 3.3  9/13/23 08:09   Lymph # 1.0 - 4.8 K/uL 2.6  9/13/23 08:09   Mono # 0.3 - 1.0 K/uL 0.6  9/13/23 08:09   Eos # 0.0 - 0.5 K/uL 0.5  9/13/23 08:09   Baso # 0.00 - 0.20 K/uL 0.09  9/13/23 08:09   Immature Grans (Abs) 0.00 - 0.04 K/uL 0.07 (H)  9/13/23 08:09   SEGS 50 - 70 % 38 (L)  11/5/07 08:20   nRBC 0 /100 WBC 0  9/13/23 08:09   Differential Method  Automated  9/13/23 08:09   Aniso  sl  11/5/07 08:20   Poikilocytosis  sl  11/5/07 08:20   Giant Platelets  Present  8/28/23 22:01   Iron 30 - 160 ug/dL 100  3/25/14 09:50   TIBC 250 - 450 ug/dL 459 (H)  3/25/14 09:50   Saturated Iron 20 - 50 % 22  3/25/14 09:50   UIBC 110 - 370 ug/dl 325  3/5/12 08:22   Transferrin 200 - 375 mg/dL 310  3/25/14 09:50   Ferritin 20.0 - 300.0 ng/mL 53  5/3/23 08:08   Folate 5.4 - 24.0 ng/ml 10.0  11/5/07 08:20   Vitamin B-12 210 - 950 pg/mL 425  5/3/23 08:08   Methlymalonic Acid 0 - 0.40 umol/L 0.24  11/20/06 07:59   D-Dimer <0.50 mg/L FEU 0.30  9/12/23 22:20   Sodium 136 - 145 mmol/L 136  9/13/23 08:09   Potassium 3.5 - 5.1 mmol/L 4.0  9/13/23 08:09   Chloride 95 - 110 mmol/L 105  9/13/23 08:09   CO2 23 - 29 mmol/L 22 (L)  9/13/23 08:09   Anion Gap 8 - 16 mmol/L 9  9/13/23 08:09   BUN 8 - 23 mg/dL 6 (L)  9/13/23 08:09   Creatinine 0.5 - 1.4 mg/dL 0.6  9/13/23 08:09   eGFR >60 mL/min/1.73 m^2 >60.0  9/13/23 08:09   eGFR if non African American >60 mL/min/1.73 m^2 >60  5/3/22 08:08   eGFR if African American >60 mL/min/1.73 m^2 >60  5/3/22 08:08   Glucose 70 - 110 mg/dL 93  9/13/23 08:09   Calcium 8.7 - 10.5 mg/dL 9.0  9/13/23 08:09   Magnesium  1.6 - 2.6 mg/dL 1.9  5/3/23 08:08   ALP 55 - 135 U/L 58  9/12/23 16:28   PROTEIN TOTAL 6.0 - 8.4 g/dL 7.0  9/12/23 16:28   Albumin 3.5 - 5.2 g/dL 3.8  9/12/23 16:28   BILIRUBIN TOTAL 0.1 - 1.0 mg/dL 0.3  9/12/23 16:28   AST  10 - 40 U/L 18  9/12/23 16:28   ALT 10 - 44 U/L 13  9/12/23 16:28   Cholesterol Total 120 - 199 mg/dL 186  5/3/23 08:08         Microbiology Data: Reviewed     Summary of Chest Imaging Personally Reviewed:     CT Chest 10/2023  Right lower lobe spiculated lesion measuring up to 2.1 cm, highly suspicious for neoplasm until proven otherwise.  Further correlation advised.  Other subcentimeter pulmonary nodules and ill-defined left lower lobe opacity, more technically indeterminate with component of metastasis not excluded.     Peripheral right lower lobe AVM.     NM PET 10/2023-  In the head and neck, there are no hypermetabolic lesions worrisome for malignancy. There are no hypermetabolic mucosal lesions, and there are no pathologically enlarged or hypermetabolic lymph nodes.     In the chest, there is a solid pulmonary nodule in the right lower lobe measuring 2.1 x 1.1 cm with max SUV of 3.8 (image 59).  There is a 0.9 cm right axillary lymph node with max SUV of 3.9 (image 43).  There is a 1.2 cm subcarinal lymph node with max SUV of 4.6.  There is a 0.7 cm lymph node along the left posterior shoulder with max SUV of 1.7 (image 54).  Multiple additional solid pulmonary nodules through the right lung base, which may be too small for uptake detection on PET-CT.  There is a 0.9 cm ground-glass pulmonary nodule in the left upper lobe (image 47) which does not show increase tracer uptake; however, indolent neoplastic process may have a similar appearance.                2D Echo: stress 9/2023    Stress Protocol: The patient exercised for 7 minutes 48 seconds on a medium ramp protocol, corresponding to a functional capacity of 6 METS, achieving a peak heart rate of 110 bpm, which is 78 % of the age predicted maximum heart rate. Their exercise capacity was normal. The patient reported no symptoms during the stress test. The test was stopped because the patient experienced fatigue.    ECG Conclusion: The ECG portion of the  study is negative for ischemia. Sensitivity is reduced due to failure to reach target heart rate.    Left Ventricle: The left ventricle is normal in size. Normal wall motion. Ejection fraction by visual approximation is 65%. There is normal diastolic function.    Right Ventricle: Normal right ventricular cavity size. Systolic function is normal.    IVC/SVC: Normal venous pressure at 3 mmHg.    Post-stress Echo: The left ventricle systolic function is hyperdynamic. Right ventricle systolic function is normal.    Post-stress Impression: The study is negative with no echocardiographic evidence of stress induced ischemia. Sensitivity is reduced due to failure to reach target heart rate.    PFT's: None            Assessment:     No diagnosis found.     Outpatient Encounter Medications as of 10/23/2023   Medication Sig Dispense Refill    calcium carbonate (CALCIUM 500 ORAL) Take 1 tablet by mouth.      cholecalciferol, vitamin D3, 125 mcg (5,000 unit) capsule Take 1 capsule (5,000 Units total) by mouth daily with breakfast. 100 capsule 4    CIPRODEX 0.3-0.1 % DrpS PLACE 4 DROPS INTO THE RIGHT EAR 2 (TWO) TIMES DAILY. FOR 10 DAYS 7.5 mL 5    cloNIDine (CATAPRES) 0.1 MG tablet Take 1 tablet (0.1 mg total) by mouth daily as needed (for systolic greater than or equal to 160 mmHg). 90 tablet 3    EScitalopram oxalate (LEXAPRO) 10 MG tablet Take 1 tablet (10 mg total) by mouth once daily. 90 tablet 4    hydrOXYzine HCL (ATARAX) 25 MG tablet Take 1 tablet (25 mg total) by mouth 3 (three) times daily as needed for Anxiety (insomnia). 10 tablet 1    Lactobacillus rhamnosus GG (CULTURELLE) 10 billion cell capsule Take 1 capsule by mouth once daily.      letrozole (FEMARA) 2.5 mg Tab TAKE 1 TABLET BY MOUTH EVERY DAY 90 tablet 3    losartan (COZAAR) 25 MG tablet Take 1 tablet (25 mg total) by mouth once daily. 30 tablet 0    metoprolol succinate (TOPROL-XL) 50 MG 24 hr tablet Take 1 tablet (50 mg total) by mouth once daily. 30 tablet  1    multivitamin-iron-folic acid Tab Take 1 tablet by mouth once daily.      pantoprazole (PROTONIX) 20 MG tablet Take 1 tablet (20 mg total) by mouth once daily. 30 tablet 2    PROLIA 60 mg/mL Syrg        No facility-administered encounter medications on file as of 10/23/2023.     No orders of the defined types were placed in this encounter.      Plan:     Pulmonary nodule- RLL 2.1 cm, + NM PET avidity.   Mediastinal adenopathy   History of breast Ca       -- Plan for robotic + EBUS (cyto + cultures)   -- Case request 11/14   -- Needs updated HRCT- Scheduled.   -- No OAC/Anti-Plt.     Discussed procedure in detail with patient and family. Risks including bleeding, PTX, respiratory failure, non-diagnostic specimen, need for additional procedure and numerous additional potential complications up to death outlined. They verbalized understanding and all questions answered to their satisfaction. Written consent signed and on chart.       Kirt Gr MD   [facility]

## 2023-10-22 NOTE — H&P (VIEW-ONLY)
Pulmonary & Critical Care Medicine   Consultation Note    Reason for Consultation: EBUS     HPI:  74 y/o female- longstanding history of hypertension and difficulties with labile blood pressure readings, osteoporosis, generalized anxiety    Follows with Dr. Garcia- Onc history as summarized   diagnostic mammogram ultrasound was performed on March 30, 2021.  The mammogram confirmed the focal asymmetry in the upper-outer quadrant of the right breast an ultrasound showed an 8 x 5 x 4 mm irregular hypoechoic mass in that area.  There was no evidence of abnormal axillary lymphadenopathy.     On April 13, 2021 biopsy was performed which showed infiltrating ductal carcinoma with lobular features and associated intermediate grade solid DCIS.  The invasive cancer was intermediate grade (histologic grade 3, nuclear grade 2, mitotic index 2).  Tumor cells were 95% ER positive, 10% [place] positive HER2 was negative and Ki-67 was 20%.     On May 12, 2021 right breast lumpectomy and sentinel lymph node biopsy was performed.  That showed a 9 mm intermediate grade invasive carcinoma with extensive DCIS measuring 1.9 cm which was intermediate grade.  [place] lymph node was negative.  Final pathological stage IA-T1 B N0.     Radiation completed 7/27/21.     Letrozole started July 2021.     Mammogram 3/30/22  -negative    Seen in ED- Had CT imaging with new RLL lung nodule + adenopathy. Confirmed with NM PEt. To Me for biopsy consideration.       From respiratory standpoint. No SOB/Dyspnea.   + Cough. No hemoptysis. White mucous.         Additional Pulmonary History:   Occupational/Environmental Exposures: From [place]. In [place] since 1980.. 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   Lives in [place].. New roof with ids..   Exposure to Animals/Pets: Has a macaw. 2 dogs.. No issues Macaw since 2006.   Travel History: Went to [facility] after image completed.   History of exposures to TB: Had BCG when child.. All x rays have been negative  Family History of Lung  Cancer: Mother had lung Ca.   Childhood history of Lung Disease:  OAC/Anti-PLT-- None    DM/weight loss MEds- None   PNA history- None   Chest surgery/Trauma- None   Tobacco use history- never   No Anesthesia issues.     Past Medical History:   Diagnosis Date    Anemia     Anxiety disorder 8/28/2019    Cataract     Chronic right-sided low back pain without sciatica 10/20/2021    Hypercholesteremia 9/17/2019    Invasive ductal carcinoma of breast, female, right 4/16/2021    Migraine with vision problems     Subclinical hypothyroidism     White coat syndrome with hypertension      Past Surgical History:   Procedure Laterality Date    COLON SURGERY      COLONOSCOPY N/A 12/7/2018    Procedure: COLONOSCOPY;  Surgeon: Bhargav Gaston MD;  Location: [address]);  Service: Endoscopy;  Laterality: N/A;    DILATION AND CURETTAGE OF UTERUS      postmenopausal bleeding    MASTECTOMY, PARTIAL Right 5/12/2021    Procedure: MASTECTOMY, PARTIAL-Right with radiological marker;  Surgeon: Vivian Cadena MD;  Location: [facility];  Service: General;  Laterality: Right;    SENTINEL LYMPH NODE BIOPSY Right 5/12/2021    Procedure: BIOPSY, LYMPH NODE, SENTINEL-Right;  Surgeon: Vivian Cadena MD;  Location: [facility];  Service: General;  Laterality: Right;    TUBAL LIGATION       Social History:   Social History     Socioeconomic History    Marital status:     Number of children: 2   Occupational History     Employer: [employer]   Tobacco Use    Smoking status: Never    Smokeless tobacco: Never   Substance and Sexual Activity    Alcohol use: No    Drug use: Never    Sexual activity: Yes     Partners: Male     Birth control/protection: None     Family History   Problem Relation Age of Onset    Diabetes Brother     Hypertension Brother     Glaucoma Brother     Glaucoma Father     Cataracts Father     Retinal detachment Father     Lung cancer Mother     Uterine cancer Maternal Grandmother     Stroke Maternal  "Grandmother     Stroke Paternal Grandmother     Amblyopia Neg Hx     Blindness Neg Hx     Macular degeneration Neg Hx     Strabismus Neg Hx     Thyroid disease Neg Hx      Drug Allergies:   Review of patient's allergies indicates:   Allergen Reactions    Sinus allergy Other (See Comments)     Headaches, migranes    Sulfa (sulfonamide antibiotics) Rash           Review of Systems:   A comprehensive 12-point review of systems was performed, and is negative except for those items mentioned above in the HPI section of this note.     Vital Signs:    /70 (BP Location: Left arm, Patient Position: Sitting, BP Method: Medium (Manual))   Pulse 70   Ht 5' 2" (1.575 m)   Wt 51.6 kg (113 lb 12.1 oz)   LMP  (LMP Unknown)   SpO2 97%   BMI 20.81 kg/m²      Physical Exam:   GEN- NAD AAOx3 Well Built, Well Appearing   HEENT- ATNC, PERRLA, EOMI, OP-Cl. No JVD, LAD or bruit noted. Trachea Midline.   CV- RRR No M/R/G  RESP- CTA-Bilateral   GI- S/NT/ND. Positive BS X 4. No HSM Noted  Ext- MAEW, No deformity. No edema or rashes noted.       Personal Review and Summary of Prior Diagnostics    Laboratory Studies: Reviewed      Latest Reference Range & Units Most Recent   WBC 3.90 - 12.70 K/uL 7.20  9/13/23 08:09   RBC 4.00 - 5.40 M/uL 5.18  9/13/23 08:09   Hemoglobin 12.0 - 16.0 g/dL 14.4  9/13/23 08:09   Hematocrit 37.0 - 48.5 % 43.4  9/13/23 08:09   MCV 82 - 98 fL 84  9/13/23 08:09   MCH 27.0 - 31.0 pg 27.8  9/13/23 08:09   MCHC 32.0 - 36.0 g/dL 33.2  9/13/23 08:09   RDW 11.5 - 14.5 % 12.8  9/13/23 08:09   Platelet Count 150 - 450 K/uL 211  9/13/23 08:09   MPV 9.2 - 12.9 fL 12.2  9/13/23 08:09   Platelet Estimate  Appears normal  8/28/23 22:01   Gran % 38.0 - 73.0 % 46.0  9/13/23 08:09   Lymph % 18.0 - 48.0 % 36.3  9/13/23 08:09   Lymphs 25 - 40 % 49 (H)  11/5/07 08:20   Mono % 4.0 - 15.0 % 8.3  9/13/23 08:09   Monocytes 0 - 10 % 4  11/5/07 08:20   Eosinophil % 0.0 - 8.0 % 7.1  9/13/23 08:09   Eosinophils 0 - 8 % 8  11/5/07 " 08:20   Basophil % 0.0 - 1.9 % 1.3  9/13/23 08:09   Basophils 0 - 2 % 1  11/5/07 08:20   Immature Granulocytes 0.0 - 0.5 % 1.0 (H)  9/13/23 08:09   Gran # (ANC) 1.8 - 7.7 K/uL 3.3  9/13/23 08:09   Lymph # 1.0 - 4.8 K/uL 2.6  9/13/23 08:09   Mono # 0.3 - 1.0 K/uL 0.6  9/13/23 08:09   Eos # 0.0 - 0.5 K/uL 0.5  9/13/23 08:09   Baso # 0.00 - 0.20 K/uL 0.09  9/13/23 08:09   Immature Grans (Abs) 0.00 - 0.04 K/uL 0.07 (H)  9/13/23 08:09   SEGS 50 - 70 % 38 (L)  11/5/07 08:20   nRBC 0 /100 WBC 0  9/13/23 08:09   Differential Method  Automated  9/13/23 08:09   Aniso  sl  11/5/07 08:20   Poikilocytosis  sl  11/5/07 08:20   Giant Platelets  Present  8/28/23 22:01   Iron 30 - 160 ug/dL 100  3/25/14 09:50   TIBC 250 - 450 ug/dL 459 (H)  3/25/14 09:50   Saturated Iron 20 - 50 % 22  3/25/14 09:50   UIBC 110 - 370 ug/dl 325  3/5/12 08:22   Transferrin 200 - 375 mg/dL 310  3/25/14 09:50   Ferritin 20.0 - 300.0 ng/mL 53  5/3/23 08:08   Folate 5.4 - 24.0 ng/ml 10.0  11/5/07 08:20   Vitamin B-12 210 - 950 pg/mL 425  5/3/23 08:08   Methlymalonic Acid 0 - 0.40 umol/L 0.24  11/20/06 07:59   D-Dimer <0.50 mg/L FEU 0.30  9/12/23 22:20   Sodium 136 - 145 mmol/L 136  9/13/23 08:09   Potassium 3.5 - 5.1 mmol/L 4.0  9/13/23 08:09   Chloride 95 - 110 mmol/L 105  9/13/23 08:09   CO2 23 - 29 mmol/L 22 (L)  9/13/23 08:09   Anion Gap 8 - 16 mmol/L 9  9/13/23 08:09   BUN 8 - 23 mg/dL 6 (L)  9/13/23 08:09   Creatinine 0.5 - 1.4 mg/dL 0.6  9/13/23 08:09   eGFR >60 mL/min/1.73 m^2 >60.0  9/13/23 08:09   eGFR if non African American >60 mL/min/1.73 m^2 >60  5/3/22 08:08   eGFR if African American >60 mL/min/1.73 m^2 >60  5/3/22 08:08   Glucose 70 - 110 mg/dL 93  9/13/23 08:09   Calcium 8.7 - 10.5 mg/dL 9.0  9/13/23 08:09   Magnesium  1.6 - 2.6 mg/dL 1.9  5/3/23 08:08   ALP 55 - 135 U/L 58  9/12/23 16:28   PROTEIN TOTAL 6.0 - 8.4 g/dL 7.0  9/12/23 16:28   Albumin 3.5 - 5.2 g/dL 3.8  9/12/23 16:28   BILIRUBIN TOTAL 0.1 - 1.0 mg/dL 0.3  9/12/23 16:28   AST  10 - 40 U/L 18  9/12/23 16:28   ALT 10 - 44 U/L 13  9/12/23 16:28   Cholesterol Total 120 - 199 mg/dL 186  5/3/23 08:08         Microbiology Data: Reviewed     Summary of Chest Imaging Personally Reviewed:     CT Chest 10/2023  Right lower lobe spiculated lesion measuring up to 2.1 cm, highly suspicious for neoplasm until proven otherwise.  Further correlation advised.  Other subcentimeter pulmonary nodules and ill-defined left lower lobe opacity, more technically indeterminate with component of metastasis not excluded.     Peripheral right lower lobe AVM.     NM PET 10/2023-  In the head and neck, there are no hypermetabolic lesions worrisome for malignancy. There are no hypermetabolic mucosal lesions, and there are no pathologically enlarged or hypermetabolic lymph nodes.     In the chest, there is a solid pulmonary nodule in the right lower lobe measuring 2.1 x 1.1 cm with max SUV of 3.8 (image 59).  There is a 0.9 cm right axillary lymph node with max SUV of 3.9 (image 43).  There is a 1.2 cm subcarinal lymph node with max SUV of 4.6.  There is a 0.7 cm lymph node along the left posterior shoulder with max SUV of 1.7 (image 54).  Multiple additional solid pulmonary nodules through the right lung base, which may be too small for uptake detection on PET-CT.  There is a 0.9 cm ground-glass pulmonary nodule in the left upper lobe (image 47) which does not show increase tracer uptake; however, indolent neoplastic process may have a similar appearance.                2D Echo: stress 9/2023    Stress Protocol: The patient exercised for 7 minutes 48 seconds on a medium ramp protocol, corresponding to a functional capacity of 6 METS, achieving a peak heart rate of 110 bpm, which is 78 % of the age predicted maximum heart rate. Their exercise capacity was normal. The patient reported no symptoms during the stress test. The test was stopped because the patient experienced fatigue.    ECG Conclusion: The ECG portion of the  study is negative for ischemia. Sensitivity is reduced due to failure to reach target heart rate.    Left Ventricle: The left ventricle is normal in size. Normal wall motion. Ejection fraction by visual approximation is 65%. There is normal diastolic function.    Right Ventricle: Normal right ventricular cavity size. Systolic function is normal.    IVC/SVC: Normal venous pressure at 3 mmHg.    Post-stress Echo: The left ventricle systolic function is hyperdynamic. Right ventricle systolic function is normal.    Post-stress Impression: The study is negative with no echocardiographic evidence of stress induced ischemia. Sensitivity is reduced due to failure to reach target heart rate.    PFT's: None            Assessment:     No diagnosis found.     Outpatient Encounter Medications as of 10/23/2023   Medication Sig Dispense Refill    calcium carbonate (CALCIUM 500 ORAL) Take 1 tablet by mouth.      cholecalciferol, vitamin D3, 125 mcg (5,000 unit) capsule Take 1 capsule (5,000 Units total) by mouth daily with breakfast. 100 capsule 4    CIPRODEX 0.3-0.1 % DrpS PLACE 4 DROPS INTO THE RIGHT EAR 2 (TWO) TIMES DAILY. FOR 10 DAYS 7.5 mL 5    cloNIDine (CATAPRES) 0.1 MG tablet Take 1 tablet (0.1 mg total) by mouth daily as needed (for systolic greater than or equal to 160 mmHg). 90 tablet 3    EScitalopram oxalate (LEXAPRO) 10 MG tablet Take 1 tablet (10 mg total) by mouth once daily. 90 tablet 4    hydrOXYzine HCL (ATARAX) 25 MG tablet Take 1 tablet (25 mg total) by mouth 3 (three) times daily as needed for Anxiety (insomnia). 10 tablet 1    Lactobacillus rhamnosus GG (CULTURELLE) 10 billion cell capsule Take 1 capsule by mouth once daily.      letrozole (FEMARA) 2.5 mg Tab TAKE 1 TABLET BY MOUTH EVERY DAY 90 tablet 3    losartan (COZAAR) 25 MG tablet Take 1 tablet (25 mg total) by mouth once daily. 30 tablet 0    metoprolol succinate (TOPROL-XL) 50 MG 24 hr tablet Take 1 tablet (50 mg total) by mouth once daily. 30 tablet  1    multivitamin-iron-folic acid Tab Take 1 tablet by mouth once daily.      pantoprazole (PROTONIX) 20 MG tablet Take 1 tablet (20 mg total) by mouth once daily. 30 tablet 2    PROLIA 60 mg/mL Syrg        No facility-administered encounter medications on file as of 10/23/2023.     No orders of the defined types were placed in this encounter.      Plan:     Pulmonary nodule- RLL 2.1 cm, + NM PET avidity.   Mediastinal adenopathy   History of breast Ca       -- Plan for robotic + EBUS (cyto + cultures)   -- Case request 11/14   -- Needs updated HRCT- Scheduled.   -- No OAC/Anti-Plt.     Discussed procedure in detail with patient and family. Risks including bleeding, PTX, respiratory failure, non-diagnostic specimen, need for additional procedure and numerous additional potential complications up to death outlined. They verbalized understanding and all questions answered to their satisfaction. Written consent signed and on chart.       Kirt Gr MD   [facility]

## 2023-10-23 ENCOUNTER — OFFICE VISIT (OUTPATIENT)
Dept: PULMONOLOGY | Facility: CLINIC | Age: 73
End: 2023-10-23
Payer: MEDICARE

## 2023-10-23 VITALS
DIASTOLIC BLOOD PRESSURE: 70 MMHG | SYSTOLIC BLOOD PRESSURE: 138 MMHG | WEIGHT: 113.75 LBS | HEIGHT: 62 IN | BODY MASS INDEX: 20.93 KG/M2 | OXYGEN SATURATION: 97 % | HEART RATE: 70 BPM

## 2023-10-23 DIAGNOSIS — R91.1 LUNG NODULE: Primary | ICD-10-CM

## 2023-10-23 DIAGNOSIS — Z85.3 HISTORY OF BREAST CANCER IN ADULTHOOD: ICD-10-CM

## 2023-10-23 PROCEDURE — 99999 PR PBB SHADOW E&M-EST. PATIENT-LVL V: CPT | Mod: PBBFAC,,, | Performed by: EMERGENCY MEDICINE

## 2023-10-23 PROCEDURE — 4010F ACE/ARB THERAPY RXD/TAKEN: CPT | Mod: ,,, | Performed by: EMERGENCY MEDICINE

## 2023-10-23 PROCEDURE — 99215 PR OFFICE/OUTPT VISIT, EST, LEVL V, 40-54 MIN: ICD-10-PCS | Mod: S$PBB,,, | Performed by: EMERGENCY MEDICINE

## 2023-10-23 PROCEDURE — 99215 OFFICE O/P EST HI 40 MIN: CPT | Mod: S$PBB,,, | Performed by: EMERGENCY MEDICINE

## 2023-10-23 PROCEDURE — 4010F PR ACE/ARB THEARPY RXD/TAKEN: ICD-10-PCS | Mod: ,,, | Performed by: EMERGENCY MEDICINE

## 2023-10-23 PROCEDURE — 99999 PR PBB SHADOW E&M-EST. PATIENT-LVL V: ICD-10-PCS | Mod: PBBFAC,,, | Performed by: EMERGENCY MEDICINE

## 2023-10-23 RX ORDER — AMLODIPINE BESYLATE 2.5 MG/1
2.5 TABLET ORAL
COMMUNITY
Start: 2023-10-15 | End: 2023-11-08 | Stop reason: ALTCHOICE

## 2023-10-26 ENCOUNTER — PATIENT MESSAGE (OUTPATIENT)
Dept: HEMATOLOGY/ONCOLOGY | Facility: CLINIC | Age: 73
End: 2023-10-26

## 2023-10-26 NOTE — PROGRESS NOTES
"Subjective:      Patient ID: Keo Frias is a 73 y.o. female.    Chief Complaint: No chief complaint on file.    HPI  The patient was seen in person at this visit on 10/10/2023.    Ms. Frias is referred to Pulmonary Clinic for evaluation of a recently noted RUL nodule.  She has been evaluated in the ED on two occasions for hypertension and suspected atypical chest pain ("gas pains").  At those evaluations on 8/12/2023 and 9/12/2023 with CXRs at both occasions showing 1.4 cm nodule in the RUL that was new when compared to her last CXR in 2019.  She was subsequently seen by her PCP on 9/20 who ordered chest CT scan.  Chest CT from 10/5/2023 showed 2 cm RLL spiculated lesion extending toward the pleural margin.  There is also a 1 cm TRICIA ground glass nodule/opacity that is indeterminate in etiology.    Pulmonary history:  No history of smoking or prior lung disease.    No report of past asthma/COPD/pneumonia;  Personal history of past breast cancer (Dr. Garcia) and family history of lung cancer.  She denies respiratory or constitutional complaints.  She does report GERD symptoms.    Breast cancer history:  Biopsy (4/13/2021) => infiltrating ductal carcinoma with lobular features and associated intermediate grade solid DCIS.  The invasive cancer was intermediate grade (histologic grade 3, nuclear grade 2, mitotic index 2).  Tumor cells were 95% ER positive, 10% [place] positive HER2 was negative and Ki-67 was 20%.  Surgery (5/12/2021) => right breast lumpectomy and sentinel lymph node biopsy was performed.  That showed a 9 mm intermediate grade invasive carcinoma with extensive DCIS measuring 1.9 cm which was intermediate grade.  [place] lymph node was negative.  Final pathological stage IA-T1 B N0.  Additional treatment => Radiation completed 7/27/21; Letrozole started July 2021.      Review of Systems   Constitutional:  Negative for fever, chills, weight loss, activity change, fatigue and night sweats.   Respiratory:  " Negative for cough, hemoptysis, sputum production, chest tightness, shortness of breath, wheezing, previous hospitialization due to pulmonary problems, pleurisy, dyspnea on extertion and use of rescue inhaler.    Cardiovascular:  Negative for chest pain and leg swelling.     Objective:     Physical Exam   Constitutional: She is oriented to person, place, and time. No distress.   Cardiovascular: Normal rate, regular rhythm and normal heart sounds. Exam reveals no gallop.   No murmur heard.  Pulmonary/Chest: Normal expansion, symmetric chest wall expansion, effort normal and breath sounds normal. No stridor. No respiratory distress. She has no decreased breath sounds. She has no wheezes. She has no rhonchi. She has no rales.   Musculoskeletal:         General: No edema.   Lymphadenopathy: No supraclavicular adenopathy is present.     She has no cervical adenopathy.   Neurological: She is alert and oriented to person, place, and time. Gait normal.   Skin: No cyanosis. Nails show no clubbing.   Psychiatric: She has a normal mood and affect. Judgment normal.   Nursing note and vitals reviewed.    Personal Diagnostic Review    Selected findings on Chest CT 10/5 =>  Lungs are symmetrically expanded.  Mild apical scarring.  There is spiculated nodular lesion of the right lower lobe measuring 2.1 x 1.3 cm (series 4, image 192).  Some spiculation noted to extend to the pleural margin.  There are other scattered pulmonary nodules centered mostly subpleural at the right lower lobe, for reference measuring 0.9 cm (series 4, image 293).  There is irregular, somewhat linear opacity of the medial left lower lobe spanning 2.4 cm (series 4, image 385).  Ill-defined ground-glass nodular focus at the left upper lobe measuring 0.9 cm (series 4, image 110).  Prominent coursing vessels with serpiginous appearance at the peripheral right lower lobe contributed by pulmonary arterial and venous branches, most in keeping with AVM.    "  Impression:     Right lower lobe spiculated lesion measuring up to 2.1 cm, highly suspicious for neoplasm until proven otherwise.  Further correlation advised.  Other subcentimeter pulmonary nodules and ill-defined left lower lobe opacity, more technically indeterminate with component of metastasis not excluded.  Peripheral right lower lobe AVM.          10/23/2023    10:20 AM 10/10/2023     1:26 PM 9/20/2023    10:51 AM 9/13/2023     3:06 PM 9/13/2023     9:03 AM 9/13/2023     4:46 AM 9/12/2023     9:05 PM   Pulmonary Function Tests   SpO2 97 % 98 % 98 % 96 % 98 % 96 % 98 %   Height 5' 2" (1.575 m) 5' 2" (1.575 m) 5' 2" (1.575 m) 5' 2" (1.575 m)      Weight 51.6 kg (113 lb 12.1 oz) 52.3 kg (115 lb 4.8 oz) 51.8 kg (114 lb 3.2 oz) 52.6 kg (116 lb)      BMI (Calculated) 20.8 21.1 20.9 21.2           Assessment:     1. Mass of right lung    2. Nodule of upper lobe of right lung    3. Abnormal chest CT         Outpatient Encounter Medications as of 10/10/2023   Medication Sig Dispense Refill    calcium carbonate (CALCIUM 500 ORAL) Take 1 tablet by mouth.      cholecalciferol, vitamin D3, 125 mcg (5,000 unit) capsule Take 1 capsule (5,000 Units total) by mouth daily with breakfast. 100 capsule 4    CIPRODEX 0.3-0.1 % DrpS PLACE 4 DROPS INTO THE RIGHT EAR 2 (TWO) TIMES DAILY. FOR 10 DAYS 7.5 mL 5    cloNIDine (CATAPRES) 0.1 MG tablet Take 1 tablet (0.1 mg total) by mouth daily as needed (for systolic greater than or equal to 160 mmHg). 90 tablet 3    EScitalopram oxalate (LEXAPRO) 10 MG tablet Take 1 tablet (10 mg total) by mouth once daily. 90 tablet 4    hydrOXYzine HCL (ATARAX) 25 MG tablet Take 1 tablet (25 mg total) by mouth 3 (three) times daily as needed for Anxiety (insomnia). 10 tablet 1    Lactobacillus rhamnosus GG (CULTURELLE) 10 billion cell capsule Take 1 capsule by mouth once daily.      letrozole (FEMARA) 2.5 mg Tab TAKE 1 TABLET BY MOUTH EVERY DAY 90 tablet 3    losartan (COZAAR) 25 MG tablet Take 1 " tablet (25 mg total) by mouth once daily. 30 tablet 0    multivitamin-iron-folic acid Tab Take 1 tablet by mouth once daily.      pantoprazole (PROTONIX) 20 MG tablet Take 1 tablet (20 mg total) by mouth once daily. 30 tablet 2    PROLIA 60 mg/mL Syrg       [DISCONTINUED] metoprolol succinate (TOPROL-XL) 50 MG 24 hr tablet Take 1 tablet (50 mg total) by mouth once daily. 30 tablet 1     No facility-administered encounter medications on file as of 10/10/2023.     Orders Placed This Encounter   Procedures    NM PET CT Routine Skull to Mid Thigh     Standing Status:   Future     Number of Occurrences:   1     Standing Expiration Date:   10/10/2024     Order Specific Question:   May the Radiologist modify the order per protocol to meet the clinical needs of the patient?     Answer:   Yes       Plan:   Findings are concerning for primary thoracic malignancy.  I notified the patient and her daughter of my concern that the chest CT findings were suspicious for cancer.  I recommended PET/CT scan to assess for increased tracer uptake typical for cancer (lung primary or metastatic breast).  We need to obtain tissue for diagnosis either by bronchoscopy or percutaneous biopsy.  I recommended referral to Interventional Pulmonary for consideration of robotic bronchoscopy.    Problem List Items Addressed This Visit       Abnormal chest CT    Overview     Chest CT on 10/5/2023 with pertinent findings:  - Spiculated nodular lesion of the right lower lobe measuring 2.1 x 1.3 cm (series 4, image 192).  Some spiculation noted to extend to the pleural margin.    - Non-specific small nodules present.  - Ill-defined ground-glass nodular focus at the left upper lobe measuring 0.9 cm (series 4, image 110).          Current Assessment & Plan     RUL finding is suspicious for primary thoracic malignancy despite non-smoking history.  TRICIA finding is non-specific given ground glass (subsolid) appearance => possible low grade neoplasm  Other  findings are not typical for lung metastases.      PET/CT to assess for tracer uptake to guide approach to diagnosis/treatment.         Nodule of upper lobe of right lung    Overview     - CXR in August 2023 with 1.4 cm RUL nodule (new since 2019)  - Chest CT in October 2023 confirms RUL nodule:  spiculated nodular lesion of the right lower lobe measuring 2.1 x 1.3 cm (series 4, image 192).  Some spiculation noted to extend to the pleural margin.  There are other scattered pulmonary nodules centered mostly subpleural at the right lower lobe, for reference measuring 0.9 cm (series 4, image 293).         Current Assessment & Plan     Non-smoker  Past history of breast cancer and family history of lung cancer.    High concern for malignant disease given size/appearance on chest CT.  The CT is not convincing for lymphadenopathy.  There is a ground glass rounded opacity in the left upper lobe that is not typical for metastatic cancer.    PET/CT scan to stage and determine next step(s) to confirm diagnosis.  Likely referral to Interventional Pulmonary for possible robotic bronchoscopy +/- EBUS.            Note copied to Dr. Ogden (Primary Care) and Dr. Garcia (Oncology)    Total Time = 45 minutes    Nikunj Isbell MD  Pulmonary Disease  [facility] 9th Fl         Radiology Result Addendum:    Selected findings from PET scan on 10/12:  In the chest, there is a solid pulmonary nodule in the right lower lobe measuring 2.1 x 1.1 cm with max SUV of 3.8 (image 59).  There is a 0.9 cm right axillary lymph node with max SUV of 3.9 (image 43).  There is a 1.2 cm subcarinal lymph node with max SUV of 4.6.  There is a 0.7 cm lymph node along the left posterior shoulder with max SUV of 1.7 (image 54).  Multiple additional solid pulmonary nodules through the right lung base, which may be too small for uptake detection on PET-CT.  There is a 0.9 cm ground-glass pulmonary nodule in the left upper lobe (image 47) which  does not show increase tracer uptake; however, indolent neoplastic process may have a similar appearance.     Additional CT findings:     Postoperative change of partial right mastectomy.  Right lower lobe vascular malformation.  Uterine fibroids.  Small left-sided renal cyst.  Calcific disease of the abdominal aorta.  Small fat containing umbilical hernia.     Impression:     In this patient with history of breast cancer, there is a hypermetabolic right lower lobe pulmonary nodule, suspicious for neoplasm.  There are hypermetabolic lymph nodes in the right axilla, mediastinum, and about left posterior shoulder, suspicious for metastatic disease.  Primary lung neoplasm or metastatic breast cancer may have this appearance.  Tissue sampling may be required for definitive diagnosis.    Ms. Frias was notified of the PET scan results.  She was scheduled for evaluation by Dr. Gr (Pulmonary) on 10/23/2023.    Nikunj Isbell MD  Pulmonary Disease  [facility] - Pulmonary [facility] 9th Fl

## 2023-10-27 ENCOUNTER — PATIENT MESSAGE (OUTPATIENT)
Dept: SURGERY | Facility: HOSPITAL | Age: 73
End: 2023-10-27

## 2023-10-30 ENCOUNTER — TELEPHONE (OUTPATIENT)
Dept: HEMATOLOGY/ONCOLOGY | Facility: CLINIC | Age: 73
End: 2023-10-30

## 2023-10-30 PROBLEM — R93.89 ABNORMAL CHEST CT: Status: ACTIVE | Noted: 2023-10-30

## 2023-10-30 PROBLEM — R91.1 NODULE OF UPPER LOBE OF RIGHT LUNG: Status: ACTIVE | Noted: 2023-10-05

## 2023-10-30 RX ORDER — METOPROLOL SUCCINATE 50 MG/1
50 TABLET, EXTENDED RELEASE ORAL DAILY
Qty: 30 TABLET | Refills: 1 | Status: SHIPPED | OUTPATIENT
Start: 2023-10-30 | End: 2023-11-08 | Stop reason: SDUPTHER

## 2023-10-30 NOTE — ASSESSMENT & PLAN NOTE
Non-smoker  Past history of breast cancer and family history of lung cancer.    High concern for malignant disease given size/appearance on chest CT.  The CT is not convincing for lymphadenopathy.  There is a ground glass rounded opacity in the left upper lobe that is not typical for metastatic cancer.    · PET/CT scan to stage and determine next step(s) to confirm diagnosis.  · Likely referral to Interventional Pulmonary for possible robotic bronchoscopy +/- EBUS.   Acute Dental Trauma in Children    AMBULATORY CARE:    Acute dental trauma is a serious injury to one or more parts of your child's mouth. The injury may include damage to any of your child's teeth, the tooth socket, the tooth root, or jaw. Your child can also have an injury to soft tissues, such as his or her tongue, cheeks, gums, or lips. Severe injuries can expose the soft pulp inside the tooth.    Common signs and symptoms include the following:     A tooth that is cracked, chipped, loose, out of place, or missing      A sharp or rough edge on your child's tooth      Bleeding from your child's gums, lips, face, or mouth      Trouble moving the jaw or mouth      A change in the way your child's teeth fit together when he or she closes his or her mouth    Call 911 for any of the following:     Your child has trouble breathing.        Seek care immediately if:     Your child loses one or more of his or her teeth, or a tooth moves out of place.      Your child has severe bleeding in his or her mouth that does not stop after 10 minutes.    Contact your child's healthcare provider if:     Your child has a fever.      Your child has new symptoms, or symptoms become worse.      Your child feels pain when air gets in contact with the damaged tooth.      Your child has tooth pain when he or she eats foods that are hot, cold, sweet, or sour.      Your child's tooth color becomes darker.      You have questions or concern about your child's condition or care.    Treatment will depend on the type of dental trauma your child has. A tooth that moves slightly may heal on its own. Depending on your child's age, he or she may also need any of the following:     Medicine may be given to decrease pain or prevent an infection. Your child may need a tetanus shot to prevent bacteria from getting into the wound. This may be needed if your child has cut his or her mouth or gums on metal.      Stitches may be needed to close a wound in your child's mouth.      Surgery may be needed to repair your child's tooth or broken bones in his or her jaw.    Manage acute dental trauma:     Apply ice on your child's jaw or cheek for 15 to 20 minutes every hour or as directed. Use an ice pack, or put crushed ice in a plastic bag. Cover it with a towel before you apply it. Ice helps prevent tissue damage and decreases swelling and pain.      Tell your child not to use the damaged tooth. Chewing food on a damaged tooth may put too much pressure on it and worsen the injury.      Have your child eat soft foods or drink liquids for 1 week or as directed. Soft foods and liquids may be easier to eat until the injury heals. Soft foods include applesauce, pudding, mashed potatoes, gelatin, and ice cream.      Care for your child's mouth while he or she heals. Have your child use a soft toothbrush and rinse his or her mouth as directed. Your child's healthcare provider may recommend a solution that contains chlorhexidine 0.1%. This solution will help prevent an infection caused by bacteria. Have your child rinse 2 times each day, or as directed.       Keep any soft tissue wounds clean. Use prescribed mouthwash as directed. Your older child can gargle with a salt water solution. To make the solution, mix 1 teaspoon of salt and 1 cup of warm water. You can also clean your child's wounds with hydrogen peroxide swabs. Ask your healthcare provider for more information on how to clean your child's wounds.      Ask about sports. Do not let your child play contact sports such as football until his or her healthcare provider says it is okay. Always have your child wear protective gear when he or she plays sports. Your child must wear a helmet and mouth guard that meet safety standards. These will prevent damage to your child's gums, teeth, and the bones that support his or her mouth.    Follow up with your child's healthcare provider as directed: Write down your questions so you remember to ask them during your visit.     Head Injury, Pediatric  There are many types of head injuries. Head injuries can be as minor as a bump, or they can be more severe. More severe head injuries include:  A jarring injury to the brain (concussion).A bruise of the brain (contusion). This means there is bleeding in the brain that can cause swelling.A cracked skull (skull fracture).Bleeding in the brain that collects, clots, and forms a bump (hematoma).After a head injury, your child may need to be observed for a while in the emergency department or urgent care. Sometimes admission to the hospital is needed.  After a head injury has happened, most problems occur within the first 24 hours, but side effects may occur up to 7–10 days after the injury. It is important to watch your child's condition for any changes.  What are the causes?  There are many possible causes of a head injury. In younger children, head injury from abuse or falls is the most common. In older children, falls, bicycle injuries, sports accidents, and car accidents (motor vehicle collisions) are common causes of head injury.  What are the symptoms?  There are many possible symptoms of a head injury. Visible symptoms of a head injury include a bruise, bump, or bleeding at the site of the injury. Other non-visible symptoms include:  Trouble being awakened.Fainting.Seizures.Headache.Dizziness.Nausea or vomiting.Confusion.Memory problems.Other possible symptoms that may develop after the head injury include:  Poor attention and concentration.Fatigue or tiring easily.Problems walking or losing balance.Irritability or crying more often.Being uncomfortable around bright lights or loud noises.Anxiety or depression.Losing a learned skill, such as toilet training or reading.Changes in eating or sleeping habits.How is this diagnosed?  This condition can usually be diagnosed based on your child's symptoms, a description of the injury, and a physical exam. Your child may also have imaging tests done, such as a CT scan or MRI. Your child will also be closely watched.  How is this treated?  Treatment for this condition depends on the severity and type of injury your child has. The main goal of treatment is to prevent complications and allow the brain time to heal.  For mild head injury, your child may be sent home and treatment may include:  Observation and checking on your child often.Physical rest.Brain rest.Pain medicines.For severe brain injury, treatment may include:  Close observation. This includes hospitalization with frequent physical exams.Pain medicines.Breathing support. This may include using a ventilator.Managing the pressure inside the brain (intracranial pressure or ICP) by:  Monitoring the ICP.Giving medicines to decrease the ICP.Positioning your child to decrease the ICP.Medicine to prevent seizures.Surgery to stop bleeding or to remove blood clots (craniotomy).Surgery to remove part of the skull (decompressive craniectomy). This allows room for the brain to swell.Follow these instructions at home:  Medicines     Give over-the-counter and prescription medicines only as told by your child's health care provider.Do not give your child aspirin because of the association with Reye syndrome.Activity     Encourage your child to rest as much as possible and avoid activities that are physically hard or tiring. Rest helps the brain to heal.Make sure your child gets enough sleep.Limit activities that require a lot of thought or attention, such as:  Watching TV.Playing memory games and puzzles.Doing homework.Working on the computer, social media, and texting.Having another head injury, especially before the first one has healed, can be dangerous. Keep your child from activities that could cause another head injury, such as:  Riding a bicycle.Playing sports.Participating in gym class or recess.Climbing on playground equipment.Ask your child's health care provider when it is safe for your child to return to his or her regular activities. Ask your child's health care provider for a step-by-step plan for your child to slowly go back to activities.General instructions     Watch your child carefully for new or worsening symptoms. This is very important in the first 24 hours after the head injury.Keep all follow-up visits as told by your child's health care provider. This is important.Tell all of your child's teachers and other caregivers about your child's injury, symptoms, and activity restrictions. Have them report any new or worsening problems.How is this prevented?  Your child should:  Wear a seatbelt when he or she is in a moving vehicle.Use the appropriate-sized car seat or booster seat when in a moving vehicle.Wear a helmet when riding a bicycle, skiing, or doing any other sport or activity that has a risk of injury.You can:  Make your living areas safer for your child.  Childproof any dangerous parts of your home.Install window guards and safety faye.Make sure the playground that your child uses is safe.Get help right away if:  Your child has:  A severe headache that is not helped by medicine.Clear or bloody fluid coming from his or her nose or ears.Changes in his or her vision.A seizure.Your child's symptoms get worse.Your child vomits.Your child's dizziness gets worse.Your child cannot walk or does not have control over his or her arms or legs.Your child will not stop crying.Your child passes out.You cannot wake up your child.Your child is sleepier and has trouble staying awake.Your child will not eat or nurse.Your child's pupils change size.These symptoms may represent a serious problem that is an emergency. Do not wait to see if the symptoms will go away. Get medical help right away. Call your local emergency services (911 in the [place]   This information is not intended to replace advice given to you by your health care provider. Make sure you discuss any questions you have with your health care provider.     FOLLOW UP WITH DR CALDERON TOMORROW, CALL THE OFFICE FOR AN APPOINTMENT. RETURN TO ER FOR ANY WORSENING SYMPTOMS OR NEW CONCERNS. Acute Dental Trauma in Children    AMBULATORY CARE:    Acute dental trauma is a serious injury to one or more parts of your child's mouth. The injury may include damage to any of your child's teeth, the tooth socket, the tooth root, or jaw. Your child can also have an injury to soft tissues, such as his or her tongue, cheeks, gums, or lips. Severe injuries can expose the soft pulp inside the tooth.    Common signs and symptoms include the following:     A tooth that is cracked, chipped, loose, out of place, or missing      A sharp or rough edge on your child's tooth      Bleeding from your child's gums, lips, face, or mouth      Trouble moving the jaw or mouth      A change in the way your child's teeth fit together when he or she closes his or her mouth    Call 911 for any of the following:     Your child has trouble breathing.        Seek care immediately if:     Your child loses one or more of his or her teeth, or a tooth moves out of place.      Your child has severe bleeding in his or her mouth that does not stop after 10 minutes.    Contact your child's healthcare provider if:     Your child has a fever.      Your child has new symptoms, or symptoms become worse.      Your child feels pain when air gets in contact with the damaged tooth.      Your child has tooth pain when he or she eats foods that are hot, cold, sweet, or sour.      Your child's tooth color becomes darker.      You have questions or concern about your child's condition or care.    Treatment will depend on the type of dental trauma your child has. A tooth that moves slightly may heal on its own. Depending on your child's age, he or she may also need any of the following:     Medicine may be given to decrease pain or prevent an infection. Your child may need a tetanus shot to prevent bacteria from getting into the wound. This may be needed if your child has cut his or her mouth or gums on metal.      Stitches may be needed to close a wound in your child's mouth.      Surgery may be needed to repair your child's tooth or broken bones in his or her jaw.    Manage acute dental trauma:     Apply ice on your child's jaw or cheek for 15 to 20 minutes every hour or as directed. Use an ice pack, or put crushed ice in a plastic bag. Cover it with a towel before you apply it. Ice helps prevent tissue damage and decreases swelling and pain.      Tell your child not to use the damaged tooth. Chewing food on a damaged tooth may put too much pressure on it and worsen the injury.      Have your child eat soft foods or drink liquids for 1 week or as directed. Soft foods and liquids may be easier to eat until the injury heals. Soft foods include applesauce, pudding, mashed potatoes, gelatin, and ice cream.      Care for your child's mouth while he or she heals. Have your child use a soft toothbrush and rinse his or her mouth as directed. Your child's healthcare provider may recommend a solution that contains chlorhexidine 0.1%. This solution will help prevent an infection caused by bacteria. Have your child rinse 2 times each day, or as directed.       Keep any soft tissue wounds clean. Use prescribed mouthwash as directed. Your older child can gargle with a salt water solution. To make the solution, mix 1 teaspoon of salt and 1 cup of warm water. You can also clean your child's wounds with hydrogen peroxide swabs. Ask your healthcare provider for more information on how to clean your child's wounds.      Ask about sports. Do not let your child play contact sports such as football until his or her healthcare provider says it is okay. Always have your child wear protective gear when he or she plays sports. Your child must wear a helmet and mouth guard that meet safety standards. These will prevent damage to your child's gums, teeth, and the bones that support his or her mouth.    Follow up with your child's healthcare provider as directed: Write down your questions so you remember to ask them during your visit.     Head Injury, Pediatric  There are many types of head injuries. Head injuries can be as minor as a bump, or they can be more severe. More severe head injuries include:  A jarring injury to the brain (concussion).A bruise of the brain (contusion). This means there is bleeding in the brain that can cause swelling.A cracked skull (skull fracture).Bleeding in the brain that collects, clots, and forms a bump (hematoma).After a head injury, your child may need to be observed for a while in the emergency department or urgent care. Sometimes admission to the hospital is needed.  After a head injury has happened, most problems occur within the first 24 hours, but side effects may occur up to 7–10 days after the injury. It is important to watch your child's condition for any changes.  What are the causes?  There are many possible causes of a head injury. In younger children, head injury from abuse or falls is the most common. In older children, falls, bicycle injuries, sports accidents, and car accidents (motor vehicle collisions) are common causes of head injury.  What are the symptoms?  There are many possible symptoms of a head injury. Visible symptoms of a head injury include a bruise, bump, or bleeding at the site of the injury. Other non-visible symptoms include:  Trouble being awakened.Fainting.Seizures.Headache.Dizziness.Nausea or vomiting.Confusion.Memory problems.Other possible symptoms that may develop after the head injury include:  Poor attention and concentration.Fatigue or tiring easily.Problems walking or losing balance.Irritability or crying more often.Being uncomfortable around bright lights or loud noises.Anxiety or depression.Losing a learned skill, such as toilet training or reading.Changes in eating or sleeping habits.How is this diagnosed?  This condition can usually be diagnosed based on your child's symptoms, a description of the injury, and a physical exam. Your child may also have imaging tests done, such as a CT scan or MRI. Your child will also be closely watched.  How is this treated?  Treatment for this condition depends on the severity and type of injury your child has. The main goal of treatment is to prevent complications and allow the brain time to heal.  For mild head injury, your child may be sent home and treatment may include:  Observation and checking on your child often.Physical rest.Brain rest.Pain medicines.For severe brain injury, treatment may include:  Close observation. This includes hospitalization with frequent physical exams.Pain medicines.Breathing support. This may include using a ventilator.Managing the pressure inside the brain (intracranial pressure or ICP) by:  Monitoring the ICP.Giving medicines to decrease the ICP.Positioning your child to decrease the ICP.Medicine to prevent seizures.Surgery to stop bleeding or to remove blood clots (craniotomy).Surgery to remove part of the skull (decompressive craniectomy). This allows room for the brain to swell.Follow these instructions at home:  Medicines     Give over-the-counter and prescription medicines only as told by your child's health care provider.Do not give your child aspirin because of the association with Reye syndrome.Activity     Encourage your child to rest as much as possible and avoid activities that are physically hard or tiring. Rest helps the brain to heal.Make sure your child gets enough sleep.Limit activities that require a lot of thought or attention, such as:  Watching TV.Playing memory games and puzzles.Doing homework.Working on the computer, social media, and texting.Having another head injury, especially before the first one has healed, can be dangerous. Keep your child from activities that could cause another head injury, such as:  Riding a bicycle.Playing sports.Participating in gym class or recess.Climbing on playground equipment.Ask your child's health care provider when it is safe for your child to return to his or her regular activities. Ask your child's health care provider for a step-by-step plan for your child to slowly go back to activities.General instructions     Watch your child carefully for new or worsening symptoms. This is very important in the first 24 hours after the head injury.Keep all follow-up visits as told by your child's health care provider. This is important.Tell all of your child's teachers and other caregivers about your child's injury, symptoms, and activity restrictions. Have them report any new or worsening problems.How is this prevented?  Your child should:  Wear a seatbelt when he or she is in a moving vehicle.Use the appropriate-sized car seat or booster seat when in a moving vehicle.Wear a helmet when riding a bicycle, skiing, or doing any other sport or activity that has a risk of injury.You can:  Make your living areas safer for your child.  Childproof any dangerous parts of your home.Install window guards and safety faye.Make sure the playground that your child uses is safe.Get help right away if:  Your child has:  A severe headache that is not helped by medicine.Clear or bloody fluid coming from his or her nose or ears.Changes in his or her vision.A seizure.Your child's symptoms get worse.Your child vomits.Your child's dizziness gets worse.Your child cannot walk or does not have control over his or her arms or legs.Your child will not stop crying.Your child passes out.You cannot wake up your child.Your child is sleepier and has trouble staying awake.Your child will not eat or nurse.Your child's pupils change size.These symptoms may represent a serious problem that is an emergency. Do not wait to see if the symptoms will go away. Get medical help right away. Call your local emergency services (911 in the [place]   This information is not intended to replace advice given to you by your health care provider. Make sure you discuss any questions you have with your health care provider.     FOLLOW UP WITH DR CALDERON TOMORROW, CALL THE OFFICE FOR AN APPOINTMENT. RETURN TO ER FOR ANY WORSENING SYMPTOMS OR NEW CONCERNS.  No food tomorrow morning.

## 2023-10-30 NOTE — TELEPHONE ENCOUNTER
"----- Message from Yasmeen Rider sent at 10/30/2023  3:37 PM CDT -----  Regarding: scheduling  The pt is call in to rescheduled her appt on 11/14 to 11/21 or 11/22 please contact the pt   ----- Message -----  From: Margot Todd  Sent: 10/30/2023   8:30 AM CDT  To: [facility]  Pool    Reschedule Existing Appointment     Appt Date:11/14    Type of appt: F/u      Physician: Jose    Reason for rescheduling?  Requesting to r/s for 11/21 if possible    Caller: Self    Contact Preference: [phone]                   Additional Information:  "Thank you for all that you do for our patients"           "

## 2023-10-30 NOTE — ASSESSMENT & PLAN NOTE
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  · TRICIA finding is non-specific given ground glass (subsolid) appearance => possible low grade neoplasm  · Other findings are not typical for lung metastases.      PET/CT to assess for tracer uptake to guide approach to diagnosis/treatment.

## 2023-11-02 ENCOUNTER — TELEPHONE (OUTPATIENT)
Dept: FAMILY MEDICINE | Facility: CLINIC | Age: 73
End: 2023-11-02
Payer: MEDICARE

## 2023-11-02 ENCOUNTER — LAB VISIT (OUTPATIENT)
Dept: LAB | Facility: HOSPITAL | Age: 73
End: 2023-11-02
Payer: MEDICARE

## 2023-11-02 DIAGNOSIS — I10 BENIGN ESSENTIAL HYPERTENSION: ICD-10-CM

## 2023-11-02 DIAGNOSIS — Z79.899 MEDICATION MANAGEMENT: ICD-10-CM

## 2023-11-02 DIAGNOSIS — I10 BENIGN ESSENTIAL HYPERTENSION: Primary | ICD-10-CM

## 2023-11-02 DIAGNOSIS — R73.03 PREDIABETES: ICD-10-CM

## 2023-11-02 DIAGNOSIS — R79.89 ABNORMAL CBC: ICD-10-CM

## 2023-11-02 LAB
ALBUMIN SERPL BCP-MCNC: 3.9 G/DL (ref 3.5–5.2)
ALP SERPL-CCNC: 53 U/L (ref 55–135)
ALT SERPL W/O P-5'-P-CCNC: 20 U/L (ref 10–44)
ANION GAP SERPL CALC-SCNC: 9 MMOL/L (ref 8–16)
AST SERPL-CCNC: 20 U/L (ref 10–40)
BASOPHILS # BLD AUTO: 0.06 K/UL (ref 0–0.2)
BASOPHILS NFR BLD: 1 % (ref 0–1.9)
BILIRUB SERPL-MCNC: 0.7 MG/DL (ref 0.1–1)
BUN SERPL-MCNC: 6 MG/DL (ref 8–23)
CALCIUM SERPL-MCNC: 9 MG/DL (ref 8.7–10.5)
CHLORIDE SERPL-SCNC: 103 MMOL/L (ref 95–110)
CHOLEST SERPL-MCNC: 162 MG/DL (ref 120–199)
CHOLEST/HDLC SERPL: 3.4 {RATIO} (ref 2–5)
CO2 SERPL-SCNC: 24 MMOL/L (ref 23–29)
CREAT SERPL-MCNC: 0.6 MG/DL (ref 0.5–1.4)
DIFFERENTIAL METHOD: ABNORMAL
EOSINOPHIL # BLD AUTO: 0.5 K/UL (ref 0–0.5)
EOSINOPHIL NFR BLD: 8.4 % (ref 0–8)
ERYTHROCYTE [DISTWIDTH] IN BLOOD BY AUTOMATED COUNT: 12.6 % (ref 11.5–14.5)
EST. GFR  (NO RACE VARIABLE): >60 ML/MIN/1.73 M^2
ESTIMATED AVG GLUCOSE: 120 MG/DL (ref 68–131)
GLUCOSE SERPL-MCNC: 96 MG/DL (ref 70–110)
HBA1C MFR BLD: 5.8 % (ref 4–5.6)
HCT VFR BLD AUTO: 42.3 % (ref 37–48.5)
HDLC SERPL-MCNC: 47 MG/DL (ref 40–75)
HDLC SERPL: 29 % (ref 20–50)
HGB BLD-MCNC: 13.6 G/DL (ref 12–16)
IMM GRANULOCYTES # BLD AUTO: 0.02 K/UL (ref 0–0.04)
IMM GRANULOCYTES NFR BLD AUTO: 0.3 % (ref 0–0.5)
LDLC SERPL CALC-MCNC: 104.4 MG/DL (ref 63–159)
LYMPHOCYTES # BLD AUTO: 1 K/UL (ref 1–4.8)
LYMPHOCYTES NFR BLD: 17.4 % (ref 18–48)
MCH RBC QN AUTO: 27.7 PG (ref 27–31)
MCHC RBC AUTO-ENTMCNC: 32.2 G/DL (ref 32–36)
MCV RBC AUTO: 86 FL (ref 82–98)
MONOCYTES # BLD AUTO: 0.5 K/UL (ref 0.3–1)
MONOCYTES NFR BLD: 8.8 % (ref 4–15)
NEUTROPHILS # BLD AUTO: 3.7 K/UL (ref 1.8–7.7)
NEUTROPHILS NFR BLD: 64.4 % (ref 38–73)
NONHDLC SERPL-MCNC: 115 MG/DL
NRBC BLD-RTO: 0 /100 WBC
PLATELET # BLD AUTO: 194 K/UL (ref 150–450)
PMV BLD AUTO: 12.2 FL (ref 9.2–12.9)
POTASSIUM SERPL-SCNC: 4.3 MMOL/L (ref 3.5–5.1)
PROT SERPL-MCNC: 7.3 G/DL (ref 6–8.4)
RBC # BLD AUTO: 4.91 M/UL (ref 4–5.4)
SODIUM SERPL-SCNC: 136 MMOL/L (ref 136–145)
T4 FREE SERPL-MCNC: 1.15 NG/DL (ref 0.71–1.51)
TRIGL SERPL-MCNC: 53 MG/DL (ref 30–150)
TSH SERPL DL<=0.005 MIU/L-ACNC: 4.18 UIU/ML (ref 0.4–4)
WBC # BLD AUTO: 5.81 K/UL (ref 3.9–12.7)

## 2023-11-02 PROCEDURE — 80053 COMPREHEN METABOLIC PANEL: CPT | Mod: HCNC | Performed by: FAMILY MEDICINE

## 2023-11-02 PROCEDURE — 84439 ASSAY OF FREE THYROXINE: CPT | Mod: HCNC | Performed by: FAMILY MEDICINE

## 2023-11-02 PROCEDURE — 36415 COLL VENOUS BLD VENIPUNCTURE: CPT | Mod: HCNC | Performed by: FAMILY MEDICINE

## 2023-11-02 PROCEDURE — 85025 COMPLETE CBC W/AUTO DIFF WBC: CPT | Mod: HCNC | Performed by: FAMILY MEDICINE

## 2023-11-02 PROCEDURE — 84443 ASSAY THYROID STIM HORMONE: CPT | Mod: HCNC | Performed by: FAMILY MEDICINE

## 2023-11-02 PROCEDURE — 83036 HEMOGLOBIN GLYCOSYLATED A1C: CPT | Mod: HCNC | Performed by: FAMILY MEDICINE

## 2023-11-02 PROCEDURE — 80061 LIPID PANEL: CPT | Mod: HCNC | Performed by: FAMILY MEDICINE

## 2023-11-02 NOTE — TELEPHONE ENCOUNTER
Pt came in as a walk up because she did not have a lipid drawn. Was able to enter her a new lipid

## 2023-11-03 ENCOUNTER — HOSPITAL ENCOUNTER (OUTPATIENT)
Dept: RADIOLOGY | Facility: HOSPITAL | Age: 73
Discharge: HOME | End: 2023-11-03
Attending: EMERGENCY MEDICINE
Payer: MEDICARE

## 2023-11-03 DIAGNOSIS — R91.1 LUNG NODULE: ICD-10-CM

## 2023-11-03 PROCEDURE — 71250 CT CHEST WITHOUT CONTRAST: ICD-10-PCS | Mod: 26,,, | Performed by: STUDENT IN AN ORGANIZED HEALTH CARE EDUCATION/TRAINING PROGRAM

## 2023-11-03 PROCEDURE — 71250 CT THORAX DX C-: CPT | Mod: TC,HCNC

## 2023-11-03 PROCEDURE — 71250 CT THORAX DX C-: CPT | Mod: 26,,, | Performed by: STUDENT IN AN ORGANIZED HEALTH CARE EDUCATION/TRAINING PROGRAM

## 2023-11-06 ENCOUNTER — PATIENT MESSAGE (OUTPATIENT)
Dept: FAMILY MEDICINE | Facility: CLINIC | Age: 73
End: 2023-11-06
Payer: MEDICARE

## 2023-11-07 ENCOUNTER — TELEPHONE (OUTPATIENT)
Dept: PULMONOLOGY | Facility: CLINIC | Age: 73
End: 2023-11-07
Payer: MEDICARE

## 2023-11-07 NOTE — TELEPHONE ENCOUNTER
----- Message from Saida Hoyt sent at 11/7/2023  9:59 AM CST -----  Regarding: Pt calling back, recieved a message to call for arrival time for procedure 11/14/2023  Contact: [phone]  Regarding:Pt calling back, recieved a message to call for arrival time for procedure 11/14/2023    Contact: Keo    Type: Call back with arrival and procedure times.    Who Called: Keo    Would the patient rather a call back or a response via [facility]? Phone call    Best Call Back Number: [phone]

## 2023-11-07 NOTE — TELEPHONE ENCOUNTER
Spoke with patient, informed her that I have received her message. I also advised pt that she can arrive for her procedure on 11/14/23 at 11:30am. Pt verbalized that she understand and states that she was under the impression that she arrive for 9am. I verbalized to pt that I understand and advised pt that her procedure will start at 1pm but she is to arrive for preparation of procedure for 11:30am. Pt verbalized that she understand.

## 2023-11-08 ENCOUNTER — PATIENT MESSAGE (OUTPATIENT)
Dept: FAMILY MEDICINE | Facility: CLINIC | Age: 73
End: 2023-11-08

## 2023-11-08 ENCOUNTER — OFFICE VISIT (OUTPATIENT)
Dept: FAMILY MEDICINE | Facility: CLINIC | Age: 73
End: 2023-11-08
Payer: MEDICARE

## 2023-11-08 VITALS
DIASTOLIC BLOOD PRESSURE: 72 MMHG | WEIGHT: 113.75 LBS | BODY MASS INDEX: 20.93 KG/M2 | TEMPERATURE: 98 F | OXYGEN SATURATION: 98 % | HEART RATE: 64 BPM | HEIGHT: 62 IN | SYSTOLIC BLOOD PRESSURE: 130 MMHG

## 2023-11-08 DIAGNOSIS — R91.1 NODULE OF UPPER LOBE OF RIGHT LUNG: Primary | ICD-10-CM

## 2023-11-08 DIAGNOSIS — F41.8 SITUATIONAL ANXIETY: ICD-10-CM

## 2023-11-08 DIAGNOSIS — C50.411 MALIGNANT NEOPLASM OF UPPER-OUTER QUADRANT OF RIGHT BREAST IN FEMALE, ESTROGEN RECEPTOR POSITIVE: ICD-10-CM

## 2023-11-08 DIAGNOSIS — R09.89 LABILE HYPERTENSION: ICD-10-CM

## 2023-11-08 DIAGNOSIS — Z79.899 LONG-TERM CURRENT USE OF PROTON PUMP INHIBITOR THERAPY: ICD-10-CM

## 2023-11-08 DIAGNOSIS — Z17.0 MALIGNANT NEOPLASM OF UPPER-OUTER QUADRANT OF RIGHT BREAST IN FEMALE, ESTROGEN RECEPTOR POSITIVE: ICD-10-CM

## 2023-11-08 DIAGNOSIS — Z79.899 MEDICATION MANAGEMENT: ICD-10-CM

## 2023-11-08 DIAGNOSIS — I10 BENIGN ESSENTIAL HYPERTENSION: ICD-10-CM

## 2023-11-08 DIAGNOSIS — Z79.811 AROMATASE INHIBITOR USE: ICD-10-CM

## 2023-11-08 DIAGNOSIS — R73.03 PREDIABETES: ICD-10-CM

## 2023-11-08 PROCEDURE — 3288F FALL RISK ASSESSMENT DOCD: CPT | Mod: CPTII,S$GLB,, | Performed by: FAMILY MEDICINE

## 2023-11-08 PROCEDURE — 1160F RVW MEDS BY RX/DR IN RCRD: CPT | Mod: CPTII,S$GLB,, | Performed by: FAMILY MEDICINE

## 2023-11-08 PROCEDURE — 99999 PR PBB SHADOW E&M-EST. PATIENT-LVL IV: ICD-10-PCS | Mod: PBBFAC,,, | Performed by: FAMILY MEDICINE

## 2023-11-08 PROCEDURE — 1160F PR REVIEW ALL MEDS BY PRESCRIBER/CLIN PHARMACIST DOCUMENTED: ICD-10-PCS | Mod: CPTII,S$GLB,, | Performed by: FAMILY MEDICINE

## 2023-11-08 PROCEDURE — 1101F PR PT FALLS ASSESS DOC 0-1 FALLS W/OUT INJ PAST YR: ICD-10-PCS | Mod: CPTII,S$GLB,, | Performed by: FAMILY MEDICINE

## 2023-11-08 PROCEDURE — 3008F BODY MASS INDEX DOCD: CPT | Mod: CPTII,S$GLB,, | Performed by: FAMILY MEDICINE

## 2023-11-08 PROCEDURE — 1159F MED LIST DOCD IN RCRD: CPT | Mod: CPTII,S$GLB,, | Performed by: FAMILY MEDICINE

## 2023-11-08 PROCEDURE — 3044F PR MOST RECENT HEMOGLOBIN A1C LEVEL <7.0%: ICD-10-PCS | Mod: CPTII,S$GLB,, | Performed by: FAMILY MEDICINE

## 2023-11-08 PROCEDURE — 3008F PR BODY MASS INDEX (BMI) DOCUMENTED: ICD-10-PCS | Mod: CPTII,S$GLB,, | Performed by: FAMILY MEDICINE

## 2023-11-08 PROCEDURE — 4010F PR ACE/ARB THEARPY RXD/TAKEN: ICD-10-PCS | Mod: CPTII,S$GLB,, | Performed by: FAMILY MEDICINE

## 2023-11-08 PROCEDURE — 99215 PR OFFICE/OUTPT VISIT, EST, LEVL V, 40-54 MIN: ICD-10-PCS | Mod: S$GLB,,, | Performed by: FAMILY MEDICINE

## 2023-11-08 PROCEDURE — 3044F HG A1C LEVEL LT 7.0%: CPT | Mod: CPTII,S$GLB,, | Performed by: FAMILY MEDICINE

## 2023-11-08 PROCEDURE — 99999 PR PBB SHADOW E&M-EST. PATIENT-LVL IV: CPT | Mod: PBBFAC,,, | Performed by: FAMILY MEDICINE

## 2023-11-08 PROCEDURE — 3288F PR FALLS RISK ASSESSMENT DOCUMENTED: ICD-10-PCS | Mod: CPTII,S$GLB,, | Performed by: FAMILY MEDICINE

## 2023-11-08 PROCEDURE — 3078F DIAST BP <80 MM HG: CPT | Mod: CPTII,S$GLB,, | Performed by: FAMILY MEDICINE

## 2023-11-08 PROCEDURE — 1101F PT FALLS ASSESS-DOCD LE1/YR: CPT | Mod: CPTII,S$GLB,, | Performed by: FAMILY MEDICINE

## 2023-11-08 PROCEDURE — 1159F PR MEDICATION LIST DOCUMENTED IN MEDICAL RECORD: ICD-10-PCS | Mod: CPTII,S$GLB,, | Performed by: FAMILY MEDICINE

## 2023-11-08 PROCEDURE — 3075F PR MOST RECENT SYSTOLIC BLOOD PRESS GE 130-139MM HG: ICD-10-PCS | Mod: CPTII,S$GLB,, | Performed by: FAMILY MEDICINE

## 2023-11-08 PROCEDURE — 3078F PR MOST RECENT DIASTOLIC BLOOD PRESSURE < 80 MM HG: ICD-10-PCS | Mod: CPTII,S$GLB,, | Performed by: FAMILY MEDICINE

## 2023-11-08 PROCEDURE — 1126F PR PAIN SEVERITY QUANTIFIED, NO PAIN PRESENT: ICD-10-PCS | Mod: CPTII,S$GLB,, | Performed by: FAMILY MEDICINE

## 2023-11-08 PROCEDURE — 1126F AMNT PAIN NOTED NONE PRSNT: CPT | Mod: CPTII,S$GLB,, | Performed by: FAMILY MEDICINE

## 2023-11-08 PROCEDURE — 3075F SYST BP GE 130 - 139MM HG: CPT | Mod: CPTII,S$GLB,, | Performed by: FAMILY MEDICINE

## 2023-11-08 PROCEDURE — 4010F ACE/ARB THERAPY RXD/TAKEN: CPT | Mod: CPTII,S$GLB,, | Performed by: FAMILY MEDICINE

## 2023-11-08 PROCEDURE — 99215 OFFICE O/P EST HI 40 MIN: CPT | Mod: S$GLB,,, | Performed by: FAMILY MEDICINE

## 2023-11-08 RX ORDER — OMEPRAZOLE 20 MG/1
20 TABLET, DELAYED RELEASE ORAL DAILY
COMMUNITY

## 2023-11-08 RX ORDER — METOPROLOL SUCCINATE 50 MG/1
50 TABLET, EXTENDED RELEASE ORAL DAILY
Qty: 90 TABLET | Refills: 4 | Status: SHIPPED | OUTPATIENT
Start: 2023-11-08 | End: 2025-01-31

## 2023-11-08 RX ORDER — LOSARTAN POTASSIUM 25 MG/1
25 TABLET ORAL DAILY
Qty: 90 TABLET | Refills: 4 | Status: SHIPPED | OUTPATIENT
Start: 2023-11-08 | End: 2024-03-26 | Stop reason: ALTCHOICE

## 2023-11-08 NOTE — PATIENT INSTRUCTIONS
CONSIDER BENEFIBER PRE-BIOTIC FIBER SUPPLEMENT DAILY FOR BOWEL MOVEMENT REGULATION    ALSO PEPCID AC OR PEPCID COMPLETE LATER INT HE DAY FOR REFLUX SYMPTOMS ON PRILOSEC

## 2023-11-08 NOTE — PROGRESS NOTES
Office Visit    Patient Name: Keo Frias    : 1950  MRN: [id number]    Subjective:  Keo is a 73 y.o. female who presents today for:    Follow-up ( month)    MOST RECENT OFFICE VISIT WITH ME 2023   PULMONARY 10/10/2023   DR PINTO 10/23/23-- AND BRONCHOSCOPY SCHEDULED 23   2D Echo: stress 2023, UNREMARKABLE WITH EF 65% & the holter monitor test is pending-- 30 day event monitor  CT Chest 10/2023  Right lower lobe spiculated lesion measuring up to 2.1 cm, highly suspicious for neoplasm until proven otherwise.  Further correlation advised.  Other subcentimeter pulmonary nodules and ill-defined left lower lobe opacity, more technically indeterminate with component of metastasis not excluded.  NM PET 10/2023-  In the head and neck, there are no hypermetabolic lesions worrisome for malignancy. There are no hypermetabolic mucosal lesions, and there are no pathologically enlarged or hypermetabolic lymph nodes.       73-year-old female patient of mine with longstanding history of hypertension and difficulties with labile blood pressure readings, osteoporosis, generalized anxiety, who presents today for follow-up.  SHE WAS PREVIOUSLY SEEN IN THE ER ON 2023 AND FORTUNATELY HER WORKUP THERE WAS UNREMARKABLE IN TERMS OF BLOOD PRESSURE COMPLICATIONS.      Workup included BNP, CBC, troponin, D-dimer, EKG, chest x-ray all of which were unremarkable except for lesion on chest x-ray that requires follow-up.    Currently feeling very well-- good energy level and no malaise.   Slight cough and some throat clearing potentially associated with reflux-- not fully controlled on Prilosec otc.      She is currently taking  Toprol-XL 50 in the morning-first thing around 8am  Vitamins with breakfast  Lexapro 5 mg every day before a noon nap  Cozaar 25 mg around 9 PM     No recent use of Clonidine.     Office visit 2023 show stable A1c of 5.8, normal liver/kidney function, stable mildly elevated TSH with  normal free T4 and history of negative TPO antibody, lipid panel normal with , CBC unremarkable.      Home blood pressure log reviewed-- blood pressures less labile and 90% in goal range-- max SBP 170s on 1 occasion (asymptomatic) and also a couple of SPBs in th 90s    She presents today with her daughter and recently returned from a family wedding in [facility].          PAST MEDICAL HISTORY, SURGICAL/SOCIAL/FAMILY HISTORY REVIEWED AS PER CHART, WITH PERTINENT FINDINGS INCLUDED IN HISTORY SECTION OF NOTE.     Current Medications    Medication List with Changes/Refills   Current Medications    AMLODIPINE (NORVASC) 2.5 MG TABLET    Take 2.5 mg by mouth.    CALCIUM CARBONATE (CALCIUM 500 ORAL)    Take 1 tablet by mouth.    CHOLECALCIFEROL, VITAMIN D3, 125 MCG (5,000 UNIT) CAPSULE    Take 1 capsule (5,000 Units total) by mouth daily with breakfast.    CIPRODEX 0.3-0.1 % DRPS    PLACE 4 DROPS INTO THE RIGHT EAR 2 (TWO) TIMES DAILY. FOR 10 DAYS    CLONIDINE (CATAPRES) 0.1 MG TABLET    Take 1 tablet (0.1 mg total) by mouth daily as needed (for systolic greater than or equal to 160 mmHg).    ESCITALOPRAM OXALATE (LEXAPRO) 10 MG TABLET    Take 1 tablet (10 mg total) by mouth once daily.    LACTOBACILLUS RHAMNOSUS GG (CULTURELLE) 10 BILLION CELL CAPSULE    Take 1 capsule by mouth once daily.    LETROZOLE (FEMARA) 2.5 MG TAB    TAKE 1 TABLET BY MOUTH EVERY DAY    LOSARTAN (COZAAR) 25 MG TABLET    Take 1 tablet (25 mg total) by mouth once daily.    METOPROLOL SUCCINATE (TOPROL-XL) 50 MG 24 HR TABLET    Take 1 tablet (50 mg total) by mouth once daily.    MULTIVITAMIN-IRON-FOLIC ACID TAB    Take 1 tablet by mouth once daily.    OMEPRAZOLE (PRILOSEC OTC) 20 MG TABLET    Take 20 mg by mouth once daily.    PROLIA 60 MG/ML SYRG       Discontinued Medications    COVID [id number],12UP,,ANDU,,PF, (SPIKEVAX 4935-6539,12Y UP,,PF,) 50 MCG/0.5 ML INJECTION    Inject into the muscle.    HYDROXYZINE HCL (ATARAX) 25 MG TABLET    Take 1  "tablet (25 mg total) by mouth 3 (three) times daily as needed for Anxiety (insomnia).    PANTOPRAZOLE (PROTONIX) 20 MG TABLET    Take 1 tablet (20 mg total) by mouth once daily.       Allergies   Review of patient's allergies indicates:   Allergen Reactions    Sinus allergy Other (See Comments)     Headaches, migranes    Sulfa (sulfonamide antibiotics) Rash         Review of Systems (Pertinent positives)  Review of Systems   Constitutional:  Negative for unexpected weight change.   HENT:  Positive for postnasal drip.    Eyes:  Negative for visual disturbance.   Respiratory:  Positive for cough. Negative for chest tightness and shortness of breath.    Cardiovascular:  Negative for chest pain, palpitations and leg swelling.   Gastrointestinal:  Negative for constipation and diarrhea.   Genitourinary:  Negative for difficulty urinating.   Allergic/Immunologic: Positive for environmental allergies.   Neurological:  Negative for dizziness, light-headedness and headaches.   Psychiatric/Behavioral:  The patient is not nervous/anxious (improved on lexapro 5).        /72 (BP Location: Right arm, Patient Position: Sitting, BP Method: Medium (Manual))   Pulse 64   Temp 98.2 °F (36.8 °C) (Oral)   Ht 5' 2" (1.575 m)   Wt 51.6 kg (113 lb 12.1 oz)   LMP  (LMP Unknown)   SpO2 98%   BMI 20.81 kg/m²     Physical Exam  Vitals reviewed.   Constitutional:       General: She is not in acute distress.     Appearance: Normal appearance. She is well-developed.   HENT:      Head: Normocephalic and atraumatic.      Right Ear: There is no impacted cerumen.      Left Ear: There is no impacted cerumen.      Nose: Rhinorrhea (mild, clear) present.      Mouth/Throat:      Pharynx: No oropharyngeal exudate.   Eyes:      Conjunctiva/sclera: Conjunctivae normal.   Cardiovascular:      Rate and Rhythm: Normal rate and regular rhythm.   Pulmonary:      Effort: Pulmonary effort is normal.      Breath sounds: Normal breath sounds. "   Abdominal:      General: There is no distension.      Palpations: Abdomen is soft.   Musculoskeletal:      Right lower leg: No edema.      Left lower leg: No edema.   Skin:     General: Skin is warm and dry.   Neurological:      General: No focal deficit present.      Mental Status: She is alert and oriented to person, place, and time.   Psychiatric:         Mood and Affect: Mood normal.         Behavior: Behavior normal.         Assessment/Plan:  Keo Frias is a 73 y.o. female who presents today for :        ICD-10-CM ICD-9-CM    1. Nodule of upper lobe of right lung  R91.1 793.11       2. Benign essential hypertension  I10 401.1 Hemoglobin A1C      Comprehensive Metabolic Panel      Lipid Panel      CBC Auto Differential      TSH      Magnesium      Vitamin D      Vitamin B12      3. Labile hypertension  R09.89 401.9       4. Medication management  Z79.899 V58.69 Hemoglobin A1C      Comprehensive Metabolic Panel      Lipid Panel      CBC Auto Differential      TSH      Magnesium      Vitamin D      Vitamin B12      5. Long-term current use of proton pump inhibitor therapy  Z79.899 V58.69 Hemoglobin A1C      Comprehensive Metabolic Panel      Lipid Panel      CBC Auto Differential      TSH      Magnesium      Vitamin D      Vitamin B12      6. Prediabetes  R73.03 790.29 Hemoglobin A1C      Comprehensive Metabolic Panel      Lipid Panel      CBC Auto Differential      TSH      Magnesium      Vitamin D      Vitamin B12        HEALTH MAINTENANCE REVIEWED, UP-TO-DATE EXCEPT PLANS TO GET RSV VACCINE AFTER HER UPCOMING BRONCHOSCOPY PROCEDURE.    MAMMOGRAM UP-TO-DATE, DEXA WILL BE DUE LATER THIS YEAR ALONG WITH HER SIX-MONTH PROLIA SHOT-ORDERED BY HEME/ONC DR. CARMONA.      HYPERTENSION WITH LABILE BLOOD PRESSURES:  Doing well on Toprol-XL 50 in the morning and losartan 25 in the evenings.  Clinically she feels well and has good exercise tolerance.  Has clonidine available-counseled that she does not need to take it  unless her systolic pressures greater than 180-- HAS NOT NEEDED RECENTLY.  Had a recently unremarkable cardiac stress test, has a 30 day event monitor in place.  Continuing blood pressure monitoring.       SITUATIONAL ANXIETY:  This does seem to factor into her blood pressure variation.  She notes improvement with change to Lexapro 5 mg daily.      LUNG MASS:  Has upcoming bronchoscopy per Pulmonary 11/14/2023 with follow-up scheduled.  Fortunately PET-CT seems clear outside of the lungs.  She will receive further recommendations following the procedure.  Breathing and exercise tolerance are good.     GERD:  Fair control on over-the-counter Prilosec, counseled that she can take Pepcid Complete or Pepcid AC later in the day as needed for breakthrough symptoms-this may need to be investigated further should her GERD symptoms persist with potential EGD recommendation.      PREDIABETES:  A1c 5.8, continue attention to low carb diet, regular walking and recheck in 6 months with labs prior to annual physical.     OSTEOPENIA:  Continuing Prolia q.6 months, vitamin-D, currently on letrozole, orders per heme/Onc Dr. Garcia. pdated DEXA scan due    A total of 40 minutes was spent on this encounter that included explaining differentials, symptom counseling, review of recent results, and next steps of diagnosis and management plan, along with direct documentation of the encounter.      Patient Instructions   CONSIDER BENEFIBER PRE-BIOTIC FIBER SUPPLEMENT DAILY FOR BOWEL MOVEMENT REGULATION    ALSO PEPCID AC OR PEPCID COMPLETE LATER INT HE DAY FOR REFLUX SYMPTOMS ON PRILOSEC           Follow up in about 6 months (around 5/8/2024) for to follow up on lab results, return as needed for new concerns.

## 2023-11-13 ENCOUNTER — ANESTHESIA EVENT (OUTPATIENT)
Dept: SURGERY | Facility: HOSPITAL | Age: 73
End: 2023-11-13
Payer: MEDICARE

## 2023-11-13 NOTE — ANESTHESIA PREPROCEDURE EVALUATION
[facility]  Anesthesia Pre-Operative Evaluation         Patient Name: Keo Frias  YOB: 1950  MRN: [id number]    SUBJECTIVE:     Pre-operative evaluation for Procedure(s) (LRB):  ROBOTIC BRONCHOSCOPY (N/A)     11/13/2023    Keo Frias is a 73 y.o. female w/ a significant PMHx HTN, osteoporosis, TERESA and previous infiltrating ductal carcinoma of breast (follows with Oncology) who presents with RLL spiculated lesion (suspicious for neoplasm).    Patient now presents for the above procedure(s).    LDA: None documented.       Prev airway:     Intubation:     Induction:  Intravenous    Intubated:  Postinduction    Mask Ventilation:  Easy mask    Attempts:  1    Attempted By:  CRNA    Difficult Airway Encountered?: No      Complications:  None    Airway Device:  Supraglottic airway/LMA    Airway Device Size:  3.5    Placement Verified By:  Capnometry    Complicating Factors:  None    Findings Post-Intubation:  BS equal bilateral and atraumatic/condition of teeth unchanged           Drips: None documented.      Patient Active Problem List   Diagnosis    Age-related osteoporosis without current pathological fracture    Migraine headache with aura    Vitamin D deficiency    Vegetarian diet    Otalgia of both ears    Sensorineural hearing loss    Dysfunctions of sleep stages or arousal from sleep    Benign essential hypertension    Situational anxiety    Right renal mass    Labile hypertension    At risk for lymphedema    Malignant neoplasm of upper-outer quadrant of right breast in female, estrogen receptor positive    Chronic right-sided low back pain without sciatica    Aromatase inhibitor use    Palpitations    Chest pain    Nodule of upper lobe of right lung    Abnormal chest CT    Medication management       Review of patient's allergies indicates:   Allergen Reactions    Sinus allergy Other (See Comments)     Headaches, migranes    Sulfa (sulfonamide antibiotics) Rash       Current  Inpatient Medications:      No current facility-administered medications on file prior to encounter.     Current Outpatient Medications on File Prior to Encounter   Medication Sig Dispense Refill    calcium carbonate (CALCIUM 500 ORAL) Take 1 tablet by mouth.      cholecalciferol, vitamin D3, 125 mcg (5,000 unit) capsule Take 1 capsule (5,000 Units total) by mouth daily with breakfast. 100 capsule 4    CIPRODEX 0.3-0.1 % DrpS PLACE 4 DROPS INTO THE RIGHT EAR 2 (TWO) TIMES DAILY. FOR 10 DAYS 7.5 mL 5    cloNIDine (CATAPRES) 0.1 MG tablet Take 1 tablet (0.1 mg total) by mouth daily as needed (for systolic greater than or equal to 160 mmHg). 90 tablet 3    EScitalopram oxalate (LEXAPRO) 10 MG tablet Take 1 tablet (10 mg total) by mouth once daily. 90 tablet 4    Lactobacillus rhamnosus GG (CULTURELLE) 10 billion cell capsule Take 1 capsule by mouth once daily.      letrozole (FEMARA) 2.5 mg Tab TAKE 1 TABLET BY MOUTH EVERY DAY 90 tablet 3    multivitamin-iron-folic acid Tab Take 1 tablet by mouth once daily.      PROLIA 60 mg/mL Syrg          Past Surgical History:   Procedure Laterality Date    COLON SURGERY      COLONOSCOPY N/A 12/7/2018    Procedure: COLONOSCOPY;  Surgeon: Bhargav Gaston MD;  Location: [address];  Service: Endoscopy;  Laterality: N/A;    DILATION AND CURETTAGE OF UTERUS      postmenopausal bleeding    MASTECTOMY, PARTIAL Right 5/12/2021    Procedure: MASTECTOMY, PARTIAL-Right with radiological marker;  Surgeon: Vivian Cadena MD;  Location: [facility];  Service: General;  Laterality: Right;    SENTINEL LYMPH NODE BIOPSY Right 5/12/2021    Procedure: BIOPSY, LYMPH NODE, SENTINEL-Right;  Surgeon: Vivian Cadena MD;  Location: [facility];  Service: General;  Laterality: Right;    TUBAL LIGATION         Social History:  Tobacco Use: Low Risk  (11/8/2023)    Patient History     Smoking Tobacco Use: Never     Smokeless Tobacco Use: Never     Passive Exposure: Not on file      Alcohol Use: Not on file         OBJECTIVE:     Vital Signs Range (Last 24H):         Significant Labs:  Lab Results   Component Value Date    WBC 5.81 11/02/2023    HGB 13.6 11/02/2023    HCT 42.3 11/02/2023     11/02/2023    CHOL 162 11/02/2023    TRIG 53 11/02/2023    HDL 47 11/02/2023    ALT 20 11/02/2023    AST 20 11/02/2023     11/02/2023    K 4.3 11/02/2023     11/02/2023    CREATININE 0.6 11/02/2023    BUN 6 (L) 11/02/2023    CO2 24 11/02/2023    TSH 4.178 (H) 11/02/2023    HGBA1C 5.8 (H) 11/02/2023       Diagnostic Studies: No relevant studies.    EKG:   Results for orders placed or performed during the hospital encounter of 09/12/23   EKG 12-lead    Collection Time: 09/12/23  3:13 PM    Narrative    Test Reason : R07.9,    Vent. Rate : 063 BPM     Atrial Rate : 063 BPM     P-R Int : 154 ms          QRS Dur : 070 ms      QT Int : 410 ms       P-R-T Axes : 080 073 078 degrees     QTc Int : 419 ms    Normal sinus rhythm  Normal ECG  When compared with ECG of 28-AUG-2023 22:06,  Premature atrial complexes are no longer Present  Confirmed by Jacques SARAH MD ([id number]) on 9/12/2023 4:17:12 PM    Referred By: [email or web address]   SELF           Confirmed By:Jacques SARAH MD       2D ECHO:  TTE:  Results for orders placed or performed during the hospital encounter of 09/13/19   Echo Color Flow Doppler? Yes   Result Value Ref Range    Ascending aorta 2.32 cm    STJ 2.52 cm    AV mean gradient 4 mmHg    Ao peak ken 1.22 m/s    Ao VTI 25.09 cm    IVS 0.68 0.6 - 1.1 cm    LA size 3.07 cm    Left Atrium Major Axis 3.69 cm    LVIDd 3.65 3.5 - 6.0 cm    LVIDs 2.49 2.1 - 4.0 cm    LVOT diameter 1.88 cm    LVOT peak VTI 20.98 cm    Posterior Wall 0.72 0.6 - 1.1 cm    MV Peak A Ken 0.88 m/s    E wave deceleration time 211.84 msec    MV Peak E Ken 0.62 m/s    PV Peak D Ken 0.30 m/s    PV Peak S Ken 0.51 m/s    RA Major Axis 3.39 cm    RA Width 3.14 cm    RVDD 2.69 cm    Sinus 2.55 cm    TAPSE 1.32 cm    TR Max Ken 2.20 m/s    TDI LATERAL 0.07 m/s     TDI SEPTAL 0.04 m/s    LA WIDTH 3.31 cm    LV Diastolic Volume 56.12 mL    LV Systolic Volume 22.20 mL    RV S' 8.61 cm/s    LVOT peak edda 0.95 m/s    LV LATERAL E/E' RATIO 8.86 m/s    LV SEPTAL E/E' RATIO 15.50 m/s    FS 32 %    LV mass 67.29 g    Left Ventricle Relative Wall Thickness 0.39 cm    AV valve area 2.32 cm2    AV Velocity Ratio 0.78     AV index (prosthetic) 0.84     E/A ratio 0.70     Mean e' 0.06 m/s    Pulm vein S/D ratio 1.70     LVOT area 2.8 cm2    LVOT stroke volume 58.21 cm3    AV peak gradient 6 mmHg    E/E' ratio 11.27 m/s    Triscuspid Valve Regurgitation Peak Gradient 19 mmHg    BSA 1.54 m2    LV Systolic Volume Index 14.5 mL/m2    LV Diastolic Volume Index 36.61 mL/m2    LV Mass Index 44 g/m2    Right Atrial Pressure (from IVC) 3 mmHg    TV resting pulmonary artery pressure 22 mmHg    Narrative    · Normal left ventricular systolic function. The estimated ejection   fraction is 65%  · Normal LV diastolic function.  · No wall motion abnormalities.  · Normal right ventricular systolic function.  · Mild tricuspid regurgitation.  · The estimated PA systolic pressure is 22 mm Hg  · Normal central venous pressure (3 mm Hg).          COURTNEY:  No results found for this or any previous visit.    ASSESSMENT/PLAN:      Pre-op Assessment    I have reviewed the Patient Summary Reports.     I have reviewed the Nursing Notes. I have reviewed the NPO Status.   I have reviewed the Medications.     Review of Systems  Anesthesia Hx:  No problems with previous Anesthesia               Denies Personal Hx of Anesthesia complications.                    Cardiovascular:     Hypertension                                        Pulmonary:  Pulmonary Normal                       Renal/:  Renal/ Normal                 Hepatic/GI:  Hepatic/GI Normal                 Neurological:      Headaches                                 Endocrine:   Hypothyroidism (subclinical)          Psych:   anxiety                 Physical  Exam  General: Well nourished, Cooperative, Alert and Oriented    Airway:  Mallampati: II / I  Mouth Opening: Normal  Tongue: Normal  Neck ROM: Normal ROM    Dental:  Intact        Anesthesia Plan  Type of Anesthesia, risks & benefits discussed:    Anesthesia Type: Gen ETT  Intra-op Monitoring Plan: Standard ASA Monitors  Post Op Pain Control Plan: multimodal analgesia and IV/PO Opioids PRN  Induction:  IV  Airway Plan: Direct, Post-Induction  Informed Consent: Informed consent signed with the Patient and all parties understand the risks and agree with anesthesia plan.  All questions answered.   ASA Score: 3  Day of Surgery Review of History & Physical: H&P Update referred to the surgeon/provider.    Ready For Surgery From Anesthesia Perspective.     .

## 2023-11-14 ENCOUNTER — ANESTHESIA (OUTPATIENT)
Dept: SURGERY | Facility: HOSPITAL | Age: 73
End: 2023-11-14
Payer: MEDICARE

## 2023-11-14 ENCOUNTER — HOSPITAL ENCOUNTER (OUTPATIENT)
Facility: HOSPITAL | Age: 73
Discharge: HOME | End: 2023-11-14
Attending: EMERGENCY MEDICINE | Admitting: EMERGENCY MEDICINE
Payer: MEDICARE

## 2023-11-14 VITALS
RESPIRATION RATE: 20 BRPM | HEIGHT: 62 IN | DIASTOLIC BLOOD PRESSURE: 84 MMHG | HEART RATE: 76 BPM | OXYGEN SATURATION: 97 % | WEIGHT: 113 LBS | BODY MASS INDEX: 20.8 KG/M2 | SYSTOLIC BLOOD PRESSURE: 187 MMHG | TEMPERATURE: 98 F

## 2023-11-14 DIAGNOSIS — R91.1 LUNG NODULE: ICD-10-CM

## 2023-11-14 PROCEDURE — 87206 SMEAR FLUORESCENT/ACID STAI: CPT | Performed by: EMERGENCY MEDICINE

## 2023-11-14 PROCEDURE — 31654 BRONCH EBUS IVNTJ PERPH LES: CPT | Mod: ,,, | Performed by: EMERGENCY MEDICINE

## 2023-11-14 PROCEDURE — 31627 NAVIGATIONAL BRONCHOSCOPY: CPT | Mod: ,,, | Performed by: EMERGENCY MEDICINE

## 2023-11-14 PROCEDURE — 37000008 HC ANESTHESIA 1ST 15 MINUTES: Performed by: EMERGENCY MEDICINE

## 2023-11-14 PROCEDURE — 31624 DX BRONCHOSCOPE/LAVAGE: CPT | Mod: 51,RT,, | Performed by: EMERGENCY MEDICINE

## 2023-11-14 PROCEDURE — 87205 SMEAR GRAM STAIN: CPT | Performed by: EMERGENCY MEDICINE

## 2023-11-14 PROCEDURE — 36000709 HC OR TIME LEV III EA ADD 15 MIN: Performed by: EMERGENCY MEDICINE

## 2023-11-14 PROCEDURE — 25000003 PHARM REV CODE 250

## 2023-11-14 PROCEDURE — 71000015 HC POSTOP RECOV 1ST HR: Performed by: EMERGENCY MEDICINE

## 2023-11-14 PROCEDURE — D9220A PRA ANESTHESIA: ICD-10-PCS | Mod: ,,, | Performed by: ANESTHESIOLOGY

## 2023-11-14 PROCEDURE — 25000003 PHARM REV CODE 250: Performed by: EMERGENCY MEDICINE

## 2023-11-14 PROCEDURE — 87116 MYCOBACTERIA CULTURE: CPT | Performed by: EMERGENCY MEDICINE

## 2023-11-14 PROCEDURE — 63600175 PHARM REV CODE 636 W HCPCS

## 2023-11-14 PROCEDURE — 31624 PR BRONCHOSCOPY,DIAG2STIC W LAVAGE: ICD-10-PCS | Mod: 51,RT,, | Performed by: EMERGENCY MEDICINE

## 2023-11-14 PROCEDURE — 31654 PR BRONCH W/ EBUS, DIAG OR THERA INTERVENTION PERIPHERAL LESION(S), INCL GUID, ADD ON CODE: ICD-10-PCS | Mod: ,,, | Performed by: EMERGENCY MEDICINE

## 2023-11-14 PROCEDURE — 37000009 HC ANESTHESIA EA ADD 15 MINS: Performed by: EMERGENCY MEDICINE

## 2023-11-14 PROCEDURE — 31629 PR BRONCHOSCOPY,TRANSBRON ASPIR BX: ICD-10-PCS | Mod: RT,,, | Performed by: EMERGENCY MEDICINE

## 2023-11-14 PROCEDURE — 71000044 HC DOSC ROUTINE RECOVERY FIRST HOUR: Performed by: EMERGENCY MEDICINE

## 2023-11-14 PROCEDURE — 87070 CULTURE OTHR SPECIMN AEROBIC: CPT | Performed by: EMERGENCY MEDICINE

## 2023-11-14 PROCEDURE — 36000708 HC OR TIME LEV III 1ST 15 MIN: Performed by: EMERGENCY MEDICINE

## 2023-11-14 PROCEDURE — C1726 CATH, BAL DIL, NON-VASCULAR: HCPCS | Performed by: EMERGENCY MEDICINE

## 2023-11-14 PROCEDURE — 31628 PR BRONCHOSCOPY,TRANSBRONCH BIOPSY: ICD-10-PCS | Mod: 51,RT,, | Performed by: EMERGENCY MEDICINE

## 2023-11-14 PROCEDURE — D9220A PRA ANESTHESIA: Mod: ,,, | Performed by: ANESTHESIOLOGY

## 2023-11-14 PROCEDURE — 31627 PR BRONCHOSCOPY,COMPUTER ASSIST/IMAGE-GUIDED NAVIGATION: ICD-10-PCS | Mod: ,,, | Performed by: EMERGENCY MEDICINE

## 2023-11-14 PROCEDURE — 31628 BRONCHOSCOPY/LUNG BX EACH: CPT | Mod: 51,RT,, | Performed by: EMERGENCY MEDICINE

## 2023-11-14 PROCEDURE — 87015 SPECIMEN INFECT AGNT CONCNTJ: CPT | Performed by: EMERGENCY MEDICINE

## 2023-11-14 PROCEDURE — 87102 FUNGUS ISOLATION CULTURE: CPT | Performed by: EMERGENCY MEDICINE

## 2023-11-14 PROCEDURE — 31629 BRONCHOSCOPY/NEEDLE BX EACH: CPT | Mod: RT,,, | Performed by: EMERGENCY MEDICINE

## 2023-11-14 RX ORDER — SODIUM CHLORIDE 0.9 % (FLUSH) 0.9 %
10 SYRINGE (ML) INJECTION
Status: CANCELLED | OUTPATIENT
Start: 2023-11-14

## 2023-11-14 RX ORDER — MIDAZOLAM HYDROCHLORIDE 5 MG/ML
INJECTION INTRAMUSCULAR; INTRAVENOUS
Status: DISCONTINUED | OUTPATIENT
Start: 2023-11-14 | End: 2023-11-14

## 2023-11-14 RX ORDER — DEXAMETHASONE SODIUM PHOSPHATE 4 MG/ML
INJECTION, SOLUTION INTRA-ARTICULAR; INTRALESIONAL; INTRAMUSCULAR; INTRAVENOUS; SOFT TISSUE
Status: DISCONTINUED | OUTPATIENT
Start: 2023-11-14 | End: 2023-11-14

## 2023-11-14 RX ORDER — FENTANYL CITRATE 50 UG/ML
INJECTION, SOLUTION INTRAMUSCULAR; INTRAVENOUS
Status: DISCONTINUED | OUTPATIENT
Start: 2023-11-14 | End: 2023-11-14

## 2023-11-14 RX ORDER — FENTANYL CITRATE 50 UG/ML
25 INJECTION, SOLUTION INTRAMUSCULAR; INTRAVENOUS EVERY 5 MIN PRN
Status: CANCELLED | OUTPATIENT
Start: 2023-11-14

## 2023-11-14 RX ORDER — HALOPERIDOL 5 MG/ML
0.5 INJECTION INTRAMUSCULAR EVERY 10 MIN PRN
Status: CANCELLED | OUTPATIENT
Start: 2023-11-14

## 2023-11-14 RX ORDER — ROCURONIUM BROMIDE 10 MG/ML
INJECTION, SOLUTION INTRAVENOUS
Status: DISCONTINUED | OUTPATIENT
Start: 2023-11-14 | End: 2023-11-14

## 2023-11-14 RX ORDER — LIDOCAINE HYDROCHLORIDE 20 MG/ML
INJECTION, SOLUTION EPIDURAL; INFILTRATION; INTRACAUDAL; PERINEURAL
Status: DISCONTINUED | OUTPATIENT
Start: 2023-11-14 | End: 2023-11-14

## 2023-11-14 RX ORDER — LIDOCAINE HYDROCHLORIDE 10 MG/ML
INJECTION, SOLUTION EPIDURAL; INFILTRATION; INTRACAUDAL; PERINEURAL
Status: DISCONTINUED | OUTPATIENT
Start: 2023-11-14 | End: 2023-11-14 | Stop reason: HOSPADM

## 2023-11-14 RX ORDER — ONDANSETRON 2 MG/ML
INJECTION INTRAMUSCULAR; INTRAVENOUS
Status: DISCONTINUED | OUTPATIENT
Start: 2023-11-14 | End: 2023-11-14

## 2023-11-14 RX ORDER — FAMOTIDINE 10 MG/1
10 TABLET ORAL
COMMUNITY

## 2023-11-14 RX ORDER — PROPOFOL 10 MG/ML
VIAL (ML) INTRAVENOUS CONTINUOUS PRN
Status: DISCONTINUED | OUTPATIENT
Start: 2023-11-14 | End: 2023-11-14

## 2023-11-14 RX ADMIN — ROCURONIUM BROMIDE 50 MG: 10 INJECTION INTRAVENOUS at 12:11

## 2023-11-14 RX ADMIN — SUGAMMADEX 200 MG: 100 INJECTION, SOLUTION INTRAVENOUS at 01:11

## 2023-11-14 RX ADMIN — MIDAZOLAM 2 MG: 5 INJECTION INTRAMUSCULAR; INTRAVENOUS at 12:11

## 2023-11-14 RX ADMIN — DEXAMETHASONE SODIUM PHOSPHATE 8 MG: 4 INJECTION INTRA-ARTICULAR; INTRALESIONAL; INTRAMUSCULAR; INTRAVENOUS; SOFT TISSUE at 01:11

## 2023-11-14 RX ADMIN — SODIUM CHLORIDE: 9 INJECTION, SOLUTION INTRAVENOUS at 12:11

## 2023-11-14 RX ADMIN — ONDANSETRON 4 MG: 2 INJECTION INTRAMUSCULAR; INTRAVENOUS at 01:11

## 2023-11-14 RX ADMIN — FENTANYL CITRATE 25 MCG: 50 INJECTION INTRAMUSCULAR; INTRAVENOUS at 12:11

## 2023-11-14 RX ADMIN — FENTANYL CITRATE 50 MCG: 50 INJECTION INTRAMUSCULAR; INTRAVENOUS at 01:11

## 2023-11-14 RX ADMIN — PROPOFOL 150 MCG/KG/MIN: 10 INJECTION, EMULSION INTRAVENOUS at 01:11

## 2023-11-14 RX ADMIN — PROPOFOL 200 MG: 10 INJECTION, EMULSION INTRAVENOUS at 12:11

## 2023-11-14 RX ADMIN — FENTANYL CITRATE 25 MCG: 50 INJECTION INTRAMUSCULAR; INTRAVENOUS at 01:11

## 2023-11-14 RX ADMIN — LIDOCAINE HYDROCHLORIDE 75 MG: 20 INJECTION, SOLUTION EPIDURAL; INFILTRATION; INTRACAUDAL at 12:11

## 2023-11-14 NOTE — DISCHARGE SUMMARY
[facility] - [facility] (2nd Fl)  Discharge Note  Short Stay    Procedure(s) (LRB):  ROBOTIC BRONCHOSCOPY (N/A)  ENDOBRONCHIAL ULTRASOUND (EBUS) (N/A)      OUTCOME: Patient tolerated treatment/procedure well without complication and is now ready for discharge.    DISPOSITION: Home or Self Care    FINAL DIAGNOSIS:  Lung nodule.     FOLLOWUP: In clinic    DISCHARGE INSTRUCTIONS:  Written and verbal instructions provided.     TIME SPENT ON DISCHARGE: 20 minutes    Kirt Gr MD   [facility]    Private car

## 2023-11-14 NOTE — BRIEF OP NOTE
Bronchoscopy completed. Normal anatomy. No endobronchial masses/mucosal abnormalities or significant secretions identified.     Robotic- Successful navigation to RLL nodule. Concentric radial probe signal (photos in media)  FNA + TBBX + BAL completed (cyto + cultures)     EBUS- Evaluated stations 11L, 4L, 4R, 11R- no pathologic LN enlargement was identified at these locations.   Station 7 LN was enlarged,, but numerous engorged vessels and no safe window for biopsy     All bleeding controlled. No immediate complications. To recovery with anesthesia. Full dictation to follow.     Kirt Gr MD   [facility]

## 2023-11-14 NOTE — INTERVAL H&P NOTE
The patient has been examined and the H&P has been reviewed:    I concur with the findings and no changes have occurred since H&P was written.    Procedure risks, benefits and alternative options discussed and understood by patient/family.      Pulmonary nodule     Proceed with robotic and staging EBUS.   Cyto + cultures.       Kirt Gr MD   [facility]

## 2023-11-14 NOTE — TRANSFER OF CARE
"Anesthesia Transfer of Care Note    Patient: Keo Frias    Procedure(s) Performed: Procedure(s) (LRB):  ROBOTIC BRONCHOSCOPY (N/A)  ENDOBRONCHIAL ULTRASOUND (EBUS) (N/A)    Patient location: PACU    Anesthesia Type: general    Transport from OR: Transported from OR on 6-10 L/min O2 by face mask with adequate spontaneous ventilation    Post pain: adequate analgesia    Post assessment: no apparent anesthetic complications    Post vital signs: stable    Level of consciousness: awake and alert    Nausea/Vomiting: no nausea/vomiting    Complications: none    Transfer of care protocol was followed      Last vitals: Visit Vitals  BP (!) 192/87   Pulse 70   Temp 36.4 °C (97.5 °F) (Temporal)   Resp 20   Ht 5' 2" (1.575 m)   Wt 51.3 kg (113 lb)   LMP  (LMP Unknown)   SpO2 100%   Breastfeeding No   BMI 20.67 kg/m²     "

## 2023-11-14 NOTE — ANESTHESIA PROCEDURE NOTES
Intubation    Date/Time: 11/14/2023 12:55 PM    Performed by: Teresa Crawford MD  Authorized by: Disha Gtz MD    Intubation:     Induction:  Intravenous    Intubated:  Postinduction    Mask Ventilation:  Easy mask    Attempts:  3    Attempted By:  Resident anesthesiologist    Method of Intubation:  Video laryngoscopy    Blade:  No 4    Laryngeal View Grade: Grade IV - neither epiglottis nor glottis seen      Attempted By (2nd Attempt):  Resident anesthesiologist    Method of Intubation (2nd Attempt):  Video laryngoscopy    Blade (2nd Attempt):  No 3    Laryngeal View Grade (2nd Attempt): Grade III - only epiglottis visible      Attempted By (3rd Attempt):  Staff anesthesiologist    Method of Intubation (3rd Attempt):  Video laryngoscopy    Blade (3rd Attempt):  No 3    Laryngeal View Grade (3rd Attempt): Grade I - full view of cords      Difficult Airway Encountered?: No      Complications:  Dental trauma    Airway Device:  Oral endotracheal tube    Airway Device Size:  8.0    Style/Cuff Inflation:  Cuffed (inflated to minimal occlusive pressure)    Secured at:  The lips    Placement Verified By:  Capnometry and Revisualization with laryngoscopy    Complicating Factors:  Anterior larynx and small mouth    Findings Post-Intubation:  BS equal bilateral and atraumatic/condition of teeth unchanged

## 2023-11-16 LAB
BACTERIA SPEC AEROBE CULT: NO GROWTH
GRAM STN SPEC: NORMAL
GRAM STN SPEC: NORMAL

## 2023-11-16 NOTE — ANESTHESIA POSTPROCEDURE EVALUATION
Anesthesia Post Evaluation    Patient: Keo Frias    Procedure(s) Performed: Procedure(s) (LRB):  ROBOTIC BRONCHOSCOPY (N/A)  ENDOBRONCHIAL ULTRASOUND (EBUS) (N/A)    Final Anesthesia Type: general      Patient location during evaluation: PACU  Patient participation: Yes- Able to Participate  Level of consciousness: awake and alert and oriented  Post-procedure vital signs: reviewed and stable  Pain management: adequate  Airway patency: patent    PONV status at discharge: No PONV  Anesthetic complications: no      Cardiovascular status: hemodynamically stable  Respiratory status: unassisted and spontaneous ventilation  Hydration status: euvolemic  Follow-up not needed.          Vitals Value Taken Time   /84 11/14/23 1545   Temp 36.4 °C (97.5 °F) 11/14/23 1430   Pulse 77 11/14/23 1545   Resp 52 11/14/23 1502   SpO2 97 % 11/14/23 1545   Vitals shown include unvalidated device data.      No case tracking events are documented in the log.      Pain/Kapil Score: No data recorded

## 2023-11-17 ENCOUNTER — PATIENT MESSAGE (OUTPATIENT)
Dept: SURGERY | Facility: CLINIC | Age: 73
End: 2023-11-17
Payer: MEDICARE

## 2023-11-17 LAB
COMMENT: ABNORMAL
FINAL PATHOLOGIC DIAGNOSIS: ABNORMAL
Lab: ABNORMAL
MICROSCOPIC EXAM: ABNORMAL

## 2023-11-20 ENCOUNTER — DOCUMENTATION ONLY (OUTPATIENT)
Dept: PULMONOLOGY | Facility: CLINIC | Age: 73
End: 2023-11-20
Payer: MEDICARE

## 2023-11-20 ENCOUNTER — OFFICE VISIT (OUTPATIENT)
Dept: HEMATOLOGY/ONCOLOGY | Facility: CLINIC | Age: 73
End: 2023-11-20
Payer: MEDICARE

## 2023-11-20 ENCOUNTER — OFFICE VISIT (OUTPATIENT)
Dept: SURGERY | Facility: CLINIC | Age: 73
End: 2023-11-20
Payer: MEDICARE

## 2023-11-20 ENCOUNTER — TELEPHONE (OUTPATIENT)
Dept: HEMATOLOGY/ONCOLOGY | Facility: CLINIC | Age: 73
End: 2023-11-20

## 2023-11-20 VITALS
DIASTOLIC BLOOD PRESSURE: 75 MMHG | OXYGEN SATURATION: 97 % | HEIGHT: 62 IN | SYSTOLIC BLOOD PRESSURE: 168 MMHG | TEMPERATURE: 98 F | DIASTOLIC BLOOD PRESSURE: 67 MMHG | HEART RATE: 60 BPM | HEART RATE: 60 BPM | RESPIRATION RATE: 18 BRPM | SYSTOLIC BLOOD PRESSURE: 141 MMHG | HEIGHT: 62 IN | BODY MASS INDEX: 20.61 KG/M2 | WEIGHT: 112 LBS | BODY MASS INDEX: 20.8 KG/M2 | WEIGHT: 113 LBS

## 2023-11-20 DIAGNOSIS — Z17.0 MALIGNANT NEOPLASM OF UPPER-OUTER QUADRANT OF RIGHT BREAST IN FEMALE, ESTROGEN RECEPTOR POSITIVE: Primary | ICD-10-CM

## 2023-11-20 DIAGNOSIS — C34.81 CANCER OF OVERLAPPING SITES OF RIGHT LUNG: ICD-10-CM

## 2023-11-20 DIAGNOSIS — C50.411 MALIGNANT NEOPLASM OF UPPER-OUTER QUADRANT OF RIGHT BREAST IN FEMALE, ESTROGEN RECEPTOR POSITIVE: Primary | ICD-10-CM

## 2023-11-20 DIAGNOSIS — Z12.39 SCREENING BREAST EXAMINATION: ICD-10-CM

## 2023-11-20 DIAGNOSIS — R59.0 AXILLARY LYMPHADENOPATHY: ICD-10-CM

## 2023-11-20 DIAGNOSIS — R91.1 NODULE OF UPPER LOBE OF RIGHT LUNG: ICD-10-CM

## 2023-11-20 DIAGNOSIS — Z85.3 PERSONAL HISTORY OF BREAST CANCER: Primary | ICD-10-CM

## 2023-11-20 DIAGNOSIS — Z98.890 S/P LUMPECTOMY, RIGHT BREAST: ICD-10-CM

## 2023-11-20 PROCEDURE — 3078F DIAST BP <80 MM HG: CPT | Mod: CPTII,S$GLB,, | Performed by: PHYSICIAN ASSISTANT

## 2023-11-20 PROCEDURE — 4010F PR ACE/ARB THEARPY RXD/TAKEN: ICD-10-PCS | Mod: CPTII,S$GLB,, | Performed by: INTERNAL MEDICINE

## 2023-11-20 PROCEDURE — 1159F PR MEDICATION LIST DOCUMENTED IN MEDICAL RECORD: ICD-10-PCS | Mod: CPTII,S$GLB,, | Performed by: PHYSICIAN ASSISTANT

## 2023-11-20 PROCEDURE — 99999 PR PBB SHADOW E&M-EST. PATIENT-LVL III: CPT | Mod: PBBFAC,,, | Performed by: INTERNAL MEDICINE

## 2023-11-20 PROCEDURE — 3044F HG A1C LEVEL LT 7.0%: CPT | Mod: CPTII,S$GLB,, | Performed by: INTERNAL MEDICINE

## 2023-11-20 PROCEDURE — 3044F PR MOST RECENT HEMOGLOBIN A1C LEVEL <7.0%: ICD-10-PCS | Mod: CPTII,S$GLB,, | Performed by: PHYSICIAN ASSISTANT

## 2023-11-20 PROCEDURE — 3288F PR FALLS RISK ASSESSMENT DOCUMENTED: ICD-10-PCS | Mod: CPTII,S$GLB,, | Performed by: PHYSICIAN ASSISTANT

## 2023-11-20 PROCEDURE — 1159F MED LIST DOCD IN RCRD: CPT | Mod: CPTII,S$GLB,, | Performed by: INTERNAL MEDICINE

## 2023-11-20 PROCEDURE — 1101F PT FALLS ASSESS-DOCD LE1/YR: CPT | Mod: CPTII,S$GLB,, | Performed by: INTERNAL MEDICINE

## 2023-11-20 PROCEDURE — 1101F PR PT FALLS ASSESS DOC 0-1 FALLS W/OUT INJ PAST YR: ICD-10-PCS | Mod: CPTII,S$GLB,, | Performed by: INTERNAL MEDICINE

## 2023-11-20 PROCEDURE — 3077F PR MOST RECENT SYSTOLIC BLOOD PRESSURE >= 140 MM HG: ICD-10-PCS | Mod: CPTII,S$GLB,, | Performed by: PHYSICIAN ASSISTANT

## 2023-11-20 PROCEDURE — 99214 OFFICE O/P EST MOD 30 MIN: CPT | Mod: S$GLB,,, | Performed by: PHYSICIAN ASSISTANT

## 2023-11-20 PROCEDURE — 3077F PR MOST RECENT SYSTOLIC BLOOD PRESSURE >= 140 MM HG: ICD-10-PCS | Mod: CPTII,S$GLB,, | Performed by: INTERNAL MEDICINE

## 2023-11-20 PROCEDURE — 99214 OFFICE O/P EST MOD 30 MIN: CPT | Mod: S$GLB,,, | Performed by: INTERNAL MEDICINE

## 2023-11-20 PROCEDURE — 99999 PR PBB SHADOW E&M-EST. PATIENT-LVL III: CPT | Mod: PBBFAC,,, | Performed by: PHYSICIAN ASSISTANT

## 2023-11-20 PROCEDURE — 3288F FALL RISK ASSESSMENT DOCD: CPT | Mod: CPTII,S$GLB,, | Performed by: PHYSICIAN ASSISTANT

## 2023-11-20 PROCEDURE — 1126F AMNT PAIN NOTED NONE PRSNT: CPT | Mod: CPTII,S$GLB,, | Performed by: INTERNAL MEDICINE

## 2023-11-20 PROCEDURE — 3077F SYST BP >= 140 MM HG: CPT | Mod: CPTII,S$GLB,, | Performed by: INTERNAL MEDICINE

## 2023-11-20 PROCEDURE — 99214 PR OFFICE/OUTPT VISIT, EST, LEVL IV, 30-39 MIN: ICD-10-PCS | Mod: S$GLB,,, | Performed by: PHYSICIAN ASSISTANT

## 2023-11-20 PROCEDURE — 1159F MED LIST DOCD IN RCRD: CPT | Mod: CPTII,S$GLB,, | Performed by: PHYSICIAN ASSISTANT

## 2023-11-20 PROCEDURE — 3044F HG A1C LEVEL LT 7.0%: CPT | Mod: CPTII,S$GLB,, | Performed by: PHYSICIAN ASSISTANT

## 2023-11-20 PROCEDURE — 4010F ACE/ARB THERAPY RXD/TAKEN: CPT | Mod: CPTII,S$GLB,, | Performed by: INTERNAL MEDICINE

## 2023-11-20 PROCEDURE — 3044F PR MOST RECENT HEMOGLOBIN A1C LEVEL <7.0%: ICD-10-PCS | Mod: CPTII,S$GLB,, | Performed by: INTERNAL MEDICINE

## 2023-11-20 PROCEDURE — 3078F DIAST BP <80 MM HG: CPT | Mod: CPTII,S$GLB,, | Performed by: INTERNAL MEDICINE

## 2023-11-20 PROCEDURE — 3078F PR MOST RECENT DIASTOLIC BLOOD PRESSURE < 80 MM HG: ICD-10-PCS | Mod: CPTII,S$GLB,, | Performed by: INTERNAL MEDICINE

## 2023-11-20 PROCEDURE — 99214 PR OFFICE/OUTPT VISIT, EST, LEVL IV, 30-39 MIN: ICD-10-PCS | Mod: S$GLB,,, | Performed by: INTERNAL MEDICINE

## 2023-11-20 PROCEDURE — 1101F PT FALLS ASSESS-DOCD LE1/YR: CPT | Mod: CPTII,S$GLB,, | Performed by: PHYSICIAN ASSISTANT

## 2023-11-20 PROCEDURE — 99999 PR PBB SHADOW E&M-EST. PATIENT-LVL III: ICD-10-PCS | Mod: PBBFAC,,, | Performed by: PHYSICIAN ASSISTANT

## 2023-11-20 PROCEDURE — 3077F SYST BP >= 140 MM HG: CPT | Mod: CPTII,S$GLB,, | Performed by: PHYSICIAN ASSISTANT

## 2023-11-20 PROCEDURE — 4010F PR ACE/ARB THEARPY RXD/TAKEN: ICD-10-PCS | Mod: CPTII,S$GLB,, | Performed by: PHYSICIAN ASSISTANT

## 2023-11-20 PROCEDURE — 1126F AMNT PAIN NOTED NONE PRSNT: CPT | Mod: CPTII,S$GLB,, | Performed by: PHYSICIAN ASSISTANT

## 2023-11-20 PROCEDURE — 4010F ACE/ARB THERAPY RXD/TAKEN: CPT | Mod: CPTII,S$GLB,, | Performed by: PHYSICIAN ASSISTANT

## 2023-11-20 PROCEDURE — 1101F PR PT FALLS ASSESS DOC 0-1 FALLS W/OUT INJ PAST YR: ICD-10-PCS | Mod: CPTII,S$GLB,, | Performed by: PHYSICIAN ASSISTANT

## 2023-11-20 PROCEDURE — 1126F PR PAIN SEVERITY QUANTIFIED, NO PAIN PRESENT: ICD-10-PCS | Mod: CPTII,S$GLB,, | Performed by: INTERNAL MEDICINE

## 2023-11-20 PROCEDURE — 3008F PR BODY MASS INDEX (BMI) DOCUMENTED: ICD-10-PCS | Mod: CPTII,S$GLB,, | Performed by: INTERNAL MEDICINE

## 2023-11-20 PROCEDURE — 3288F PR FALLS RISK ASSESSMENT DOCUMENTED: ICD-10-PCS | Mod: CPTII,S$GLB,, | Performed by: INTERNAL MEDICINE

## 2023-11-20 PROCEDURE — 3008F BODY MASS INDEX DOCD: CPT | Mod: CPTII,S$GLB,, | Performed by: INTERNAL MEDICINE

## 2023-11-20 PROCEDURE — 3008F BODY MASS INDEX DOCD: CPT | Mod: CPTII,S$GLB,, | Performed by: PHYSICIAN ASSISTANT

## 2023-11-20 PROCEDURE — 3078F PR MOST RECENT DIASTOLIC BLOOD PRESSURE < 80 MM HG: ICD-10-PCS | Mod: CPTII,S$GLB,, | Performed by: PHYSICIAN ASSISTANT

## 2023-11-20 PROCEDURE — 3008F PR BODY MASS INDEX (BMI) DOCUMENTED: ICD-10-PCS | Mod: CPTII,S$GLB,, | Performed by: PHYSICIAN ASSISTANT

## 2023-11-20 PROCEDURE — 1159F PR MEDICATION LIST DOCUMENTED IN MEDICAL RECORD: ICD-10-PCS | Mod: CPTII,S$GLB,, | Performed by: INTERNAL MEDICINE

## 2023-11-20 PROCEDURE — 99999 PR PBB SHADOW E&M-EST. PATIENT-LVL III: ICD-10-PCS | Mod: PBBFAC,,, | Performed by: INTERNAL MEDICINE

## 2023-11-20 PROCEDURE — 3288F FALL RISK ASSESSMENT DOCD: CPT | Mod: CPTII,S$GLB,, | Performed by: INTERNAL MEDICINE

## 2023-11-20 PROCEDURE — 1126F PR PAIN SEVERITY QUANTIFIED, NO PAIN PRESENT: ICD-10-PCS | Mod: CPTII,S$GLB,, | Performed by: PHYSICIAN ASSISTANT

## 2023-11-20 NOTE — NURSING
Spoke with patients daughter.  Notified of the scheduled Consult appointment with Dr. Llamas for 11/22/23.  Agreeable to date and time.  Oncology Navigation   Intake  Date of Diagnosis: 11/17/23  Cancer Type: Thoracic  Internal / External Referral: Internal  Date of Referral: 11/20/23  Initial Nurse Navigator Contact: 11/20/23  Referral to Initial Contact Timeline (days): 0  Date Worked: 11/20/23  First Appointment Available: 11/22/23  Appointment Date: 11/22/23  First Available Date vs. Scheduled Date (days): 0  Multiple appointments: No     Treatment  Current Status: Staging work-up    Surgery: N/A    Medical Oncologist: Dionisio  Consult Date: 11/22/23                   Support Systems: Children  Barriers of Care: Comorbidities  Comorbidities: Breast Cancer     Acuity      Follow Up  No follow-ups on file.

## 2023-11-20 NOTE — PROGRESS NOTES
Robotic results reviewed- Nodule + adenocarcinoma. Lung primary. As stated in procedure note the station 7 node was very vascular and really no window for biopsy.. Has concerning axillar nodes as well. She saw Dr. Garcia today and scheduled thoracic onc appt on Wed.. Will add to thoracic tumor board this week to discuss.     I discussed results and plan of care with patient and her daughter by phone.     Kirt Gr MD   [facility]

## 2023-11-20 NOTE — PROGRESS NOTES
Subjective:       Patient ID: Keo Frias is a 73 y.o. female.    Chief Complaint: No chief complaint on file.      HPI 73-year-old female seen for diagnosis of right breast cancer.  She is on adjuvant endocrine therapy with letrozole..  Also on Prolia for osteoporosis.      Had recent workup for pulmonary nodules found incidentally on CXR.    CT 10/5/23 -    Right lower lobe spiculated lesion measuring up to 2.1 cm, highly suspicious for neoplasm until proven otherwise.  Further correlation advised.  Other subcentimeter pulmonary nodules and ill-defined left lower lobe opacity,.      PET 10/12/23 - hypermetabolic right lower lobe pulmonary nodule, suspicious for neoplasm.  There are hypermetabolic lymph nodes in the right axilla, mediastinum, and about left posterior shoulder, suspicious for metastatic disease     Bronchoscopic biopsy of the right lower lobe nodules was performed on November 14th which revealed adenocarcinoma which was TTF positive and GATA3 negative consistent with 1 primary.    In general, she is been feeling fairly well.  Her cough has increased somewhat since her bronchoscopy.  She is eating well but has had some weight loss.  She has some arthralgias in her hands.  Notes some back pain at times after eating which resolves spontaneously.      History:  Screening mammogram on March 25, 2021 showed a focal asymmetry in the upper-outer quadrant of the right breast.    Follow-up diagnostic mammogram ultrasound was performed on March 30, 2021.  The mammogram confirmed the focal asymmetry in the upper-outer quadrant of the right breast an ultrasound showed an 8 x 5 x 4 mm irregular hypoechoic mass in that area.  There was no evidence of abnormal axillary lymphadenopathy.    On April 13, 2021 biopsy was performed which showed infiltrating ductal carcinoma with lobular features and associated intermediate grade solid DCIS.  The invasive cancer was intermediate grade (histologic grade 3, nuclear grade  2, mitotic index 2).  Tumor cells were 95% ER positive, 10% IN positive HER2 was negative and Ki-67 was 20%.    On May 12, 2021 right breast lumpectomy and sentinel lymph node biopsy was performed.  That showed a 9 mm intermediate grade invasive carcinoma with extensive DCIS measuring 1.9 cm which was intermediate grade.  Filley lymph node was negative.  Final pathological stage IA-T1 B N0.    Radiation completed 7/27/21.    Letrozole started July 2021.    Mammogram - 4/6/23 - negative    Review of Systems   Constitutional:  Positive for unexpected weight change. Negative for appetite change.   HENT: Negative.     Respiratory:  Positive for cough. Negative for shortness of breath.    Cardiovascular: Negative.    Gastrointestinal: Negative.    Genitourinary: Negative.    Musculoskeletal:  Positive for arthralgias (mild hands). Negative for back pain.   Integumentary:  Negative.   Neurological: Negative.    Psychiatric/Behavioral: Negative.  The patient is not nervous/anxious.          Objective:      Physical Exam  Vitals reviewed.   Constitutional:       General: She is not in acute distress.     Appearance: Normal appearance.   Cardiovascular:      Rate and Rhythm: Normal rate and regular rhythm.   Pulmonary:      Effort: Pulmonary effort is normal. No respiratory distress.      Breath sounds: Normal breath sounds. No wheezing or rales.   Chest:   Breasts:     Right: No mass, nipple discharge or skin change.      Left: Normal.       Abdominal:      Palpations: Abdomen is soft. There is no mass.      Tenderness: There is no abdominal tenderness.   Lymphadenopathy:      Cervical: No cervical adenopathy.      Upper Body:      Right upper body: No supraclavicular or axillary adenopathy.      Left upper body: No supraclavicular or axillary adenopathy.   Skin:     Findings: No rash.   Neurological:      Mental Status: She is alert and oriented to person, place, and time.   Psychiatric:         Mood and Affect: Mood  normal.         Behavior: Behavior normal.         Thought Content: Thought content normal.         Judgment: Judgment normal.       Assessment:       1. Malignant neoplasm of upper-outer quadrant of right breast in female, estrogen receptor positive    2. Cancer of overlapping sites of right lung        Plan:    I discussed the luing cancer findings and the need to see the Thoracic team for further recommnedations.    Prolia every 6 M.  Continue endocrine therapy.      She will need to have an appointment with thoracic oncology for recommendations for treatment  of her lung cancer.    Route Chart for Scheduling    Med Onc Chart Routing      Follow up with physician    Follow up with BRENDA    Infusion scheduling note    Injection scheduling note Early every 6 months with CMP   Labs CMP   Scheduling:  Preferred lab:  Lab interval:     Imaging None      Pharmacy appointment No pharmacy appointment needed      Other referrals no referral to Oncology Primary Care needed -  no Massage appointment needed    No additional referrals needed             Therapy Plan Information  Medications  denosumab (PROLIA) injection 60 mg  60 mg, Subcutaneous, Every 26 weeks

## 2023-11-20 NOTE — PROGRESS NOTES
[facility]  Department of Surgery    REFERRING PROVIDER:  Sharron Ogden MD  CHIEF COMPLAINT: No chief complaint on file.      DIAGNOSIS:   This is a 73 y.o. female with a history of stage T1bN0, grade 2, ER +, FL +, HER2 - IDC with lobular features of the right breast.     TREATMENT:   1. S/p right partial mastectomy with sentinel node biopsy on 5/12/2021. Dr. Surinder M.D. Surgical Oncology  2. Final Path: Negative margins.  3. XRT completed with Dr. India M.D. Radiation Oncology   4. Adjuvant endocrine therapy (Letrozole with Dr. Jose M.D. Medical Oncology     HISTORY OF PRESENT ILLNESS:   Keo Frias is a 73 y.o. female comes in for oncological follow up. Patient recently diagnosed with adenocarcinoma of there right lung. PET performed on 10/12/23 showed a hypermetabolic lymph nodes in the axilla, mediastinum and left posterior shoulder suspicious for metastatic disease. Otherwise no new breast complaints new masses, skin or nipple changes, or nipple discharge. The patient denies constitutional symptoms of night sweats, chills, weight loss, new headaches, visual changes, new back or bony pain, chest pain, or shortness of breath.  Does have a cough as well as hoarseness her daughter thinks may be related to her recent biopsy procedure.      Review of Systems: See HPI/Interval History for other systems reviewed.     IMAGING:     10/12/23 PET scan:      FINDINGS:  Quality of the study: Adequate.     In the head and neck, there are no hypermetabolic lesions worrisome for malignancy. There are no hypermetabolic mucosal lesions, and there are no pathologically enlarged or hypermetabolic lymph nodes.     In the chest, there is a solid pulmonary nodule in the right lower lobe measuring 2.1 x 1.1 cm with max SUV of 3.8 (image 59).  There is a 0.9 cm right axillary lymph node with max SUV of 3.9 (image 43).  There is a 1.2 cm subcarinal lymph node with max SUV of 4.6.  There is a 0.7 cm lymph node along  the left posterior shoulder with max SUV of 1.7 (image 54).  Multiple additional solid pulmonary nodules through the right lung base, which may be too small for uptake detection on PET-CT.  There is a 0.9 cm ground-glass pulmonary nodule in the left upper lobe (image 47) which does not show increase tracer uptake; however, indolent neoplastic process may have a similar appearance.     In the abdomen and pelvis, there is physiologic tracer distribution within the abdominal organs and excretion into the genitourinary system.     In the bones, there are no hypermetabolic lesions worrisome for malignancy.     In the extremities, there are no hypermetabolic lesions worrisome for malignancy.     Additional CT findings:     Postoperative change of partial right mastectomy.  Right lower lobe vascular malformation.  Uterine fibroids.  Small left-sided renal cyst.  Calcific disease of the abdominal aorta.  Small fat containing umbilical hernia.     Impression:     In this patient with history of breast cancer, there is a hypermetabolic right lower lobe pulmonary nodule, suspicious for neoplasm.  There are hypermetabolic lymph nodes in the right axilla, mediastinum, and about left posterior shoulder, suspicious for metastatic disease.  Primary lung neoplasm or metastatic breast cancer may have this appearance.  Tissue sampling may be required for definitive diagnosis    4/6/23 Bilateral Screening Mammo:    Findings:  This procedure was performed using tomosynthesis. Computer-aided detection was utilized in the interpretation of this examination.  The breasts have scattered areas of fibroglandular density.      Right  There are post-surgical findings from a previous lumpectomy seen in the right breast. Compared to the previous study, there are no significant changes. There has been no interval development of a suspicious mass, microcalcification, or architectural distortion.      There is no evidence of suspicious masses,  "calcifications, or other abnormal findings in the right breast.     Left  There is no evidence of suspicious masses, calcifications, or other abnormal findings in the left breast.     Impression:  Bilateral  There is no mammographic evidence of malignancy.     BI-RADS Category:   Overall: 2 - Benign        Recommendation:  Routine screening mammogram in 1 year is recommended.     MEDICATIONS/ALLERGIES:     Current Outpatient Medications   Medication Sig    calcium carbonate (CALCIUM 500 ORAL) Take 1 tablet by mouth.    cholecalciferol, vitamin D3, 125 mcg (5,000 unit) capsule Take 1 capsule (5,000 Units total) by mouth daily with breakfast.    CIPRODEX 0.3-0.1 % DrpS PLACE 4 DROPS INTO THE RIGHT EAR 2 (TWO) TIMES DAILY. FOR 10 DAYS    cloNIDine (CATAPRES) 0.1 MG tablet Take 1 tablet (0.1 mg total) by mouth daily as needed (for systolic greater than or equal to 160 mmHg).    EScitalopram oxalate (LEXAPRO) 10 MG tablet Take 1 tablet (10 mg total) by mouth once daily.    famotidine (PEPCID) 10 MG tablet Take 10 mg by mouth 2 (two) times daily.    Lactobacillus rhamnosus GG (CULTURELLE) 10 billion cell capsule Take 1 capsule by mouth once daily. Pt takes Align probiotic    letrozole (FEMARA) 2.5 mg Tab TAKE 1 TABLET BY MOUTH EVERY DAY    losartan (COZAAR) 25 MG tablet Take 1 tablet (25 mg total) by mouth once daily.    metoprolol succinate (TOPROL-XL) 50 MG 24 hr tablet Take 1 tablet (50 mg total) by mouth once daily.    multivitamin-iron-folic acid Tab Take 1 tablet by mouth once daily.    omeprazole (PRILOSEC OTC) 20 MG tablet Take 20 mg by mouth once daily.    PROLIA 60 mg/mL Syrg      No current facility-administered medications for this visit.      Review of patient's allergies indicates:   Allergen Reactions    Sinus allergy Other (See Comments)     Headaches, migranes    Sulfa (sulfonamide antibiotics) Rash       PHYSICAL EXAM:   BP (!) 168/75   Pulse 60   Ht 5' 2" (1.575 m)   Wt 51.3 kg (113 lb)   LMP  (LMP " Unknown)   BMI 20.67 kg/m²     Physical Exam   Vitals reviewed.  Constitutional: She is oriented to person, place, and time.   HENT:   Head: Normocephalic and atraumatic.   Nose: Nose normal.   Eyes: Pupils are equal, round, and reactive to light. Right eye exhibits no discharge. Left eye exhibits no discharge.   Pulmonary/Chest: Effort normal and breath sounds normal. No stridor. No respiratory distress. She exhibits no mass, no tenderness and no edema. Right breast exhibits no inverted nipple, no mass, no nipple discharge, no skin change and no tenderness. Left breast exhibits no inverted nipple, no mass, no nipple discharge, no skin change and no tenderness. No breast swelling or bleeding. Breasts are symmetrical.       Abdominal: Normal appearance.   Genitourinary: No breast swelling or bleeding.   Neurological: She is alert and oriented to person, place, and time.   Skin: Skin is warm and dry.     Psychiatric: Her behavior is normal. Mood, judgment and thought content normal.       ASSESSMENT:   This is a 73 y.o. female without evidence of recurrence by exam, history or imaging.       PLAN:   1. Discussed at length her new lung cancer diagnosis.   2. Patient is scheduled to see Dr. Ángel Nichole on 11/22/23 to plan next steps in management. She was noted a right axillary node PET suggestive of metastases.   3. Will reach out to the thoracic group to discuss need for additional work up of this finding.   4. Will be in touch with patient regarding recommendations.     The patient is in agreement with the plan. Questions were encouraged and answered to patient's satisfaction. Keo will call our office with any questions or concerns.

## 2023-11-20 NOTE — Clinical Note
Alvina Nichole,   I saw this sweet lady who I think you will be seeing on Wednesday as a new patient. She was recently diagnosed with adenocarcinoma of the right lung. She has a personal history of right breast cancer. On PET, she was found to have a right axillary node concerning for metastases. Would you want us to pursue further work up? Could get an US of the area to better assess although I wasn't sure it would change much in terms of your management. Let me know. I am happy to coordinate.   Take care,  Gabi Richey PA-C Breast Surgery

## 2023-11-22 ENCOUNTER — OFFICE VISIT (OUTPATIENT)
Dept: HEMATOLOGY/ONCOLOGY | Facility: CLINIC | Age: 73
End: 2023-11-22
Payer: MEDICARE

## 2023-11-22 ENCOUNTER — LAB VISIT (OUTPATIENT)
Dept: LAB | Facility: HOSPITAL | Age: 73
End: 2023-11-22
Attending: INTERNAL MEDICINE
Payer: MEDICARE

## 2023-11-22 ENCOUNTER — PATIENT MESSAGE (OUTPATIENT)
Dept: HEMATOLOGY/ONCOLOGY | Facility: CLINIC | Age: 73
End: 2023-11-22

## 2023-11-22 ENCOUNTER — OFFICE VISIT (OUTPATIENT)
Dept: CARDIOLOGY | Facility: CLINIC | Age: 73
End: 2023-11-22
Payer: MEDICARE

## 2023-11-22 ENCOUNTER — TELEPHONE (OUTPATIENT)
Dept: HEMATOLOGY/ONCOLOGY | Facility: CLINIC | Age: 73
End: 2023-11-22

## 2023-11-22 VITALS
SYSTOLIC BLOOD PRESSURE: 137 MMHG | HEART RATE: 72 BPM | DIASTOLIC BLOOD PRESSURE: 85 MMHG | BODY MASS INDEX: 20.98 KG/M2 | WEIGHT: 114 LBS | HEIGHT: 62 IN

## 2023-11-22 VITALS
HEART RATE: 61 BPM | DIASTOLIC BLOOD PRESSURE: 74 MMHG | SYSTOLIC BLOOD PRESSURE: 113 MMHG | RESPIRATION RATE: 18 BRPM | OXYGEN SATURATION: 99 % | WEIGHT: 113.31 LBS | BODY MASS INDEX: 20.85 KG/M2 | TEMPERATURE: 97 F | HEIGHT: 62 IN

## 2023-11-22 DIAGNOSIS — C50.411 MALIGNANT NEOPLASM OF UPPER-OUTER QUADRANT OF RIGHT BREAST IN FEMALE, ESTROGEN RECEPTOR POSITIVE: ICD-10-CM

## 2023-11-22 DIAGNOSIS — R00.2 PALPITATIONS: ICD-10-CM

## 2023-11-22 DIAGNOSIS — Z17.0 MALIGNANT NEOPLASM OF UPPER-OUTER QUADRANT OF RIGHT BREAST IN FEMALE, ESTROGEN RECEPTOR POSITIVE: ICD-10-CM

## 2023-11-22 DIAGNOSIS — R05.9 COUGH, UNSPECIFIED TYPE: ICD-10-CM

## 2023-11-22 DIAGNOSIS — R07.9 CHEST PAIN, UNSPECIFIED TYPE: ICD-10-CM

## 2023-11-22 DIAGNOSIS — C34.31 ADENOCARCINOMA OF LOWER LOBE OF RIGHT LUNG: Primary | Chronic | ICD-10-CM

## 2023-11-22 DIAGNOSIS — E78.2 MIXED HYPERLIPIDEMIA: Primary | ICD-10-CM

## 2023-11-22 DIAGNOSIS — R09.89 LABILE HYPERTENSION: ICD-10-CM

## 2023-11-22 DIAGNOSIS — C34.81 CANCER OF OVERLAPPING SITES OF RIGHT LUNG: ICD-10-CM

## 2023-11-22 PROBLEM — E78.5 HYPERLIPIDEMIA: Status: ACTIVE | Noted: 2019-09-17

## 2023-11-22 PROCEDURE — 3079F PR MOST RECENT DIASTOLIC BLOOD PRESSURE 80-89 MM HG: ICD-10-PCS | Mod: CPTII,S$GLB,, | Performed by: INTERNAL MEDICINE

## 2023-11-22 PROCEDURE — 1159F PR MEDICATION LIST DOCUMENTED IN MEDICAL RECORD: ICD-10-PCS | Mod: CPTII,S$GLB,, | Performed by: HOSPITALIST

## 2023-11-22 PROCEDURE — 99215 PR OFFICE/OUTPT VISIT, EST, LEVL V, 40-54 MIN: ICD-10-PCS | Mod: S$GLB,,, | Performed by: HOSPITALIST

## 2023-11-22 PROCEDURE — 99999 PR PBB SHADOW E&M-EST. PATIENT-LVL III: ICD-10-PCS | Mod: PBBFAC,,, | Performed by: INTERNAL MEDICINE

## 2023-11-22 PROCEDURE — 3008F BODY MASS INDEX DOCD: CPT | Mod: CPTII,S$GLB,, | Performed by: INTERNAL MEDICINE

## 2023-11-22 PROCEDURE — 3079F DIAST BP 80-89 MM HG: CPT | Mod: CPTII,S$GLB,, | Performed by: INTERNAL MEDICINE

## 2023-11-22 PROCEDURE — 99215 PR OFFICE/OUTPT VISIT, EST, LEVL V, 40-54 MIN: ICD-10-PCS | Mod: S$GLB,,, | Performed by: INTERNAL MEDICINE

## 2023-11-22 PROCEDURE — 1159F MED LIST DOCD IN RCRD: CPT | Mod: CPTII,S$GLB,, | Performed by: HOSPITALIST

## 2023-11-22 PROCEDURE — 1126F PR PAIN SEVERITY QUANTIFIED, NO PAIN PRESENT: ICD-10-PCS | Mod: CPTII,S$GLB,, | Performed by: INTERNAL MEDICINE

## 2023-11-22 PROCEDURE — 4010F PR ACE/ARB THEARPY RXD/TAKEN: ICD-10-PCS | Mod: CPTII,S$GLB,, | Performed by: HOSPITALIST

## 2023-11-22 PROCEDURE — 3288F PR FALLS RISK ASSESSMENT DOCUMENTED: ICD-10-PCS | Mod: CPTII,S$GLB,, | Performed by: INTERNAL MEDICINE

## 2023-11-22 PROCEDURE — 1101F PT FALLS ASSESS-DOCD LE1/YR: CPT | Mod: CPTII,S$GLB,, | Performed by: INTERNAL MEDICINE

## 2023-11-22 PROCEDURE — 3074F PR MOST RECENT SYSTOLIC BLOOD PRESSURE < 130 MM HG: ICD-10-PCS | Mod: CPTII,S$GLB,, | Performed by: HOSPITALIST

## 2023-11-22 PROCEDURE — 99999 PR PBB SHADOW E&M-EST. PATIENT-LVL IV: CPT | Mod: PBBFAC,,, | Performed by: HOSPITALIST

## 2023-11-22 PROCEDURE — 99999 PR PBB SHADOW E&M-EST. PATIENT-LVL IV: ICD-10-PCS | Mod: PBBFAC,,, | Performed by: HOSPITALIST

## 2023-11-22 PROCEDURE — 3044F HG A1C LEVEL LT 7.0%: CPT | Mod: CPTII,S$GLB,, | Performed by: INTERNAL MEDICINE

## 2023-11-22 PROCEDURE — 3044F PR MOST RECENT HEMOGLOBIN A1C LEVEL <7.0%: ICD-10-PCS | Mod: CPTII,S$GLB,, | Performed by: INTERNAL MEDICINE

## 2023-11-22 PROCEDURE — 3074F SYST BP LT 130 MM HG: CPT | Mod: CPTII,S$GLB,, | Performed by: HOSPITALIST

## 2023-11-22 PROCEDURE — 1159F MED LIST DOCD IN RCRD: CPT | Mod: CPTII,S$GLB,, | Performed by: INTERNAL MEDICINE

## 2023-11-22 PROCEDURE — 99999 PR PBB SHADOW E&M-EST. PATIENT-LVL III: CPT | Mod: PBBFAC,,, | Performed by: INTERNAL MEDICINE

## 2023-11-22 PROCEDURE — 3008F PR BODY MASS INDEX (BMI) DOCUMENTED: ICD-10-PCS | Mod: CPTII,S$GLB,, | Performed by: INTERNAL MEDICINE

## 2023-11-22 PROCEDURE — 1101F PT FALLS ASSESS-DOCD LE1/YR: CPT | Mod: CPTII,S$GLB,, | Performed by: HOSPITALIST

## 2023-11-22 PROCEDURE — 1101F PR PT FALLS ASSESS DOC 0-1 FALLS W/OUT INJ PAST YR: ICD-10-PCS | Mod: CPTII,S$GLB,, | Performed by: HOSPITALIST

## 2023-11-22 PROCEDURE — 1126F AMNT PAIN NOTED NONE PRSNT: CPT | Mod: CPTII,S$GLB,, | Performed by: HOSPITALIST

## 2023-11-22 PROCEDURE — 3075F SYST BP GE 130 - 139MM HG: CPT | Mod: CPTII,S$GLB,, | Performed by: INTERNAL MEDICINE

## 2023-11-22 PROCEDURE — 3288F FALL RISK ASSESSMENT DOCD: CPT | Mod: CPTII,S$GLB,, | Performed by: HOSPITALIST

## 2023-11-22 PROCEDURE — 1159F PR MEDICATION LIST DOCUMENTED IN MEDICAL RECORD: ICD-10-PCS | Mod: CPTII,S$GLB,, | Performed by: INTERNAL MEDICINE

## 2023-11-22 PROCEDURE — 1126F PR PAIN SEVERITY QUANTIFIED, NO PAIN PRESENT: ICD-10-PCS | Mod: CPTII,S$GLB,, | Performed by: HOSPITALIST

## 2023-11-22 PROCEDURE — 3078F DIAST BP <80 MM HG: CPT | Mod: CPTII,S$GLB,, | Performed by: HOSPITALIST

## 2023-11-22 PROCEDURE — 3044F PR MOST RECENT HEMOGLOBIN A1C LEVEL <7.0%: ICD-10-PCS | Mod: CPTII,S$GLB,, | Performed by: HOSPITALIST

## 2023-11-22 PROCEDURE — 4010F ACE/ARB THERAPY RXD/TAKEN: CPT | Mod: CPTII,S$GLB,, | Performed by: INTERNAL MEDICINE

## 2023-11-22 PROCEDURE — 3044F HG A1C LEVEL LT 7.0%: CPT | Mod: CPTII,S$GLB,, | Performed by: HOSPITALIST

## 2023-11-22 PROCEDURE — 99215 OFFICE O/P EST HI 40 MIN: CPT | Mod: S$GLB,,, | Performed by: HOSPITALIST

## 2023-11-22 PROCEDURE — 3008F BODY MASS INDEX DOCD: CPT | Mod: CPTII,S$GLB,, | Performed by: HOSPITALIST

## 2023-11-22 PROCEDURE — 99215 OFFICE O/P EST HI 40 MIN: CPT | Mod: S$GLB,,, | Performed by: INTERNAL MEDICINE

## 2023-11-22 PROCEDURE — 36415 COLL VENOUS BLD VENIPUNCTURE: CPT | Performed by: INTERNAL MEDICINE

## 2023-11-22 PROCEDURE — 4010F ACE/ARB THERAPY RXD/TAKEN: CPT | Mod: CPTII,S$GLB,, | Performed by: HOSPITALIST

## 2023-11-22 PROCEDURE — 3008F PR BODY MASS INDEX (BMI) DOCUMENTED: ICD-10-PCS | Mod: CPTII,S$GLB,, | Performed by: HOSPITALIST

## 2023-11-22 PROCEDURE — 3288F FALL RISK ASSESSMENT DOCD: CPT | Mod: CPTII,S$GLB,, | Performed by: INTERNAL MEDICINE

## 2023-11-22 PROCEDURE — 4010F PR ACE/ARB THEARPY RXD/TAKEN: ICD-10-PCS | Mod: CPTII,S$GLB,, | Performed by: INTERNAL MEDICINE

## 2023-11-22 PROCEDURE — 1126F AMNT PAIN NOTED NONE PRSNT: CPT | Mod: CPTII,S$GLB,, | Performed by: INTERNAL MEDICINE

## 2023-11-22 PROCEDURE — 3075F PR MOST RECENT SYSTOLIC BLOOD PRESS GE 130-139MM HG: ICD-10-PCS | Mod: CPTII,S$GLB,, | Performed by: INTERNAL MEDICINE

## 2023-11-22 PROCEDURE — 3288F PR FALLS RISK ASSESSMENT DOCUMENTED: ICD-10-PCS | Mod: CPTII,S$GLB,, | Performed by: HOSPITALIST

## 2023-11-22 PROCEDURE — 3078F PR MOST RECENT DIASTOLIC BLOOD PRESSURE < 80 MM HG: ICD-10-PCS | Mod: CPTII,S$GLB,, | Performed by: HOSPITALIST

## 2023-11-22 PROCEDURE — 1101F PR PT FALLS ASSESS DOC 0-1 FALLS W/OUT INJ PAST YR: ICD-10-PCS | Mod: CPTII,S$GLB,, | Performed by: INTERNAL MEDICINE

## 2023-11-22 RX ORDER — ATORVASTATIN CALCIUM 20 MG/1
40 TABLET, FILM COATED ORAL DAILY
Qty: 180 TABLET | Refills: 3 | Status: SHIPPED | OUTPATIENT
Start: 2023-11-22 | End: 2024-11-21

## 2023-11-22 RX ORDER — BENZONATATE 100 MG/1
100 CAPSULE ORAL 3 TIMES DAILY PRN
Qty: 30 CAPSULE | Refills: 1 | Status: SHIPPED | OUTPATIENT
Start: 2023-11-22 | End: 2024-11-21

## 2023-11-22 NOTE — Clinical Note
Hi all,   I saw Ms. Frias this morning and we discussed her at tumor board at noon.   The plan is for a biopsy of the R axillary LN to determine if this is lung (uncommon location) or recurrent breast. If the former, then this would be non-regional disease requiring systemic therapy. If the latter, then will need to work with you regarding treatment plan as well as to further evaluation the hypermetabolic station 7 LN (pulm was unable to biopsy, but it's very convincing on PET - would favor this is adenocarcinoma outright).   I've ordered an MRI brain and the liquid biopsy was drawn today (NGS and PD-L1 on solid tumor already sent - thanks!).   I'm going to follow up with her in 2 weeks. Gabi, I sent you a sideline message but hoping you can confirm which is the best biopsy order to place.   Is there anything else I can do on my end to help coordinate care?   Doni,  Bo

## 2023-11-22 NOTE — PROGRESS NOTES
[facility]      Chief Complaint   Patient presents with    Labile hypertension       Patient ID: Keo Frias is a 73 y.o. female with HTN, breast cancer, anxiety, who presents for a follow up appointment.  Pertinent history/events are as follows:     -Pt presents for evaluation of HTN.    - At clinic visit on  8/31/2023 Mrs. Frias reported episodes of elevations of blood pressures with some periods of hypotension.  Currently taking metoprolol succinate 50 mg daily.  Also taking buspirone for anxiety.    Plan:  HTN- Blood pressure have been labile.  Continue metoprolol succinate 50 mg daily.  Recommend weaning off buspirone which can interact with metoprolol and cause hypotension.  Pt to take clonidine 0.1 mg prn for systolic greater than or equal to 160 mmHg.  Pt to keep log of blood pressure/heart rate bring in next visit for review.    Palpitations- Pt with risk factors for CAD.  Check exercise stress echo and 30 day event monitor.   HLD- Pt would like to wait to start a statin.  Continue with lifestyle modification.    Follow up in 6 weeks     HPI:  Mrs. Frias reports episodes of elevations of blood pressures with some periods of hypotension.  BP log at home shows average less than 140s/90s. Currently taking metoprolol succinate 50 mg daily, buspirone has been weaned off. She visited ED on 9/13/2023 for chest pain and elevated blood pressure, discharged with protonix considering chest pain likely due to GERD after the Exercise Stress Echo on 9/13/2023 revealed negative for ischemia. The exercise capacity was normal. She is undergoing evaluation of lung nodule to rule out/in metastasis in view of history of Breast Cancer.    Past Medical History:   Diagnosis Date    Anemia     Anxiety disorder 8/28/2019    Cataract     Chronic right-sided low back pain without sciatica 10/20/2021    Hypercholesteremia 9/17/2019    Invasive ductal carcinoma of breast, female, right 4/16/2021    Migraine with vision  problems     Subclinical hypothyroidism     White coat syndrome with hypertension      Past Surgical History:   Procedure Laterality Date    COLON SURGERY      COLONOSCOPY N/A 12/7/2018    Procedure: COLONOSCOPY;  Surgeon: Bhargav Gaston MD;  Location: [facility] ENDO (4TH FLR);  Service: Endoscopy;  Laterality: N/A;    DILATION AND CURETTAGE OF UTERUS      postmenopausal bleeding    ENDOBRONCHIAL ULTRASOUND N/A 11/14/2023    Procedure: ENDOBRONCHIAL ULTRASOUND (EBUS);  Surgeon: Kirt Gr MD;  Location: [facility] OR 2ND FLR;  Service: Pulmonary;  Laterality: N/A;    MASTECTOMY, PARTIAL Right 5/12/2021    Procedure: MASTECTOMY, PARTIAL-Right with radiological marker;  Surgeon: Vivian Cadena MD;  Location: [facility], [place], operated by [facility] OR;  Service: General;  Laterality: Right;    ROBOTIC BRONCHOSCOPY N/A 11/14/2023    Procedure: ROBOTIC BRONCHOSCOPY;  Surgeon: Kirt Gr MD;  Location: [facility] OR 2ND FLR;  Service: Pulmonary;  Laterality: N/A;    SENTINEL LYMPH NODE BIOPSY Right 5/12/2021    Procedure: BIOPSY, LYMPH NODE, SENTINEL-Right;  Surgeon: Vivian Cadena MD;  Location: [facility], [place], operated by [facility] OR;  Service: General;  Laterality: Right;    TUBAL LIGATION       Social History     Socioeconomic History    Marital status:     Number of children: 2   Occupational History     Employer: [employer]   Tobacco Use    Smoking status: Never    Smokeless tobacco: Never   Substance and Sexual Activity    Alcohol use: No    Drug use: Never    Sexual activity: Yes     Partners: Male     Birth control/protection: None     Family History   Problem Relation Age of Onset    Diabetes Brother     Hypertension Brother     Glaucoma Brother     Glaucoma Father     Cataracts Father     Retinal detachment Father     Lung cancer Mother     Uterine cancer Maternal Grandmother     Stroke Maternal Grandmother     Stroke Paternal Grandmother     Amblyopia Neg Hx     Blindness Neg Hx     Macular degeneration Neg Hx     Strabismus Neg Hx     Thyroid  disease Neg Hx        Review of patient's allergies indicates:   Allergen Reactions    Sinus allergy Other (See Comments)     Headaches, migranes    Sulfa (sulfonamide antibiotics) Rash       Medication List with Changes/Refills   Current Medications    BENZONATATE (TESSALON PERLES) 100 MG CAPSULE    Take 1 capsule (100 mg total) by mouth 3 (three) times daily as needed for Cough.    CALCIUM CARBONATE (CALCIUM 500 ORAL)    Take 1 tablet by mouth.    CHOLECALCIFEROL, VITAMIN D3, 125 MCG (5,000 UNIT) CAPSULE    Take 1 capsule (5,000 Units total) by mouth daily with breakfast.    CIPRODEX 0.3-0.1 % DRPS    PLACE 4 DROPS INTO THE RIGHT EAR 2 (TWO) TIMES DAILY. FOR 10 DAYS    CLONIDINE (CATAPRES) 0.1 MG TABLET    Take 1 tablet (0.1 mg total) by mouth daily as needed (for systolic greater than or equal to 160 mmHg).    ESCITALOPRAM OXALATE (LEXAPRO) 10 MG TABLET    Take 1 tablet (10 mg total) by mouth once daily.    FAMOTIDINE (PEPCID) 10 MG TABLET    Take 10 mg by mouth as needed.    LACTOBACILLUS RHAMNOSUS GG (CULTURELLE) 10 BILLION CELL CAPSULE    Take 1 capsule by mouth once daily. Pt takes Align probiotic    LETROZOLE (FEMARA) 2.5 MG TAB    TAKE 1 TABLET BY MOUTH EVERY DAY    LOSARTAN (COZAAR) 25 MG TABLET    Take 1 tablet (25 mg total) by mouth once daily.    METOPROLOL SUCCINATE (TOPROL-XL) 50 MG 24 HR TABLET    Take 1 tablet (50 mg total) by mouth once daily.    MULTIVITAMIN-IRON-FOLIC ACID TAB    Take 1 tablet by mouth once daily.    OMEPRAZOLE (PRILOSEC OTC) 20 MG TABLET    Take 20 mg by mouth once daily.    PROLIA 60 MG/ML SYRG           Review of Systems  Constitution: Denies chills, fever, and sweats.  HENT: Denies headaches or blurry vision.  Cardiovascular: Denies chest pain or irregular heart beat.  Respiratory: Denies cough or shortness of breath.  Gastrointestinal: Denies abdominal pain, nausea, or vomiting.  Musculoskeletal: Denies muscle cramps.  Neurological: Denies dizziness or focal  "weakness.  Psychiatric/Behavioral: Normal mental status.  Hematologic/Lymphatic: Denies bleeding problem or easy bruising/bleeding.  Skin: Denies rash or suspicious lesions    Physical Examination  /85   Pulse 72   Ht 5' 2" (1.575 m)   Wt 51.7 kg (113 lb 15.7 oz)   LMP  (LMP Unknown)   BMI 20.85 kg/m²     Constitutional: No acute distress, conversant  HEENT: Sclera anicteric, Pupils equal, round and reactive to light, extraocular motions intact, Oropharynx clear  Neck: No JVD, no carotid bruits  Cardiovascular: regular rate and rhythm, no murmur, rubs or gallops, normal S1/S2  Pulmonary: Clear to auscultation bilaterally  Abdominal: Abdomen soft, nontender, nondistended, positive bowel sounds  Extremities: No lower extremity edema,   Pulses:  Carotid pulses are 2+ on the right side, and 2+ on the left side.  Radial pulses are 2+ on the right side, and 2+ on the left side.   Femoral pulses are 2+ on the right side, and 2+ on the left side.  Popliteal pulses are 2+ on the right side, and 2+ on the left side.   Dorsalis pedis pulses are 2+ on the right side, and 2+ on the left side.   Posterior tibial pulses are 2+ on the right side, and 2+ on the left side.    Skin: No ecchymosis, erythema, or ulcers  Psych: Alert and oriented x 3, appropriate affect  Neuro: CNII-XII intact, no focal deficits    Labs:  Most Recent Data  CBC:   Lab Results   Component Value Date    WBC 5.81 11/02/2023    HGB 13.6 11/02/2023    HCT 42.3 11/02/2023     11/02/2023    MCV 86 11/02/2023    RDW 12.6 11/02/2023     BMP:   Lab Results   Component Value Date     11/02/2023    K 4.3 11/02/2023     11/02/2023    CO2 24 11/02/2023    BUN 6 (L) 11/02/2023    CREATININE 0.6 11/02/2023    GLU 96 11/02/2023    CALCIUM 9.0 11/02/2023    MG 1.9 05/03/2023     LFTS;   Lab Results   Component Value Date    PROT 7.3 11/02/2023    ALBUMIN 3.9 11/02/2023    BILITOT 0.7 11/02/2023    AST 20 11/02/2023    ALKPHOS 53 (L) 11/02/2023 " "   ALT 20 11/02/2023     COAGS: No results found for: "INR", "PROTIME", "PTT"  FLP:   Lab Results   Component Value Date    CHOL 162 11/02/2023    HDL 47 11/02/2023    LDLCALC 104.4 11/02/2023    TRIG 53 11/02/2023    CHOLHDL 29.0 11/02/2023     CARDIAC:   Lab Results   Component Value Date    TROPONINI 0.024 09/12/2023     (H) 09/12/2023       Imaging:    EKG 8/28/2023:  Sinus rhythm with Premature atrial complexes  Baseline Artifact    Exercise Stress Echo 9/13/2023    Stress Protocol: The patient exercised for 7 minutes 48 seconds on a medium ramp protocol, corresponding to a functional capacity of 6 METS, achieving a peak heart rate of 110 bpm, which is 78 % of the age predicted maximum heart rate. Their exercise capacity was normal. The patient reported no symptoms during the stress test. The test was stopped because the patient experienced fatigue.    ECG Conclusion: The ECG portion of the study is negative for ischemia. Sensitivity is reduced due to failure to reach target heart rate.    Left Ventricle: The left ventricle is normal in size. Normal wall motion. Ejection fraction by visual approximation is 65%. There is normal diastolic function.    Right Ventricle: Normal right ventricular cavity size. Systolic function is normal.    IVC/SVC: Normal venous pressure at 3 mmHg.    Post-stress Echo: The left ventricle systolic function is hyperdynamic. Right ventricle systolic function is normal.    Post-stress Impression: The study is negative with no echocardiographic evidence of stress induced ischemia. Sensitivity is reduced due to failure to reach target heart rate.    Echo 9/13/2019:  Normal left ventricular systolic function. The estimated ejection fraction is 65%  Normal LV diastolic function.  No wall motion abnormalities.  Normal right ventricular systolic function.  Mild tricuspid regurgitation.  The estimated PA systolic pressure is 22 mm Hg  Normal central venous pressure (3 mm " Hg).    Assessment/Plan:  Keo Frias is a 73 y.o. female with HTN, breast cancer, anxiety, who presents for a follow up appointment.    HTN- BP log at home shows average less than 140s/90s. Continue taking metoprolol succinate 50 mg daily, buspirone has been weaned off.  metoprolol succinate 50 mg daily.  Pt to take clonidine 0.1 mg prn for systolic greater than or equal to 160 mmHg.  Pt to keep a log of blood pressure/heart rate bring in next visit for review.      2. Palpitations- Pt with risk factors for CAD. 30 day event monitor result is pending. We will notify once the result has been released.Exercise Stress Echo on 9/13/2023 revealed negative for ischemia. The exercise capacity was normal.    3. HLD-   on 11/2/2023 vs 121 on 5/3/2023. 10-year ASCVD risk score (Mai DK, et al., 2019) is: 21%. We recommend to start atorvastatin 40 mg and aspirin 81 mg. Agreed to start. Check lipid levels before next visit.    Follow up in 6 months    Total duration of face to face visit time 45 minutes.  Total time spent counseling greater than fifty percent of total visit time.  Counseling included discussion regarding imaging findings, diagnosis, possibilities, treatment options, risks and benefits.  The patient had many questions regarding the options and long-term effects.    Patient seen and discussed with Dr. Rodríguez. Further recommendations per the attending addendum.    Gamaliel Kahn MD  PGY IV - Vascular Medicine Fellow   [facility]

## 2023-11-22 NOTE — PATIENT INSTRUCTIONS
Assessment/Plan:  Keo Frias is a 73 y.o. female with HTN, breast cancer, anxiety, who presents for a follow up appointment.    HTN- BP log at home shows average less than 140s/90s. Continue taking metoprolol succinate 50 mg daily, buspirone has been weaned off.  metoprolol succinate 50 mg daily.  Pt to take clonidine 0.1 mg prn for systolic greater than or equal to 160 mmHg.  Pt to keep a log of blood pressure/heart rate bring in next visit for review.      2. Palpitations- Pt with risk factors for CAD. 30 day event monitor result is pending. We will notify once the result has been released.Exercise Stress Echo on 9/13/2023 revealed negative for ischemia. The exercise capacity was normal.    3. HLD-   on 11/2/2023 vs 121 on 5/3/2023. 10-year ASCVD risk score (Mai AYALA, et al., 2019) is: 21%. We recommend to start atorvastatin 40 mg and aspirin 81 mg. Agreed to start. Check lipid levels before next visit.    Follow up in 6 months

## 2023-11-22 NOTE — TELEPHONE ENCOUNTER
Returned call to the pt. She had messaged the clinic about a missed call. Review of the chart did not reveal who it could have been from. Dr. Nichole did not attempt to call. The pt's daughter then got on the line and stated Dr. Nichole is supposed to call this evening. I told her I will make sure he calls. I then informed Dr. Nichole., the pt is expecting a call today.

## 2023-11-22 NOTE — PROGRESS NOTES
[facility] MEDICAL ONCOLOGY - NEW PATIENT VISIT    Reason for visit: New diagnosis of adenocarcinoma of the lung      Oncology History   Malignant neoplasm of upper-outer quadrant of right breast in female, estrogen receptor positive   4/13/2021 Biopsy    Breast, right, 10:00 5N, biopsy:  - Invasive ductal carcinoma with lobular features     4/13/2021 Breast Tumor Markers    Estrogen Receptor: Positive >90%  Progesterone Receptor: Positive 10-50%  HER2: Negative  Ki67: 10-30%     4/26/2021 Genetic Testing    MyRisk: negative     5/12/2021 Breast Surgery    Right partial mastectomy with SLNB     6/1/2021 Tumor Conference    Radiation options? Offer radiation, can discuss partial vs whole breast radiation.        6/2/2021 Cancer Staged    Staging form: Breast, AJCC 8th Edition  - Pathologic stage from 6/2/2021: Stage IA (pT1b, pN0(sn), cM0, G2, ER+, NE+, HER2-)     7/6/2021 - 7/27/2021 Radiation Therapy    Treatment Summary  Course: C1 Chest 2021  Treatment Site Energy Dose/Fx (Gy) #Fx Total Dose (Gy) Start Date End Date Elapsed Days   Breast_Rt 6X 2.65 16 / 16 42.4 7/6/2021 7/27/2021 21        7/2021 -  Hormone Therapy    Letrozole     Adenocarcinoma of lower lobe of right lung   10/5/2023 Imaging Significant Findings    CT C (obtained after CXR, which was itself obtained for palpitations)  - 2.1 x 1.3 cm spiculated RLL nodule, some spiculation noted to extend to the pleural margin  - Scattered pulmonary nodules centered mostly subpleural at the RLL (e.g. 0.9 cm)       11/10/2023 Imaging Significant Findings    PET CT  - 2.1 x 1.1 cm RLL nodule, SUV 3.8  - 0.9 cm R axillary LN, SUV 3.9  - 1.2 cm subcarinal LN, SUV 4.6  - 0.7 cm Ln along L posterior shoulder, SUV 1.7  - Multiple additional solid pulmonary nodules through R lung base, too small for uptake detection     11/14/2023 Procedure    Bronch with EBUS  RLL, FNA  - Adenocarcinoma (TTF1 positive, GATA3  negative)    RLL, TBBx  - Adenocarcinoma    Per pulmonology, station 7 node was very vascular without window for biopsy, plan to discuss at thoracic tumor board     11/22/2023 Tumor Conference    Tumor Board  - Plan for axillary LN biopsy to determine lung vs recurrent breast vs other etiology  - Repeat CT C to evaluate nodules in RLL  - Determine treatment plan based on above results            HPI:     Keo Frias is a 73 y.o. female with pmh significant for HTN, migraine headaches, subclinical hypothyroidism, and adenocarcinoma of the RLL of uncertain stage (X1rEsCh) (NGS, PD-L1 pending), initially dx'd 11/14/23, currently treatment naive, who presents to [facility]. Of note, pt also has a history of Stage IA ( [id number], ER 95%, MN 10%, HER2 negative, Ki-67 20%) IDC of the R breast, initially dx'd 4/13/21, s/p lumpectomy and SLNB (5/12/21), XRT (7/6/21-7/27/21), and currently on letrozole (7/2021 - present).     Pt states that she feels physically well. She says that her breathing is normal though she notes an intermittent daily cough which has worsened since the procedure; she had scant hemoptysis for 1-2 days after the bronch. She walks 3-4 miles daily without stopping and can complete all household chores. She completes all ADLs and iADLs without assistance. Her appetite is stable though she notes a 10 lbs weight loss over the past year (noting improved diet for HTN control).     Living Situation: She lives in [place] with her  and a daughter and granddaughter. She lives in a two story house and can climb the stairs without issue.     Tobacco Use: Never smoker.     EtOH Use: None.     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     Family Cancer History: Her mother had lung cancer (non-smoker), her brother had a cancer on his arm (uncertain type), otherwise she denies  a family history of cancer.     History has been obtained by chart review and discussion with the patient.    Past Medical History:   Past Medical History:   Diagnosis Date    Anemia     Anxiety disorder 8/28/2019    Cataract     Chronic right-sided low back pain without sciatica 10/20/2021    Hypercholesteremia 9/17/2019    Invasive ductal carcinoma of breast, female, right 4/16/2021    Migraine with vision problems     Subclinical hypothyroidism     White coat syndrome with hypertension         Past Surgical History:   Past Surgical History:   Procedure Laterality Date    COLON SURGERY      COLONOSCOPY N/A 12/7/2018    Procedure: COLONOSCOPY;  Surgeon: Bhargav Gaston MD;  Location: [facility] ENDO (4TH FLR);  Service: Endoscopy;  Laterality: N/A;    DILATION AND CURETTAGE OF UTERUS      postmenopausal bleeding    ENDOBRONCHIAL ULTRASOUND N/A 11/14/2023    Procedure: ENDOBRONCHIAL ULTRASOUND (EBUS);  Surgeon: Kirt Gr MD;  Location: [facility] OR 2ND FLR;  Service: Pulmonary;  Laterality: N/A;    MASTECTOMY, PARTIAL Right 5/12/2021    Procedure: MASTECTOMY, PARTIAL-Right with radiological marker;  Surgeon: Vivian Cadena MD;  Location: [facility] OR;  Service: General;  Laterality: Right;    ROBOTIC BRONCHOSCOPY N/A 11/14/2023    Procedure: ROBOTIC BRONCHOSCOPY;  Surgeon: Kirt Gr MD;  Location: [facility] OR 2ND FLR;  Service: Pulmonary;  Laterality: N/A;    SENTINEL LYMPH NODE BIOPSY Right 5/12/2021    Procedure: BIOPSY, LYMPH NODE, SENTINEL-Right;  Surgeon: Vivian Cadena MD;  Location: [facility];  Service: General;  Laterality: Right;    TUBAL LIGATION          Family History:   Family History   Problem Relation Age of Onset    Diabetes Brother     Hypertension Brother     Glaucoma Brother     Glaucoma Father     Cataracts Father     Retinal detachment Father     Lung cancer Mother     Uterine cancer Maternal Grandmother     Stroke Maternal Grandmother     Stroke Paternal Grandmother     Amblyopia Neg Hx      Blindness Neg Hx     Macular degeneration Neg Hx     Strabismus Neg Hx     Thyroid disease Neg Hx         Social History:   Social History     Tobacco Use    Smoking status: Never    Smokeless tobacco: Never   Substance Use Topics    Alcohol use: No        I have reviewed and updated the patient's past medical, surgical, family and social histories.      ROS:   As per HPI.     Allergies:   Review of patient's allergies indicates:   Allergen Reactions    Sinus allergy Other (See Comments)     Headaches, migranes    Sulfa (sulfonamide antibiotics) Rash        Medications:   Current Outpatient Medications   Medication Sig Dispense Refill    calcium carbonate (CALCIUM 500 ORAL) Take 1 tablet by mouth.      cholecalciferol, vitamin D3, 125 mcg (5,000 unit) capsule Take 1 capsule (5,000 Units total) by mouth daily with breakfast. 100 capsule 4    CIPRODEX 0.3-0.1 % DrpS PLACE 4 DROPS INTO THE RIGHT EAR 2 (TWO) TIMES DAILY. FOR 10 DAYS 7.5 mL 5    cloNIDine (CATAPRES) 0.1 MG tablet Take 1 tablet (0.1 mg total) by mouth daily as needed (for systolic greater than or equal to 160 mmHg). 90 tablet 3    EScitalopram oxalate (LEXAPRO) 10 MG tablet Take 1 tablet (10 mg total) by mouth once daily. 90 tablet 4    famotidine (PEPCID) 10 MG tablet Take 10 mg by mouth 2 (two) times daily.      letrozole (FEMARA) 2.5 mg Tab TAKE 1 TABLET BY MOUTH EVERY DAY 90 tablet 3    losartan (COZAAR) 25 MG tablet Take 1 tablet (25 mg total) by mouth once daily. 90 tablet 4    metoprolol succinate (TOPROL-XL) 50 MG 24 hr tablet Take 1 tablet (50 mg total) by mouth once daily. 90 tablet 4    multivitamin-iron-folic acid Tab Take 1 tablet by mouth once daily.      omeprazole (PRILOSEC OTC) 20 MG tablet Take 20 mg by mouth once daily.      PROLIA 60 mg/mL Syrg       benzonatate (TESSALON PERLES) 100 MG capsule Take 1 capsule (100 mg total) by mouth 3 (three) times daily as needed for Cough. 30 capsule 1    Lactobacillus rhamnosus GG (CULTURELLE) 10  "billion cell capsule Take 1 capsule by mouth once daily. Pt takes Align probiotic       No current facility-administered medications for this visit.          Physical Exam:       /74 (BP Location: Left arm, Patient Position: Sitting, BP Method: Medium (Automatic))   Pulse 61   Temp 97.4 °F (36.3 °C) (Oral)   Resp 18   Ht 5' 2" (1.575 m)   Wt 51.4 kg (113 lb 5.1 oz)   LMP  (LMP Unknown)   SpO2 99%   BMI 20.73 kg/m²                Physical Exam  Constitutional:       Appearance: Normal appearance.   HENT:      Head: Normocephalic and atraumatic.   Eyes:      Extraocular Movements: Extraocular movements intact.      Conjunctiva/sclera: Conjunctivae normal.      Pupils: Pupils are equal, round, and reactive to light.   Cardiovascular:      Rate and Rhythm: Normal rate and regular rhythm.      Heart sounds: No murmur heard.     No friction rub. No gallop.   Pulmonary:      Effort: Pulmonary effort is normal.      Breath sounds: No wheezing, rhonchi or rales.   Chest:      Comments: No palpable R axillary LAD  Musculoskeletal:         General: Normal range of motion.      Right lower leg: No edema.      Left lower leg: No edema.   Skin:     General: Skin is warm and dry.   Neurological:      Mental Status: She is alert and oriented to person, place, and time.   Psychiatric:         Mood and Affect: Mood normal.         Thought Content: Thought content normal.         Judgment: Judgment normal.           Labs:   No results found for this or any previous visit (from the past 48 hour(s)).     Imaging:         Path:  See oncologic history above.      Assessment and Plan:     Keo Frias is a 73 y.o. female with pmh significant for HTN, migraine headaches, and adenocarcinoma of the RLL of uncertain stage (D1uFfUa) (NGS, PD-L1 pending), initially dx'd 11/14/23, currently treatment naive, who presents to [facility]. Of note, pt also has a history of Stage IA ( [id number], ER 95%, CT 10%, HER2 negative, Ki-67 20%) " IDC of the R breast, initially dx'd 4/13/21, s/p lumpectomy and SLNB (5/12/21), XRT (7/6/21-7/27/21), and currently on letrozole (7/2021 - present).     Adenocarcinoma of RLL  ECOG PS 1.  Patient presents with a new diagnosis of right lower lobe adenocarcinoma.  Staging scans are not yet complete (MRI brain pending), however PET-CT demonstrates a primary right lower lobe lesion (2.1 x 1.1 cm), an avid subcarinal node (no window for biopsy on recent bronchoscopy with EBUS), 0.9 cm FDG avid right axillary lymphadenopathy, and an FDG avid lymph node along the left posterior shoulder. Notably, the patient also has a history of right-sided invasive ductal carcinoma status post lumpectomy, radiation therapy, and currently on letrozole (lumpectomy on 5/12/21 radiation completed in 7/2021).  Staging is unclear at this time; based on review of imaging, favor that subcarinal node is positive for adenocarcinoma but the etiology of the right axillary lymphadenopathy is less clear given that this is an atypical location for lung cancer metastasis.  In the setting of her recent right-sided breast cancer, will obtain a biopsy of the right axillary lymph node.  If this is positive for lung cancer, this would represent non-regional disease and require systemic treatment.  If this is positive for breast cancer, then in addition to working with the breast oncology team, would need to further define the subcarinal lesion; this was discussed at tumor board where the opinion was that this would be difficult to access via mediastinoscopy and so may need to repeat either EBUS or to treat empirically likely with CRT.     Of note, CT imaging also demonstrates solid RLL nodules. After review with multidisciplinary team at tumor board, will plan for repeat imaging to assess stability of these nodules.    PLAN:   -- MRI brain ordered  -- NGS and PD-L1 pending on lung biopsy (tumor received 11/22/23)  -- Liquid biopsy drawn today (11/22/23)  --  US guided R axillary LN biopsy ordered  -- CT C with contrast ordered  -- RTC tentatively in 2 weeks to ensure workup complete as above  -- Message sent to patient's breast oncology team      Right IDC, ER+/NE+/HER2-  S/p lumpectomy with SLNB, radiation therapy, and currently on letrozole and denosumab, tolerating well. Concern for possible R axillary recurrence, workup as below.   -- Okay to continue letrozole  -- Okay to continue denosumab  -- US guided R axillary LN biopsy ordered  -- Breast oncology team messaged      Cough  Pt had mild cough prior to bronchoscopy, worse since. It initially improved somewhat but has now stabilized. Non-productive without hemoptysis. This is moderately bothersome to the patient. She has not tried any cough suppressants.   -- Benzonotate ordered      The above information has been reviewed with the patient and all questions have been answered to their apparent satisfaction.  They understand that they can call the clinic with any questions.    Bridger Nichole MD  Hematology/Oncology  [facility] [place]        Med Onc Chart Routing      Follow up with physician . RTC in 2 weeks   Follow up with BRENDA    Infusion scheduling note    Injection scheduling note    Labs    Imaging   MRI brain prior to RTC; CT C prior to RTC   Pharmacy appointment    Other referrals

## 2023-11-24 ENCOUNTER — TELEPHONE (OUTPATIENT)
Dept: HEMATOLOGY/ONCOLOGY | Facility: CLINIC | Age: 73
End: 2023-11-24
Payer: MEDICARE

## 2023-11-24 NOTE — TELEPHONE ENCOUNTER
Pt's daughter had questions about when the CT and MRI for her mother will be scheduled. I checked the chart and the authorizations for both procedures are pending. Once approved the tests will then be scheduled. She also said a lung biopsy was mentioned by Dr. Nichole. I did not see an order and messaged him. He said that he is having multidisciplinary meetings about her case. He will order it if this is the agreed upon recommendation. I informed the daughter of the same. She verbalized an understanding.

## 2023-11-27 ENCOUNTER — TELEPHONE (OUTPATIENT)
Dept: HEMATOLOGY/ONCOLOGY | Facility: CLINIC | Age: 73
End: 2023-11-27
Payer: MEDICARE

## 2023-11-29 ENCOUNTER — HOSPITAL ENCOUNTER (OUTPATIENT)
Dept: RADIOLOGY | Facility: HOSPITAL | Age: 73
Discharge: HOME | End: 2023-11-29
Attending: HOSPITALIST
Payer: MEDICARE

## 2023-11-29 DIAGNOSIS — C34.31 ADENOCARCINOMA OF LOWER LOBE OF RIGHT LUNG: Chronic | ICD-10-CM

## 2023-11-29 PROCEDURE — 71260 CT THORAX DX C+: CPT | Mod: 26,,, | Performed by: STUDENT IN AN ORGANIZED HEALTH CARE EDUCATION/TRAINING PROGRAM

## 2023-11-29 PROCEDURE — 25500020 PHARM REV CODE 255: Performed by: HOSPITALIST

## 2023-11-29 PROCEDURE — 71260 CT CHEST WITH CONTRAST: ICD-10-PCS | Mod: 26,,, | Performed by: STUDENT IN AN ORGANIZED HEALTH CARE EDUCATION/TRAINING PROGRAM

## 2023-11-29 PROCEDURE — 71260 CT THORAX DX C+: CPT | Mod: TC

## 2023-11-29 RX ADMIN — IOHEXOL 75 ML: 350 INJECTION, SOLUTION INTRAVENOUS at 07:11

## 2023-11-30 ENCOUNTER — OFFICE VISIT (OUTPATIENT)
Dept: PSYCHIATRY | Facility: CLINIC | Age: 73
End: 2023-11-30
Payer: MEDICARE

## 2023-11-30 VITALS
SYSTOLIC BLOOD PRESSURE: 146 MMHG | HEART RATE: 71 BPM | WEIGHT: 113.75 LBS | BODY MASS INDEX: 20.81 KG/M2 | DIASTOLIC BLOOD PRESSURE: 74 MMHG

## 2023-11-30 DIAGNOSIS — Z17.0 MALIGNANT NEOPLASM OF UPPER-OUTER QUADRANT OF RIGHT BREAST IN FEMALE, ESTROGEN RECEPTOR POSITIVE: ICD-10-CM

## 2023-11-30 DIAGNOSIS — F41.9 ANXIETY DISORDER, UNSPECIFIED TYPE: Primary | ICD-10-CM

## 2023-11-30 DIAGNOSIS — C50.411 MALIGNANT NEOPLASM OF UPPER-OUTER QUADRANT OF RIGHT BREAST IN FEMALE, ESTROGEN RECEPTOR POSITIVE: ICD-10-CM

## 2023-11-30 DIAGNOSIS — C34.31 ADENOCARCINOMA OF LOWER LOBE OF RIGHT LUNG: ICD-10-CM

## 2023-11-30 DIAGNOSIS — I10 BENIGN ESSENTIAL HYPERTENSION: ICD-10-CM

## 2023-11-30 PROCEDURE — 3078F PR MOST RECENT DIASTOLIC BLOOD PRESSURE < 80 MM HG: ICD-10-PCS | Mod: CPTII,S$GLB,, | Performed by: PSYCHIATRY & NEUROLOGY

## 2023-11-30 PROCEDURE — 3077F SYST BP >= 140 MM HG: CPT | Mod: CPTII,S$GLB,, | Performed by: PSYCHIATRY & NEUROLOGY

## 2023-11-30 PROCEDURE — 3008F PR BODY MASS INDEX (BMI) DOCUMENTED: ICD-10-PCS | Mod: CPTII,S$GLB,, | Performed by: PSYCHIATRY & NEUROLOGY

## 2023-11-30 PROCEDURE — 99999 PR PBB SHADOW E&M-EST. PATIENT-LVL II: CPT | Mod: PBBFAC,,, | Performed by: PSYCHIATRY & NEUROLOGY

## 2023-11-30 PROCEDURE — 4010F ACE/ARB THERAPY RXD/TAKEN: CPT | Mod: CPTII,S$GLB,, | Performed by: PSYCHIATRY & NEUROLOGY

## 2023-11-30 PROCEDURE — 99205 PR OFFICE/OUTPT VISIT, NEW, LEVL V, 60-74 MIN: ICD-10-PCS | Mod: S$GLB,,, | Performed by: PSYCHIATRY & NEUROLOGY

## 2023-11-30 PROCEDURE — 3077F PR MOST RECENT SYSTOLIC BLOOD PRESSURE >= 140 MM HG: ICD-10-PCS | Mod: CPTII,S$GLB,, | Performed by: PSYCHIATRY & NEUROLOGY

## 2023-11-30 PROCEDURE — 99999 PR PBB SHADOW E&M-EST. PATIENT-LVL II: ICD-10-PCS | Mod: PBBFAC,,, | Performed by: PSYCHIATRY & NEUROLOGY

## 2023-11-30 PROCEDURE — 99205 OFFICE O/P NEW HI 60 MIN: CPT | Mod: S$GLB,,, | Performed by: PSYCHIATRY & NEUROLOGY

## 2023-11-30 PROCEDURE — 3044F HG A1C LEVEL LT 7.0%: CPT | Mod: CPTII,S$GLB,, | Performed by: PSYCHIATRY & NEUROLOGY

## 2023-11-30 PROCEDURE — 3078F DIAST BP <80 MM HG: CPT | Mod: CPTII,S$GLB,, | Performed by: PSYCHIATRY & NEUROLOGY

## 2023-11-30 PROCEDURE — 4010F PR ACE/ARB THEARPY RXD/TAKEN: ICD-10-PCS | Mod: CPTII,S$GLB,, | Performed by: PSYCHIATRY & NEUROLOGY

## 2023-11-30 PROCEDURE — 3008F BODY MASS INDEX DOCD: CPT | Mod: CPTII,S$GLB,, | Performed by: PSYCHIATRY & NEUROLOGY

## 2023-11-30 PROCEDURE — 3044F PR MOST RECENT HEMOGLOBIN A1C LEVEL <7.0%: ICD-10-PCS | Mod: CPTII,S$GLB,, | Performed by: PSYCHIATRY & NEUROLOGY

## 2023-11-30 NOTE — PATIENT INSTRUCTIONS
PLAN:     Follow Up:    1/31/2024 10:30 AM ESTABLISHED PATIENT - VIRTUAL Nikko Agustin - Psych [facility] 4th Fl Post, Nikunj INTERIANO MD     She will check with oncology / re counselor  THO IF any issues she was given info about Psychology [email or web address]    Psyc Meds: has Lexapro as 10 mg once daily by Sharron Ogden MD / sudhakar remain on such    References:     Relaxation stress reduction workbook: GIL Lincoln PhD ( used: $7-10)    Feeling Good Website: Nikunj Chowdhury MD / [email or web address] website (free) / conrad. PODCASTS    Anxiety &  phobia workbook by RC Sullivan PhD  (web retailers: used: $ 7-10)    [place]: Path to Better Sleep : https://www.veterantraining.[place].gov/insomnia/ (free)     Pt expressed appreciation for the visit today and did not have further question at this time though pt  was still informed to:     Call  if problems.    Call / Report Side Effects to Psyc MD     Encouraged to follow up with primary care / Gen Med MD for continued monitoring of general health and wellness.    Understanding was expressed; and no further concerns nor questions were raised at this time.     Remember healthy self care:   eat right  attempt adequate rest   HANDWASHING / encourage such conrad. During this corona virus time   walk or light exercise within reason and as your general med team approves  read or explore any of reference materials / homework mentioned  reach out (I.e.,  connect with)  others who nuture and bring out best in you  avoid risky behaviors    Keep your appointments:    IF you  cannot make your appt THEN please call [phone]  or go online (via My Chart daniel) to reschedule.    It is the responsibility of the patient to reschedule an appointment if an appointment has been canceled or missed.    Avoid  alcohol and illicit substances.  Look for the positive.  All is often relative-seek balance  Call sooner if needed : [phone]  Call 221 or go to Emergency Room  (ER)  if  any acute concerns

## 2023-11-30 NOTE — PROGRESS NOTES
PSYCHIATRIC EVALUATION    Disclaimer: Evaluation and treatment is based on information presented to date. Any new information may affect assessment and findings.     Note:Face to Face time with patient: 75 minutes of total time spent on the encounter, which includes face to face time and non-face to face time preparing to see the patient including but not limited to: 1) Obtaining and/or reviewing separately obtained history, 2) Documenting clinical information in the electronic or other health record, 3) Independently  reviewing / interpreting results as clinically indicated ; 4) discussing medication options including risks and benefits as well as 5) recommendations  for therapeutic interventions and 5) where appropriate additional educational resources (ex: reference books / websites); 6) communicating assessment and plan of care to the patient/family/caregiver  7) explaining importance of keeping appts ; and 8) rescheduling IF miss appt  IF acute concerns to call 911 or go to nearest ER.     Name: Keo Frias  Age: 73 y.o. 1950  Date of Encounter: 11/30/2023    Sources of Information:  Patient  Interview and chart review     Chief Complaint:  history of anxiety and more recently diagnosed with lung cancer    Referred by: (Whose idea to come see Psychiatry) : self referred     History of Present Illness    Keo Frias a 73 y.o.  female presents today as self referred     history of anxiety and more recently diagnosed with lung cancer    Worked in [employer] x over 30 years      11/29/2023    12:38 AM 9/4/2019     3:57 PM   GAD7   1. Feeling nervous, anxious, or on edge? 1 0   2. Not being able to stop or control worrying? 1 0   3. Worrying too much about different things? 1    4. Trouble relaxing? 1    5. Being so restless that it is hard to sit still? 1    6. Becoming easily annoyed or irritable? 1    7. Feeling afraid as if something awful might happen? 1    8. If you checked off any problems, how  difficult have these problems made it for you to do your work, take care of things at home, or get along with other people? 1    TERESA-7 Score 7            11/30/2023    12:39 AM 11/8/2023     8:41 AM 10/23/2023    10:21 AM 10/10/2023     1:27 PM 9/20/2023    10:54 AM 8/9/2023    11:30 AM 5/8/2023     8:42 AM   Depression Patient Health Questionnaire   Over the last two weeks how often have you been bothered by little interest or pleasure in doing things Not at all Not at all Not at all Not at all Not at all Not at all Not at all   Over the last two weeks how often have you been bothered by feeling down, depressed or hopeless Not at all Not at all Not at all Not at all Not at all Not at all Not at all   PHQ-2 Total Score 0 0 0 0 0 0 0   Over the last two weeks how often have you been bothered by trouble falling or staying asleep, or sleeping too much Not at all         Over the last two weeks how often have you been bothered by feeling tired or having little energy Not at all         Over the last two weeks how often have you been bothered by a poor appetite or overeating Not at all         Over the last two weeks how often have you been bothered by feeling bad about yourself - or that you are a failure or have let yourself or your family down Not at all         Over the last two weeks how often have you been bothered by trouble concentrating on things, such as reading the newspaper or watching television Not at all         Over the last two weeks how often have you been bothered by moving or speaking so slowly that other people could have noticed. Or the opposite - being so fidgety or restless that you have been moving around a lot more than usual. Not at all         Over the last two weeks how often have you been bothered by thoughts that you would be better off dead, or of hurting yourself Not at all         If you checked off any problems, how difficult have these problems made it for you to do your work, take  care of things at home or get along with other people? Not difficult at all         PHQ-9 Score 0         PHQ-9 Interpretation Minimal or None             Excerpt  of 11-22-23 Heme Oncology : Bridger Nichole MD [facility]    73 y.o. female with h/o right breast IDC (Stage 1A, T1BN0) s/p lumpectomy (05/2021) and adjuvant right breast radiation (currently treated with adjuvant letrozole), HTN, and newly diagnosed RLL NSCLC. Chest CT 10/05/23 showed RLL spiculate lesion measuring 2.1 cm. Follow up PET/CT 10/12/23 redemonstrated hypermetabolic RLL mass as well as PET avid lymph nodes in the right axilla, mediastinum and left posterior shoulder. Underwent robotic bronchoscopy with EBUS 11/14/23; pathology: RLL positive for NSCLC, adenocarcinoma (TTF positive, GATA3 negative) consistent with new primary.     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      Family Cancer History: Her mother had lung cancer (non-smoker), her brother had a cancer on his arm (uncertain type), otherwise she denies a family history of cancer.     Adenocarcinoma of RLL  ECOG PS 1.  Patient presents with a new diagnosis of right lower lobe adenocarcinoma.  Staging scans are not yet complete (MRI brain pending), however PET-CT demonstrates a primary right lower lobe lesion (2.1 x 1.1 cm), an avid subcarinal node (no window for biopsy on recent bronchoscopy with EBUS), 0.9 cm FDG avid right axillary lymphadenopathy, and an FDG avid lymph node along the left posterior shoulder. Notably, the patient also has a history of right-sided invasive ductal carcinoma status post lumpectomy, radiation therapy, and currently on letrozole (lumpectomy on 5/12/21 radiation completed in 7/2021).  Staging is unclear at this time; based on review of imaging, favor that subcarinal node is positive for  adenocarcinoma but the etiology of the right axillary lymphadenopathy is less clear given that this is an atypical location for lung cancer metastasis.  In the setting of her recent right-sided breast cancer, will obtain a biopsy of the right axillary lymph node.  If this is positive for lung cancer, this would represent non-regional disease and require systemic treatment.  If this is positive for breast cancer, then in addition to working with the breast oncology team, would need to further define the subcarinal lesion; this was discussed at tumor board where the opinion was that this would be difficult to access via mediastinoscopy and so may need to repeat either EBUS or to treat empirically likely with CRT.      Of note, CT imaging also demonstrates solid RLL nodules. After review with multidisciplinary team at tumor board, will plan for repeat imaging to assess stability of these nodules.    >>     Anemia      Anxiety disorder 8/28/2019    Cataract      Chronic right-sided low back pain without sciatica 10/20/2021    Hypercholesteremia 9/17/2019    Invasive ductal carcinoma of breast, female, right 4/16/2021    Migraine with vision problems      Subclinical hypothyroidism      White coat syndrome with hypertension          History:     Past Psychiatric History:   Previous therapy: No  Previous psychiatric treatment and medication trials: Yes  Previous psychiatric hospitalizations: No  Previous diagnoses:  depression anxiety Dr Saab  Previous suicide attempts: No    Abuse History:   Physical Abuse: No  Sexual Abuse: No  Other Abuse / Trauma :  n    Substance Abuse History:  Use of alcohol:  no  Tobacco use:  she NEVER ; father did smoke   Cannabis: no  Recreational drugs:  none    Family History Mental Health:   Suicide :  No  Other:  No    Psyc Meds: lexapro / buspirone (which she stopped after cardiology suggested she do so IF taknig lexapro)      Top 3 Stressors:  if disagree with someone: If they talk  politics not like and ruminate      Review Of Systems:     Medical Review Of Systems:  Says recently diag w/  lung cancer     Musculoskeletal :  no tremor     Current Evaluation:     Mental Status Exam:   Appearance: casual /   Oriented: x 3 / including: Date: 11- ; and aware that at: [employer]    Attitude: cooperative pleasant   Eye Contact: good   Behavior: calm here   Mood: says some anxiety    Cognition: alert / 20-3: 17, 14, 11, 8, 5 ,2   Concentration: grossly intact   Affect: appropriate range   Anxiety: mild  Thought Process: goal directed   Speech: Volume : WNL   Quantity WNL   Quality: appears to openly answer questions   Eye Contact: good   Insight:  acknowledges some anxiety  Threats: no SI / no HI   Memory: intact (as relay information across time spans) Pres? BIDEN          Prior Pres? TRUMP  Psychosis: denies all   Estimate of Intellectual Function: average   Judgment (to simple situation): if saw a house on fire / A: call 911   Impulse Control: no history SI / nor HI ; calm here     3 wishes ?  Health  wealth my family       Current Outpatient Medications:     atorvastatin (LIPITOR) 20 MG tablet, Take 2 tablets (40 mg total) by mouth once daily., Disp: 180 tablet, Rfl: 3    benzonatate (TESSALON PERLES) 100 MG capsule, Take 1 capsule (100 mg total) by mouth 3 (three) times daily as needed for Cough. (Patient not taking: Reported on 11/22/2023), Disp: 30 capsule, Rfl: 1    calcium carbonate (CALCIUM 500 ORAL), Take 1 tablet by mouth., Disp: , Rfl:     cholecalciferol, vitamin D3, 125 mcg (5,000 unit) capsule, Take 1 capsule (5,000 Units total) by mouth daily with breakfast., Disp: 100 capsule, Rfl: 4    CIPRODEX 0.3-0.1 % DrpS, PLACE 4 DROPS INTO THE RIGHT EAR 2 (TWO) TIMES DAILY. FOR 10 DAYS, Disp: 7.5 mL, Rfl: 5    cloNIDine (CATAPRES) 0.1 MG tablet, Take 1 tablet (0.1 mg total) by mouth daily as needed (for systolic greater than or equal to 160 mmHg)., Disp: 90 tablet, Rfl: 3    EScitalopram  oxalate (LEXAPRO) 10 MG tablet, Take 1 tablet (10 mg total) by mouth once daily. (Patient taking differently: Take 10 mg by mouth once daily. Pt taking 5mg total 1 time daily), Disp: 90 tablet, Rfl: 4    famotidine (PEPCID) 10 MG tablet, Take 10 mg by mouth as needed., Disp: , Rfl:     Lactobacillus rhamnosus GG (CULTURELLE) 10 billion cell capsule, Take 1 capsule by mouth once daily. Pt takes Align probiotic, Disp: , Rfl:     letrozole (FEMARA) 2.5 mg Tab, TAKE 1 TABLET BY MOUTH EVERY DAY, Disp: 90 tablet, Rfl: 3    losartan (COZAAR) 25 MG tablet, Take 1 tablet (25 mg total) by mouth once daily., Disp: 90 tablet, Rfl: 4    metoprolol succinate (TOPROL-XL) 50 MG 24 hr tablet, Take 1 tablet (50 mg total) by mouth once daily., Disp: 90 tablet, Rfl: 4    multivitamin-iron-folic acid Tab, Take 1 tablet by mouth once daily., Disp: , Rfl:     omeprazole (PRILOSEC OTC) 20 MG tablet, Take 20 mg by mouth once daily., Disp: , Rfl:     PROLIA 60 mg/mL Syrg, , Disp: , Rfl:      Psychosocial History :    City Born: [facility]     Siblings (full or half)  Brothers: 3  Sisters:2    Parents :    Briefly Describe  your Mom: like me get good education;  good habits   Briefly Describe your Dad: same thing     Bio Mom: Occupation:  not twork  Bio Dad:  Occupation: lab work    Marital Status: once /  51 yrs   (Braxton Frias; alive)     Children   Girls  (ages): daughter 46 Renee and 50 Impal  Boys (ages): none    Education: B.S. [employer]    Protestant: [employer]    Legal Issues?  n  DWI ?n  FPC time?n    Employment:   Retired 2014  Longest Job? [employer]      Assessment - Diagnosis - Goals:     Encounter Diagnoses   Name Primary?    Anxiety disorder, unspecified type Yes    Adenocarcinoma of lower lobe of right lung     Benign essential hypertension     History Malignant neoplasm of upper-outer quadrant of right breast in female, estrogen receptor positive         Patient Instructions     PLAN:     Follow  Up:    1/31/2024 10:30 AM ESTABLISHED PATIENT - VIRTUAL Nikko Agustin - Psych [facility] 4th Fl Post, Nikunj INTERIANO MD     She will check with oncology / re counselor  THANASTACIO IF any issues she was given info about Psychology Today.[email or web address]    Psyc Meds: has Lexapro as 10 mg once daily by Sharron Ogden MD / sudhakar remain on such    References:     Relaxation stress reduction workbook: GIL Lincoln PhD ( used: $7-10)    Feeling Good Website: Nikunj Chowdhury MD / www.[email or web address] website (free) / conrad. PODCASTS    Anxiety &  phobia workbook by RC Sullivan PhD  (web retailers: used: $ 7-10)    [place]: Path to Better Sleep : https://www.veterantraining.[place].gov/insomnia/ (free)     Pt expressed appreciation for the visit today and did not have further question at this time though pt  was still informed to:     Call  if problems.    Call / Report Side Effects to Psyc MD     Encouraged to follow up with primary care / Gen Med MD for continued monitoring of general health and wellness.    Understanding was expressed; and no further concerns nor questions were raised at this time.     Remember healthy self care:   eat right  attempt adequate rest   HANDWASHING / encourage such conrad. During this corona virus time   walk or light exercise within reason and as your general med team approves  read or explore any of reference materials / homework mentioned  reach out (I.e.,  connect with)  others who nuture and bring out best in you  avoid risky behaviors    Keep your appointments:    IF you  cannot make your appt THEN please call [phone]  or go online (via My Chart daniel) to reschedule.    It is the responsibility of the patient to reschedule an appointment if an appointment has been canceled or missed.    Avoid  alcohol and illicit substances.  Look for the positive.  All is often relative-seek balance  Call sooner if needed : [phone]  Call [phone] or go to Emergency Room  (ER)  if  any acute concerns

## 2023-12-03 PROBLEM — F41.9 ANXIETY DISORDER: Status: ACTIVE | Noted: 2023-12-03

## 2023-12-04 ENCOUNTER — INFUSION (OUTPATIENT)
Dept: INFUSION THERAPY | Facility: HOSPITAL | Age: 73
End: 2023-12-04
Attending: FAMILY MEDICINE
Payer: MEDICARE

## 2023-12-04 VITALS
HEART RATE: 87 BPM | OXYGEN SATURATION: 96 % | HEIGHT: 62 IN | RESPIRATION RATE: 18 BRPM | WEIGHT: 113 LBS | SYSTOLIC BLOOD PRESSURE: 138 MMHG | DIASTOLIC BLOOD PRESSURE: 62 MMHG | BODY MASS INDEX: 20.8 KG/M2 | TEMPERATURE: 98 F

## 2023-12-04 DIAGNOSIS — M81.0 AGE-RELATED OSTEOPOROSIS WITHOUT CURRENT PATHOLOGICAL FRACTURE: Primary | ICD-10-CM

## 2023-12-04 PROCEDURE — 63600175 PHARM REV CODE 636 W HCPCS: Mod: JZ,JG | Performed by: INTERNAL MEDICINE

## 2023-12-04 PROCEDURE — 96372 THER/PROPH/DIAG INJ SC/IM: CPT

## 2023-12-04 RX ADMIN — DENOSUMAB 60 MG: 60 INJECTION SUBCUTANEOUS at 11:12

## 2023-12-05 ENCOUNTER — OFFICE VISIT (OUTPATIENT)
Dept: INTERVENTIONAL RADIOLOGY/VASCULAR | Facility: CLINIC | Age: 73
End: 2023-12-05
Payer: MEDICARE

## 2023-12-05 ENCOUNTER — PATIENT MESSAGE (OUTPATIENT)
Dept: HEMATOLOGY/ONCOLOGY | Facility: CLINIC | Age: 73
End: 2023-12-05
Payer: MEDICARE

## 2023-12-05 VITALS
DIASTOLIC BLOOD PRESSURE: 67 MMHG | SYSTOLIC BLOOD PRESSURE: 136 MMHG | BODY MASS INDEX: 20.67 KG/M2 | HEART RATE: 84 BPM | HEIGHT: 62 IN

## 2023-12-05 DIAGNOSIS — D49.9 NEOPLASM: ICD-10-CM

## 2023-12-05 DIAGNOSIS — C50.411 MALIGNANT NEOPLASM OF UPPER-OUTER QUADRANT OF RIGHT BREAST IN FEMALE, ESTROGEN RECEPTOR POSITIVE: ICD-10-CM

## 2023-12-05 DIAGNOSIS — Z17.0 MALIGNANT NEOPLASM OF UPPER-OUTER QUADRANT OF RIGHT BREAST IN FEMALE, ESTROGEN RECEPTOR POSITIVE: ICD-10-CM

## 2023-12-05 DIAGNOSIS — R59.0 LYMPHADENOPATHY, AXILLARY: Primary | ICD-10-CM

## 2023-12-05 PROCEDURE — 99214 PR OFFICE/OUTPT VISIT, EST, LEVL IV, 30-39 MIN: ICD-10-PCS | Mod: S$GLB,,, | Performed by: PHYSICIAN ASSISTANT

## 2023-12-05 PROCEDURE — 3008F BODY MASS INDEX DOCD: CPT | Mod: CPTII,S$GLB,, | Performed by: PHYSICIAN ASSISTANT

## 2023-12-05 PROCEDURE — 3075F SYST BP GE 130 - 139MM HG: CPT | Mod: CPTII,S$GLB,, | Performed by: PHYSICIAN ASSISTANT

## 2023-12-05 PROCEDURE — 3008F PR BODY MASS INDEX (BMI) DOCUMENTED: ICD-10-PCS | Mod: CPTII,S$GLB,, | Performed by: PHYSICIAN ASSISTANT

## 2023-12-05 PROCEDURE — 3078F DIAST BP <80 MM HG: CPT | Mod: CPTII,S$GLB,, | Performed by: PHYSICIAN ASSISTANT

## 2023-12-05 PROCEDURE — 3044F PR MOST RECENT HEMOGLOBIN A1C LEVEL <7.0%: ICD-10-PCS | Mod: CPTII,S$GLB,, | Performed by: PHYSICIAN ASSISTANT

## 2023-12-05 PROCEDURE — 99999 PR PBB SHADOW E&M-EST. PATIENT-LVL III: ICD-10-PCS | Mod: PBBFAC,,, | Performed by: PHYSICIAN ASSISTANT

## 2023-12-05 PROCEDURE — 4010F ACE/ARB THERAPY RXD/TAKEN: CPT | Mod: CPTII,S$GLB,, | Performed by: PHYSICIAN ASSISTANT

## 2023-12-05 PROCEDURE — 1159F MED LIST DOCD IN RCRD: CPT | Mod: CPTII,S$GLB,, | Performed by: PHYSICIAN ASSISTANT

## 2023-12-05 PROCEDURE — 1126F PR PAIN SEVERITY QUANTIFIED, NO PAIN PRESENT: ICD-10-PCS | Mod: CPTII,S$GLB,, | Performed by: PHYSICIAN ASSISTANT

## 2023-12-05 PROCEDURE — 3078F PR MOST RECENT DIASTOLIC BLOOD PRESSURE < 80 MM HG: ICD-10-PCS | Mod: CPTII,S$GLB,, | Performed by: PHYSICIAN ASSISTANT

## 2023-12-05 PROCEDURE — 3044F HG A1C LEVEL LT 7.0%: CPT | Mod: CPTII,S$GLB,, | Performed by: PHYSICIAN ASSISTANT

## 2023-12-05 PROCEDURE — 3075F PR MOST RECENT SYSTOLIC BLOOD PRESS GE 130-139MM HG: ICD-10-PCS | Mod: CPTII,S$GLB,, | Performed by: PHYSICIAN ASSISTANT

## 2023-12-05 PROCEDURE — 1126F AMNT PAIN NOTED NONE PRSNT: CPT | Mod: CPTII,S$GLB,, | Performed by: PHYSICIAN ASSISTANT

## 2023-12-05 PROCEDURE — 4010F PR ACE/ARB THEARPY RXD/TAKEN: ICD-10-PCS | Mod: CPTII,S$GLB,, | Performed by: PHYSICIAN ASSISTANT

## 2023-12-05 PROCEDURE — 99999 PR PBB SHADOW E&M-EST. PATIENT-LVL III: CPT | Mod: PBBFAC,,, | Performed by: PHYSICIAN ASSISTANT

## 2023-12-05 PROCEDURE — 1160F PR REVIEW ALL MEDS BY PRESCRIBER/CLIN PHARMACIST DOCUMENTED: ICD-10-PCS | Mod: CPTII,S$GLB,, | Performed by: PHYSICIAN ASSISTANT

## 2023-12-05 PROCEDURE — 99214 OFFICE O/P EST MOD 30 MIN: CPT | Mod: S$GLB,,, | Performed by: PHYSICIAN ASSISTANT

## 2023-12-05 PROCEDURE — 1160F RVW MEDS BY RX/DR IN RCRD: CPT | Mod: CPTII,S$GLB,, | Performed by: PHYSICIAN ASSISTANT

## 2023-12-05 PROCEDURE — 1159F PR MEDICATION LIST DOCUMENTED IN MEDICAL RECORD: ICD-10-PCS | Mod: CPTII,S$GLB,, | Performed by: PHYSICIAN ASSISTANT

## 2023-12-05 NOTE — PROGRESS NOTES
"Subjective     Patient ID: Keo Frias is a 73 y.o. female.    Chief Complaint: Lesion    Patient referred to Interventional Radiology by Dr. Nichole for evaluation of biopsy of right axillary LN and placement of reflector.  Pt here with her daughter Renee ([phone]).    She has a history of HTN, migraine headaches, and adenocarcinoma of the RLL of uncertain stage ([id number]) (NGS, PD-L1 pending), initially dx'd 11/14/23, currently treatment naive, also with history of Stage IA ( [id number], ER 95%, TX 10%, HER2 negative, Ki-67 20%) IDC of the R breast, initially dx'd 4/13/21, s/p lumpectomy and SLNB (5/12/21), XRT (7/6/21-7/27/21), and currently on letrozole (7/2021 - present). PET 10/12/23" solid pulmonary nodule in the right lower lobe measuring 2.1 x 1.1 cm with max SUV of 3.8 (image 59).  There is a 0.9 cm right axillary lymph node with max SUV of 3.9 (image 43).  There is a 1.2 cm subcarinal lymph node with max SUV of 4.6.  There is a 0.7 cm lymph node along the left posterior shoulder with max SUV of 1.7 (image 54)."  Patient reports cough has become slightly worse, some mucous, denies hemoptysis.   She denies any fever, chills, unexpected wt change, decreased appetite, fatigue, CP, SOB, abdominal pain or distention, N/V/D, leg pain or swelling, jaundice, confusion, sleep disturbance.     Patient denies AC, antiplatelet use, or other medications containing asa.  Pt denies issues laying flat. Pt denies CPAP use or O2 use at home.  Allergies reviewed, pt denies allergy to contrast.  Oncology notes reviewed.         Review of Systems   Constitutional:  Negative for activity change, appetite change, chills, fatigue, fever and unexpected weight change.   Respiratory:  Positive for cough. Negative for shortness of breath.    Cardiovascular:  Negative for chest pain and leg swelling.   Gastrointestinal:  Negative for abdominal distention, abdominal pain, constipation, diarrhea, nausea and vomiting.   Genitourinary:  " Negative for difficulty urinating and flank pain.   Musculoskeletal:  Negative for back pain and gait problem.   Neurological:  Negative for weakness and memory loss.   Psychiatric/Behavioral:  Negative for confusion and sleep disturbance.           Objective     Physical Exam  Vitals and nursing note reviewed.   Constitutional:       Appearance: Normal appearance.   HENT:      Head: Normocephalic and atraumatic.   Cardiovascular:      Rate and Rhythm: Normal rate and regular rhythm.   Pulmonary:      Effort: Pulmonary effort is normal.      Breath sounds: Normal breath sounds. No wheezing.   Abdominal:      General: Abdomen is flat.      Palpations: Abdomen is soft.      Tenderness: There is no abdominal tenderness.   Musculoskeletal:      Right lower leg: No edema.      Left lower leg: No edema.   Skin:     General: Skin is warm and dry.      Coloration: Skin is not jaundiced.   Neurological:      General: No focal deficit present.      Mental Status: She is alert and oriented to person, place, and time.   Psychiatric:         Behavior: Behavior normal.       Imaging PET 10/12/23 reviewed independently          Assessment and Plan     1. Lymphadenopathy, axillary  -     IR Biopsy Lymph Node; Future; Expected date: 12/06/2023  -     CBC Auto Differential; Future; Expected date: 12/06/2023    2. Malignant neoplasm of upper-outer quadrant of right breast in female, estrogen receptor positive    3. Neoplasm  -     Protime-INR; Future; Expected date: 12/06/2023    Discussed how the procedure Bx of right axillary LN (CT with US) and reflector will be performed, risks (including, but not limited to, pain, bleeding, infection, damage to nearby structures, and the need for additional procedures), benefits, possible complications, pre-post procedure expectations, and alternatives. The patient voices understanding and all questions have been answered.  The patient agrees to proceed as planned.     Patient pending scheduling  at  with Dr. Ray.  We have placed patient on cancellation list for earlier appointment.  Discussed at this time January appointment.  Pt phone number [phone], daughter Renee at [phone].  2.  CBC/INR ordered to be drawn within 30 days of the procedure.  3.  Pt denies taking AC, antiplatelet medication, or other medications containing asa.    4.  Pt with no iodine or contrast allergy.  5.  Pre-procedure handout with clinic phone number provided.  Pt advised to call if any further questions or need to reschedule.  Discussed with patient our nursing team will call the day before the procedure with reminder for instructions.          No follow-ups on file.

## 2023-12-06 PROBLEM — R59.0 LYMPHADENOPATHY, AXILLARY: Status: ACTIVE | Noted: 2023-12-06

## 2023-12-07 LAB
DNA RANGE(S) EXAMINED NAR: NORMAL
GENE DIS ANL INTERP-IMP: NORMAL
GENE DIS ASSESSED: NORMAL
REASON FOR STUDY: NORMAL
TEMPUS LCA: NORMAL
TEMPUS PORTAL: NORMAL

## 2023-12-11 LAB — FUNGUS SPEC CULT: NORMAL

## 2023-12-14 LAB
DNA RANGE(S) EXAMINED NAR: NORMAL
GENE DIS ANL INTERP-IMP: POSITIVE
GENE DIS ASSESSED: NORMAL
GENE MUT TESTED BLD/T: 21.6 M/MB
MSI CA SPEC-IMP: NORMAL
PD-L1 BY 22C3 TISS IMSTN DOC: POSITIVE
REASON FOR STUDY: NORMAL
TEMPUS AMENDMENTNOTE1: NORMAL
TEMPUS FUSIONADDENDUM: NORMAL
TEMPUS LCA: NORMAL
TEMPUS PD-L1 (22C3) COMBINED POSITIVE SCORE: 2
TEMPUS PD-L1 (22C3) TUMOR PROPORTION SCORE: 1 %
TEMPUS PERTINENTNEGATIVES: NORMAL
TEMPUS PORTAL: NORMAL
TEMPUS THERAPY1: NORMAL
TEMPUS THERAPYCOUNT: 1
TEMPUS TRIAL1: NORMAL
TEMPUS TRIAL2: NORMAL
TEMPUS TRIAL3: NORMAL
TEMPUS TRIAL4: NORMAL
TEMPUS TRIALCOUNT: 4

## 2023-12-15 ENCOUNTER — TELEPHONE (OUTPATIENT)
Dept: INTERVENTIONAL RADIOLOGY/VASCULAR | Facility: CLINIC | Age: 73
End: 2023-12-15
Payer: MEDICARE

## 2023-12-15 NOTE — TELEPHONE ENCOUNTER
Returned phone call to pt's nephew Dr. Wil Frias [phone].  Reviewed pt scheduled for biopsy and reflector placement on 1/9/24.  Discussed we do have patient on a cancellation list for earlier appointment.  Discussed recommendation by Dr. Ray to schedule this procedure with him at [facility] due to nature of the procedure.    Dr. Frias had further questions regarding discussion with breast team.  Will send message to Dr. Alva referring physician.    Katherine Beltrán PA-C

## 2023-12-18 ENCOUNTER — HOSPITAL ENCOUNTER (OUTPATIENT)
Dept: RADIOLOGY | Facility: HOSPITAL | Age: 73
Discharge: HOME | End: 2023-12-18
Attending: HOSPITALIST
Payer: MEDICARE

## 2023-12-18 DIAGNOSIS — C34.31 ADENOCARCINOMA OF LOWER LOBE OF RIGHT LUNG: Chronic | ICD-10-CM

## 2023-12-18 PROCEDURE — 25500020 PHARM REV CODE 255: Performed by: HOSPITALIST

## 2023-12-18 PROCEDURE — A9585 GADOBUTROL INJECTION: HCPCS | Performed by: HOSPITALIST

## 2023-12-18 PROCEDURE — 70553 MRI BRAIN STEM W/O & W/DYE: CPT | Mod: TC

## 2023-12-18 PROCEDURE — 70553 MRI BRAIN STEM W/O & W/DYE: CPT | Mod: 26,,, | Performed by: RADIOLOGY

## 2023-12-18 PROCEDURE — 70553 MRI BRAIN W WO CONTRAST: ICD-10-PCS | Mod: 26,,, | Performed by: RADIOLOGY

## 2023-12-18 RX ORDER — GADOBUTROL 604.72 MG/ML
5 INJECTION INTRAVENOUS
Status: COMPLETED | OUTPATIENT
Start: 2023-12-18 | End: 2023-12-18

## 2023-12-18 RX ADMIN — GADOBUTROL 5 ML: 604.72 INJECTION INTRAVENOUS at 06:12

## 2023-12-21 ENCOUNTER — OFFICE VISIT (OUTPATIENT)
Dept: HEMATOLOGY/ONCOLOGY | Facility: CLINIC | Age: 73
End: 2023-12-21
Payer: MEDICARE

## 2023-12-21 VITALS
OXYGEN SATURATION: 98 % | WEIGHT: 114 LBS | DIASTOLIC BLOOD PRESSURE: 80 MMHG | HEART RATE: 59 BPM | SYSTOLIC BLOOD PRESSURE: 185 MMHG | TEMPERATURE: 98 F | BODY MASS INDEX: 20.98 KG/M2 | HEIGHT: 62 IN | RESPIRATION RATE: 14 BRPM

## 2023-12-21 DIAGNOSIS — C34.31 ADENOCARCINOMA OF LOWER LOBE OF RIGHT LUNG: Primary | ICD-10-CM

## 2023-12-21 DIAGNOSIS — Z17.0 MALIGNANT NEOPLASM OF UPPER-OUTER QUADRANT OF RIGHT BREAST IN FEMALE, ESTROGEN RECEPTOR POSITIVE: ICD-10-CM

## 2023-12-21 DIAGNOSIS — R53.83 FATIGUE, UNSPECIFIED TYPE: ICD-10-CM

## 2023-12-21 DIAGNOSIS — R05.9 COUGH, UNSPECIFIED TYPE: ICD-10-CM

## 2023-12-21 DIAGNOSIS — C50.411 MALIGNANT NEOPLASM OF UPPER-OUTER QUADRANT OF RIGHT BREAST IN FEMALE, ESTROGEN RECEPTOR POSITIVE: ICD-10-CM

## 2023-12-21 PROCEDURE — 3077F SYST BP >= 140 MM HG: CPT | Mod: CPTII,S$GLB,, | Performed by: HOSPITALIST

## 2023-12-21 PROCEDURE — 1126F AMNT PAIN NOTED NONE PRSNT: CPT | Mod: CPTII,S$GLB,, | Performed by: HOSPITALIST

## 2023-12-21 PROCEDURE — 99215 OFFICE O/P EST HI 40 MIN: CPT | Mod: S$GLB,,, | Performed by: HOSPITALIST

## 2023-12-21 PROCEDURE — 99215 PR OFFICE/OUTPT VISIT, EST, LEVL V, 40-54 MIN: ICD-10-PCS | Mod: S$GLB,,, | Performed by: HOSPITALIST

## 2023-12-21 PROCEDURE — 1159F PR MEDICATION LIST DOCUMENTED IN MEDICAL RECORD: ICD-10-PCS | Mod: CPTII,S$GLB,, | Performed by: HOSPITALIST

## 2023-12-21 PROCEDURE — 3079F DIAST BP 80-89 MM HG: CPT | Mod: CPTII,S$GLB,, | Performed by: HOSPITALIST

## 2023-12-21 PROCEDURE — 4010F PR ACE/ARB THEARPY RXD/TAKEN: ICD-10-PCS | Mod: CPTII,S$GLB,, | Performed by: HOSPITALIST

## 2023-12-21 PROCEDURE — 99999 PR PBB SHADOW E&M-EST. PATIENT-LVL IV: CPT | Mod: PBBFAC,,, | Performed by: HOSPITALIST

## 2023-12-21 PROCEDURE — 99999 PR PBB SHADOW E&M-EST. PATIENT-LVL IV: ICD-10-PCS | Mod: PBBFAC,,, | Performed by: HOSPITALIST

## 2023-12-21 PROCEDURE — 3077F PR MOST RECENT SYSTOLIC BLOOD PRESSURE >= 140 MM HG: ICD-10-PCS | Mod: CPTII,S$GLB,, | Performed by: HOSPITALIST

## 2023-12-21 PROCEDURE — 3008F PR BODY MASS INDEX (BMI) DOCUMENTED: ICD-10-PCS | Mod: CPTII,S$GLB,, | Performed by: HOSPITALIST

## 2023-12-21 PROCEDURE — 1126F PR PAIN SEVERITY QUANTIFIED, NO PAIN PRESENT: ICD-10-PCS | Mod: CPTII,S$GLB,, | Performed by: HOSPITALIST

## 2023-12-21 PROCEDURE — 3008F BODY MASS INDEX DOCD: CPT | Mod: CPTII,S$GLB,, | Performed by: HOSPITALIST

## 2023-12-21 PROCEDURE — 3288F PR FALLS RISK ASSESSMENT DOCUMENTED: ICD-10-PCS | Mod: CPTII,S$GLB,, | Performed by: HOSPITALIST

## 2023-12-21 PROCEDURE — 3079F PR MOST RECENT DIASTOLIC BLOOD PRESSURE 80-89 MM HG: ICD-10-PCS | Mod: CPTII,S$GLB,, | Performed by: HOSPITALIST

## 2023-12-21 PROCEDURE — 1101F PR PT FALLS ASSESS DOC 0-1 FALLS W/OUT INJ PAST YR: ICD-10-PCS | Mod: CPTII,S$GLB,, | Performed by: HOSPITALIST

## 2023-12-21 PROCEDURE — 4010F ACE/ARB THERAPY RXD/TAKEN: CPT | Mod: CPTII,S$GLB,, | Performed by: HOSPITALIST

## 2023-12-21 PROCEDURE — 1159F MED LIST DOCD IN RCRD: CPT | Mod: CPTII,S$GLB,, | Performed by: HOSPITALIST

## 2023-12-21 PROCEDURE — 3288F FALL RISK ASSESSMENT DOCD: CPT | Mod: CPTII,S$GLB,, | Performed by: HOSPITALIST

## 2023-12-21 PROCEDURE — 3044F HG A1C LEVEL LT 7.0%: CPT | Mod: CPTII,S$GLB,, | Performed by: HOSPITALIST

## 2023-12-21 PROCEDURE — 1101F PT FALLS ASSESS-DOCD LE1/YR: CPT | Mod: CPTII,S$GLB,, | Performed by: HOSPITALIST

## 2023-12-21 PROCEDURE — 3044F PR MOST RECENT HEMOGLOBIN A1C LEVEL <7.0%: ICD-10-PCS | Mod: CPTII,S$GLB,, | Performed by: HOSPITALIST

## 2023-12-21 NOTE — PROGRESS NOTES
[facility] MEDICAL ONCOLOGY - FOLLOW UP VISIT    Reason for visit: Follow up visit for adenocarcinoma of the lung      Oncology History   Malignant neoplasm of upper-outer quadrant of right breast in female, estrogen receptor positive   4/13/2021 Biopsy    Breast, right, 10:00 5N, biopsy:  - Invasive ductal carcinoma with lobular features     4/13/2021 Breast Tumor Markers    Estrogen Receptor: Positive >90%  Progesterone Receptor: Positive 10-50%  HER2: Negative  Ki67: 10-30%     4/26/2021 Genetic Testing    MyRisk: negative     5/12/2021 Breast Surgery    Right partial mastectomy with SLNB     6/1/2021 Tumor Conference    Radiation options? Offer radiation, can discuss partial vs whole breast radiation.        6/2/2021 Cancer Staged    Staging form: Breast, AJCC 8th Edition  - Pathologic stage from 6/2/2021: Stage IA (pT1b, pN0(sn), cM0, G2, ER+, KS+, HER2-)     7/6/2021 - 7/27/2021 Radiation Therapy    Treatment Summary  Course: C1 Chest 2021  Treatment Site Energy Dose/Fx (Gy) #Fx Total Dose (Gy) Start Date End Date Elapsed Days   Breast_Rt 6X 2.65 16 / 16 42.4 7/6/2021 7/27/2021 21        7/2021 -  Hormone Therapy    Letrozole     Adenocarcinoma of lower lobe of right lung   10/5/2023 Imaging Significant Findings    CT C (obtained after CXR, which was itself obtained for palpitations)  - 2.1 x 1.3 cm spiculated RLL nodule, some spiculation noted to extend to the pleural margin  - Scattered pulmonary nodules centered mostly subpleural at the RLL (e.g. 0.9 cm)       10/10/2023 Imaging Significant Findings    PET CT  - 2.1 x 1.1 cm RLL nodule, SUV 3.8  - 0.9 cm R axillary LN, SUV 3.9  - 1.2 cm subcarinal LN, SUV 4.6  - 0.7 cm Ln along L posterior shoulder, SUV 1.7  - Multiple additional solid pulmonary nodules through R lung base, too small for uptake detection     11/14/2023 Procedure    Bronch with EBUS  RLL, FNA  - Adenocarcinoma (TTF1 positive, GATA3  negative)    RLL, TBBx  - Adenocarcinoma    Per pulmonology, station 7 node was very vascular without window for biopsy, plan to discuss at thoracic tumor board    [facility] NGS  - KRAS G12A, CHEK1, MLH1, CTNNB1, CIC, PTPRD, NOTCH3, EZH2, LATS1, KMT2D  - Negative: EGFR, ALK, BRAF, KRAS, ROS1, RET, MET, EBB2  - PD-L1 1%       11/22/2023 Tumor Conference    Tumor Board  - Plan for axillary LN biopsy to determine lung vs recurrent breast vs other etiology  - Repeat CT C to evaluate nodules in RLL  - Determine treatment plan based on above results     11/22/2023 Tumor Genotyping    [facility] Liquid Biopsy  - No reportable pathogenic variants found     11/29/2023 Imaging Significant Findings    CT C  - 2.1 x 1.2 cm RLL spiculated nodule, stable in size w/ new central cavitation. Spiculated margins extend towards adjacent pleura.   - Stable irregular 2.0 cm linar opacity in LLL  - Stable 0.3 cm GGO, TRICIA  - Stable R basal sub cm nodules, largest 0.9 cm  - Several stable solid nodules predominantly in RLL, largest 0.9 cm     12/18/2023 Imaging Significant Findings    MRI Brain  - JELENA            HPI:     Keo Frias is a 73 y.o. female with pmh significant for HTN, migraine headaches, and adenocarcinoma of the RLL of uncertain stage ([id number]) (no targetable mutations, PD-L1 1%), initially dx'd 11/14/23, currently treatment naive, who presents to [facility] care. Of note, pt also has a history of Stage IA ( [id number], ER 95%, ND 10%, HER2 negative, Ki-67 20%) IDC of the R breast, initially dx'd 4/13/21, s/p lumpectomy and SLNB (5/12/21), XRT (7/6/21-7/27/21), and currently on letrozole (7/2021 - present).     Interval History:  - NGS (solid and liquid) without targetable mutations  - 11/29/23: CT C, stable disease  - 12/06/23: IR consultation, pt pending procedure time for R axillary LN biopsy and placed on wait list  - 12/18/23: MRI brain, JELENA, empty sella syndrome    Pt states that she fells well today.  She notes that she has a  slightly increased cough and mucus production since her last visit, but denies any changes in her breathing.  Multiple family members are currently infected with COVID, including a daughter who is sequester himself in the upstairs floor of the patient's house.  Otherwise denies any new or bothersome symptoms at this time.  She presents with her daughter by telephone today.    History has been obtained by chart review and discussion with the patient.    Past Medical History:   Past Medical History:   Diagnosis Date    Anemia     Anxiety disorder 8/28/2019    Cataract     Chronic right-sided low back pain without sciatica 10/20/2021    Hypercholesteremia 9/17/2019    Invasive ductal carcinoma of breast, female, right 4/16/2021    Migraine with vision problems     Subclinical hypothyroidism     White coat syndrome with hypertension         Past Surgical History:   Past Surgical History:   Procedure Laterality Date    COLON SURGERY      COLONOSCOPY N/A 12/7/2018    Procedure: COLONOSCOPY;  Surgeon: Bhargav Gaston MD;  Location: [facility] (4TH FLR);  Service: Endoscopy;  Laterality: N/A;    DILATION AND CURETTAGE OF UTERUS      postmenopausal bleeding    ENDOBRONCHIAL ULTRASOUND N/A 11/14/2023    Procedure: ENDOBRONCHIAL ULTRASOUND (EBUS);  Surgeon: Kirt Gr MD;  Location: [facility] OR 2ND FLR;  Service: Pulmonary;  Laterality: N/A;    MASTECTOMY, PARTIAL Right 5/12/2021    Procedure: MASTECTOMY, PARTIAL-Right with radiological marker;  Surgeon: Vivian Cadena MD;  Location: [facility];  Service: General;  Laterality: Right;    ROBOTIC BRONCHOSCOPY N/A 11/14/2023    Procedure: ROBOTIC BRONCHOSCOPY;  Surgeon: Kirt Gr MD;  Location: [facility] OR 2ND FLR;  Service: Pulmonary;  Laterality: N/A;    SENTINEL LYMPH NODE BIOPSY Right 5/12/2021    Procedure: BIOPSY, LYMPH NODE, SENTINEL-Right;  Surgeon: Vivian Cadena MD;  Location: [facility] OR;  Service: General;  Laterality: Right;    TUBAL LIGATION          Family  History:   Family History   Problem Relation Age of Onset    Diabetes Brother     Hypertension Brother     Glaucoma Brother     Glaucoma Father     Cataracts Father     Retinal detachment Father     Lung cancer Mother     Uterine cancer Maternal Grandmother     Stroke Maternal Grandmother     Stroke Paternal Grandmother     Amblyopia Neg Hx     Blindness Neg Hx     Macular degeneration Neg Hx     Strabismus Neg Hx     Thyroid disease Neg Hx         Social History:   Social History     Tobacco Use    Smoking status: Never    Smokeless tobacco: Never   Substance Use Topics    Alcohol use: No        I have reviewed and updated the patient's past medical, surgical, family and social histories.      ROS:   As per HPI.     Allergies:   Review of patient's allergies indicates:   Allergen Reactions    Sinus allergy Other (See Comments)     Headaches, migranes    Buspar [buspirone] Other (See Comments)     Said cardiology told her to stop IF on Lexapro    Sulfa (sulfonamide antibiotics) Rash        Medications:   Current Outpatient Medications   Medication Sig Dispense Refill    atorvastatin (LIPITOR) 20 MG tablet Take 2 tablets (40 mg total) by mouth once daily. 180 tablet 3    benzonatate (TESSALON PERLES) 100 MG capsule Take 1 capsule (100 mg total) by mouth 3 (three) times daily as needed for Cough. 30 capsule 1    calcium carbonate (CALCIUM 500 ORAL) Take 1 tablet by mouth.      cholecalciferol, vitamin D3, 125 mcg (5,000 unit) capsule Take 1 capsule (5,000 Units total) by mouth daily with breakfast. 100 capsule 4    CIPRODEX 0.3-0.1 % DrpS PLACE 4 DROPS INTO THE RIGHT EAR 2 (TWO) TIMES DAILY. FOR 10 DAYS 7.5 mL 5    cloNIDine (CATAPRES) 0.1 MG tablet Take 1 tablet (0.1 mg total) by mouth daily as needed (for systolic greater than or equal to 160 mmHg). 90 tablet 3    EScitalopram oxalate (LEXAPRO) 10 MG tablet Take 1 tablet (10 mg total) by mouth once daily. (Patient taking differently: Take 10 mg by mouth once daily.  "Pt taking 5mg total 1 time daily) 90 tablet 4    famotidine (PEPCID) 10 MG tablet Take 10 mg by mouth as needed.      Lactobacillus rhamnosus GG (CULTURELLE) 10 billion cell capsule Take 1 capsule by mouth once daily. Pt takes Align probiotic      letrozole (FEMARA) 2.5 mg Tab TAKE 1 TABLET BY MOUTH EVERY DAY 90 tablet 3    losartan (COZAAR) 25 MG tablet Take 1 tablet (25 mg total) by mouth once daily. 90 tablet 4    metoprolol succinate (TOPROL-XL) 50 MG 24 hr tablet Take 1 tablet (50 mg total) by mouth once daily. 90 tablet 4    multivitamin-iron-folic acid Tab Take 1 tablet by mouth once daily.      omeprazole (PRILOSEC OTC) 20 MG tablet Take 20 mg by mouth once daily.      PROLIA 60 mg/mL Syrg        No current facility-administered medications for this visit.          Physical Exam:       BP (!) 185/80 (BP Location: Left arm, Patient Position: Sitting, BP Method: Medium (Automatic))   Pulse (!) 59   Temp 97.8 °F (36.6 °C) (Oral)   Resp 14   Ht 5' 2" (1.575 m)   Wt 51.7 kg (113 lb 15.7 oz)   LMP  (LMP Unknown)   SpO2 98%   BMI 20.85 kg/m²                Physical Exam  Constitutional:       Appearance: Normal appearance.   HENT:      Head: Normocephalic and atraumatic.   Eyes:      Extraocular Movements: Extraocular movements intact.      Conjunctiva/sclera: Conjunctivae normal.      Pupils: Pupils are equal, round, and reactive to light.   Cardiovascular:      Rate and Rhythm: Normal rate and regular rhythm.      Heart sounds: No murmur heard.     No friction rub. No gallop.   Pulmonary:      Effort: Pulmonary effort is normal.      Breath sounds: No wheezing, rhonchi or rales.   Chest:      Comments: No palpable R axillary LAD  Musculoskeletal:         General: Normal range of motion.   Skin:     General: Skin is warm and dry.   Neurological:      Mental Status: She is alert and oriented to person, place, and time.   Psychiatric:         Mood and Affect: Mood normal.         Thought Content: Thought " content normal.         Judgment: Judgment normal.           Labs:   No results found for this or any previous visit (from the past 48 hour(s)).     Imaging:         Path:  See oncologic history above.      Assessment and Plan:     Keo Frias is a 73 y.o. female with pmh significant for HTN, migraine headaches, and adenocarcinoma of the RLL of uncertain stage ([id number]) (no targetable mutations, PD-L1 1%), initially dx'd 11/14/23, currently treatment naive, who presents to [facility]. Of note, pt also has a history of Stage IA ( [id number], ER 95%, NM 10%, HER2 negative, Ki-67 20%) IDC of the R breast, initially dx'd 4/13/21, s/p lumpectomy and SLNB (5/12/21), XRT (7/6/21-7/27/21), and currently on letrozole (7/2021 - present).     Adenocarcinoma of RLL  ECOG PS 1.  Patient presents today for follow-up.  Since last visit, staging scans have been completed including an MRI brain (12/18/23) which was without evidence disease as well as a CT chest (11/29/23) which demonstrates stable intrathoracic disease.  Notably, the patient also has a history of right-sided invasive ductal carcinoma status post lumpectomy, radiation therapy, and currently on letrozole (lumpectomy on 5/12/21 radiation completed in 7/2021).  Staging is unclear at this time; based on review of imaging, favor that subcarinal node is positive for adenocarcinoma but the etiology of the right axillary lymphadenopathy is less clear given that this is an atypical location for lung cancer metastasis. In the setting of her recent right-sided breast cancer, will obtain a biopsy of the right axillary lymph node. A right axillary biopsy is still pending by IR, currently scheduled for 01/09/2024 though patient is on the wait list for a sooner biopsy if possible (per review with breast surgery and breast radiology, pt should also have a reflector placed). If this LN is positive for lung cancer, this would represent non-regional disease and require systemic  treatment (likely carboplatin/pemetrexed/pembrolizumab).  If this is positive for breast cancer, then in addition to working with the breast oncology team, would need to further define the subcarinal lesion; this was discussed at tumor board where the opinion was that this would be difficult to access via mediastinoscopy and so may need to repeat either EBUS or to treat empirically likely with CRT (favoring the latter).     Of note, CT imaging also demonstrates solid RLL nodules. These have been stable on multiple CT chests (most recent 11/29/23) and are below the resolving power of PET scans.     PLAN:   -- IR guided biopsy on 1/09/24, pt on wait list for sooner appointment  -- Repeat PET scan scheduled in early January, as last was on 10/10/23  -- RTC on 1/17/24 (biopsy results should return by then, will make sooner appointment if biopsied sooner)      Right IDC, ER+/WI+/HER2-  S/p lumpectomy with SLNB, radiation therapy, and currently on letrozole and denosumab, tolerating well. Concern for possible R axillary recurrence, workup as below.   -- Okay to continue letrozole  -- Okay to continue denosumab  -- US guided R axillary LN biopsy ordered as above  -- Breast oncology team messaged      Cough  Pt had mild cough prior to bronchoscopy, worse since. It initially improved somewhat but has been slowly worsening since. Non-productive without hemoptysis. This is moderately bothersome to the patient. She has not tried any cough suppressants.   -- Benzonotate ordered previously      The above information has been reviewed with the patient and all questions have been answered to their apparent satisfaction.  They understand that they can call the clinic with any questions.    Bridger Nichole MD  Hematology/Oncology  [facility]        Med Onc Chart Routing      Follow up with physician . PET scan in early January, RTC with me on 1/17/24   Follow up with BRENDA    Infusion scheduling note    Injection  scheduling note    Labs CBC, CMP, free T4 and TSH   Scheduling:  Preferred lab:  Lab interval:     Imaging PET scan      Pharmacy appointment    Other referrals

## 2023-12-21 NOTE — H&P (VIEW-ONLY)
[facility] MEDICAL ONCOLOGY - FOLLOW UP VISIT    Reason for visit: Follow up visit for adenocarcinoma of the lung      Oncology History   Malignant neoplasm of upper-outer quadrant of right breast in female, estrogen receptor positive   4/13/2021 Biopsy    Breast, right, 10:00 5N, biopsy:  - Invasive ductal carcinoma with lobular features     4/13/2021 Breast Tumor Markers    Estrogen Receptor: Positive >90%  Progesterone Receptor: Positive 10-50%  HER2: Negative  Ki67: 10-30%     4/26/2021 Genetic Testing    MyRisk: negative     5/12/2021 Breast Surgery    Right partial mastectomy with SLNB     6/1/2021 Tumor Conference    Radiation options? Offer radiation, can discuss partial vs whole breast radiation.        6/2/2021 Cancer Staged    Staging form: Breast, AJCC 8th Edition  - Pathologic stage from 6/2/2021: Stage IA (pT1b, pN0(sn), cM0, G2, ER+, OR+, HER2-)     7/6/2021 - 7/27/2021 Radiation Therapy    Treatment Summary  Course: C1 Chest 2021  Treatment Site Energy Dose/Fx (Gy) #Fx Total Dose (Gy) Start Date End Date Elapsed Days   Breast_Rt 6X 2.65 16 / 16 42.4 7/6/2021 7/27/2021 21        7/2021 -  Hormone Therapy    Letrozole     Adenocarcinoma of lower lobe of right lung   10/5/2023 Imaging Significant Findings    CT C (obtained after CXR, which was itself obtained for palpitations)  - 2.1 x 1.3 cm spiculated RLL nodule, some spiculation noted to extend to the pleural margin  - Scattered pulmonary nodules centered mostly subpleural at the RLL (e.g. 0.9 cm)       10/10/2023 Imaging Significant Findings    PET CT  - 2.1 x 1.1 cm RLL nodule, SUV 3.8  - 0.9 cm R axillary LN, SUV 3.9  - 1.2 cm subcarinal LN, SUV 4.6  - 0.7 cm Ln along L posterior shoulder, SUV 1.7  - Multiple additional solid pulmonary nodules through R lung base, too small for uptake detection     11/14/2023 Procedure    Bronch with EBUS  RLL, FNA  - Adenocarcinoma (TTF1 positive, GATA3  negative)    RLL, TBBx  - Adenocarcinoma    Per pulmonology, station 7 node was very vascular without window for biopsy, plan to discuss at thoracic tumor board    [facility] NGS  - KRAS G12A, CHEK1, MLH1, CTNNB1, CIC, PTPRD, NOTCH3, EZH2, LATS1, KMT2D  - Negative: EGFR, ALK, BRAF, KRAS, ROS1, RET, MET, EBB2  - PD-L1 1%       11/22/2023 Tumor Conference    Tumor Board  - Plan for axillary LN biopsy to determine lung vs recurrent breast vs other etiology  - Repeat CT C to evaluate nodules in RLL  - Determine treatment plan based on above results     11/22/2023 Tumor Genotyping    [facility] Liquid Biopsy  - No reportable pathogenic variants found     11/29/2023 Imaging Significant Findings    CT C  - 2.1 x 1.2 cm RLL spiculated nodule, stable in size w/ new central cavitation. Spiculated margins extend towards adjacent pleura.   - Stable irregular 2.0 cm linar opacity in LLL  - Stable 0.3 cm GGO, TRICIA  - Stable R basal sub cm nodules, largest 0.9 cm  - Several stable solid nodules predominantly in RLL, largest 0.9 cm     12/18/2023 Imaging Significant Findings    MRI Brain  - JELENA            HPI:     Keo Frias is a 73 y.o. female with pmh significant for HTN, migraine headaches, and adenocarcinoma of the RLL of uncertain stage ([id number]) (no targetable mutations, PD-L1 1%), initially dx'd 11/14/23, currently treatment naive, who presents to [facility] care. Of note, pt also has a history of Stage IA ( L1wC0G0, ER 95%, W[place] 10%, HER2 negative, Ki-67 20%) IDC of the R breast, initially dx'd 4/13/21, s/p lumpectomy and SLNB (5/12/21), XRT (7/6/21-7/27/21), and currently on letrozole (7/2021 - present).     Interval History:  - NGS (solid and liquid) without targetable mutations  - 11/29/23: CT C, stable disease  - 12/06/23: IR consultation, pt pending procedure time for R axillary LN biopsy and placed on wait list  - 12/18/23: MRI brain, JELENA, empty sella syndrome    Pt states that she fells well today.  She notes that she has a  slightly increased cough and mucus production since her last visit, but denies any changes in her breathing.  Multiple family members are currently infected with COVID, including a daughter who is sequester himself in the upstairs floor of the patient's house.  Otherwise denies any new or bothersome symptoms at this time.  She presents with her daughter by telephone today.    History has been obtained by chart review and discussion with the patient.    Past Medical History:   Past Medical History:   Diagnosis Date    Anemia     Anxiety disorder 8/28/2019    Cataract     Chronic right-sided low back pain without sciatica 10/20/2021    Hypercholesteremia 9/17/2019    Invasive ductal carcinoma of breast, female, right 4/16/2021    Migraine with vision problems     Subclinical hypothyroidism     White coat syndrome with hypertension         Past Surgical History:   Past Surgical History:   Procedure Laterality Date    COLON SURGERY      COLONOSCOPY N/A 12/7/2018    Procedure: COLONOSCOPY;  Surgeon: Bhargav Gaston MD;  Location: [facility] (4TH FLR);  Service: Endoscopy;  Laterality: N/A;    DILATION AND CURETTAGE OF UTERUS      postmenopausal bleeding    ENDOBRONCHIAL ULTRASOUND N/A 11/14/2023    Procedure: ENDOBRONCHIAL ULTRASOUND (EBUS);  Surgeon: Kirt Gr MD;  Location: [facility] OR 2ND FLR;  Service: Pulmonary;  Laterality: N/A;    MASTECTOMY, PARTIAL Right 5/12/2021    Procedure: MASTECTOMY, PARTIAL-Right with radiological marker;  Surgeon: Vivian Cadena MD;  Location: [facility];  Service: General;  Laterality: Right;    ROBOTIC BRONCHOSCOPY N/A 11/14/2023    Procedure: ROBOTIC BRONCHOSCOPY;  Surgeon: Kirt Gr MD;  Location: [facility] OR 2ND FLR;  Service: Pulmonary;  Laterality: N/A;    SENTINEL LYMPH NODE BIOPSY Right 5/12/2021    Procedure: BIOPSY, LYMPH NODE, SENTINEL-Right;  Surgeon: Vivian Cadena MD;  Location: [facility] OR;  Service: General;  Laterality: Right;    TUBAL LIGATION          Family  History:   Family History   Problem Relation Age of Onset    Diabetes Brother     Hypertension Brother     Glaucoma Brother     Glaucoma Father     Cataracts Father     Retinal detachment Father     Lung cancer Mother     Uterine cancer Maternal Grandmother     Stroke Maternal Grandmother     Stroke Paternal Grandmother     Amblyopia Neg Hx     Blindness Neg Hx     Macular degeneration Neg Hx     Strabismus Neg Hx     Thyroid disease Neg Hx         Social History:   Social History     Tobacco Use    Smoking status: Never    Smokeless tobacco: Never   Substance Use Topics    Alcohol use: No        I have reviewed and updated the patient's past medical, surgical, family and social histories.      ROS:   As per HPI.     Allergies:   Review of patient's allergies indicates:   Allergen Reactions    Sinus allergy Other (See Comments)     Headaches, migranes    Buspar [buspirone] Other (See Comments)     Said cardiology told her to stop IF on Lexapro    Sulfa (sulfonamide antibiotics) Rash        Medications:   Current Outpatient Medications   Medication Sig Dispense Refill    atorvastatin (LIPITOR) 20 MG tablet Take 2 tablets (40 mg total) by mouth once daily. 180 tablet 3    benzonatate (TESSALON PERLES) 100 MG capsule Take 1 capsule (100 mg total) by mouth 3 (three) times daily as needed for Cough. 30 capsule 1    calcium carbonate (CALCIUM 500 ORAL) Take 1 tablet by mouth.      cholecalciferol, vitamin D3, 125 mcg (5,000 unit) capsule Take 1 capsule (5,000 Units total) by mouth daily with breakfast. 100 capsule 4    CIPRODEX 0.3-0.1 % DrpS PLACE 4 DROPS INTO THE RIGHT EAR 2 (TWO) TIMES DAILY. FOR 10 DAYS 7.5 mL 5    cloNIDine (CATAPRES) 0.1 MG tablet Take 1 tablet (0.1 mg total) by mouth daily as needed (for systolic greater than or equal to 160 mmHg). 90 tablet 3    EScitalopram oxalate (LEXAPRO) 10 MG tablet Take 1 tablet (10 mg total) by mouth once daily. (Patient taking differently: Take 10 mg by mouth once daily.  "Pt taking 5mg total 1 time daily) 90 tablet 4    famotidine (PEPCID) 10 MG tablet Take 10 mg by mouth as needed.      Lactobacillus rhamnosus GG (CULTURELLE) 10 billion cell capsule Take 1 capsule by mouth once daily. Pt takes Align probiotic      letrozole (FEMARA) 2.5 mg Tab TAKE 1 TABLET BY MOUTH EVERY DAY 90 tablet 3    losartan (COZAAR) 25 MG tablet Take 1 tablet (25 mg total) by mouth once daily. 90 tablet 4    metoprolol succinate (TOPROL-XL) 50 MG 24 hr tablet Take 1 tablet (50 mg total) by mouth once daily. 90 tablet 4    multivitamin-iron-folic acid Tab Take 1 tablet by mouth once daily.      omeprazole (PRILOSEC OTC) 20 MG tablet Take 20 mg by mouth once daily.      PROLIA 60 mg/mL Syrg        No current facility-administered medications for this visit.          Physical Exam:       BP (!) 185/80 (BP Location: Left arm, Patient Position: Sitting, BP Method: Medium (Automatic))   Pulse (!) 59   Temp 97.8 °F (36.6 °C) (Oral)   Resp 14   Ht 5' 2" (1.575 m)   Wt 51.7 kg (113 lb 15.7 oz)   LMP  (LMP Unknown)   SpO2 98%   BMI 20.85 kg/m²                Physical Exam  Constitutional:       Appearance: Normal appearance.   HENT:      Head: Normocephalic and atraumatic.   Eyes:      Extraocular Movements: Extraocular movements intact.      Conjunctiva/sclera: Conjunctivae normal.      Pupils: Pupils are equal, round, and reactive to light.   Cardiovascular:      Rate and Rhythm: Normal rate and regular rhythm.      Heart sounds: No murmur heard.     No friction rub. No gallop.   Pulmonary:      Effort: Pulmonary effort is normal.      Breath sounds: No wheezing, rhonchi or rales.   Chest:      Comments: No palpable R axillary LAD  Musculoskeletal:         General: Normal range of motion.   Skin:     General: Skin is warm and dry.   Neurological:      Mental Status: She is alert and oriented to person, place, and time.   Psychiatric:         Mood and Affect: Mood normal.         Thought Content: Thought " content normal.         Judgment: Judgment normal.           Labs:   No results found for this or any previous visit (from the past 48 hour(s)).     Imaging:         Path:  See oncologic history above.      Assessment and Plan:     Keo Frias is a 73 y.o. female with pmh significant for HTN, migraine headaches, and adenocarcinoma of the RLL of uncertain stage ([id number]) (no targetable mutations, PD-L1 1%), initially dx'd 11/14/23, currently treatment naive, who presents to [facility]. Of note, pt also has a history of Stage IA ( D1vF8M4, ER 95%, [place] 10%, HER2 negative, Ki-67 20%) IDC of the R breast, initially dx'd 4/13/21, s/p lumpectomy and SLNB (5/12/21), XRT (7/6/21-7/27/21), and currently on letrozole (7/2021 - present).     Adenocarcinoma of RLL  ECOG PS 1.  Patient presents today for follow-up.  Since last visit, staging scans have been completed including an MRI brain (12/18/23) which was without evidence disease as well as a CT chest (11/29/23) which demonstrates stable intrathoracic disease.  Notably, the patient also has a history of right-sided invasive ductal carcinoma status post lumpectomy, radiation therapy, and currently on letrozole (lumpectomy on 5/12/21 radiation completed in 7/2021).  Staging is unclear at this time; based on review of imaging, favor that subcarinal node is positive for adenocarcinoma but the etiology of the right axillary lymphadenopathy is less clear given that this is an atypical location for lung cancer metastasis. In the setting of her recent right-sided breast cancer, will obtain a biopsy of the right axillary lymph node. A right axillary biopsy is still pending by IR, currently scheduled for 01/09/2024 though patient is on the wait list for a sooner biopsy if possible (per review with breast surgery and breast radiology, pt should also have a reflector placed). If this LN is positive for lung cancer, this would represent non-regional disease and require systemic  treatment (likely carboplatin/pemetrexed/pembrolizumab).  If this is positive for breast cancer, then in addition to working with the breast oncology team, would need to further define the subcarinal lesion; this was discussed at tumor board where the opinion was that this would be difficult to access via mediastinoscopy and so may need to repeat either EBUS or to treat empirically likely with CRT (favoring the latter).     Of note, CT imaging also demonstrates solid RLL nodules. These have been stable on multiple CT chests (most recent 11/29/23) and are below the resolving power of PET scans.     PLAN:   -- IR guided biopsy on 1/09/24, pt on wait list for sooner appointment  -- Repeat PET scan scheduled in early January, as last was on 10/10/23  -- RTC on 1/17/24 (biopsy results should return by then, will make sooner appointment if biopsied sooner)      Right IDC, ER+/SD+/HER2-  S/p lumpectomy with SLNB, radiation therapy, and currently on letrozole and denosumab, tolerating well. Concern for possible R axillary recurrence, workup as below.   -- Okay to continue letrozole  -- Okay to continue denosumab  -- US guided R axillary LN biopsy ordered as above  -- Breast oncology team messaged      Cough  Pt had mild cough prior to bronchoscopy, worse since. It initially improved somewhat but has been slowly worsening since. Non-productive without hemoptysis. This is moderately bothersome to the patient. She has not tried any cough suppressants.   -- Benzonotate ordered previously      The above information has been reviewed with the patient and all questions have been answered to their apparent satisfaction.  They understand that they can call the clinic with any questions.    Bridger Nichole MD  Hematology/Oncology  [facility]        Med Onc Chart Routing      Follow up with physician . PET scan in early January, RTC with me on 1/17/24   Follow up with BRENDA    Infusion scheduling note    Injection  scheduling note    Labs CBC, CMP, free T4 and TSH   Scheduling:  Preferred lab:  Lab interval:     Imaging PET scan      Pharmacy appointment    Other referrals

## 2023-12-22 ENCOUNTER — PATIENT MESSAGE (OUTPATIENT)
Dept: HEMATOLOGY/ONCOLOGY | Facility: CLINIC | Age: 73
End: 2023-12-22
Payer: MEDICARE

## 2023-12-26 ENCOUNTER — PATIENT MESSAGE (OUTPATIENT)
Dept: HEMATOLOGY/ONCOLOGY | Facility: CLINIC | Age: 73
End: 2023-12-26
Payer: MEDICARE

## 2023-12-26 ENCOUNTER — TELEPHONE (OUTPATIENT)
Dept: HEMATOLOGY/ONCOLOGY | Facility: CLINIC | Age: 73
End: 2023-12-26
Payer: MEDICARE

## 2023-12-26 NOTE — TELEPHONE ENCOUNTER
I lm for the pt, and sent a pt message. Pt's location for her PET CT has been changed to [place] as requested. It is on 1/8 at 9:30.

## 2023-12-30 ENCOUNTER — LAB VISIT (OUTPATIENT)
Dept: LAB | Facility: HOSPITAL | Age: 73
End: 2023-12-30
Attending: FAMILY MEDICINE
Payer: MEDICARE

## 2023-12-30 DIAGNOSIS — R59.0 LYMPHADENOPATHY, AXILLARY: ICD-10-CM

## 2023-12-30 DIAGNOSIS — I10 BENIGN ESSENTIAL HYPERTENSION: ICD-10-CM

## 2023-12-30 DIAGNOSIS — D49.9 NEOPLASM: ICD-10-CM

## 2023-12-30 LAB
ALBUMIN SERPL BCP-MCNC: 3.9 G/DL (ref 3.5–5.2)
ALP SERPL-CCNC: 56 U/L (ref 55–135)
ALT SERPL W/O P-5'-P-CCNC: 20 U/L (ref 10–44)
ANION GAP SERPL CALC-SCNC: 11 MMOL/L (ref 8–16)
AST SERPL-CCNC: 23 U/L (ref 10–40)
BASOPHILS # BLD AUTO: 0.09 K/UL (ref 0–0.2)
BASOPHILS NFR BLD: 1.8 % (ref 0–1.9)
BILIRUB SERPL-MCNC: 0.6 MG/DL (ref 0.1–1)
BUN SERPL-MCNC: 7 MG/DL (ref 8–23)
CALCIUM SERPL-MCNC: 8.8 MG/DL (ref 8.7–10.5)
CHLORIDE SERPL-SCNC: 101 MMOL/L (ref 95–110)
CO2 SERPL-SCNC: 24 MMOL/L (ref 23–29)
CREAT SERPL-MCNC: 0.7 MG/DL (ref 0.5–1.4)
DIFFERENTIAL METHOD BLD: ABNORMAL
EOSINOPHIL # BLD AUTO: 0.5 K/UL (ref 0–0.5)
EOSINOPHIL NFR BLD: 10.3 % (ref 0–8)
ERYTHROCYTE [DISTWIDTH] IN BLOOD BY AUTOMATED COUNT: 12.8 % (ref 11.5–14.5)
EST. GFR  (NO RACE VARIABLE): >60 ML/MIN/1.73 M^2
GLUCOSE SERPL-MCNC: 126 MG/DL (ref 70–110)
HCT VFR BLD AUTO: 43.4 % (ref 37–48.5)
HGB BLD-MCNC: 13.9 G/DL (ref 12–16)
IMM GRANULOCYTES # BLD AUTO: 0.01 K/UL (ref 0–0.04)
IMM GRANULOCYTES NFR BLD AUTO: 0.2 % (ref 0–0.5)
INR PPP: 1 (ref 0.8–1.2)
LYMPHOCYTES # BLD AUTO: 1.8 K/UL (ref 1–4.8)
LYMPHOCYTES NFR BLD: 34.5 % (ref 18–48)
MCH RBC QN AUTO: 28 PG (ref 27–31)
MCHC RBC AUTO-ENTMCNC: 32 G/DL (ref 32–36)
MCV RBC AUTO: 87 FL (ref 82–98)
MONOCYTES # BLD AUTO: 0.4 K/UL (ref 0.3–1)
MONOCYTES NFR BLD: 8.5 % (ref 4–15)
NEUTROPHILS # BLD AUTO: 2.3 K/UL (ref 1.8–7.7)
NEUTROPHILS NFR BLD: 44.7 % (ref 38–73)
NRBC BLD-RTO: 0 /100 WBC
PLATELET # BLD AUTO: 167 K/UL (ref 150–450)
PMV BLD AUTO: 12 FL (ref 9.2–12.9)
POTASSIUM SERPL-SCNC: 4.2 MMOL/L (ref 3.5–5.1)
PROT SERPL-MCNC: 7.4 G/DL (ref 6–8.4)
PROTHROMBIN TIME: 11.2 SEC (ref 9–12.5)
RBC # BLD AUTO: 4.97 M/UL (ref 4–5.4)
SODIUM SERPL-SCNC: 136 MMOL/L (ref 136–145)
WBC # BLD AUTO: 5.07 K/UL (ref 3.9–12.7)

## 2023-12-30 PROCEDURE — 85610 PROTHROMBIN TIME: CPT | Performed by: PHYSICIAN ASSISTANT

## 2023-12-30 PROCEDURE — 36415 COLL VENOUS BLD VENIPUNCTURE: CPT | Performed by: PHYSICIAN ASSISTANT

## 2023-12-30 PROCEDURE — 80053 COMPREHEN METABOLIC PANEL: CPT | Performed by: RADIOLOGY

## 2023-12-30 PROCEDURE — 85025 COMPLETE CBC W/AUTO DIFF WBC: CPT | Performed by: PHYSICIAN ASSISTANT

## 2024-01-02 ENCOUNTER — PATIENT MESSAGE (OUTPATIENT)
Dept: HEMATOLOGY/ONCOLOGY | Facility: CLINIC | Age: 74
End: 2024-01-02
Payer: MEDICARE

## 2024-01-02 LAB
ACID FAST MOD KINY STN SPEC: NORMAL
MYCOBACTERIUM SPEC QL CULT: NORMAL

## 2024-01-03 ENCOUNTER — HOSPITAL ENCOUNTER (OUTPATIENT)
Dept: RADIOLOGY | Facility: HOSPITAL | Age: 74
Discharge: HOME | End: 2024-01-03
Attending: PHYSICIAN ASSISTANT
Payer: MEDICARE

## 2024-01-03 VITALS
HEART RATE: 60 BPM | OXYGEN SATURATION: 99 % | DIASTOLIC BLOOD PRESSURE: 75 MMHG | RESPIRATION RATE: 12 BRPM | SYSTOLIC BLOOD PRESSURE: 174 MMHG

## 2024-01-03 DIAGNOSIS — R59.0 LYMPHADENOPATHY, AXILLARY: ICD-10-CM

## 2024-01-03 PROCEDURE — 88307 TISSUE EXAM BY PATHOLOGIST: CPT | Performed by: PATHOLOGY

## 2024-01-03 PROCEDURE — 63600175 PHARM REV CODE 636 W HCPCS: Performed by: RADIOLOGY

## 2024-01-03 PROCEDURE — 88333 PATH CONSLTJ SURG CYTO XM 1: CPT | Mod: 26,,, | Performed by: PATHOLOGY

## 2024-01-03 PROCEDURE — 88333 PATH CONSLTJ SURG CYTO XM 1: CPT | Performed by: PATHOLOGY

## 2024-01-03 PROCEDURE — 38505 NEEDLE BIOPSY LYMPH NODES: CPT | Mod: RT,,, | Performed by: RADIOLOGY

## 2024-01-03 PROCEDURE — 88305 TISSUE EXAM BY PATHOLOGIST: CPT | Mod: 26,,, | Performed by: PATHOLOGY

## 2024-01-03 PROCEDURE — 88342 IMHCHEM/IMCYTCHM 1ST ANTB: CPT | Performed by: PATHOLOGY

## 2024-01-03 PROCEDURE — 88307 TISSUE EXAM BY PATHOLOGIST: CPT | Mod: 26,,, | Performed by: PATHOLOGY

## 2024-01-03 PROCEDURE — A4215 STERILE NEEDLE: HCPCS

## 2024-01-03 PROCEDURE — 88342 IMHCHEM/IMCYTCHM 1ST ANTB: CPT | Mod: 26,,, | Performed by: PATHOLOGY

## 2024-01-03 PROCEDURE — 25000003 PHARM REV CODE 250: Performed by: RADIOLOGY

## 2024-01-03 PROCEDURE — 88305 TISSUE EXAM BY PATHOLOGIST: CPT | Performed by: PATHOLOGY

## 2024-01-03 RX ORDER — LIDOCAINE HYDROCHLORIDE 10 MG/ML
INJECTION INFILTRATION; PERINEURAL CODE/TRAUMA/SEDATION MEDICATION
Status: DISCONTINUED | OUTPATIENT
Start: 2024-01-03 | End: 2024-01-04 | Stop reason: HOSPADM

## 2024-01-03 RX ORDER — FENTANYL CITRATE 50 UG/ML
INJECTION, SOLUTION INTRAMUSCULAR; INTRAVENOUS CODE/TRAUMA/SEDATION MEDICATION
Status: DISCONTINUED | OUTPATIENT
Start: 2024-01-03 | End: 2024-01-04 | Stop reason: HOSPADM

## 2024-01-03 RX ADMIN — FENTANYL CITRATE 25 MCG: 50 INJECTION, SOLUTION INTRAMUSCULAR; INTRAVENOUS at 10:01

## 2024-01-03 RX ADMIN — LIDOCAINE HYDROCHLORIDE 5 ML: 10 INJECTION, SOLUTION INFILTRATION; PERINEURAL at 11:01

## 2024-01-03 NOTE — DISCHARGE SUMMARY
[facility] (Hospital)  Discharge Note  Short Stay    IR Biopsy Lymph Node      OUTCOME: Patient tolerated treatment/procedure well without complication and is now ready for discharge.    DISPOSITION: Home or Self Care    FINAL DIAGNOSIS:  <principal problem not specified>    FOLLOWUP: With primary care provider    DISCHARGE INSTRUCTIONS:  No discharge procedures on file.      Clinical Reference Documents Added to Patient Instructions         Document    LYMPH NODE BIOPSY (ENGLISH)            TIME SPENT ON DISCHARGE: 15 minutes    Date:  01/03/2024    Pre Op Diagnosis: Right lower lobe adenocarcinoma     Post Op Diagnosis: same     Procedure:  Lymph node biopsy     Procedure performed by: Surinder SALAS, Elsi COTE     Written Informed Consent Obtained: Yes     Specimen Removed:  yes     Estimated Blood Loss:  minimal     Findings: Local anesthesia and moderate sedation used     The patient tolerated the procedure well and there were no complications.      Disposition: F/U in clinic or with ordering physician    Discharge instructions: Light activity for 24 hours.  No driving for 24 hours.  Remove bandage in 24 hours.  No baths for 24 hours; showers are appropriate.    Sterile technique was performed in the right axillary region, lidocaine was used as a local anesthetic.  Multiple samples taken from lymph node.  Patient tolerated the procedure well without immediate complications.  Please see radiologist report for details. F/u with PCP and/or ordering physician.     Time spent on discharge:  15 minutes

## 2024-01-03 NOTE — DISCHARGE INSTRUCTIONS
Please return to ER if any of these symptoms occur:  Fever over 101 degrees,  Bleeding from the puncture site not controlled, (Hold pressure for 5 minutes, if bleeding does not stop then go to the ER.)  Pain not controlled with Aleve or Tylenol,    Do not lift anything heavy, nothing over the size of a gallon of milk, for at least 2 days.    Do not submerge in standing water for 1 days after biopsy but you may shower.    Resume home medications and diet    Biopsy results will be with Dr. Llamas in 5-7 days, please follow up with him for results and any other questions or concerns that you may have.

## 2024-01-05 ENCOUNTER — HOSPITAL ENCOUNTER (OUTPATIENT)
Dept: RADIOLOGY | Facility: HOSPITAL | Age: 74
Discharge: HOME | End: 2024-01-05
Attending: HOSPITALIST
Payer: MEDICARE

## 2024-01-05 DIAGNOSIS — C34.31 ADENOCARCINOMA OF LOWER LOBE OF RIGHT LUNG: ICD-10-CM

## 2024-01-05 LAB — POCT GLUCOSE: 87 MG/DL (ref 70–110)

## 2024-01-05 PROCEDURE — A9552 F18 FDG: HCPCS

## 2024-01-05 PROCEDURE — 78815 PET IMAGE W/CT SKULL-THIGH: CPT | Mod: 26,PS,, | Performed by: STUDENT IN AN ORGANIZED HEALTH CARE EDUCATION/TRAINING PROGRAM

## 2024-01-05 PROCEDURE — 78815 PET IMAGE W/CT SKULL-THIGH: CPT | Mod: TC

## 2024-01-08 LAB
FINAL PATHOLOGIC DIAGNOSIS: NORMAL
GROSS: NORMAL
Lab: NORMAL
MICROSCOPIC EXAM: NORMAL

## 2024-01-10 ENCOUNTER — OFFICE VISIT (OUTPATIENT)
Dept: HEMATOLOGY/ONCOLOGY | Facility: CLINIC | Age: 74
End: 2024-01-10
Payer: MEDICARE

## 2024-01-10 VITALS
RESPIRATION RATE: 14 BRPM | DIASTOLIC BLOOD PRESSURE: 84 MMHG | WEIGHT: 114 LBS | SYSTOLIC BLOOD PRESSURE: 131 MMHG | OXYGEN SATURATION: 99 % | HEART RATE: 72 BPM | HEIGHT: 62 IN | TEMPERATURE: 98 F | BODY MASS INDEX: 20.98 KG/M2

## 2024-01-10 DIAGNOSIS — Z17.0 MALIGNANT NEOPLASM OF UPPER-OUTER QUADRANT OF RIGHT BREAST IN FEMALE, ESTROGEN RECEPTOR POSITIVE: ICD-10-CM

## 2024-01-10 DIAGNOSIS — C34.31 ADENOCARCINOMA OF LOWER LOBE OF RIGHT LUNG: Primary | ICD-10-CM

## 2024-01-10 DIAGNOSIS — C50.411 MALIGNANT NEOPLASM OF UPPER-OUTER QUADRANT OF RIGHT BREAST IN FEMALE, ESTROGEN RECEPTOR POSITIVE: ICD-10-CM

## 2024-01-10 PROCEDURE — 3288F FALL RISK ASSESSMENT DOCD: CPT | Mod: CPTII,S$GLB,, | Performed by: HOSPITALIST

## 2024-01-10 PROCEDURE — 99215 OFFICE O/P EST HI 40 MIN: CPT | Mod: S$GLB,,, | Performed by: HOSPITALIST

## 2024-01-10 PROCEDURE — 1126F AMNT PAIN NOTED NONE PRSNT: CPT | Mod: CPTII,S$GLB,, | Performed by: HOSPITALIST

## 2024-01-10 PROCEDURE — 3075F SYST BP GE 130 - 139MM HG: CPT | Mod: CPTII,S$GLB,, | Performed by: HOSPITALIST

## 2024-01-10 PROCEDURE — 3079F DIAST BP 80-89 MM HG: CPT | Mod: CPTII,S$GLB,, | Performed by: HOSPITALIST

## 2024-01-10 PROCEDURE — 99999 PR PBB SHADOW E&M-EST. PATIENT-LVL IV: CPT | Mod: PBBFAC,,, | Performed by: HOSPITALIST

## 2024-01-10 PROCEDURE — 3008F BODY MASS INDEX DOCD: CPT | Mod: CPTII,S$GLB,, | Performed by: HOSPITALIST

## 2024-01-10 PROCEDURE — 1159F MED LIST DOCD IN RCRD: CPT | Mod: CPTII,S$GLB,, | Performed by: HOSPITALIST

## 2024-01-10 PROCEDURE — 1101F PT FALLS ASSESS-DOCD LE1/YR: CPT | Mod: CPTII,S$GLB,, | Performed by: HOSPITALIST

## 2024-01-10 NOTE — PROGRESS NOTES
[facility] MEDICAL ONCOLOGY - FOLLOW UP VISIT    Reason for visit: Follow up visit for adenocarcinoma of the lung      Oncology History   Malignant neoplasm of upper-outer quadrant of right breast in female, estrogen receptor positive   4/13/2021 Biopsy    Breast, right, 10:00 5N, biopsy:  - Invasive ductal carcinoma with lobular features     4/13/2021 Breast Tumor Markers    Estrogen Receptor: Positive >90%  Progesterone Receptor: Positive 10-50%  HER2: Negative  Ki67: 10-30%     4/26/2021 Genetic Testing    MyRisk: negative     5/12/2021 Breast Surgery    Right partial mastectomy with SLNB     6/1/2021 Tumor Conference    Radiation options? Offer radiation, can discuss partial vs whole breast radiation.        6/2/2021 Cancer Staged    Staging form: Breast, AJCC 8th Edition  - Pathologic stage from 6/2/2021: Stage IA (pT1b, pN0(sn), cM0, G2, ER+, WA+, HER2-)     7/6/2021 - 7/27/2021 Radiation Therapy    Treatment Summary  Course: C1 Chest 2021  Treatment Site Energy Dose/Fx (Gy) #Fx Total Dose (Gy) Start Date End Date Elapsed Days   Breast_Rt 6X 2.65 16 / 16 42.4 7/6/2021 7/27/2021 21        7/2021 -  Hormone Therapy    Letrozole     Adenocarcinoma of lower lobe of right lung   10/5/2023 Imaging Significant Findings    CT C (obtained after CXR, which was itself obtained for palpitations)  - 2.1 x 1.3 cm spiculated RLL nodule, some spiculation noted to extend to the pleural margin  - Scattered pulmonary nodules centered mostly subpleural at the RLL (e.g. 0.9 cm)       10/10/2023 Imaging Significant Findings    PET CT  - 2.1 x 1.1 cm RLL nodule, SUV 3.8  - 0.9 cm R axillary LN, SUV 3.9  - 1.2 cm subcarinal LN, SUV 4.6  - 0.7 cm Ln along L posterior shoulder, SUV 1.7  - Multiple additional solid pulmonary nodules through R lung base, too small for uptake detection     11/14/2023 Procedure    Bronch with EBUS  RLL, FNA  - Adenocarcinoma (TTF1 positive, GATA3  negative)    RLL, TBBx  - Adenocarcinoma    Per pulmonology, station 7 node was very vascular without window for biopsy, plan to discuss at thoracic tumor board    [facility] NGS  - KRAS G12A, CHEK1, MLH1, CTNNB1, CIC, PTPRD, NOTCH3, EZH2, LATS1, KMT2D  - Negative: EGFR, ALK, BRAF, KRAS, ROS1, RET, MET, EBB2  - PD-L1 1%       11/22/2023 Tumor Conference    Tumor Board  - Plan for axillary LN biopsy to determine lung vs recurrent breast vs other etiology  - Repeat CT C to evaluate nodules in RLL  - Determine treatment plan based on above results     11/22/2023 Tumor Genotyping    [facility] Liquid Biopsy  - No reportable pathogenic variants found     11/29/2023 Imaging Significant Findings    CT C  - 2.1 x 1.2 cm RLL spiculated nodule, stable in size w/ new central cavitation. Spiculated margins extend towards adjacent pleura.   - Stable irregular 2.0 cm linar opacity in LLL  - Stable 0.3 cm GGO, TRICIA  - Stable R basal sub cm nodules, largest 0.9 cm  - Several stable solid nodules predominantly in RLL, largest 0.9 cm     12/18/2023 Imaging Significant Findings    MRI Brain  - JELENA     1/3/2024 Procedure    IR Guided Biopsy, R Axillary LN  - No metastatic carcinoma (0.3 x 0.3 x 0.1 cm tissue, positive and negative controls stained appropriately)     1/5/2024 Imaging Significant Findings    PET CT  - Stable 2.0 x 1.1 cm RLL nodule (previously 2.1 x 1.1 cm)  - Stable additional nodules w/o significant racer uptake  - 0.8 cm R axillary node, SUV 4.2 (previously 0.9 cm, SUV 3.9)  - 0.5 cm L posterior shoulder node, SUV 2.3 (previously 0.7 cm, SUV 1.7)  - 1.3 cm subcarinal node, SUV 4.9 (previously 1.2 cm, SUV 4.6)            HPI:     Keo Frias is a 73 y.o. female with pmh significant for HTN, migraine headaches, and adenocarcinoma of the RLL of uncertain stage ([id number]) (no targetable mutations, PD-L1 1%), initially dx'd 11/14/23, currently treatment naive, who presents to establish care. Of note, pt also has a history of Stage  IA ( V6xG7L9, ER 95%, NH 10%, HER2 negative, Ki-67 20%) IDC of the R breast, initially dx'd 4/13/21, s/p lumpectomy and SLNB (5/12/21), XRT (7/6/21-7/27/21), and currently on letrozole (7/2021 - present).     Interval History:  - 1/3/24: IR guided biopsy L axilla, JELENA  - 1/5/24: PET CT, largely unchanged from prior (see above)    Pt states that she feels today.  She denies new or worsening symptoms at this time.  She is eager to determine what the next steps in her treatment will be.    History has been obtained by chart review and discussion with the patient.    Past Medical History:   Past Medical History:   Diagnosis Date    Anemia     Anxiety disorder 8/28/2019    Cataract     Chronic right-sided low back pain without sciatica 10/20/2021    Hypercholesteremia 9/17/2019    Invasive ductal carcinoma of breast, female, right 4/16/2021    Migraine with vision problems     Subclinical hypothyroidism     White coat syndrome with hypertension         Past Surgical History:   Past Surgical History:   Procedure Laterality Date    COLON SURGERY      COLONOSCOPY N/A 12/7/2018    Procedure: COLONOSCOPY;  Surgeon: Bhargav Gaston MD;  Location: [facility] ([facility]  Service: Endoscopy;  Laterality: N/A;    DILATION AND CURETTAGE OF UTERUS      postmenopausal bleeding    ENDOBRONCHIAL ULTRASOUND N/A 11/14/2023    Procedure: ENDOBRONCHIAL ULTRASOUND (EBUS);  Surgeon: Kirt Gr MD;  Location: [address];  Service: Pulmonary;  Laterality: N/A;    MASTECTOMY, PARTIAL Right 5/12/2021    Procedure: MASTECTOMY, PARTIAL-Right with radiological marker;  Surgeon: Vivian Cadena MD;  Location: [facility];  Service: General;  Laterality: Right;    ROBOTIC BRONCHOSCOPY N/A 11/14/2023    Procedure: ROBOTIC BRONCHOSCOPY;  Surgeon: Kirt Gr MD;  [address];  Service: Pulmonary;  Laterality: N/A;    SENTINEL LYMPH NODE BIOPSY Right 5/12/2021    Procedure: BIOPSY, LYMPH NODE, SENTINEL-Right;  Surgeon:  Vivian Cadena MD;  Location: [facility] OR;  Service: General;  Laterality: Right;    TUBAL LIGATION          Family History:   Family History   Problem Relation Age of Onset    Diabetes Brother     Hypertension Brother     Glaucoma Brother     Glaucoma Father     Cataracts Father     Retinal detachment Father     Lung cancer Mother     Uterine cancer Maternal Grandmother     Stroke Maternal Grandmother     Stroke Paternal Grandmother     Amblyopia Neg Hx     Blindness Neg Hx     Macular degeneration Neg Hx     Strabismus Neg Hx     Thyroid disease Neg Hx         Social History:   Social History     Tobacco Use    Smoking status: Never    Smokeless tobacco: Never   Substance Use Topics    Alcohol use: No        I have reviewed and updated the patient's past medical, surgical, family and social histories.      ROS:   As per HPI.     Allergies:   Review of patient's allergies indicates:   Allergen Reactions    Sinus allergy Other (See Comments)     Headaches, migranes    Buspar [buspirone] Other (See Comments)     Said cardiology told her to stop IF on Lexapro    Sulfa (sulfonamide antibiotics) Rash        Medications:   Current Outpatient Medications   Medication Sig Dispense Refill    atorvastatin (LIPITOR) 20 MG tablet Take 2 tablets (40 mg total) by mouth once daily. 180 tablet 3    benzonatate (TESSALON PERLES) 100 MG capsule Take 1 capsule (100 mg total) by mouth 3 (three) times daily as needed for Cough. 30 capsule 1    calcium carbonate (CALCIUM 500 ORAL) Take 1 tablet by mouth.      cholecalciferol, vitamin D3, 125 mcg (5,000 unit) capsule Take 1 capsule (5,000 Units total) by mouth daily with breakfast. 100 capsule 4    CIPRODEX 0.3-0.1 % DrpS PLACE 4 DROPS INTO THE RIGHT EAR 2 (TWO) TIMES DAILY. FOR 10 DAYS 7.5 mL 5    cloNIDine (CATAPRES) 0.1 MG tablet Take 1 tablet (0.1 mg total) by mouth daily as needed (for systolic greater than or equal to 160 mmHg). 90 tablet 3    EScitalopram oxalate (LEXAPRO) 10 MG tablet  "Take 1 tablet (10 mg total) by mouth once daily. (Patient taking differently: Take 10 mg by mouth once daily. Pt taking 5mg total 1 time daily) 90 tablet 4    famotidine (PEPCID) 10 MG tablet Take 10 mg by mouth as needed.      Lactobacillus rhamnosus GG (CULTURELLE) 10 billion cell capsule Take 1 capsule by mouth once daily. Pt takes Align probiotic      letrozole (FEMARA) 2.5 mg Tab TAKE 1 TABLET BY MOUTH EVERY DAY 90 tablet 3    losartan (COZAAR) 25 MG tablet Take 1 tablet (25 mg total) by mouth once daily. 90 tablet 4    metoprolol succinate (TOPROL-XL) 50 MG 24 hr tablet Take 1 tablet (50 mg total) by mouth once daily. 90 tablet 4    multivitamin-iron-folic acid Tab Take 1 tablet by mouth once daily.      omeprazole (PRILOSEC OTC) 20 MG tablet Take 20 mg by mouth once daily.      PROLIA 60 mg/mL Syrg        No current facility-administered medications for this visit.          Physical Exam:       BP (!) 145/80 (BP Location: Left arm, Patient Position: Sitting, BP Method: Medium (Automatic))   Pulse 72   Temp 98.3 °F (36.8 °C) (Oral)   Resp 14   Ht 5' 2" (1.575 m)   Wt 51.7 kg (113 lb 15.7 oz)   LMP  (LMP Unknown)   SpO2 99%   BMI 20.85 kg/m²                Physical Exam  Constitutional:       Appearance: Normal appearance.   HENT:      Head: Normocephalic and atraumatic.   Eyes:      Extraocular Movements: Extraocular movements intact.      Conjunctiva/sclera: Conjunctivae normal.      Pupils: Pupils are equal, round, and reactive to light.   Cardiovascular:      Rate and Rhythm: Normal rate and regular rhythm.      Heart sounds: No murmur heard.     No friction rub. No gallop.   Pulmonary:      Effort: Pulmonary effort is normal.      Breath sounds: No wheezing, rhonchi or rales.   Chest:      Comments: No palpable R axillary LAD  Musculoskeletal:         General: Normal range of motion.   Skin:     General: Skin is warm and dry.   Neurological:      Mental Status: She is alert and oriented to person, " place, and time.   Psychiatric:         Mood and Affect: Mood normal.         Thought Content: Thought content normal.         Judgment: Judgment normal.           Labs:   No results found for this or any previous visit (from the past 48 hour(s)).     Imaging:         Path:  See oncologic history above.      Assessment and Plan:     Keo Frias is a 73 y.o. female with pmh significant for HTN, migraine headaches, and adenocarcinoma of the RLL of uncertain stage ([id number]) (no targetable mutations, PD-L1 1%), initially dx'd 11/14/23, currently treatment naive, who presents to [facility]. Of note, pt also has a history of Stage IA ( R7mH8C0, ER 95%, VT 10%, HER2 negative, Ki-67 20%) IDC of the R breast, initially dx'd 4/13/21, s/p lumpectomy and SLNB (5/12/21), XRT (7/6/21-7/27/21), and currently on letrozole (7/2021 - present).     Adenocarcinoma of RLL, Uncertain Stage  ECOG PS 1.  Patient presents today for follow-up.  Since last visit, patient received a biopsy of her right axillary lymph node which was without evidence of carcinoma.  Repeat PET-CT (01/05/2024) is essentially unchanged from PET 3 months prior (10/12/2023).  Given persistent avidity of right axillary lymph node, concern for possible carcinoma (lung versus breast) remains high.  Patient was discussed at tumor board on 01/10/2024 with the recommendation is to re-biopsy this lymph node, possibly by excisional biopsy.  To this end, I have discussed with the patient's primary breast oncologist Dr. Garcia and will work towards obtaining repeat sample. If this LN is positive for lung cancer, this would represent non-regional disease and require systemic treatment (likely carboplatin/pemetrexed/pembrolizumab).  If this is positive for breast cancer, then in addition to working with the breast oncology team, would need to further define the subcarinal lesion; this was discussed at tumor board where the opinion was that this would be difficult to access  via mediastinoscopy and so may need to repeat either EBUS or to treat empirically likely with CRT. This was discussed with the patient and her daughter who understand and agree with this plan.    Of note, CT imaging also demonstrates solid RLL nodules. These have been stable on multiple CT chests (most recent 11/29/23) and are below the resolving power of PET scans.     PLAN:   -- Working with breast oncology to arrange repeat biopsy of axillary LN. Primary breast medical oncologist is Dr. Garcia, primary breast surgeon is Dr. Cadena.   -- RTC pending the above      Right IDC, ER+/ID+/HER2-  S/p lumpectomy with SLNB, radiation therapy, and currently on letrozole and denosumab, tolerating well. Concern for possible R axillary recurrence, workup as below.   -- Okay to continue letrozole  -- Okay to continue denosumab  -- Breast oncology team messaged as above      The above information has been reviewed with the patient and all questions have been answered to their apparent satisfaction.  They understand that they can call the clinic with any questions.    Bridger Nichole MD  Hematology/Oncology  [facility]        Med Onc Chart Routing      Follow up with physician . RTC in 3 weeks, subject to change based on biopsy information   Follow up with BRENDA    Infusion scheduling note    Injection scheduling note    Labs    Imaging    Pharmacy appointment    Other referrals

## 2024-01-17 ENCOUNTER — PATIENT MESSAGE (OUTPATIENT)
Dept: HEMATOLOGY/ONCOLOGY | Facility: CLINIC | Age: 74
End: 2024-01-17
Payer: MEDICARE

## 2024-01-17 ENCOUNTER — PATIENT MESSAGE (OUTPATIENT)
Dept: SURGERY | Facility: CLINIC | Age: 74
End: 2024-01-17
Payer: MEDICARE

## 2024-01-18 ENCOUNTER — PATIENT MESSAGE (OUTPATIENT)
Dept: HEMATOLOGY/ONCOLOGY | Facility: CLINIC | Age: 74
End: 2024-01-18
Payer: MEDICARE

## 2024-01-18 DIAGNOSIS — R59.0 LYMPHADENOPATHY, AXILLARY: Primary | ICD-10-CM

## 2024-01-19 ENCOUNTER — TELEPHONE (OUTPATIENT)
Dept: INTERVENTIONAL RADIOLOGY/VASCULAR | Facility: HOSPITAL | Age: 74
End: 2024-01-19
Payer: MEDICARE

## 2024-01-19 NOTE — NURSING
Pre-procedure call complete.  Pt instructed not to eat or drink anything after midnight the night before procedure.  Pt aware will need someone to provide transport home and monitor pt 8 hours post procedure.  No driving for at least 24 hours after procedure.   Patient advised to take blood pressure, heart medications, with a sip of water morning of procedure.  Patient verbalized aware of which medications to take.  Do not take  sleep medication (including OTC) and anxiety medication the night before procedure.  Arrival time and location given.  Expected length of stay reviewed.  Covid screening completed.  Pt verbalized understanding of all pre-procedure instructions.  Written instructions and directions sent to patient in Mychart/portal.

## 2024-01-22 ENCOUNTER — PATIENT MESSAGE (OUTPATIENT)
Dept: HEMATOLOGY/ONCOLOGY | Facility: CLINIC | Age: 74
End: 2024-01-22
Payer: MEDICARE

## 2024-01-23 ENCOUNTER — HOSPITAL ENCOUNTER (OUTPATIENT)
Dept: INTERVENTIONAL RADIOLOGY/VASCULAR | Facility: HOSPITAL | Age: 74
Discharge: HOME | End: 2024-01-23
Attending: FAMILY MEDICINE
Payer: MEDICARE

## 2024-01-23 VITALS
SYSTOLIC BLOOD PRESSURE: 138 MMHG | TEMPERATURE: 98 F | HEIGHT: 62 IN | BODY MASS INDEX: 20.98 KG/M2 | WEIGHT: 114 LBS | RESPIRATION RATE: 18 BRPM | DIASTOLIC BLOOD PRESSURE: 78 MMHG | HEART RATE: 74 BPM | OXYGEN SATURATION: 100 %

## 2024-01-23 DIAGNOSIS — R59.0 LYMPHADENOPATHY, AXILLARY: ICD-10-CM

## 2024-01-23 PROCEDURE — 77012 CT SCAN FOR NEEDLE BIOPSY: CPT | Mod: TC | Performed by: RADIOLOGY

## 2024-01-23 PROCEDURE — 88342 IMHCHEM/IMCYTCHM 1ST ANTB: CPT | Mod: 26,XU,, | Performed by: PATHOLOGY

## 2024-01-23 PROCEDURE — 99152 MOD SED SAME PHYS/QHP 5/>YRS: CPT | Performed by: RADIOLOGY

## 2024-01-23 PROCEDURE — 63600175 PHARM REV CODE 636 W HCPCS: Performed by: RADIOLOGY

## 2024-01-23 PROCEDURE — 88184 FLOWCYTOMETRY/ TC 1 MARKER: CPT | Performed by: PATHOLOGY

## 2024-01-23 PROCEDURE — 88185 FLOWCYTOMETRY/TC ADD-ON: CPT | Performed by: PATHOLOGY

## 2024-01-23 PROCEDURE — 38505 NEEDLE BIOPSY LYMPH NODES: CPT | Performed by: RADIOLOGY

## 2024-01-23 PROCEDURE — 27201068 IR BIOPSY LYMPH NODE

## 2024-01-23 PROCEDURE — 99152 MOD SED SAME PHYS/QHP 5/>YRS: CPT | Mod: ,,, | Performed by: RADIOLOGY

## 2024-01-23 PROCEDURE — 99153 MOD SED SAME PHYS/QHP EA: CPT | Performed by: RADIOLOGY

## 2024-01-23 PROCEDURE — 88307 TISSUE EXAM BY PATHOLOGIST: CPT | Mod: 26,,, | Performed by: PATHOLOGY

## 2024-01-23 PROCEDURE — 88342 IMHCHEM/IMCYTCHM 1ST ANTB: CPT | Performed by: PATHOLOGY

## 2024-01-23 PROCEDURE — 88360 TUMOR IMMUNOHISTOCHEM/MANUAL: CPT | Mod: 26,,, | Performed by: PATHOLOGY

## 2024-01-23 PROCEDURE — 77012 CT SCAN FOR NEEDLE BIOPSY: CPT | Mod: 26,,, | Performed by: RADIOLOGY

## 2024-01-23 PROCEDURE — 88333 PATH CONSLTJ SURG CYTO XM 1: CPT | Mod: 26,,, | Performed by: PATHOLOGY

## 2024-01-23 PROCEDURE — 88333 PATH CONSLTJ SURG CYTO XM 1: CPT | Performed by: PATHOLOGY

## 2024-01-23 PROCEDURE — 88360 TUMOR IMMUNOHISTOCHEM/MANUAL: CPT | Performed by: PATHOLOGY

## 2024-01-23 PROCEDURE — 88341 IMHCHEM/IMCYTCHM EA ADD ANTB: CPT | Mod: 26,59,, | Performed by: PATHOLOGY

## 2024-01-23 PROCEDURE — 88189 FLOWCYTOMETRY/READ 16 & >: CPT | Mod: ,,, | Performed by: PATHOLOGY

## 2024-01-23 PROCEDURE — 88341 IMHCHEM/IMCYTCHM EA ADD ANTB: CPT | Mod: 59 | Performed by: PATHOLOGY

## 2024-01-23 PROCEDURE — 88307 TISSUE EXAM BY PATHOLOGIST: CPT | Performed by: PATHOLOGY

## 2024-01-23 RX ORDER — FENTANYL CITRATE 50 UG/ML
INJECTION, SOLUTION INTRAMUSCULAR; INTRAVENOUS
Status: COMPLETED | OUTPATIENT
Start: 2024-01-23 | End: 2024-01-23

## 2024-01-23 RX ORDER — LIDOCAINE HYDROCHLORIDE 10 MG/ML
1 INJECTION, SOLUTION EPIDURAL; INFILTRATION; INTRACAUDAL; PERINEURAL ONCE
Status: DISCONTINUED | OUTPATIENT
Start: 2024-01-23 | End: 2024-01-24 | Stop reason: HOSPADM

## 2024-01-23 RX ORDER — SODIUM CHLORIDE 9 MG/ML
INJECTION, SOLUTION INTRAVENOUS CONTINUOUS
Status: DISCONTINUED | OUTPATIENT
Start: 2024-01-23 | End: 2024-01-24 | Stop reason: HOSPADM

## 2024-01-23 RX ORDER — ONDANSETRON HYDROCHLORIDE 2 MG/ML
4 INJECTION, SOLUTION INTRAVENOUS EVERY 6 HOURS PRN
Status: DISCONTINUED | OUTPATIENT
Start: 2024-01-23 | End: 2024-01-24 | Stop reason: HOSPADM

## 2024-01-23 RX ORDER — MIDAZOLAM HYDROCHLORIDE 1 MG/ML
INJECTION INTRAMUSCULAR; INTRAVENOUS
Status: COMPLETED | OUTPATIENT
Start: 2024-01-23 | End: 2024-01-23

## 2024-01-23 RX ADMIN — FENTANYL CITRATE 25 MCG: 50 INJECTION, SOLUTION INTRAMUSCULAR; INTRAVENOUS at 02:01

## 2024-01-23 RX ADMIN — MIDAZOLAM HYDROCHLORIDE 0.5 MG: 2 INJECTION, SOLUTION INTRAMUSCULAR; INTRAVENOUS at 02:01

## 2024-01-23 NOTE — H&P
Radiology History & Physical      SUBJECTIVE:     Chief Complaint: NSCLC    History of Present Illness:  Koe Frias is a 73 y.o. female who presents for lymph node biopsy and marker placement.    Past Medical History:   Diagnosis Date    Anemia     Anxiety disorder 8/28/2019    Cataract     Chronic right-sided low back pain without sciatica 10/20/2021    Hypercholesteremia 9/17/2019    Invasive ductal carcinoma of breast, female, right 4/16/2021    Migraine with vision problems     Subclinical hypothyroidism     White coat syndrome with hypertension      Past Surgical History:   Procedure Laterality Date    COLON SURGERY      COLONOSCOPY N/A 12/7/2018    Procedure: COLONOSCOPY;  Surgeon: Bhargav Gaston MD;  Location: [facility] ENDO (4TH FLR);  Service: Endoscopy;  Laterality: N/A;    DILATION AND CURETTAGE OF UTERUS      postmenopausal bleeding    ENDOBRONCHIAL ULTRASOUND N/A 11/14/2023    Procedure: ENDOBRONCHIAL ULTRASOUND (EBUS);  Surgeon: Kirt Gr MD;  Location: [facility] OR 2ND FLR;  Service: Pulmonary;  Laterality: N/A;    MASTECTOMY, PARTIAL Right 5/12/2021    Procedure: MASTECTOMY, PARTIAL-Right with radiological marker;  Surgeon: Vivian Cadena MD;  Location: [facility];  Service: General;  Laterality: Right;    ROBOTIC BRONCHOSCOPY N/A 11/14/2023    Procedure: ROBOTIC BRONCHOSCOPY;  Surgeon: Kirt Gr MD;  Location: [facility] OR 2ND FLR;  Service: Pulmonary;  Laterality: N/A;    SENTINEL LYMPH NODE BIOPSY Right 5/12/2021    Procedure: BIOPSY, LYMPH NODE, SENTINEL-Right;  Surgeon: Vivian Cadena MD;  Location: [facility];  Service: General;  Laterality: Right;    TUBAL LIGATION         Home Meds:   Prior to Admission medications    Medication Sig Start Date End Date Taking? Authorizing Provider   atorvastatin (LIPITOR) 20 MG tablet Take 2 tablets (40 mg total) by mouth once daily. 11/22/23 11/21/24  Gamaliel Kahn MD   benzonatate (TESSALON PERLES) 100 MG capsule Take 1 capsule (100 mg total) by mouth 3  (three) times daily as needed for Cough. 11/22/23 11/21/24  Bridger Nichole IV, MD   calcium carbonate (CALCIUM 500 ORAL) Take 1 tablet by mouth.    Provider, Historical   cholecalciferol, vitamin D3, 125 mcg (5,000 unit) capsule Take 1 capsule (5,000 Units total) by mouth daily with breakfast. 4/30/21   Sharron Ogden MD   CIPRODEX 0.3-0.1 % DrpS PLACE 4 DROPS INTO THE RIGHT EAR 2 (TWO) TIMES DAILY. FOR 10 DAYS 7/26/21   Brandon Lema MD   cloNIDine (CATAPRES) 0.1 MG tablet Take 1 tablet (0.1 mg total) by mouth daily as needed (for systolic greater than or equal to 160 mmHg). 9/1/23 8/31/24  Esa Rodríguez MD PhD   EScitalopram oxalate (LEXAPRO) 10 MG tablet Take 1 tablet (10 mg total) by mouth once daily.  Patient taking differently: Take 10 mg by mouth once daily. Pt taking 5mg total 1 time daily 9/3/23 9/2/24  Sharron Ogden MD   famotidine (PEPCID) 10 MG tablet Take 10 mg by mouth as needed.    Provider, Historical   Lactobacillus rhamnosus GG (CULTURELLE) 10 billion cell capsule Take 1 capsule by mouth once daily. Pt takes Align probiotic    Provider, Historical   letrozole (FEMARA) 2.5 mg Tab TAKE 1 TABLET BY MOUTH EVERY DAY 8/18/23   Urmila Murray NP   losartan (COZAAR) 25 MG tablet Take 1 tablet (25 mg total) by mouth once daily. 11/8/23   Sharron Ogden MD   metoprolol succinate (TOPROL-XL) 50 MG 24 hr tablet Take 1 tablet (50 mg total) by mouth once daily. 11/8/23 1/31/25  Sharron Ogden MD   multivitamin-iron-folic acid Tab Take 1 tablet by mouth once daily.    Provider, Historical   omeprazole (PRILOSEC OTC) 20 MG tablet Take 20 mg by mouth once daily.    Provider, Historical   PROLIA 60 mg/mL Syrg  5/26/23   Provider, Historical     Anticoagulants/Antiplatelets: no anticoagulation    Allergies:   Review of patient's allergies indicates:   Allergen Reactions    Sinus allergy Other (See Comments)     Headaches, migranes    Buspar [buspirone] Other (See  Comments)     Said cardiology told her to stop IF on Lexapro    Sulfa (sulfonamide antibiotics) Rash     Sedation History:  no adverse reactions    Review of Systems:   Hematological: no known coagulopathies  Respiratory: no shortness of breath  Cardiovascular: no chest pain  Gastrointestinal: no abdominal pain  Genito-Urinary: no dysuria  Musculoskeletal: negative  Neurological: no TIA or stroke symptoms         OBJECTIVE:     Vital Signs (Most Recent)  Temp: 97.9 °F (36.6 °C) (01/23/24 1234)  Pulse: 75 (01/23/24 1234)  Resp: 20 (01/23/24 1234)  BP: (!) 188/85 (01/23/24 1234)  SpO2: 100 % (01/23/24 1234)    Physical Exam:  ASA: 3  Mallampati: 3    General: no acute distress  Mental Status: alert and oriented to person, place and time  HEENT: normocephalic, atraumatic  Chest: unlabored breathing. Healed skin reaction in the right chest.  Heart: regular heart rate  Abdomen: nondistended  Extremity: moves all extremities    Laboratory  Lab Results   Component Value Date    INR 1.0 12/30/2023       Lab Results   Component Value Date    WBC 5.07 12/30/2023    HGB 13.9 12/30/2023    HCT 43.4 12/30/2023    MCV 87 12/30/2023     12/30/2023      Lab Results   Component Value Date     (H) 12/30/2023     12/30/2023    K 4.2 12/30/2023     12/30/2023    CO2 24 12/30/2023    BUN 7 (L) 12/30/2023    CREATININE 0.7 12/30/2023    CALCIUM 8.8 12/30/2023    MG 1.9 05/03/2023    ALT 20 12/30/2023    AST 23 12/30/2023    ALBUMIN 3.9 12/30/2023    BILITOT 0.6 12/30/2023       ASSESSMENT/PLAN:     Sedation Plan: up to moderate  Patient will undergo lymph node biopsy and marker placement.    Dennise Bruno MD MSCR  PGY-5 Radiology Resident

## 2024-01-23 NOTE — PLAN OF CARE
Pt tolerated rg axillary lymp node biopsy, specimen collected and sent w/pathology, dressing c/d/I, transfer to mpu, 1 hr recovery, report given at bedside

## 2024-01-23 NOTE — PROCEDURES
Radiology Post-Procedure Note    Pre Op Diagnosis: Lung cancer    Post Op Diagnosis: Same    Procedure: Lymph node biopsy    Procedure performed by: Handy Ray MD    Written Informed Consent Obtained: Yes  Specimen Removed: YES 16 x 18 gauge cores  Estimated Blood Loss: Minimal    Findings:   Under CT guidance, 17/18 gauge biopsy system was used to sample right axillary lymph node marked on prior biopsy and hypermetabolic on PET. No complications. See dictation.    Patient tolerated procedure well.    Handy Ray M.D.  Interventional Radiology  Department of Radiology

## 2024-01-23 NOTE — DISCHARGE INSTRUCTIONS
For scheduling: Call zayra at [phone]    For questions or concerns call: HORACE MON-FRI 8 AM- 5PM [phone]. Radiology resident on call [phone].    For immediate concerns that are not emergent, you may call our radiology clinic at: [phone]

## 2024-01-23 NOTE — PLAN OF CARE
Pt arrived to 173 for liver biopsy. Pt oriented to unit and staff, Pt safely transferred from stretcher to procedural table. Fall risk reviewed and comfort measures utilized with interventions. Safety strap applied, position pillows to minimize pressure points. Blankets applied. Pt prepped and draped utilizing standard sterile technique. Patient placed on continuous monitoring, as required by sedation policy. Timeouts implemented utilizing standard universal time-out per department and facility policy. RN to remain at bedside with continuous monitoring. Pt resting comfortably. Denies pain/discomfort. Will continue to monitor. See flow sheets for monitoring, medication administration, and updates. patient verbalizes understanding.

## 2024-01-23 NOTE — DISCHARGE SUMMARY
Radiology Discharge Summary      Hospital Course: No complications    Admit Date: 1/23/2024  Discharge Date: 01/23/2024     Instructions Given to Patient: Yes  Diet: Resume prior diet  Activity: activity as tolerated and no driving for today    Description of Condition on Discharge: Stable  Vital Signs (Most Recent): Temp: 97.9 °F (36.6 °C) (01/23/24 1234)  Pulse: 69 (01/23/24 1450)  Resp: 18 (01/23/24 1450)  BP: (!) 157/72 (01/23/24 1450)  SpO2: 100 % (01/23/24 1450)    Discharge Disposition: Home    Discharge Diagnosis: Lung cancer s/p right axillary lymph node biopsy    Follow up: As scheduled    Handy Ray M.D.  Interventional Radiology  Department of Radiology

## 2024-01-23 NOTE — PROGRESS NOTES
Pre-op checklist complete. Called IR to notify of patient being ready for procedure. IR stated it would be about an hour before they come for patient. Vitals stable, no distress noted. Patient's daughter is at bedside.

## 2024-01-29 LAB
FINAL PATHOLOGIC DIAGNOSIS: NORMAL
FLOW CYTOMETRY ANTIBODIES ANALYZED - LYMPH NODE: NORMAL
FLOW CYTOMETRY COMMENT - LYMPH NODE: NORMAL
FLOW CYTOMETRY INTERPRETATION - LYMPH NODE: NORMAL
GROSS: NORMAL
Lab: NORMAL
MICROSCOPIC EXAM: NORMAL
SUPPLEMENTAL DIAGNOSIS: NORMAL

## 2024-02-02 ENCOUNTER — OFFICE VISIT (OUTPATIENT)
Dept: HEMATOLOGY/ONCOLOGY | Facility: CLINIC | Age: 74
End: 2024-02-02
Payer: MEDICARE

## 2024-02-02 VITALS
SYSTOLIC BLOOD PRESSURE: 142 MMHG | HEIGHT: 62 IN | BODY MASS INDEX: 21.14 KG/M2 | RESPIRATION RATE: 14 BRPM | TEMPERATURE: 97 F | OXYGEN SATURATION: 99 % | WEIGHT: 114.88 LBS | DIASTOLIC BLOOD PRESSURE: 83 MMHG | HEART RATE: 79 BPM

## 2024-02-02 DIAGNOSIS — Z17.0 MALIGNANT NEOPLASM OF UPPER-OUTER QUADRANT OF RIGHT BREAST IN FEMALE, ESTROGEN RECEPTOR POSITIVE: ICD-10-CM

## 2024-02-02 DIAGNOSIS — C50.411 MALIGNANT NEOPLASM OF UPPER-OUTER QUADRANT OF RIGHT BREAST IN FEMALE, ESTROGEN RECEPTOR POSITIVE: ICD-10-CM

## 2024-02-02 DIAGNOSIS — C34.31 ADENOCARCINOMA OF LOWER LOBE OF RIGHT LUNG: Primary | ICD-10-CM

## 2024-02-02 PROCEDURE — 3079F DIAST BP 80-89 MM HG: CPT | Mod: CPTII,S$GLB,, | Performed by: HOSPITALIST

## 2024-02-02 PROCEDURE — 1159F MED LIST DOCD IN RCRD: CPT | Mod: CPTII,S$GLB,, | Performed by: HOSPITALIST

## 2024-02-02 PROCEDURE — 99999 PR PBB SHADOW E&M-EST. PATIENT-LVL IV: CPT | Mod: PBBFAC,,, | Performed by: HOSPITALIST

## 2024-02-02 PROCEDURE — 1126F AMNT PAIN NOTED NONE PRSNT: CPT | Mod: CPTII,S$GLB,, | Performed by: HOSPITALIST

## 2024-02-02 PROCEDURE — 3288F FALL RISK ASSESSMENT DOCD: CPT | Mod: CPTII,S$GLB,, | Performed by: HOSPITALIST

## 2024-02-02 PROCEDURE — 1101F PT FALLS ASSESS-DOCD LE1/YR: CPT | Mod: CPTII,S$GLB,, | Performed by: HOSPITALIST

## 2024-02-02 PROCEDURE — 99215 OFFICE O/P EST HI 40 MIN: CPT | Mod: S$GLB,,, | Performed by: HOSPITALIST

## 2024-02-02 PROCEDURE — 3077F SYST BP >= 140 MM HG: CPT | Mod: CPTII,S$GLB,, | Performed by: HOSPITALIST

## 2024-02-02 PROCEDURE — 3008F BODY MASS INDEX DOCD: CPT | Mod: CPTII,S$GLB,, | Performed by: HOSPITALIST

## 2024-02-02 NOTE — PROGRESS NOTES
[facility] MEDICAL ONCOLOGY - FOLLOW UP VISIT    Reason for visit: Follow up visit for adenocarcinoma of the lung      Oncology History   Malignant neoplasm of upper-outer quadrant of right breast in female, estrogen receptor positive   4/13/2021 Biopsy    Breast, right, 10:00 5N, biopsy:  - Invasive ductal carcinoma with lobular features     4/13/2021 Breast Tumor Markers    Estrogen Receptor: Positive >90%  Progesterone Receptor: Positive 10-50%  HER2: Negative  Ki67: 10-30%     4/26/2021 Genetic Testing    MyRisk: negative     5/12/2021 Breast Surgery    Right partial mastectomy with SLNB     6/1/2021 Tumor Conference    Radiation options? Offer radiation, can discuss partial vs whole breast radiation.        6/2/2021 Cancer Staged    Staging form: Breast, AJCC 8th Edition  - Pathologic stage from 6/2/2021: Stage IA (pT1b, pN0(sn), cM0, G2, ER+, DC+, HER2-)     7/6/2021 - 7/27/2021 Radiation Therapy    Treatment Summary  Course: C1 Chest 2021  Treatment Site Energy Dose/Fx (Gy) #Fx Total Dose (Gy) Start Date End Date Elapsed Days   Breast_Rt 6X 2.65 16 / 16 42.4 7/6/2021 7/27/2021 21        7/2021 -  Hormone Therapy    Letrozole     Adenocarcinoma of lower lobe of right lung   10/5/2023 Imaging Significant Findings    CT C (obtained after CXR, which was itself obtained for palpitations)  - 2.1 x 1.3 cm spiculated RLL nodule, some spiculation noted to extend to the pleural margin  - Scattered pulmonary nodules centered mostly subpleural at the RLL (e.g. 0.9 cm)       10/10/2023 Imaging Significant Findings    PET CT  - 2.1 x 1.1 cm RLL nodule, SUV 3.8  - 0.9 cm R axillary LN, SUV 3.9  - 1.2 cm subcarinal LN, SUV 4.6  - 0.7 cm Ln along L posterior shoulder, SUV 1.7  - Multiple additional solid pulmonary nodules through R lung base, too small for uptake detection     11/14/2023 Procedure    Bronch with EBUS  RLL, FNA  - Adenocarcinoma (TTF1 positive, GATA3  negative)    RLL, TBBx  - Adenocarcinoma    Per pulmonology, station 7 node was very vascular without window for biopsy, plan to discuss at thoracic tumor board    [facility] NGS  - KRAS G12A, CHEK1, MLH1, CTNNB1, CIC, PTPRD, NOTCH3, EZH2, LATS1, KMT2D  - Negative: EGFR, ALK, BRAF, KRAS, ROS1, RET, MET, EBB2  - PD-L1 1%       11/22/2023 Tumor Conference    Tumor Board  - Plan for axillary LN biopsy to determine lung vs recurrent breast vs other etiology  - Repeat CT C to evaluate nodules in RLL  - Determine treatment plan based on above results     11/22/2023 Tumor Genotyping    [facility] Liquid Biopsy  - No reportable pathogenic variants found     11/29/2023 Imaging Significant Findings    CT C  - 2.1 x 1.2 cm RLL spiculated nodule, stable in size w/ new central cavitation. Spiculated margins extend towards adjacent pleura.   - Stable irregular 2.0 cm linar opacity in LLL  - Stable 0.3 cm GGO, TRICIA  - Stable R basal sub cm nodules, largest 0.9 cm  - Several stable solid nodules predominantly in RLL, largest 0.9 cm     12/18/2023 Imaging Significant Findings    MRI Brain  - JELENA     1/3/2024 Procedure    IR Guided Biopsy, R Axillary LN  - No metastatic carcinoma (0.3 x 0.3 x 0.1 cm tissue, positive and negative controls stained appropriately)     1/5/2024 Imaging Significant Findings    PET CT  - Stable 2.0 x 1.1 cm RLL nodule (previously 2.1 x 1.1 cm)  - Stable additional nodules w/o significant racer uptake  - 0.8 cm R axillary node, SUV 4.2 (previously 0.9 cm, SUV 3.9)  - 0.5 cm L posterior shoulder node, SUV 2.3 (previously 0.7 cm, SUV 1.7)  - 1.3 cm subcarinal node, SUV 4.9 (previously 1.2 cm, SUV 4.6)     1/10/2024 Tumor Conference    Tumor Board   Re-sample enlarged, hypermetabolic axillary LN with repeat CT-guided biopsy versus excisional biopsy. If LN is negative for recurrent NSCLC, obtain EBUS of PET-avid mediastinal LN.         1/23/2024 Procedure    IR Guided Biopsy, R Axillary LN (Second Biopsy)  - Negative for  metastatic carcinoma            HPI:     Keo Frias is a 73 y.o. female with pmh significant for HTN, migraine headaches, and adenocarcinoma of the RLL of uncertain stage ([id number]) (no targetable mutations, PD-L1 1%), initially dx'd 11/14/23, currently treatment naive, who presents to for follow up. Of note, pt also has a history of Stage IA ( [id number], ER 95%, [place] 10%, HER2 negative, Ki-67 20%) IDC of the R breast, initially dx'd 4/13/21, s/p lumpectomy and SLNB (5/12/21), XRT (7/6/21-7/27/21), and currently on letrozole (7/2021 - present).     Interval History:  - 1/23/24: IR guided biopsy L axilla, JELENA  - Second opinion, [facility]: contralateral LLL lesion stable but plan for sampling of this as well as subcarinal LN and possible additional RLL nodule. Pending results, consider NA --> surgery vs CRT.     Patient states that she feels well today.  She denies any new or bothersome symptoms at this time.  She confirms meeting with Dr. Ordoñez at [facility] and is able to recount her conversation there (I have also discussed with Dr. Ordoñez).  She notes that she has follow-up at [facility] this coming week, including CT surgery (02/07/2024), pulmonology (02/07/2024), and plan for bronchoscopy (02/08/2024).  She presents today with her daughter.      History has been obtained by chart review and discussion with the patient.    Past Medical History:   Past Medical History:   Diagnosis Date    Anemia     Anxiety disorder 8/28/2019    Cataract     Chronic right-sided low back pain without sciatica 10/20/2021    Hypercholesteremia 9/17/2019    Invasive ductal carcinoma of breast, female, right 4/16/2021    Migraine with vision problems     Subclinical hypothyroidism     White coat syndrome with hypertension         Past Surgical History:   Past Surgical History:   Procedure Laterality Date    COLON SURGERY      COLONOSCOPY N/A 12/7/2018    Procedure: COLONOSCOPY;  Surgeon: Bhargav Gaston MD;  Location: [facility] ([address] [place] [address]);   Service: Endoscopy;  Laterality: N/A;    DILATION AND CURETTAGE OF UTERUS      postmenopausal bleeding    ENDOBRONCHIAL ULTRASOUND N/A 11/14/2023    Procedure: ENDOBRONCHIAL ULTRASOUND (EBUS);  Surgeon: Kirt Gr MD;  Location: [facility] OR 2ND FLR;  Service: Pulmonary;  Laterality: N/A;    MASTECTOMY, PARTIAL Right 5/12/2021    Procedure: MASTECTOMY, PARTIAL-Right with radiological marker;  Surgeon: Vivian Cadena MD;  Location: [facility] OR;  Service: General;  Laterality: Right;    ROBOTIC BRONCHOSCOPY N/A 11/14/2023    Procedure: ROBOTIC BRONCHOSCOPY;  Surgeon: Kirt Gr MD;  Location: [facility] OR 2ND FLR;  Service: Pulmonary;  Laterality: N/A;    SENTINEL LYMPH NODE BIOPSY Right 5/12/2021    Procedure: BIOPSY, LYMPH NODE, SENTINEL-Right;  Surgeon: Vivian Cadena MD;  Location: [facility] OR;  Service: General;  Laterality: Right;    TUBAL LIGATION          Family History:   Family History   Problem Relation Age of Onset    Diabetes Brother     Hypertension Brother     Glaucoma Brother     Glaucoma Father     Cataracts Father     Retinal detachment Father     Lung cancer Mother     Uterine cancer Maternal Grandmother     Stroke Maternal Grandmother     Stroke Paternal Grandmother     Amblyopia Neg Hx     Blindness Neg Hx     Macular degeneration Neg Hx     Strabismus Neg Hx     Thyroid disease Neg Hx         Social History:   Social History     Tobacco Use    Smoking status: Never    Smokeless tobacco: Never   Substance Use Topics    Alcohol use: No        I have reviewed and updated the patient's past medical, surgical, family and social histories.      ROS:   As per HPI.     Allergies:   Review of patient's allergies indicates:   Allergen Reactions    Sinus allergy Other (See Comments)     Headaches, migranes    Buspar [buspirone] Other (See Comments)     Said cardiology told her to stop IF on Lexapro    Sulfa (sulfonamide antibiotics) Rash        Medications:   Current Outpatient Medications   Medication Sig  "Dispense Refill    atorvastatin (LIPITOR) 20 MG tablet Take 2 tablets (40 mg total) by mouth once daily. 180 tablet 3    benzonatate (TESSALON PERLES) 100 MG capsule Take 1 capsule (100 mg total) by mouth 3 (three) times daily as needed for Cough. 30 capsule 1    calcium carbonate (CALCIUM 500 ORAL) Take 1 tablet by mouth.      cholecalciferol, vitamin D3, 125 mcg (5,000 unit) capsule Take 1 capsule (5,000 Units total) by mouth daily with breakfast. 100 capsule 4    CIPRODEX 0.3-0.1 % DrpS PLACE 4 DROPS INTO THE RIGHT EAR 2 (TWO) TIMES DAILY. FOR 10 DAYS 7.5 mL 5    cloNIDine (CATAPRES) 0.1 MG tablet Take 1 tablet (0.1 mg total) by mouth daily as needed (for systolic greater than or equal to 160 mmHg). 90 tablet 3    EScitalopram oxalate (LEXAPRO) 10 MG tablet Take 1 tablet (10 mg total) by mouth once daily. (Patient taking differently: Take 10 mg by mouth once daily. Pt taking 5mg total 1 time daily) 90 tablet 4    famotidine (PEPCID) 10 MG tablet Take 10 mg by mouth as needed.      Lactobacillus rhamnosus GG (CULTURELLE) 10 billion cell capsule Take 1 capsule by mouth once daily. Pt takes Align probiotic      letrozole (FEMARA) 2.5 mg Tab TAKE 1 TABLET BY MOUTH EVERY DAY 90 tablet 3    losartan (COZAAR) 25 MG tablet Take 1 tablet (25 mg total) by mouth once daily. 90 tablet 4    metoprolol succinate (TOPROL-XL) 50 MG 24 hr tablet Take 1 tablet (50 mg total) by mouth once daily. 90 tablet 4    multivitamin-iron-folic acid Tab Take 1 tablet by mouth once daily.      omeprazole (PRILOSEC OTC) 20 MG tablet Take 20 mg by mouth once daily.      PROLIA 60 mg/mL Syrg        No current facility-administered medications for this visit.          Physical Exam:       BP (!) 142/83 (BP Location: Left arm, Patient Position: Sitting, BP Method: Medium (Automatic))   Pulse 79   Temp 97.4 °F (36.3 °C) (Oral)   Resp 14   Ht 5' 2" (1.575 m)   Wt 52.1 kg (114 lb 13.8 oz)   LMP  (LMP Unknown)   SpO2 99%   BMI 21.01 kg/m²       "          Physical Exam  Constitutional:       Appearance: Normal appearance.   HENT:      Head: Normocephalic and atraumatic.   Eyes:      Extraocular Movements: Extraocular movements intact.      Conjunctiva/sclera: Conjunctivae normal.      Pupils: Pupils are equal, round, and reactive to light.   Cardiovascular:      Rate and Rhythm: Normal rate and regular rhythm.      Heart sounds: No murmur heard.     No friction rub. No gallop.   Pulmonary:      Effort: Pulmonary effort is normal.      Breath sounds: No wheezing, rhonchi or rales.   Chest:      Comments: No palpable R axillary LAD  Musculoskeletal:         General: Normal range of motion.   Skin:     General: Skin is warm and dry.   Neurological:      Mental Status: She is alert and oriented to person, place, and time.   Psychiatric:         Mood and Affect: Mood normal.         Thought Content: Thought content normal.         Judgment: Judgment normal.           Labs:   No results found for this or any previous visit (from the past 48 hour(s)).     Imaging:         Path:  See oncologic history above.      Assessment and Plan:     Keo Frias is a 73 y.o. female with pmh significant for HTN, migraine headaches, and adenocarcinoma of the RLL of uncertain stage ([id number]) (no targetable mutations, PD-L1 1%), initially dx'd 11/14/23, currently treatment naive, who presents to for follow up. Of note, pt also has a history of Stage IA ( [id number], ER 95%, [place] 10%, HER2 negative, Ki-67 20%) IDC of the R breast, initially dx'd 4/13/21, s/p lumpectomy and SLNB (5/12/21), XRT (7/6/21-7/27/21), and currently on letrozole (7/2021 - present).     Adenocarcinoma of RLL, Uncertain Stage  ECOG PS 1.  Patient presents today for follow-up. Work up most recently includes axillary LN biopsy x2 (both negative, will treat as negative, see previous notes for rationale) as well as PET-CT (1/05/24) which was essentially unchanged from 3 months prior (10/12/23).  Since last visit,  patient presented to [facility] for second opinion with Dr. Don.  I have discussed with Dr. Don multiple times. Subcarinal LN remains unsampled at this time. Plan is for bronchoscopic evaluation of the subcarinal node, stable ground-glass opacity in the left lower lobe, and stable nodules in the right lower lobe (Dr. Don notes concern in particular that radiation would not include these nodules and so was initially favoring surgery).  Plan for bronchoscopy in line with tumor board recommendations at [facility], patient has this already arranged for 02/08/2024 at [facility]. If disease is confirmed Stage IIIA, Dr. Don recommends plan for neoadjuvant chemoIO (especially given MSI-H) followed by evaluation of response to determine receipt of surgery versus CRT --> durvalumab. Patient will proceed with bronchoscopy and will follow-up pending results of this, favor proceeding with neoadjuvant therapy only if pt deemed to be resection candidate up front.     PLAN:   -- Bronchoscopy scheduled 2/08/24  -- Will determine treatment plan following results of biopsy  -- Tentative RTC in 2 weeks      Right IDC, ER+/SC+/HER2-  S/p lumpectomy with SLNB, radiation therapy, and currently on letrozole and denosumab, tolerating well. Concern for possible R axillary recurrence, workup as below.   -- Okay to continue letrozole  -- Okay to continue denosumab  -- Breast oncology team messaged as above      The above information has been reviewed with the patient and all questions have been answered to their apparent satisfaction.  They understand that they can call the clinic with any questions.    Bridger Nichole MD  Hematology/Oncology  [facility]        Med Onc Chart Routing      Follow up with physician . RTC in 2 weeks   Follow up with BREN[facility]    Infusion scheduling note    Injection scheduling note    Labs    Imaging    Pharmacy appointment    Other referrals

## 2024-02-05 ENCOUNTER — PATIENT MESSAGE (OUTPATIENT)
Dept: PULMONOLOGY | Facility: CLINIC | Age: 74
End: 2024-02-05
Payer: MEDICARE

## 2024-02-07 ENCOUNTER — PATIENT MESSAGE (OUTPATIENT)
Dept: HEMATOLOGY/ONCOLOGY | Facility: CLINIC | Age: 74
End: 2024-02-07
Payer: MEDICARE

## 2024-02-08 ENCOUNTER — PATIENT MESSAGE (OUTPATIENT)
Dept: HEMATOLOGY/ONCOLOGY | Facility: CLINIC | Age: 74
End: 2024-02-08
Payer: MEDICARE

## 2024-02-11 DIAGNOSIS — I10 BENIGN ESSENTIAL HYPERTENSION: ICD-10-CM

## 2024-02-11 NOTE — TELEPHONE ENCOUNTER
No care due was identified.  [facility] Embedded Care Due Messages. Reference number: [id number].   2/11/2024 2:48:03 PM CST

## 2024-02-12 RX ORDER — AMLODIPINE BESYLATE 2.5 MG/1
2.5 TABLET ORAL
Qty: 90 TABLET | Refills: 4 | OUTPATIENT
Start: 2024-02-12

## 2024-02-12 NOTE — TELEPHONE ENCOUNTER
Refill Decision Note   Keo Frias  is requesting a refill authorization.  Brief Assessment and Rationale for Refill:  Quick Discontinue     Medication Therapy Plan:  Med d/c on 11/08/23 by PCP(Therapy completed) [facility]      Comments:     Note composed:5:57 AM 02/12/2024

## 2024-02-14 NOTE — TELEPHONE ENCOUNTER
No care due was identified.  [facility] Embedded Care Due Messages. Reference number: [id number].   2/14/2024 11:12:07 AM CST

## 2024-02-14 NOTE — PROGRESS NOTES
[facility] MEDICAL ONCOLOGY - FOLLOW UP VISIT    Reason for visit: Follow up visit for adenocarcinoma of the lung      Oncology History   Malignant neoplasm of upper-outer quadrant of right breast in female, estrogen receptor positive   4/13/2021 Biopsy    Breast, right, 10:00 5N, biopsy:  - Invasive ductal carcinoma with lobular features     4/13/2021 Breast Tumor Markers    Estrogen Receptor: Positive >90%  Progesterone Receptor: Positive 10-50%  HER2: Negative  Ki67: 10-30%     4/26/2021 Genetic Testing    MyRisk: negative     5/12/2021 Breast Surgery    Right partial mastectomy with SLNB     6/1/2021 Tumor Conference    Radiation options? Offer radiation, can discuss partial vs whole breast radiation.        6/2/2021 Cancer Staged    Staging form: Breast, AJCC 8th Edition  - Pathologic stage from 6/2/2021: Stage IA (pT1b, pN0(sn), cM0, G2, ER+, NY+, HER2-)     7/6/2021 - 7/27/2021 Radiation Therapy    Treatment Summary  Course: C1 Chest 2021  Treatment Site Energy Dose/Fx (Gy) #Fx Total Dose (Gy) Start Date End Date Elapsed Days   Breast_Rt 6X 2.65 16 / 16 42.4 7/6/2021 7/27/2021 21        7/2021 -  Hormone Therapy    Letrozole     2/8/2024 Procedure    Bronch with EBUS  LN  Positive: Station 7  Negative: Station 11R     Adenocarcinoma of lower lobe of right lung   10/5/2023 Imaging Significant Findings    CT C (obtained after CXR, which was itself obtained for palpitations)  - 2.1 x 1.3 cm spiculated RLL nodule, some spiculation noted to extend to the pleural margin  - Scattered pulmonary nodules centered mostly subpleural at the RLL (e.g. 0.9 cm)       10/10/2023 Imaging Significant Findings    PET CT  - 2.1 x 1.1 cm RLL nodule, SUV 3.8  - 0.9 cm R axillary LN, SUV 3.9  - 1.2 cm subcarinal LN, SUV 4.6  - 0.7 cm Ln along L posterior shoulder, SUV 1.7  - Multiple additional solid pulmonary nodules through R lung base, too small for uptake detection     11/14/2023  Procedure    Bronch with EBUS  RLL, FNA  - Adenocarcinoma (TTF1 positive, GATA3 negative)    RLL, TBBx  - Adenocarcinoma    Per pulmonology, station 7 node was very vascular without window for biopsy, plan to discuss at thoracic tumor board    [facility] NGS  - KRAS G12A, CHEK1, MLH1, CTNNB1, CIC, PTPRD, NOTCH3, EZH2, LATS1, KMT2D  - Negative: EGFR, ALK, BRAF, KRAS, ROS1, RET, MET, EBB2  - PD-L1 1%       11/22/2023 Tumor Conference    Tumor Board  - Plan for axillary LN biopsy to determine lung vs recurrent breast vs other etiology  - Repeat CT C to evaluate nodules in RLL  - Determine treatment plan based on above results     11/22/2023 Tumor Genotyping    [facility] Liquid Biopsy  - No reportable pathogenic variants found     11/29/2023 Imaging Significant Findings    CT C  - 2.1 x 1.2 cm RLL spiculated nodule, stable in size w/ new central cavitation. Spiculated margins extend towards adjacent pleura.   - Stable irregular 2.0 cm linar opacity in LLL  - Stable 0.3 cm GGO, TRICIA  - Stable R basal sub cm nodules, largest 0.9 cm  - Several stable solid nodules predominantly in RLL, largest 0.9 cm     12/18/2023 Imaging Significant Findings    MRI Brain  - JELENA     1/3/2024 Procedure    IR Guided Biopsy, R Axillary LN  - No metastatic carcinoma (0.3 x 0.3 x 0.1 cm tissue, positive and negative controls stained appropriately)     1/5/2024 Imaging Significant Findings    PET CT  - Stable 2.0 x 1.1 cm RLL nodule (previously 2.1 x 1.1 cm)  - Stable additional nodules w/o significant racer uptake  - 0.8 cm R axillary node, SUV 4.2 (previously 0.9 cm, SUV 3.9)  - 0.5 cm L posterior shoulder node, SUV 2.3 (previously 0.7 cm, SUV 1.7)  - 1.3 cm subcarinal node, SUV 4.9 (previously 1.2 cm, SUV 4.6)     1/10/2024 Tumor Conference    Tumor Board   Re-sample enlarged, hypermetabolic axillary LN with repeat CT-guided biopsy versus excisional biopsy. If LN is negative for recurrent NSCLC, obtain EBUS of PET-avid mediastinal LN.          1/23/2024 Procedure    IR Guided Biopsy, R Axillary LN (Second Biopsy)  - Negative for metastatic carcinoma     2/22/2024 -  Chemotherapy    Treatment Summary   Plan Name: OP NSCLC NIVOLUMAB 360 MG PLUS CARBOPLATIN (AUC5) PEMETREXED 500 MG/M2 Q3W x 3 CYCLES  Treatment Goal: Control  Status: Active  Start Date: 2/22/2024 (Planned)  End Date: 4/4/2024 (Planned)  Provider: Bridger Nichole IV, MD  Chemotherapy: CARBOplatin (PARAPLATIN) in sodium chloride 0.9% 250 mL chemo infusion, , Intravenous, Clinic/HOD 1 time, 0 of 3 cycles  PEMEtrexed disodium (ALIMTA) 750 mg in sodium chloride 0.9% SolP 100 mL chemo infusion, 500 mg/m2 = 750 mg, Intravenous, Clinic/HOD 1 time, 0 of 3 cycles        - Second opinion, [facility]: contralateral LLL lesion stable but plan for sampling of this as well as subcarinal LN and possible additional RLL nodule. Pending results, consider NA --> surgery vs CRT    HPI:     Keo Frias is a 73 y.o. female with pmh significant for HTN, migraine headaches, and Stage IIIA ([id number]) adenocarcinoma of the RLL (no targetable mutations, PD-L1 1%), initially dx'd 11/14/23, currently treatment naive, who presents to for follow up. Of note, pt also has a history of Stage IA ( Y5pN5S0, ER 95%, SD 10%, HER2 negative, Ki-67 20%) IDC of the R breast, initially dx'd 4/13/21, s/p lumpectomy and SLNB (5/12/21), XRT (7/6/21-7/27/21), and currently on letrozole (7/2021 - present).     Interval History:  - Second opinion at [facility], plan to start with neoadjuvant therapy and re-evaluate after 3 cycles. If pt is a candidate for surgery, will perform at that time. If not, will transition to CRT.   - 2/08/24: Bronchoscopy, Station 7 positive for malignancy    Pt presents today with her daughter. She states that she has been feeling well. She confirms the conversation she had with Dr. Don. She denies any new or bothersome symptoms at this time.     History has been obtained by chart review and discussion with the  patient.    Past Medical History:   Past Medical History:   Diagnosis Date    Anemia     Anxiety disorder 8/28/2019    Cataract     Chronic right-sided low back pain without sciatica 10/20/2021    Hypercholesteremia 9/17/2019    Invasive ductal carcinoma of breast, female, right 4/16/2021    Migraine with vision problems     Subclinical hypothyroidism     White coat syndrome with hypertension         Past Surgical History:   Past Surgical History:   Procedure Laterality Date    COLON SURGERY      COLONOSCOPY N/A 12/7/2018    Procedure: COLONOSCOPY;  Surgeon: Bhargav Gaston MD;  Location: [facility] ENDO (4TH FLR);  Service: Endoscopy;  Laterality: N/A;    DILATION AND CURETTAGE OF UTERUS      postmenopausal bleeding    ENDOBRONCHIAL ULTRASOUND N/A 11/14/2023    Procedure: ENDOBRONCHIAL ULTRASOUND (EBUS);  Surgeon: Kirt Gr MD;  Location: [facility] OR 2ND FLR;  Service: Pulmonary;  Laterality: N/A;    MASTECTOMY, PARTIAL Right 5/12/2021    Procedure: MASTECTOMY, PARTIAL-Right with radiological marker;  Surgeon: Vivian Cadena MD;  Location: [facility];  Service: General;  Laterality: Right;    ROBOTIC BRONCHOSCOPY N/A 11/14/2023    Procedure: ROBOTIC BRONCHOSCOPY;  Surgeon: Kirt Gr MD;  Location: [facility] OR 2ND FLR;  Service: Pulmonary;  Laterality: N/A;    SENTINEL LYMPH NODE BIOPSY Right 5/12/2021    Procedure: BIOPSY, LYMPH NODE, SENTINEL-Right;  Surgeon: Vivian Cadena MD;  Location: [facility];  Service: General;  Laterality: Right;    TUBAL LIGATION          Family History:   Family History   Problem Relation Age of Onset    Diabetes Brother     Hypertension Brother     Glaucoma Brother     Glaucoma Father     Cataracts Father     Retinal detachment Father     Lung cancer Mother     Uterine cancer Maternal Grandmother     Stroke Maternal Grandmother     Stroke Paternal Grandmother     Amblyopia Neg Hx     Blindness Neg Hx     Macular degeneration Neg Hx     Strabismus Neg Hx     Thyroid disease Neg Hx          Social History:   Social History     Tobacco Use    Smoking status: Never    Smokeless tobacco: Never   Substance Use Topics    Alcohol use: No        I have reviewed and updated the patient's past medical, surgical, family and social histories.      ROS:   As per HPI.     Allergies:   Review of patient's allergies indicates:   Allergen Reactions    Sinus allergy Other (See Comments)     Headaches, migranes    Buspar [buspirone] Other (See Comments)     Said cardiology told her to stop IF on Lexapro    Sulfa (sulfonamide antibiotics) Rash        Medications:   Current Outpatient Medications   Medication Sig Dispense Refill    atorvastatin (LIPITOR) 20 MG tablet Take 2 tablets (40 mg total) by mouth once daily. 180 tablet 3    benzonatate (TESSALON PERLES) 100 MG capsule Take 1 capsule (100 mg total) by mouth 3 (three) times daily as needed for Cough. 30 capsule 1    calcium carbonate (CALCIUM 500 ORAL) Take 1 tablet by mouth.      cholecalciferol, vitamin D3, 125 mcg (5,000 unit) capsule Take 1 capsule (5,000 Units total) by mouth daily with breakfast. 100 capsule 4    CIPRODEX 0.3-0.1 % DrpS PLACE 4 DROPS INTO THE RIGHT EAR 2 (TWO) TIMES DAILY. FOR 10 DAYS 7.5 mL 5    cloNIDine (CATAPRES) 0.1 MG tablet Take 1 tablet (0.1 mg total) by mouth daily as needed (for systolic greater than or equal to 160 mmHg). 90 tablet 3    EScitalopram oxalate (LEXAPRO) 10 MG tablet Take 1 tablet (10 mg total) by mouth once daily. (Patient taking differently: Take 10 mg by mouth once daily. Pt taking 5mg total 1 time daily) 90 tablet 4    famotidine (PEPCID) 10 MG tablet Take 10 mg by mouth as needed.      Lactobacillus rhamnosus GG (CULTURELLE) 10 billion cell capsule Take 1 capsule by mouth once daily. Pt takes Align probiotic      letrozole (FEMARA) 2.5 mg Tab TAKE 1 TABLET BY MOUTH EVERY DAY 90 tablet 3    losartan (COZAAR) 25 MG tablet Take 1 tablet (25 mg total) by mouth once daily. 90 tablet 4    metoprolol succinate  "(TOPROL-XL) 50 MG 24 hr tablet Take 1 tablet (50 mg total) by mouth once daily. 90 tablet 4    multivitamin-iron-folic acid Tab Take 1 tablet by mouth once daily.      omeprazole (PRILOSEC OTC) 20 MG tablet Take 20 mg by mouth once daily.      PROLIA 60 mg/mL Syrg        No current facility-administered medications for this visit.          Physical Exam:       BP (!) 154/80 (BP Location: Left arm, Patient Position: Sitting, BP Method: Medium (Automatic))   Pulse 75   Temp 97.5 °F (36.4 °C) (Oral)   Resp 14   Ht 5' 2" (1.575 m)   Wt 52.2 kg (115 lb 1.3 oz)   LMP  (LMP Unknown)   SpO2 98%   BMI 21.05 kg/m²                Physical Exam  Constitutional:       Appearance: Normal appearance.   HENT:      Head: Normocephalic and atraumatic.   Eyes:      Extraocular Movements: Extraocular movements intact.      Conjunctiva/sclera: Conjunctivae normal.      Pupils: Pupils are equal, round, and reactive to light.   Cardiovascular:      Rate and Rhythm: Normal rate and regular rhythm.      Heart sounds: No murmur heard.     No friction rub. No gallop.   Pulmonary:      Effort: Pulmonary effort is normal.      Breath sounds: No wheezing, rhonchi or rales.   Chest:      Comments: No palpable R axillary LAD  Musculoskeletal:         General: Normal range of motion.   Skin:     General: Skin is warm and dry.   Neurological:      Mental Status: She is alert and oriented to person, place, and time.   Psychiatric:         Mood and Affect: Mood normal.         Thought Content: Thought content normal.         Judgment: Judgment normal.           Labs:   No results found for this or any previous visit (from the past 48 hour(s)).     Imaging:         Path:  See oncologic history above.      Assessment and Plan:     Keo Frias is a 73 y.o. female with pmh significant for HTN, migraine headaches, and adenocarcinoma of the RLL of uncertain stage ([id number]) (no targetable mutations, PD-L1 1%), initially dx'd 11/14/23, currently " treatment naive, who presents to for follow up. Of note, pt also has a history of Stage IA ( S7sM3D7, ER 95%, NE 10%, HER2 negative, Ki-67 20%) IDC of the R breast, initially dx'd 4/13/21, s/p lumpectomy and SLNB (5/12/21), XRT (7/6/21-7/27/21), and currently on letrozole (7/2021 - present).     Adenocarcinoma of RLL, Uncertain Stage  ECOG PS 1.  Patient presents today for follow-up. Since last visit, the patient underwent bronchoscopy with EBUS at [facility] which demonstrated positive N2 disease. On discussion with Dr. Don, he recommends starting with NA treatment per [id number] followed by surgical evaluation after three cycles of treatment to determine surgical candidacy; if she is not a surgical candidate at that time, he favors proceeding instead with standard CRT followed by immunotherapy per the PACIFIC trial. Notably, the patient has not been evaluated by thoracic oncology (either at [facility] or at [facility]). We had a long discussion regarding both Dr. Don's recommendation as well as the option to proceed directly with CRT. At this juncture, I favor evaluation by thoracic surgery to determine if the patient is a surgical candidate; if she is not an up front candidate, I would favor proceeding with CRT though will make a joint decision with the patient to follow this plan vs the plan offered by Dr. Don. As the patient has had a prolonged workup, will work expedite first treatment. The NA regimen has been ordered for authorization and I will discuss the patient at  on 2/21/24. The pt and her daughter endorse understanding and agree with this plan. They were provided educational materials on the NA regimen, as well as on receipt of systemic cancer-directed therapy in general.    Most recent imaging was a PET CT on 1/05/24.     PLAN:   -- Discuss pt at  on 2/21/24  -- F/u pending discussion at , will contact patient after discussion  -- NA carboplatin/pemetrexed/nivolumab ordered      Right IDC, ER+/NE+/HER2-  S/p  lumpectomy with SLNB, radiation therapy, and currently on letrozole and denosumab, tolerating well. Concern for possible R axillary recurrence, workup as below.   -- Okay to continue letrozole  -- Okay to continue denosumab  -- Breast oncology team messaged as above      The above information has been reviewed with the patient and all questions have been answered to their apparent satisfaction.  They understand that they can call the clinic with any questions.    Bridger Nichole MD  Hematology/Oncology  [facility]      Med Onc Route Chart for Scheduling

## 2024-02-15 ENCOUNTER — OFFICE VISIT (OUTPATIENT)
Dept: HEMATOLOGY/ONCOLOGY | Facility: CLINIC | Age: 74
End: 2024-02-15
Payer: MEDICARE

## 2024-02-15 VITALS
SYSTOLIC BLOOD PRESSURE: 154 MMHG | HEART RATE: 75 BPM | HEIGHT: 62 IN | WEIGHT: 115.06 LBS | OXYGEN SATURATION: 98 % | TEMPERATURE: 98 F | RESPIRATION RATE: 14 BRPM | DIASTOLIC BLOOD PRESSURE: 80 MMHG | BODY MASS INDEX: 21.17 KG/M2

## 2024-02-15 DIAGNOSIS — C34.31 ADENOCARCINOMA OF LOWER LOBE OF RIGHT LUNG: Primary | ICD-10-CM

## 2024-02-15 PROCEDURE — 1101F PT FALLS ASSESS-DOCD LE1/YR: CPT | Mod: CPTII,S$GLB,, | Performed by: HOSPITALIST

## 2024-02-15 PROCEDURE — 1159F MED LIST DOCD IN RCRD: CPT | Mod: CPTII,S$GLB,, | Performed by: HOSPITALIST

## 2024-02-15 PROCEDURE — 99999 PR PBB SHADOW E&M-EST. PATIENT-LVL IV: CPT | Mod: PBBFAC,,, | Performed by: HOSPITALIST

## 2024-02-15 PROCEDURE — 3288F FALL RISK ASSESSMENT DOCD: CPT | Mod: CPTII,S$GLB,, | Performed by: HOSPITALIST

## 2024-02-15 PROCEDURE — 3077F SYST BP >= 140 MM HG: CPT | Mod: CPTII,S$GLB,, | Performed by: HOSPITALIST

## 2024-02-15 PROCEDURE — 4010F ACE/ARB THERAPY RXD/TAKEN: CPT | Mod: CPTII,S$GLB,, | Performed by: HOSPITALIST

## 2024-02-15 PROCEDURE — 3008F BODY MASS INDEX DOCD: CPT | Mod: CPTII,S$GLB,, | Performed by: HOSPITALIST

## 2024-02-15 PROCEDURE — 3079F DIAST BP 80-89 MM HG: CPT | Mod: CPTII,S$GLB,, | Performed by: HOSPITALIST

## 2024-02-15 PROCEDURE — 99215 OFFICE O/P EST HI 40 MIN: CPT | Mod: S$GLB,,, | Performed by: HOSPITALIST

## 2024-02-15 PROCEDURE — 1126F AMNT PAIN NOTED NONE PRSNT: CPT | Mod: CPTII,S$GLB,, | Performed by: HOSPITALIST

## 2024-02-15 RX ORDER — AMLODIPINE BESYLATE 2.5 MG/1
2.5 TABLET ORAL
Qty: 90 TABLET | Refills: 4 | OUTPATIENT
Start: 2024-02-15

## 2024-02-15 NOTE — TELEPHONE ENCOUNTER
Refill Decision Note   Keo Frias  is requesting a refill authorization.  Brief Assessment and Rationale for Refill:  Quick Discontinue     Medication Therapy Plan:         Comments:     Note composed:10:24 AM 02/15/2024

## 2024-02-15 NOTE — Clinical Note
Good afternoon,   Hoping to present this patient at  on 2/21 to determine CRT vs NA --> surgery. There has been a pretty prolonged workup (lengthened by second opinion), so not sure if it would just be faster to have her see surgery first (I think probably not? Just really want to get her going on treatment asap while she is still curative intent).   Thanks all,  Bo

## 2024-02-16 ENCOUNTER — TELEPHONE (OUTPATIENT)
Dept: HEMATOLOGY/ONCOLOGY | Facility: CLINIC | Age: 74
End: 2024-02-16
Payer: MEDICARE

## 2024-02-16 DIAGNOSIS — C50.411 MALIGNANT NEOPLASM OF UPPER-OUTER QUADRANT OF RIGHT BREAST IN FEMALE, ESTROGEN RECEPTOR POSITIVE: Primary | ICD-10-CM

## 2024-02-16 DIAGNOSIS — Z17.0 MALIGNANT NEOPLASM OF UPPER-OUTER QUADRANT OF RIGHT BREAST IN FEMALE, ESTROGEN RECEPTOR POSITIVE: Primary | ICD-10-CM

## 2024-02-16 NOTE — NURSING
Patient notified of the scheduled appointment with Dr. Malloy for 02/21/24.  Agreeable to date and time.

## 2024-02-19 ENCOUNTER — PATIENT MESSAGE (OUTPATIENT)
Dept: HEMATOLOGY/ONCOLOGY | Facility: CLINIC | Age: 74
End: 2024-02-19
Payer: MEDICARE

## 2024-02-21 ENCOUNTER — OFFICE VISIT (OUTPATIENT)
Dept: CARDIOTHORACIC SURGERY | Facility: CLINIC | Age: 74
End: 2024-02-21
Payer: MEDICARE

## 2024-02-21 ENCOUNTER — PATIENT MESSAGE (OUTPATIENT)
Dept: HEMATOLOGY/ONCOLOGY | Facility: CLINIC | Age: 74
End: 2024-02-21
Payer: MEDICARE

## 2024-02-21 ENCOUNTER — LAB VISIT (OUTPATIENT)
Dept: LAB | Facility: HOSPITAL | Age: 74
End: 2024-02-21
Attending: HOSPITALIST
Payer: MEDICARE

## 2024-02-21 ENCOUNTER — PATIENT MESSAGE (OUTPATIENT)
Dept: ADMINISTRATIVE | Facility: OTHER | Age: 74
End: 2024-02-21
Payer: MEDICARE

## 2024-02-21 VITALS
HEART RATE: 59 BPM | DIASTOLIC BLOOD PRESSURE: 75 MMHG | HEIGHT: 62 IN | OXYGEN SATURATION: 96 % | SYSTOLIC BLOOD PRESSURE: 119 MMHG | WEIGHT: 112 LBS | BODY MASS INDEX: 20.61 KG/M2

## 2024-02-21 DIAGNOSIS — Z17.0 MALIGNANT NEOPLASM OF UPPER-OUTER QUADRANT OF RIGHT BREAST IN FEMALE, ESTROGEN RECEPTOR POSITIVE: Primary | ICD-10-CM

## 2024-02-21 DIAGNOSIS — C79.9 METASTATIC NEOPLASM: ICD-10-CM

## 2024-02-21 DIAGNOSIS — C50.411 MALIGNANT NEOPLASM OF UPPER-OUTER QUADRANT OF RIGHT BREAST IN FEMALE, ESTROGEN RECEPTOR POSITIVE: ICD-10-CM

## 2024-02-21 DIAGNOSIS — R59.0 MEDIASTINAL LYMPHADENOPATHY: ICD-10-CM

## 2024-02-21 DIAGNOSIS — C50.411 MALIGNANT NEOPLASM OF UPPER-OUTER QUADRANT OF RIGHT BREAST IN FEMALE, ESTROGEN RECEPTOR POSITIVE: Primary | ICD-10-CM

## 2024-02-21 DIAGNOSIS — C34.31 ADENOCARCINOMA OF LOWER LOBE OF RIGHT LUNG: ICD-10-CM

## 2024-02-21 DIAGNOSIS — C34.31 MALIGNANT NEOPLASM OF LOWER LOBE OF RIGHT LUNG: Primary | ICD-10-CM

## 2024-02-21 DIAGNOSIS — Z17.0 MALIGNANT NEOPLASM OF UPPER-OUTER QUADRANT OF RIGHT BREAST IN FEMALE, ESTROGEN RECEPTOR POSITIVE: ICD-10-CM

## 2024-02-21 LAB
ALBUMIN SERPL BCP-MCNC: 4.1 G/DL (ref 3.5–5.2)
ALP SERPL-CCNC: 65 U/L (ref 55–135)
ALT SERPL W/O P-5'-P-CCNC: 18 U/L (ref 10–44)
ANION GAP SERPL CALC-SCNC: 7 MMOL/L (ref 8–16)
AST SERPL-CCNC: 23 U/L (ref 10–40)
BILIRUB SERPL-MCNC: 0.5 MG/DL (ref 0.1–1)
BUN SERPL-MCNC: 10 MG/DL (ref 8–23)
CALCIUM SERPL-MCNC: 10.3 MG/DL (ref 8.7–10.5)
CHLORIDE SERPL-SCNC: 100 MMOL/L (ref 95–110)
CO2 SERPL-SCNC: 28 MMOL/L (ref 23–29)
CREAT SERPL-MCNC: 0.7 MG/DL (ref 0.5–1.4)
ERYTHROCYTE [DISTWIDTH] IN BLOOD BY AUTOMATED COUNT: 12.7 % (ref 11.5–14.5)
EST. GFR  (NO RACE VARIABLE): >60 ML/MIN/1.73 M^2
GLUCOSE SERPL-MCNC: 85 MG/DL (ref 70–110)
HCT VFR BLD AUTO: 44.3 % (ref 37–48.5)
HGB BLD-MCNC: 14.5 G/DL (ref 12–16)
IMM GRANULOCYTES # BLD AUTO: 0.01 K/UL (ref 0–0.04)
MAGNESIUM SERPL-MCNC: 2.4 MG/DL (ref 1.6–2.6)
MCH RBC QN AUTO: 28.2 PG (ref 27–31)
MCHC RBC AUTO-ENTMCNC: 32.7 G/DL (ref 32–36)
MCV RBC AUTO: 86 FL (ref 82–98)
NEUTROPHILS # BLD AUTO: 2.9 K/UL (ref 1.8–7.7)
PLATELET # BLD AUTO: 192 K/UL (ref 150–450)
PMV BLD AUTO: 11.7 FL (ref 9.2–12.9)
POTASSIUM SERPL-SCNC: 4.5 MMOL/L (ref 3.5–5.1)
PROT SERPL-MCNC: 7.9 G/DL (ref 6–8.4)
RBC # BLD AUTO: 5.14 M/UL (ref 4–5.4)
SODIUM SERPL-SCNC: 135 MMOL/L (ref 136–145)
T4 FREE SERPL-MCNC: 1.1 NG/DL (ref 0.71–1.51)
TSH SERPL DL<=0.005 MIU/L-ACNC: 3.78 UIU/ML (ref 0.4–4)
WBC # BLD AUTO: 6.45 K/UL (ref 3.9–12.7)

## 2024-02-21 PROCEDURE — 3288F FALL RISK ASSESSMENT DOCD: CPT | Mod: CPTII,S$GLB,, | Performed by: THORACIC SURGERY (CARDIOTHORACIC VASCULAR SURGERY)

## 2024-02-21 PROCEDURE — 3074F SYST BP LT 130 MM HG: CPT | Mod: CPTII,S$GLB,, | Performed by: THORACIC SURGERY (CARDIOTHORACIC VASCULAR SURGERY)

## 2024-02-21 PROCEDURE — 1101F PT FALLS ASSESS-DOCD LE1/YR: CPT | Mod: CPTII,S$GLB,, | Performed by: THORACIC SURGERY (CARDIOTHORACIC VASCULAR SURGERY)

## 2024-02-21 PROCEDURE — 99999 PR PBB SHADOW E&M-EST. PATIENT-LVL III: CPT | Mod: PBBFAC,,, | Performed by: THORACIC SURGERY (CARDIOTHORACIC VASCULAR SURGERY)

## 2024-02-21 PROCEDURE — 84439 ASSAY OF FREE THYROXINE: CPT | Performed by: HOSPITALIST

## 2024-02-21 PROCEDURE — 85027 COMPLETE CBC AUTOMATED: CPT | Performed by: HOSPITALIST

## 2024-02-21 PROCEDURE — 4010F ACE/ARB THERAPY RXD/TAKEN: CPT | Mod: CPTII,S$GLB,, | Performed by: THORACIC SURGERY (CARDIOTHORACIC VASCULAR SURGERY)

## 2024-02-21 PROCEDURE — 3008F BODY MASS INDEX DOCD: CPT | Mod: CPTII,S$GLB,, | Performed by: THORACIC SURGERY (CARDIOTHORACIC VASCULAR SURGERY)

## 2024-02-21 PROCEDURE — 99205 OFFICE O/P NEW HI 60 MIN: CPT | Mod: S$GLB,,, | Performed by: THORACIC SURGERY (CARDIOTHORACIC VASCULAR SURGERY)

## 2024-02-21 PROCEDURE — 1126F AMNT PAIN NOTED NONE PRSNT: CPT | Mod: CPTII,S$GLB,, | Performed by: THORACIC SURGERY (CARDIOTHORACIC VASCULAR SURGERY)

## 2024-02-21 PROCEDURE — 3078F DIAST BP <80 MM HG: CPT | Mod: CPTII,S$GLB,, | Performed by: THORACIC SURGERY (CARDIOTHORACIC VASCULAR SURGERY)

## 2024-02-21 PROCEDURE — 36415 COLL VENOUS BLD VENIPUNCTURE: CPT | Performed by: HOSPITALIST

## 2024-02-21 PROCEDURE — 1159F MED LIST DOCD IN RCRD: CPT | Mod: CPTII,S$GLB,, | Performed by: THORACIC SURGERY (CARDIOTHORACIC VASCULAR SURGERY)

## 2024-02-21 PROCEDURE — 80053 COMPREHEN METABOLIC PANEL: CPT | Performed by: HOSPITALIST

## 2024-02-21 PROCEDURE — 83735 ASSAY OF MAGNESIUM: CPT | Performed by: HOSPITALIST

## 2024-02-21 PROCEDURE — 84443 ASSAY THYROID STIM HORMONE: CPT | Performed by: HOSPITALIST

## 2024-02-21 RX ORDER — OLANZAPINE 5 MG/1
5 TABLET ORAL NIGHTLY
Qty: 30 TABLET | Refills: 1 | Status: SHIPPED | OUTPATIENT
Start: 2024-02-21

## 2024-02-21 RX ORDER — DIPHENHYDRAMINE HYDROCHLORIDE 50 MG/ML
50 INJECTION INTRAMUSCULAR; INTRAVENOUS ONCE AS NEEDED
Status: CANCELLED | OUTPATIENT
Start: 2024-02-22

## 2024-02-21 RX ORDER — HEPARIN 100 UNIT/ML
500 SYRINGE INTRAVENOUS
Status: CANCELLED | OUTPATIENT
Start: 2024-02-22

## 2024-02-21 RX ORDER — FOLIC ACID 1 MG/1
1 TABLET ORAL DAILY
Qty: 90 TABLET | Refills: 0 | Status: SHIPPED | OUTPATIENT
Start: 2024-02-21

## 2024-02-21 RX ORDER — PROCHLORPERAZINE EDISYLATE 5 MG/ML
5 INJECTION INTRAMUSCULAR; INTRAVENOUS ONCE AS NEEDED
Status: CANCELLED
Start: 2024-02-22

## 2024-02-21 RX ORDER — SODIUM CHLORIDE 0.9 % (FLUSH) 0.9 %
10 SYRINGE (ML) INJECTION
Status: CANCELLED | OUTPATIENT
Start: 2024-02-22

## 2024-02-21 RX ORDER — DEXAMETHASONE 4 MG/1
8 TABLET ORAL SEE ADMIN INSTRUCTIONS
Qty: 24 TABLET | Refills: 0 | Status: SHIPPED | OUTPATIENT
Start: 2024-02-21

## 2024-02-21 RX ORDER — PROCHLORPERAZINE MALEATE 10 MG
10 TABLET ORAL EVERY 6 HOURS PRN
Qty: 30 TABLET | Refills: 1 | Status: SHIPPED | OUTPATIENT
Start: 2024-02-21

## 2024-02-21 RX ORDER — CYANOCOBALAMIN 1000 UG/ML
1000 INJECTION, SOLUTION INTRAMUSCULAR; SUBCUTANEOUS
Status: CANCELLED | OUTPATIENT
Start: 2024-02-22

## 2024-02-21 RX ORDER — EPINEPHRINE 0.3 MG/.3ML
0.3 INJECTION SUBCUTANEOUS ONCE AS NEEDED
Status: CANCELLED | OUTPATIENT
Start: 2024-02-22

## 2024-02-21 NOTE — PROGRESS NOTES
Pt presented after her visit with thoracic oncology. We had a discussion regarding the recommendations at [facility] and at [facility]. She was also discussed at the thoracic tumor board today. The ultimate plan is to proceed with neoadjuvant therapy followed by re-evaluate at [facility] following completion of the three cycles.     As the patient had presented today, we reviewed the medications (she was previously given hand outs on these), including the logistics, general mechanisms of actions, and potential side effects, up to and including death. The patient and her daughter asked many questions which were answered to apparent satisfaction. She signed consent for her treatment today (2/21/24). We also discussed [facility]  and the patient enrolled after the visit. Baseline labs were obtained today and are acceptable. The first cycle of carboplatin/pemetrexed/nivolumab is now scheduled for 2/22/24.     PLAN:   -- Proceed w/ C1D1 carboplatin/pemetrexed/nivolumab on 2/22/24, labs reviewed and treatment signed  -- [facility]  enrollment complete  -- Pre-meds discussed today and released to her preferred pharmacy  -- RTC and labs on 3/13/24, w/ C2D1 on 3/14/24

## 2024-02-21 NOTE — PROGRESS NOTES
History & Physical    SUBJECTIVE:     History of Present Illness:  Patient is a 73 y.o. 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 Original PET also with axillary avidity and 1.2 cm subcarinal LN with SUV 4.6. Robotic bronchoscopy positive for adenocarcinoma and Level 7 concern for metastasis however not biopsied 2/2 surrounding vessels. Brain MRI negative. Underwent IR biopsy of axillary LN which was negative. Almost 2 month delay thus repeated PET which showed persistently avid axillary LN and subcarinal adenopathy. Repeat axillary LN biopsy negative. Went to [facility] to meet with thoracic oncology. Repeat EBUS with level 7 positive for malignancy. Planned to start NA therapy followed by consideration for surgery although surgery visit cancelled at [facility]. If restaging does not support surgery their plan is to convert to chemoRT. Updated outside chest CT with contrast, unable to view those images today however report reads stable. Walks 3 miles at a time without difficulty. No blood thinners.     Never smoker.   PFTS: FEV1  1.14L 81%, 11.98  82%     Chief Complaint   Patient presents with    Consult       Review of patient's allergies indicates:   Allergen Reactions    Sinus allergy Other (See Comments)     Headaches, migranes    Buspar [buspirone] Other (See Comments)     Said cardiology told her to stop IF on Lexapro    Sulfa (sulfonamide antibiotics) Rash       Current Outpatient Medications   Medication Sig Dispense Refill    atorvastatin (LIPITOR) 20 MG tablet Take 2 tablets (40 mg total) by mouth once daily. 180 tablet 3    benzonatate (TESSALON PERLES) 100 MG capsule Take 1 capsule (100 mg total) by mouth 3 (three) times daily as needed for Cough. 30 capsule 1    calcium carbonate (CALCIUM 500 ORAL) Take 1 tablet by mouth.      cholecalciferol, vitamin D3, 125 mcg (5,000 unit) capsule Take 1 capsule (5,000 Units  total) by mouth daily with breakfast. 100 capsule 4    CIPRODEX 0.3-0.1 % DrpS PLACE 4 DROPS INTO THE RIGHT EAR 2 (TWO) TIMES DAILY. FOR 10 DAYS 7.5 mL 5    cloNIDine (CATAPRES) 0.1 MG tablet Take 1 tablet (0.1 mg total) by mouth daily as needed (for systolic greater than or equal to 160 mmHg). 90 tablet 3    EScitalopram oxalate (LEXAPRO) 10 MG tablet Take 1 tablet (10 mg total) by mouth once daily. (Patient taking differently: Take 10 mg by mouth once daily. Pt taking 5mg total 1 time daily) 90 tablet 4    famotidine (PEPCID) 10 MG tablet Take 10 mg by mouth as needed.      Lactobacillus rhamnosus GG (CULTURELLE) 10 billion cell capsule Take 1 capsule by mouth once daily. Pt takes Align probiotic      letrozole (FEMARA) 2.5 mg Tab TAKE 1 TABLET BY MOUTH EVERY DAY 90 tablet 3    losartan (COZAAR) 25 MG tablet Take 1 tablet (25 mg total) by mouth once daily. 90 tablet 4    metoprolol succinate (TOPROL-XL) 50 MG 24 hr tablet Take 1 tablet (50 mg total) by mouth once daily. 90 tablet 4    multivitamin-iron-folic acid Tab Take 1 tablet by mouth once daily.      omeprazole (PRILOSEC OTC) 20 MG tablet Take 20 mg by mouth once daily.      PROLIA 60 mg/mL Syrg        No current facility-administered medications for this visit.       Past Medical History:   Diagnosis Date    Anemia     Anxiety disorder 8/28/2019    Cataract     Chronic right-sided low back pain without sciatica 10/20/2021    Hypercholesteremia 9/17/2019    Invasive ductal carcinoma of breast, female, right 4/16/2021    Migraine with vision problems     Subclinical hypothyroidism     White coat syndrome with hypertension      Past Surgical History:   Procedure Laterality Date    COLON SURGERY      COLONOSCOPY N/A 12/7/2018    Procedure: COLONOSCOPY;  Surgeon: Bhargav Gaston MD;  Location: [facility] ([address] [place] [address]);  Service: Endoscopy;  Laterality: N/A;    DILATION AND CURETTAGE OF UTERUS      postmenopausal bleeding    ENDOBRONCHIAL ULTRASOUND N/A 11/14/2023     Procedure: ENDOBRONCHIAL ULTRASOUND (EBUS);  Surgeon: Kirt Gr MD;  Location: [facility] OR 2ND FLR;  Service: Pulmonary;  Laterality: N/A;    MASTECTOMY, PARTIAL Right 5/12/2021    Procedure: MASTECTOMY, PARTIAL-Right with radiological marker;  Surgeon: Vivian Cadena MD;  Location: [facility] OR;  Service: General;  Laterality: Right;    ROBOTIC BRONCHOSCOPY N/A 11/14/2023    Procedure: ROBOTIC BRONCHOSCOPY;  Surgeon: Kirt Gr MD;  Location: [facility] OR 2ND FLR;  Service: Pulmonary;  Laterality: N/A;    SENTINEL LYMPH NODE BIOPSY Right 5/12/2021    Procedure: BIOPSY, LYMPH NODE, SENTINEL-Right;  Surgeon: Vivian Cadena MD;  Location: [facility] OR;  Service: General;  Laterality: Right;    TUBAL LIGATION       Family History   Problem Relation Age of Onset    Diabetes Brother     Hypertension Brother     Glaucoma Brother     Glaucoma Father     Cataracts Father     Retinal detachment Father     Lung cancer Mother     Uterine cancer Maternal Grandmother     Stroke Maternal Grandmother     Stroke Paternal Grandmother     Amblyopia Neg Hx     Blindness Neg Hx     Macular degeneration Neg Hx     Strabismus Neg Hx     Thyroid disease Neg Hx      Social History     Tobacco Use    Smoking status: Never    Smokeless tobacco: Never   Substance Use Topics    Alcohol use: No    Drug use: Never        Review of Systems:  Review of Systems   Constitutional:  Negative for activity change and appetite change.   Respiratory:  Negative for cough and shortness of breath.    Gastrointestinal:  Negative for abdominal pain.   Genitourinary:  Negative for difficulty urinating.   Musculoskeletal:  Negative for arthralgias.   Skin:  Negative for color change and rash.   Neurological:  Negative for dizziness.   Hematological:  Negative for adenopathy.   Psychiatric/Behavioral:  Negative for agitation. The patient is not nervous/anxious.        OBJECTIVE:     Vital Signs (Most Recent)  Pulse: (!) 59 (02/21/24 1008)  BP: 119/75 (02/21/24  "1008)  SpO2: 96 % (02/21/24 1008)  5' 2" (1.575 m)  50.8 kg (111 lb 15.9 oz)     Physical Exam:  Physical Exam  Constitutional:       Appearance: Normal appearance.   HENT:      Head: Atraumatic.   Eyes:      Extraocular Movements: Extraocular movements intact.   Cardiovascular:      Rate and Rhythm: Normal rate and regular rhythm.      Pulses: Normal pulses.      Heart sounds: Normal heart sounds.   Pulmonary:      Effort: Pulmonary effort is normal.      Breath sounds: Normal breath sounds.   Musculoskeletal:      Cervical back: Normal range of motion.   Skin:     General: Skin is warm and dry.   Neurological:      General: No focal deficit present.      Mental Status: She is alert and oriented to person, place, and time.   Psychiatric:         Mood and Affect: Mood normal.         Behavior: Behavior normal.         PET 1/5/24:   Similar appearance of hypermetabolic 2.0 cm right lower lobe nodule, compatible with known malignancy.  Similar appearance of hypermetabolic right axillary, left posterior shoulder, and subcarinal nodes, concerning for metastases.  Stable non hypermetabolic pulmonary nodules.  No new hypermetabolic lesion.    Chest CT :   1. The right lower lobe primary tumor is not significantly changed compared to the previous study.   2. Unchanged subcarinal lymph node/aura tissue, concerning for aura metastatic disease.  3. Right lower lobe nodules measuring up to 11 mm are stable from 10/05/2023 and are of uncertain etiology with the differential including metastases and postinfectious nodules.  4. Stable left lower lobe part solid lesion suspicious for synchronous primary tumor.   5. Right axillary node is stable. Reportedly, prior biopsies x2 of this node have been negative for metastatic disease. Indeterminate small left parascapular region node/nodule.     ASSESSMENT/PLAN:     Patient is a 73 y.o. 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   Clinical stage III A disease with bulky lower level 7 adenopathy  Preserved cardiopulmonary function  Extended time interval between cancer diagnosis and institution of treatment    PLAN:Plan     I discussed robotic right lower lobectomy with mediastinal lymph node dissection following neoadjuvant chemotherapy alone versus neoadjuvant chemo immunotherapy.  I expressed some concerns about the bulky nature of the subcarinal aura disease, and the potential that the aura basin may be incompletely removed at the time of surgery.  This may result in an R1 or R2 resection.  I am also concerned about the extent of disease given the time lag between initial diagnosis and today's visit.  I do not have access to the most recent imaging which was done at [facility].    Another surgical option is robotic anatomic right lower lobe superior segmentectomy with mediastinal lymph node dissection.  This is less advisable considering that the patient has N2 disease.  Anatomic sublobar resection poses the risk of retention of positive in transit subsegmental, segmental and lobar lymph nodes.  If surgery is performed, I prefer anatomic right lower lobectomy with mediastinal lymph node dissection.    A 3rd option is definitive chemo RT given the clinically bulky subcarinal aura disease.      The patient and her daughter are seeking a 2nd opinion at [facility].  We will discuss this case in our multidisciplinary tumor board.  We will await her decision following her visit to [facility].  I stressed the importance of timely definitive treatment to the patient and her daughter considering the fact that a diagnosis was established 4 months ago.  There is a significant risk of cancer upstaging with any further delays.

## 2024-02-21 NOTE — Clinical Note
Pt starting therapy tomorrow. Can we arrange RTC and labs with me on 3/13/24, and C2D1 on 3/14/24? Thanks!

## 2024-02-22 ENCOUNTER — INFUSION (OUTPATIENT)
Dept: INFUSION THERAPY | Facility: HOSPITAL | Age: 74
End: 2024-02-22
Attending: FAMILY MEDICINE
Payer: MEDICARE

## 2024-02-22 VITALS
SYSTOLIC BLOOD PRESSURE: 119 MMHG | HEART RATE: 63 BPM | RESPIRATION RATE: 17 BRPM | WEIGHT: 113.31 LBS | TEMPERATURE: 98 F | HEIGHT: 62 IN | OXYGEN SATURATION: 100 % | BODY MASS INDEX: 20.85 KG/M2 | DIASTOLIC BLOOD PRESSURE: 58 MMHG

## 2024-02-22 DIAGNOSIS — C34.31 ADENOCARCINOMA OF LOWER LOBE OF RIGHT LUNG: Primary | ICD-10-CM

## 2024-02-22 PROCEDURE — 96372 THER/PROPH/DIAG INJ SC/IM: CPT | Mod: 59

## 2024-02-22 PROCEDURE — 25000003 PHARM REV CODE 250: Performed by: HOSPITALIST

## 2024-02-22 PROCEDURE — 96375 TX/PRO/DX INJ NEW DRUG ADDON: CPT

## 2024-02-22 PROCEDURE — 96413 CHEMO IV INFUSION 1 HR: CPT

## 2024-02-22 PROCEDURE — 63600175 PHARM REV CODE 636 W HCPCS: Mod: JZ,JG | Performed by: HOSPITALIST

## 2024-02-22 PROCEDURE — A4216 STERILE WATER/SALINE, 10 ML: HCPCS | Performed by: HOSPITALIST

## 2024-02-22 PROCEDURE — 96417 CHEMO IV INFUS EACH ADDL SEQ: CPT

## 2024-02-22 PROCEDURE — 96367 TX/PROPH/DG ADDL SEQ IV INF: CPT

## 2024-02-22 PROCEDURE — 96411 CHEMO IV PUSH ADDL DRUG: CPT

## 2024-02-22 RX ORDER — PROCHLORPERAZINE EDISYLATE 5 MG/ML
5 INJECTION INTRAMUSCULAR; INTRAVENOUS ONCE AS NEEDED
Status: DISCONTINUED | OUTPATIENT
Start: 2024-02-22 | End: 2024-02-22 | Stop reason: HOSPADM

## 2024-02-22 RX ORDER — HEPARIN 100 UNIT/ML
500 SYRINGE INTRAVENOUS
Status: DISCONTINUED | OUTPATIENT
Start: 2024-02-22 | End: 2024-02-22 | Stop reason: HOSPADM

## 2024-02-22 RX ORDER — SODIUM CHLORIDE 0.9 % (FLUSH) 0.9 %
10 SYRINGE (ML) INJECTION
Status: DISCONTINUED | OUTPATIENT
Start: 2024-02-22 | End: 2024-02-22 | Stop reason: HOSPADM

## 2024-02-22 RX ORDER — DIPHENHYDRAMINE HYDROCHLORIDE 50 MG/ML
50 INJECTION INTRAMUSCULAR; INTRAVENOUS ONCE AS NEEDED
Status: DISCONTINUED | OUTPATIENT
Start: 2024-02-22 | End: 2024-02-22 | Stop reason: HOSPADM

## 2024-02-22 RX ORDER — EPINEPHRINE 0.3 MG/.3ML
0.3 INJECTION SUBCUTANEOUS ONCE AS NEEDED
Status: DISCONTINUED | OUTPATIENT
Start: 2024-02-22 | End: 2024-02-22 | Stop reason: HOSPADM

## 2024-02-22 RX ORDER — CYANOCOBALAMIN 1000 UG/ML
1000 INJECTION, SOLUTION INTRAMUSCULAR; SUBCUTANEOUS
Status: COMPLETED | OUTPATIENT
Start: 2024-02-22 | End: 2024-02-22

## 2024-02-22 RX ADMIN — CARBOPLATIN 415 MG: 10 INJECTION INTRAVENOUS at 12:02

## 2024-02-22 RX ADMIN — APREPITANT 130 MG: 130 INJECTION, EMULSION INTRAVENOUS at 11:02

## 2024-02-22 RX ADMIN — SODIUM CHLORIDE 360 MG: 9 INJECTION, SOLUTION INTRAVENOUS at 11:02

## 2024-02-22 RX ADMIN — SODIUM CHLORIDE 750 MG: 9 INJECTION, SOLUTION INTRAVENOUS at 12:02

## 2024-02-22 RX ADMIN — Medication 10 ML: at 01:02

## 2024-02-22 RX ADMIN — CYANOCOBALAMIN 1000 MCG: 1000 INJECTION, SOLUTION INTRAMUSCULAR at 11:02

## 2024-02-22 RX ADMIN — DEXAMETHASONE SODIUM PHOSPHATE 0.25 MG: 10 INJECTION, SOLUTION INTRAMUSCULAR; INTRAVENOUS at 11:02

## 2024-02-22 NOTE — PLAN OF CARE
Pt tolerated Cycle 1 Opdivo + Alimta + Carbo infusion well. VSS, No adverse reaction noted. Chemo and immunotherapy education provided and reviewed with pt and daughter prior to start of treatments. Handouts given, reviewed s/s to monitor for and when to report to MD or ER. Pt and daughter verbalized understanding. PIV flushed with NS, good blood return noted and D/C per protocol. AVS and treatment calendar printed and reviewed. Patient left clinic in no acute distress.   
- - -

## 2024-02-27 ENCOUNTER — PATIENT MESSAGE (OUTPATIENT)
Dept: HEMATOLOGY/ONCOLOGY | Facility: CLINIC | Age: 74
End: 2024-02-27
Payer: MEDICARE

## 2024-02-27 DIAGNOSIS — K12.30 MUCOSITIS: Primary | ICD-10-CM

## 2024-02-28 NOTE — TELEPHONE ENCOUNTER
Good morning Ms. Frias,     This is likely due to the chemotherapy, given the time that has elapsed since your first treatment. I will send you a prescription for something called [employer]'s Solution. You can rinse your mouth with this and spit it back out. It should help, and if you're having difficulty eating because of the pain, you can do this 30 minutes or so before you eat food. Just be aware that your mouth will be numbed so you need to be mindful of not biting your tongue when chewing.     Sometimes, pharmacies don't compound this medicine. If your preferred pharmacy doesn't have this (I have sent the order), I can have it filled at [facility].     As always, please reach out with any and all questions.     Sincerely,  KUSH Nichole MD

## 2024-03-12 ENCOUNTER — PATIENT MESSAGE (OUTPATIENT)
Dept: HEMATOLOGY/ONCOLOGY | Facility: CLINIC | Age: 74
End: 2024-03-12
Payer: MEDICARE

## 2024-03-12 ENCOUNTER — LAB VISIT (OUTPATIENT)
Dept: LAB | Facility: HOSPITAL | Age: 74
End: 2024-03-12
Attending: HOSPITALIST
Payer: MEDICARE

## 2024-03-12 DIAGNOSIS — C50.411 MALIGNANT NEOPLASM OF UPPER-OUTER QUADRANT OF RIGHT BREAST IN FEMALE, ESTROGEN RECEPTOR POSITIVE: ICD-10-CM

## 2024-03-12 DIAGNOSIS — Z17.0 MALIGNANT NEOPLASM OF UPPER-OUTER QUADRANT OF RIGHT BREAST IN FEMALE, ESTROGEN RECEPTOR POSITIVE: ICD-10-CM

## 2024-03-12 DIAGNOSIS — C79.9 METASTATIC NEOPLASM: ICD-10-CM

## 2024-03-12 LAB
ALBUMIN SERPL BCP-MCNC: 3.4 G/DL (ref 3.5–5.2)
ALP SERPL-CCNC: 54 U/L (ref 55–135)
ALT SERPL W/O P-5'-P-CCNC: 26 U/L (ref 10–44)
ANION GAP SERPL CALC-SCNC: 8 MMOL/L (ref 8–16)
AST SERPL-CCNC: 27 U/L (ref 10–40)
BILIRUB SERPL-MCNC: 0.2 MG/DL (ref 0.1–1)
BUN SERPL-MCNC: 10 MG/DL (ref 8–23)
CALCIUM SERPL-MCNC: 8.7 MG/DL (ref 8.7–10.5)
CHLORIDE SERPL-SCNC: 103 MMOL/L (ref 95–110)
CO2 SERPL-SCNC: 25 MMOL/L (ref 23–29)
CREAT SERPL-MCNC: 0.6 MG/DL (ref 0.5–1.4)
ERYTHROCYTE [DISTWIDTH] IN BLOOD BY AUTOMATED COUNT: 13.4 % (ref 11.5–14.5)
EST. GFR  (NO RACE VARIABLE): >60 ML/MIN/1.73 M^2
GLUCOSE SERPL-MCNC: 107 MG/DL (ref 70–110)
HCT VFR BLD AUTO: 36.5 % (ref 37–48.5)
HGB BLD-MCNC: 12.1 G/DL (ref 12–16)
IMM GRANULOCYTES # BLD AUTO: 0.01 K/UL (ref 0–0.04)
MAGNESIUM SERPL-MCNC: 1.8 MG/DL (ref 1.6–2.6)
MCH RBC QN AUTO: 28.9 PG (ref 27–31)
MCHC RBC AUTO-ENTMCNC: 33.2 G/DL (ref 32–36)
MCV RBC AUTO: 87 FL (ref 82–98)
NEUTROPHILS # BLD AUTO: 1.7 K/UL (ref 1.8–7.7)
PLATELET # BLD AUTO: 202 K/UL (ref 150–450)
PMV BLD AUTO: 11.7 FL (ref 9.2–12.9)
POTASSIUM SERPL-SCNC: 4 MMOL/L (ref 3.5–5.1)
PROT SERPL-MCNC: 6.6 G/DL (ref 6–8.4)
RBC # BLD AUTO: 4.18 M/UL (ref 4–5.4)
SODIUM SERPL-SCNC: 136 MMOL/L (ref 136–145)
T4 FREE SERPL-MCNC: 1.45 NG/DL (ref 0.71–1.51)
TSH SERPL DL<=0.005 MIU/L-ACNC: 0.8 UIU/ML (ref 0.4–4)
WBC # BLD AUTO: 3.8 K/UL (ref 3.9–12.7)

## 2024-03-12 PROCEDURE — 84439 ASSAY OF FREE THYROXINE: CPT | Performed by: HOSPITALIST

## 2024-03-12 PROCEDURE — 85027 COMPLETE CBC AUTOMATED: CPT | Performed by: HOSPITALIST

## 2024-03-12 PROCEDURE — 36415 COLL VENOUS BLD VENIPUNCTURE: CPT | Performed by: HOSPITALIST

## 2024-03-12 PROCEDURE — 84443 ASSAY THYROID STIM HORMONE: CPT | Performed by: HOSPITALIST

## 2024-03-12 PROCEDURE — 80053 COMPREHEN METABOLIC PANEL: CPT | Performed by: HOSPITALIST

## 2024-03-12 PROCEDURE — 83735 ASSAY OF MAGNESIUM: CPT | Performed by: HOSPITALIST

## 2024-03-13 ENCOUNTER — OFFICE VISIT (OUTPATIENT)
Dept: HEMATOLOGY/ONCOLOGY | Facility: CLINIC | Age: 74
End: 2024-03-13
Payer: MEDICARE

## 2024-03-13 VITALS
DIASTOLIC BLOOD PRESSURE: 71 MMHG | WEIGHT: 114.44 LBS | TEMPERATURE: 98 F | OXYGEN SATURATION: 98 % | HEART RATE: 71 BPM | HEIGHT: 62 IN | SYSTOLIC BLOOD PRESSURE: 152 MMHG | RESPIRATION RATE: 16 BRPM | BODY MASS INDEX: 21.06 KG/M2

## 2024-03-13 DIAGNOSIS — C34.31 ADENOCARCINOMA OF LOWER LOBE OF RIGHT LUNG: Primary | ICD-10-CM

## 2024-03-13 DIAGNOSIS — K12.30 MUCOSITIS: ICD-10-CM

## 2024-03-13 PROCEDURE — 1126F AMNT PAIN NOTED NONE PRSNT: CPT | Mod: CPTII,S$GLB,, | Performed by: HOSPITALIST

## 2024-03-13 PROCEDURE — 99215 OFFICE O/P EST HI 40 MIN: CPT | Mod: S$GLB,,, | Performed by: HOSPITALIST

## 2024-03-13 PROCEDURE — 1101F PT FALLS ASSESS-DOCD LE1/YR: CPT | Mod: CPTII,S$GLB,, | Performed by: HOSPITALIST

## 2024-03-13 PROCEDURE — 4010F ACE/ARB THERAPY RXD/TAKEN: CPT | Mod: CPTII,S$GLB,, | Performed by: HOSPITALIST

## 2024-03-13 PROCEDURE — 3288F FALL RISK ASSESSMENT DOCD: CPT | Mod: CPTII,S$GLB,, | Performed by: HOSPITALIST

## 2024-03-13 PROCEDURE — 3078F DIAST BP <80 MM HG: CPT | Mod: CPTII,S$GLB,, | Performed by: HOSPITALIST

## 2024-03-13 PROCEDURE — 99999 PR PBB SHADOW E&M-EST. PATIENT-LVL III: CPT | Mod: PBBFAC,,, | Performed by: HOSPITALIST

## 2024-03-13 PROCEDURE — 3008F BODY MASS INDEX DOCD: CPT | Mod: CPTII,S$GLB,, | Performed by: HOSPITALIST

## 2024-03-13 PROCEDURE — 3077F SYST BP >= 140 MM HG: CPT | Mod: CPTII,S$GLB,, | Performed by: HOSPITALIST

## 2024-03-13 RX ORDER — SODIUM CHLORIDE 0.9 % (FLUSH) 0.9 %
10 SYRINGE (ML) INJECTION
Status: CANCELLED | OUTPATIENT
Start: 2024-03-14

## 2024-03-13 RX ORDER — EPINEPHRINE 0.3 MG/.3ML
0.3 INJECTION SUBCUTANEOUS ONCE AS NEEDED
Status: CANCELLED | OUTPATIENT
Start: 2024-03-14

## 2024-03-13 RX ORDER — DIPHENHYDRAMINE HYDROCHLORIDE 50 MG/ML
50 INJECTION INTRAMUSCULAR; INTRAVENOUS ONCE AS NEEDED
Status: CANCELLED | OUTPATIENT
Start: 2024-03-14

## 2024-03-13 RX ORDER — PROCHLORPERAZINE EDISYLATE 5 MG/ML
5 INJECTION INTRAMUSCULAR; INTRAVENOUS ONCE AS NEEDED
Status: CANCELLED
Start: 2024-03-14

## 2024-03-13 RX ORDER — HEPARIN 100 UNIT/ML
500 SYRINGE INTRAVENOUS
Status: CANCELLED | OUTPATIENT
Start: 2024-03-14

## 2024-03-13 NOTE — PROGRESS NOTES
[facility] MEDICAL ONCOLOGY - FOLLOW UP VISIT    Reason for visit: Follow up visit for adenocarcinoma of the lung      Oncology History   Malignant neoplasm of upper-outer quadrant of right breast in female, estrogen receptor positive   4/13/2021 Biopsy    Breast, right, 10:00 5N, biopsy:  - Invasive ductal carcinoma with lobular features     4/13/2021 Breast Tumor Markers    Estrogen Receptor: Positive >90%  Progesterone Receptor: Positive 10-50%  HER2: Negative  Ki67: 10-30%     4/26/2021 Genetic Testing    MyRisk: negative     5/12/2021 Breast Surgery    Right partial mastectomy with SLNB     6/1/2021 Tumor Conference    Radiation options? Offer radiation, can discuss partial vs whole breast radiation.        6/2/2021 Cancer Staged    Staging form: Breast, AJCC 8th Edition  - Pathologic stage from 6/2/2021: Stage IA (pT1b, pN0(sn), cM0, G2, ER+, LA+, HER2-)     7/6/2021 - 7/27/2021 Radiation Therapy    Treatment Summary  Course: C1 Chest 2021  Treatment Site Energy Dose/Fx (Gy) #Fx Total Dose (Gy) Start Date End Date Elapsed Days   Breast_Rt 6X 2.65 16 / 16 42.4 7/6/2021 7/27/2021 21        7/2021 -  Hormone Therapy    Letrozole      Procedure    Bronchoscopy, Station 7 positive for malignancy     Adenocarcinoma of lower lobe of right lung   10/5/2023 Imaging Significant Findings    CT C (obtained after CXR, which was itself obtained for palpitations)  - 2.1 x 1.3 cm spiculated RLL nodule, some spiculation noted to extend to the pleural margin  - Scattered pulmonary nodules centered mostly subpleural at the RLL (e.g. 0.9 cm)       10/10/2023 Imaging Significant Findings    PET CT  - 2.1 x 1.1 cm RLL nodule, SUV 3.8  - 0.9 cm R axillary LN, SUV 3.9  - 1.2 cm subcarinal LN, SUV 4.6  - 0.7 cm Ln along L posterior shoulder, SUV 1.7  - Multiple additional solid pulmonary nodules through R lung base, too small for uptake detection     11/14/2023 Procedure    Bronch with  EBUS  RLL, FNA  - Adenocarcinoma (TTF1 positive, GATA3 negative)    RLL, TBBx  - Adenocarcinoma    Per pulmonology, station 7 node was very vascular without window for biopsy, plan to discuss at thoracic tumor board    [facility] NGS  - KRAS G12A, CHEK1, MLH1, CTNNB1, CIC, PTPRD, NOTCH3, EZH2, LATS1, KMT2D  - Negative: EGFR, ALK, BRAF, KRAS, ROS1, RET, MET, EBB2  - PD-L1 1%       11/22/2023 Tumor Conference    Tumor Board  - Plan for axillary LN biopsy to determine lung vs recurrent breast vs other etiology  - Repeat CT C to evaluate nodules in RLL  - Determine treatment plan based on above results     11/22/2023 Tumor Genotyping    [facility] Liquid Biopsy  - No reportable pathogenic variants found     11/29/2023 Imaging Significant Findings    CT C  - 2.1 x 1.2 cm RLL spiculated nodule, stable in size w/ new central cavitation. Spiculated margins extend towards adjacent pleura.   - Stable irregular 2.0 cm linar opacity in LLL  - Stable 0.3 cm GGO, TRICIA  - Stable R basal sub cm nodules, largest 0.9 cm  - Several stable solid nodules predominantly in RLL, largest 0.9 cm     12/18/2023 Imaging Significant Findings    MRI Brain  - JELENA     1/3/2024 Procedure    IR Guided Biopsy, R Axillary LN  - No metastatic carcinoma (0.3 x 0.3 x 0.1 cm tissue, positive and negative controls stained appropriately)     1/5/2024 Imaging Significant Findings    PET CT  - Stable 2.0 x 1.1 cm RLL nodule (previously 2.1 x 1.1 cm)  - Stable additional nodules w/o significant racer uptake  - 0.8 cm R axillary node, SUV 4.2 (previously 0.9 cm, SUV 3.9)  - 0.5 cm L posterior shoulder node, SUV 2.3 (previously 0.7 cm, SUV 1.7)  - 1.3 cm subcarinal node, SUV 4.9 (previously 1.2 cm, SUV 4.6)     1/10/2024 Tumor Conference    Tumor Board   Re-sample enlarged, hypermetabolic axillary LN with repeat CT-guided biopsy versus excisional biopsy. If LN is negative for recurrent NSCLC, obtain EBUS of PET-avid mediastinal LN.         1/23/2024 Procedure    IR  Guided Biopsy, R Axillary LN (Second Biopsy)  - Negative for metastatic carcinoma     2/8/2024 Procedure    Bronch with EBUS  LN  Positive: Station 7 (Adenocarcinoma)  Negative: Station 11R     2/21/2024 Cancer Staged    Staging form: Lung, AJCC 8th Edition  - Clinical stage from 2/21/2024: Stage IIIA (cT1b, cN2, cM0)     2/21/2024 Tumor Conference    Thoracic Tumor Board  Stage IIIA right lower lobe adenocarcinoma with bulky subcarinal lymphadenopathy.  Proceed with NA chemo/I-O therapy. Return to [facility] for surgical evaluation. If patient is not a surgical candidate following 3 cycles of therapy, proceed with definitive chemo/RT.       2/22/2024 -  Chemotherapy    Treatment Summary   Plan Name: OP NSCLC NIVOLUMAB 360 MG PLUS CARBOPLATIN (AUC5) PEMETREXED 500 MG/M2 Q3W x 3 CYCLES  Treatment Goal: Control  Status: Active  Start Date: 2/22/2024  End Date: 4/5/2024 (Planned)  Provider: Bridger Nichole IV, MD  Chemotherapy: CARBOplatin (PARAPLATIN) 415 mg in sodium chloride 0.9% 326.5 mL chemo infusion, 415 mg, Intravenous, Clinic/HOD 1 time, 1 of 3 cycles  Administration: 415 mg (2/22/2024)  PEMEtrexed disodium (ALIMTA) 750 mg in sodium chloride 0.9% SolP 100 mL chemo infusion, 500 mg/m2 = 750 mg, Intravenous, Clinic/HOD 1 time, 1 of 3 cycles  Administration: 750 mg (2/22/2024)        - Second opinion, [facility]: contralateral LLL lesion stable but plan for sampling of this as well as subcarinal LN and possible additional RLL nodule. Pending results, consider NA --> surgery vs CRT    HPI:     Keo Frias is a 73 y.o. female with pmh significant for HTN, migraine headaches, and Stage IIIA (R2rH0U8) adenocarcinoma of the RLL (no targetable mutations, PD-L1 1%), initially dx'd 11/14/23, currently on neoadjuvant carboplatin/pemetrexed/nivolumab (2/22/24 - present), who presents to for follow up. Of note, pt also has a history of Stage IA ( D4hR7O2, ER 95%, UT 10%, HER2 negative, Ki-67 20%) IDC of the R breast, initially dx'd  4/13/21, s/p lumpectomy and SLNB (5/12/21), XRT (7/6/21-7/27/21), and currently on letrozole (7/2021 - present).     Interval History:  - 2/22/24: C1D1 carboplatin/pemetrexed/nivolumab    Pt describes 2 nights of hiccups after treatment. She describes burning and redness over her tongue around day 5, though denies seeing any blisters. She says that she did not obtain the [facility]'s solution, saying instead she used ice chips and ice water which helped. She says that she might be coughing a little bit more than she was before. She denies N/V, diarrhea, constipation, rash, new/worsening SOB, new/worsening cough, or fatigue.     History has been obtained by chart review and discussion with the patient.    Past Medical History:   Past Medical History:   Diagnosis Date    Anemia     Anxiety disorder 8/28/2019    Cataract     Chronic right-sided low back pain without sciatica 10/20/2021    Hypercholesteremia 9/17/2019    Invasive ductal carcinoma of breast, female, right 4/16/2021    Migraine with vision problems     Subclinical hypothyroidism     White coat syndrome with hypertension         Past Surgical History:   Past Surgical History:   Procedure Laterality Date    COLON SURGERY      COLONOSCOPY N/A 12/7/2018    Procedure: COLONOSCOPY;  Surgeon: Bhargav Gaston MD;  Location: [facility] (4TH FLR);  Service: Endoscopy;  Laterality: N/A;    DILATION AND CURETTAGE OF UTERUS      postmenopausal bleeding    ENDOBRONCHIAL ULTRASOUND N/A 11/14/2023    Procedure: ENDOBRONCHIAL ULTRASOUND (EBUS);  Surgeon: Kirt Gr MD;  Location: [facility] 2ND FLR;  Service: Pulmonary;  Laterality: N/A;    MASTECTOMY, PARTIAL Right 5/12/2021    Procedure: MASTECTOMY, PARTIAL-Right with radiological marker;  Surgeon: Vivian Cadena MD;  Location: [facility];  Service: General;  Laterality: Right;    ROBOTIC BRONCHOSCOPY N/A 11/14/2023    Procedure: ROBOTIC BRONCHOSCOPY;  Surgeon: Kirt Gr MD;  Location: [facility] 2ND FLR;   Service: Pulmonary;  Laterality: N/A;    SENTINEL LYMPH NODE BIOPSY Right 5/12/2021    Procedure: BIOPSY, LYMPH NODE, SENTINEL-Right;  Surgeon: Vivian Cadena MD;  Location: [facility];  Service: General;  Laterality: Right;    TUBAL LIGATION          Family History:   Family History   Problem Relation Age of Onset    Diabetes Brother     Hypertension Brother     Glaucoma Brother     Glaucoma Father     Cataracts Father     Retinal detachment Father     Lung cancer Mother     Uterine cancer Maternal Grandmother     Stroke Maternal Grandmother     Stroke Paternal Grandmother     Amblyopia Neg Hx     Blindness Neg Hx     Macular degeneration Neg Hx     Strabismus Neg Hx     Thyroid disease Neg Hx         Social History:   Social History     Tobacco Use    Smoking status: Never    Smokeless tobacco: Never   Substance Use Topics    Alcohol use: No        I have reviewed and updated the patient's past medical, surgical, family and social histories.      ROS:   As per HPI.     Allergies:   Review of patient's allergies indicates:   Allergen Reactions    Sinus allergy Other (See Comments)     Headaches, migranes    Buspar [buspirone] Other (See Comments)     Said cardiology told her to stop IF on Lexapro    Sulfa (sulfonamide antibiotics) Rash        Medications:   Current Outpatient Medications   Medication Sig Dispense Refill    atorvastatin (LIPITOR) 20 MG tablet Take 2 tablets (40 mg total) by mouth once daily. 180 tablet 3    benzonatate (TESSALON PERLES) 100 MG capsule Take 1 capsule (100 mg total) by mouth 3 (three) times daily as needed for Cough. 30 capsule 1    calcium carbonate (CALCIUM 500 ORAL) Take 1 tablet by mouth.      cholecalciferol, vitamin D3, 125 mcg (5,000 unit) capsule Take 1 capsule (5,000 Units total) by mouth daily with breakfast. 100 capsule 4    CIPRODEX 0.3-0.1 % DrpS PLACE 4 DROPS INTO THE RIGHT EAR 2 (TWO) TIMES DAILY. FOR 10 DAYS 7.5 mL 5    cloNIDine (CATAPRES) 0.1 MG tablet Take 1 tablet (0.1  mg total) by mouth daily as needed (for systolic greater than or equal to 160 mmHg). 90 tablet 3    dexAMETHasone (DECADRON) 4 MG Tab Take 2 tablets (8 mg total) by mouth As instructed. Take 2 tablets (8 mg) by mouth once on the day prior to chemotherapy then daily on days 2,3,4 following chemotherapy. 24 tablet 0    duke's soln (benadryl 30 mL, mylanta 30 mL, LIDOcaine 30 mL, nystatin 30 mL) 120mL Take 10 mLs by mouth 4 (four) times daily. 120 mL 0    EScitalopram oxalate (LEXAPRO) 10 MG tablet Take 1 tablet (10 mg total) by mouth once daily. (Patient taking differently: Take 10 mg by mouth once daily. Pt taking 5mg total 1 time daily) 90 tablet 4    famotidine (PEPCID) 10 MG tablet Take 10 mg by mouth as needed.      folic acid (FOLVITE) 1 MG tablet Take 1 tablet (1 mg total) by mouth once daily. Take 1 tablet (1 mg) by mouth once daily starting one week prior to pemetrexed and continuing for at least 21 days after stopping pemetrexed. 90 tablet 0    Lactobacillus rhamnosus GG (CULTURELLE) 10 billion cell capsule Take 1 capsule by mouth once daily. Pt takes Align probiotic      letrozole (FEMARA) 2.5 mg Tab TAKE 1 TABLET BY MOUTH EVERY DAY 90 tablet 3    losartan (COZAAR) 25 MG tablet Take 1 tablet (25 mg total) by mouth once daily. 90 tablet 4    metoprolol succinate (TOPROL-XL) 50 MG 24 hr tablet Take 1 tablet (50 mg total) by mouth once daily. 90 tablet 4    multivitamin-iron-folic acid Tab Take 1 tablet by mouth once daily.      OLANZapine (ZYPREXA) 5 MG tablet Take 1 tablet (5 mg total) by mouth every evening. Take 1 tablet by mouth every evening on days 1-4 of each chemotherapy cycle 30 tablet 1    omeprazole (PRILOSEC OTC) 20 MG tablet Take 20 mg by mouth once daily.      prochlorperazine (COMPAZINE) 10 MG tablet Take 1 tablet (10 mg total) by mouth every 6 (six) hours as needed (nausea). 30 tablet 1    PROLIA 60 mg/mL Syrg        No current facility-administered medications for this visit.     "      Physical Exam:       BP (!) 152/71 (BP Location: Left arm, Patient Position: Sitting, BP Method: Medium (Automatic))   Pulse 71   Temp 97.7 °F (36.5 °C) (Oral)   Resp 16   Ht 5' 2" (1.575 m)   Wt 51.9 kg (114 lb 6.7 oz)   LMP  (LMP Unknown)   SpO2 98%   BMI 20.93 kg/m²                Physical Exam  Constitutional:       Appearance: Normal appearance.   HENT:      Head: Normocephalic and atraumatic.   Eyes:      Extraocular Movements: Extraocular movements intact.      Conjunctiva/sclera: Conjunctivae normal.      Pupils: Pupils are equal, round, and reactive to light.   Cardiovascular:      Rate and Rhythm: Normal rate and regular rhythm.      Heart sounds: No murmur heard.     No friction rub. No gallop.   Pulmonary:      Effort: Pulmonary effort is normal.      Breath sounds: No wheezing, rhonchi or rales.   Chest:      Comments: No palpable R axillary LAD  Musculoskeletal:         General: Normal range of motion.   Skin:     General: Skin is warm and dry.   Neurological:      Mental Status: She is alert and oriented to person, place, and time.   Psychiatric:         Mood and Affect: Mood normal.         Thought Content: Thought content normal.         Judgment: Judgment normal.           Labs:   Recent Results (from the past 48 hour(s))   CBC Oncology    Collection Time: 03/12/24 12:10 PM   Result Value Ref Range    WBC 3.80 (L) 3.90 - 12.70 K/uL    RBC 4.18 4.00 - 5.40 M/uL    Hemoglobin 12.1 12.0 - 16.0 g/dL    Hematocrit 36.5 (L) 37.0 - 48.5 %    MCV 87 82 - 98 fL    MCH 28.9 27.0 - 31.0 pg    MCHC 33.2 32.0 - 36.0 g/dL    RDW 13.4 11.5 - 14.5 %    Platelets 202 150 - 450 K/uL    MPV 11.7 9.2 - 12.9 fL    Gran # (ANC) 1.7 (L) 1.8 - 7.7 K/uL    Immature Grans (Abs) 0.01 0.00 - 0.04 K/uL   Comprehensive Metabolic Panel    Collection Time: 03/12/24 12:10 PM   Result Value Ref Range    Sodium 136 136 - 145 mmol/L    Potassium 4.0 3.5 - 5.1 mmol/L    Chloride 103 95 - 110 mmol/L    CO2 25 23 - 29 " mmol/L    Glucose 107 70 - 110 mg/dL    BUN 10 8 - 23 mg/dL    Creatinine 0.6 0.5 - 1.4 mg/dL    Calcium 8.7 8.7 - 10.5 mg/dL    Total Protein 6.6 6.0 - 8.4 g/dL    Albumin 3.4 (L) 3.5 - 5.2 g/dL    Total Bilirubin 0.2 0.1 - 1.0 mg/dL    Alkaline Phosphatase 54 (L) 55 - 135 U/L    AST 27 10 - 40 U/L    ALT 26 10 - 44 U/L    eGFR >60 >60 mL/min/1.73 m^2    Anion Gap 8 8 - 16 mmol/L   T4, Free    Collection Time: 03/12/24 12:10 PM   Result Value Ref Range    Free T4 1.45 0.71 - 1.51 ng/dL   TSH    Collection Time: 03/12/24 12:10 PM   Result Value Ref Range    TSH 0.796 0.400 - 4.000 uIU/mL   Magnesium    Collection Time: 03/12/24 12:10 PM   Result Value Ref Range    Magnesium 1.8 1.6 - 2.6 mg/dL        Imaging:         Path:  See oncologic history above.      Assessment and Plan:     Keo Frias is a 73 y.o. female with pmh significant for HTN, migraine headaches, and Stage IIIA ([id number]) adenocarcinoma of the RLL (no targetable mutations, PD-L1 1%), initially dx'd 11/14/23, currently on neoadjuvant carboplatin/pemetrexed/nivolumab (2/22/24 - present), who presents to for follow up. Of note, pt also has a history of Stage IA ( [id number], ER 95%, IA 10%, HER2 negative, Ki-67 20%) IDC of the R breast, initially dx'd 4/13/21, s/p lumpectomy and SLNB (5/12/21), XRT (7/6/21-7/27/21), and currently on letrozole (7/2021 - present).     Adenocarcinoma of RLL, Uncertain Stage  ECOG PS 1.  Since last visit, pt has been seen by thoracic surgery and discussed at tumor board with pan to proceed with NA chemoIO. The patient is currently on NA carboplatin/pemetrexed/nivolumab, tolerating with mild mucositis after C1.  Most recent imaging is a PET-CT on 01/05/2024 prior to treatment start.  Given good tolerance, we will proceed with C2D1 tomorrow (03/14/2024).  Patient has a CT appointment on 04/24/2024 followed by medical oncology, radiation oncology, and surgical oncology followups on 04/25/2024 at [facility].    Of note, pt did  develop mucositis this cycle and her ANC prior to C2 is 1700. Given age >65 and borderline neutropenia with the first cycle, have requested GCSF for C2 and C3, pending authorization.     PLAN:   -- Proceed with C2D1 on 3/14/24. Labs acceptable (planning for GCSF) and treatment signed.   -- GCSF on 3/15/24, pending authorization  -- RTC and labs on 4/03/24, C3D1 on 4/04/24, GCSF on 4/05/24      Mucositis  Patient developed mild mucositis around day 5 of cycle 1 which resolved with cold ice chips.  Do solution was called in however patient did not obtain this.  Discussed obtaining this medication now anticipation of likely mucositis with cycle 2.  -- [facility]'s solution prescribed        Right IDC, ER+/AL+/HER2-  S/p lumpectomy with SLNB, radiation therapy, and currently on letrozole and denosumab, tolerating well. Concern for possible R axillary recurrence, workup as below.   -- Okay to continue letrozole  -- Okay to continue denosumab  -- Breast oncology team aware      The above information has been reviewed with the patient and all questions have been answered to their apparent satisfaction.  They understand that they can call the clinic with any questions.    Bridger Nichole MD  Hematology/Oncology  [facility]        Med Onc Chart Routing      Follow up with physician . As scheduled (RTC and labs on 4/03, C3D1 on 4/04/24, GCSF on 4/05/24)   Follow up with BRENDA    Infusion scheduling note    Injection scheduling note    Labs    Imaging    Pharmacy appointment    Other referrals

## 2024-03-14 ENCOUNTER — INFUSION (OUTPATIENT)
Dept: INFUSION THERAPY | Facility: HOSPITAL | Age: 74
End: 2024-03-14
Attending: FAMILY MEDICINE
Payer: MEDICARE

## 2024-03-14 ENCOUNTER — TELEPHONE (OUTPATIENT)
Dept: INFUSION THERAPY | Facility: HOSPITAL | Age: 74
End: 2024-03-14

## 2024-03-14 ENCOUNTER — PATIENT MESSAGE (OUTPATIENT)
Dept: HEMATOLOGY/ONCOLOGY | Facility: CLINIC | Age: 74
End: 2024-03-14
Payer: MEDICARE

## 2024-03-14 VITALS
RESPIRATION RATE: 16 BRPM | HEART RATE: 80 BPM | BODY MASS INDEX: 21.06 KG/M2 | HEIGHT: 62 IN | OXYGEN SATURATION: 99 % | SYSTOLIC BLOOD PRESSURE: 140 MMHG | DIASTOLIC BLOOD PRESSURE: 65 MMHG | TEMPERATURE: 99 F | WEIGHT: 114.44 LBS

## 2024-03-14 DIAGNOSIS — C34.31 ADENOCARCINOMA OF LOWER LOBE OF RIGHT LUNG: Primary | ICD-10-CM

## 2024-03-14 PROCEDURE — 25000003 PHARM REV CODE 250: Performed by: HOSPITALIST

## 2024-03-14 PROCEDURE — 96411 CHEMO IV PUSH ADDL DRUG: CPT

## 2024-03-14 PROCEDURE — 96413 CHEMO IV INFUSION 1 HR: CPT

## 2024-03-14 PROCEDURE — 63600175 PHARM REV CODE 636 W HCPCS: Mod: JZ,JG | Performed by: HOSPITALIST

## 2024-03-14 PROCEDURE — 96367 TX/PROPH/DG ADDL SEQ IV INF: CPT

## 2024-03-14 PROCEDURE — 96417 CHEMO IV INFUS EACH ADDL SEQ: CPT

## 2024-03-14 PROCEDURE — 96375 TX/PRO/DX INJ NEW DRUG ADDON: CPT

## 2024-03-14 PROCEDURE — A4216 STERILE WATER/SALINE, 10 ML: HCPCS | Performed by: HOSPITALIST

## 2024-03-14 RX ORDER — PROCHLORPERAZINE EDISYLATE 5 MG/ML
5 INJECTION INTRAMUSCULAR; INTRAVENOUS ONCE AS NEEDED
Status: DISCONTINUED | OUTPATIENT
Start: 2024-03-14 | End: 2024-03-14 | Stop reason: HOSPADM

## 2024-03-14 RX ORDER — EPINEPHRINE 0.3 MG/.3ML
0.3 INJECTION SUBCUTANEOUS ONCE AS NEEDED
Status: DISCONTINUED | OUTPATIENT
Start: 2024-03-14 | End: 2024-03-14 | Stop reason: HOSPADM

## 2024-03-14 RX ORDER — DIPHENHYDRAMINE HYDROCHLORIDE 50 MG/ML
50 INJECTION INTRAMUSCULAR; INTRAVENOUS ONCE AS NEEDED
Status: DISCONTINUED | OUTPATIENT
Start: 2024-03-14 | End: 2024-03-14 | Stop reason: HOSPADM

## 2024-03-14 RX ORDER — HEPARIN 100 UNIT/ML
500 SYRINGE INTRAVENOUS
Status: DISCONTINUED | OUTPATIENT
Start: 2024-03-14 | End: 2024-03-14 | Stop reason: HOSPADM

## 2024-03-14 RX ORDER — SODIUM CHLORIDE 0.9 % (FLUSH) 0.9 %
10 SYRINGE (ML) INJECTION
Status: DISCONTINUED | OUTPATIENT
Start: 2024-03-14 | End: 2024-03-14 | Stop reason: HOSPADM

## 2024-03-14 RX ADMIN — SODIUM CHLORIDE: 9 INJECTION, SOLUTION INTRAVENOUS at 09:03

## 2024-03-14 RX ADMIN — DEXAMETHASONE SODIUM PHOSPHATE 0.25 MG: 10 INJECTION, SOLUTION INTRAMUSCULAR; INTRAVENOUS at 10:03

## 2024-03-14 RX ADMIN — CARBOPLATIN 465 MG: 10 INJECTION INTRAVENOUS at 10:03

## 2024-03-14 RX ADMIN — APREPITANT 130 MG: 130 INJECTION, EMULSION INTRAVENOUS at 09:03

## 2024-03-14 RX ADMIN — SODIUM CHLORIDE 360 MG: 9 INJECTION, SOLUTION INTRAVENOUS at 09:03

## 2024-03-14 RX ADMIN — SODIUM CHLORIDE 750 MG: 9 INJECTION, SOLUTION INTRAVENOUS at 10:03

## 2024-03-14 RX ADMIN — Medication 10 ML: at 11:03

## 2024-03-14 NOTE — TELEPHONE ENCOUNTER
Called and spoke with pt to inform Cayetano injection not authorized for tomorrow. Pt states she was informed it will be authorized, to please leave appointment on and call back tomorrow at 12 to update on auth status. Informed pt I will let staff know to call her with auth status and that she can not received injection if not authorized. Pt states she understands she can not get injection if not authorized.

## 2024-03-14 NOTE — PLAN OF CARE
Pt tolerated Cycle 2 Day 1 Opdivo + Alimta + Carbo infusion well.  VSS. No adverse reaction noted. No complaints at this time. PIV flushed with NS, good blood return noted and D/C per protocol. Informed pt and family member I will call this evening if Udenyca injection gets approved for tomorrow. Informed pt and family member, Dr. Nichole is aware. AVS and treatment calendar printed and reviewed. Patient left clinic in no acute distress.

## 2024-03-15 ENCOUNTER — INFUSION (OUTPATIENT)
Dept: INFUSION THERAPY | Facility: HOSPITAL | Age: 74
End: 2024-03-15
Attending: FAMILY MEDICINE
Payer: MEDICARE

## 2024-03-15 VITALS
SYSTOLIC BLOOD PRESSURE: 137 MMHG | OXYGEN SATURATION: 99 % | HEART RATE: 75 BPM | DIASTOLIC BLOOD PRESSURE: 61 MMHG | RESPIRATION RATE: 18 BRPM | TEMPERATURE: 98 F

## 2024-03-15 DIAGNOSIS — C34.31 ADENOCARCINOMA OF LOWER LOBE OF RIGHT LUNG: Primary | ICD-10-CM

## 2024-03-15 PROCEDURE — 63600175 PHARM REV CODE 636 W HCPCS: Mod: JZ,JG | Performed by: HOSPITALIST

## 2024-03-15 PROCEDURE — 96372 THER/PROPH/DIAG INJ SC/IM: CPT

## 2024-03-15 RX ADMIN — PEGFILGRASTIM-JMDB 6 MG: 6 INJECTION SUBCUTANEOUS at 02:03

## 2024-03-15 NOTE — PLAN OF CARE
Pt tolerated Fulphila injection well, no complaints at this time. No adverse reactions noted. Written & oral education provided. Next appointment scheduled and pt d/c in no acute distress.

## 2024-03-17 DIAGNOSIS — C50.411 MALIGNANT NEOPLASM OF UPPER-OUTER QUADRANT OF RIGHT BREAST IN FEMALE, ESTROGEN RECEPTOR POSITIVE: ICD-10-CM

## 2024-03-17 DIAGNOSIS — Z17.0 MALIGNANT NEOPLASM OF UPPER-OUTER QUADRANT OF RIGHT BREAST IN FEMALE, ESTROGEN RECEPTOR POSITIVE: ICD-10-CM

## 2024-03-18 ENCOUNTER — PATIENT MESSAGE (OUTPATIENT)
Dept: HEMATOLOGY/ONCOLOGY | Facility: CLINIC | Age: 74
End: 2024-03-18
Payer: MEDICARE

## 2024-03-18 ENCOUNTER — PATIENT MESSAGE (OUTPATIENT)
Dept: FAMILY MEDICINE | Facility: CLINIC | Age: 74
End: 2024-03-18
Payer: MEDICARE

## 2024-03-18 DIAGNOSIS — F41.1 GENERALIZED ANXIETY DISORDER: Primary | ICD-10-CM

## 2024-03-18 RX ORDER — LETROZOLE 2.5 MG/1
2.5 TABLET, FILM COATED ORAL
Qty: 90 TABLET | Refills: 3 | Status: SHIPPED | OUTPATIENT
Start: 2024-03-18

## 2024-03-18 NOTE — TELEPHONE ENCOUNTER
Thanks for keeping me in the loop-- I agree with staying off of the losartan with continued monitoring (and also agree with not monitoring too much!) YARI [place]

## 2024-03-26 RX ORDER — ESCITALOPRAM OXALATE 5 MG/1
5 TABLET ORAL DAILY
Qty: 90 TABLET | Refills: 4 | Status: SHIPPED | OUTPATIENT
Start: 2024-03-26 | End: 2025-03-26

## 2024-04-01 ENCOUNTER — PATIENT MESSAGE (OUTPATIENT)
Dept: FAMILY MEDICINE | Facility: CLINIC | Age: 74
End: 2024-04-01
Payer: MEDICARE

## 2024-04-02 ENCOUNTER — LAB VISIT (OUTPATIENT)
Dept: LAB | Facility: HOSPITAL | Age: 74
End: 2024-04-02
Attending: HOSPITALIST
Payer: MEDICARE

## 2024-04-02 ENCOUNTER — OFFICE VISIT (OUTPATIENT)
Dept: FAMILY MEDICINE | Facility: CLINIC | Age: 74
End: 2024-04-02
Payer: MEDICARE

## 2024-04-02 DIAGNOSIS — I95.9 HYPOTENSION, UNSPECIFIED HYPOTENSION TYPE: Primary | ICD-10-CM

## 2024-04-02 DIAGNOSIS — Z79.811 AROMATASE INHIBITOR USE: ICD-10-CM

## 2024-04-02 DIAGNOSIS — Z79.899 MEDICATION MANAGEMENT: ICD-10-CM

## 2024-04-02 DIAGNOSIS — C50.411 MALIGNANT NEOPLASM OF UPPER-OUTER QUADRANT OF RIGHT BREAST IN FEMALE, ESTROGEN RECEPTOR POSITIVE: ICD-10-CM

## 2024-04-02 DIAGNOSIS — Z17.0 MALIGNANT NEOPLASM OF UPPER-OUTER QUADRANT OF RIGHT BREAST IN FEMALE, ESTROGEN RECEPTOR POSITIVE: ICD-10-CM

## 2024-04-02 DIAGNOSIS — I10 BENIGN ESSENTIAL HYPERTENSION: ICD-10-CM

## 2024-04-02 DIAGNOSIS — C79.9 METASTATIC NEOPLASM: ICD-10-CM

## 2024-04-02 DIAGNOSIS — C34.31 ADENOCARCINOMA OF LOWER LOBE OF RIGHT LUNG: ICD-10-CM

## 2024-04-02 DIAGNOSIS — F41.1 GENERALIZED ANXIETY DISORDER: ICD-10-CM

## 2024-04-02 DIAGNOSIS — R00.2 PALPITATIONS: ICD-10-CM

## 2024-04-02 DIAGNOSIS — R09.89 LABILE BLOOD PRESSURE: ICD-10-CM

## 2024-04-02 LAB
ALBUMIN SERPL BCP-MCNC: 3.2 G/DL (ref 3.5–5.2)
ALP SERPL-CCNC: 70 U/L (ref 55–135)
ALT SERPL W/O P-5'-P-CCNC: 27 U/L (ref 10–44)
ANION GAP SERPL CALC-SCNC: 10 MMOL/L (ref 8–16)
AST SERPL-CCNC: 29 U/L (ref 10–40)
BILIRUB SERPL-MCNC: 0.2 MG/DL (ref 0.1–1)
BUN SERPL-MCNC: 7 MG/DL (ref 8–23)
CALCIUM SERPL-MCNC: 8.7 MG/DL (ref 8.7–10.5)
CHLORIDE SERPL-SCNC: 107 MMOL/L (ref 95–110)
CO2 SERPL-SCNC: 23 MMOL/L (ref 23–29)
CREAT SERPL-MCNC: 0.6 MG/DL (ref 0.5–1.4)
ERYTHROCYTE [DISTWIDTH] IN BLOOD BY AUTOMATED COUNT: 14.5 % (ref 11.5–14.5)
EST. GFR  (NO RACE VARIABLE): >60 ML/MIN/1.73 M^2
GLUCOSE SERPL-MCNC: 96 MG/DL (ref 70–110)
HCT VFR BLD AUTO: 34 % (ref 37–48.5)
HGB BLD-MCNC: 10.7 G/DL (ref 12–16)
IMM GRANULOCYTES # BLD AUTO: 0.01 K/UL (ref 0–0.04)
MAGNESIUM SERPL-MCNC: 2.2 MG/DL (ref 1.6–2.6)
MCH RBC QN AUTO: 28.2 PG (ref 27–31)
MCHC RBC AUTO-ENTMCNC: 31.5 G/DL (ref 32–36)
MCV RBC AUTO: 90 FL (ref 82–98)
NEUTROPHILS # BLD AUTO: 1.5 K/UL (ref 1.8–7.7)
PLATELET # BLD AUTO: 293 K/UL (ref 150–450)
PMV BLD AUTO: 11.3 FL (ref 9.2–12.9)
POTASSIUM SERPL-SCNC: 4.8 MMOL/L (ref 3.5–5.1)
PROT SERPL-MCNC: 6.3 G/DL (ref 6–8.4)
RBC # BLD AUTO: 3.79 M/UL (ref 4–5.4)
SODIUM SERPL-SCNC: 140 MMOL/L (ref 136–145)
T4 FREE SERPL-MCNC: 1.92 NG/DL (ref 0.71–1.51)
TSH SERPL DL<=0.005 MIU/L-ACNC: 0.01 UIU/ML (ref 0.4–4)
WBC # BLD AUTO: 3.71 K/UL (ref 3.9–12.7)

## 2024-04-02 PROCEDURE — 36415 COLL VENOUS BLD VENIPUNCTURE: CPT | Performed by: HOSPITALIST

## 2024-04-02 PROCEDURE — 1159F MED LIST DOCD IN RCRD: CPT | Mod: CPTII,95,, | Performed by: FAMILY MEDICINE

## 2024-04-02 PROCEDURE — 85027 COMPLETE CBC AUTOMATED: CPT | Performed by: HOSPITALIST

## 2024-04-02 PROCEDURE — 84443 ASSAY THYROID STIM HORMONE: CPT | Performed by: HOSPITALIST

## 2024-04-02 PROCEDURE — 83735 ASSAY OF MAGNESIUM: CPT | Performed by: HOSPITALIST

## 2024-04-02 PROCEDURE — 4010F ACE/ARB THERAPY RXD/TAKEN: CPT | Mod: CPTII,95,, | Performed by: FAMILY MEDICINE

## 2024-04-02 PROCEDURE — 1160F RVW MEDS BY RX/DR IN RCRD: CPT | Mod: CPTII,95,, | Performed by: FAMILY MEDICINE

## 2024-04-02 PROCEDURE — 84439 ASSAY OF FREE THYROXINE: CPT | Performed by: HOSPITALIST

## 2024-04-02 PROCEDURE — 99214 OFFICE O/P EST MOD 30 MIN: CPT | Mod: 95,,, | Performed by: FAMILY MEDICINE

## 2024-04-02 PROCEDURE — 80053 COMPREHEN METABOLIC PANEL: CPT | Performed by: HOSPITALIST

## 2024-04-02 NOTE — PROGRESS NOTES
Office Visit    Patient Name: Keo Frias    : 1950  MRN: [id number]    Subjective:  Keo is a 73 y.o. female who presents today for:    No chief complaint on file.    The patient location is: HER HOME  The chief complaint leading to consultation is: LOW BP CONCERNS    Visit type: audiovisual    Face to Face time with patient: START 942 END 1003  35 minutes of total time spent on the encounter, which includes face to face time and non-face to face time preparing to see the patient (eg, review of tests), Obtaining and/or reviewing separately obtained history, Documenting clinical information in the electronic or other health record, Independently interpreting results (not separately reported) and communicating results to the patient/family/caregiver, or Care coordination (not separately reported).     Each patient to whom he or she provides medical services by telemedicine is:  (1) informed of the relationship between the physician and patient and the respective role of any other health care provider with respect to management of the patient; and (2) notified that he or she may decline to receive medical services by telemedicine and may withdraw from such care at any time.    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      She is followed by Heme/Onc Dr Llamas-- seen 3/13/24 and by CT Surgery Dr Malloy-- seen 24 and considering surgical options(seeking 2nd opinion at [facility]).     She message me with concerns for low BP readings and I advised that she stop her PM BP med losartan 25, which she has done, but has still been taking Toprol  XL 50 in the AM.     She reports that she has been getting home BP readings of 90s systolic/ upper 40s diastolic and has been feeling fatigued.   Highest readings of low 100s (113/64, HR 81) since stopping.     She is eating fairly regularly and drinking plenty of water.   She feels heavy and fatigued.   Not really lightheaded or feeling like she will faint.    No CP or SOB. No palpitations.   +Rhinitis, clear    Taking Lexapro 5 for management of anxiety and this is working well.     Labs 3/12/24 reviewed and show normal kidney function and electrolytes, normal Hemoglobin of 12.5 & borderline low HCT of 36.5, and normal TSH of 0.7 w/ normal Free T4.     Has upcoming appointments for labs 5/3/24 and OV 5/8/24 with me.         PAST MEDICAL HISTORY, SURGICAL/SOCIAL/FAMILY HISTORY REVIEWED AS PER CHART, WITH PERTINENT FINDINGS INCLUDED IN HISTORY SECTION OF NOTE.     Current Medications    Medication List with Changes/Refills   Current Medications    ATORVASTATIN (LIPITOR) 20 MG TABLET    Take 2 tablets (40 mg total) by mouth once daily.    BENZONATATE (TESSALON PERLES) 100 MG CAPSULE    Take 1 capsule (100 mg total) by mouth 3 (three) times daily as needed for Cough.    CALCIUM CARBONATE (CALCIUM 500 ORAL)    Take 1 tablet by mouth.    CHOLECALCIFEROL, VITAMIN D3, 125 MCG (5,000 UNIT) CAPSULE    Take 1 capsule (5,000 Units total) by mouth daily with breakfast.    CIPRODEX 0.3-0.1 % DRPS    PLACE 4 DROPS INTO THE RIGHT EAR 2 (TWO) TIMES DAILY. FOR 10 DAYS    CLONIDINE (CATAPRES) 0.1 MG TABLET    Take 1 tablet (0.1 mg total) by mouth daily as needed (for systolic greater than or equal to 160 mmHg).    DEXAMETHASONE (DECADRON) 4 MG TAB    Take 2 tablets (8 mg total) by mouth As instructed. Take 2 tablets (8 mg) by mouth once on the day prior to chemotherapy then daily on days 2,3,4 following chemotherapy.    DUKE'S SOLN (BENADRYL 30 ML, MYLANTA 30 ML, LIDOCAINE 30 ML, NYSTATIN 30 ML) 120ML    Take 10 mLs by mouth 4  (four) times daily.    ESCITALOPRAM OXALATE (LEXAPRO) 5 MG TAB    Take 1 tablet (5 mg total) by mouth once daily.    FAMOTIDINE (PEPCID) 10 MG TABLET    Take 10 mg by mouth as needed.    FOLIC ACID (FOLVITE) 1 MG TABLET    Take 1 tablet (1 mg total) by mouth once daily. Take 1 tablet (1 mg) by mouth once daily starting one week prior to pemetrexed and continuing for at least 21 days after stopping pemetrexed.    LACTOBACILLUS RHAMNOSUS GG (CULTURELLE) 10 BILLION CELL CAPSULE    Take 1 capsule by mouth once daily. Pt takes Align probiotic    LETROZOLE (FEMARA) 2.5 MG TAB    TAKE 1 TABLET EVERY DAY    METOPROLOL SUCCINATE (TOPROL-XL) 50 MG 24 HR TABLET    Take 1 tablet (50 mg total) by mouth once daily.    MULTIVITAMIN-IRON-FOLIC ACID TAB    Take 1 tablet by mouth once daily.    OLANZAPINE (ZYPREXA) 5 MG TABLET    Take 1 tablet (5 mg total) by mouth every evening. Take 1 tablet by mouth every evening on days 1-4 of each chemotherapy cycle    OMEPRAZOLE (PRILOSEC OTC) 20 MG TABLET    Take 20 mg by mouth once daily.    PROCHLORPERAZINE (COMPAZINE) 10 MG TABLET    Take 1 tablet (10 mg total) by mouth every 6 (six) hours as needed (nausea).    PROLIA 60 MG/ML SYRG           Allergies   Review of patient's allergies indicates:   Allergen Reactions    Sinus allergy Other (See Comments)     Headaches, migranes    Buspar [buspirone] Other (See Comments)     Said cardiology told her to stop IF on Lexapro    Sulfa (sulfonamide antibiotics) Rash         Review of Systems (Pertinent positives)  Review of Systems   Constitutional:  Positive for activity change. Negative for unexpected weight change.   HENT:  Positive for rhinorrhea. Negative for hearing loss and trouble swallowing.    Eyes:  Negative for discharge and visual disturbance.   Respiratory:  Negative for chest tightness and wheezing.    Cardiovascular:  Negative for chest pain and palpitations.   Gastrointestinal:  Negative for blood in stool, constipation, diarrhea  and vomiting.   Endocrine: Negative for polydipsia and polyuria.   Genitourinary:  Negative for difficulty urinating, dysuria, hematuria and menstrual problem.   Musculoskeletal:  Positive for arthralgias, joint swelling and neck pain.   Neurological:  Negative for weakness and headaches.   Psychiatric/Behavioral:  Negative for confusion and dysphoric mood.        LMP  (LMP Unknown)     Physical Exam  Vitals reviewed.   Constitutional:       General: She is not in acute distress.     Appearance: Normal appearance. She is well-developed.   HENT:      Head: Normocephalic and atraumatic.   Eyes:      Conjunctiva/sclera: Conjunctivae normal.   Pulmonary:      Effort: Pulmonary effort is normal.   Neurological:      Mental Status: She is alert and oriented to person, place, and time.   Psychiatric:         Mood and Affect: Mood normal.         Behavior: Behavior normal.           Assessment/Plan:  Keo Frias is a 73 y.o. female who presents today for :        ICD-10-CM ICD-9-CM    1. Hypotension, unspecified hypotension type  I95.9 458.9       2. Labile blood pressure  R09.89 796.2       3. Palpitations  R00.2 785.1       4. Benign essential hypertension  I10 401.1       5. Adenocarcinoma of lower lobe of right lung  C34.31 162.5       6. Malignant neoplasm of upper-outer quadrant of right breast in female, estrogen receptor positive  C50.411 174.4     Z17.0 V86.0       7. Aromatase inhibitor use  Z79.811 V07.52       8. Generalized anxiety disorder  F41.1 300.02       9. Medication management  Z79.899 V58.69         HTN with RECENT HYPOTENSION, LABILE BP READINGS: already holding Losartan, continuing TOPROL XL 50. Overall tolerating Chemo well with tolerable SEs but does seem to be experiencing Hypotension. No infection concerns-- on Neupogen.  Recent labs reviewed with normal kidney function, electrolytes, TSH and no Signficant anemia. Cariology Dr Rodríguez 7/24/24.   ADVISE CUTTING TOPROL XL IN 1/2 AND TAKING 25 MG Q  AM WITH CONTINUED HOME MONITORING--WILLMESSAGE ME IN 5-7 DAYS WITH UPDATED BP READINGS AND UPDATE WITH HOW SHE IS FEELING.     ADENOCARCINOMA of LL R LUNG: following with heme/onc and CT surgery and currently receiving Chemo TX. Considering surgical options and following up with MD Barreto.    H/O R Breast Cancer: s/p lumpectomy and continuing on Letrozole, Mammograms UTD    Anxiety: LEXAPRO 5. Has upcoming appointment with Psychiatry through oncology services.     There are no Patient Instructions on file for this visit.      Follow up in about 1 week (around 4/9/2024) for to follow up on lab results, return as needed for new concerns.

## 2024-04-03 ENCOUNTER — OFFICE VISIT (OUTPATIENT)
Dept: HEMATOLOGY/ONCOLOGY | Facility: CLINIC | Age: 74
End: 2024-04-03
Payer: MEDICARE

## 2024-04-03 VITALS
SYSTOLIC BLOOD PRESSURE: 117 MMHG | DIASTOLIC BLOOD PRESSURE: 77 MMHG | RESPIRATION RATE: 14 BRPM | BODY MASS INDEX: 21.17 KG/M2 | TEMPERATURE: 99 F | WEIGHT: 115.06 LBS | HEIGHT: 62 IN | OXYGEN SATURATION: 98 % | HEART RATE: 88 BPM

## 2024-04-03 DIAGNOSIS — K12.30 MUCOSITIS: ICD-10-CM

## 2024-04-03 DIAGNOSIS — C34.31 ADENOCARCINOMA OF LOWER LOBE OF RIGHT LUNG: Primary | ICD-10-CM

## 2024-04-03 DIAGNOSIS — E03.8 SUBCLINICAL HYPOTHYROIDISM: ICD-10-CM

## 2024-04-03 PROCEDURE — 99215 OFFICE O/P EST HI 40 MIN: CPT | Mod: S$GLB,,, | Performed by: HOSPITALIST

## 2024-04-03 PROCEDURE — 3078F DIAST BP <80 MM HG: CPT | Mod: CPTII,S$GLB,, | Performed by: HOSPITALIST

## 2024-04-03 PROCEDURE — 1159F MED LIST DOCD IN RCRD: CPT | Mod: CPTII,S$GLB,, | Performed by: HOSPITALIST

## 2024-04-03 PROCEDURE — 3008F BODY MASS INDEX DOCD: CPT | Mod: CPTII,S$GLB,, | Performed by: HOSPITALIST

## 2024-04-03 PROCEDURE — 3074F SYST BP LT 130 MM HG: CPT | Mod: CPTII,S$GLB,, | Performed by: HOSPITALIST

## 2024-04-03 PROCEDURE — 3288F FALL RISK ASSESSMENT DOCD: CPT | Mod: CPTII,S$GLB,, | Performed by: HOSPITALIST

## 2024-04-03 PROCEDURE — 99999 PR PBB SHADOW E&M-EST. PATIENT-LVL IV: CPT | Mod: PBBFAC,,, | Performed by: HOSPITALIST

## 2024-04-03 PROCEDURE — 4010F ACE/ARB THERAPY RXD/TAKEN: CPT | Mod: CPTII,S$GLB,, | Performed by: HOSPITALIST

## 2024-04-03 PROCEDURE — 1126F AMNT PAIN NOTED NONE PRSNT: CPT | Mod: CPTII,S$GLB,, | Performed by: HOSPITALIST

## 2024-04-03 PROCEDURE — 1101F PT FALLS ASSESS-DOCD LE1/YR: CPT | Mod: CPTII,S$GLB,, | Performed by: HOSPITALIST

## 2024-04-03 RX ORDER — PROCHLORPERAZINE EDISYLATE 5 MG/ML
5 INJECTION INTRAMUSCULAR; INTRAVENOUS ONCE AS NEEDED
Status: CANCELLED
Start: 2024-04-04

## 2024-04-03 RX ORDER — SODIUM CHLORIDE 0.9 % (FLUSH) 0.9 %
10 SYRINGE (ML) INJECTION
Status: CANCELLED | OUTPATIENT
Start: 2024-04-04

## 2024-04-03 RX ORDER — EPINEPHRINE 0.3 MG/.3ML
0.3 INJECTION SUBCUTANEOUS ONCE AS NEEDED
Status: CANCELLED | OUTPATIENT
Start: 2024-04-04

## 2024-04-03 RX ORDER — HEPARIN 100 UNIT/ML
500 SYRINGE INTRAVENOUS
Status: CANCELLED | OUTPATIENT
Start: 2024-04-04

## 2024-04-03 RX ORDER — DIPHENHYDRAMINE HYDROCHLORIDE 50 MG/ML
50 INJECTION INTRAMUSCULAR; INTRAVENOUS ONCE AS NEEDED
Status: CANCELLED | OUTPATIENT
Start: 2024-04-04

## 2024-04-03 NOTE — PROGRESS NOTES
[facility] MEDICAL ONCOLOGY - FOLLOW UP VISIT    Reason for visit: Follow up visit for adenocarcinoma of the lung      Oncology History   Malignant neoplasm of upper-outer quadrant of right breast in female, estrogen receptor positive   4/13/2021 Biopsy    Breast, right, 10:00 5N, biopsy:  - Invasive ductal carcinoma with lobular features     4/13/2021 Breast Tumor Markers    Estrogen Receptor: Positive >90%  Progesterone Receptor: Positive 10-50%  HER2: Negative  Ki67: 10-30%     4/26/2021 Genetic Testing    MyRisk: negative     5/12/2021 Breast Surgery    Right partial mastectomy with SLNB     6/1/2021 Tumor Conference    Radiation options? Offer radiation, can discuss partial vs whole breast radiation.        6/2/2021 Cancer Staged    Staging form: Breast, AJCC 8th Edition  - Pathologic stage from 6/2/2021: Stage IA (pT1b, pN0(sn), cM0, G2, ER+, HI+, HER2-)     7/6/2021 - 7/27/2021 Radiation Therapy    Treatment Summary  Course: C1 Chest 2021  Treatment Site Energy Dose/Fx (Gy) #Fx Total Dose (Gy) Start Date End Date Elapsed Days   Breast_Rt 6X 2.65 16 / 16 42.4 7/6/2021 7/27/2021 21        7/2021 -  Hormone Therapy    Letrozole      Procedure    Bronchoscopy, Station 7 positive for malignancy     Adenocarcinoma of lower lobe of right lung   10/5/2023 Imaging Significant Findings    CT C (obtained after CXR, which was itself obtained for palpitations)  - 2.1 x 1.3 cm spiculated RLL nodule, some spiculation noted to extend to the pleural margin  - Scattered pulmonary nodules centered mostly subpleural at the RLL (e.g. 0.9 cm)       10/10/2023 Imaging Significant Findings    PET CT  - 2.1 x 1.1 cm RLL nodule, SUV 3.8  - 0.9 cm R axillary LN, SUV 3.9  - 1.2 cm subcarinal LN, SUV 4.6  - 0.7 cm Ln along L posterior shoulder, SUV 1.7  - Multiple additional solid pulmonary nodules through R lung base, too small for uptake detection     11/14/2023 Procedure    Bronch with  EBUS  RLL, FNA  - Adenocarcinoma (TTF1 positive, GATA3 negative)    RLL, TBBx  - Adenocarcinoma    Per pulmonology, station 7 node was very vascular without window for biopsy, plan to discuss at thoracic tumor board    [facility] NGS  - KRAS G12A, CHEK1, MLH1, CTNNB1, CIC, PTPRD, NOTCH3, EZH2, LATS1, KMT2D  - Negative: EGFR, ALK, BRAF, KRAS, ROS1, RET, MET, EBB2  - PD-L1 1%       11/22/2023 Tumor Conference    Tumor Board  - Plan for axillary LN biopsy to determine lung vs recurrent breast vs other etiology  - Repeat CT C to evaluate nodules in RLL  - Determine treatment plan based on above results     11/22/2023 Tumor Genotyping    [facility] Liquid Biopsy  - No reportable pathogenic variants found     11/29/2023 Imaging Significant Findings    CT C  - 2.1 x 1.2 cm RLL spiculated nodule, stable in size w/ new central cavitation. Spiculated margins extend towards adjacent pleura.   - Stable irregular 2.0 cm linar opacity in LLL  - Stable 0.3 cm GGO, TRICIA  - Stable R basal sub cm nodules, largest 0.9 cm  - Several stable solid nodules predominantly in RLL, largest 0.9 cm     12/18/2023 Imaging Significant Findings    MRI Brain  - JELENA     1/3/2024 Procedure    IR Guided Biopsy, R Axillary LN  - No metastatic carcinoma (0.3 x 0.3 x 0.1 cm tissue, positive and negative controls stained appropriately)     1/5/2024 Imaging Significant Findings    PET CT  - Stable 2.0 x 1.1 cm RLL nodule (previously 2.1 x 1.1 cm)  - Stable additional nodules w/o significant racer uptake  - 0.8 cm R axillary node, SUV 4.2 (previously 0.9 cm, SUV 3.9)  - 0.5 cm L posterior shoulder node, SUV 2.3 (previously 0.7 cm, SUV 1.7)  - 1.3 cm subcarinal node, SUV 4.9 (previously 1.2 cm, SUV 4.6)     1/10/2024 Tumor Conference    Tumor Board   Re-sample enlarged, hypermetabolic axillary LN with repeat CT-guided biopsy versus excisional biopsy. If LN is negative for recurrent NSCLC, obtain EBUS of PET-avid mediastinal LN.         1/23/2024 Procedure    IR  Guided Biopsy, R Axillary LN (Second Biopsy)  - Negative for metastatic carcinoma     2/8/2024 Procedure    Bronch with EBUS  LN  Positive: Station 7 (Adenocarcinoma)  Negative: Station 11R     2/21/2024 Cancer Staged    Staging form: Lung, AJCC 8th Edition  - Clinical stage from 2/21/2024: Stage IIIA (cT1b, cN2, cM0)     2/21/2024 Tumor Conference    Thoracic Tumor Board  Stage IIIA right lower lobe adenocarcinoma with bulky subcarinal lymphadenopathy.  Proceed with NA chemo/I-O therapy. Return to [facility] for surgical evaluation. If patient is not a surgical candidate following 3 cycles of therapy, proceed with definitive chemo/RT.       2/22/2024 -  Chemotherapy    Treatment Summary   Plan Name: OP NSCLC NIVOLUMAB 360 MG PLUS CARBOPLATIN (AUC5) PEMETREXED 500 MG/M2 Q3W x 3 CYCLES  Treatment Goal: Control  Status: Active  Start Date: 2/22/2024  End Date: 4/5/2024 (Planned)  Provider: Bridger Nichole IV, MD  Chemotherapy: CARBOplatin (PARAPLATIN) 415 mg in sodium chloride 0.9% 326.5 mL chemo infusion, 415 mg, Intravenous, Clinic/HOD 1 time, 2 of 3 cycles  Administration: 415 mg (2/22/2024), 465 mg (3/14/2024)  PEMEtrexed disodium (ALIMTA) 750 mg in sodium chloride 0.9% SolP 100 mL chemo infusion, 500 mg/m2 = 750 mg, Intravenous, Clinic/HOD 1 time, 2 of 3 cycles  Administration: 750 mg (2/22/2024), 750 mg (3/14/2024)        - Second opinion, [facility]: contralateral LLL lesion stable but plan for sampling of this as well as subcarinal LN and possible additional RLL nodule. Pending results, consider NA --> surgery vs CRT    HPI:     Keo Frias is a 73 y.o. female with pmh significant for HTN, migraine headaches, and Stage IIIA (Q6tR3P4) adenocarcinoma of the RLL (no targetable mutations, PD-L1 1%), initially dx'd 11/14/23, currently on neoadjuvant carboplatin/pemetrexed/nivolumab (2/22/24 - present), who presents to for follow up. Of note, pt also has a history of Stage IA ( [id number], ER 95%, LA 10%, HER2 negative, Ki-67 20%)  "IDC of the R breast, initially dx'd 4/13/21, s/p lumpectomy and SLNB (5/12/21), XRT (7/6/21-7/27/21), and currently on letrozole (7/2021 - present).     Last clinic 3/13/24, C2D1 on 3/14/24. Labs acceptable, planning for GCSF on 3/15. RTC and labs on 4/03, C3 on 4/04, GCSF on 4/05    Interval History:  - 2/22/24: C1D1 carboplatin/pemetrexed/nivolumab  - 4/2/24: Family medicine, plan to cut metoprolol in half to 25 mg daily, stopped taking losartan.     Pt states that she was more fatigued after her last cycle, starting around day 7  but says that this has improved in the past 2 days. She notes sometimes having loose stools intermittently, with one day described as watery but she attributes this to eating dragon fruit. She denies N/V, constipation, new/worsening SOB, new/worsening cough (states that this is "a lot better"), mucositis (didn't need to use ice chips this time), or other new or bothersome symptoms. She denies hiccups with this round of chemotherapy. She notes that she has been walking for exercise.     Past Medical History:   Past Medical History:   Diagnosis Date    Anemia     Anxiety disorder 8/28/2019    Cataract     Chronic right-sided low back pain without sciatica 10/20/2021    Hypercholesteremia 9/17/2019    Invasive ductal carcinoma of breast, female, right 4/16/2021    Migraine with vision problems     Subclinical hypothyroidism     White coat syndrome with hypertension         Past Surgical History:   Past Surgical History:   Procedure Laterality Date    COLON SURGERY      COLONOSCOPY N/A 12/7/2018    Procedure: COLONOSCOPY;  Surgeon: Bhargav Gaston MD;  Location: [facility] (4TH FLR);  Service: Endoscopy;  Laterality: N/A;    DILATION AND CURETTAGE OF UTERUS      postmenopausal bleeding    ENDOBRONCHIAL ULTRASOUND N/A 11/14/2023    Procedure: ENDOBRONCHIAL ULTRASOUND (EBUS);  Surgeon: Kirt Gr MD;  Location: [facility] OR 2[facility];  Service: Pulmonary;  Laterality: N/A;    MASTECTOMY, " PARTIAL Right 5/12/2021    Procedure: MASTECTOMY, PARTIAL-Right with radiological marker;  Surgeon: Vivian Cadena MD;  Location: [facility] OR;  Service: General;  Laterality: Right;    ROBOTIC BRONCHOSCOPY N/A 11/14/2023    Procedure: ROBOTIC BRONCHOSCOPY;  Surgeon: Kirt Gr MD;  Location: [facility] OR 2ND FLR;  Service: Pulmonary;  Laterality: N/A;    SENTINEL LYMPH NODE BIOPSY Right 5/12/2021    Procedure: BIOPSY, LYMPH NODE, SENTINEL-Right;  Surgeon: Vivian Cadena MD;  Location: [facility] OR;  Service: General;  Laterality: Right;    TUBAL LIGATION          Family History:   Family History   Problem Relation Age of Onset    Diabetes Brother     Hypertension Brother     Glaucoma Brother     Glaucoma Father     Cataracts Father     Retinal detachment Father     Lung cancer Mother     Uterine cancer Maternal Grandmother     Stroke Maternal Grandmother     Stroke Paternal Grandmother     Amblyopia Neg Hx     Blindness Neg Hx     Macular degeneration Neg Hx     Strabismus Neg Hx     Thyroid disease Neg Hx         Social History:   Social History     Tobacco Use    Smoking status: Never    Smokeless tobacco: Never   Substance Use Topics    Alcohol use: No        I have reviewed and updated the patient's past medical, surgical, family and social histories.      ROS:   As per HPI.     Allergies:   Review of patient's allergies indicates:   Allergen Reactions    Sinus allergy Other (See Comments)     Headaches, migranes    Buspar [buspirone] Other (See Comments)     Said cardiology told her to stop IF on Lexapro    Sulfa (sulfonamide antibiotics) Rash        Medications:   Current Outpatient Medications   Medication Sig Dispense Refill    atorvastatin (LIPITOR) 20 MG tablet Take 2 tablets (40 mg total) by mouth once daily. 180 tablet 3    benzonatate (TESSALON PERLES) 100 MG capsule Take 1 capsule (100 mg total) by mouth 3 (three) times daily as needed for Cough. 30 capsule 1    calcium carbonate (CALCIUM 500 ORAL) Take 1  tablet by mouth.      cholecalciferol, vitamin D3, 125 mcg (5,000 unit) capsule Take 1 capsule (5,000 Units total) by mouth daily with breakfast. 100 capsule 4    CIPRODEX 0.3-0.1 % DrpS PLACE 4 DROPS INTO THE RIGHT EAR 2 (TWO) TIMES DAILY. FOR 10 DAYS 7.5 mL 5    cloNIDine (CATAPRES) 0.1 MG tablet Take 1 tablet (0.1 mg total) by mouth daily as needed (for systolic greater than or equal to 160 mmHg). 90 tablet 3    dexAMETHasone (DECADRON) 4 MG Tab Take 2 tablets (8 mg total) by mouth As instructed. Take 2 tablets (8 mg) by mouth once on the day prior to chemotherapy then daily on days 2,3,4 following chemotherapy. 24 tablet 0    duke's soln (benadryl 30 mL, mylanta 30 mL, LIDOcaine 30 mL, nystatin 30 mL) 120mL Take 10 mLs by mouth 4 (four) times daily. 120 mL 0    EScitalopram oxalate (LEXAPRO) 5 MG Tab Take 1 tablet (5 mg total) by mouth once daily. 90 tablet 4    famotidine (PEPCID) 10 MG tablet Take 10 mg by mouth as needed.      folic acid (FOLVITE) 1 MG tablet Take 1 tablet (1 mg total) by mouth once daily. Take 1 tablet (1 mg) by mouth once daily starting one week prior to pemetrexed and continuing for at least 21 days after stopping pemetrexed. 90 tablet 0    Lactobacillus rhamnosus GG (CULTURELLE) 10 billion cell capsule Take 1 capsule by mouth once daily. Pt takes Align probiotic      letrozole (FEMARA) 2.5 mg Tab TAKE 1 TABLET EVERY DAY 90 tablet 3    metoprolol succinate (TOPROL-XL) 50 MG 24 hr tablet Take 1 tablet (50 mg total) by mouth once daily. 90 tablet 4    multivitamin-iron-folic acid Tab Take 1 tablet by mouth once daily.      OLANZapine (ZYPREXA) 5 MG tablet Take 1 tablet (5 mg total) by mouth every evening. Take 1 tablet by mouth every evening on days 1-4 of each chemotherapy cycle 30 tablet 1    omeprazole (PRILOSEC OTC) 20 MG tablet Take 20 mg by mouth once daily.      prochlorperazine (COMPAZINE) 10 MG tablet Take 1 tablet (10 mg total) by mouth every 6 (six) hours as needed (nausea). 30  "tablet 1    PROLIA 60 mg/mL Syrg        No current facility-administered medications for this visit.          Physical Exam:       Ht 5' 2" (1.575 m)   LMP  (LMP Unknown)   BMI 20.93 kg/m²                Physical Exam  Constitutional:       Appearance: Normal appearance.   HENT:      Head: Normocephalic and atraumatic.   Eyes:      Extraocular Movements: Extraocular movements intact.      Conjunctiva/sclera: Conjunctivae normal.      Pupils: Pupils are equal, round, and reactive to light.   Cardiovascular:      Rate and Rhythm: Normal rate and regular rhythm.      Heart sounds: No murmur heard.     No friction rub. No gallop.   Pulmonary:      Effort: Pulmonary effort is normal.      Breath sounds: No wheezing, rhonchi or rales.   Chest:      Comments: No palpable R axillary LAD  Musculoskeletal:         General: Normal range of motion.   Skin:     General: Skin is warm and dry.   Neurological:      Mental Status: She is alert and oriented to person, place, and time.   Psychiatric:         Mood and Affect: Mood normal.         Thought Content: Thought content normal.         Judgment: Judgment normal.           Labs:   Recent Results (from the past 48 hour(s))   CBC Oncology    Collection Time: 04/02/24  4:27 PM   Result Value Ref Range    WBC 3.71 (L) 3.90 - 12.70 K/uL    RBC 3.79 (L) 4.00 - 5.40 M/uL    Hemoglobin 10.7 (L) 12.0 - 16.0 g/dL    Hematocrit 34.0 (L) 37.0 - 48.5 %    MCV 90 82 - 98 fL    MCH 28.2 27.0 - 31.0 pg    MCHC 31.5 (L) 32.0 - 36.0 g/dL    RDW 14.5 11.5 - 14.5 %    Platelets 293 150 - 450 K/uL    MPV 11.3 9.2 - 12.9 fL    Gran # (ANC) 1.5 (L) 1.8 - 7.7 K/uL    Immature Grans (Abs) 0.01 0.00 - 0.04 K/uL   Comprehensive Metabolic Panel    Collection Time: 04/02/24  4:27 PM   Result Value Ref Range    Sodium 140 136 - 145 mmol/L    Potassium 4.8 3.5 - 5.1 mmol/L    Chloride 107 95 - 110 mmol/L    CO2 23 23 - 29 mmol/L    Glucose 96 70 - 110 mg/dL    BUN 7 (L) 8 - 23 mg/dL    Creatinine 0.6 0.5 - " 1.4 mg/dL    Calcium 8.7 8.7 - 10.5 mg/dL    Total Protein 6.3 6.0 - 8.4 g/dL    Albumin 3.2 (L) 3.5 - 5.2 g/dL    Total Bilirubin 0.2 0.1 - 1.0 mg/dL    Alkaline Phosphatase 70 55 - 135 U/L    AST 29 10 - 40 U/L    ALT 27 10 - 44 U/L    eGFR >60 >60 mL/min/1.73 m^2    Anion Gap 10 8 - 16 mmol/L   T4, Free    Collection Time: 04/02/24  4:27 PM   Result Value Ref Range    Free T4 1.92 (H) 0.71 - 1.51 ng/dL   TSH    Collection Time: 04/02/24  4:27 PM   Result Value Ref Range    TSH 0.014 (L) 0.400 - 4.000 uIU/mL   Magnesium    Collection Time: 04/02/24  4:27 PM   Result Value Ref Range    Magnesium 2.2 1.6 - 2.6 mg/dL        Imaging:         Path:  See oncologic history above.      Assessment and Plan:     Keo Frias is a 73 y.o. female with pmh significant for HTN, migraine headaches, and Stage IIIA (V7oQ8P0) adenocarcinoma of the RLL (no targetable mutations, PD-L1 1%), initially dx'd 11/14/23, currently on neoadjuvant carboplatin/pemetrexed/nivolumab (2/22/24 - present), who presents to for follow up. Of note, pt also has a history of Stage IA ( I4aM5D5, ER 95%, MS 10%, HER2 negative, Ki-67 20%) IDC of the R breast, initially dx'd 4/13/21, s/p lumpectomy and SLNB (5/12/21), XRT (7/6/21-7/27/21), and currently on letrozole (7/2021 - present).     Adenocarcinoma of RLL, Uncertain Stage  ECOG PS 1. The patient is currently on NA carboplatin/pemetrexed/nivolumab, treatment complicated by G1 mucositis after C1, neutropenia, and fatigue.  Most recent imaging is a PET-CT on 01/05/2024 prior to treatment start.  Will proceed with C3D1 tomorrow (4/04/24).  Patient has a lab and CT appointment on 04/24/2024 followed by medical oncology, radiation oncology, and surgical oncology followups on 04/25/2024 at [facility].    Of note, pt did develop mucositis this cycle and her ANC prior to C2 is 1700. Given age >65 and borderline neutropenia with the first cycle, she was started on GCSF with C2.     PLAN:   -- Proceed with C3D1  on 4/4/24. Labs acceptable (ANC 1500, GCSF scheduled and will proceed given curative intent; Hgb downtrending to 10.7; T4 and TSH as below) and treatment signed.   -- GCSF on 4/05/24  -- Labs and CT C at [facility] on 4/24/24; medical oncology, radiation oncology, and CT surgery at [facility] on 4/25/24  -- RTC on 5/2/24 for review of the above      Mucositis  Patient developed mild mucositis around day 5 of cycle 1 which resolved with cold ice chips.  Pt without similar symptoms for C2.  -- Duke's solution prescribed      Subclinical Hyperthyroidism  TSH 0.014 and free T4 1.92 on 4/2/24, newly deranged. Pt w/ fatigue but given velocity (worse after chemotherapy, improving subsequently), this is more consistent with chemotherapy-induced fatigue.   -- Repeat TSH and T4 in 2-3 weeks      Right IDC, ER+/VA+/HER2-  S/p lumpectomy with SLNB, radiation therapy, and currently on letrozole and denosumab, tolerating well. Concern for possible R axillary recurrence, workup as below.   -- Okay to continue letrozole  -- Okay to continue denosumab  -- Breast oncology team aware      The above information has been reviewed with the patient and all questions have been answered to their apparent satisfaction.  They understand that they can call the clinic with any questions.    Bridger Nichole MD  Hematology/Oncology  [facility]        Med Onc Chart Routing      Follow up with physician . TSH and T4 in 2-3 weeks, will need lab appointment for this but does not need to see me. RTC on 5/2/24.   Follow up with BRENDA    Infusion scheduling note    Injection scheduling note    Labs    Imaging    Pharmacy appointment    Other referrals

## 2024-04-04 ENCOUNTER — INFUSION (OUTPATIENT)
Dept: INFUSION THERAPY | Facility: HOSPITAL | Age: 74
End: 2024-04-04
Attending: FAMILY MEDICINE
Payer: MEDICARE

## 2024-04-04 VITALS
WEIGHT: 115.06 LBS | BODY MASS INDEX: 21.17 KG/M2 | HEIGHT: 62 IN | SYSTOLIC BLOOD PRESSURE: 144 MMHG | TEMPERATURE: 98 F | HEART RATE: 81 BPM | RESPIRATION RATE: 16 BRPM | OXYGEN SATURATION: 100 % | DIASTOLIC BLOOD PRESSURE: 61 MMHG

## 2024-04-04 DIAGNOSIS — C34.31 ADENOCARCINOMA OF LOWER LOBE OF RIGHT LUNG: Primary | ICD-10-CM

## 2024-04-04 PROCEDURE — A4216 STERILE WATER/SALINE, 10 ML: HCPCS | Performed by: HOSPITALIST

## 2024-04-04 PROCEDURE — 96411 CHEMO IV PUSH ADDL DRUG: CPT

## 2024-04-04 PROCEDURE — 96417 CHEMO IV INFUS EACH ADDL SEQ: CPT

## 2024-04-04 PROCEDURE — 96367 TX/PROPH/DG ADDL SEQ IV INF: CPT

## 2024-04-04 PROCEDURE — 25000003 PHARM REV CODE 250: Performed by: HOSPITALIST

## 2024-04-04 PROCEDURE — 96375 TX/PRO/DX INJ NEW DRUG ADDON: CPT

## 2024-04-04 PROCEDURE — 96413 CHEMO IV INFUSION 1 HR: CPT

## 2024-04-04 PROCEDURE — 63600175 PHARM REV CODE 636 W HCPCS: Mod: JZ,JG | Performed by: HOSPITALIST

## 2024-04-04 RX ORDER — DIPHENHYDRAMINE HYDROCHLORIDE 50 MG/ML
50 INJECTION INTRAMUSCULAR; INTRAVENOUS ONCE AS NEEDED
Status: DISCONTINUED | OUTPATIENT
Start: 2024-04-04 | End: 2024-04-04 | Stop reason: HOSPADM

## 2024-04-04 RX ORDER — EPINEPHRINE 0.3 MG/.3ML
0.3 INJECTION SUBCUTANEOUS ONCE AS NEEDED
Status: DISCONTINUED | OUTPATIENT
Start: 2024-04-04 | End: 2024-04-04 | Stop reason: HOSPADM

## 2024-04-04 RX ORDER — PROCHLORPERAZINE EDISYLATE 5 MG/ML
5 INJECTION INTRAMUSCULAR; INTRAVENOUS ONCE AS NEEDED
Status: DISCONTINUED | OUTPATIENT
Start: 2024-04-04 | End: 2024-04-04 | Stop reason: HOSPADM

## 2024-04-04 RX ORDER — SODIUM CHLORIDE 0.9 % (FLUSH) 0.9 %
10 SYRINGE (ML) INJECTION
Status: DISCONTINUED | OUTPATIENT
Start: 2024-04-04 | End: 2024-04-04 | Stop reason: HOSPADM

## 2024-04-04 RX ORDER — HEPARIN 100 UNIT/ML
500 SYRINGE INTRAVENOUS
Status: DISCONTINUED | OUTPATIENT
Start: 2024-04-04 | End: 2024-04-04 | Stop reason: HOSPADM

## 2024-04-04 RX ADMIN — SODIUM CHLORIDE: 9 INJECTION, SOLUTION INTRAVENOUS at 08:04

## 2024-04-04 RX ADMIN — CARBOPLATIN 465 MG: 10 INJECTION INTRAVENOUS at 11:04

## 2024-04-04 RX ADMIN — DEXAMETHASONE SODIUM PHOSPHATE 0.25 MG: 10 INJECTION, SOLUTION INTRAMUSCULAR; INTRAVENOUS at 10:04

## 2024-04-04 RX ADMIN — Medication 10 ML: at 11:04

## 2024-04-04 RX ADMIN — SODIUM CHLORIDE 750 MG: 9 INJECTION, SOLUTION INTRAVENOUS at 10:04

## 2024-04-04 RX ADMIN — SODIUM CHLORIDE 360 MG: 9 INJECTION, SOLUTION INTRAVENOUS at 09:04

## 2024-04-04 RX ADMIN — APREPITANT 130 MG: 130 INJECTION, EMULSION INTRAVENOUS at 10:04

## 2024-04-04 NOTE — PLAN OF CARE
Patient tolerated D1C3 Opdivo/Alimta/Carbo well today. PIV removed, catheter tip intact. Plan to rtc tomorrow for injection. AVS given and reviewed upcoming appts. Discharged home, ambulated independently with daughter by side.

## 2024-04-04 NOTE — PLAN OF CARE
Problem: Adult Inpatient Plan of Care  Goal: Optimal Comfort and Wellbeing  Intervention: Provide Person-Centered Care  Flowsheets (Taken 4/4/2024 [id number])  Trust Relationship/Rapport:   care explained   questions answered   choices provided   questions encouraged   emotional support provided   reassurance provided   empathic listening provided   thoughts/feelings acknowledged

## 2024-04-05 ENCOUNTER — INFUSION (OUTPATIENT)
Dept: INFUSION THERAPY | Facility: HOSPITAL | Age: 74
End: 2024-04-05
Attending: FAMILY MEDICINE
Payer: MEDICARE

## 2024-04-05 VITALS
SYSTOLIC BLOOD PRESSURE: 139 MMHG | RESPIRATION RATE: 18 BRPM | HEART RATE: 92 BPM | OXYGEN SATURATION: 100 % | DIASTOLIC BLOOD PRESSURE: 65 MMHG

## 2024-04-05 DIAGNOSIS — C34.31 ADENOCARCINOMA OF LOWER LOBE OF RIGHT LUNG: Primary | ICD-10-CM

## 2024-04-05 PROCEDURE — 96372 THER/PROPH/DIAG INJ SC/IM: CPT

## 2024-04-05 PROCEDURE — 63600175 PHARM REV CODE 636 W HCPCS: Mod: JZ,JG | Performed by: HOSPITALIST

## 2024-04-05 RX ADMIN — PEGFILGRASTIM-JMDB 6 MG: 6 INJECTION SUBCUTANEOUS at 02:04

## 2024-04-05 NOTE — PLAN OF CARE
Pt tolerated Fulphila injection well, no complaints at this time. No adverse reactions noted. Next appointment scheduled and pt d/c in no acute distress.

## 2024-04-15 ENCOUNTER — PATIENT MESSAGE (OUTPATIENT)
Dept: HEMATOLOGY/ONCOLOGY | Facility: CLINIC | Age: 74
End: 2024-04-15
Payer: MEDICARE

## 2024-04-26 ENCOUNTER — PATIENT MESSAGE (OUTPATIENT)
Dept: HEMATOLOGY/ONCOLOGY | Facility: CLINIC | Age: 74
End: 2024-04-26
Payer: MEDICARE

## 2024-04-26 NOTE — H&P
"ED Observation Unit  History and Physical      I assumed care of this patient from the Main ED at onset of observation time, 2019 on 09/12/2023.       History of Present Illness:    73-year-old female medical history of anxiety, hypercholesterol and HTN, breast cancer on Letrozole/Femara (aromatase inhibitor for about 2 years) s/p R lumpectomy in 2021, osteoprosis on prolia injections, presents to [facility] on 9/12/2023 for emergent evaluation of epigastric and chest pain.     Patient has been experiencing epigastric and sternal chest "gas" pain for 2 weeks that she has mainly when she lays down. Patient had an episode last night that lasted 30 minutes.  Patient reports when pain came on she took a dose of her clonidine.  The daughter reports while it decreased her blood pressure from 170s it however did remain low.  Patient denies onset of symptoms with exertion. She had palpitations when she presented to the ED on 8/23 that resolved. She occasionally has dizziness and weakness when she gets up to walk.     Patient's daughter who assists in providing history also reports patient was had medication changes recently over last 2 weeks.  She was previously on losartan however change to metoprolol, over the last week and a half she was also begin taking clonidine 0.1 mg prn. Patient denies fever, chills, SOB, nausea, and vomiting.     In the ED, BP elevated. Other vitals stable. ECG with NSR at 63 bpm. PACs seen on last ECG not apparent. Nonspecific T wave inversions noted. No ST changes. Normal intervals. No STEMI. Trop x2 neg. BNP at 253. CXR without cardiomegaly. No other acute processes. She has a 1.4 cm nodular density in the R upper lung zone. Non-emergent CT of chest recommended.     I reviewed the ED Provider Note dated 9/122023 prior to my evaluation of this patient.  I reviewed all labs and imaging performed in the Main ED, prior to patient being placed in Observation. Patient was placed in the ED Observation " Unit for trending trops and stress ECHO.    PMHx   Past Medical History:   Diagnosis Date    Anemia     Anxiety disorder 8/28/2019    Cataract     Chronic right-sided low back pain without sciatica 10/20/2021    Hypercholesteremia 9/17/2019    Invasive ductal carcinoma of breast, female, right 4/16/2021    Migraine with vision problems     Subclinical hypothyroidism     White coat syndrome with hypertension       Past Surgical History:   Procedure Laterality Date    COLON SURGERY      COLONOSCOPY N/A 12/7/2018    Procedure: COLONOSCOPY;  Surgeon: Bhargav Gaston MD;  Location: [facility] ENDO ([address] [place] [address]);  Service: Endoscopy;  Laterality: N/A;    DILATION AND CURETTAGE OF UTERUS      postmenopausal bleeding    MASTECTOMY, PARTIAL Right 5/12/2021    Procedure: MASTECTOMY, PARTIAL-Right with radiological marker;  Surgeon: Vivian Cadena MD;  Location: [facility];  Service: General;  Laterality: Right;    SENTINEL LYMPH NODE BIOPSY Right 5/12/2021    Procedure: BIOPSY, LYMPH NODE, SENTINEL-Right;  Surgeon: Vivian Cadena MD;  Location: [facility];  Service: General;  Laterality: Right;    TUBAL LIGATION          Family Hx   Family History   Problem Relation Age of Onset    Diabetes Brother     Hypertension Brother     Glaucoma Brother     Glaucoma Father     Cataracts Father     Retinal detachment Father     Lung cancer Mother     Uterine cancer Maternal Grandmother     Stroke Maternal Grandmother     Stroke Paternal Grandmother     Amblyopia Neg Hx     Blindness Neg Hx     Macular degeneration Neg Hx     Strabismus Neg Hx     Thyroid disease Neg Hx         Social Hx   Social History     Socioeconomic History    Marital status:     Number of children: 2   Occupational History     Employer: [employer]   Tobacco Use    Smoking status: Never    Smokeless tobacco: Never   Substance and Sexual Activity    Alcohol use: No    Drug use: Never    Sexual activity: Yes     Partners: Male     Birth control/protection: None         Vital Signs   Vitals:    09/12/23 1507 09/12/23 1809 09/12/23 2105   BP: (!) 189/86 (!) 169/83 (!) 193/86   BP Location:  Left arm Left arm   Patient Position:  Sitting Lying   Pulse: 63 61 68   Resp: 18 20 19   Temp: 98.8 °F (37.1 °C) 98 °F (36.7 °C) 98.6 °F (37 °C)   TempSrc: Oral Oral Oral   SpO2: 98% 97% 98%   Weight: 52.2 kg (115 lb)          Review of Systems  Review of Systems   Constitutional:  Negative for chills, diaphoresis and fever.   Eyes:  Negative for pain, discharge and redness.   Respiratory:  Negative for cough and shortness of breath.    Cardiovascular:  Positive for chest pain.   Gastrointestinal:  Positive for abdominal pain and nausea. Negative for diarrhea and vomiting.   Genitourinary:  Negative for hematuria.   Musculoskeletal:  Negative for back pain, falls and neck pain.   Skin:  Negative for rash.   Neurological:  Positive for dizziness. Negative for seizures, loss of consciousness and weakness.       Physical Exam  Physical Exam  Constitutional:       General: She is not in acute distress.     Appearance: She is not ill-appearing, toxic-appearing or diaphoretic.   HENT:      Head: Atraumatic.      Right Ear: External ear normal.      Left Ear: External ear normal.      Nose: Nose normal.   Eyes:      Extraocular Movements: Extraocular movements intact.   Cardiovascular:      Rate and Rhythm: Normal rate and regular rhythm.   Pulmonary:      Effort: No tachypnea, accessory muscle usage or respiratory distress.      Breath sounds: No stridor. No wheezing.   Abdominal:      General: There is no distension.   Musculoskeletal:         General: Normal range of motion.      Cervical back: Normal range of motion. No rigidity.   Skin:     Coloration: Skin is not jaundiced.      Findings: No rash.   Neurological:      General: No focal deficit present.      Mental Status: She is alert. Mental status is at baseline.         Medications:   Scheduled Meds:   letrozole  2.5 mg Oral QHS     0 losartan  25 mg Oral Once     Continuous Infusions:  PRN Meds:.      Assessment/Plan:    Epigastric and sternal chest pain:  -ECG with NSR at 63 bpm. PACs seen on last ECG not apparent. Nonspecific T wave inversions noted. No ST changes. Normal intervals. No STEMI.   -Trop x3 neg.   -BNP at 253. CXR without cardiomegaly. No other acute processes. She has a 1.4 cm nodular density in the R upper lung zone. Non-emergent CT of chest recommended.   -Patient asymptomatic.   -D dimer ordered. Negative. Doubt PE.  -Treadmill stress echo ordered. Last had metoprolol at 0940 this morning. Will hold.     HTN:  -Losartan d/c on 8/23/2023 and switched to metoprolol during ED visit for palpitations where ECG showed PACs.   -Last dose of BB at 0940 this morning. Will hold for stress echo test.   -Will restart Losartan 25 mg daily.    Right breast cancer:  -Continue Letrozole/Femara (aromatase inhibitor) daily.  -Follow up with Oncology    Case was discussed with the ED provider, Asha Aguilar PA-C.        Megan Yo PA-C  Emergent Department  [facility]  Spectralink #[id number] or #[phone]

## 2024-04-29 ENCOUNTER — PATIENT MESSAGE (OUTPATIENT)
Dept: FAMILY MEDICINE | Facility: CLINIC | Age: 74
End: 2024-04-29
Payer: MEDICARE

## 2024-04-30 ENCOUNTER — LAB VISIT (OUTPATIENT)
Dept: LAB | Facility: HOSPITAL | Age: 74
End: 2024-04-30
Attending: FAMILY MEDICINE
Payer: MEDICARE

## 2024-04-30 ENCOUNTER — PATIENT MESSAGE (OUTPATIENT)
Dept: FAMILY MEDICINE | Facility: CLINIC | Age: 74
End: 2024-04-30
Payer: MEDICARE

## 2024-04-30 DIAGNOSIS — Z79.899 LONG-TERM CURRENT USE OF PROTON PUMP INHIBITOR THERAPY: ICD-10-CM

## 2024-04-30 DIAGNOSIS — Z79.899 MEDICATION MANAGEMENT: ICD-10-CM

## 2024-04-30 DIAGNOSIS — C79.9 METASTATIC NEOPLASM: ICD-10-CM

## 2024-04-30 DIAGNOSIS — I10 BENIGN ESSENTIAL HYPERTENSION: ICD-10-CM

## 2024-04-30 DIAGNOSIS — C50.411 MALIGNANT NEOPLASM OF UPPER-OUTER QUADRANT OF RIGHT BREAST IN FEMALE, ESTROGEN RECEPTOR POSITIVE: ICD-10-CM

## 2024-04-30 DIAGNOSIS — Z17.0 MALIGNANT NEOPLASM OF UPPER-OUTER QUADRANT OF RIGHT BREAST IN FEMALE, ESTROGEN RECEPTOR POSITIVE: ICD-10-CM

## 2024-04-30 DIAGNOSIS — R73.03 PREDIABETES: ICD-10-CM

## 2024-04-30 LAB
25(OH)D3+25(OH)D2 SERPL-MCNC: 85 NG/ML (ref 30–96)
ALBUMIN SERPL BCP-MCNC: 3.7 G/DL (ref 3.5–5.2)
ALP SERPL-CCNC: 69 U/L (ref 55–135)
ALT SERPL W/O P-5'-P-CCNC: 21 U/L (ref 10–44)
ANION GAP SERPL CALC-SCNC: 10 MMOL/L (ref 8–16)
AST SERPL-CCNC: 30 U/L (ref 10–40)
BASOPHILS # BLD AUTO: 0.09 K/UL (ref 0–0.2)
BASOPHILS NFR BLD: 1.8 % (ref 0–1.9)
BILIRUB SERPL-MCNC: 0.5 MG/DL (ref 0.1–1)
BUN SERPL-MCNC: 8 MG/DL (ref 8–23)
CALCIUM SERPL-MCNC: 9.3 MG/DL (ref 8.7–10.5)
CHLORIDE SERPL-SCNC: 101 MMOL/L (ref 95–110)
CHOLEST SERPL-MCNC: 215 MG/DL (ref 120–199)
CHOLEST/HDLC SERPL: 4 {RATIO} (ref 2–5)
CO2 SERPL-SCNC: 27 MMOL/L (ref 23–29)
CREAT SERPL-MCNC: 0.7 MG/DL (ref 0.5–1.4)
DIFFERENTIAL METHOD BLD: ABNORMAL
EOSINOPHIL # BLD AUTO: 0.4 K/UL (ref 0–0.5)
EOSINOPHIL NFR BLD: 8 % (ref 0–8)
ERYTHROCYTE [DISTWIDTH] IN BLOOD BY AUTOMATED COUNT: 16.2 % (ref 11.5–14.5)
EST. GFR  (NO RACE VARIABLE): >60 ML/MIN/1.73 M^2
ESTIMATED AVG GLUCOSE: 114 MG/DL (ref 68–131)
GLUCOSE SERPL-MCNC: 90 MG/DL (ref 70–110)
HBA1C MFR BLD: 5.6 % (ref 4–5.6)
HCT VFR BLD AUTO: 39.4 % (ref 37–48.5)
HDLC SERPL-MCNC: 54 MG/DL (ref 40–75)
HDLC SERPL: 25.1 % (ref 20–50)
HGB BLD-MCNC: 12.8 G/DL (ref 12–16)
IMM GRANULOCYTES # BLD AUTO: 0.01 K/UL (ref 0–0.04)
IMM GRANULOCYTES NFR BLD AUTO: 0.2 % (ref 0–0.5)
LDLC SERPL CALC-MCNC: 141 MG/DL (ref 63–159)
LYMPHOCYTES # BLD AUTO: 1.6 K/UL (ref 1–4.8)
LYMPHOCYTES NFR BLD: 31.1 % (ref 18–48)
MAGNESIUM SERPL-MCNC: 1.8 MG/DL (ref 1.6–2.6)
MCH RBC QN AUTO: 29.2 PG (ref 27–31)
MCHC RBC AUTO-ENTMCNC: 32.5 G/DL (ref 32–36)
MCV RBC AUTO: 90 FL (ref 82–98)
MONOCYTES # BLD AUTO: 0.4 K/UL (ref 0.3–1)
MONOCYTES NFR BLD: 8.6 % (ref 4–15)
NEUTROPHILS # BLD AUTO: 2.6 K/UL (ref 1.8–7.7)
NEUTROPHILS NFR BLD: 50.3 % (ref 38–73)
NONHDLC SERPL-MCNC: 161 MG/DL
NRBC BLD-RTO: 0 /100 WBC
PLATELET # BLD AUTO: 199 K/UL (ref 150–450)
PMV BLD AUTO: 11.5 FL (ref 9.2–12.9)
POTASSIUM SERPL-SCNC: 4.2 MMOL/L (ref 3.5–5.1)
PROT SERPL-MCNC: 7.3 G/DL (ref 6–8.4)
RBC # BLD AUTO: 4.38 M/UL (ref 4–5.4)
SODIUM SERPL-SCNC: 138 MMOL/L (ref 136–145)
T4 FREE SERPL-MCNC: 0.71 NG/DL (ref 0.71–1.51)
TRIGL SERPL-MCNC: 100 MG/DL (ref 30–150)
TSH SERPL DL<=0.005 MIU/L-ACNC: 41.25 UIU/ML (ref 0.4–4)
VIT B12 SERPL-MCNC: >2000 PG/ML (ref 210–950)
WBC # BLD AUTO: 5.11 K/UL (ref 3.9–12.7)

## 2024-04-30 PROCEDURE — 84443 ASSAY THYROID STIM HORMONE: CPT | Performed by: FAMILY MEDICINE

## 2024-04-30 PROCEDURE — 83735 ASSAY OF MAGNESIUM: CPT | Performed by: FAMILY MEDICINE

## 2024-04-30 PROCEDURE — 83036 HEMOGLOBIN GLYCOSYLATED A1C: CPT | Performed by: FAMILY MEDICINE

## 2024-04-30 PROCEDURE — 84439 ASSAY OF FREE THYROXINE: CPT | Performed by: HOSPITALIST

## 2024-04-30 PROCEDURE — 82607 VITAMIN B-12: CPT | Performed by: FAMILY MEDICINE

## 2024-04-30 PROCEDURE — 82306 VITAMIN D 25 HYDROXY: CPT | Performed by: FAMILY MEDICINE

## 2024-04-30 PROCEDURE — 80061 LIPID PANEL: CPT | Performed by: FAMILY MEDICINE

## 2024-04-30 PROCEDURE — 85025 COMPLETE CBC W/AUTO DIFF WBC: CPT | Performed by: FAMILY MEDICINE

## 2024-04-30 PROCEDURE — 36415 COLL VENOUS BLD VENIPUNCTURE: CPT | Performed by: FAMILY MEDICINE

## 2024-04-30 PROCEDURE — 80053 COMPREHEN METABOLIC PANEL: CPT | Performed by: FAMILY MEDICINE

## 2024-05-02 ENCOUNTER — OFFICE VISIT (OUTPATIENT)
Dept: HEMATOLOGY/ONCOLOGY | Facility: CLINIC | Age: 74
End: 2024-05-02
Payer: MEDICARE

## 2024-05-02 VITALS
WEIGHT: 113.56 LBS | TEMPERATURE: 98 F | DIASTOLIC BLOOD PRESSURE: 84 MMHG | SYSTOLIC BLOOD PRESSURE: 143 MMHG | HEIGHT: 62 IN | BODY MASS INDEX: 20.9 KG/M2 | OXYGEN SATURATION: 99 % | RESPIRATION RATE: 18 BRPM | HEART RATE: 68 BPM

## 2024-05-02 DIAGNOSIS — C34.31 ADENOCARCINOMA OF LOWER LOBE OF RIGHT LUNG: Primary | ICD-10-CM

## 2024-05-02 DIAGNOSIS — E03.9 HYPOTHYROIDISM, UNSPECIFIED TYPE: ICD-10-CM

## 2024-05-02 PROCEDURE — 1101F PT FALLS ASSESS-DOCD LE1/YR: CPT | Mod: CPTII,S$GLB,, | Performed by: HOSPITALIST

## 2024-05-02 PROCEDURE — 1159F MED LIST DOCD IN RCRD: CPT | Mod: CPTII,S$GLB,, | Performed by: HOSPITALIST

## 2024-05-02 PROCEDURE — 99999 PR PBB SHADOW E&M-EST. PATIENT-LVL IV: CPT | Mod: PBBFAC,,, | Performed by: HOSPITALIST

## 2024-05-02 PROCEDURE — 4010F ACE/ARB THERAPY RXD/TAKEN: CPT | Mod: CPTII,S$GLB,, | Performed by: HOSPITALIST

## 2024-05-02 PROCEDURE — G2211 COMPLEX E/M VISIT ADD ON: HCPCS | Mod: S$GLB,,, | Performed by: HOSPITALIST

## 2024-05-02 PROCEDURE — 3288F FALL RISK ASSESSMENT DOCD: CPT | Mod: CPTII,S$GLB,, | Performed by: HOSPITALIST

## 2024-05-02 PROCEDURE — 3044F HG A1C LEVEL LT 7.0%: CPT | Mod: CPTII,S$GLB,, | Performed by: HOSPITALIST

## 2024-05-02 PROCEDURE — 3079F DIAST BP 80-89 MM HG: CPT | Mod: CPTII,S$GLB,, | Performed by: HOSPITALIST

## 2024-05-02 PROCEDURE — 1126F AMNT PAIN NOTED NONE PRSNT: CPT | Mod: CPTII,S$GLB,, | Performed by: HOSPITALIST

## 2024-05-02 PROCEDURE — 99214 OFFICE O/P EST MOD 30 MIN: CPT | Mod: S$GLB,,, | Performed by: HOSPITALIST

## 2024-05-02 PROCEDURE — 3077F SYST BP >= 140 MM HG: CPT | Mod: CPTII,S$GLB,, | Performed by: HOSPITALIST

## 2024-05-02 RX ORDER — LEVOTHYROXINE SODIUM 88 UG/1
88 TABLET ORAL
COMMUNITY
Start: 2024-04-26 | End: 2024-05-31 | Stop reason: SDUPTHER

## 2024-05-02 NOTE — PROGRESS NOTES
[facility] MEDICAL ONCOLOGY - FOLLOW UP VISIT    Reason for visit: Follow up visit for adenocarcinoma of the lung      Oncology History   Malignant neoplasm of upper-outer quadrant of right breast in female, estrogen receptor positive   4/13/2021 Biopsy    Breast, right, 10:00 5N, biopsy:  - Invasive ductal carcinoma with lobular features     4/13/2021 Breast Tumor Markers    Estrogen Receptor: Positive >90%  Progesterone Receptor: Positive 10-50%  HER2: Negative  Ki67: 10-30%     4/26/2021 Genetic Testing    MyRisk: negative     5/12/2021 Breast Surgery    Right partial mastectomy with SLNB     6/1/2021 Tumor Conference    Radiation options? Offer radiation, can discuss partial vs whole breast radiation.        6/2/2021 Cancer Staged    Staging form: Breast, AJCC 8th Edition  - Pathologic stage from 6/2/2021: Stage IA (pT1b, pN0(sn), cM0, G2, ER+, KY+, HER2-)     7/6/2021 - 7/27/2021 Radiation Therapy    Treatment Summary  Course: C1 Chest 2021  Treatment Site Energy Dose/Fx (Gy) #Fx Total Dose (Gy) Start Date End Date Elapsed Days   Breast_Rt 6X 2.65 16 / 16 42.4 7/6/2021 7/27/2021 21          7/2021 -  Hormone Therapy    Letrozole      Procedure    Bronchoscopy, Station 7 positive for malignancy     Adenocarcinoma of lower lobe of right lung   10/5/2023 Imaging Significant Findings    CT C (obtained after CXR, which was itself obtained for palpitations)  - 2.1 x 1.3 cm spiculated RLL nodule, some spiculation noted to extend to the pleural margin  - Scattered pulmonary nodules centered mostly subpleural at the RLL (e.g. 0.9 cm)       10/10/2023 Imaging Significant Findings    PET CT  - 2.1 x 1.1 cm RLL nodule, SUV 3.8  - 0.9 cm R axillary LN, SUV 3.9  - 1.2 cm subcarinal LN, SUV 4.6  - 0.7 cm Ln along L posterior shoulder, SUV 1.7  - Multiple additional solid pulmonary nodules through R lung base, too small for uptake detection     11/14/2023 Procedure    Bronch with  EBUS  RLL, FNA  - Adenocarcinoma (TTF1 positive, GATA3 negative)    RLL, TBBx  - Adenocarcinoma    Per pulmonology, station 7 node was very vascular without window for biopsy, plan to discuss at thoracic tumor board    [facility] NGS  - KRAS G12A, CHEK1, MLH1, CTNNB1, CIC, PTPRD, NOTCH3, EZH2, LATS1, KMT2D  - Negative: EGFR, ALK, BRAF, KRAS, ROS1, RET, MET, EBB2  - PD-L1 1%       11/22/2023 Tumor Conference    Tumor Board  - Plan for axillary LN biopsy to determine lung vs recurrent breast vs other etiology  - Repeat CT C to evaluate nodules in RLL  - Determine treatment plan based on above results     11/22/2023 Tumor Genotyping    [facility] Liquid Biopsy  - No reportable pathogenic variants found     11/29/2023 Imaging Significant Findings    CT C  - 2.1 x 1.2 cm RLL spiculated nodule, stable in size w/ new central cavitation. Spiculated margins extend towards adjacent pleura.   - Stable irregular 2.0 cm linar opacity in LLL  - Stable 0.3 cm GGO, TRICIA  - Stable R basal sub cm nodules, largest 0.9 cm  - Several stable solid nodules predominantly in RLL, largest 0.9 cm     12/18/2023 Imaging Significant Findings    MRI Brain  - JELENA     1/3/2024 Procedure    IR Guided Biopsy, R Axillary LN  - No metastatic carcinoma (0.3 x 0.3 x 0.1 cm tissue, positive and negative controls stained appropriately)     1/5/2024 Imaging Significant Findings    PET CT  - Stable 2.0 x 1.1 cm RLL nodule (previously 2.1 x 1.1 cm)  - Stable additional nodules w/o significant racer uptake  - 0.8 cm R axillary node, SUV 4.2 (previously 0.9 cm, SUV 3.9)  - 0.5 cm L posterior shoulder node, SUV 2.3 (previously 0.7 cm, SUV 1.7)  - 1.3 cm subcarinal node, SUV 4.9 (previously 1.2 cm, SUV 4.6)     1/10/2024 Tumor Conference    Tumor Board   Re-sample enlarged, hypermetabolic axillary LN with repeat CT-guided biopsy versus excisional biopsy. If LN is negative for recurrent NSCLC, obtain EBUS of PET-avid mediastinal LN.         1/23/2024 Procedure    IR  Guided Biopsy, R Axillary LN (Second Biopsy)  - Negative for metastatic carcinoma     2/8/2024 Procedure    Bronch with EBUS  LN  Positive: Station 7 (Adenocarcinoma)  Negative: Station 11R     2/21/2024 Cancer Staged    Staging form: Lung, AJCC 8th Edition  - Clinical stage from 2/21/2024: Stage IIIA (cT1b, cN2, cM0)     2/21/2024 Tumor Conference    Thoracic Tumor Board  Stage IIIA right lower lobe adenocarcinoma with bulky subcarinal lymphadenopathy.  Proceed with NA chemo/I-O therapy. Return to [facility] for surgical evaluation. If patient is not a surgical candidate following 3 cycles of therapy, proceed with definitive chemo/RT.       2/22/2024 -  Chemotherapy    Treatment Summary   Plan Name: OP NSCLC NIVOLUMAB 360 MG PLUS CARBOPLATIN (AUC5) PEMETREXED 500 MG/M2 Q3W x 3 CYCLES  Treatment Goal: Control  Status: Active  Start Date: 2/22/2024  End Date: 4/5/2024  Provider: Bridger Nichole IV, MD  Chemotherapy: CARBOplatin (PARAPLATIN) 415 mg in sodium chloride 0.9% 326.5 mL chemo infusion, 415 mg, Intravenous, Clinic/HOD 1 time, 3 of 3 cycles  Administration: 415 mg (2/22/2024), 465 mg (4/4/2024), 465 mg (3/14/2024)  PEMEtrexed disodium (ALIMTA) 750 mg in sodium chloride 0.9% SolP 100 mL chemo infusion, 500 mg/m2 = 750 mg, Intravenous, Clinic/HOD 1 time, 3 of 3 cycles  Administration: 750 mg (2/22/2024), 750 mg (4/4/2024), 750 mg (3/14/2024)     4/24/2024 Imaging Significant Findings    CT C/A/P  - 1.2 x 1.0 cm RLL malignancy, previously 2 x 1.2 cm  - Interval decrease in the previously seen nodules in the RLL  - 0.9 cm subcarinal node, previously 1.7 cm  - Interval decrease in right infrahilar soft tissue  - 2.1 cm LLL solid nodular opacity, unchanged  - 0.8 cm TRICIA ground-glass opacity, unchanged  - AVM again noted in RLL  - 0.6 cm right axillary lymph node, decreased in size from prior  - Left scapular lymph node decreased in size from prior, incompletely seen          - Second opinion, [facility]: contralateral LLL lesion  stable but plan for sampling of this as well as subcarinal LN and possible additional RLL nodule. Pending results, consider NA --> surgery vs CRT    HPI:     Keo Frias is a 73 y.o. female with pmh significant for HTN, migraine headaches, and Stage IIIA (J6nQ2I3) adenocarcinoma of the RLL (no targetable mutations, PD-L1 1%), initially dx'd 11/14/23, s/p neoadjuvant carboplatin/pemetrexed/nivolumab (2/22/24 - 4/4/24), who presents to for follow up. Of note, pt also has a history of Stage IA ( J3vR2N1, ER 95%, NH 10%, HER2 negative, Ki-67 20%) IDC of the R breast, initially dx'd 4/13/21, s/p lumpectomy and SLNB (5/12/21), XRT (7/6/21-7/27/21), and currently on letrozole (7/2021 - present).     Last clinic 04/03/2024, proceed with cycle 3 neoadjuvant therapy, repeat labs and imaging at [facility], C on 05/02.  Subclinical hyperthyroidism with repeat TSH and T4 in 2-3 weeks.    Interval History:  - 04/24/2024: CT C/A/P with a decrease in RLL primary tumor, subcarinal lymph node, and RLL nodules.  - 04/25/2024: Med onc at [facility] (Dr. Don)  - 04/26/2024:  Cardiothoracic surgery at [facility] (Dr. Reddy), patient is a suitable candidate for RLL lobectomy and mediastinal lymph node dissection    Patient states that she is doing well today.  She tolerated her last round of therapy without significant issue.  She confirms her discussions with the physicians at [facility].  The plan is to undergo her surgery on 05/06/2024.  She and her daughter have some questions about potential future options pending the results of the surgery.  She is no new or bothersome symptoms at this time.      Past Medical History:   Past Medical History:   Diagnosis Date    Anemia     Anxiety disorder 8/28/2019    Cataract     Chronic right-sided low back pain without sciatica 10/20/2021    Hypercholesteremia 9/17/2019    Invasive ductal carcinoma of breast, female, right 4/16/2021    Migraine with vision problems     Subclinical hypothyroidism     White coat  syndrome with hypertension         Past Surgical History:   Past Surgical History:   Procedure Laterality Date    COLON SURGERY      COLONOSCOPY N/A 12/7/2018    Procedure: COLONOSCOPY;  Surgeon: Bhargav Gaston MD;  Location: [facility] ENDO (4TH FLR);  Service: Endoscopy;  Laterality: N/A;    DILATION AND CURETTAGE OF UTERUS      postmenopausal bleeding    ENDOBRONCHIAL ULTRASOUND N/A 11/14/2023    Procedure: ENDOBRONCHIAL ULTRASOUND (EBUS);  Surgeon: Kirt Gr MD;  Location: [facility] OR 2ND FLR;  Service: Pulmonary;  Laterality: N/A;    MASTECTOMY, PARTIAL Right 5/12/2021    Procedure: MASTECTOMY, PARTIAL-Right with radiological marker;  Surgeon: Vivian Cadena MD;  Location: [facility] OR;  Service: General;  Laterality: Right;    ROBOTIC BRONCHOSCOPY N/A 11/14/2023    Procedure: ROBOTIC BRONCHOSCOPY;  Surgeon: Kirt Gr MD;  Location: [facility] OR 2ND FLR;  Service: Pulmonary;  Laterality: N/A;    SENTINEL LYMPH NODE BIOPSY Right 5/12/2021    Procedure: BIOPSY, LYMPH NODE, SENTINEL-Right;  Surgeon: Vivian Cadena MD;  Location: [facility] OR;  Service: General;  Laterality: Right;    TUBAL LIGATION          Family History:   Family History   Problem Relation Name Age of Onset    Diabetes Brother      Hypertension Brother      Glaucoma Brother      Glaucoma Father      Cataracts Father      Retinal detachment Father      Lung cancer Mother      Uterine cancer Maternal Grandmother      Stroke Maternal Grandmother      Stroke Paternal Grandmother      Amblyopia Neg Hx      Blindness Neg Hx      Macular degeneration Neg Hx      Strabismus Neg Hx      Thyroid disease Neg Hx          Social History:   Social History     Tobacco Use    Smoking status: Never    Smokeless tobacco: Never   Substance Use Topics    Alcohol use: No        I have reviewed and updated the patient's past medical, surgical, family and social histories.      ROS:   As per HPI.     Allergies:   Review of patient's allergies indicates:   Allergen  Reactions    Sinus allergy Other (See Comments)     Headaches, migranes    Buspar [buspirone] Other (See Comments)     Pt isnt allergic      Sulfa (sulfonamide antibiotics) Rash        Medications:   Current Outpatient Medications   Medication Sig Dispense Refill    atorvastatin (LIPITOR) 20 MG tablet Take 2 tablets (40 mg total) by mouth once daily. 180 tablet 3    benzonatate (TESSALON PERLES) 100 MG capsule Take 1 capsule (100 mg total) by mouth 3 (three) times daily as needed for Cough. 30 capsule 1    calcium carbonate (CALCIUM 500 ORAL) Take 1 tablet by mouth.      cholecalciferol, vitamin D3, 125 mcg (5,000 unit) capsule Take 1 capsule (5,000 Units total) by mouth daily with breakfast. 100 capsule 4    CIPRODEX 0.3-0.1 % DrpS PLACE 4 DROPS INTO THE RIGHT EAR 2 (TWO) TIMES DAILY. FOR 10 DAYS 7.5 mL 5    cloNIDine (CATAPRES) 0.1 MG tablet Take 1 tablet (0.1 mg total) by mouth daily as needed (for systolic greater than or equal to 160 mmHg). 90 tablet 3    dexAMETHasone (DECADRON) 4 MG Tab Take 2 tablets (8 mg total) by mouth As instructed. Take 2 tablets (8 mg) by mouth once on the day prior to chemotherapy then daily on days 2,3,4 following chemotherapy. 24 tablet 0    duke's soln (benadryl 30 mL, mylanta 30 mL, LIDOcaine 30 mL, nystatin 30 mL) 120mL Take 10 mLs by mouth 4 (four) times daily. 120 mL 0    EScitalopram oxalate (LEXAPRO) 5 MG Tab Take 1 tablet (5 mg total) by mouth once daily. 90 tablet 4    famotidine (PEPCID) 10 MG tablet Take 10 mg by mouth as needed.      folic acid (FOLVITE) 1 MG tablet Take 1 tablet (1 mg total) by mouth once daily. Take 1 tablet (1 mg) by mouth once daily starting one week prior to pemetrexed and continuing for at least 21 days after stopping pemetrexed. 90 tablet 0    Lactobacillus rhamnosus GG (CULTURELLE) 10 billion cell capsule Take 1 capsule by mouth once daily. Pt takes Align probiotic      letrozole (FEMARA) 2.5 mg Tab TAKE 1 TABLET EVERY DAY 90 tablet 3     metoprolol succinate (TOPROL-XL) 50 MG 24 hr tablet Take 1 tablet (50 mg total) by mouth once daily. 90 tablet 4    multivitamin-iron-folic acid Tab Take 1 tablet by mouth once daily.      OLANZapine (ZYPREXA) 5 MG tablet Take 1 tablet (5 mg total) by mouth every evening. Take 1 tablet by mouth every evening on days 1-4 of each chemotherapy cycle 30 tablet 1    omeprazole (PRILOSEC OTC) 20 MG tablet Take 20 mg by mouth once daily.      prochlorperazine (COMPAZINE) 10 MG tablet Take 1 tablet (10 mg total) by mouth every 6 (six) hours as needed (nausea). 30 tablet 1    PROLIA 60 mg/mL Syrg        No current facility-administered medications for this visit.          Physical Exam:       LMP  (LMP Unknown)                Physical Exam  Constitutional:       Appearance: Normal appearance.   HENT:      Head: Normocephalic and atraumatic.   Eyes:      Extraocular Movements: Extraocular movements intact.      Conjunctiva/sclera: Conjunctivae normal.      Pupils: Pupils are equal, round, and reactive to light.   Pulmonary:      Effort: Pulmonary effort is normal.      Comments: Speaking in complete sentences  Chest:      Comments: No palpable R axillary LAD  Musculoskeletal:         General: Normal range of motion.   Skin:     General: Skin is warm and dry.   Neurological:      Mental Status: She is alert and oriented to person, place, and time.   Psychiatric:         Mood and Affect: Mood normal.         Thought Content: Thought content normal.         Judgment: Judgment normal.           Labs:   No results found for this or any previous visit (from the past 48 hour(s)).       Imaging:         Path:  See oncologic history above.      Assessment and Plan:     baldomero Frias is a 73 y.o. female with pmh significant for HTN, migraine headaches, and Stage IIIA (A1dF8C2) adenocarcinoma of the RLL (no targetable mutations, PD-L1 1%), initially dx'd 11/14/23, s/p neoadjuvant carboplatin/pemetrexed/nivolumab (2/22/24 - 4/4/24), who  presents to for follow up. Of note, pt also has a history of Stage IA ( [id number], ER 95%, OR 10%, HER2 negative, Ki-67 20%) IDC of the R breast, initially dx'd 4/13/21, s/p lumpectomy and SLNB (5/12/21), XRT (7/6/21-7/27/21), and currently on letrozole (7/2021 - present).     Adenocarcinoma of RLL, Uncertain Stage  ECOG PS 1. The patient is now s/p 3 cycles of NA carboplatin/pemetrexed/nivolumab.  Her most recent imaging (CT C/A/P, 4/24/24, after 3 cycles of NA therapy) demonstrates decrement in the primary RLL lesion as well as satellite lesions in the RLL, the subcarinal lymph node, and right infrahilar soft tissue fullness; notably, the axillary lymph node (s/p multiple biopsies) and the left scapular lymph node both decreased after therapy which is concerning for malignant etiology.  This has been discussed at length with Dr. Don and Dr. Reddy at [facility] and the plan is to proceed with surgery for curative intent with ongoing monitoring of these lesions.  Also worth noting is that the LLL and TRICIA lesions are unchanged after systemic therapy, arguing against a malignant process these areas.  She has met with Medical Oncology, Radiation Oncology, and Surgical Oncology at [facility] with plans to undergo a right lower lobectomy with mediastinal lymph node dissection on 5/6/24.  We will follow patient up after completion of surgery.    PLAN:   -- Pt will reach out following completion of surgery regarding follow up at [facility] vs [facility]. Tentative RTC in 6 weeks.       Hypothyroidism  On 4/30/24, TSH was 41.25 and free T4 0.71, as compared to 4/2/24 when TSH was 0.014 and T4 was 1.92.  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  She has since been started on levothyroxine per Dr. Don.   -- Continue levothyroxine 88 mcg daily  -- Repeat labs w/ f/u      Right IDC, ER+/OR+/HER2-  S/p lumpectomy with SLNB, radiation therapy, and currently on letrozole and denosumab, tolerating well. Concern for possible R  axillary recurrence, workup as below.   -- Okay to continue letrozole  -- Okay to continue denosumab  -- Breast oncology team aware      The above information has been reviewed with the patient and all questions have been answered to their apparent satisfaction.  They understand that they can call the clinic with any questions.    Bridger Nichole MD  Hematology/Oncology  [facility]        Med Onc Chart Routing      Follow up with physician . RTC in 6 weeks, can be changed once pt reaches out.   Follow up with BRENDA    Infusion scheduling note    Injection scheduling note    Labs    Imaging    Pharmacy appointment    Other referrals

## 2024-05-12 ENCOUNTER — PATIENT MESSAGE (OUTPATIENT)
Dept: FAMILY MEDICINE | Facility: CLINIC | Age: 74
End: 2024-05-12
Payer: MEDICARE

## 2024-05-31 ENCOUNTER — OFFICE VISIT (OUTPATIENT)
Dept: FAMILY MEDICINE | Facility: CLINIC | Age: 74
End: 2024-05-31
Payer: MEDICARE

## 2024-05-31 ENCOUNTER — PATIENT MESSAGE (OUTPATIENT)
Dept: FAMILY MEDICINE | Facility: CLINIC | Age: 74
End: 2024-05-31
Payer: MEDICARE

## 2024-05-31 VITALS
DIASTOLIC BLOOD PRESSURE: 78 MMHG | SYSTOLIC BLOOD PRESSURE: 136 MMHG | BODY MASS INDEX: 20.32 KG/M2 | OXYGEN SATURATION: 98 % | HEART RATE: 68 BPM | WEIGHT: 110.44 LBS | HEIGHT: 62 IN

## 2024-05-31 DIAGNOSIS — Z79.811 AROMATASE INHIBITOR USE: ICD-10-CM

## 2024-05-31 DIAGNOSIS — C34.91 ADENOCARCINOMA, LUNG, RIGHT: ICD-10-CM

## 2024-05-31 DIAGNOSIS — Z00.00 ROUTINE GENERAL MEDICAL EXAMINATION AT A HEALTH CARE FACILITY: Primary | ICD-10-CM

## 2024-05-31 DIAGNOSIS — Z17.0 MALIGNANT NEOPLASM OF UPPER-OUTER QUADRANT OF RIGHT BREAST IN FEMALE, ESTROGEN RECEPTOR POSITIVE: ICD-10-CM

## 2024-05-31 DIAGNOSIS — F41.8 SITUATIONAL ANXIETY: ICD-10-CM

## 2024-05-31 DIAGNOSIS — I10 BENIGN ESSENTIAL HYPERTENSION: ICD-10-CM

## 2024-05-31 DIAGNOSIS — M81.0 AGE-RELATED OSTEOPOROSIS WITHOUT CURRENT PATHOLOGICAL FRACTURE: ICD-10-CM

## 2024-05-31 DIAGNOSIS — Z79.899 MEDICATION MANAGEMENT: ICD-10-CM

## 2024-05-31 DIAGNOSIS — R79.89 ABNORMAL TSH: ICD-10-CM

## 2024-05-31 DIAGNOSIS — E55.9 VITAMIN D DEFICIENCY: ICD-10-CM

## 2024-05-31 DIAGNOSIS — E78.2 MIXED HYPERLIPIDEMIA: ICD-10-CM

## 2024-05-31 DIAGNOSIS — Z12.31 SCREENING MAMMOGRAM FOR BREAST CANCER: ICD-10-CM

## 2024-05-31 DIAGNOSIS — Z78.0 POSTMENOPAUSAL: ICD-10-CM

## 2024-05-31 DIAGNOSIS — Z90.2 HISTORY OF LOBECTOMY OF LUNG: ICD-10-CM

## 2024-05-31 DIAGNOSIS — C50.411 MALIGNANT NEOPLASM OF UPPER-OUTER QUADRANT OF RIGHT BREAST IN FEMALE, ESTROGEN RECEPTOR POSITIVE: ICD-10-CM

## 2024-05-31 PROCEDURE — 3075F SYST BP GE 130 - 139MM HG: CPT | Mod: CPTII,S$GLB,, | Performed by: FAMILY MEDICINE

## 2024-05-31 PROCEDURE — 3044F HG A1C LEVEL LT 7.0%: CPT | Mod: CPTII,S$GLB,, | Performed by: FAMILY MEDICINE

## 2024-05-31 PROCEDURE — 3288F FALL RISK ASSESSMENT DOCD: CPT | Mod: CPTII,S$GLB,, | Performed by: FAMILY MEDICINE

## 2024-05-31 PROCEDURE — 4010F ACE/ARB THERAPY RXD/TAKEN: CPT | Mod: CPTII,S$GLB,, | Performed by: FAMILY MEDICINE

## 2024-05-31 PROCEDURE — 1159F MED LIST DOCD IN RCRD: CPT | Mod: CPTII,S$GLB,, | Performed by: FAMILY MEDICINE

## 2024-05-31 PROCEDURE — 99999 PR PBB SHADOW E&M-EST. PATIENT-LVL V: CPT | Mod: PBBFAC,,, | Performed by: FAMILY MEDICINE

## 2024-05-31 PROCEDURE — 3078F DIAST BP <80 MM HG: CPT | Mod: CPTII,S$GLB,, | Performed by: FAMILY MEDICINE

## 2024-05-31 PROCEDURE — 99397 PER PM REEVAL EST PAT 65+ YR: CPT | Mod: S$GLB,,, | Performed by: FAMILY MEDICINE

## 2024-05-31 PROCEDURE — 1101F PT FALLS ASSESS-DOCD LE1/YR: CPT | Mod: CPTII,S$GLB,, | Performed by: FAMILY MEDICINE

## 2024-05-31 PROCEDURE — 1126F AMNT PAIN NOTED NONE PRSNT: CPT | Mod: CPTII,S$GLB,, | Performed by: FAMILY MEDICINE

## 2024-05-31 RX ORDER — GABAPENTIN 100 MG/1
CAPSULE ORAL
COMMUNITY
Start: 2024-05-08 | End: 2024-06-02

## 2024-05-31 RX ORDER — LEVOTHYROXINE SODIUM 88 UG/1
88 TABLET ORAL
Qty: 30 TABLET | Refills: 11 | Status: SHIPPED | OUTPATIENT
Start: 2024-05-31

## 2024-05-31 RX ORDER — LOSARTAN POTASSIUM 25 MG/1
25 TABLET ORAL
COMMUNITY
Start: 2024-05-08

## 2024-05-31 RX ORDER — AMLODIPINE BESYLATE 5 MG/1
5 TABLET ORAL
COMMUNITY
Start: 2024-05-12

## 2024-05-31 NOTE — PROGRESS NOTES
Office Visit    Patient Name: Keo Frias    : 1950  MRN: [id number]    Subjective:  Keo is a 73 y.o. 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     She is followed by Heme/Onc Dr Llamas-- seen 24 and by CT Surgery Dr Malloy-- seen 24  then received a 2nd opinion from CT surgery Dr. Reddy with [facility] 2024 who ultimately performed her lobectomy.    Labs visible from 2024 show some mild postoperative anemia, normal kidney function and electrolytes.  CXR 24: Lungs/Pleura/Mediastinum: Small right pleural effusion and small volume of air in the right pleural space. Recent right lower lobectomy. Linear atelectasis right lower lung. Left lung is without airspace disease.     She has had labile blood pressure readings exacerbated by recent illness/chemo regimen.  She was previously on losartan but is currently just taking Toprol-XL 50 (taking 1/2 BID) mg daily with Norvasc 2.5-5 mg daily.    Breathing ok and w/o concerns. No chest pain or lightheadedness.     Some incomplete bowel movements. No overt diarrhea or constipation.    Appetite is improving. Still with more liquids and soft foods.     She is up and about in the home and walking about 3,000 steps daily.     Energy level improving. No f/c/n/v. Mild cough-- not needing medications.     HEALTH MAINTENANCE:  Due for updated mammogram,  bone density-ordered, prior colonoscopy 12/07/2018 unremarkable on repeat not advise for screening purposes.  Immunizations all up-to-date including seasonal COVID-19 vaccine, seasonal flu shot and RSV vaccine.    EYE/DENTAL: DUE TO SCHEDULE, had to cancel eye exam with Dr Hernadez         PAST MEDICAL HISTORY, SURGICAL/SOCIAL/FAMILY HISTORY REVIEWED AS PER CHART, WITH PERTINENT FINDINGS INCLUDED IN HISTORY SECTION OF NOTE.     Current Medications    Medication List with Changes/Refills   Current Medications    AMLODIPINE (NORVASC) 5 MG TABLET    Take 5 mg by mouth.    ATORVASTATIN (LIPITOR) 20 MG TABLET    Take 2 tablets (40 mg total) by mouth once daily.    BENZONATATE (TESSALON PERLES) 100 MG CAPSULE    Take 1 capsule (100 mg total) by mouth 3 (three) times daily as needed for Cough.    CALCIUM CARBONATE (CALCIUM 500 ORAL)    Take 1 tablet by mouth.    CHOLECALCIFEROL, VITAMIN D3, 125 MCG (5,000 UNIT) CAPSULE    Take 1 capsule (5,000 Units total) by mouth daily with breakfast.    CIPRODEX 0.3-0.1 % DRPS    PLACE 4 DROPS INTO THE RIGHT EAR 2 (TWO) TIMES DAILY. FOR 10 DAYS    CLONIDINE (CATAPRES) 0.1 MG TABLET    Take 1 tablet (0.1 mg total) by mouth daily as needed (for systolic greater than or equal to 160 mmHg).    DEXAMETHASONE (DECADRON) 4 MG TAB    Take 2 tablets (8 mg total) by mouth As instructed. Take 2 tablets (8 mg) by mouth once on the day prior to chemotherapy then daily on days 2,3,4 following chemotherapy.    DUKE'S SOLN (BENADRYL 30 ML, MYLANTA 30 ML, LIDOCAINE 30 ML, NYSTATIN 30 ML) 120ML    Take 10 mLs by mouth 4 (four) times daily.    ESCITALOPRAM OXALATE (LEXAPRO) 5 MG TAB    Take 1 tablet (5 mg total) by mouth once daily.    FAMOTIDINE (PEPCID) 10 MG TABLET    Take 10 mg by mouth as needed.    FOLIC ACID (FOLVITE) 1 MG TABLET    Take 1 tablet (1 mg total) by mouth once daily. Take 1 tablet (1 mg) by mouth once daily starting one week prior to pemetrexed and continuing for at least 21 days after  stopping pemetrexed.    GABAPENTIN (NEURONTIN) 100 MG CAPSULE    Take by mouth.    LACTOBACILLUS RHAMNOSUS GG (CULTURELLE) 10 BILLION CELL CAPSULE    Take 1 capsule by mouth once daily. Pt takes Align probiotic    LETROZOLE (FEMARA) 2.5 MG TAB    TAKE 1 TABLET EVERY DAY    LOSARTAN (COZAAR) 25 MG TABLET    Take 25 mg by mouth.    METOPROLOL SUCCINATE (TOPROL-XL) 50 MG 24 HR TABLET    Take 1 tablet (50 mg total) by mouth once daily.    MULTIVITAMIN-IRON-FOLIC ACID TAB    Take 1 tablet by mouth once daily.    OLANZAPINE (ZYPREXA) 5 MG TABLET    Take 1 tablet (5 mg total) by mouth every evening. Take 1 tablet by mouth every evening on days 1-4 of each chemotherapy cycle    OMEPRAZOLE (PRILOSEC OTC) 20 MG TABLET    Take 20 mg by mouth once daily.    PROCHLORPERAZINE (COMPAZINE) 10 MG TABLET    Take 1 tablet (10 mg total) by mouth every 6 (six) hours as needed (nausea).    PROLIA 60 MG/ML SYRG       Changed and/or Refilled Medications    Modified Medication Previous Medication    LEVOTHYROXINE (SYNTHROID) 88 MCG TABLET levothyroxine (SYNTHROID) 88 MCG tablet       Take 1 tablet (88 mcg total) by mouth before breakfast.    Take 88 mcg by mouth.       Allergies   Review of patient's allergies indicates:   Allergen Reactions    Sinus allergy Other (See Comments)     Headaches, migranes    Buspar [buspirone] Other (See Comments)     Pt isnt allergic      Sulfa (sulfonamide antibiotics) Rash         Review of Systems (Pertinent positives)  Review of Systems   Constitutional:  Positive for fatigue. Negative for chills, fever and unexpected weight change.   Respiratory:  Positive for cough (mild, dry cough). Negative for chest tightness and shortness of breath.    Cardiovascular:  Negative for chest pain, palpitations and leg swelling.   Gastrointestinal:  Positive for abdominal distention (increased gas) and constipation (incomplete). Negative for nausea and vomiting.   Genitourinary:  Negative for difficulty urinating.  "  Neurological:  Negative for dizziness, syncope and light-headedness.   Psychiatric/Behavioral:  Negative for dysphoric mood and sleep disturbance. The patient is not nervous/anxious.        /78 (BP Location: Left arm, Patient Position: Sitting, BP Method: Small (Automatic))   Pulse 68   Ht 5' 2" (1.575 m)   Wt 50.1 kg (110 lb 7.2 oz)   LMP  (LMP Unknown)   SpO2 98%   BMI 20.20 kg/m²     Physical Exam  Vitals reviewed.   Constitutional:       General: She is not in acute distress.     Appearance: Normal appearance. She is well-developed.   HENT:      Head: Normocephalic and atraumatic.      Right Ear: Tympanic membrane and ear canal normal. Tympanic membrane is not erythematous or bulging.      Left Ear: Tympanic membrane and ear canal normal. Tympanic membrane is not erythematous or bulging.      Nose: Nose normal.      Mouth/Throat:      Mouth: Mucous membranes are moist.      Pharynx: No oropharyngeal exudate or posterior oropharyngeal erythema.   Eyes:      Extraocular Movements: Extraocular movements intact.      Conjunctiva/sclera: Conjunctivae normal.   Neck:      Thyroid: No thyroid mass or thyromegaly.      Vascular: No carotid bruit.   Cardiovascular:      Rate and Rhythm: Normal rate and regular rhythm.      Pulses:           Dorsalis pedis pulses are 2+ on the right side and 2+ on the left side.      Heart sounds: Normal heart sounds. No murmur heard.  Pulmonary:      Effort: Pulmonary effort is normal. No respiratory distress.      Breath sounds: Examination of the right-lower field reveals decreased breath sounds. Decreased breath sounds present. No wheezing, rhonchi or rales.   Abdominal:      General: Bowel sounds are normal. There is no distension.      Palpations: Abdomen is soft. There is no mass.      Tenderness: There is no abdominal tenderness.   Musculoskeletal:         General: Normal range of motion.      Right lower leg: No edema.      Left lower leg: No edema. "   Lymphadenopathy:      Cervical: No cervical adenopathy.   Skin:     General: Skin is warm and dry.      Findings: No rash.   Neurological:      General: No focal deficit present.      Mental Status: She is alert and oriented to person, place, and time.   Psychiatric:         Mood and Affect: Mood normal.         Behavior: Behavior normal.           Assessment/Plan:  Keo Frias is a 73 y.o. female who presents today for :        ICD-10-CM ICD-9-CM    1. Routine general medical examination at a health care facility  Z00.00 V70.0       2. BMI 20.0-20.9, adult  Z68.20 V85.1       3. Adenocarcinoma, lung, right  C34.91 162.9       4. History of lobectomy of lung  Z90.2 V12.59       5. Screening mammogram for breast cancer  Z12.31 V76.12 Mammo Digital Screening Bilat      6. Postmenopausal  Z78.0 V49.81       7. Age-related osteoporosis without current pathological fracture  M81.0 733.01 DXA Bone Density Axial Skeleton 1 or more sites      8. Vitamin D deficiency  E55.9 268.9       9. Benign essential hypertension  I10 401.1       10. Mixed hyperlipidemia  E78.2 272.2       11. Situational anxiety  F41.8 300.09       12. Aromatase inhibitor use  Z79.811 V07.52       13. Malignant neoplasm of upper-outer quadrant of right breast in female, estrogen receptor positive  C50.411 174.4     Z17.0 V86.0       14. Medication management  Z79.899 V58.69       15. Abnormal TSH  R79.89 790.6 levothyroxine (SYNTHROID) 88 MCG tablet        ADVISED ON DIET/EXERCISE/SLEEP, ROUTINE EYE/DENTAL EXAMS, AND THE IMPORTANCE OF KEEPING UP WITH APPROPRIATE SCREENING TESTS BASED ON AGE AND RISK FACTORS.  HEALTH MAINTENANCE REVIEWED-COLON CANCER SCREENING NO LONGER INDICATED, IMMUNIZATIONS UP-TO-DATE, ORDERED UPDATED MAMMOGRAM AND DEXA SCAN.      ADENOCARCINOMA OF THE LUNG STATUS POST RIGHT LOWER LOBE LOBECTOMY:  Overall doing well, meeting postoperative milestones, energy level and activity level improving.  Has upcoming follow-up with  heme/Onc.  Fortunately margins sound like they were clear.  Can discuss with heme/Onc ongoing need for any follow-up chemo or radiation.      HISTORY OF RIGHT BREAST CANCER, CHRONIC AROMATASE USE:  Mammogram ordered, continue letrozole.    POSTMENOPAUSAL OSTEOPOROSIS, HISTORY OF VITAMIN-D DEFICIENCY:  Has been on Q six-month Prolia-due to receive her next shot but will postpone until after her heme/Onc follow-up.  Continue calcium, vitamin-D, exercise as tolerated, updated DEXA scan ordered and will advise based on results.      ABNORMAL TSH:  No history of hypothyroidism but seems to have occurred as a complication of chemotherapy.  She is now on Synthroid-- needs follow-up TSH monitoring.  Will discuss with heme/Onc.  Schedule virtual visit with me in 6 weeks to review which labs she has had and help decide if more are needed.  Do not want to order any unnecessary labs today as she is still recovering from her surgery and building back blood counts after some mild postoperative anemia.      HYPERTENSION WITH LABILE BLOOD PRESSURES:  Had some issue with this as a complication of her surgery.  Currently overall doing well on Toprol-XL 50 mg 1/2 tablet twice a day with amlodipine 2.5-5 mg daily depending on what her pressures are running.  No orthostatic symptoms.  Continue current regimen with ongoing monitoring and hopefully at her 6 week follow-up can simplify her regimen if her blood pressure lability is improving.      ANXIETY:  Stable on low-dose Lexapro.  Sleeping well, dealing with the stress of her recent illness, good support from her daughter who presents with her to the office visit today.  no concerns    HYPERLIPIDEMIA:  Currently off statin, will re-evaluate her 6 week follow-up visit.  Was on Lipitor 20.    Patient Instructions   BENEFIBER PREBIOTIC FIBER SUPPLEMENT MIXED WITH WATER 1-2 DOSES DAILY MIXED WITH WATER.           Follow up in about 6 weeks (around 7/12/2024) for return as needed for new  concerns.

## 2024-06-06 ENCOUNTER — PATIENT MESSAGE (OUTPATIENT)
Dept: HEMATOLOGY/ONCOLOGY | Facility: CLINIC | Age: 74
End: 2024-06-06
Payer: MEDICARE

## 2024-06-12 ENCOUNTER — LAB VISIT (OUTPATIENT)
Dept: LAB | Facility: HOSPITAL | Age: 74
End: 2024-06-12
Attending: HOSPITALIST
Payer: MEDICARE

## 2024-06-12 DIAGNOSIS — C79.9 METASTATIC NEOPLASM: ICD-10-CM

## 2024-06-12 DIAGNOSIS — Z17.0 MALIGNANT NEOPLASM OF UPPER-OUTER QUADRANT OF RIGHT BREAST IN FEMALE, ESTROGEN RECEPTOR POSITIVE: ICD-10-CM

## 2024-06-12 DIAGNOSIS — C50.411 MALIGNANT NEOPLASM OF UPPER-OUTER QUADRANT OF RIGHT BREAST IN FEMALE, ESTROGEN RECEPTOR POSITIVE: ICD-10-CM

## 2024-06-12 LAB
ALBUMIN SERPL BCP-MCNC: 3.6 G/DL (ref 3.5–5.2)
ALP SERPL-CCNC: 73 U/L (ref 55–135)
ALT SERPL W/O P-5'-P-CCNC: 12 U/L (ref 10–44)
ANION GAP SERPL CALC-SCNC: 7 MMOL/L (ref 8–16)
AST SERPL-CCNC: 22 U/L (ref 10–40)
BILIRUB SERPL-MCNC: 0.3 MG/DL (ref 0.1–1)
BUN SERPL-MCNC: 8 MG/DL (ref 8–23)
CALCIUM SERPL-MCNC: 9.7 MG/DL (ref 8.7–10.5)
CHLORIDE SERPL-SCNC: 98 MMOL/L (ref 95–110)
CO2 SERPL-SCNC: 29 MMOL/L (ref 23–29)
CREAT SERPL-MCNC: 0.7 MG/DL (ref 0.5–1.4)
ERYTHROCYTE [DISTWIDTH] IN BLOOD BY AUTOMATED COUNT: 13 % (ref 11.5–14.5)
EST. GFR  (NO RACE VARIABLE): >60 ML/MIN/1.73 M^2
GLUCOSE SERPL-MCNC: 114 MG/DL (ref 70–110)
HCT VFR BLD AUTO: 40.1 % (ref 37–48.5)
HGB BLD-MCNC: 12.9 G/DL (ref 12–16)
IMM GRANULOCYTES # BLD AUTO: 0.02 K/UL (ref 0–0.04)
MAGNESIUM SERPL-MCNC: 1.8 MG/DL (ref 1.6–2.6)
MCH RBC QN AUTO: 29.1 PG (ref 27–31)
MCHC RBC AUTO-ENTMCNC: 32.2 G/DL (ref 32–36)
MCV RBC AUTO: 90 FL (ref 82–98)
NEUTROPHILS # BLD AUTO: 3.5 K/UL (ref 1.8–7.7)
PLATELET # BLD AUTO: 226 K/UL (ref 150–450)
PMV BLD AUTO: 11.2 FL (ref 9.2–12.9)
POTASSIUM SERPL-SCNC: 4.4 MMOL/L (ref 3.5–5.1)
PROT SERPL-MCNC: 7.2 G/DL (ref 6–8.4)
RBC # BLD AUTO: 4.44 M/UL (ref 4–5.4)
SODIUM SERPL-SCNC: 134 MMOL/L (ref 136–145)
T4 FREE SERPL-MCNC: 1.44 NG/DL (ref 0.71–1.51)
TSH SERPL DL<=0.005 MIU/L-ACNC: 1.76 UIU/ML (ref 0.4–4)
WBC # BLD AUTO: 6.44 K/UL (ref 3.9–12.7)

## 2024-06-12 PROCEDURE — 36415 COLL VENOUS BLD VENIPUNCTURE: CPT | Performed by: HOSPITALIST

## 2024-06-12 PROCEDURE — 83735 ASSAY OF MAGNESIUM: CPT | Performed by: HOSPITALIST

## 2024-06-12 PROCEDURE — 84443 ASSAY THYROID STIM HORMONE: CPT | Performed by: HOSPITALIST

## 2024-06-12 PROCEDURE — 85027 COMPLETE CBC AUTOMATED: CPT | Performed by: HOSPITALIST

## 2024-06-12 PROCEDURE — 84439 ASSAY OF FREE THYROXINE: CPT | Performed by: HOSPITALIST

## 2024-06-12 PROCEDURE — 80053 COMPREHEN METABOLIC PANEL: CPT | Performed by: HOSPITALIST

## 2024-06-14 ENCOUNTER — OFFICE VISIT (OUTPATIENT)
Dept: HEMATOLOGY/ONCOLOGY | Facility: CLINIC | Age: 74
End: 2024-06-14
Payer: MEDICARE

## 2024-06-14 VITALS
HEART RATE: 80 BPM | HEIGHT: 62 IN | OXYGEN SATURATION: 99 % | BODY MASS INDEX: 20.24 KG/M2 | DIASTOLIC BLOOD PRESSURE: 80 MMHG | SYSTOLIC BLOOD PRESSURE: 145 MMHG | TEMPERATURE: 98 F | RESPIRATION RATE: 15 BRPM | WEIGHT: 110 LBS

## 2024-06-14 DIAGNOSIS — C34.31 ADENOCARCINOMA OF LOWER LOBE OF RIGHT LUNG: Primary | ICD-10-CM

## 2024-06-14 DIAGNOSIS — E03.9 HYPOTHYROIDISM, UNSPECIFIED TYPE: ICD-10-CM

## 2024-06-14 PROCEDURE — 1101F PT FALLS ASSESS-DOCD LE1/YR: CPT | Mod: CPTII,S$GLB,, | Performed by: HOSPITALIST

## 2024-06-14 PROCEDURE — 3288F FALL RISK ASSESSMENT DOCD: CPT | Mod: CPTII,S$GLB,, | Performed by: HOSPITALIST

## 2024-06-14 PROCEDURE — 4010F ACE/ARB THERAPY RXD/TAKEN: CPT | Mod: CPTII,S$GLB,, | Performed by: HOSPITALIST

## 2024-06-14 PROCEDURE — 1126F AMNT PAIN NOTED NONE PRSNT: CPT | Mod: CPTII,S$GLB,, | Performed by: HOSPITALIST

## 2024-06-14 PROCEDURE — G2211 COMPLEX E/M VISIT ADD ON: HCPCS | Mod: S$GLB,,, | Performed by: HOSPITALIST

## 2024-06-14 PROCEDURE — 3044F HG A1C LEVEL LT 7.0%: CPT | Mod: CPTII,S$GLB,, | Performed by: HOSPITALIST

## 2024-06-14 PROCEDURE — 99215 OFFICE O/P EST HI 40 MIN: CPT | Mod: S$GLB,,, | Performed by: HOSPITALIST

## 2024-06-14 PROCEDURE — 3079F DIAST BP 80-89 MM HG: CPT | Mod: CPTII,S$GLB,, | Performed by: HOSPITALIST

## 2024-06-14 PROCEDURE — 99999 PR PBB SHADOW E&M-EST. PATIENT-LVL V: CPT | Mod: PBBFAC,,, | Performed by: HOSPITALIST

## 2024-06-14 PROCEDURE — 3077F SYST BP >= 140 MM HG: CPT | Mod: CPTII,S$GLB,, | Performed by: HOSPITALIST

## 2024-06-14 PROCEDURE — 3008F BODY MASS INDEX DOCD: CPT | Mod: CPTII,S$GLB,, | Performed by: HOSPITALIST

## 2024-06-14 PROCEDURE — 1159F MED LIST DOCD IN RCRD: CPT | Mod: CPTII,S$GLB,, | Performed by: HOSPITALIST

## 2024-06-14 NOTE — Clinical Note
Hi all,   Hoping to start nivolumab when available. Will need labs prior, can be same day.   Thanks!

## 2024-06-14 NOTE — PROGRESS NOTES
[facility] MEDICAL ONCOLOGY - FOLLOW UP VISIT    Reason for visit: Follow up visit for adenocarcinoma of the lung      Oncology History   Malignant neoplasm of upper-outer quadrant of right breast in female, estrogen receptor positive   4/13/2021 Biopsy    Breast, right, 10:00 5N, biopsy:  - Invasive ductal carcinoma with lobular features     4/13/2021 Breast Tumor Markers    Estrogen Receptor: Positive >90%  Progesterone Receptor: Positive 10-50%  HER2: Negative  Ki67: 10-30%     4/26/2021 Genetic Testing    MyRisk: negative     5/12/2021 Breast Surgery    Right partial mastectomy with SLNB     6/1/2021 Tumor Conference    Radiation options? Offer radiation, can discuss partial vs whole breast radiation.        6/2/2021 Cancer Staged    Staging form: Breast, AJCC 8th Edition  - Pathologic stage from 6/2/2021: Stage IA (pT1b, pN0(sn), cM0, G2, ER+, SC+, HER2-)     7/6/2021 - 7/27/2021 Radiation Therapy    Treatment Summary  Course: C1 Chest 2021  Treatment Site Energy Dose/Fx (Gy) #Fx Total Dose (Gy) Start Date End Date Elapsed Days   Breast_Rt 6X 2.65 16 / 16 42.4 7/6/2021 7/27/2021 21          7/2021 -  Hormone Therapy    Letrozole      Procedure    Bronchoscopy, Station 7 positive for malignancy     Adenocarcinoma of lower lobe of right lung   10/5/2023 Imaging Significant Findings    CT C (obtained after CXR, which was itself obtained for palpitations)  - 2.1 x 1.3 cm spiculated RLL nodule, some spiculation noted to extend to the pleural margin  - Scattered pulmonary nodules centered mostly subpleural at the RLL (e.g. 0.9 cm)       10/10/2023 Imaging Significant Findings    PET CT  - 2.1 x 1.1 cm RLL nodule, SUV 3.8  - 0.9 cm R axillary LN, SUV 3.9  - 1.2 cm subcarinal LN, SUV 4.6  - 0.7 cm Ln along L posterior shoulder, SUV 1.7  - Multiple additional solid pulmonary nodules through R lung base, too small for uptake detection     11/14/2023 Procedure    Bronch with  EBUS  RLL, FNA  - Adenocarcinoma (TTF1 positive, GATA3 negative)    RLL, TBBx  - Adenocarcinoma    Per pulmonology, station 7 node was very vascular without window for biopsy, plan to discuss at thoracic tumor board    [facility] NGS  - KRAS G12A, CHEK1, MLH1, CTNNB1, CIC, PTPRD, NOTCH3, EZH2, LATS1, KMT2D  - Negative: EGFR, ALK, BRAF, KRAS, ROS1, RET, MET, EBB2  - PD-L1 1%       11/22/2023 Tumor Conference    Tumor Board  - Plan for axillary LN biopsy to determine lung vs recurrent breast vs other etiology  - Repeat CT C to evaluate nodules in RLL  - Determine treatment plan based on above results     11/22/2023 Tumor Genotyping    [facility] Liquid Biopsy  - No reportable pathogenic variants found     11/29/2023 Imaging Significant Findings    CT C  - 2.1 x 1.2 cm RLL spiculated nodule, stable in size w/ new central cavitation. Spiculated margins extend towards adjacent pleura.   - Stable irregular 2.0 cm linar opacity in LLL  - Stable 0.3 cm GGO, TRICIA  - Stable R basal sub cm nodules, largest 0.9 cm  - Several stable solid nodules predominantly in RLL, largest 0.9 cm     12/18/2023 Imaging Significant Findings    MRI Brain  - JELENA     1/3/2024 Procedure    IR Guided Biopsy, R Axillary LN  - No metastatic carcinoma (0.3 x 0.3 x 0.1 cm tissue, positive and negative controls stained appropriately)     1/5/2024 Imaging Significant Findings    PET CT  - Stable 2.0 x 1.1 cm RLL nodule (previously 2.1 x 1.1 cm)  - Stable additional nodules w/o significant racer uptake  - 0.8 cm R axillary node, SUV 4.2 (previously 0.9 cm, SUV 3.9)  - 0.5 cm L posterior shoulder node, SUV 2.3 (previously 0.7 cm, SUV 1.7)  - 1.3 cm subcarinal node, SUV 4.9 (previously 1.2 cm, SUV 4.6)     1/10/2024 Tumor Conference    Tumor Board   Re-sample enlarged, hypermetabolic axillary LN with repeat CT-guided biopsy versus excisional biopsy. If LN is negative for recurrent NSCLC, obtain EBUS of PET-avid mediastinal LN.         1/23/2024 Procedure    IR  Guided Biopsy, R Axillary LN (Second Biopsy)  - Negative for metastatic carcinoma     2/8/2024 Procedure    Bronch with EBUS  LN  Positive: Station 7 (Adenocarcinoma)  Negative: Station 11R     2/21/2024 Cancer Staged    Staging form: Lung, AJCC 8th Edition  - Clinical stage from 2/21/2024: Stage IIIA (cT1b, cN2, cM0)     2/21/2024 Tumor Conference    Thoracic Tumor Board  Stage IIIA right lower lobe adenocarcinoma with bulky subcarinal lymphadenopathy.  Proceed with NA chemo/I-O therapy. Return to [facility] for [facility] evaluation. If patient is not a surgical candidate following 3 cycles of therapy, proceed with definitive chemo/RT.       2/22/2024 - 4/5/2024 Chemotherapy    Treatment Summary   Plan Name: OP NSCLC NIVOLUMAB 360 MG PLUS CARBOPLATIN (AUC5) PEMETREXED 500 MG/M2 Q3W x 3 CYCLES  Treatment Goal: Control  Status: Inactive  Start Date: 2/22/2024  End Date: 4/5/2024  Provider: Bridger Nichole IV, MD  Chemotherapy: CARBOplatin (PARAPLATIN) 415 mg in sodium chloride 0.9% 326.5 mL chemo infusion, 415 mg, Intravenous, Clinic/HOD 1 time, 3 of 3 cycles  Administration: 415 mg (2/22/2024), 465 mg (4/4/2024), 465 mg (3/14/2024)  PEMEtrexed disodium (ALIMTA) 750 mg in sodium chloride 0.9% SolP 100 mL chemo infusion, 500 mg/m2 = 750 mg, Intravenous, Clinic/HOD 1 time, 3 of 3 cycles  Administration: 750 mg (2/22/2024), 750 mg (4/4/2024), 750 mg (3/14/2024)     4/24/2024 Imaging Significant Findings    CT C/A/P  - 1.2 x 1.0 cm RLL malignancy, previously 2 x 1.2 cm  - Interval decrease in the previously seen nodules in the RLL  - 0.9 cm subcarinal node, previously 1.7 cm  - Interval decrease in right infrahilar soft tissue  - 2.1 cm LLL solid nodular opacity, unchanged  - 0.8 cm TRICIA ground-glass opacity, unchanged  - AVM again noted in RLL  - 0.6 cm right axillary lymph node, decreased in size from prior  - Left scapular lymph node decreased in size from prior, incompletely seen       5/6/2024 Surgery    Right Lower  Lobectomy  Right Lower Lobe  - Adenocarcinoma with acinar pattern, 35% residual viable tumor, 1.0 cm in greatest dimension.  Pleural invasion absent, LVI absent, margins negative.  0/1 lymph node    Pericardial Excision  - Negative for tumor    LN  - Positive: Station 7  - Negative: Station 8R (0/1), 9 are (0/2), 2R (0/1), 4R (0/1), 11R (0/1), 12R (0/1), R posterior interlobar LN (0/1)    Path Staging: pT1a, pN2     5/6/2024 - 5/12/2024 Hospital Admission    Admitted to [facility] for RLLx.  Postoperative course complicated by hemothorax requiring return to the operating room for reexploration on postop day 1.  Chest tube removed on postop day 5.     6/11/2024 -  Chemotherapy    Treatment Summary   Plan Name: OP NIVOLUMAB 480 MG Q4W  Treatment Goal: Control  Status: Active  Start Date: 6/11/2024 (Planned)  End Date: 5/13/2025 (Planned)  Provider: Bridger Nichole IV, MD  Chemotherapy: [No matching medication found in this treatment plan]        - Second opinion, [facility]: contralateral LLL lesion stable but plan for sampling of this as well as subcarinal LN and possible additional RLL nodule. Pending results, consider NA --> surgery vs CRT          HPI:     Keo Frias is a 73 y.o. female with pmh significant for HTN, migraine headaches, and Stage IIIA ([id number]) adenocarcinoma of the RLL (no targetable mutations, PD-L1 1%), initially dx'd 11/14/23, s/p neoadjuvant carboplatin/pemetrexed/nivolumab (2/22/24 - 4/4/24), who presents to for follow up. Of note, pt also has a history of Stage IA ( [id number], ER 95%, AZ 10%, HER2 negative, Ki-67 20%) IDC of the R breast, initially dx'd 4/13/21, s/p lumpectomy and SLNB (5/12/21), XRT (7/6/21-7/27/21), and currently on letrozole (7/2021 - present).     Last clinic 04/03/2024, proceed with cycle 3 neoadjuvant therapy, repeat labs and imaging at [facility], [facility] on 05/02.  Subclinical hyperthyroidism with repeat TSH and T4 in 2-3 weeks.    Interval History:  - 5/6/24-5/12/24: Admitted for RLLx, c/b  "hemothorax require return to operating room on POD1.   - 6/6/24: Med onc at [facility] (Dr. Don), 35% viable tumor and positive node at time of surgery, pluse contralateral L pulmonary nodule and indeterminate lesions of axilla and scapula.  Recommend adjuvant nivolumab while determining the etiology of these lesions, plan to perform biopsy if persistent on PET scan in August.  - 6/7/24:  Surgery follow up, doing well, MiraLax for constipation, no symptoms, planned adjuvant nivolumab.  Discharged from thoracic surgery.    The pt states that she is "feeling stronger every day". She feels that over the past few days, she has been developing a "few seconds" of shortness breath. She notes an intermittent cough, which is more than prior to her surgery. She says that it is a dry cough, and sometimes worse with eating or drinking. She notes intermittent pain over her surgical sites, which she says varies in location; she says this is not severe or constant enough to warrant pain medications. She feels that she has been gassier than before the surgery. Otherwise, she denies new/worsening symptoms at this time.      Past Medical History:   Past Medical History:   Diagnosis Date    Anemia     Anxiety disorder 8/28/2019    Cataract     Chronic right-sided low back pain without sciatica 10/20/2021    Hypercholesteremia 9/17/2019    Invasive ductal carcinoma of breast, female, right 4/16/2021    Migraine with vision problems     Subclinical hypothyroidism     White coat syndrome with hypertension         Past Surgical History:   Past Surgical History:   Procedure Laterality Date    COLON SURGERY      COLONOSCOPY N/A 12/7/2018    Procedure: COLONOSCOPY;  Surgeon: Bhargav Gaston MD;  Location: [address];  Service: Endoscopy;  Laterality: N/A;    DILATION AND CURETTAGE OF UTERUS      postmenopausal bleeding    ENDOBRONCHIAL ULTRASOUND N/A 11/14/2023    Procedure: ENDOBRONCHIAL ULTRASOUND (EBUS);  Surgeon: Kirt Gr, " MD;  Location: [facility] OR 2ND FLR;  Service: Pulmonary;  Laterality: N/A;    MASTECTOMY, PARTIAL Right 5/12/2021    Procedure: MASTECTOMY, PARTIAL-Right with radiological marker;  Surgeon: Vivian Cadena MD;  Location: [facility] OR;  Service: General;  Laterality: Right;    ROBOTIC BRONCHOSCOPY N/A 11/14/2023    Procedure: ROBOTIC BRONCHOSCOPY;  Surgeon: Kirt Gr MD;  Location: [facility] OR 2ND FLR;  Service: Pulmonary;  Laterality: N/A;    SENTINEL LYMPH NODE BIOPSY Right 5/12/2021    Procedure: BIOPSY, LYMPH NODE, SENTINEL-Right;  Surgeon: Vivian Cadena MD;  Location: [facility] OR;  Service: General;  Laterality: Right;    TUBAL LIGATION          Family History:   Family History   Problem Relation Name Age of Onset    Diabetes Brother      Hypertension Brother      Glaucoma Brother      Glaucoma Father      Cataracts Father      Retinal detachment Father      Lung cancer Mother      Uterine cancer Maternal Grandmother      Stroke Maternal Grandmother      Stroke Paternal Grandmother      Amblyopia Neg Hx      Blindness Neg Hx      Macular degeneration Neg Hx      Strabismus Neg Hx      Thyroid disease Neg Hx          Social History:   Social History     Tobacco Use    Smoking status: Never    Smokeless tobacco: Never   Substance Use Topics    Alcohol use: No        I have reviewed and updated the patient's past medical, surgical, family and social histories.      ROS:   As per HPI.     Allergies:   Review of patient's allergies indicates:   Allergen Reactions    Sinus allergy Other (See Comments)     Headaches, migranes    Buspar [buspirone] Other (See Comments)     Pt isnt allergic      Sulfa (sulfonamide antibiotics) Rash        Medications:   Current Outpatient Medications   Medication Sig Dispense Refill    amLODIPine (NORVASC) 5 MG tablet Take 5 mg by mouth.      atorvastatin (LIPITOR) 20 MG tablet Take 2 tablets (40 mg total) by mouth once daily. 180 tablet 3    benzonatate (TESSALON PERLES) 100 MG capsule Take 1  capsule (100 mg total) by mouth 3 (three) times daily as needed for Cough. 30 capsule 1    calcium carbonate (CALCIUM 500 ORAL) Take 1 tablet by mouth.      cholecalciferol, vitamin D3, 125 mcg (5,000 unit) capsule Take 1 capsule (5,000 Units total) by mouth daily with breakfast. 100 capsule 4    CIPRODEX 0.3-0.1 % DrpS PLACE 4 DROPS INTO THE RIGHT EAR 2 (TWO) TIMES DAILY. FOR 10 DAYS 7.5 mL 5    cloNIDine (CATAPRES) 0.1 MG tablet Take 1 tablet (0.1 mg total) by mouth daily as needed (for systolic greater than or equal to 160 mmHg). 90 tablet 3    dexAMETHasone (DECADRON) 4 MG Tab Take 2 tablets (8 mg total) by mouth As instructed. Take 2 tablets (8 mg) by mouth once on the day prior to chemotherapy then daily on days 2,3,4 following chemotherapy. 24 tablet 0    duke's soln (benadryl 30 mL, mylanta 30 mL, LIDOcaine 30 mL, nystatin 30 mL) 120mL Take 10 mLs by mouth 4 (four) times daily. 120 mL 0    EScitalopram oxalate (LEXAPRO) 5 MG Tab Take 1 tablet (5 mg total) by mouth once daily. 90 tablet 4    famotidine (PEPCID) 10 MG tablet Take 10 mg by mouth as needed.      folic acid (FOLVITE) 1 MG tablet Take 1 tablet (1 mg total) by mouth once daily. Take 1 tablet (1 mg) by mouth once daily starting one week prior to pemetrexed and continuing for at least 21 days after stopping pemetrexed. 90 tablet 0    Lactobacillus rhamnosus GG (CULTURELLE) 10 billion cell capsule Take 1 capsule by mouth once daily. Pt takes Align probiotic      letrozole (FEMARA) 2.5 mg Tab TAKE 1 TABLET EVERY DAY 90 tablet 3    levothyroxine (SYNTHROID) 88 MCG tablet Take 1 tablet (88 mcg total) by mouth before breakfast. 30 tablet 11    losartan (COZAAR) 25 MG tablet Take 25 mg by mouth.      metoprolol succinate (TOPROL-XL) 50 MG 24 hr tablet Take 1 tablet (50 mg total) by mouth once daily. 90 tablet 4    multivitamin-iron-folic acid Tab Take 1 tablet by mouth once daily.      OLANZapine (ZYPREXA) 5 MG tablet Take 1 tablet (5 mg total) by mouth  "every evening. Take 1 tablet by mouth every evening on days 1-4 of each chemotherapy cycle 30 tablet 1    omeprazole (PRILOSEC OTC) 20 MG tablet Take 20 mg by mouth once daily.      prochlorperazine (COMPAZINE) 10 MG tablet Take 1 tablet (10 mg total) by mouth every 6 (six) hours as needed (nausea). 30 tablet 1    PROLIA 60 mg/mL Syrg        No current facility-administered medications for this visit.          Physical Exam:       BP (!) 145/80 (BP Location: Left arm, Patient Position: Sitting, BP Method: Medium (Automatic))   Pulse 80   Temp 98.1 °F (36.7 °C) (Oral)   Resp 15   Ht 5' 2" (1.575 m)   Wt 49.9 kg (110 lb 0.2 oz)   LMP  (LMP Unknown)   SpO2 99%   BMI 20.12 kg/m²                Physical Exam  Constitutional:       Appearance: Normal appearance.   HENT:      Head: Normocephalic and atraumatic.   Eyes:      Extraocular Movements: Extraocular movements intact.      Conjunctiva/sclera: Conjunctivae normal.      Pupils: Pupils are equal, round, and reactive to light.   Cardiovascular:      Rate and Rhythm: Normal rate and regular rhythm.      Heart sounds: No murmur heard.     No friction rub. No gallop.   Pulmonary:      Effort: Pulmonary effort is normal.      Breath sounds: No wheezing, rhonchi or rales.      Comments: Diminished breath sounds in posterior inferior right lung field  Musculoskeletal:         General: Normal range of motion.      Right lower leg: No edema.      Left lower leg: No edema.   Skin:     General: Skin is warm and dry.   Neurological:      Mental Status: She is alert and oriented to person, place, and time.   Psychiatric:         Mood and Affect: Mood normal.         Thought Content: Thought content normal.         Judgment: Judgment normal.           Labs:   No results found for this or any previous visit (from the past 48 hour(s)).         Imaging:         Path:  See oncologic history above.      Assessment and Plan:     baldomero Frias is a 73 y.o. female with pmh " significant for HTN, migraine headaches, and Stage IIIA ([id number]) adenocarcinoma of the RLL (no targetable mutations, PD-L1 1%), initially dx'd 11/14/23, s/p neoadjuvant carboplatin/pemetrexed/nivolumab (2/22/24 - 4/4/24), who presents to for follow up. Of note, pt also has a history of Stage IA ( [id number], ER 95%, SD 10%, HER2 negative, Ki-67 20%) IDC of the R breast, initially dx'd 4/13/21, s/p lumpectomy and SLNB (5/12/21), XRT (7/6/21-7/27/21), and currently on letrozole (7/2021 - present).     Adenocarcinoma of RLL, Uncertain Stage  ECOG PS 1.  Since last visit, the patient underwent a right lower lobectomy at [facility].  The patient has been recovering well since her surgery.  Today, we had a long conversation regarding her pathology results and potential treatment plans moving forward.  I have discussed the patient with Dr. Don at [facility] multiple times.  Given hypermetabolic lymph nodes in the right axilla and left scapular region of unclear etiology (noting multiple negative biopsies of the right axillary lymph node) which appeared to decrease in size with the initial systemic therapy, as well as 35% residual viable tumor at time of surgery, it is reasonable to proceed with perioperative immunotherapy (noting that the patient received 3 cycles of NA therapy per Checkmate 816, rather than 4 cycles per Checkmate 77T). Dr Don has discussed this with the patient and her family, as have I, and we are in agreement to proceed with 1 year of adjuvant nivolumab.  Today (6/14/24), we discussed logistics, general mechanism of action, and potential side effects (up to and including death) of nivolumab.  The patient signed consent today (6/14/24). Per Checkmate 77T, treatment should start within 90 days of resection. Ultimately, will plan to treat with nivolumab q4w x 1 year, w/ q3m imaging throughout.     PLAN:   -- Proceed w/ adjuvant nivolumab, pending labs to be collected prior to C1D1  -- Labs and RTC 1 day prior to C2D1  --  Pt has imaging on 8/07/24, medical oncology f/u on 8/08/24, and thoracic surgery f/u on 8/09/24 at [facility]. If PET imaging demonstrates ongoing FDG avidity in R axillary or L scapular LN, these areas may be biopsied      Hypothyroidism  On 4/30/24, TSH was 41.25 and free T4 0.71, as compared to 4/2/24 when TSH was 0.014 and T4 was 1.92.  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  She has since been started on levothyroxine per Dr. Don. TFTs on 6/12/24 w/ TSH of 1.762 and free T4 of 1.44.  -- Continue levothyroxine 88 mcg daily  -- Repeat labs w/ f/u      Right IDC, ER+/MN+/HER2-  S/p lumpectomy with SLNB, radiation therapy, and currently on letrozole and denosumab, tolerating well. Concern for possible R axillary recurrence, workup as below.   -- Okay to continue letrozole  -- Okay to continue denosumab  -- Breast oncology team aware      The above information has been reviewed with the patient and all questions have been answered to their apparent satisfaction.  They understand that they can call the clinic with any questions.    Bridger Nichole MD  Hematology/Oncology  [facility]        Med Onc Chart Routing      Follow up with physician . Start nivolumab, will need labs prior. Then, labs and RTC 1 day prior to C2D1.   Follow up with BRENAD    Infusion scheduling note    Injection scheduling note    Labs    Imaging    Pharmacy appointment    Other referrals

## 2024-06-19 ENCOUNTER — TELEPHONE (OUTPATIENT)
Dept: INFUSION THERAPY | Facility: HOSPITAL | Age: 74
End: 2024-06-19
Payer: MEDICARE

## 2024-06-25 ENCOUNTER — TELEPHONE (OUTPATIENT)
Dept: HEMATOLOGY/ONCOLOGY | Facility: CLINIC | Age: 74
End: 2024-06-25
Payer: MEDICARE

## 2024-06-25 NOTE — TELEPHONE ENCOUNTER
"I spoke to patients daughter, Bull and her sister. They are confused and frustrated about the treatment for their mother being "approved" and then not. They said it happens all the time, every month. I told them I was sorry they felt that way, but we were working on getting things straightened out to get the referral authorized. We think that when the location was changed, something was put in wrong and it was then not approved. I reassured them we are doing our best to make sure it gets corrected. They continued to vent their frustration and I listened and reassured them they were being heard. I gave them the number for Patient Advocacy and told them they could call to let them know their frustration with the process. I let them know that we can't fix something we don't know is broken. By the end of the conversation, they were understanding and asked if there was anything they could do to get things moving. I told them if there was, we would let them know.   "

## 2024-06-25 NOTE — TELEPHONE ENCOUNTER
----- Message from Kelly Canales sent at 6/25/2024  3:36 PM CDT -----  Regarding: Insurance issues  Contact: Bull  Consult/Advisory      Name Of Caller:Bull         Contact Preference:     [phone]       Nature of call:pt is daughter is calling to speak with nurse in regards to insurance issue with the pt. She states she is very frustrated. Concerned about the pt infusion. Please advise.requesting a call back.       Note;  [facility] has already approved the infusion treatment.

## 2024-06-26 ENCOUNTER — TELEPHONE (OUTPATIENT)
Dept: HEMATOLOGY/ONCOLOGY | Facility: CLINIC | Age: 74
End: 2024-06-26
Payer: MEDICARE

## 2024-06-26 ENCOUNTER — TELEPHONE (OUTPATIENT)
Dept: INFUSION THERAPY | Facility: HOSPITAL | Age: 74
End: 2024-06-26
Payer: MEDICARE

## 2024-06-26 ENCOUNTER — PATIENT MESSAGE (OUTPATIENT)
Dept: HEMATOLOGY/ONCOLOGY | Facility: CLINIC | Age: 74
End: 2024-06-26
Payer: MEDICARE

## 2024-06-26 NOTE — TELEPHONE ENCOUNTER
Returned daughter's call concerning mother's infusion appointment cancelled for immunotherapy. Spoke with pt's daughter, informed her waiting on a [id number] to be completed and insurance approval. Once approved our team will call to schedule pt for treatment. Message sent to Dr. Llamas to follow up on [id number] and to please call family.

## 2024-06-26 NOTE — TELEPHONE ENCOUNTER
----- Message from Dayami Morocho sent at 6/26/2024  3:28 PM CDT -----  Type:  Patient Returning Call    Who Called:Reji barber   Who Left Message for Patient:  Does the patient know what this is regarding?:pt   Would the patient rather a call back or a response via [facility]? call  Best Call Back Number:  Additional Information: would like to speak with fred and states that they are calling in regards to immutherapy and states that the insurance company have not received paperwork from [facility].

## 2024-06-26 NOTE — TELEPHONE ENCOUNTER
I spoke to family. Told them I tried to call [facility] and was hung up on twice. I told them Dr. Nichole had spoke to someone at [facility] earlier and they told him they had everything they need and we should have an answer within 72 hours.

## 2024-06-26 NOTE — TELEPHONE ENCOUNTER
----- Message from Dereje Levine sent at 6/26/2024 10:50 AM CDT -----  Regarding: Authorization  Contact: [phone]  Felipe cotto from [facility] needing prior authorization. Please contact [employer]      Fax:[phone]

## 2024-06-27 ENCOUNTER — LAB VISIT (OUTPATIENT)
Dept: LAB | Facility: HOSPITAL | Age: 74
End: 2024-06-27
Attending: HOSPITALIST
Payer: MEDICARE

## 2024-06-27 ENCOUNTER — PATIENT MESSAGE (OUTPATIENT)
Dept: HEMATOLOGY/ONCOLOGY | Facility: CLINIC | Age: 74
End: 2024-06-27
Payer: MEDICARE

## 2024-06-27 ENCOUNTER — TELEPHONE (OUTPATIENT)
Dept: INFUSION THERAPY | Facility: HOSPITAL | Age: 74
End: 2024-06-27
Payer: MEDICARE

## 2024-06-27 DIAGNOSIS — C34.31 ADENOCARCINOMA OF LOWER LOBE OF RIGHT LUNG: ICD-10-CM

## 2024-06-27 DIAGNOSIS — E03.9 HYPOTHYROIDISM, UNSPECIFIED TYPE: ICD-10-CM

## 2024-06-27 LAB
ALBUMIN SERPL BCP-MCNC: 3.5 G/DL (ref 3.5–5.2)
ALP SERPL-CCNC: 68 U/L (ref 55–135)
ALT SERPL W/O P-5'-P-CCNC: 13 U/L (ref 10–44)
ANION GAP SERPL CALC-SCNC: 7 MMOL/L (ref 8–16)
AST SERPL-CCNC: 20 U/L (ref 10–40)
BILIRUB SERPL-MCNC: 0.3 MG/DL (ref 0.1–1)
BUN SERPL-MCNC: 10 MG/DL (ref 8–23)
CALCIUM SERPL-MCNC: 9.4 MG/DL (ref 8.7–10.5)
CHLORIDE SERPL-SCNC: 103 MMOL/L (ref 95–110)
CO2 SERPL-SCNC: 26 MMOL/L (ref 23–29)
CREAT SERPL-MCNC: 0.6 MG/DL (ref 0.5–1.4)
ERYTHROCYTE [DISTWIDTH] IN BLOOD BY AUTOMATED COUNT: 12.3 % (ref 11.5–14.5)
EST. GFR  (NO RACE VARIABLE): >60 ML/MIN/1.73 M^2
GLUCOSE SERPL-MCNC: 96 MG/DL (ref 70–110)
HCT VFR BLD AUTO: 38.8 % (ref 37–48.5)
HGB BLD-MCNC: 12.6 G/DL (ref 12–16)
IMM GRANULOCYTES # BLD AUTO: 0.02 K/UL (ref 0–0.04)
MCH RBC QN AUTO: 28.8 PG (ref 27–31)
MCHC RBC AUTO-ENTMCNC: 32.5 G/DL (ref 32–36)
MCV RBC AUTO: 89 FL (ref 82–98)
NEUTROPHILS # BLD AUTO: 2.2 K/UL (ref 1.8–7.7)
PLATELET # BLD AUTO: 214 K/UL (ref 150–450)
PMV BLD AUTO: 11.2 FL (ref 9.2–12.9)
POTASSIUM SERPL-SCNC: 4 MMOL/L (ref 3.5–5.1)
PROT SERPL-MCNC: 7.2 G/DL (ref 6–8.4)
RBC # BLD AUTO: 4.37 M/UL (ref 4–5.4)
SODIUM SERPL-SCNC: 136 MMOL/L (ref 136–145)
T4 FREE SERPL-MCNC: 1.46 NG/DL (ref 0.71–1.51)
TSH SERPL DL<=0.005 MIU/L-ACNC: 1.19 UIU/ML (ref 0.4–4)
WBC # BLD AUTO: 5.06 K/UL (ref 3.9–12.7)

## 2024-06-27 PROCEDURE — 80053 COMPREHEN METABOLIC PANEL: CPT | Performed by: HOSPITALIST

## 2024-06-27 PROCEDURE — 36415 COLL VENOUS BLD VENIPUNCTURE: CPT | Performed by: HOSPITALIST

## 2024-06-27 PROCEDURE — 84439 ASSAY OF FREE THYROXINE: CPT | Performed by: HOSPITALIST

## 2024-06-27 PROCEDURE — 85027 COMPLETE CBC AUTOMATED: CPT | Performed by: HOSPITALIST

## 2024-06-27 PROCEDURE — 84443 ASSAY THYROID STIM HORMONE: CPT | Performed by: HOSPITALIST

## 2024-06-27 NOTE — TELEPHONE ENCOUNTER
Confirmed upcoming infusion appointments with the patient for 7/2 at 1030a. Pt declined first available on 7/1 due to being out of town.

## 2024-06-28 RX ORDER — DIPHENHYDRAMINE HYDROCHLORIDE 50 MG/ML
50 INJECTION INTRAMUSCULAR; INTRAVENOUS ONCE AS NEEDED
OUTPATIENT
Start: 2024-07-02

## 2024-06-28 RX ORDER — EPINEPHRINE 0.3 MG/.3ML
0.3 INJECTION SUBCUTANEOUS ONCE AS NEEDED
OUTPATIENT
Start: 2024-07-02

## 2024-06-28 RX ORDER — HEPARIN 100 UNIT/ML
500 SYRINGE INTRAVENOUS
OUTPATIENT
Start: 2024-07-02

## 2024-06-28 RX ORDER — SODIUM CHLORIDE 0.9 % (FLUSH) 0.9 %
10 SYRINGE (ML) INJECTION
OUTPATIENT
Start: 2024-07-02

## 2024-07-02 ENCOUNTER — PATIENT MESSAGE (OUTPATIENT)
Dept: HEMATOLOGY/ONCOLOGY | Facility: CLINIC | Age: 74
End: 2024-07-02
Payer: MEDICARE

## 2024-07-02 ENCOUNTER — INFUSION (OUTPATIENT)
Dept: INFUSION THERAPY | Facility: HOSPITAL | Age: 74
End: 2024-07-02
Attending: FAMILY MEDICINE
Payer: MEDICARE

## 2024-07-02 VITALS
BODY MASS INDEX: 20.1 KG/M2 | HEIGHT: 62 IN | TEMPERATURE: 99 F | DIASTOLIC BLOOD PRESSURE: 87 MMHG | RESPIRATION RATE: 18 BRPM | HEART RATE: 80 BPM | OXYGEN SATURATION: 98 % | WEIGHT: 109.25 LBS | SYSTOLIC BLOOD PRESSURE: 129 MMHG

## 2024-07-02 DIAGNOSIS — C34.31 ADENOCARCINOMA OF LOWER LOBE OF RIGHT LUNG: Primary | ICD-10-CM

## 2024-07-02 PROCEDURE — 63600175 PHARM REV CODE 636 W HCPCS: Mod: JZ,JG | Performed by: HOSPITALIST

## 2024-07-02 PROCEDURE — 96413 CHEMO IV INFUSION 1 HR: CPT

## 2024-07-02 PROCEDURE — A4216 STERILE WATER/SALINE, 10 ML: HCPCS | Performed by: HOSPITALIST

## 2024-07-02 PROCEDURE — 25000003 PHARM REV CODE 250: Performed by: HOSPITALIST

## 2024-07-02 RX ORDER — SODIUM CHLORIDE 0.9 % (FLUSH) 0.9 %
10 SYRINGE (ML) INJECTION
Status: DISCONTINUED | OUTPATIENT
Start: 2024-07-02 | End: 2024-07-02 | Stop reason: HOSPADM

## 2024-07-02 RX ORDER — DIPHENHYDRAMINE HYDROCHLORIDE 50 MG/ML
50 INJECTION INTRAMUSCULAR; INTRAVENOUS ONCE AS NEEDED
Status: DISCONTINUED | OUTPATIENT
Start: 2024-07-02 | End: 2024-07-02 | Stop reason: HOSPADM

## 2024-07-02 RX ORDER — EPINEPHRINE 0.3 MG/.3ML
0.3 INJECTION SUBCUTANEOUS ONCE AS NEEDED
Status: DISCONTINUED | OUTPATIENT
Start: 2024-07-02 | End: 2024-07-02 | Stop reason: HOSPADM

## 2024-07-02 RX ADMIN — SODIUM CHLORIDE: 9 INJECTION, SOLUTION INTRAVENOUS at 10:07

## 2024-07-02 RX ADMIN — SODIUM CHLORIDE 480 MG: 9 INJECTION, SOLUTION INTRAVENOUS at 11:07

## 2024-07-02 RX ADMIN — Medication 10 ML: at 11:07

## 2024-07-02 NOTE — PLAN OF CARE
Pt tolerated cycle 1 Opdivo infusion well.  VSS. No adverse reaction noted. No complaints at this time. IV flushed with NS and D/C per protocol. AVS and treatment calendar printed and reviewed. Patient left clinic in no acute distress.

## 2024-07-11 ENCOUNTER — HOSPITAL ENCOUNTER (OUTPATIENT)
Dept: RADIOLOGY | Facility: HOSPITAL | Age: 74
Discharge: HOME | End: 2024-07-11
Attending: FAMILY MEDICINE
Payer: MEDICARE

## 2024-07-11 DIAGNOSIS — Z12.31 SCREENING MAMMOGRAM FOR BREAST CANCER: ICD-10-CM

## 2024-07-11 DIAGNOSIS — M81.0 AGE-RELATED OSTEOPOROSIS WITHOUT CURRENT PATHOLOGICAL FRACTURE: ICD-10-CM

## 2024-07-11 PROCEDURE — 77080 DXA BONE DENSITY AXIAL: CPT | Mod: 26,,, | Performed by: RADIOLOGY

## 2024-07-11 PROCEDURE — 77067 SCR MAMMO BI INCL CAD: CPT | Mod: TC

## 2024-07-11 PROCEDURE — 77080 DXA BONE DENSITY AXIAL: CPT | Mod: TC

## 2024-07-16 ENCOUNTER — TELEPHONE (OUTPATIENT)
Dept: INFUSION THERAPY | Facility: HOSPITAL | Age: 74
End: 2024-07-16
Payer: MEDICARE

## 2024-07-16 ENCOUNTER — TELEPHONE (OUTPATIENT)
Dept: FAMILY MEDICINE | Facility: CLINIC | Age: 74
End: 2024-07-16
Payer: MEDICARE

## 2024-07-16 NOTE — TELEPHONE ENCOUNTER
Called and spoke with pt. Prolia injection scheduled 7/30/24 when she comes for Opdivo infusion. Pt verbalized understanding.

## 2024-07-16 NOTE — TELEPHONE ENCOUNTER
Per Heme/Onc Ok to resume Q 6 month prolia shots-- currently due--please contact her to schedule (CMP 6/27/24 ok), thanks!

## 2024-07-19 ENCOUNTER — TELEPHONE (OUTPATIENT)
Dept: INFUSION THERAPY | Facility: HOSPITAL | Age: 74
End: 2024-07-19
Payer: MEDICARE

## 2024-07-19 NOTE — TELEPHONE ENCOUNTER
Returned pt 's call. Pt requesting to reschedule prolia injection 4 days before or 4 days after immunotherapy treatment. Offered pt Aug 5th. Pt states she will be out of town Aug 5-9th at [facility]. Pt agrees to appointment Aug 12th at 9am.

## 2024-07-21 PROBLEM — E03.9 HYPOTHYROIDISM (ACQUIRED): Status: ACTIVE | Noted: 2024-07-21

## 2024-07-21 NOTE — PROGRESS NOTES
Office Visit    Patient Name: Keo Frias    : 1950  MRN: [id number]    Subjective:  Keo is a 74 y.o. female who presents today for:    No chief complaint on file.    The patient location is: HER HOME   The chief complaint leading to consultation is: FOLLOW UP LABILE BP, Anxiety, Prolia for OP treatment and recent R lung lobectomy 2/2 Adenocarcinoma    Visit type: audiovisual    Face to Face time with patient: START 117   30 minutes of total time spent on the encounter, which includes face to face time and non-face to face time preparing to see the patient (eg, review of tests), Obtaining and/or reviewing separately obtained history, Documenting clinical information in the electronic or other health record, Independently interpreting results (not separately reported) and communicating results to the patient/family/caregiver, or Care coordination (not separately reported).     Each patient to whom he or she provides medical services by telemedicine is:  (1) informed of the relationship between the physician and patient and the respective role of any other health care provider with respect to management of the patient; and (2) notified that he or she may decline to receive medical services by telemedicine and may withdraw from such care at any time.    Notes:     STATUS POST LOBECTOMY OF THE RIGHT LOWER LOBE 2024-MD MARILEE   POSTOP COMPLICATION-EVACUATION OF HEMATOMA 2024  LN clear except the one expected +,margins clear-- f/u Heme/Onc  24  -- Proceed w/ adjuvant nivolumab, pending labs to be collected prior to C1D1  -- Labs and RTC 1 day prior to C2D1  -- Pt has imaging on 24, medical oncology f/u on 24, and thoracic surgery f/u on 24 at [facility]. If PET imaging demonstrates ongoing FDG avidity in R axillary or L scapular LN, these areas may be biopsied     74-year-old female patient of mine with longstanding history of hypertension and difficulties with labile blood  pressure readings, osteoporosis, generalized anxiety, Tx for R breast cancer--s/p lumpectomy & currently on Letrozole, currently receiving Chemotherapy [place] for adenocarcinoma of the Lower Lobe of the R lung who presents today for follow up and review of recent home blood pressure log.    Seen by me for physical 5/31/24 and advised as follows:   Currently overall doing well on Toprol-XL 50 mg 1/2 tablet twice a day with amlodipine 5 mg daily around depending on what her pressures are running.   This schedule is working well for her.   Blood pressures about 120-140/ 70s  No orthostatic symptoms.   Continue current regimen with ongoing monitoring and hopefully at her 6 week follow-up can simplify her regimen if her blood pressure lability is improving.     Had first dose of immunotherapy on June 30th. Today she reports that she is taking     Has PROLIA injection scheduled 8/12/24    Mood on lexapro 5 is good-- no anxiety or depression concerns.     Walking about 4-6 miles most days-- 1 hour in AM and another walk in the afternoon/evening.  No balance concerns.   Some upper back and lower neck pain-- would like PT. No current treatment.   No radicular symptoms.     TSH 1.1 6/27/24 w/ normal CBC and CMP    PAST MEDICAL HISTORY, SURGICAL/SOCIAL/FAMILY HISTORY REVIEWED AS PER CHART, WITH PERTINENT FINDINGS INCLUDED IN HISTORY SECTION OF NOTE.     Current Medications    Medication List with Changes/Refills   New Medications    LORATADINE 10 MG CHEW    Take 10 mg by mouth daily as needed (allergy).   Current Medications    AMLODIPINE (NORVASC) 5 MG TABLET    Take 5 mg by mouth.    CALCIUM CARBONATE (CALCIUM 500 ORAL)    Take 1 tablet by mouth.    CHOLECALCIFEROL, VITAMIN D3, 125 MCG (5,000 UNIT) CAPSULE    Take 1 capsule (5,000 Units total) by mouth daily with breakfast.    CIPRODEX 0.3-0.1 % DRPS    PLACE 4 DROPS INTO THE RIGHT EAR 2 (TWO) TIMES DAILY. FOR 10 DAYS    CLONIDINE (CATAPRES) 0.1 MG TABLET    Take 1 tablet (0.1  mg total) by mouth daily as needed (for systolic greater than or equal to 160 mmHg).    ESCITALOPRAM OXALATE (LEXAPRO) 5 MG TAB    Take 1 tablet (5 mg total) by mouth once daily.    FAMOTIDINE (PEPCID) 10 MG TABLET    Take 10 mg by mouth as needed.    LACTOBACILLUS RHAMNOSUS GG (CULTURELLE) 10 BILLION CELL CAPSULE    Take 1 capsule by mouth once daily. Pt takes Align probiotic    LETROZOLE (FEMARA) 2.5 MG TAB    TAKE 1 TABLET EVERY DAY    LEVOTHYROXINE (SYNTHROID) 88 MCG TABLET    Take 1 tablet (88 mcg total) by mouth before breakfast.    METOPROLOL SUCCINATE (TOPROL-XL) 50 MG 24 HR TABLET    Take 1 tablet (50 mg total) by mouth once daily.    MULTIVITAMIN-IRON-FOLIC ACID TAB    Take 1 tablet by mouth once daily.    OMEPRAZOLE (PRILOSEC OTC) 20 MG TABLET    Take 20 mg by mouth once daily.    PROLIA 60 MG/ML SYRG       Discontinued Medications    ATORVASTATIN (LIPITOR) 20 MG TABLET    Take 2 tablets (40 mg total) by mouth once daily.    BENZONATATE (TESSALON PERLES) 100 MG CAPSULE    Take 1 capsule (100 mg total) by mouth 3 (three) times daily as needed for Cough.    DEXAMETHASONE (DECADRON) 4 MG TAB    Take 2 tablets (8 mg total) by mouth As instructed. Take 2 tablets (8 mg) by mouth once on the day prior to chemotherapy then daily on days 2,3,4 following chemotherapy.    DUKE'S SOLN (BENADRYL 30 ML, MYLANTA 30 ML, LIDOCAINE 30 ML, NYSTATIN 30 ML) 120ML    Take 10 mLs by mouth 4 (four) times daily.    FOLIC ACID (FOLVITE) 1 MG TABLET    Take 1 tablet (1 mg total) by mouth once daily. Take 1 tablet (1 mg) by mouth once daily starting one week prior to pemetrexed and continuing for at least 21 days after stopping pemetrexed.    LOSARTAN (COZAAR) 25 MG TABLET    Take 25 mg by mouth.    OLANZAPINE (ZYPREXA) 5 MG TABLET    Take 1 tablet (5 mg total) by mouth every evening. Take 1 tablet by mouth every evening on days 1-4 of each chemotherapy cycle    PROCHLORPERAZINE (COMPAZINE) 10 MG TABLET    Take 1 tablet (10 mg  total) by mouth every 6 (six) hours as needed (nausea).       Allergies   Review of patient's allergies indicates:   Allergen Reactions    Sinus allergy Other (See Comments)     Headaches, migranes    Buspar [buspirone] Other (See Comments)     Pt isnt allergic      Sulfa (sulfonamide antibiotics) Rash         Review of Systems (Pertinent positives)  Review of Systems   Constitutional:  Negative for activity change and unexpected weight change.   HENT:  Positive for rhinorrhea. Negative for hearing loss and trouble swallowing.    Eyes:  Negative for discharge and visual disturbance.   Respiratory:  Negative for chest tightness, shortness of breath and wheezing.    Cardiovascular:  Negative for chest pain and palpitations.   Gastrointestinal:  Negative for blood in stool, constipation, diarrhea and vomiting.   Endocrine: Negative for polydipsia and polyuria.   Genitourinary:  Negative for difficulty urinating, dysuria, hematuria and menstrual problem.   Musculoskeletal:  Positive for back pain (upper back at base of neck) and neck pain. Negative for arthralgias and joint swelling.   Allergic/Immunologic: Positive for environmental allergies.   Neurological:  Negative for dizziness, weakness, light-headedness and headaches.   Psychiatric/Behavioral:  Negative for confusion and dysphoric mood.        LMP  (LMP Unknown)     Physical Exam  Vitals reviewed.   Constitutional:       General: She is not in acute distress.     Appearance: Normal appearance. She is well-developed.   HENT:      Head: Normocephalic and atraumatic.   Eyes:      Conjunctiva/sclera: Conjunctivae normal.   Pulmonary:      Effort: Pulmonary effort is normal.   Neurological:      Mental Status: She is alert and oriented to person, place, and time.   Psychiatric:         Mood and Affect: Mood normal.         Behavior: Behavior normal.           Assessment/Plan:  Keo Frias is a 74 y.o. female who presents today for :        ICD-10-CM ICD-9-CM    1.  Benign essential hypertension  I10 401.1 Comprehensive Metabolic Panel      Hemoglobin A1C      Lipid Panel      2. Labile hypertension  R09.89 401.9       3. Hypothyroidism (acquired)  E03.9 244.9       4. Situational anxiety  F41.8 300.09       5. Age-related osteoporosis without current pathological fracture  M81.0 733.01       6. Adenocarcinoma of lower lobe of right lung  C34.31 162.5       7. Malignant neoplasm of upper-outer quadrant of right breast in female, estrogen receptor positive  C50.411 174.4     Z17.0 V86.0       8. Medication management  Z79.899 V58.69 Comprehensive Metabolic Panel      Hemoglobin A1C      Lipid Panel      9. Prediabetes  R73.03 790.29 Comprehensive Metabolic Panel      Hemoglobin A1C      Lipid Panel      10. Allergic rhinitis due to other allergic trigger, unspecified seasonality  J30.89 477.8 loratadine 10 mg Chew      11. Upper back pain  M54.9 724.5 Ambulatory referral/consult to Physical/Occupational Therapy      12. Neck pain  M54.2 723.1 Ambulatory referral/consult to Physical/Occupational Therapy        ADENOCARCINOMA OF THE LUNG STATUS POST RIGHT LOWER LOBE LOBECTOMY:  Overall doing well, meeting postoperative milestones, energy level and activity level improving.  follow-up with heme/Onc.  Fortunately margins sound like they were clear.  Per note 6/14/24: Proceed w/ adjuvant nivolumab(Immunotherapy), had normal labs prior to C1D1.  Second dose scheduled.     HISTORY OF RIGHT BREAST CANCER, CHRONIC AROMATASE USE:  Mammogram7/11/24- Repeat 1 year, continue letrozole.     POSTMENOPAUSAL OSTEOPOROSIS, HISTORY OF VITAMIN-D DEFICIENCY:  Has been on Q six-month Prolia-due to receive her next shot but postponed pending heme/Onc follow-up.  Continue calcium, vitamin-D, exercise as tolerated, updated DEXA scan 7/11/24: stable moderate osteopenia of the hips and stable severe osteoporosis of the lumbar spine.   NEXT PROLIA SCHEDULED 8/12/24- cleared for Prolia per heme/onc      ABNORMAL TSH:  No history of hypothyroidism-- complication of chemotherapy.  She is now on Synthroid with normalization of most recent TSH.  Per heme/onc.    TSH 1.1 6/27/24 on synthroid 88 mcg w/ normal CBC and CMP       HYPERTENSION WITH LABILE BLOOD PRESSURES:  Had some issue with this as a complication of her surgery.  Currently overall doing well on Toprol-XL 50 mg 1/2 tablet twice a day with amlodipine 5 mg daily around lunch--happy with this schedule. No orthostatic symptoms.  Continue current regimen with ongoing monitoring. Follow up up Virtual visit in 3 months for lab and BP log review.      ANXIETY:  Stable on low-dose Lexapro 5.  Sleeping well, dealing with the stress of her recent illness, good support from her daughter who presents with her to the office visit today.  no concerns    HLD/ PREDIABETES: off Lipitor back pretty much back to her regular diet and exercise routine. Recheck lipids and A1c in 3 months with VV to follow for results review    UPPER BACK/ NECK PAIN: No radicular symptoms, PT referral placed for postural/ core strengthening and evaluation, can apply topical asper cream, heating pad, icy hot, etc prn.     There are no Patient Instructions on file for this visit.      Follow up in about 3 months (around 10/23/2024) for to follow up on lab results, return as needed for new concerns.

## 2024-07-23 ENCOUNTER — OFFICE VISIT (OUTPATIENT)
Dept: FAMILY MEDICINE | Facility: CLINIC | Age: 74
End: 2024-07-23
Payer: MEDICARE

## 2024-07-23 DIAGNOSIS — M81.0 AGE-RELATED OSTEOPOROSIS WITHOUT CURRENT PATHOLOGICAL FRACTURE: ICD-10-CM

## 2024-07-23 DIAGNOSIS — C34.31 ADENOCARCINOMA OF LOWER LOBE OF RIGHT LUNG: ICD-10-CM

## 2024-07-23 DIAGNOSIS — C50.411 MALIGNANT NEOPLASM OF UPPER-OUTER QUADRANT OF RIGHT BREAST IN FEMALE, ESTROGEN RECEPTOR POSITIVE: ICD-10-CM

## 2024-07-23 DIAGNOSIS — E03.9 HYPOTHYROIDISM (ACQUIRED): ICD-10-CM

## 2024-07-23 DIAGNOSIS — M54.9 UPPER BACK PAIN: ICD-10-CM

## 2024-07-23 DIAGNOSIS — Z17.0 MALIGNANT NEOPLASM OF UPPER-OUTER QUADRANT OF RIGHT BREAST IN FEMALE, ESTROGEN RECEPTOR POSITIVE: ICD-10-CM

## 2024-07-23 DIAGNOSIS — F41.8 SITUATIONAL ANXIETY: ICD-10-CM

## 2024-07-23 DIAGNOSIS — J30.89 ALLERGIC RHINITIS DUE TO OTHER ALLERGIC TRIGGER, UNSPECIFIED SEASONALITY: ICD-10-CM

## 2024-07-23 DIAGNOSIS — Z79.899 MEDICATION MANAGEMENT: ICD-10-CM

## 2024-07-23 DIAGNOSIS — I10 BENIGN ESSENTIAL HYPERTENSION: Primary | ICD-10-CM

## 2024-07-23 DIAGNOSIS — R09.89 LABILE HYPERTENSION: ICD-10-CM

## 2024-07-23 DIAGNOSIS — M54.2 NECK PAIN: ICD-10-CM

## 2024-07-23 DIAGNOSIS — R73.03 PREDIABETES: ICD-10-CM

## 2024-07-23 PROBLEM — J30.9 ALLERGIC RHINITIS: Status: ACTIVE | Noted: 2024-07-23

## 2024-07-24 ENCOUNTER — OFFICE VISIT (OUTPATIENT)
Dept: HEMATOLOGY/ONCOLOGY | Facility: CLINIC | Age: 74
End: 2024-07-24
Payer: MEDICARE

## 2024-07-24 ENCOUNTER — TELEPHONE (OUTPATIENT)
Dept: HEMATOLOGY/ONCOLOGY | Facility: CLINIC | Age: 74
End: 2024-07-24

## 2024-07-24 ENCOUNTER — LAB VISIT (OUTPATIENT)
Dept: LAB | Facility: HOSPITAL | Age: 74
End: 2024-07-24
Attending: HOSPITALIST
Payer: MEDICARE

## 2024-07-24 ENCOUNTER — PATIENT MESSAGE (OUTPATIENT)
Dept: FAMILY MEDICINE | Facility: CLINIC | Age: 74
End: 2024-07-24
Payer: MEDICARE

## 2024-07-24 VITALS
TEMPERATURE: 98 F | HEART RATE: 63 BPM | SYSTOLIC BLOOD PRESSURE: 129 MMHG | RESPIRATION RATE: 16 BRPM | DIASTOLIC BLOOD PRESSURE: 80 MMHG | HEIGHT: 62 IN | WEIGHT: 109.38 LBS | OXYGEN SATURATION: 99 % | BODY MASS INDEX: 20.13 KG/M2

## 2024-07-24 DIAGNOSIS — E03.9 HYPOTHYROIDISM, UNSPECIFIED TYPE: ICD-10-CM

## 2024-07-24 DIAGNOSIS — C34.31 ADENOCARCINOMA OF LOWER LOBE OF RIGHT LUNG: Primary | ICD-10-CM

## 2024-07-24 DIAGNOSIS — C77.1 SECONDARY MALIGNANT NEOPLASM OF INTRATHORACIC LYMPH NODES: ICD-10-CM

## 2024-07-24 DIAGNOSIS — C34.31 ADENOCARCINOMA OF LOWER LOBE OF RIGHT LUNG: ICD-10-CM

## 2024-07-24 LAB
ALBUMIN SERPL BCP-MCNC: 3.5 G/DL (ref 3.5–5.2)
ALP SERPL-CCNC: 66 U/L (ref 55–135)
ALT SERPL W/O P-5'-P-CCNC: 15 U/L (ref 10–44)
ANION GAP SERPL CALC-SCNC: 5 MMOL/L (ref 8–16)
AST SERPL-CCNC: 20 U/L (ref 10–40)
BILIRUB SERPL-MCNC: 0.4 MG/DL (ref 0.1–1)
BUN SERPL-MCNC: 7 MG/DL (ref 8–23)
CALCIUM SERPL-MCNC: 9.2 MG/DL (ref 8.7–10.5)
CHLORIDE SERPL-SCNC: 104 MMOL/L (ref 95–110)
CO2 SERPL-SCNC: 27 MMOL/L (ref 23–29)
CREAT SERPL-MCNC: 0.7 MG/DL (ref 0.5–1.4)
ERYTHROCYTE [DISTWIDTH] IN BLOOD BY AUTOMATED COUNT: 12.6 % (ref 11.5–14.5)
EST. GFR  (NO RACE VARIABLE): >60 ML/MIN/1.73 M^2
GLUCOSE SERPL-MCNC: 81 MG/DL (ref 70–110)
HCT VFR BLD AUTO: 41 % (ref 37–48.5)
HGB BLD-MCNC: 13 G/DL (ref 12–16)
IMM GRANULOCYTES # BLD AUTO: 0.02 K/UL (ref 0–0.04)
MCH RBC QN AUTO: 28.6 PG (ref 27–31)
MCHC RBC AUTO-ENTMCNC: 31.7 G/DL (ref 32–36)
MCV RBC AUTO: 90 FL (ref 82–98)
NEUTROPHILS # BLD AUTO: 2.5 K/UL (ref 1.8–7.7)
PLATELET # BLD AUTO: 190 K/UL (ref 150–450)
PMV BLD AUTO: 11.9 FL (ref 9.2–12.9)
POTASSIUM SERPL-SCNC: 4 MMOL/L (ref 3.5–5.1)
PROT SERPL-MCNC: 6.9 G/DL (ref 6–8.4)
RBC # BLD AUTO: 4.55 M/UL (ref 4–5.4)
SODIUM SERPL-SCNC: 136 MMOL/L (ref 136–145)
T4 FREE SERPL-MCNC: 1.29 NG/DL (ref 0.71–1.51)
TSH SERPL DL<=0.005 MIU/L-ACNC: 0.8 UIU/ML (ref 0.4–4)
WBC # BLD AUTO: 5.4 K/UL (ref 3.9–12.7)

## 2024-07-24 PROCEDURE — 36415 COLL VENOUS BLD VENIPUNCTURE: CPT | Performed by: HOSPITALIST

## 2024-07-24 PROCEDURE — 1159F MED LIST DOCD IN RCRD: CPT | Mod: CPTII,S$GLB,, | Performed by: HOSPITALIST

## 2024-07-24 PROCEDURE — 4010F ACE/ARB THERAPY RXD/TAKEN: CPT | Mod: CPTII,S$GLB,, | Performed by: HOSPITALIST

## 2024-07-24 PROCEDURE — 3044F HG A1C LEVEL LT 7.0%: CPT | Mod: CPTII,S$GLB,, | Performed by: HOSPITALIST

## 2024-07-24 PROCEDURE — 84439 ASSAY OF FREE THYROXINE: CPT | Performed by: HOSPITALIST

## 2024-07-24 PROCEDURE — 3074F SYST BP LT 130 MM HG: CPT | Mod: CPTII,S$GLB,, | Performed by: HOSPITALIST

## 2024-07-24 PROCEDURE — 80053 COMPREHEN METABOLIC PANEL: CPT | Performed by: HOSPITALIST

## 2024-07-24 PROCEDURE — 99999 PR PBB SHADOW E&M-EST. PATIENT-LVL IV: CPT | Mod: PBBFAC,,, | Performed by: HOSPITALIST

## 2024-07-24 PROCEDURE — 3079F DIAST BP 80-89 MM HG: CPT | Mod: CPTII,S$GLB,, | Performed by: HOSPITALIST

## 2024-07-24 PROCEDURE — 85027 COMPLETE CBC AUTOMATED: CPT | Performed by: HOSPITALIST

## 2024-07-24 PROCEDURE — 99215 OFFICE O/P EST HI 40 MIN: CPT | Mod: S$GLB,,, | Performed by: HOSPITALIST

## 2024-07-24 PROCEDURE — 3288F FALL RISK ASSESSMENT DOCD: CPT | Mod: CPTII,S$GLB,, | Performed by: HOSPITALIST

## 2024-07-24 PROCEDURE — 1101F PT FALLS ASSESS-DOCD LE1/YR: CPT | Mod: CPTII,S$GLB,, | Performed by: HOSPITALIST

## 2024-07-24 PROCEDURE — G2211 COMPLEX E/M VISIT ADD ON: HCPCS | Mod: S$GLB,,, | Performed by: HOSPITALIST

## 2024-07-24 PROCEDURE — 1126F AMNT PAIN NOTED NONE PRSNT: CPT | Mod: CPTII,S$GLB,, | Performed by: HOSPITALIST

## 2024-07-24 PROCEDURE — 84443 ASSAY THYROID STIM HORMONE: CPT | Performed by: HOSPITALIST

## 2024-07-24 RX ORDER — DIPHENHYDRAMINE HYDROCHLORIDE 50 MG/ML
50 INJECTION INTRAMUSCULAR; INTRAVENOUS ONCE AS NEEDED
OUTPATIENT
Start: 2024-07-30

## 2024-07-24 RX ORDER — SODIUM CHLORIDE 0.9 % (FLUSH) 0.9 %
10 SYRINGE (ML) INJECTION
OUTPATIENT
Start: 2024-07-30

## 2024-07-24 RX ORDER — HEPARIN 100 UNIT/ML
500 SYRINGE INTRAVENOUS
OUTPATIENT
Start: 2024-07-30

## 2024-07-24 RX ORDER — EPINEPHRINE 0.3 MG/.3ML
0.3 INJECTION SUBCUTANEOUS ONCE AS NEEDED
OUTPATIENT
Start: 2024-07-30

## 2024-07-24 NOTE — PROGRESS NOTES
[facility] MEDICAL ONCOLOGY - FOLLOW UP VISIT    Reason for visit: Follow up visit for adenocarcinoma of the lung      Oncology History   Malignant neoplasm of upper-outer quadrant of right breast in female, estrogen receptor positive   4/13/2021 Biopsy    Breast, right, 10:00 5N, biopsy:  - Invasive ductal carcinoma with lobular features     4/13/2021 Breast Tumor Markers    Estrogen Receptor: Positive >90%  Progesterone Receptor: Positive 10-50%  HER2: Negative  Ki67: 10-30%     4/26/2021 Genetic Testing    MyRisk: negative     5/12/2021 Breast Surgery    Right partial mastectomy with SLNB     6/1/2021 Tumor Conference    Radiation options? Offer radiation, can discuss partial vs whole breast radiation.        6/2/2021 Cancer Staged    Staging form: Breast, AJCC 8th Edition  - Pathologic stage from 6/2/2021: Stage IA (pT1b, pN0(sn), cM0, G2, ER+, SC+, HER2-)     7/6/2021 - 7/27/2021 Radiation Therapy    Treatment Summary  Course: C1 Chest 2021  Treatment Site Energy Dose/Fx (Gy) #Fx Total Dose (Gy) Start Date End Date Elapsed Days   Breast_Rt 6X 2.65 16 / 16 42.4 7/6/2021 7/27/2021 21          7/2021 -  Hormone Therapy    Letrozole      Procedure    Bronchoscopy, Station 7 positive for malignancy     Adenocarcinoma of lower lobe of right lung   10/5/2023 Imaging Significant Findings    CT C (obtained after CXR, which was itself obtained for palpitations)  - 2.1 x 1.3 cm spiculated RLL nodule, some spiculation noted to extend to the pleural margin  - Scattered pulmonary nodules centered mostly subpleural at the RLL (e.g. 0.9 cm)       10/10/2023 Imaging Significant Findings    PET CT  - 2.1 x 1.1 cm RLL nodule, SUV 3.8  - 0.9 cm R axillary LN, SUV 3.9  - 1.2 cm subcarinal LN, SUV 4.6  - 0.7 cm Ln along L posterior shoulder, SUV 1.7  - Multiple additional solid pulmonary nodules through R lung base, too small for uptake detection     11/14/2023 Procedure    Bronch with  EBUS  RLL, FNA  - Adenocarcinoma (TTF1 positive, GATA3 negative)    RLL, TBBx  - Adenocarcinoma    Per pulmonology, station 7 node was very vascular without window for biopsy, plan to discuss at thoracic tumor board    [facility] NGS  - KRAS G12A, CHEK1, MLH1, CTNNB1, CIC, PTPRD, NOTCH3, EZH2, LATS1, KMT2D  - Negative: EGFR, ALK, BRAF, KRAS, ROS1, RET, MET, EBB2  - PD-L1 1%       11/22/2023 Tumor Conference    Tumor Board  - Plan for axillary LN biopsy to determine lung vs recurrent breast vs other etiology  - Repeat CT C to evaluate nodules in RLL  - Determine treatment plan based on above results     11/22/2023 Tumor Genotyping    [facility] Liquid Biopsy  - No reportable pathogenic variants found     11/29/2023 Imaging Significant Findings    CT C  - 2.1 x 1.2 cm RLL spiculated nodule, stable in size w/ new central cavitation. Spiculated margins extend towards adjacent pleura.   - Stable irregular 2.0 cm linar opacity in LLL  - Stable 0.3 cm GGO, TRICIA  - Stable R basal sub cm nodules, largest 0.9 cm  - Several stable solid nodules predominantly in RLL, largest 0.9 cm     12/18/2023 Imaging Significant Findings    MRI Brain  - JELENA     1/3/2024 Procedure    IR Guided Biopsy, R Axillary LN  - No metastatic carcinoma (0.3 x 0.3 x 0.1 cm tissue, positive and negative controls stained appropriately)     1/5/2024 Imaging Significant Findings    PET CT  - Stable 2.0 x 1.1 cm RLL nodule (previously 2.1 x 1.1 cm)  - Stable additional nodules w/o significant racer uptake  - 0.8 cm R axillary node, SUV 4.2 (previously 0.9 cm, SUV 3.9)  - 0.5 cm L posterior shoulder node, SUV 2.3 (previously 0.7 cm, SUV 1.7)  - 1.3 cm subcarinal node, SUV 4.9 (previously 1.2 cm, SUV 4.6)     1/10/2024 Tumor Conference    Tumor Board   Re-sample enlarged, hypermetabolic axillary LN with repeat CT-guided biopsy versus excisional biopsy. If LN is negative for recurrent NSCLC, obtain EBUS of PET-avid mediastinal LN.         1/23/2024 Procedure    IR  Guided Biopsy, R Axillary LN (Second Biopsy)  - Negative for metastatic carcinoma     2/8/2024 Procedure    Bronch with EBUS  LN  Positive: Station 7 (Adenocarcinoma)  Negative: Station 11R     2/21/2024 Cancer Staged    Staging form: Lung, AJCC 8th Edition  - Clinical stage from 2/21/2024: Stage IIIA (cT1b, cN2, cM0)     2/21/2024 Tumor Conference    Thoracic Tumor Board  Stage IIIA right lower lobe adenocarcinoma with bulky subcarinal lymphadenopathy.  Proceed with NA chemo/I-O therapy. Return to [facility] for surgical evaluation. If patient is not a surgical candidate following 3 cycles of therapy, proceed with definitive chemo/RT.       2/22/2024 - 4/5/2024 Chemotherapy    Treatment Summary   Plan Name: OP NSCLC NIVOLUMAB 360 MG PLUS CARBOPLATIN (AUC5) PEMETREXED 500 MG/M2 Q3W x 3 CYCLES  Treatment Goal: Control  Status: Inactive  Start Date: 2/22/2024  End Date: 4/5/2024  Provider: Bridger Nichole IV, MD  Chemotherapy: CARBOplatin (PARAPLATIN) 415 mg in sodium chloride 0.9% 326.5 mL chemo infusion, 415 mg, Intravenous, Clinic/HOD 1 time, 3 of 3 cycles  Administration: 415 mg (2/22/2024), 465 mg (4/4/2024), 465 mg (3/14/2024)  PEMEtrexed disodium (ALIMTA) 750 mg in sodium chloride 0.9% SolP 100 mL chemo infusion, 500 mg/m2 = 750 mg, Intravenous, Clinic/HOD 1 time, 3 of 3 cycles  Administration: 750 mg (2/22/2024), 750 mg (4/4/2024), 750 mg (3/14/2024)     4/24/2024 Imaging Significant Findings    CT C/A/P  - 1.2 x 1.0 cm RLL malignancy, previously 2 x 1.2 cm  - Interval decrease in the previously seen nodules in the RLL  - 0.9 cm subcarinal node, previously 1.7 cm  - Interval decrease in right infrahilar soft tissue  - 2.1 cm LLL solid nodular opacity, unchanged  - 0.8 cm TRICIA ground-glass opacity, unchanged  - AVM again noted in RLL  - 0.6 cm right axillary lymph node, decreased in size from prior  - Left scapular lymph node decreased in size from prior, incompletely seen       5/6/2024 - 5/12/2024 Hospital Admission     Admitted to [facility] for RLLx.  Postoperative course complicated by hemothorax requiring return to the operating room for reexploration on postop day 1.  Chest tube removed on postop day 5.     5/6/2024 Surgery    Right Lower Lobectomy  Right Lower Lobe  - Adenocarcinoma with acinar pattern, 35% residual viable tumor, 1.0 cm in greatest dimension.  Pleural invasion absent, LVI absent, margins negative.  0/1 lymph node    Pericardial Excision  - Negative for tumor    LN  - Positive: Station 7  - Negative: Station 8R (0/1), 9 are (0/2), 2R (0/1), 4R (0/1), 11R (0/1), 12R (0/1), R posterior interlobar LN (0/1)    Path Staging: pT1a, pN2     7/2/2024 -  Chemotherapy    Treatment Summary   Plan Name: OP NIVOLUMAB 480 MG Q4W  Treatment Goal: Control  Status: Active  Start Date: 7/2/2024  End Date: 6/3/2025 (Planned)  Provider: Bridger Nichole IV, MD  Chemotherapy: [No matching medication found in this treatment plan]        - Second opinion, [facility]: contralateral LLL lesion stable but plan for sampling of this as well as subcarinal LN and possible additional RLL nodule. Pending results, consider NA --> surgery vs CRT          HPI:     Keo Frias is a 73 y.o. female with pmh significant for HTN, migraine headaches, and Stage IIIA (pT1a, pN2, cM0) adenocarcinoma of the RLL (no targetable mutations, PD-L1 1%), initially dx'd 11/14/23, s/p neoadjuvant carboplatin/pemetrexed/nivolumab (2/22/24 - 4/4/24), right lower lobectomy (5/6/24), and currently on adjuvant nivolumab (7/2/24 - present) who presents to for follow up. Of note, pt also has a history of Stage IA ( [id number], ER 95%, WI 10%, HER2 negative, Ki-67 20%) IDC of the R breast, initially dx'd 4/13/21, s/p lumpectomy and SLNB (5/12/21), XRT (7/6/21-7/27/21), and currently on letrozole (7/2021 - present).     Last clinic 6/14/24, proceed with C1D1 adjuvant nivolumab, labs and RTC 1 day prior to C2D1.  Imaging and follow up scheduled at [facility].    Interval History:  - 7/2/24:  "C1D1 nivolumab    Pt states that after her first cycle of nivolumab, though describes 5-6 days of mild fatigue after the infusion. She describes pruritus over her arms and legs, which improved with lotion, anddenies any visible rash. She says that her appetite is "very good", but notes that it is not as good as before the surgery. She says that she "limits myself in eating because I feel uncomfortable in my stomach"; her weight is unchanged over the past month (10.35 lbs, from 10.24 lbs). Pt denies N/V, diarrhea, constipation, rash, new/worsening SOB, or new/worsening cough.      Past Medical History:   Past Medical History:   Diagnosis Date    Anemia     Anxiety disorder 8/28/2019    Cataract     Chronic right-sided low back pain without sciatica 10/20/2021    Hypercholesteremia 9/17/2019    Invasive ductal carcinoma of breast, female, right 4/16/2021    Migraine with vision problems     Subclinical hypothyroidism     White coat syndrome with hypertension         Past Surgical History:   Past Surgical History:   Procedure Laterality Date    BREAST BIOPSY Right 04/13/2021    COLON SURGERY      COLONOSCOPY N/A 12/07/2018    Procedure: COLONOSCOPY;  Surgeon: Bhargav Gaston MD;  Location: [facility] (4TH FLR);  Service: Endoscopy;  Laterality: N/A;    DILATION AND CURETTAGE OF UTERUS      postmenopausal bleeding    ENDOBRONCHIAL ULTRASOUND N/A 11/14/2023    Procedure: ENDOBRONCHIAL ULTRASOUND (EBUS);  Surgeon: Kirt Gr MD;  [address];  Service: Pulmonary;  Laterality: N/A;    MASTECTOMY, PARTIAL Right 05/12/2021    Procedure: MASTECTOMY, PARTIAL-Right with radiological marker;  Surgeon: Vivian Cadena MD;  Location: [facility];  Service: General;  Laterality: Right;    ROBOTIC BRONCHOSCOPY N/A 11/14/2023    Procedure: ROBOTIC BRONCHOSCOPY;  Surgeon: Kirt Gr MD;  Location: [address];  Service: Pulmonary;  Laterality: N/A;    SENTINEL LYMPH NODE BIOPSY Right 05/12/2021    " Procedure: BIOPSY, LYMPH NODE, SENTINEL-Right;  Surgeon: Vivian Cadena MD;  Location: [facility];  Service: General;  Laterality: Right;    TUBAL LIGATION          Family History:   Family History   Problem Relation Name Age of Onset    Diabetes Brother      Hypertension Brother      Glaucoma Brother      Glaucoma Father      Cataracts Father      Retinal detachment Father      Lung cancer Mother      Uterine cancer Maternal Grandmother      Stroke Maternal Grandmother      Stroke Paternal Grandmother      Amblyopia Neg Hx      Blindness Neg Hx      Macular degeneration Neg Hx      Strabismus Neg Hx      Thyroid disease Neg Hx          Social History:   Social History     Tobacco Use    Smoking status: Never    Smokeless tobacco: Never   Substance Use Topics    Alcohol use: No        I have reviewed and updated the patient's past medical, surgical, family and social histories.      ROS:   As per HPI.     Allergies:   Review of patient's allergies indicates:   Allergen Reactions    Sinus allergy Other (See Comments)     Headaches, migranes    Buspar [buspirone] Other (See Comments)     Pt isnt allergic      Sulfa (sulfonamide antibiotics) Rash        Medications:   Current Outpatient Medications   Medication Sig Dispense Refill    amLODIPine (NORVASC) 5 MG tablet Take 5 mg by mouth.      calcium carbonate (CALCIUM 500 ORAL) Take 1 tablet by mouth.      cholecalciferol, vitamin D3, 125 mcg (5,000 unit) capsule Take 1 capsule (5,000 Units total) by mouth daily with breakfast. 100 capsule 4    CIPRODEX 0.3-0.1 % DrpS PLACE 4 DROPS INTO THE RIGHT EAR 2 (TWO) TIMES DAILY. FOR 10 DAYS 7.5 mL 5    cloNIDine (CATAPRES) 0.1 MG tablet Take 1 tablet (0.1 mg total) by mouth daily as needed (for systolic greater than or equal to 160 mmHg). 90 tablet 3    EScitalopram oxalate (LEXAPRO) 5 MG Tab Take 1 tablet (5 mg total) by mouth once daily. 90 tablet 4    famotidine (PEPCID) 10 MG tablet Take 10 mg by mouth as needed.       "Lactobacillus rhamnosus GG (CULTURELLE) 10 billion cell capsule Take 1 capsule by mouth once daily. Pt takes Align probiotic      letrozole (FEMARA) 2.5 mg Tab TAKE 1 TABLET EVERY DAY 90 tablet 3    levothyroxine (SYNTHROID) 88 MCG tablet Take 1 tablet (88 mcg total) by mouth before breakfast. 30 tablet 11    loratadine 10 mg Chew Take 10 mg by mouth daily as needed (allergy). 30 tablet 11    metoprolol succinate (TOPROL-XL) 50 MG 24 hr tablet Take 1 tablet (50 mg total) by mouth once daily. 90 tablet 4    multivitamin-iron-folic acid Tab Take 1 tablet by mouth once daily.      omeprazole (PRILOSEC OTC) 20 MG tablet Take 20 mg by mouth once daily.      PROLIA 60 mg/mL Syrg        No current facility-administered medications for this visit.          Physical Exam:       Ht 5' 2" (1.575 m)   Wt 49.6 kg (109 lb 5.6 oz)   LMP  (LMP Unknown)   BMI 20.00 kg/m²                Physical Exam  Constitutional:       Appearance: Normal appearance.   HENT:      Head: Normocephalic and atraumatic.   Eyes:      Extraocular Movements: Extraocular movements intact.      Conjunctiva/sclera: Conjunctivae normal.      Pupils: Pupils are equal, round, and reactive to light.   Cardiovascular:      Rate and Rhythm: Normal rate and regular rhythm.      Heart sounds: No murmur heard.     No friction rub. No gallop.   Pulmonary:      Effort: Pulmonary effort is normal.      Breath sounds: No wheezing, rhonchi or rales.      Comments: Diminished breath sounds in posterior inferior right lung field  Musculoskeletal:         General: Normal range of motion.      Right lower leg: No edema.      Left lower leg: No edema.   Skin:     General: Skin is warm and dry.   Neurological:      Mental Status: She is alert and oriented to person, place, and time.   Psychiatric:         Mood and Affect: Mood normal.         Thought Content: Thought content normal.         Judgment: Judgment normal.           Labs:   No results found for this or any " previous visit (from the past 48 hour(s)).         Imaging:         Path:  See oncologic history above.      Assessment and Plan:     Keo Frias is a 73 y.o. female with pmh significant for HTN, migraine headaches, and Stage IIIA (pT1a, pN2, cM0) adenocarcinoma of the RLL (no targetable mutations, PD-L1 1%), initially dx'd 11/14/23, s/p neoadjuvant carboplatin/pemetrexed/nivolumab (2/22/24 - 4/4/24), right lower lobectomy (5/6/24), and currently on adjuvant nivolumab (7/2/24 - present) who presents to for follow up. Of note, pt also has a history of Stage IA ( [id number], ER 95%, AK 10%, HER2 negative, Ki-67 20%) IDC of the R breast, initially dx'd 4/13/21, s/p lumpectomy and SLNB (5/12/21), XRT (7/6/21-7/27/21), and currently on letrozole (7/2021 - present).     Adenocarcinoma of RLL, Uncertain Stage  ECOG PS 1.  Patient is currently on adjuvant nivolumab (see note from 6/14/24), tolerating without significant issue. Her most recent imaging (CT C/A/P, 4/24/24) was after neoadjuvant therapy and prior to right lower lobectomy.  She has imaging and follow up at [facility] as below.  Ultimately, will plan to treat with nivolumab q4w x 1 year, w/ q3m imaging throughout.     PLAN:   -- C2D1 nivolumab on 7/30/24, labs pending and will sign accordingly  -- Pt has imaging on 8/07/24, medical oncology f/u on 8/08/24, and thoracic surgery f/u on 8/09/24 at [facility]. If PET imaging demonstrates ongoing FDG avidity in R axillary or L scapular LN, these areas may be biopsied  -- Labs, RTC, and C3D1 on 8/28/24 (one day delayed to align schedules)      Hypothyroidism  On 4/30/24, TSH was 41.25 and free T4 0.71, as compared to 4/2/24 when TSH was 0.014 and T4 was 1.92.  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  She has since been started on levothyroxine per Dr. Don.  Subsequent TFTs within normal limits.  -- Continue levothyroxine 88 mcg daily  -- Repeat labs w/ f/u      Osteoporosis  DEXA from 7/11/24 with  osteoporosis of the lumbar spine and hip.  -- Plan to resume Prolia shots per Dr. Ogden      Right IDC, ER+/MS+/HER2-  S/p lumpectomy with SLNB, radiation therapy, and currently on letrozole and denosumab, tolerating well. Concern for possible R axillary recurrence, workup as below.   -- Okay to continue letrozole  -- Okay to continue denosumab as above  -- Breast oncology team aware      The above information has been reviewed with the patient and all questions have been answered to their apparent satisfaction.  They understand that they can call the clinic with any questions.    Bridger Nichole MD  Hematology/Oncology  [facility]        Med Onc Chart Routing      Follow up with physician . Infusion on 7/30 as planned. Labs, RTC, and C3D1 on 8/28/24   Follow up with BRENDA    Infusion scheduling note    Injection scheduling note    Labs CBC, CMP, TSH and free T4   Scheduling:  Preferred lab:  Lab interval:     Imaging    Pharmacy appointment    Other referrals

## 2024-07-25 DIAGNOSIS — I10 BENIGN ESSENTIAL HYPERTENSION: ICD-10-CM

## 2024-07-25 DIAGNOSIS — R09.89 LABILE HYPERTENSION: ICD-10-CM

## 2024-07-25 RX ORDER — AMLODIPINE BESYLATE 5 MG/1
5 TABLET ORAL DAILY
Qty: 90 TABLET | Refills: 3 | Status: SHIPPED | OUTPATIENT
Start: 2024-07-25

## 2024-07-25 RX ORDER — METOPROLOL SUCCINATE 50 MG/1
50 TABLET, EXTENDED RELEASE ORAL DAILY
Qty: 90 TABLET | Refills: 4 | Status: SHIPPED | OUTPATIENT
Start: 2024-07-25 | End: 2025-10-18

## 2024-07-25 NOTE — TELEPHONE ENCOUNTER
No care due was identified.  [facility] Embedded Care Due Messages. Reference number: [id number].   7/25/2024 12:45:21 PM CDT

## 2024-07-30 ENCOUNTER — INFUSION (OUTPATIENT)
Dept: INFUSION THERAPY | Facility: HOSPITAL | Age: 74
End: 2024-07-30
Attending: FAMILY MEDICINE
Payer: MEDICARE

## 2024-07-30 VITALS
BODY MASS INDEX: 20.13 KG/M2 | SYSTOLIC BLOOD PRESSURE: 141 MMHG | RESPIRATION RATE: 17 BRPM | HEART RATE: 67 BPM | TEMPERATURE: 98 F | OXYGEN SATURATION: 97 % | HEIGHT: 62 IN | WEIGHT: 109.38 LBS | DIASTOLIC BLOOD PRESSURE: 64 MMHG

## 2024-07-30 DIAGNOSIS — C34.31 ADENOCARCINOMA OF LOWER LOBE OF RIGHT LUNG: Primary | ICD-10-CM

## 2024-07-30 PROCEDURE — 63600175 PHARM REV CODE 636 W HCPCS: Mod: JZ,JG | Performed by: HOSPITALIST

## 2024-07-30 PROCEDURE — 96413 CHEMO IV INFUSION 1 HR: CPT

## 2024-07-30 PROCEDURE — A4216 STERILE WATER/SALINE, 10 ML: HCPCS | Performed by: HOSPITALIST

## 2024-07-30 PROCEDURE — 25000003 PHARM REV CODE 250: Performed by: HOSPITALIST

## 2024-07-30 RX ORDER — EPINEPHRINE 0.3 MG/.3ML
0.3 INJECTION SUBCUTANEOUS ONCE AS NEEDED
Status: DISCONTINUED | OUTPATIENT
Start: 2024-07-30 | End: 2024-07-30 | Stop reason: HOSPADM

## 2024-07-30 RX ORDER — DIPHENHYDRAMINE HYDROCHLORIDE 50 MG/ML
50 INJECTION INTRAMUSCULAR; INTRAVENOUS ONCE AS NEEDED
Status: DISCONTINUED | OUTPATIENT
Start: 2024-07-30 | End: 2024-07-30 | Stop reason: HOSPADM

## 2024-07-30 RX ORDER — SODIUM CHLORIDE 0.9 % (FLUSH) 0.9 %
10 SYRINGE (ML) INJECTION
Status: DISCONTINUED | OUTPATIENT
Start: 2024-07-30 | End: 2024-07-30 | Stop reason: HOSPADM

## 2024-07-30 RX ADMIN — Medication 10 ML: at 12:07

## 2024-07-30 RX ADMIN — SODIUM CHLORIDE: 9 INJECTION, SOLUTION INTRAVENOUS at 11:07

## 2024-07-30 RX ADMIN — SODIUM CHLORIDE 480 MG: 9 INJECTION, SOLUTION INTRAVENOUS at 11:07

## 2024-07-30 NOTE — PLAN OF CARE
Pt tolerated Opdivo infusion well.  No adverse reaction noted. No complaints at this time. PIV flushed with NS, blood return noted and D/C per protocol. Treatment calendar printed and reviewed. Patient left clinic in no acute distress.

## 2024-08-12 ENCOUNTER — INFUSION (OUTPATIENT)
Dept: INFUSION THERAPY | Facility: HOSPITAL | Age: 74
End: 2024-08-12
Attending: FAMILY MEDICINE
Payer: MEDICARE

## 2024-08-12 ENCOUNTER — TELEPHONE (OUTPATIENT)
Dept: HEMATOLOGY/ONCOLOGY | Facility: CLINIC | Age: 74
End: 2024-08-12
Payer: MEDICARE

## 2024-08-12 VITALS
TEMPERATURE: 98 F | SYSTOLIC BLOOD PRESSURE: 116 MMHG | DIASTOLIC BLOOD PRESSURE: 60 MMHG | OXYGEN SATURATION: 97 % | RESPIRATION RATE: 18 BRPM | HEART RATE: 65 BPM

## 2024-08-12 DIAGNOSIS — M81.0 AGE-RELATED OSTEOPOROSIS WITHOUT CURRENT PATHOLOGICAL FRACTURE: Primary | ICD-10-CM

## 2024-08-12 PROCEDURE — 63600175 PHARM REV CODE 636 W HCPCS: Mod: JZ,JG | Performed by: FAMILY MEDICINE

## 2024-08-12 PROCEDURE — 96372 THER/PROPH/DIAG INJ SC/IM: CPT

## 2024-08-12 RX ADMIN — DENOSUMAB 60 MG: 60 INJECTION SUBCUTANEOUS at 09:08

## 2024-08-13 ENCOUNTER — CLINICAL SUPPORT (OUTPATIENT)
Dept: REHABILITATION | Facility: HOSPITAL | Age: 74
End: 2024-08-13
Payer: MEDICARE

## 2024-08-13 DIAGNOSIS — M54.2 NECK PAIN: ICD-10-CM

## 2024-08-13 DIAGNOSIS — M54.9 UPPER BACK PAIN: ICD-10-CM

## 2024-08-13 PROCEDURE — 97162 PT EVAL MOD COMPLEX 30 MIN: CPT | Mod: PN

## 2024-08-14 ENCOUNTER — OFFICE VISIT (OUTPATIENT)
Dept: PSYCHIATRY | Facility: CLINIC | Age: 74
End: 2024-08-14
Payer: MEDICARE

## 2024-08-14 DIAGNOSIS — F41.9 ANXIETY DISORDER, UNSPECIFIED TYPE: Primary | ICD-10-CM

## 2024-08-14 DIAGNOSIS — C34.31 ADENOCARCINOMA OF LOWER LOBE OF RIGHT LUNG: ICD-10-CM

## 2024-08-14 DIAGNOSIS — R91.1 NODULE OF UPPER LOBE OF RIGHT LUNG: ICD-10-CM

## 2024-08-14 DIAGNOSIS — I10 BENIGN ESSENTIAL HYPERTENSION: ICD-10-CM

## 2024-08-14 PROCEDURE — 4010F ACE/ARB THERAPY RXD/TAKEN: CPT | Mod: CPTII,95,, | Performed by: PSYCHIATRY & NEUROLOGY

## 2024-08-14 PROCEDURE — 99214 OFFICE O/P EST MOD 30 MIN: CPT | Mod: 95,,, | Performed by: PSYCHIATRY & NEUROLOGY

## 2024-08-14 PROCEDURE — 3044F HG A1C LEVEL LT 7.0%: CPT | Mod: CPTII,95,, | Performed by: PSYCHIATRY & NEUROLOGY

## 2024-08-14 NOTE — PROGRESS NOTES
Telehealth Session Info    The patient location is: Patient's home in [place], [place] .  Patient reported that his/her location at the time of this visit was in the [facility]     Participants on call:    I also informed patient of the following:     Nikunj Green MD , an Employee of [employer] / [place] /   [facility]  / Nikko Agustin    Each patient to whom he or she provides medical services by telemedicine is: (1) informed of the relationship between the physician and patient and the respective role of any other health care provider with respect to management of the patient; and (2) notified that he or she may decline to receive medical services by telemedicine and may withdraw from such care at any time.    If technology issues arise during call,  pt informed that MD will attempt to call pt back / as well:  pt may call office phone: [phone] in attempt to reconnect.    If pt has questions related to privacy practices or records: pt instructed to contact [facility] Information Department:     Pt informed that IF acute concerns to: Dial 911 or go to nearest Emergency Room (ER)    Understanding Expressed      Keo Frias   1950 08/14/2024       Disclaimer: Evaluation and treatment is based on information presented to date. Any new information may affect assessment and findings.     review allergies & prob list     S: Patient's Own Perception of Condition (& Side Effects) : no med complaint     O:      CURRENT PRESENTATION:      R bottom lobe lung removed; lung cancer MD [place]  1 week in hospital May 2024    She had only seen me 1 time before in November 20, 2023 for initial evaluation    She did see her primary care who decreased her Lexapro from 10 mg 5 mg as agreed by patient herself    Today patient today patient has an interest in coming off Lexapro as she is doing well    We discussed pros and con; mutually agreed to take her off / discontinue all Lexapro    We have set up a  follow-up in early November / for status check if all goes well would plan to close with the understanding that she would re-contact if any anxiety depression or other concerns. Understanding Expressed    Psyc Meds:  Mutually agreed DISCONTINUE ALL  Lexapro / re-contact if any anxiety depression or other concerns      Self Ratings:         8/14/2024     9:25 AM 11/29/2023    12:38 AM 9/4/2019     3:57 PM   GAD7   1. Feeling nervous, anxious, or on edge? 0 1 0   2. Not being able to stop or control worrying? 0 1 0   3. Worrying too much about different things? 0 1    4. Trouble relaxing? 0 1    5. Being so restless that it is hard to sit still? 0 1    6. Becoming easily annoyed or irritable? 0 1    7. Feeling afraid as if something awful might happen? 0 1    8. If you checked off any problems, how difficult have these problems made it for you to do your work, take care of things at home, or get along with other people? 0 1    TERESA-7 Score 0 7             8/14/2024     9:26 AM 5/31/2024     1:12 PM 11/30/2023    12:39 AM 11/8/2023     8:41 AM 10/23/2023    10:21 AM 10/10/2023     1:27 PM 9/20/2023    10:54 AM   Depression Patient Health Questionnaire   Over the last two weeks how often have you been bothered by little interest or pleasure in doing things Not at all Not at all Not at all Not at all Not at all Not at all Not at all   Over the last two weeks how often have you been bothered by feeling down, depressed or hopeless Not at all Not at all Not at all Not at all Not at all Not at all Not at all   PHQ-2 Total Score 0 0 0 0 0 0 0   Over the last two weeks how often have you been bothered by trouble falling or staying asleep, or sleeping too much Not at all  Not at all       Over the last two weeks how often have you been bothered by feeling tired or having little energy Not at all  Not at all       Over the last two weeks how often have you been bothered by a poor appetite or overeating Not at all  Not at all        Over the last two weeks how often have you been bothered by feeling bad about yourself - or that you are a failure or have let yourself or your family down Not at all  Not at all       Over the last two weeks how often have you been bothered by trouble concentrating on things, such as reading the newspaper or watching television Several days  Not at all       Over the last two weeks how often have you been bothered by moving or speaking so slowly that other people could have noticed. Or the opposite - being so fidgety or restless that you have been moving around a lot more than usual. Not at all  Not at all       Over the last two weeks how often have you been bothered by thoughts that you would be better off dead, or of hurting yourself Not at all  Not at all       If you checked off any problems, how difficult have these problems made it for you to do your work, take care of things at home or get along with other people? Not difficult at all  Not difficult at all       PHQ-9 Score 1  0       PHQ-9 Interpretation Minimal or None  Minimal or None           Constitutional Health Concerns:  Had resection of right bottom lobe of lung MD [place] ; procedure went well and she is doing well in recovery      Wt Readings from Last 3 Encounters:   07/30/24 49.6 kg (109 lb 5.6 oz)   07/24/24 49.6 kg (109 lb 5.6 oz)   07/02/24 49.6 kg (109 lb 3.8 oz)        Laboratory Data  Lab Visit on 07/24/2024   Component Date Value Ref Range Status    Sodium 07/24/2024 136  136 - 145 mmol/L Final    Potassium 07/24/2024 4.0  3.5 - 5.1 mmol/L Final    Chloride 07/24/2024 104  95 - 110 mmol/L Final    CO2 07/24/2024 27  23 - 29 mmol/L Final    Glucose 07/24/2024 81  70 - 110 mg/dL Final    BUN 07/24/2024 7 (L)  8 - 23 mg/dL Final    Creatinine 07/24/2024 0.7  0.5 - 1.4 mg/dL Final    Calcium 07/24/2024 9.2  8.7 - 10.5 mg/dL Final    Total Protein 07/24/2024 6.9  6.0 - 8.4 g/dL Final    Albumin 07/24/2024 3.5  3.5 - 5.2 g/dL Final     Total Bilirubin 07/24/2024 0.4  0.1 - 1.0 mg/dL Final    Alkaline Phosphatase 07/24/2024 66  55 - 135 U/L Final    AST 07/24/2024 20  10 - 40 U/L Final    ALT 07/24/2024 15  10 - 44 U/L Final    eGFR 07/24/2024 >60.0  >60 mL/min/1.73 m^2 Final    Anion Gap 07/24/2024 5 (L)  8 - 16 mmol/L Final    WBC 07/24/2024 5.40  3.90 - 12.70 K/uL Final    RBC 07/24/2024 4.55  4.00 - 5.40 M/uL Final    Hemoglobin 07/24/2024 13.0  12.0 - 16.0 g/dL Final    Hematocrit 07/24/2024 41.0  37.0 - 48.5 % Final    MCV 07/24/2024 90  82 - 98 fL Final    MCH 07/24/2024 28.6  27.0 - 31.0 pg Final    MCHC 07/24/2024 31.7 (L)  32.0 - 36.0 g/dL Final    RDW 07/24/2024 12.6  11.5 - 14.5 % Final    Platelets 07/24/2024 190  150 - 450 K/uL Final    MPV 07/24/2024 11.9  9.2 - 12.9 fL Final    Gran # (ANC) 07/24/2024 2.5  1.8 - 7.7 K/uL Final    Immature Grans (Abs) 07/24/2024 0.02  0.00 - 0.04 K/uL Final    TSH 07/24/2024 0.803  0.400 - 4.000 uIU/mL Final    Free T4 07/24/2024 1.29  0.71 - 1.51 ng/dL Final      Mental Status Exam:   Appearance: casual   Oriented: x 3   Attitude: cooperative pleasant   Eye Contact: good   Behavior: calm here   Mood: says feels good  Cognition: alert   Concentration: grossly intact   Affect: appropriate range   Anxiety: denies  Thought Process: goal directed   Speech: Volume : WNL   Quantity WNL   Quality: appears to openly answer questions   Eye Contact: good   Threats: no SI / no HI   Memory: intact (as relay information across time spans)   Psychosis: denies all   Estimate of Intellectual Function: average   Impulse Control: no history SI / nor HI ; calm here      3 wishes ?  Health  wealth my family   Musculoskeletal:  No tremor     Social:    is supportive    Past Surgical History:   Procedure Laterality Date    BREAST BIOPSY Right 04/13/2021    COLON SURGERY      COLONOSCOPY N/A 12/07/2018    Procedure: COLONOSCOPY;  Surgeon: Bhargav Gaston MD;  Location: [facility] ([address] [place] [address]);  Service:  Endoscopy;  Laterality: N/A;    DILATION AND CURETTAGE OF UTERUS      postmenopausal bleeding    ENDOBRONCHIAL ULTRASOUND N/A 11/14/2023    Procedure: ENDOBRONCHIAL ULTRASOUND (EBUS);  Surgeon: Kirt Gr MD;  Location: [facility] OR 2ND FLR;  Service: Pulmonary;  Laterality: N/A;    MASTECTOMY, PARTIAL Right 05/12/2021    Procedure: MASTECTOMY, PARTIAL-Right with radiological marker;  Surgeon: Vivian Cadena MD;  Location: [facility] OR;  Service: General;  Laterality: Right;    ROBOTIC BRONCHOSCOPY N/A 11/14/2023    Procedure: ROBOTIC BRONCHOSCOPY;  Surgeon: Kirt Gr MD;  Location: [facility] OR 2ND FLR;  Service: Pulmonary;  Laterality: N/A;    SENTINEL LYMPH NODE BIOPSY Right 05/12/2021    Procedure: BIOPSY, LYMPH NODE, SENTINEL-Right;  Surgeon: Vivian Cadena MD;  Location: [facility] OR;  Service: General;  Laterality: Right;    TUBAL LIGATION          Patient Active Problem List   Diagnosis    Age-related osteoporosis without current pathological fracture    Migraine headache with aura    Vitamin D deficiency    Vegetarian diet    Otalgia of both ears    Sensorineural hearing loss    Dysfunctions of sleep stages or arousal from sleep    Benign essential hypertension    Situational anxiety    Right renal mass    Labile hypertension    Hyperlipidemia    At risk for lymphedema    Malignant neoplasm of upper-outer quadrant of right breast in female, estrogen receptor positive    Chronic right-sided low back pain without sciatica    Aromatase inhibitor use    Palpitations    Chest pain    Nodule of upper lobe of right lung    Abnormal chest CT    Medication management    Adenocarcinoma of lower lobe of right lung    Anxiety disorder    Lymphadenopathy, axillary    Hypothyroidism (acquired)    Allergic rhinitis          Current Outpatient Medications:     amLODIPine (NORVASC) 5 MG tablet, Take 1 tablet (5 mg total) by mouth once daily., Disp: 90 tablet, Rfl: 3    calcium carbonate (CALCIUM 500 ORAL), Take 1 tablet by  mouth., Disp: , Rfl:     cholecalciferol, vitamin D3, 125 mcg (5,000 unit) capsule, Take 1 capsule (5,000 Units total) by mouth daily with breakfast., Disp: 100 capsule, Rfl: 4    CIPRODEX 0.3-0.1 % DrpS, PLACE 4 DROPS INTO THE RIGHT EAR 2 (TWO) TIMES DAILY. FOR 10 DAYS, Disp: 7.5 mL, Rfl: 5    EScitalopram oxalate (LEXAPRO) 5 MG Tab, Take 1 tablet (5 mg total) by mouth once daily., Disp: 90 tablet, Rfl: 4    famotidine (PEPCID) 10 MG tablet, Take 10 mg by mouth as needed., Disp: , Rfl:     Lactobacillus rhamnosus GG (CULTURELLE) 10 billion cell capsule, Take 1 capsule by mouth once daily. Pt takes Align probiotic, Disp: , Rfl:     letrozole (FEMARA) 2.5 mg Tab, TAKE 1 TABLET EVERY DAY, Disp: 90 tablet, Rfl: 3    levothyroxine (SYNTHROID) 88 MCG tablet, Take 1 tablet (88 mcg total) by mouth before breakfast., Disp: 30 tablet, Rfl: 11    loratadine 10 mg Chew, Take 10 mg by mouth daily as needed (allergy)., Disp: 30 tablet, Rfl: 11    metoprolol succinate (TOPROL-XL) 50 MG 24 hr tablet, Take 1 tablet (50 mg total) by mouth once daily., Disp: 90 tablet, Rfl: 4    multivitamin-iron-folic acid Tab, Take 1 tablet by mouth once daily., Disp: , Rfl:     PROLIA 60 mg/mL Syrg, , Disp: , Rfl:      Social History     Tobacco Use   Smoking Status Never   Smokeless Tobacco Never        Review of patient's allergies indicates:   Allergen Reactions    Sinus allergy Other (See Comments)     Headaches, migranes    Buspar [buspirone] Other (See Comments)     Pt isnt allergic      Sulfa (sulfonamide antibiotics) Rash        ASSESSMENT:   Encounter Diagnoses   Name Primary?    Anxiety disorder, unspecified type, historically mild; August 14 2024 ; now denies anxiety tapering off of Lexapro from 10mg to 5 mg to now off; mutually agreed that she will re-contact if any issues Yes    Adenocarcinoma of lower lobe of right lung     Nodule of upper lobe of right lung     Benign essential hypertension        Patient Instructions     PLAN:    Follow Up Nov 6 2024 @ 1pm TELEHEALTH    She had only seen me 1 time before in November 20, 2023 for initial evaluation    She did see her primary care who decreased her Lexapro from 10 mg 5 mg as agreed by patient herself    Today patient today patient has an interest in coming off Lexapro as she is doing well    We discussed pros and con; mutually agreed to take her off / discontinue all Lexapro    We have set up a follow-up in early November / for status check if all goes well would plan to close with the understanding that she would re-contact if any anxiety depression or other concerns. Understanding Expressed    Psyc Meds:  Mutually agreed DISCONTINUE ALL  Lexapro / re-contact if any anxiety depression or other concerns    References:     Relaxation stress reduction workbook: GIL Lincoln PhD ( used: $7-10)    Feeling Good Website: Nikunj Chowdhury MD / www.[employer] website (free) / conrad. PODCASTS    Anxiety &  phobia workbook by RC Sullivan PhD  (web retailers: used: $ 7-10)    [place]: Path to Better Sleep : https://www.veterantraining.[place].gov/insomnia/ (free)     Pt expressed appreciation for the visit today and did not have further question at this time though pt  was still informed to:     Call  if problems.    Call / Report Side Effects to Psyc MD     Encouraged to follow up with primary care / Gen Med MD for continued monitoring of general health and wellness.    Understanding was expressed; and no further concerns nor questions were raised at this time.     Remember healthy self care:   eat right  attempt adequate rest   HANDWASHING / encourage such conrad. During this corona virus time   walk or light exercise within reason and as your general med team approves  read or explore any of reference materials / homework mentioned  reach out (I.e.,  connect with)  others who nuture and bring out best in you  avoid risky behaviors    Keep your appointments:    IF you  cannot make your appt THEN please call  [phone]  or go online (via My Chart daniel) to reschedule.    It is the responsibility of the patient to reschedule an appointment if an appointment has been canceled or missed.    Avoid  alcohol and illicit substances.  Look for the positive.  All is often relative-seek balance  Call sooner if needed : [phone]  Call 911 or go to Emergency Room  (ER)  if  any acute concerns     Excerpt from Initiual Psyc MD Vargas from Nov 2023:    Sources of Information:  Patient  Interview and chart review      Chief Complaint:  history of anxiety and more recently diagnosed with lung cancer     Referred by: (Whose idea to come see Psychiatry) : self referred      History of Present Illness     Keo long 73 y.o.  female presents today as self referred      history of anxiety and more recently diagnosed with lung cancer     Worked in [employer] x over 30 years  Excerpt  of 11-22-23 Heme Oncology : Bridger Nichole MD [facility]     73 y.o. female with h/o right breast IDC (Stage 1A, T1BN0) s/p lumpectomy (05/2021) and adjuvant right breast radiation (currently treated with adjuvant letrozole), HTN, and newly diagnosed RLL NSCLC. Chest CT 10/05/23 showed RLL spiculate lesion measuring 2.1 cm. Follow up PET/CT 10/12/23 redemonstrated hypermetabolic RLL mass as well as PET avid lymph nodes in the right axilla, mediastinum and left posterior shoulder. Underwent robotic bronchoscopy with EBUS 11/14/23; pathology: RLL positive for NSCLC, adenocarcinoma (TTF positive, GATA3 negative) consistent with new primary.     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      Family Cancer History: Her mother had lung cancer (non-smoker), her brother had a cancer on his arm (uncertain type), otherwise she denies a family history of cancer.      Adenocarcinoma of RLL  ECOG PS 1.   Patient presents with a new diagnosis of right lower lobe adenocarcinoma.  Staging scans are not yet complete (MRI brain pending), however PET-CT demonstrates a primary right lower lobe lesion (2.1 x 1.1 cm), an avid subcarinal node (no window for biopsy on recent bronchoscopy with EBUS), 0.9 cm FDG avid right axillary lymphadenopathy, and an FDG avid lymph node along the left posterior shoulder. Notably, the patient also has a history of right-sided invasive ductal carcinoma status post lumpectomy, radiation therapy, and currently on letrozole (lumpectomy on 5/12/21 radiation completed in 7/2021).  Staging is unclear at this time; based on review of imaging, favor that subcarinal node is positive for adenocarcinoma but the etiology of the right axillary lymphadenopathy is less clear given that this is an atypical location for lung cancer metastasis.  In the setting of her recent right-sided breast cancer, will obtain a biopsy of the right axillary lymph node.  If this is positive for lung cancer, this would represent non-regional disease and require systemic treatment.  If this is positive for breast cancer, then in addition to working with the breast oncology team, would need to further define the subcarinal lesion; this was discussed at tumor board where the opinion was that this would be difficult to access via mediastinoscopy and so may need to repeat either EBUS or to treat empirically likely with CRT.      Of note, CT imaging also demonstrates solid RLL nodules. After review with multidisciplinary team at tumor board, will plan for repeat imaging to assess stability of these nodules.   Past Psychiatric History:   Previous therapy: No  Previous psychiatric treatment and medication trials: Yes  Previous psychiatric hospitalizations: No  Previous diagnoses:  depression anxiety Dr Saab  Previous suicide attempts: No     Abuse History:   Physical Abuse: No  Sexual Abuse: No  Other Abuse / Trauma :  n      Substance Abuse History:  Use of alcohol:  no  Tobacco use:  she NEVER ; father did smoke   Cannabis: no  Recreational drugs:  none     Family History Mental Health:   Suicide :  No  Other:  No     Psyc Meds: lexapro / buspirone (which she stopped after cardiology suggested she do so IF taknig lexapro)        Top 3 Stressors:  if disagree with someone: If they talk politics not like and ruminate  City [facility]: [facility]      Siblings (full or half)  Brothers: 3  Sisters:2     Parents :     Briefly Describe  your Mom: like me get good education;  good habits   Briefly Describe your Dad: same thing      Bio Mom: Occupation:  not twork  Bio Dad:  Occupation: lab work     Marital Status: once /  51 yrs   (Braxton Frias; alive)      Children   Girls  (ages): daughter 46 Renee and 50 Impal  Boys (ages): none     Education: B.S.  science [employer]     Sabianism: Lutheran     Legal Issues?  n  DWI ?n  intermediate time?n     Employment:   Retired 2014  Longest Job? [employer]

## 2024-08-14 NOTE — PATIENT INSTRUCTIONS
PLAN:   Follow Up Nov 6 2024 @ 1pm TELEHEALTH    She had only seen me 1 time before in November 20, 2023 for initial evaluation    She did see her primary care who decreased her Lexapro from 10 mg 5 mg as agreed by patient herself    Today patient today patient has an interest in coming off Lexapro as she is doing well    We discussed pros and con; mutually agreed to take her off / discontinue all Lexapro    We have set up a follow-up in early November / for status check if all goes well would plan to close with the understanding that she would re-contact if any anxiety depression or other concerns. Understanding Expressed    Psyc Meds:  Mutually agreed DISCONTINUE ALL  Lexapro / re-contact if any anxiety depression or other concerns    References:     Relaxation stress reduction workbook: GIL Lincoln PhD ( used: $7-10)    Feeling Good Website: Nikunj Chowdhury MD / www.[employer] website (free) / conrad. PODCASTS    Anxiety &  phobia workbook by RC Sullivan PhD  (web retailers: used: $ 7-10)    [place]: Path to Better Sleep : https://www.veterantraining.[place].gov/insomnia/ (free)     Pt expressed appreciation for the visit today and did not have further question at this time though pt  was still informed to:     Call  if problems.    Call / Report Side Effects to Psyc MD     Encouraged to follow up with primary care / Gen Med MD for continued monitoring of general health and wellness.    Understanding was expressed; and no further concerns nor questions were raised at this time.     Remember healthy self care:   eat right  attempt adequate rest   HANDWASHING / encourage such conrad. During this corona virus time   walk or light exercise within reason and as your general med team approves  read or explore any of reference materials / homework mentioned  reach out (I.e.,  connect with)  others who nuture and bring out best in you  avoid risky behaviors    Keep your appointments:    IF you  cannot make your appt THEN please  call [phone]  or go online (via My Chart daniel) to reschedule.    It is the responsibility of the patient to reschedule an appointment if an appointment has been canceled or missed.    Avoid  alcohol and illicit substances.  Look for the positive.  All is often relative-seek balance  Call sooner if needed : [phone]  Call 911 or go to Emergency Room  (ER)  if  any acute concerns

## 2024-08-15 PROBLEM — M54.2 NECK PAIN: Status: ACTIVE | Noted: 2024-08-15

## 2024-08-15 PROBLEM — M54.9 UPPER BACK PAIN: Status: ACTIVE | Noted: 2024-08-15

## 2024-08-20 ENCOUNTER — CLINICAL SUPPORT (OUTPATIENT)
Dept: REHABILITATION | Facility: HOSPITAL | Age: 74
End: 2024-08-20
Payer: MEDICARE

## 2024-08-20 DIAGNOSIS — M54.9 UPPER BACK PAIN: ICD-10-CM

## 2024-08-20 DIAGNOSIS — M54.2 NECK PAIN: Primary | ICD-10-CM

## 2024-08-20 PROCEDURE — 97112 NEUROMUSCULAR REEDUCATION: CPT | Mod: PN,CQ

## 2024-08-20 PROCEDURE — 97110 THERAPEUTIC EXERCISES: CPT | Mod: PN,CQ

## 2024-08-20 NOTE — PROGRESS NOTES
"[facility]   Physical Therapy Treatment Note      Name: Keo Frias  Clinic Number: [id number]    Therapy Diagnosis:   Encounter Diagnoses   Name Primary?    Neck pain Yes    Upper back pain      Physician: Sharron Ogden MD    Visit Date: 8/20/2024       Physician Orders: PT Eval and Treat   Medical Diagnosis:   M54.9 (ICD-10-CM) - Upper back pain   M54.2 (ICD-10-CM) - Neck pain      Evaluation Date: 8/13/2024  Authorization Period: 12/31/2024  Plan of Care Certification Period: 8/13/2024 to 11/10/2024  Visit # / Visits authorized: 1/TBD  FOTO: 1/5  PTA Visit #: 1/5      Time In: 0900  Time Out: 0954  Total Billable Time: 30 minutes 1:1 (1 NMR, 1TE)  Precautions: cancer      Subjective     Patient reports: Has been doing some exercises at home that have helped with her pain.  She was compliant with home exercise program.  Response to previous treatment: Eval only  Functional change: Ongoing    Pain: 0/10  Location: upper thoracic spine midline     Objective      Objective Measures updated at progress report unless specified.     Treatment     Keo received the treatments listed below:      therapeutic exercises to develop strength, endurance, ROM, flexibility, posture, and core stabilization for 15 minutes including:  Bike 5' lvl 3  Open books 2 x 10   Seated thoracic extension 5" x 20     manual therapy techniques: were applied to the: neck for 00 minutes, including:  Not today    neuromuscular re-education activities to improve: Balance, Coordination, Kinesthetic, Sense, Proprioception, and Posture for 15 minutes. The following activities were included:  Scap retractions 5" x 20   No money 2 x 10 red band   Supine serratus punch 2# 2 x 10   Wand flexion 2 x 10 2#   Chin tuck 5" 2 x 10   Prone I's 2 x 10   Seated cervical retraction 2 x 10       Patient Education and Home Exercises       Education provided:   - PT diagnosis and recommended treatment course   - HEP    Written Home " Exercises Provided: Yes. Exercises were reviewed and Keo was able to demonstrate them prior to the end of the session.  Keo demonstrated good  understanding of the education provided. See Electronic Medical Record under Patient Instructions for exercises provided during therapy sessions    Assessment     Keo is a 74 y.o. female referred to outpatient Physical Therapy with a medical diagnosis of M54.9 (ICD-10-CM) - Upper back pain, M54.2 (ICD-10-CM) - Neck pain. Pt presents with upper thoracic pain. Primary complaint of upper thoracic pain vs neck. No pain currently at rest. Focused session on thoracic mobility as well as scapular and cervical strengthening. Tolerated well. Monitor response. Adjust intensity as needed.     Keo Is progressing well towards her goals.   Patient prognosis is Good.     Patient will continue to benefit from skilled outpatient physical therapy to address the deficits listed in the problem list box on initial evaluation, provide pt/family education and to maximize pt's level of independence in the home and community environment.     Patient's spiritual, cultural and educational needs considered and pt agreeable to plan of care and goals.     Anticipated barriers to physical therapy: co-morbidities including cancer tx/surgeries     Goals:   Short Term Goals: 3 weeks:  1. Patient will demonstrate understanding of and compliance with home exercise program.   2. Patient will demonstrate normal cervical range of motion throughout without pain for improved tolerance to ADLs.   3. Patient will report <4/10  point improvement in daily average neck pain as measured on NPRS.      Long Term Goals: 8 weeks:  1. Patient will report <4/10  point improvement in daily average neck pain as measured on NPRS.   2. Patient will report >9% improvement in NDI.   3. Patient will report <31% limitation on FOTO survey.     Plan     Certification Period: 8/13/2024 to 11/10/1014.     Radha Méndez, PTA

## 2024-08-21 ENCOUNTER — CLINICAL SUPPORT (OUTPATIENT)
Dept: REHABILITATION | Facility: HOSPITAL | Age: 74
End: 2024-08-21
Payer: MEDICARE

## 2024-08-21 DIAGNOSIS — M54.2 NECK PAIN: Primary | ICD-10-CM

## 2024-08-21 DIAGNOSIS — M54.9 UPPER BACK PAIN: ICD-10-CM

## 2024-08-21 PROCEDURE — 97112 NEUROMUSCULAR REEDUCATION: CPT | Mod: PN

## 2024-08-21 PROCEDURE — 97110 THERAPEUTIC EXERCISES: CPT | Mod: PN

## 2024-08-21 NOTE — PROGRESS NOTES
"[facility]   Physical Therapy Treatment Note      Name: Keo Frias  Clinic Number: [id number]    Therapy Diagnosis:   Encounter Diagnoses   Name Primary?    Neck pain Yes    Upper back pain      Physician: Sharron Ogden MD    Visit Date: 8/21/2024       Physician Orders: PT Eval and Treat   Medical Diagnosis:   M54.9 (ICD-10-CM) - Upper back pain   M54.2 (ICD-10-CM) - Neck pain      Evaluation Date: 8/13/2024  Authorization Period: 11/10/2024  Plan of Care Certification Period: 8/13/2024 to 11/10/2024  Visit # / Visits authorized: 2/15 (+eval)  FOTO: 3/5  PTA Visit #: 0/5      Time In: 1010  Time Out: 1050  Total Billable Time: 30 minutes 1:1 (1 NMR, 1 TE)  Precautions: cancer    Subjective     Patient reports: more right lateral shoulder pain today than upper back.   She was compliant with home exercise program.  Response to previous treatment: Eval only  Functional change: Ongoing    Pain: 3/10  Location: right lateral shoulder pain.     Objective      Objective Measures updated at progress report unless specified.     Treatment     Keo received the treatments listed below:      Therapeutic exercises to develop strength, endurance, ROM, flexibility, posture, and core stabilization for 10 minutes 1:1 including:  Bike 5' lvl 3 NP  Open books 2 x 10   Seated thoracic extension 5" x 20     Manual therapy techniques: were applied to the: neck for 10 minutes, including:  Grade I/II right shoulder joint mobs  Passive physiological right shoulder FE, ExR (various angles), abd  Grade II/III cervical sideglides throughout.     Neuromuscular re-education activities to improve: Balance, Coordination, Kinesthetic, Sense, Proprioception, and Posture for 10 minutes 1:1. The following activities were included:  Seated cervical retractions 3x10   Sidelying shoulder ExR 1# 3x10  Sidelying shoulder abd 1# 2x10  Seated retractions 2x15 3# cable    Not today:   Scap retractions 5" x 20   No money 2 x " "10 red band   Supine serratus punch 2# 2 x 10   Wand flexion 2 x 10 2#   Chin tuck 5" 2 x 10   Prone I's 2 x 10       Patient Education and Home Exercises       Education provided:   - PT diagnosis and recommended treatment course   - HEP    Written Home Exercises Provided: Yes. Exercises were reviewed and Keo was able to demonstrate them prior to the end of the session.  Keo demonstrated good  understanding of the education provided. See Electronic Medical Record under Patient Instructions for exercises provided during therapy sessions    Assessment     Keo is a 74 y.o. female referred to outpatient Physical Therapy with a medical diagnosis of M54.9 (ICD-10-CM) - Upper back pain, M54.2 (ICD-10-CM) - Neck pain. Pt presents with upper thoracic pain. Primary complaint of today of right shoulder pain. Back-to-back session; more focus on right shoulder today.     Keo Is progressing well towards her goals.   Patient prognosis is Good.     Patient will continue to benefit from skilled outpatient physical therapy to address the deficits listed in the problem list box on initial evaluation, provide pt/family education and to maximize pt's level of independence in the home and community environment.   Patient's spiritual, cultural and educational needs considered and pt agreeable to plan of care and goals.     Anticipated barriers to physical therapy: co-morbidities including cancer tx/surgeries     Goals:   Short Term Goals: 3 weeks:  1. Patient will demonstrate understanding of and compliance with home exercise program.   2. Patient will demonstrate normal cervical range of motion throughout without pain for improved tolerance to ADLs.   3. Patient will report <4/10  point improvement in daily average neck pain as measured on NPRS.      Long Term Goals: 8 weeks:  1. Patient will report <4/10  point improvement in daily average neck pain as measured on NPRS.   2. Patient will report >9% improvement in NDI.   3. Patient " will report <31% limitation on FOTO survey.     Plan   Continue plan of care.  Monitor response to interventions.  Adjust intensity accordingly.     Michael Pond, PT, DPT, OCS

## 2024-08-27 ENCOUNTER — CLINICAL SUPPORT (OUTPATIENT)
Dept: REHABILITATION | Facility: HOSPITAL | Age: 74
End: 2024-08-27
Payer: MEDICARE

## 2024-08-27 DIAGNOSIS — M54.2 NECK PAIN: Primary | ICD-10-CM

## 2024-08-27 DIAGNOSIS — M54.9 UPPER BACK PAIN: ICD-10-CM

## 2024-08-27 PROCEDURE — 97112 NEUROMUSCULAR REEDUCATION: CPT | Mod: PN

## 2024-08-27 PROCEDURE — 97140 MANUAL THERAPY 1/> REGIONS: CPT | Mod: PN

## 2024-08-27 NOTE — PROGRESS NOTES
"[facility]   Physical Therapy Treatment Note      Name: Keo Frias  Clinic Number: [id number]    Therapy Diagnosis:   Encounter Diagnoses   Name Primary?    Neck pain Yes    Upper back pain      Physician: Sharron Ogden MD    Visit Date: 8/27/2024       Physician Orders: PT Eval and Treat   Medical Diagnosis:   M54.9 (ICD-10-CM) - Upper back pain   M54.2 (ICD-10-CM) - Neck pain      Evaluation Date: 8/13/2024  Authorization Period: 11/10/2024  Plan of Care Certification Period: 8/13/2024 to 11/10/2024  Visit # / Visits authorized: 3/15 (+eval)  FOTO: 4/5  PTA Visit #: 0/5      Time In: 1000  Time Out: 1055  Total Billable Time: 55 minutes 1:1 (3 NM, 1 MT)  Precautions: cancer    Subjective     Patient reports: more right lateral shoulder pain today than upper back.   She was compliant with home exercise program.  Response to previous treatment: no adverse effects.   Functional change: Ongoing    Pain: 2/10  Location: right lateral shoulder pain.     Objective      (+) internal shoulder impingement    Treatment     Keo received the treatments listed below:      Therapeutic exercises to develop strength, endurance, ROM, flexibility, posture, and core stabilization for 5 minutes  including:  Seated thoracic extension 5" x 20     Manual therapy techniques: were applied to the: neck for 15 minutes, including:  Grade I/II right shoulder joint mobs for pain modulation  Grade III/IV right shoulder joint mobs posterior and MWM flexion  Passive physiological right shoulder FE, ExR (various angles), abd  Grade II/III cervical sideglides throughout.     Neuromuscular re-education activities to improve: Balance, Coordination, Kinesthetic, Sense, Proprioception, and Posture for 35 minutes . The following activities were included:  Seated cervical retractions 3x10   Sidelying shoulder ExR 2# 3x10  Sidelying shoulder abd 2# 2x10  Seated rows 2x15 3# cable    Patient Education and Home Exercises   "     Education provided:   - PT diagnosis and recommended treatment course   - home exercise program: wall slides, sidelying ExR, sidelying hip abd,     Written Home Exercises Provided: Yes. Exercises were reviewed and Keo was able to demonstrate them prior to the end of the session.  Keo demonstrated good  understanding of the education provided. See Electronic Medical Record under Patient Instructions for exercises provided during therapy sessions    Assessment     Keo is a 74 y.o. female referred to outpatient Physical Therapy with a medical diagnosis of M54.9 (ICD-10-CM) - Upper back pain, M54.2 (ICD-10-CM) - Neck pain. Pt presents with upper thoracic pain. Primary complaint of today of right shoulder pain. (+) internal impingement right shoulder. Right shoulder FE more tight than left.     Keo Is progressing well towards her goals.   Patient prognosis is Good.     Patient will continue to benefit from skilled outpatient physical therapy to address the deficits listed in the problem list box on initial evaluation, provide pt/family education and to maximize pt's level of independence in the home and community environment.   Patient's spiritual, cultural and educational needs considered and pt agreeable to plan of care and goals.     Anticipated barriers to physical therapy: co-morbidities including cancer tx/surgeries     Goals:   Short Term Goals: 3 weeks:  1. Patient will demonstrate understanding of and compliance with home exercise program. met  2. Patient will demonstrate normal cervical range of motion throughout without pain for improved tolerance to ADLs.   3. Patient will report <4/10  point improvement in daily average neck pain as measured on NPRS.      Long Term Goals: 8 weeks:  1. Patient will report <4/10  point improvement in daily average neck pain as measured on NPRS.   2. Patient will report >9% improvement in NDI.   3. Patient will report <31% limitation on FOTO survey.     Plan   Continue  plan of care.  Monitor response to interventions.  Adjust intensity accordingly.     Michael Pond, PT, DPT, OCS

## 2024-08-28 ENCOUNTER — LAB VISIT (OUTPATIENT)
Dept: LAB | Facility: HOSPITAL | Age: 74
End: 2024-08-28
Attending: HOSPITALIST
Payer: MEDICARE

## 2024-08-28 ENCOUNTER — OFFICE VISIT (OUTPATIENT)
Dept: HEMATOLOGY/ONCOLOGY | Facility: CLINIC | Age: 74
End: 2024-08-28
Payer: MEDICARE

## 2024-08-28 VITALS
HEART RATE: 60 BPM | TEMPERATURE: 98 F | WEIGHT: 110.25 LBS | SYSTOLIC BLOOD PRESSURE: 122 MMHG | OXYGEN SATURATION: 100 % | DIASTOLIC BLOOD PRESSURE: 72 MMHG | HEIGHT: 62 IN | RESPIRATION RATE: 16 BRPM | BODY MASS INDEX: 20.29 KG/M2

## 2024-08-28 DIAGNOSIS — C34.31 ADENOCARCINOMA OF LOWER LOBE OF RIGHT LUNG: ICD-10-CM

## 2024-08-28 DIAGNOSIS — E03.9 HYPOTHYROIDISM, UNSPECIFIED TYPE: ICD-10-CM

## 2024-08-28 DIAGNOSIS — C77.1 SECONDARY MALIGNANT NEOPLASM OF INTRATHORACIC LYMPH NODES: ICD-10-CM

## 2024-08-28 DIAGNOSIS — C34.31 ADENOCARCINOMA OF LOWER LOBE OF RIGHT LUNG: Primary | ICD-10-CM

## 2024-08-28 LAB
ALBUMIN SERPL BCP-MCNC: 3.6 G/DL (ref 3.5–5.2)
ALP SERPL-CCNC: 71 U/L (ref 55–135)
ALT SERPL W/O P-5'-P-CCNC: 15 U/L (ref 10–44)
ANION GAP SERPL CALC-SCNC: 6 MMOL/L (ref 8–16)
AST SERPL-CCNC: 20 U/L (ref 10–40)
BILIRUB SERPL-MCNC: 0.4 MG/DL (ref 0.1–1)
BUN SERPL-MCNC: 10 MG/DL (ref 8–23)
CALCIUM SERPL-MCNC: 9 MG/DL (ref 8.7–10.5)
CHLORIDE SERPL-SCNC: 102 MMOL/L (ref 95–110)
CO2 SERPL-SCNC: 28 MMOL/L (ref 23–29)
CREAT SERPL-MCNC: 0.7 MG/DL (ref 0.5–1.4)
ERYTHROCYTE [DISTWIDTH] IN BLOOD BY AUTOMATED COUNT: 12.8 % (ref 11.5–14.5)
EST. GFR  (NO RACE VARIABLE): >60 ML/MIN/1.73 M^2
GLUCOSE SERPL-MCNC: 93 MG/DL (ref 70–110)
HCT VFR BLD AUTO: 39 % (ref 37–48.5)
HGB BLD-MCNC: 12.5 G/DL (ref 12–16)
IMM GRANULOCYTES # BLD AUTO: 0.02 K/UL (ref 0–0.04)
MCH RBC QN AUTO: 28 PG (ref 27–31)
MCHC RBC AUTO-ENTMCNC: 32.1 G/DL (ref 32–36)
MCV RBC AUTO: 87 FL (ref 82–98)
NEUTROPHILS # BLD AUTO: 2.6 K/UL (ref 1.8–7.7)
PLATELET # BLD AUTO: 176 K/UL (ref 150–450)
PMV BLD AUTO: 12.2 FL (ref 9.2–12.9)
POTASSIUM SERPL-SCNC: 4.4 MMOL/L (ref 3.5–5.1)
PROT SERPL-MCNC: 6.9 G/DL (ref 6–8.4)
RBC # BLD AUTO: 4.47 M/UL (ref 4–5.4)
SODIUM SERPL-SCNC: 136 MMOL/L (ref 136–145)
T4 FREE SERPL-MCNC: 1.37 NG/DL (ref 0.71–1.51)
TSH SERPL DL<=0.005 MIU/L-ACNC: 1.7 UIU/ML (ref 0.4–4)
WBC # BLD AUTO: 6.1 K/UL (ref 3.9–12.7)

## 2024-08-28 PROCEDURE — 3288F FALL RISK ASSESSMENT DOCD: CPT | Mod: CPTII,S$GLB,, | Performed by: HOSPITALIST

## 2024-08-28 PROCEDURE — 84439 ASSAY OF FREE THYROXINE: CPT | Performed by: HOSPITALIST

## 2024-08-28 PROCEDURE — 3074F SYST BP LT 130 MM HG: CPT | Mod: CPTII,S$GLB,, | Performed by: HOSPITALIST

## 2024-08-28 PROCEDURE — 3044F HG A1C LEVEL LT 7.0%: CPT | Mod: CPTII,S$GLB,, | Performed by: HOSPITALIST

## 2024-08-28 PROCEDURE — 80053 COMPREHEN METABOLIC PANEL: CPT | Performed by: HOSPITALIST

## 2024-08-28 PROCEDURE — 36415 COLL VENOUS BLD VENIPUNCTURE: CPT | Performed by: HOSPITALIST

## 2024-08-28 PROCEDURE — 1101F PT FALLS ASSESS-DOCD LE1/YR: CPT | Mod: CPTII,S$GLB,, | Performed by: HOSPITALIST

## 2024-08-28 PROCEDURE — G2211 COMPLEX E/M VISIT ADD ON: HCPCS | Mod: S$GLB,,, | Performed by: HOSPITALIST

## 2024-08-28 PROCEDURE — 1125F AMNT PAIN NOTED PAIN PRSNT: CPT | Mod: CPTII,S$GLB,, | Performed by: HOSPITALIST

## 2024-08-28 PROCEDURE — 85027 COMPLETE CBC AUTOMATED: CPT | Performed by: HOSPITALIST

## 2024-08-28 PROCEDURE — 99999 PR PBB SHADOW E&M-EST. PATIENT-LVL IV: CPT | Mod: PBBFAC,,, | Performed by: HOSPITALIST

## 2024-08-28 PROCEDURE — 99215 OFFICE O/P EST HI 40 MIN: CPT | Mod: S$GLB,,, | Performed by: HOSPITALIST

## 2024-08-28 PROCEDURE — 4010F ACE/ARB THERAPY RXD/TAKEN: CPT | Mod: CPTII,S$GLB,, | Performed by: HOSPITALIST

## 2024-08-28 PROCEDURE — 84443 ASSAY THYROID STIM HORMONE: CPT | Performed by: HOSPITALIST

## 2024-08-28 PROCEDURE — 3078F DIAST BP <80 MM HG: CPT | Mod: CPTII,S$GLB,, | Performed by: HOSPITALIST

## 2024-08-28 NOTE — PROGRESS NOTES
[facility] MEDICAL ONCOLOGY - FOLLOW UP VISIT    Reason for visit: Follow up visit for adenocarcinoma of the lung      Oncology History   Malignant neoplasm of upper-outer quadrant of right breast in female, estrogen receptor positive   4/13/2021 Biopsy    Breast, right, 10:00 5N, biopsy:  - Invasive ductal carcinoma with lobular features     4/13/2021 Breast Tumor Markers    Estrogen Receptor: Positive >90%  Progesterone Receptor: Positive 10-50%  HER2: Negative  Ki67: 10-30%     4/26/2021 Genetic Testing    MyRisk: negative     5/12/2021 Breast Surgery    Right partial mastectomy with SLNB     6/1/2021 Tumor Conference    Radiation options? Offer radiation, can discuss partial vs whole breast radiation.        6/2/2021 Cancer Staged    Staging form: Breast, AJCC 8th Edition  - Pathologic stage from 6/2/2021: Stage IA (pT1b, pN0(sn), cM0, G2, ER+, FL+, HER2-)     7/6/2021 - 7/27/2021 Radiation Therapy    Treatment Summary  Course: C1 Chest 2021  Treatment Site Energy Dose/Fx (Gy) #Fx Total Dose (Gy) Start Date End Date Elapsed Days   Breast_Rt 6X 2.65 16 / 16 42.4 7/6/2021 7/27/2021 21          7/2021 -  Hormone Therapy    Letrozole      Procedure    Bronchoscopy, Station 7 positive for malignancy     Adenocarcinoma of lower lobe of right lung   10/5/2023 Imaging Significant Findings    CT C (obtained after CXR, which was itself obtained for palpitations)  - 2.1 x 1.3 cm spiculated RLL nodule, some spiculation noted to extend to the pleural margin  - Scattered pulmonary nodules centered mostly subpleural at the RLL (e.g. 0.9 cm)       10/10/2023 Imaging Significant Findings    PET CT  - 2.1 x 1.1 cm RLL nodule, SUV 3.8  - 0.9 cm R axillary LN, SUV 3.9  - 1.2 cm subcarinal LN, SUV 4.6  - 0.7 cm Ln along L posterior shoulder, SUV 1.7  - Multiple additional solid pulmonary nodules through R lung base, too small for uptake detection     11/14/2023 Procedure    Bronch with  EBUS  RLL, FNA  - Adenocarcinoma (TTF1 positive, GATA3 negative)    RLL, TBBx  - Adenocarcinoma    Per pulmonology, station 7 node was very vascular without window for biopsy, plan to discuss at thoracic tumor board    [facility] NGS  - KRAS G12A, CHEK1, MLH1, CTNNB1, CIC, PTPRD, NOTCH3, EZH2, LATS1, KMT2D  - Negative: EGFR, ALK, BRAF, KRAS, ROS1, RET, MET, EBB2  - PD-L1 1%       11/22/2023 Tumor Conference    Tumor Board  - Plan for axillary LN biopsy to determine lung vs recurrent breast vs other etiology  - Repeat CT C to evaluate nodules in RLL  - Determine treatment plan based on above results     11/22/2023 Tumor Genotyping    [facility] Liquid Biopsy  - No reportable pathogenic variants found     11/29/2023 Imaging Significant Findings    CT C  - 2.1 x 1.2 cm RLL spiculated nodule, stable in size w/ new central cavitation. Spiculated margins extend towards adjacent pleura.   - Stable irregular 2.0 cm linar opacity in LLL  - Stable 0.3 cm GGO, TRICIA  - Stable R basal sub cm nodules, largest 0.9 cm  - Several stable solid nodules predominantly in RLL, largest 0.9 cm     12/18/2023 Imaging Significant Findings    MRI Brain  - JELENA     1/3/2024 Procedure    IR Guided Biopsy, R Axillary LN  - No metastatic carcinoma (0.3 x 0.3 x 0.1 cm tissue, positive and negative controls stained appropriately)     1/5/2024 Imaging Significant Findings    PET CT  - Stable 2.0 x 1.1 cm RLL nodule (previously 2.1 x 1.1 cm)  - Stable additional nodules w/o significant racer uptake  - 0.8 cm R axillary node, SUV 4.2 (previously 0.9 cm, SUV 3.9)  - 0.5 cm L posterior shoulder node, SUV 2.3 (previously 0.7 cm, SUV 1.7)  - 1.3 cm subcarinal node, SUV 4.9 (previously 1.2 cm, SUV 4.6)     1/10/2024 Tumor Conference    Tumor Board   Re-sample enlarged, hypermetabolic axillary LN with repeat CT-guided biopsy versus excisional biopsy. If LN is negative for recurrent NSCLC, obtain EBUS of PET-avid mediastinal LN.         1/23/2024 Procedure    IR  Guided Biopsy, R Axillary LN (Second Biopsy)  - Negative for metastatic carcinoma     2/8/2024 Procedure    Bronch with EBUS  LN  Positive: Station 7 (Adenocarcinoma)  Negative: Station 11R     2/21/2024 Cancer Staged    Staging form: Lung, AJCC 8th Edition  - Clinical stage from 2/21/2024: Stage IIIA (cT1b, cN2, cM0)     2/21/2024 Tumor Conference    Thoracic Tumor Board  Stage IIIA right lower lobe adenocarcinoma with bulky subcarinal lymphadenopathy.  Proceed with NA chemo/I-O therapy. Return to [facility] for surgical evaluation. If patient is not a surgical candidate following 3 cycles of therapy, proceed with definitive chemo/RT.       2/22/2024 - 4/5/2024 Chemotherapy    Treatment Summary   Plan Name: OP NSCLC NIVOLUMAB 360 MG PLUS CARBOPLATIN (AUC5) PEMETREXED 500 MG/M2 Q3W x 3 CYCLES  Treatment Goal: Control  Status: Inactive  Start Date: 2/22/2024  End Date: 4/5/2024  Provider: Bridger Nichole IV, MD  Chemotherapy: CARBOplatin (PARAPLATIN) 415 mg in sodium chloride 0.9% 326.5 mL chemo infusion, 415 mg, Intravenous, Clinic/HOD 1 time, 3 of 3 cycles  Administration: 415 mg (2/22/2024), 465 mg (4/4/2024), 465 mg (3/14/2024)  PEMEtrexed disodium (ALIMTA) 750 mg in sodium chloride 0.9% SolP 100 mL chemo infusion, 500 mg/m2 = 750 mg, Intravenous, Clinic/HOD 1 time, 3 of 3 cycles  Administration: 750 mg (2/22/2024), 750 mg (4/4/2024), 750 mg (3/14/2024)     4/24/2024 Imaging Significant Findings    CT C/A/P  - 1.2 x 1.0 cm RLL malignancy, previously 2 x 1.2 cm  - Interval decrease in the previously seen nodules in the RLL  - 0.9 cm subcarinal node, previously 1.7 cm  - Interval decrease in right infrahilar soft tissue  - 2.1 cm LLL solid nodular opacity, unchanged  - 0.8 cm TRICIA ground-glass opacity, unchanged  - AVM again noted in RLL  - 0.6 cm right axillary lymph node, decreased in size from prior  - Left scapular lymph node decreased in size from prior, incompletely seen       5/6/2024 - 5/12/2024 Hospital Admission     Admitted to [facility] for RLLx.  Postoperative course complicated by hemothorax requiring return to the operating room for reexploration on postop day 1.  Chest tube removed on postop day 5.     5/6/2024 Surgery    Right Lower Lobectomy  Right Lower Lobe  - Adenocarcinoma with acinar pattern, 35% residual viable tumor, 1.0 cm in greatest dimension.  Pleural invasion absent, LVI absent, margins negative.  0/1 lymph node    Pericardial Excision  - Negative for tumor    LN  - Positive: Station 7  - Negative: Station 8R (0/1), 9 are (0/2), 2R (0/1), 4R (0/1), 11R (0/1), 12R (0/1), R posterior interlobar LN (0/1)    Path Staging: pT1a, pN2     7/2/2024 -  Chemotherapy    Treatment Summary   Plan Name: OP NIVOLUMAB 480 MG Q4W  Treatment Goal: Control  Status: Active  Start Date: 7/2/2024  End Date: 6/3/2025 (Planned)  Provider: Bridger Nichole IV, MD  Chemotherapy: [No matching medication found in this treatment plan]     7/24/2024 Cancer Staged    Staging form: Lung, AJCC 8th Edition  - Pathologic: Stage IIIA (pT1a, pN2, cM0)     8/7/2024 Imaging Significant Findings    PET CT  - New foci of FDG uptake in right lateral chest wall, likely represents postsurgical changes  - Development of moderate size right pleural effusion  - No evidence of hypermetabolic mediastinal or hilar lymph nodes        - Second opinion, [facility]: contralateral LLL lesion stable but plan for sampling of this as well as subcarinal LN and possible additional RLL nodule. Pending results, consider NA --> surgery vs CRT    Oncology History    HPI:     Keo Frias is a 73 y.o. female with pmh significant for HTN, migraine headaches, and Stage IIIA (pT1a, pN2, cM0) adenocarcinoma of the RLL (no targetable mutations, PD-L1 1%), initially dx'd 11/14/23, s/p neoadjuvant carboplatin/pemetrexed/nivolumab (2/22/24 - 4/4/24), right lower lobectomy (5/6/24), and currently on adjuvant nivolumab (7/2/24 - present) who presents to for follow up. Of note, pt also has a  "history of Stage IA ( [id number], ER 95%, NV 10%, HER2 negative, Ki-67 20%) IDC of the R breast, initially dx'd 4/13/21, s/p lumpectomy and SLNB (5/12/21), XRT (7/6/21-7/27/21), and currently on letrozole (7/2021 - present).     Last clinic 7/24/24, C2D1 nivolumab on 07/30.  Patient has [facility] follow up in early August.  Labs, RTC, and C3D1 on 08/28.  Continue levothyroxine 80 mcg.    Interval History:  - 7/30/24: C2D1 nivolumab  - 8/7/24: PET CT, stable as above  - 8/08/24: Med onc w/ Dr. Don ([facility]), PET with postsurgical avidity and no other obvious evidence of malignancy.  Continue nivolumab.  Recommend surveillance and left lung nodule pending discussion with Dr. Garcia about empiric SBRT, would recommend CT-guided biopsy of growth at any point in future.  Q three-month surveillance imaging, next with CT scan.  - 8/8/24: Radiation oncology w/ Dr. Garcia ([facility]), continue to monitor LLL irregular nodule, no empiric SBRT at this time.  - 8/9/24: CT surgery w/ Izzy Sabillon ([facility]), patient has recovered well from surgery, has moderately sized right pleural effusion.  Referred to interventional pulmonology for thoracentesis and follow up results.  Likely cause of cough.  - 8/9/24: Pulmonology ([facility]), small pleural effusion ultrasound, no diagnostic indication at this time for re-evaluation, follow up in 3 months.    SOB is improving    PLAN:   - Studies   - Pending  - Imaging   - 12 weeks from last  - Treatment   - 8/29/24  - RTC    - ***      Pt states that after her first cycle of nivolumab, though describes 5-6 days of mild fatigue after the infusion. She describes pruritus over her arms and legs, which improved with lotion, anddenies any visible rash. She says that her appetite is "very good", but notes that it is not as good as before the surgery. She says that she "limits myself in eating because I feel uncomfortable in my stomach"; her weight is unchanged over the past month (10.35 lbs, from 10.24 lbs). Pt denies " N/V, diarrhea, constipation, rash, new/worsening SOB, or new/worsening cough.      Past Medical History:   Past Medical History:   Diagnosis Date    Anemia     Anxiety disorder 8/28/2019    Cataract     Chronic right-sided low back pain without sciatica 10/20/2021    Hypercholesteremia 9/17/2019    Invasive ductal carcinoma of breast, female, right 4/16/2021    Migraine with vision problems     Subclinical hypothyroidism     White coat syndrome with hypertension         Past Surgical History:   Past Surgical History:   Procedure Laterality Date    BREAST BIOPSY Right 04/13/2021    COLON SURGERY      COLONOSCOPY N/A 12/07/2018    Procedure: COLONOSCOPY;  Surgeon: Bhargav Gaston MD;  Location: [facility] ENDO (4TH FLR);  Service: Endoscopy;  Laterality: N/A;    DILATION AND CURETTAGE OF UTERUS      postmenopausal bleeding    ENDOBRONCHIAL ULTRASOUND N/A 11/14/2023    Procedure: ENDOBRONCHIAL ULTRASOUND (EBUS);  Surgeon: Kirt Gr MD;  Location: [facility] OR 2ND FLR;  Service: Pulmonary;  Laterality: N/A;    MASTECTOMY, PARTIAL Right 05/12/2021    Procedure: MASTECTOMY, PARTIAL-Right with radiological marker;  Surgeon: Vivian Cadena MD;  Location: [facility] OR;  Service: General;  Laterality: Right;    ROBOTIC BRONCHOSCOPY N/A 11/14/2023    Procedure: ROBOTIC BRONCHOSCOPY;  Surgeon: Kirt Gr MD;  Location: [facility] OR 2ND FLR;  Service: Pulmonary;  Laterality: N/A;    SENTINEL LYMPH NODE BIOPSY Right 05/12/2021    Procedure: BIOPSY, LYMPH NODE, SENTINEL-Right;  Surgeon: Vivian Cadena MD;  Location: [facility];  Service: General;  Laterality: Right;    TUBAL LIGATION          Family History:   Family History   Problem Relation Name Age of Onset    Diabetes Brother      Hypertension Brother      Glaucoma Brother      Glaucoma Father      Cataracts Father      Retinal detachment Father      Lung cancer Mother      Uterine cancer Maternal Grandmother      Stroke Maternal Grandmother      Stroke Paternal Grandmother       Amblyopia Neg Hx      Blindness Neg Hx      Macular degeneration Neg Hx      Strabismus Neg Hx      Thyroid disease Neg Hx          Social History:   Social History     Tobacco Use    Smoking status: Never    Smokeless tobacco: Never   Substance Use Topics    Alcohol use: No        I have reviewed and updated the patient's past medical, surgical, family and social histories.      ROS:   As per HPI.     Allergies:   Review of patient's allergies indicates:   Allergen Reactions    Sinus allergy Other (See Comments)     Headaches, migranes    Buspar [buspirone] Other (See Comments)     Pt isnt allergic      Sulfa (sulfonamide antibiotics) Rash        Medications:   Current Outpatient Medications   Medication Sig Dispense Refill    amLODIPine (NORVASC) 5 MG tablet Take 1 tablet (5 mg total) by mouth once daily. 90 tablet 3    calcium carbonate (CALCIUM 500 ORAL) Take 1 tablet by mouth.      cholecalciferol, vitamin D3, 125 mcg (5,000 unit) capsule Take 1 capsule (5,000 Units total) by mouth daily with breakfast. 100 capsule 4    CIPRODEX 0.3-0.1 % DrpS PLACE 4 DROPS INTO THE RIGHT EAR 2 (TWO) TIMES DAILY. FOR 10 DAYS 7.5 mL 5    letrozole (FEMARA) 2.5 mg Tab TAKE 1 TABLET EVERY DAY 90 tablet 3    levothyroxine (SYNTHROID) 88 MCG tablet Take 1 tablet (88 mcg total) by mouth before breakfast. (Patient taking differently: Take 88 mcg by mouth before breakfast. Dosage lowered to 75 mg.) 30 tablet 11    metoprolol succinate (TOPROL-XL) 50 MG 24 hr tablet Take 1 tablet (50 mg total) by mouth once daily. 90 tablet 4    multivitamin-iron-folic acid Tab Take 1 tablet by mouth once daily.      EScitalopram oxalate (LEXAPRO) 5 MG Tab Take 1 tablet (5 mg total) by mouth once daily. (Patient not taking: Reported on 8/28/2024) 90 tablet 4    famotidine (PEPCID) 10 MG tablet Take 10 mg by mouth as needed. (Patient not taking: Reported on 8/28/2024)      Lactobacillus rhamnosus GG (CULTURELLE) 10 billion cell capsule Take 1 capsule by  "mouth once daily. Pt takes Align probiotic (Patient not taking: Reported on 8/28/2024)      loratadine 10 mg Chew Take 10 mg by mouth daily as needed (allergy). (Patient not taking: Reported on 8/28/2024) 30 tablet 11    PROLIA 60 mg/mL Syrg        No current facility-administered medications for this visit.          Physical Exam:       /72   Pulse 60   Temp 98 °F (36.7 °C) (Oral)   Resp 16   Ht 5' 2" (1.575 m)   Wt 50 kg (110 lb 3.7 oz)   LMP  (LMP Unknown)   SpO2 100%   BMI 20.16 kg/m²                Physical Exam  Constitutional:       Appearance: Normal appearance.   HENT:      Head: Normocephalic and atraumatic.   Eyes:      Extraocular Movements: Extraocular movements intact.      Conjunctiva/sclera: Conjunctivae normal.      Pupils: Pupils are equal, round, and reactive to light.   Cardiovascular:      Rate and Rhythm: Normal rate and regular rhythm.      Heart sounds: No murmur heard.     No friction rub. No gallop.   Pulmonary:      Effort: Pulmonary effort is normal.      Breath sounds: No wheezing, rhonchi or rales.      Comments: Diminished breath sounds in posterior inferior right lung field  Musculoskeletal:         General: Normal range of motion.      Right lower leg: No edema.      Left lower leg: No edema.   Skin:     General: Skin is warm and dry.   Neurological:      Mental Status: She is alert and oriented to person, place, and time.   Psychiatric:         Mood and Affect: Mood normal.         Thought Content: Thought content normal.         Judgment: Judgment normal.           Labs:   No results found for this or any previous visit (from the past 48 hour(s)).         Imaging:         Path:  See oncologic history above.      Assessment and Plan:     Keo Frias is a 73 y.o. female with pmh significant for HTN, migraine headaches, and Stage IIIA (pT1a, pN2, cM0) adenocarcinoma of the RLL (no targetable mutations, PD-L1 1%), initially dx'd 11/14/23, s/p neoadjuvant " carboplatin/pemetrexed/nivolumab (2/22/24 - 4/4/24), right lower lobectomy (5/6/24), and currently on adjuvant nivolumab (7/2/24 - present) who presents to for follow up. Of note, pt also has a history of Stage IA ( [id number], ER 95%, RI 10%, HER2 negative, Ki-67 20%) IDC of the R breast, initially dx'd 4/13/21, s/p lumpectomy and SLNB (5/12/21), XRT (7/6/21-7/27/21), and currently on letrozole (7/2021 - present).     Adenocarcinoma of RLL, Uncertain Stage  ECOG PS 1.  Patient is currently on adjuvant nivolumab (see note from 6/14/24), tolerating without significant issue. Her most recent imaging (CT C/A/P, 4/24/24) was after neoadjuvant therapy and prior to right lower lobectomy.  She has imaging and follow up at [facility] as below.  Ultimately, will plan to treat with nivolumab q4w x 1 year, w/ q3m imaging throughout.     PLAN:   -- C2D1 nivolumab on 7/30/24, labs pending and will sign accordingly  -- Pt has imaging on 8/07/24, medical oncology f/u on 8/08/24, and thoracic surgery f/u on 8/09/24 at [facility]. If PET imaging demonstrates ongoing FDG avidity in R axillary or L scapular LN, these areas may be biopsied  -- Labs, RTC, and C3D1 on 8/28/24 (one day delayed to align schedules)      Hypothyroidism  On 4/30/24, TSH was 41.25 and free T4 0.71, as compared to 4/2/24 when TSH was 0.014 and T4 was 1.92.  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  She has since been started on levothyroxine per Dr. Don.  Subsequent TFTs within normal limits.  -- Continue levothyroxine 88 mcg daily  -- Repeat labs w/ f/u      Osteoporosis  DEXA from 7/11/24 with osteoporosis of the lumbar spine and hip.  -- Plan to resume Prolia shots per Dr. Ogden      Right IDC, ER+/RI+/HER2-  S/p lumpectomy with SLNB, radiation therapy, and currently on letrozole and denosumab, tolerating well. Concern for possible R axillary recurrence, workup as below.   -- Okay to continue letrozole  -- Okay to continue denosumab as  above  -- Breast oncology team aware      The above information has been reviewed with the patient and all questions have been answered to their apparent satisfaction.  They understand that they can call the clinic with any questions.    Bridger Nichole MD  Hematology/Oncology  [facility] [place]      Med Onc Route Chart for Scheduling

## 2024-08-28 NOTE — PROGRESS NOTES
[facility] MEDICAL ONCOLOGY - FOLLOW UP VISIT    Reason for visit: Follow up visit for adenocarcinoma of the lung      Oncology History   Malignant neoplasm of upper-outer quadrant of right breast in female, estrogen receptor positive   4/13/2021 Biopsy    Breast, right, 10:00 5N, biopsy:  - Invasive ductal carcinoma with lobular features     4/13/2021 Breast Tumor Markers    Estrogen Receptor: Positive >90%  Progesterone Receptor: Positive 10-50%  HER2: Negative  Ki67: 10-30%     4/26/2021 Genetic Testing    MyRisk: negative     5/12/2021 Breast Surgery    Right partial mastectomy with SLNB     6/1/2021 Tumor Conference    Radiation options? Offer radiation, can discuss partial vs whole breast radiation.        6/2/2021 Cancer Staged    Staging form: Breast, AJCC 8th Edition  - Pathologic stage from 6/2/2021: Stage IA (pT1b, pN0(sn), cM0, G2, ER+, MS+, HER2-)     7/6/2021 - 7/27/2021 Radiation Therapy    Treatment Summary  Course: C1 Chest 2021  Treatment Site Energy Dose/Fx (Gy) #Fx Total Dose (Gy) Start Date End Date Elapsed Days   Breast_Rt 6X 2.65 16 / 16 42.4 7/6/2021 7/27/2021 21          7/2021 -  Hormone Therapy    Letrozole      Procedure    Bronchoscopy, Station 7 positive for malignancy     Adenocarcinoma of lower lobe of right lung   10/5/2023 Imaging Significant Findings    CT C (obtained after CXR, which was itself obtained for palpitations)  - 2.1 x 1.3 cm spiculated RLL nodule, some spiculation noted to extend to the pleural margin  - Scattered pulmonary nodules centered mostly subpleural at the RLL (e.g. 0.9 cm)       10/10/2023 Imaging Significant Findings    PET CT  - 2.1 x 1.1 cm RLL nodule, SUV 3.8  - 0.9 cm R axillary LN, SUV 3.9  - 1.2 cm subcarinal LN, SUV 4.6  - 0.7 cm Ln along L posterior shoulder, SUV 1.7  - Multiple additional solid pulmonary nodules through R lung base, too small for uptake detection     11/14/2023 Procedure    Bronch with  EBUS  RLL, FNA  - Adenocarcinoma (TTF1 positive, GATA3 negative)    RLL, TBBx  - Adenocarcinoma    Per pulmonology, station 7 node was very vascular without window for biopsy, plan to discuss at thoracic tumor board    [facility] NGS  - KRAS G12A, CHEK1, MLH1, CTNNB1, CIC, PTPRD, NOTCH3, EZH2, LATS1, KMT2D  - Negative: EGFR, ALK, BRAF, KRAS, ROS1, RET, MET, EBB2  - PD-L1 1%       11/22/2023 Tumor Conference    Tumor Board  - Plan for axillary LN biopsy to determine lung vs recurrent breast vs other etiology  - Repeat CT C to evaluate nodules in RLL  - Determine treatment plan based on above results     11/22/2023 Tumor Genotyping    [facility] Liquid Biopsy  - No reportable pathogenic variants found     11/29/2023 Imaging Significant Findings    CT C  - 2.1 x 1.2 cm RLL spiculated nodule, stable in size w/ new central cavitation. Spiculated margins extend towards adjacent pleura.   - Stable irregular 2.0 cm linar opacity in LLL  - Stable 0.3 cm GGO, TRICIA  - Stable R basal sub cm nodules, largest 0.9 cm  - Several stable solid nodules predominantly in RLL, largest 0.9 cm     12/18/2023 Imaging Significant Findings    MRI Brain  - JELENA     1/3/2024 Procedure    IR Guided Biopsy, R Axillary LN  - No metastatic carcinoma (0.3 x 0.3 x 0.1 cm tissue, positive and negative controls stained appropriately)     1/5/2024 Imaging Significant Findings    PET CT  - Stable 2.0 x 1.1 cm RLL nodule (previously 2.1 x 1.1 cm)  - Stable additional nodules w/o significant racer uptake  - 0.8 cm R axillary node, SUV 4.2 (previously 0.9 cm, SUV 3.9)  - 0.5 cm L posterior shoulder node, SUV 2.3 (previously 0.7 cm, SUV 1.7)  - 1.3 cm subcarinal node, SUV 4.9 (previously 1.2 cm, SUV 4.6)     1/10/2024 Tumor Conference    Tumor Board   Re-sample enlarged, hypermetabolic axillary LN with repeat CT-guided biopsy versus excisional biopsy. If LN is negative for recurrent NSCLC, obtain EBUS of PET-avid mediastinal LN.         1/23/2024 Procedure    IR  Guided Biopsy, R Axillary LN (Second Biopsy)  - Negative for metastatic carcinoma     2/8/2024 Procedure    Bronch with EBUS  LN  Positive: Station 7 (Adenocarcinoma)  Negative: Station 11R     2/21/2024 Cancer Staged    Staging form: Lung, AJCC 8th Edition  - Clinical stage from 2/21/2024: Stage IIIA (cT1b, cN2, cM0)     2/21/2024 Tumor Conference    Thoracic Tumor Board  Stage IIIA right lower lobe adenocarcinoma with bulky subcarinal lymphadenopathy.  Proceed with NA chemo/I-O therapy. Return to [facility] for surgical evaluation. If patient is not a surgical candidate following 3 cycles of therapy, proceed with definitive chemo/RT.       2/22/2024 - 4/5/2024 Chemotherapy    Treatment Summary   Plan Name: OP NSCLC NIVOLUMAB 360 MG PLUS CARBOPLATIN (AUC5) PEMETREXED 500 MG/M2 Q3W x 3 CYCLES  Treatment Goal: Control  Status: Inactive  Start Date: 2/22/2024  End Date: 4/5/2024  Provider: Bridger Nichole IV, MD  Chemotherapy: CARBOplatin (PARAPLATIN) 415 mg in sodium chloride 0.9% 326.5 mL chemo infusion, 415 mg, Intravenous, Clinic/HOD 1 time, 3 of 3 cycles  Administration: 415 mg (2/22/2024), 465 mg (4/4/2024), 465 mg (3/14/2024)  PEMEtrexed disodium (ALIMTA) 750 mg in sodium chloride 0.9% SolP 100 mL chemo infusion, 500 mg/m2 = 750 mg, Intravenous, Clinic/HOD 1 time, 3 of 3 cycles  Administration: 750 mg (2/22/2024), 750 mg (4/4/2024), 750 mg (3/14/2024)     4/24/2024 Imaging Significant Findings    CT C/A/P  - 1.2 x 1.0 cm RLL malignancy, previously 2 x 1.2 cm  - Interval decrease in the previously seen nodules in the RLL  - 0.9 cm subcarinal node, previously 1.7 cm  - Interval decrease in right infrahilar soft tissue  - 2.1 cm LLL solid nodular opacity, unchanged  - 0.8 cm TRICIA ground-glass opacity, unchanged  - AVM again noted in RLL  - 0.6 cm right axillary lymph node, decreased in size from prior  - Left scapular lymph node decreased in size from prior, incompletely seen       5/6/2024 - 5/12/2024 Hospital Admission     Admitted to [facility] for RLLx.  Postoperative course complicated by hemothorax requiring return to the operating room for reexploration on postop day 1.  Chest tube removed on postop day 5.     5/6/2024 Surgery    Right Lower Lobectomy  Right Lower Lobe  - Adenocarcinoma with acinar pattern, 35% residual viable tumor, 1.0 cm in greatest dimension.  Pleural invasion absent, LVI absent, margins negative.  0/1 lymph node    Pericardial Excision  - Negative for tumor    LN  - Positive: Station 7  - Negative: Station 8R (0/1), 9 are (0/2), 2R (0/1), 4R (0/1), 11R (0/1), 12R (0/1), R posterior interlobar LN (0/1)    Path Staging: pT1a, pN2     7/2/2024 -  Chemotherapy    Treatment Summary   Plan Name: OP NIVOLUMAB 480 MG Q4W  Treatment Goal: Control  Status: Active  Start Date: 7/2/2024  End Date: 6/3/2025 (Planned)  Provider: Bridger Nichole IV, MD  Chemotherapy: [No matching medication found in this treatment plan]     7/24/2024 Cancer Staged    Staging form: Lung, AJCC 8th Edition  - Pathologic: Stage IIIA (pT1a, pN2, cM0)     8/7/2024 Imaging Significant Findings    PET CT  - New foci of FDG uptake in right lateral chest wall, likely represents postsurgical changes  - Development of moderate size right pleural effusion  - No evidence of hypermetabolic mediastinal or hilar lymph nodes        - Second opinion, [facility]: contralateral LLL lesion stable but plan for sampling of this as well as subcarinal LN and possible additional RLL nodule. Pending results, consider NA --> surgery vs CRT    Oncology History    HPI:     Keo Frias is a 73 y.o. female with pmh significant for HTN, migraine headaches, and Stage IIIA (pT1a, pN2, cM0) adenocarcinoma of the RLL (no targetable mutations, PD-L1 1%), initially dx'd 11/14/23, s/p neoadjuvant carboplatin/pemetrexed/nivolumab (2/22/24 - 4/4/24), right lower lobectomy (5/6/24), and currently on adjuvant nivolumab (7/2/24 - present) who presents to for follow up. Of note, pt also has a  history of Stage IA ( [id number], ER 95%, [place] 10%, HER2 negative, Ki-67 20%) IDC of the R breast, initially dx'd 4/13/21, s/p lumpectomy and SLNB (5/12/21), XRT (7/6/21-7/27/21), and currently on letrozole (7/2021 - present).     Last clinic 7/24/24, C2D1 nivolumab on 07/30.  Patient has [facility] follow up in early August.  Labs, RTC, and C3D1 on 08/28.  Continue levothyroxine 88 mcg.    Interval History:  - 7/30/24: C2D1 nivolumab  - 8/7/24: PET CT, stable as above  - 8/08/24: Med onc w/ Dr. Don ([facility]), PET with postsurgical avidity and no other obvious evidence of malignancy.  Continue nivolumab.  Recommend surveillance and left lung nodule pending discussion with Dr. Garcia about empiric SBRT, would recommend CT-guided biopsy of growth at any point in future.  Q three-month surveillance imaging, next with CT scan.  - 8/8/24: Radiation oncology w/ Dr. Garcia ([facility]), continue to monitor LLL irregular nodule, no empiric SBRT at this time.  - 8/9/24: CT surgery w/ Izzy Sabillon ([facility]), patient has recovered well from surgery, has moderately sized right pleural effusion.  Referred to interventional pulmonology for thoracentesis and follow up results.  Likely cause of cough.  - 8/9/24: Pulmonology ([facility]), small pleural effusion ultrasound, no diagnostic indication at this time for re-evaluation, follow up in 3 months.    Pt presents alone in clinic prior to C3 of nivolumab. SOB is improving. She complains of itching in the left leg. Her appetite is not too great. She denies any rash, fatigue, cough, n/v/d. Her dose of synthroid was lowered to 75mcg      Past Medical History:   Past Medical History:   Diagnosis Date    Anemia     Anxiety disorder 8/28/2019    Cataract     Chronic right-sided low back pain without sciatica 10/20/2021    Hypercholesteremia 9/17/2019    Invasive ductal carcinoma of breast, female, right 4/16/2021    Migraine with vision problems     Subclinical hypothyroidism     White coat syndrome with  hypertension         Past Surgical History:   Past Surgical History:   Procedure Laterality Date    BREAST BIOPSY Right 04/13/2021    COLON SURGERY      COLONOSCOPY N/A 12/07/2018    Procedure: COLONOSCOPY;  Surgeon: Bhargav Gaston MD;  Location: [facility] ENDO (4TH FLR);  Service: Endoscopy;  Laterality: N/A;    DILATION AND CURETTAGE OF UTERUS      postmenopausal bleeding    ENDOBRONCHIAL ULTRASOUND N/A 11/14/2023    Procedure: ENDOBRONCHIAL ULTRASOUND (EBUS);  Surgeon: Kirt Gr MD;  Location: [facility] OR 2ND FLR;  Service: Pulmonary;  Laterality: N/A;    MASTECTOMY, PARTIAL Right 05/12/2021    Procedure: MASTECTOMY, PARTIAL-Right with radiological marker;  Surgeon: Vivian Cadena MD;  Location: [facility] OR;  Service: General;  Laterality: Right;    ROBOTIC BRONCHOSCOPY N/A 11/14/2023    Procedure: ROBOTIC BRONCHOSCOPY;  Surgeon: Kirt Gr MD;  Location: [facility] OR 2ND FLR;  Service: Pulmonary;  Laterality: N/A;    SENTINEL LYMPH NODE BIOPSY Right 05/12/2021    Procedure: BIOPSY, LYMPH NODE, SENTINEL-Right;  Surgeon: Vivian Cadena MD;  Location: [facility];  Service: General;  Laterality: Right;    TUBAL LIGATION          Family History:   Family History   Problem Relation Name Age of Onset    Diabetes Brother      Hypertension Brother      Glaucoma Brother      Glaucoma Father      Cataracts Father      Retinal detachment Father      Lung cancer Mother      Uterine cancer Maternal Grandmother      Stroke Maternal Grandmother      Stroke Paternal Grandmother      Amblyopia Neg Hx      Blindness Neg Hx      Macular degeneration Neg Hx      Strabismus Neg Hx      Thyroid disease Neg Hx          Social History:   Social History     Tobacco Use    Smoking status: Never    Smokeless tobacco: Never   Substance Use Topics    Alcohol use: No        I have reviewed and updated the patient's past medical, surgical, family and social histories.      ROS:   As per HPI.     Allergies:   Review of patient's allergies  "indicates:   Allergen Reactions    Sinus allergy Other (See Comments)     Headaches, migranes    Buspar [buspirone] Other (See Comments)     Pt isnt allergic      Sulfa (sulfonamide antibiotics) Rash        Medications:   Current Outpatient Medications   Medication Sig Dispense Refill    amLODIPine (NORVASC) 5 MG tablet Take 1 tablet (5 mg total) by mouth once daily. 90 tablet 3    calcium carbonate (CALCIUM 500 ORAL) Take 1 tablet by mouth.      cholecalciferol, vitamin D3, 125 mcg (5,000 unit) capsule Take 1 capsule (5,000 Units total) by mouth daily with breakfast. 100 capsule 4    CIPRODEX 0.3-0.1 % DrpS PLACE 4 DROPS INTO THE RIGHT EAR 2 (TWO) TIMES DAILY. FOR 10 DAYS 7.5 mL 5    letrozole (FEMARA) 2.5 mg Tab TAKE 1 TABLET EVERY DAY 90 tablet 3    levothyroxine (SYNTHROID) 88 MCG tablet Take 1 tablet (88 mcg total) by mouth before breakfast. (Patient taking differently: Take 88 mcg by mouth before breakfast. Dosage lowered to 75 mg.) 30 tablet 11    metoprolol succinate (TOPROL-XL) 50 MG 24 hr tablet Take 1 tablet (50 mg total) by mouth once daily. 90 tablet 4    multivitamin-iron-folic acid Tab Take 1 tablet by mouth once daily.      EScitalopram oxalate (LEXAPRO) 5 MG Tab Take 1 tablet (5 mg total) by mouth once daily. (Patient not taking: Reported on 8/28/2024) 90 tablet 4    famotidine (PEPCID) 10 MG tablet Take 10 mg by mouth as needed. (Patient not taking: Reported on 8/28/2024)      Lactobacillus rhamnosus GG (CULTURELLE) 10 billion cell capsule Take 1 capsule by mouth once daily. Pt takes Align probiotic (Patient not taking: Reported on 8/28/2024)      loratadine 10 mg Chew Take 10 mg by mouth daily as needed (allergy). (Patient not taking: Reported on 8/28/2024) 30 tablet 11    PROLIA 60 mg/mL Syrg        No current facility-administered medications for this visit.          Physical Exam:       /72   Pulse 60   Temp 98 °F (36.7 °C) (Oral)   Resp 16   Ht 5' 2" (1.575 m)   Wt 50 kg (110 lb 3.7 " oz)   LMP  (LMP Unknown)   SpO2 100%   BMI 20.16 kg/m²                Physical Exam  Constitutional:       Appearance: Normal appearance.   HENT:      Head: Normocephalic and atraumatic.   Eyes:      Extraocular Movements: Extraocular movements intact.      Conjunctiva/sclera: Conjunctivae normal.      Pupils: Pupils are equal, round, and reactive to light.   Cardiovascular:      Rate and Rhythm: Normal rate and regular rhythm.      Heart sounds: No murmur heard.     No friction rub. No gallop.   Pulmonary:      Effort: Pulmonary effort is normal.      Breath sounds: No wheezing, rhonchi or rales.      Comments: Diminished breath sounds in posterior inferior right lung field  Musculoskeletal:         General: Normal range of motion.      Right lower leg: No edema.      Left lower leg: No edema.   Skin:     General: Skin is warm and dry.   Neurological:      Mental Status: She is alert and oriented to person, place, and time.   Psychiatric:         Mood and Affect: Mood normal.         Thought Content: Thought content normal.         Judgment: Judgment normal.           Labs:   Recent Results (from the past 48 hour(s))   Comprehensive Metabolic Panel    Collection Time: 08/28/24  9:41 AM   Result Value Ref Range    Sodium 136 136 - 145 mmol/L    Potassium 4.4 3.5 - 5.1 mmol/L    Chloride 102 95 - 110 mmol/L    CO2 28 23 - 29 mmol/L    Glucose 93 70 - 110 mg/dL    BUN 10 8 - 23 mg/dL    Creatinine 0.7 0.5 - 1.4 mg/dL    Calcium 9.0 8.7 - 10.5 mg/dL    Total Protein 6.9 6.0 - 8.4 g/dL    Albumin 3.6 3.5 - 5.2 g/dL    Total Bilirubin 0.4 0.1 - 1.0 mg/dL    Alkaline Phosphatase 71 55 - 135 U/L    AST 20 10 - 40 U/L    ALT 15 10 - 44 U/L    eGFR >60.0 >60 mL/min/1.73 m^2    Anion Gap 6 (L) 8 - 16 mmol/L   CBC Oncology    Collection Time: 08/28/24  9:41 AM   Result Value Ref Range    WBC 6.10 3.90 - 12.70 K/uL    RBC 4.47 4.00 - 5.40 M/uL    Hemoglobin 12.5 12.0 - 16.0 g/dL    Hematocrit 39.0 37.0 - 48.5 %    MCV 87 82  - 98 fL    MCH 28.0 27.0 - 31.0 pg    MCHC 32.1 32.0 - 36.0 g/dL    RDW 12.8 11.5 - 14.5 %    Platelets 176 150 - 450 K/uL    MPV 12.2 9.2 - 12.9 fL    Gran # (ANC) 2.6 1.8 - 7.7 K/uL    Immature Grans (Abs) 0.02 0.00 - 0.04 K/uL   TSH    Collection Time: 08/28/24  9:41 AM   Result Value Ref Range    TSH 1.697 0.400 - 4.000 uIU/mL   T4, Free    Collection Time: 08/28/24  9:41 AM   Result Value Ref Range    Free T4 1.37 0.71 - 1.51 ng/dL            Imaging:         Path:  See oncologic history above.      Assessment and Plan:     Keo Frias is a 73 y.o. female with pmh significant for HTN, migraine headaches, and Stage IIIA (pT1a, pN2, cM0) adenocarcinoma of the RLL (no targetable mutations, PD-L1 1%), initially dx'd 11/14/23, s/p neoadjuvant carboplatin/pemetrexed/nivolumab (2/22/24 - 4/4/24), right lower lobectomy (5/6/24), and currently on adjuvant nivolumab (7/2/24 - present) who presents to for follow up. Of note, pt also has a history of Stage IA ( [id number], ER 95%, [place] 10%, HER2 negative, Ki-67 20%) IDC of the R breast, initially dx'd 4/13/21, s/p lumpectomy and SLNB (5/12/21), XRT (7/6/21-7/27/21), and currently on letrozole (7/2021 - present).     Adenocarcinoma of RLL, Uncertain Stage  ECOG PS 1.  Patient is currently on adjuvant nivolumab (see note from 6/14/24), tolerating without significant issue. Her most recent imaging (CT C/A/P, 4/24/24) was after neoadjuvant therapy and prior to right lower lobectomy.  She has imaging and follow up at [facility] as below.  Ultimately, will plan to treat with nivolumab q4w x 1 year, w/ q3m imaging throughout.     PLAN:   - Studies   - Labs reviewed. Okay to proceed with C3D1 of nivolumab 08/29/24   - Imaging   - 12 weeks from last. Scheduled for 11/13 at [facility]  - Treatment   - C3D1 Nivolumab 08/29/24  - RTC    - 4 weeks 09/25 with labs for C4D1 on 09/26/24      Hypothyroidism  On 4/30/24, TSH was 41.25 and free T4 0.71, as compared to 4/2/24 when TSH was 0.014 and T4  was 1.92.  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  She has since been started on levothyroxine per Dr. Don.  Subsequent TFTs within normal limits.  -- Levothyroxine dose reduced to 75 mcg daily  -- Repeat labs w/ f/u    Osteoporosis  DEXA from 7/11/24 with osteoporosis of the lumbar spine and hip.  -- Plan to resume Prolia shots per Dr. Ogden    Right IDC, ER+/RI+/HER2-  S/p lumpectomy with SLNB, radiation therapy, and currently on letrozole and denosumab, tolerating well. Concern for possible R axillary recurrence, workup as below.   -- Okay to continue letrozole  -- Okay to continue denosumab as above  -- Breast oncology team aware      The above information has been reviewed with the patient and all questions have been answered to their apparent satisfaction.  They understand that they can call the clinic with any questions.    Taina Bowden MD  Hematology/Oncology Fellow  [facility] [place]        Med Onc Chart Routing      Follow up with physician . RTC 09/25/24. CBC, CMP, TSH, FT4 on the same day. Nivolumab infusion 09/26/24   Follow up with BRENDA    Infusion scheduling note    Injection scheduling note    Labs CMP, CBC, free T4 and TSH   Scheduling:  Preferred lab:  Lab interval:     Imaging    Pharmacy appointment    Other referrals

## 2024-08-29 ENCOUNTER — INFUSION (OUTPATIENT)
Dept: INFUSION THERAPY | Facility: HOSPITAL | Age: 74
End: 2024-08-29
Attending: FAMILY MEDICINE
Payer: MEDICARE

## 2024-08-29 VITALS
BODY MASS INDEX: 20.29 KG/M2 | HEIGHT: 62 IN | DIASTOLIC BLOOD PRESSURE: 59 MMHG | RESPIRATION RATE: 17 BRPM | TEMPERATURE: 98 F | SYSTOLIC BLOOD PRESSURE: 130 MMHG | WEIGHT: 110.25 LBS | OXYGEN SATURATION: 98 % | HEART RATE: 65 BPM

## 2024-08-29 DIAGNOSIS — C34.31 ADENOCARCINOMA OF LOWER LOBE OF RIGHT LUNG: Primary | ICD-10-CM

## 2024-08-29 PROCEDURE — 63600175 PHARM REV CODE 636 W HCPCS: Mod: JZ,JG | Performed by: HOSPITALIST

## 2024-08-29 PROCEDURE — 96413 CHEMO IV INFUSION 1 HR: CPT

## 2024-08-29 PROCEDURE — 25000003 PHARM REV CODE 250: Performed by: HOSPITALIST

## 2024-08-29 PROCEDURE — A4216 STERILE WATER/SALINE, 10 ML: HCPCS | Performed by: HOSPITALIST

## 2024-08-29 RX ORDER — SODIUM CHLORIDE 0.9 % (FLUSH) 0.9 %
10 SYRINGE (ML) INJECTION
Status: CANCELLED | OUTPATIENT
Start: 2024-08-29

## 2024-08-29 RX ORDER — SODIUM CHLORIDE 0.9 % (FLUSH) 0.9 %
10 SYRINGE (ML) INJECTION
Status: DISCONTINUED | OUTPATIENT
Start: 2024-08-29 | End: 2024-08-29 | Stop reason: HOSPADM

## 2024-08-29 RX ORDER — EPINEPHRINE 0.3 MG/.3ML
0.3 INJECTION SUBCUTANEOUS ONCE AS NEEDED
Status: CANCELLED | OUTPATIENT
Start: 2024-08-29

## 2024-08-29 RX ORDER — DIPHENHYDRAMINE HYDROCHLORIDE 50 MG/ML
50 INJECTION INTRAMUSCULAR; INTRAVENOUS ONCE AS NEEDED
Status: DISCONTINUED | OUTPATIENT
Start: 2024-08-29 | End: 2024-08-29 | Stop reason: HOSPADM

## 2024-08-29 RX ORDER — HEPARIN 100 UNIT/ML
500 SYRINGE INTRAVENOUS
Status: CANCELLED | OUTPATIENT
Start: 2024-08-29

## 2024-08-29 RX ORDER — DIPHENHYDRAMINE HYDROCHLORIDE 50 MG/ML
50 INJECTION INTRAMUSCULAR; INTRAVENOUS ONCE AS NEEDED
Status: CANCELLED | OUTPATIENT
Start: 2024-08-29

## 2024-08-29 RX ORDER — EPINEPHRINE 0.3 MG/.3ML
0.3 INJECTION SUBCUTANEOUS ONCE AS NEEDED
Status: DISCONTINUED | OUTPATIENT
Start: 2024-08-29 | End: 2024-08-29 | Stop reason: HOSPADM

## 2024-08-29 RX ADMIN — SODIUM CHLORIDE 480 MG: 9 INJECTION, SOLUTION INTRAVENOUS at 12:08

## 2024-08-29 RX ADMIN — Medication 10 ML: at 01:08

## 2024-08-29 RX ADMIN — SODIUM CHLORIDE: 9 INJECTION, SOLUTION INTRAVENOUS at 11:08

## 2024-08-30 ENCOUNTER — PATIENT MESSAGE (OUTPATIENT)
Dept: HEMATOLOGY/ONCOLOGY | Facility: CLINIC | Age: 74
End: 2024-08-30
Payer: MEDICARE

## 2024-09-03 ENCOUNTER — CLINICAL SUPPORT (OUTPATIENT)
Dept: REHABILITATION | Facility: HOSPITAL | Age: 74
End: 2024-09-03
Payer: MEDICARE

## 2024-09-03 DIAGNOSIS — M54.9 UPPER BACK PAIN: ICD-10-CM

## 2024-09-03 DIAGNOSIS — M54.2 NECK PAIN: Primary | ICD-10-CM

## 2024-09-03 PROCEDURE — 97140 MANUAL THERAPY 1/> REGIONS: CPT | Mod: KX,PN

## 2024-09-03 PROCEDURE — 97112 NEUROMUSCULAR REEDUCATION: CPT | Mod: KX,PN

## 2024-09-03 NOTE — PROGRESS NOTES
"[facility]   Physical Therapy Treatment Note      Name: Keo Frias  Clinic Number: [id number]    Therapy Diagnosis:   Encounter Diagnoses   Name Primary?    Neck pain Yes    Upper back pain      Physician: Sharron Ogden MD    Visit Date: 9/3/2024       Physician Orders: PT Eval and Treat   Medical Diagnosis:   M54.9 (ICD-10-CM) - Upper back pain   M54.2 (ICD-10-CM) - Neck pain      Evaluation Date: 8/13/2024  Authorization Period: 11/10/2024  Plan of Care Certification Period: 8/13/2024 to 11/10/2024  Visit # / Visits authorized: 4/15 (+eval)  FOTO: 4/5  PTA Visit #: 0/5      Time In: 9:07  Time Out: 10:00  Total Billable Time: 53 minutes 1:1 (3 NM, 1 MT)  Precautions: cancer    Subjective     Patient reports: recent surgery was May. Began walking again a few weeks later. Recent pain in L calf (1/10).  She was compliant with home exercise program.  Response to previous treatment: no adverse effects.   Functional change: Ongoing    Pain: 2/10  Location: right lateral shoulder pain.     Objective      (+) internal shoulder impingement    Treatment     Keo received the treatments listed below:      Therapeutic exercises to develop strength, endurance, ROM, flexibility, posture, and core stabilization for 5 minutes  including:  Seated thoracic extension 5" x 20     Manual therapy techniques: were applied to the: neck for 8 minutes, including:  Grade I/II right shoulder joint mobs for pain modulation  Grade III/IV right shoulder joint mobs posterior with external rotation at 90 deg abd  Passive physiological right shoulder FE, ExR (various angles), abd    Neuromuscular re-education activities to improve: Balance, Coordination, Kinesthetic, Sense, Proprioception, and Posture for 35 minutes . The following activities were included:  Seated cervical retractions 3x10  (VC for technique)  Sidelying shoulder ExR 3# 2x10 (fatigue)  Sidelying shoulder abd 2# 3x10  Seated rows 2x10 red " band  +Standing SAPD 2x10 reps, red band  +Wall slides with overhead shrugs 2x10 reps  +External rotation (yellow band) + flexion 10 reps  +Resisted shoulder flexion, yellow band (unilateral) x 8 reps    Patient Education and Home Exercises       Education provided:   - PT diagnosis and recommended treatment course   - home exercise program: wall slides, sidelying ExR, sidelying hip abd,     Written Home Exercises Provided: Yes. Exercises were reviewed and Keo was able to demonstrate them prior to the end of the session.  Keo demonstrated good  understanding of the education provided. See Electronic Medical Record under Patient Instructions for exercises provided during therapy sessions  . HEP updated 9/3  Assessment     Keo is a 74 y.o. female referred to outpatient Physical Therapy with a medical diagnosis of M54.9 (ICD-10-CM) - Upper back pain, M54.2 (ICD-10-CM) - Neck pain. Pt presents with upper thoracic pain. Primary complaint of today of right shoulder pain. Continues with positive internal impingement in right shoulder. Good response to periscapular strengthening. No pain with overhead flexion/extension of right shoulder. Continue POC to address pain associated with right shoulder external rotation, extension and/or internal rotation.    Keo Is progressing well towards her goals.   Patient prognosis is Good.     Patient will continue to benefit from skilled outpatient physical therapy to address the deficits listed in the problem list box on initial evaluation, provide pt/family education and to maximize pt's level of independence in the home and community environment.   Patient's spiritual, cultural and educational needs considered and pt agreeable to plan of care and goals.     Anticipated barriers to physical therapy: co-morbidities including cancer tx/surgeries     Goals:   Short Term Goals: 3 weeks:  1. Patient will demonstrate understanding of and compliance with home exercise program. met  2.  Patient will demonstrate normal cervical range of motion throughout without pain for improved tolerance to ADLs.   3. Patient will report <4/10  point improvement in daily average neck pain as measured on NPRS.      Long Term Goals: 8 weeks:  1. Patient will report <4/10  point improvement in daily average neck pain as measured on NPRS.   2. Patient will report >9% improvement in NDI.   3. Patient will report <31% limitation on FOTO survey.     Plan   Continue plan of care.  Monitor response to interventions.  Adjust intensity accordingly.     Teresita Pearl, PT, DPT  9/3/2024

## 2024-09-04 ENCOUNTER — CLINICAL SUPPORT (OUTPATIENT)
Dept: REHABILITATION | Facility: HOSPITAL | Age: 74
End: 2024-09-04
Payer: MEDICARE

## 2024-09-04 DIAGNOSIS — M54.2 NECK PAIN: Primary | ICD-10-CM

## 2024-09-04 PROCEDURE — 97140 MANUAL THERAPY 1/> REGIONS: CPT | Mod: KX,PN

## 2024-09-04 PROCEDURE — 97530 THERAPEUTIC ACTIVITIES: CPT | Mod: KX,PN

## 2024-09-04 PROCEDURE — 97112 NEUROMUSCULAR REEDUCATION: CPT | Mod: KX,PN

## 2024-09-04 PROCEDURE — 97110 THERAPEUTIC EXERCISES: CPT | Mod: KX,PN

## 2024-09-04 NOTE — PROGRESS NOTES
"[facility]   Physical Therapy Treatment Note      Name: Keo Frias  Clinic Number: [id number]    Therapy Diagnosis:   Encounter Diagnosis   Name Primary?    Neck pain Yes       Physician: Sharron Ogden MD    Visit Date: 9/4/2024       Physician Orders: PT Eval and Treat   Medical Diagnosis:   M54.9 (ICD-10-CM) - Upper back pain   M54.2 (ICD-10-CM) - Neck pain      Evaluation Date: 8/13/2024  Authorization Period: 11/10/2024  Plan of Care Certification Period: 8/13/2024 to 11/10/2024  Visit # / Visits authorized: 5/15 (+eval)  FOTO: today  PTA Visit #: 0/5      Time In: 0800  Time Out: 0855  Total Billable Time: 55 minutes (1 TH, 1 TE, 1 NM, 1 MT)  Precautions: cancer    Subjective     Patient reports: mild right shoulder pain but it is getting 'a little better'.   She was compliant with home exercise program.  Response to previous treatment: no adverse effects.   Functional change: Ongoing    Pain: 2/10  Location: right lateral shoulder pain.     Objective      (+) internal shoulder impingement    Treatment     Keo received the treatments listed below:      Therapeutic exercises to develop strength, endurance, ROM, flexibility, posture, and core stabilization for 10 minutes  including:  Seated thoracic extension 5" x 20 bolster    Therapeutic activities: total time 10 minutes:  Seated lat pull downs 7# 3x10    Manual therapy techniques: were applied to the: neck for 15 minutes, including:  Cervical sideglides grade II/III  Grade III/IV right shoulder joint mobs posterior with external rotation at 90 deg abd  Passive physiological right shoulder FE, ExR (various angles), abd    Neuromuscular re-education activities to improve: Balance, Coordination, Kinesthetic, Sense, Proprioception, and Posture for 20 minutes . The following activities were included:  Seated cervical retractions 3x10  (VC for technique)  No money's red tB 2x15    Patient Education and Home Exercises   "     Education provided:   - PT diagnosis and recommended treatment course   - home exercise program: wall slides, sidelying ExR, sidelying hip abd,     Written Home Exercises Provided: Yes. Exercises were reviewed and Keo was able to demonstrate them prior to the end of the session.  Keo demonstrated good  understanding of the education provided. See Electronic Medical Record under Patient Instructions for exercises provided during therapy sessions  . HEP updated 9/3  Assessment     Keo is a 74 y.o. female referred to outpatient Physical Therapy with a medical diagnosis of M54.9 (ICD-10-CM) - Upper back pain, M54.2 (ICD-10-CM) - Neck pain. Pt presents with upper thoracic pain. Primary complaint of today of right shoulder pain. Continues with positive internal impingement in right shoulder. 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     Keo Is progressing well towards her goals.   Patient prognosis is Good.     Patient will continue to benefit from skilled outpatient physical therapy to address the deficits listed in the problem list box on initial evaluation, provide pt/family education and to maximize pt's level of independence in the home and community environment.   Patient's spiritual, cultural and educational needs considered and pt agreeable to plan of care and goals.     Anticipated barriers to physical therapy: co-morbidities including cancer tx/surgeries     Goals:   Short Term Goals: 3 weeks:  1. Patient will demonstrate understanding of and compliance with home exercise program. met  2. Patient will demonstrate normal cervical range of motion throughout without pain for improved tolerance to ADLs.   3. Patient will report <4/10  point improvement in daily average neck pain as measured on NPRS.      Long Term Goals: 8 weeks:  1. Patient will report <4/10  point improvement in daily average neck pain as  measured on NPRS.   2. Patient will report >9% improvement in NDI.   3. Patient will report <31% limitation on FOTO survey.     Plan   Continue plan of care.  Monitor response to interventions.  Adjust intensity accordingly.     Michael Podn, PT, DPT  9/4/2024

## 2024-09-10 ENCOUNTER — CLINICAL SUPPORT (OUTPATIENT)
Dept: REHABILITATION | Facility: HOSPITAL | Age: 74
End: 2024-09-10
Payer: MEDICARE

## 2024-09-10 DIAGNOSIS — M54.2 NECK PAIN: Primary | ICD-10-CM

## 2024-09-10 DIAGNOSIS — M54.9 UPPER BACK PAIN: ICD-10-CM

## 2024-09-10 PROCEDURE — 97112 NEUROMUSCULAR REEDUCATION: CPT | Mod: KX,PN

## 2024-09-10 PROCEDURE — 97140 MANUAL THERAPY 1/> REGIONS: CPT | Mod: KX,PN

## 2024-09-10 NOTE — PROGRESS NOTES
"[facility]   Physical Therapy Treatment Note      Name: Keo Frias  Clinic Number: [id number]    Therapy Diagnosis:   Encounter Diagnoses   Name Primary?    Neck pain Yes    Upper back pain        Physician: Sharron Ogden MD    Visit Date: 9/10/2024       Physician Orders: PT Eval and Treat   Medical Diagnosis:   M54.9 (ICD-10-CM) - Upper back pain   M54.2 (ICD-10-CM) - Neck pain      Evaluation Date: 8/13/2024  Authorization Period: 11/10/2024  Plan of Care Certification Period: 8/13/2024 to 11/10/2024  Visit # / Visits authorized: 6/15 (+eval)  FOTO: at dc PTA Visit #: 0/5      Time In: 1000  Time Out: 1055  Total Billable Time: 30 minutes (1 NM, 1 MT)  Precautions: cancer    Subjective     Patient reports: mild right shoulder pain but it is getting 'a little better' every day  She was compliant with home exercise program.  Response to previous treatment: no adverse effects.   Functional change: Ongoing    Pain: 1/10  Location: right lateral shoulder pain.     Objective      (+) internal shoulder impingement    Treatment     Keo received the treatments listed below:        Manual therapy techniques: were applied to the: neck for 15 minutes 1:1, including:  Cervical sideglides grade II/III  Grade III/IV right shoulder joint mobs posterior with external rotation at 90 deg abd  Passive physiological right shoulder FE, ExR (various angles), abd  Posterior capsule stretch 5x30"    Neuromuscular re-education activities to improve: Balance, Coordination, Kinesthetic, Sense, Proprioception, and Posture for 15 minutes 1:1. The following activities were included:  Seated cervical retractions 3x10  (VC for technique)  Sidelying shoulder ExR 2# 3x10 (fatigue)  Sidelying shoulder abd 2# 3x10  Seated rows 2x10 red band  Wall slides with overhead shrugs 2x10 reps    Patient Education and Home Exercises       Education provided:   - PT diagnosis and recommended treatment course   - home exercise " program: wall slides, sidelying ExR, sidelying hip abd,     Written Home Exercises Provided: Yes. Exercises were reviewed and Keo was able to demonstrate them prior to the end of the session.  Keo demonstrated good  understanding of the education provided. See Electronic Medical Record under Patient Instructions for exercises provided during therapy sessions  . HEP updated 9/3  Assessment     Keo is a 74 y.o. female referred to outpatient Physical Therapy with a medical diagnosis of M54.9 (ICD-10-CM) - Upper back pain, M54.2 (ICD-10-CM) - Neck pain. Pt presents with upper thoracic pain. Primary complaint of today of right shoulder pain. Continues with positive internal impingement in right shoulder. Making steady gains toward goals.     Keo Is progressing well towards her goals.   Patient prognosis is Good.     Patient will continue to benefit from skilled outpatient physical therapy to address the deficits listed in the problem list box on initial evaluation, provide pt/family education and to maximize pt's level of independence in the home and community environment.   Patient's spiritual, cultural and educational needs considered and pt agreeable to plan of care and goals.     Anticipated barriers to physical therapy: co-morbidities including cancer tx/surgeries     Goals:   Short Term Goals: 3 weeks:  1. Patient will demonstrate understanding of and compliance with home exercise program. met  2. Patient will demonstrate normal cervical range of motion throughout without pain for improved tolerance to ADLs.   3. Patient will report <4/10  point improvement in daily average neck pain as measured on NPRS.      Long Term Goals: 8 weeks:  1. Patient will report <4/10  point improvement in daily average neck pain as measured on NPRS.   2. Patient will report >9% improvement in NDI.   3. Patient will report <31% limitation on FOTO survey.     Plan   Continue plan of care.  Monitor response to interventions.  Adjust  intensity accordingly.     Michael Pond, PT, DPT  9/10/2024

## 2024-09-13 ENCOUNTER — CLINICAL SUPPORT (OUTPATIENT)
Dept: REHABILITATION | Facility: HOSPITAL | Age: 74
End: 2024-09-13
Payer: MEDICARE

## 2024-09-13 DIAGNOSIS — M54.9 UPPER BACK PAIN: ICD-10-CM

## 2024-09-13 DIAGNOSIS — M54.2 NECK PAIN: Primary | ICD-10-CM

## 2024-09-13 PROCEDURE — 97112 NEUROMUSCULAR REEDUCATION: CPT | Mod: PN

## 2024-09-13 PROCEDURE — 97140 MANUAL THERAPY 1/> REGIONS: CPT | Mod: PN

## 2024-09-13 NOTE — PROGRESS NOTES
[facility]   Physical Therapy Treatment Note      Name: Keo Frias  Clinic Number: [id number]    Therapy Diagnosis:   Encounter Diagnoses   Name Primary?    Neck pain Yes    Upper back pain        Physician: Sharron Ogden MD    Visit Date: 9/13/2024       Physician Orders: PT Eval and Treat   Medical Diagnosis:   M54.9 (ICD-10-CM) - Upper back pain   M54.2 (ICD-10-CM) - Neck pain      Evaluation Date: 8/13/2024  Authorization Period: 11/10/2024  Plan of Care Certification Period: 8/13/2024 to 11/10/2024  Visit # / Visits authorized: 7/15 (+eval)  FOTO: at dc PTA Visit #: 0/5      Time In: 1000  Time Out: 1055  Total Billable Time: 40 minutes (2 NM, 1 MT)  Precautions: cancer    Subjective     Patient reports: does voices some right shoulder night pain. Minimal pain/soreness during the day.   She was compliant with home exercise program.  Response to previous treatment: no adverse effects.   Functional change: Ongoing    Pain: 1/10  Location: right lateral shoulder pain.     Objective      (+) internal shoulder impingement    Treatment     Keo received the treatments listed below:      Manual therapy techniques: were applied to the: neck for 15 minutes 1:1, including:  Cervical sideglides grade II/III  Grade III/IV right shoulder joint mobs posterior with external rotation at 90 deg abd  Passive physiological right shoulder FE, ExR (various angles), abd    Neuromuscular re-education activities to improve: Balance, Coordination, Kinesthetic, Sense, Proprioception, and Posture for 25 minutes 1:1. The following activities were included:  Seated cervical retractions 3x10  (VC for technique)  Sidelying shoulder ExR 2# 3x10 (  Supine FE red tB 2x15  Standing cable rows single arm 2x10/each 7#  Lat pulls downs 3x10 10#  Wall slides with overhead shrugs 2x10 reps    Patient Education and Home Exercises       Education provided:   - PT diagnosis and recommended treatment course   - home  exercise program: wall slides, sidelying ExR, sidelying hip abd,     Written Home Exercises Provided: Yes. Exercises were reviewed and Keo was able to demonstrate them prior to the end of the session.  Keo demonstrated good  understanding of the education provided. See Electronic Medical Record under Patient Instructions for exercises provided during therapy sessions  . HEP updated 9/3  Assessment     Keo is a 74 y.o. female referred to outpatient Physical Therapy with a medical diagnosis of M54.9 (ICD-10-CM) - Upper back pain, M54.2 (ICD-10-CM) - Neck pain. Pt presents with upper thoracic pain. Primary complaint of today of right shoulder pain. Decreased loading intensity for supraspinatus a bit due to continue night pain.     Keo Is progressing well towards her goals.   Patient prognosis is Good.     Patient will continue to benefit from skilled outpatient physical therapy to address the deficits listed in the problem list box on initial evaluation, provide pt/family education and to maximize pt's level of independence in the home and community environment.   Patient's spiritual, cultural and educational needs considered and pt agreeable to plan of care and goals.     Anticipated barriers to physical therapy: co-morbidities including cancer tx/surgeries     Goals:   Short Term Goals: 3 weeks:  1. Patient will demonstrate understanding of and compliance with home exercise program. met  2. Patient will demonstrate normal cervical range of motion throughout without pain for improved tolerance to ADLs.   3. Patient will report <4/10  point improvement in daily average neck pain as measured on NPRS.      Long Term Goals: 8 weeks:  1. Patient will report <4/10  point improvement in daily average neck pain as measured on NPRS.   2. Patient will report >9% improvement in NDI.   3. Patient will report <31% limitation on FOTO survey.     Plan   Continue plan of care.  Monitor response to interventions.  Adjust intensity  accordingly.     Michael Pond, PT, DPT, OCS

## 2024-09-16 NOTE — TELEPHONE ENCOUNTER
No new care gaps identified.  [facility] Embedded Care Gaps. Reference number: [id number]. 5/18/2022   4:18:17 PM CDT  
[facility] Note  Quick DC. Inappropriate Request   Refill request requires further review by MD: NO   Medication Therapy Plan: Pharmacy is requesting new script(s) for the following medications without required information, (sig/ frequency/qty/etc)     [facility] action(s):  Quick Discontinue      Duplicate Pended Encounter(s)/ Last Prescribed Details:    Pharmacies have been requesting medications for patients without required information, (sig, frequency, qty, etc.). In addition, requests are sent for medication(s) pt. are currently not taking, and medications patients have never taken.    We have spoken to the pharmacies about these request types and advised their teams previously that we are unable to assess these New Script requests and require all details for these requests. This is a known issue and has been reported.        Medication related problems are not assessed for [facility].   Medication Reconciliation Completed? NO Were there pending details that required adjustment? NO     Automatic Epic Generated Protocol Data Below:   Requested Prescriptions   Pending Prescriptions Disp Refills    amLODIPine (NORVASC) 2.5 MG tablet [Pharmacy Med Name: AMLODIPINE  2.5MG TAB] 90 tablet 0              Appointments      Date Provider   Last Visit   5/6/2022 Sharron Ogden MD   Next Visit   Visit date not found Sharron Ogden MD        Note composed:4:19 PM 05/18/2022            
[facility]  [address] [place] [address]  Phone: [phone]  Fax: [phone]  Established Patient  Follow Up Time: 2 weeks

## 2024-09-18 ENCOUNTER — CLINICAL SUPPORT (OUTPATIENT)
Dept: REHABILITATION | Facility: HOSPITAL | Age: 74
End: 2024-09-18
Payer: MEDICARE

## 2024-09-18 DIAGNOSIS — M54.9 UPPER BACK PAIN: ICD-10-CM

## 2024-09-18 DIAGNOSIS — M54.2 NECK PAIN: Primary | ICD-10-CM

## 2024-09-18 PROCEDURE — 97140 MANUAL THERAPY 1/> REGIONS: CPT | Mod: KX,PN

## 2024-09-18 PROCEDURE — 97112 NEUROMUSCULAR REEDUCATION: CPT | Mod: KX,PN

## 2024-09-19 NOTE — PROGRESS NOTES
[facility]   Physical Therapy Treatment Note      Name: Keo Frias  Clinic Number: [id number]    Therapy Diagnosis:   Encounter Diagnoses   Name Primary?    Neck pain Yes    Upper back pain        Physician: Sharron Ogden MD    Visit Date: 9/18/2024       Physician Orders: PT Eval and Treat   Medical Diagnosis:   M54.9 (ICD-10-CM) - Upper back pain   M54.2 (ICD-10-CM) - Neck pain      Evaluation Date: 8/13/2024  Authorization Period: 11/10/2024  Plan of Care Certification Period: 8/13/2024 to 11/10/2024  Visit # / Visits authorized: 8/15 (+eval)  FOTO: at dc PTA Visit #: 0/5      Time In: 0900  Time Out: 0955  Total Billable Time: 30 minutes (1 NM, 1 MT)  Precautions: cancer    Subjective     Patient reports: does voices some right shoulder night pain. Minimal pain/soreness during the day. Feels better overall.   She was compliant with home exercise program.  Response to previous treatment: no adverse effects.   Functional change: Ongoing    Pain: 1/10  Location: right lateral shoulder pain.     Objective      (+) internal shoulder impingement; improving.  Tight right lat dorsi    Treatment     Keo received the treatments listed below:      Manual therapy techniques: were applied to the: neck for 15 minutes, including:  Cervical sideglides grade II/III  Grade III/IV right shoulder joint mobs posterior with external rotation at 90 deg abd  Passive physiological right shoulder FE, ExR (various angles), abd    Neuromuscular re-education activities to improve: Balance, Coordination, Kinesthetic, Sense, Proprioception, and Posture for 15 minutes 1:1. The following activities were included:  Seated cervical retractions 3x10  (VC for technique)  Standing cable rows single arm 2x10/each 7#  Lat pulls downs 3x10 10#  Wall slides with overhead shrugs 2x10 reps  (+)Standing shoulder ExR yellow tB 2x10/each, IntR red Tb 2x10/each    Patient Education and Home Exercises       Education  provided:   - PT diagnosis and recommended treatment course   - home exercise program: wall slides, sidelying ExR, sidelying hip abd,     Written Home Exercises Provided: Yes. Exercises were reviewed and Keo was able to demonstrate them prior to the end of the session.  Keo demonstrated good  understanding of the education provided. See Electronic Medical Record under Patient Instructions for exercises provided during therapy sessions  . HEP updated 9/3  Assessment     Keo is a 74 y.o. female referred to outpatient Physical Therapy with a medical diagnosis of M54.9 (ICD-10-CM) - Upper back pain, M54.2 (ICD-10-CM) - Neck pain. Pt presents with upper thoracic pain. Primary complaint of today of right shoulder pain. Tight GhJ capsule, tight lat dorsi muscle right.     Keo Is progressing well towards her goals.   Patient prognosis is Good.     Patient will continue to benefit from skilled outpatient physical therapy to address the deficits listed in the problem list box on initial evaluation, provide pt/family education and to maximize pt's level of independence in the home and community environment.   Patient's spiritual, cultural and educational needs considered and pt agreeable to plan of care and goals.     Anticipated barriers to physical therapy: co-morbidities including cancer tx/surgeries     Goals:   Short Term Goals: 3 weeks:  1. Patient will demonstrate understanding of and compliance with home exercise program. met  2. Patient will demonstrate normal cervical range of motion throughout without pain for improved tolerance to ADLs.   3. Patient will report <4/10  point improvement in daily average neck pain as measured on NPRS.      Long Term Goals: 8 weeks:  1. Patient will report <4/10  point improvement in daily average neck pain as measured on NPRS.   2. Patient will report >9% improvement in NDI.   3. Patient will report <31% limitation on FOTO survey.     Plan   Continue plan of care.  Monitor  response to interventions.  Adjust intensity accordingly.     Michael Pond, PT, DPT, OCS

## 2024-09-20 ENCOUNTER — LAB VISIT (OUTPATIENT)
Dept: LAB | Facility: HOSPITAL | Age: 74
End: 2024-09-20
Attending: HOSPITALIST
Payer: MEDICARE

## 2024-09-20 ENCOUNTER — OFFICE VISIT (OUTPATIENT)
Dept: HEMATOLOGY/ONCOLOGY | Facility: CLINIC | Age: 74
End: 2024-09-20
Payer: MEDICARE

## 2024-09-20 VITALS
TEMPERATURE: 99 F | WEIGHT: 111.13 LBS | SYSTOLIC BLOOD PRESSURE: 129 MMHG | HEIGHT: 62 IN | HEART RATE: 66 BPM | DIASTOLIC BLOOD PRESSURE: 65 MMHG | BODY MASS INDEX: 20.45 KG/M2 | OXYGEN SATURATION: 98 % | RESPIRATION RATE: 18 BRPM

## 2024-09-20 DIAGNOSIS — C77.1 SECONDARY MALIGNANT NEOPLASM OF INTRATHORACIC LYMPH NODES: ICD-10-CM

## 2024-09-20 DIAGNOSIS — E03.9 HYPOTHYROIDISM, UNSPECIFIED TYPE: ICD-10-CM

## 2024-09-20 DIAGNOSIS — C34.31 ADENOCARCINOMA OF LOWER LOBE OF RIGHT LUNG: Primary | ICD-10-CM

## 2024-09-20 DIAGNOSIS — C34.31 ADENOCARCINOMA OF LOWER LOBE OF RIGHT LUNG: ICD-10-CM

## 2024-09-20 LAB
ALBUMIN SERPL BCP-MCNC: 3.6 G/DL (ref 3.5–5.2)
ALP SERPL-CCNC: 62 U/L (ref 55–135)
ALT SERPL W/O P-5'-P-CCNC: 16 U/L (ref 10–44)
ANION GAP SERPL CALC-SCNC: 10 MMOL/L (ref 8–16)
AST SERPL-CCNC: 21 U/L (ref 10–40)
BILIRUB SERPL-MCNC: 0.7 MG/DL (ref 0.1–1)
BUN SERPL-MCNC: 12 MG/DL (ref 8–23)
CALCIUM SERPL-MCNC: 8.9 MG/DL (ref 8.7–10.5)
CHLORIDE SERPL-SCNC: 104 MMOL/L (ref 95–110)
CO2 SERPL-SCNC: 22 MMOL/L (ref 23–29)
CREAT SERPL-MCNC: 0.7 MG/DL (ref 0.5–1.4)
ERYTHROCYTE [DISTWIDTH] IN BLOOD BY AUTOMATED COUNT: 13.3 % (ref 11.5–14.5)
EST. GFR  (NO RACE VARIABLE): >60 ML/MIN/1.73 M^2
GLUCOSE SERPL-MCNC: 103 MG/DL (ref 70–110)
HCT VFR BLD AUTO: 39.3 % (ref 37–48.5)
HGB BLD-MCNC: 12.7 G/DL (ref 12–16)
IMM GRANULOCYTES # BLD AUTO: 0.02 K/UL (ref 0–0.04)
MCH RBC QN AUTO: 27.7 PG (ref 27–31)
MCHC RBC AUTO-ENTMCNC: 32.3 G/DL (ref 32–36)
MCV RBC AUTO: 86 FL (ref 82–98)
NEUTROPHILS # BLD AUTO: 2.9 K/UL (ref 1.8–7.7)
PLATELET # BLD AUTO: 212 K/UL (ref 150–450)
PMV BLD AUTO: 12.8 FL (ref 9.2–12.9)
POTASSIUM SERPL-SCNC: 3.8 MMOL/L (ref 3.5–5.1)
PROT SERPL-MCNC: 7.2 G/DL (ref 6–8.4)
RBC # BLD AUTO: 4.58 M/UL (ref 4–5.4)
SODIUM SERPL-SCNC: 136 MMOL/L (ref 136–145)
T4 FREE SERPL-MCNC: 1.35 NG/DL (ref 0.71–1.51)
TSH SERPL DL<=0.005 MIU/L-ACNC: 3.4 UIU/ML (ref 0.4–4)
WBC # BLD AUTO: 7.04 K/UL (ref 3.9–12.7)

## 2024-09-20 PROCEDURE — 84443 ASSAY THYROID STIM HORMONE: CPT | Performed by: HOSPITALIST

## 2024-09-20 PROCEDURE — 85027 COMPLETE CBC AUTOMATED: CPT | Performed by: HOSPITALIST

## 2024-09-20 PROCEDURE — 99999 PR PBB SHADOW E&M-EST. PATIENT-LVL IV: CPT | Mod: PBBFAC,,, | Performed by: HOSPITALIST

## 2024-09-20 PROCEDURE — 84439 ASSAY OF FREE THYROXINE: CPT | Performed by: HOSPITALIST

## 2024-09-20 PROCEDURE — 36415 COLL VENOUS BLD VENIPUNCTURE: CPT | Performed by: HOSPITALIST

## 2024-09-20 PROCEDURE — 80053 COMPREHEN METABOLIC PANEL: CPT | Performed by: HOSPITALIST

## 2024-09-20 RX ORDER — SODIUM CHLORIDE 0.9 % (FLUSH) 0.9 %
10 SYRINGE (ML) INJECTION
OUTPATIENT
Start: 2024-09-24

## 2024-09-20 RX ORDER — DIPHENHYDRAMINE HYDROCHLORIDE 50 MG/ML
50 INJECTION INTRAMUSCULAR; INTRAVENOUS ONCE AS NEEDED
OUTPATIENT
Start: 2024-09-24

## 2024-09-20 RX ORDER — EPINEPHRINE 0.3 MG/.3ML
0.3 INJECTION SUBCUTANEOUS ONCE AS NEEDED
OUTPATIENT
Start: 2024-09-24

## 2024-09-20 RX ORDER — HEPARIN 100 UNIT/ML
500 SYRINGE INTRAVENOUS
OUTPATIENT
Start: 2024-09-24

## 2024-09-20 NOTE — PROGRESS NOTES
[facility] MEDICAL ONCOLOGY - FOLLOW UP VISIT    Reason for visit: Follow up visit for adenocarcinoma of the lung      Oncology History   Malignant neoplasm of upper-outer quadrant of right breast in female, estrogen receptor positive   4/13/2021 Biopsy    Breast, right, 10:00 5N, biopsy:  - Invasive ductal carcinoma with lobular features     4/13/2021 Breast Tumor Markers    Estrogen Receptor: Positive >90%  Progesterone Receptor: Positive 10-50%  HER2: Negative  Ki67: 10-30%     4/26/2021 Genetic Testing    MyRisk: negative     5/12/2021 Breast Surgery    Right partial mastectomy with SLNB     6/1/2021 Tumor Conference    Radiation options? Offer radiation, can discuss partial vs whole breast radiation.        6/2/2021 Cancer Staged    Staging form: Breast, AJCC 8th Edition  - Pathologic stage from 6/2/2021: Stage IA (pT1b, pN0(sn), cM0, G2, ER+, WI+, HER2-)     7/6/2021 - 7/27/2021 Radiation Therapy    Treatment Summary  Course: C1 Chest 2021  Treatment Site Energy Dose/Fx (Gy) #Fx Total Dose (Gy) Start Date End Date Elapsed Days   Breast_Rt 6X 2.65 16 / 16 42.4 7/6/2021 7/27/2021 21          7/2021 -  Hormone Therapy    Letrozole      Procedure    Bronchoscopy, Station 7 positive for malignancy     Adenocarcinoma of lower lobe of right lung   10/5/2023 Imaging Significant Findings    CT C (obtained after CXR, which was itself obtained for palpitations)  - 2.1 x 1.3 cm spiculated RLL nodule, some spiculation noted to extend to the pleural margin  - Scattered pulmonary nodules centered mostly subpleural at the RLL (e.g. 0.9 cm)       10/10/2023 Imaging Significant Findings    PET CT  - 2.1 x 1.1 cm RLL nodule, SUV 3.8  - 0.9 cm R axillary LN, SUV 3.9  - 1.2 cm subcarinal LN, SUV 4.6  - 0.7 cm Ln along L posterior shoulder, SUV 1.7  - Multiple additional solid pulmonary nodules through R lung base, too small for uptake detection     11/14/2023 Procedure    Bronch with  EBUS  RLL, FNA  - Adenocarcinoma (TTF1 positive, GATA3 negative)    RLL, TBBx  - Adenocarcinoma    Per pulmonology, station 7 node was very vascular without window for biopsy, plan to discuss at thoracic tumor board    [facility] NGS  - KRAS G12A, CHEK1, MLH1, CTNNB1, CIC, PTPRD, NOTCH3, EZH2, LATS1, KMT2D  - Negative: EGFR, ALK, BRAF, KRAS, ROS1, RET, MET, EBB2  - PD-L1 1%       11/22/2023 Tumor Conference    Tumor Board  - Plan for axillary LN biopsy to determine lung vs recurrent breast vs other etiology  - Repeat CT C to evaluate nodules in RLL  - Determine treatment plan based on above results     11/22/2023 Tumor Genotyping    [facility] Liquid Biopsy  - No reportable pathogenic variants found     11/29/2023 Imaging Significant Findings    CT C  - 2.1 x 1.2 cm RLL spiculated nodule, stable in size w/ new central cavitation. Spiculated margins extend towards adjacent pleura.   - Stable irregular 2.0 cm linar opacity in LLL  - Stable 0.3 cm GGO, TRICIA  - Stable R basal sub cm nodules, largest 0.9 cm  - Several stable solid nodules predominantly in RLL, largest 0.9 cm     12/18/2023 Imaging Significant Findings    MRI Brain  - JELENA     1/3/2024 Procedure    IR Guided Biopsy, R Axillary LN  - No metastatic carcinoma (0.3 x 0.3 x 0.1 cm tissue, positive and negative controls stained appropriately)     1/5/2024 Imaging Significant Findings    PET CT  - Stable 2.0 x 1.1 cm RLL nodule (previously 2.1 x 1.1 cm)  - Stable additional nodules w/o significant racer uptake  - 0.8 cm R axillary node, SUV 4.2 (previously 0.9 cm, SUV 3.9)  - 0.5 cm L posterior shoulder node, SUV 2.3 (previously 0.7 cm, SUV 1.7)  - 1.3 cm subcarinal node, SUV 4.9 (previously 1.2 cm, SUV 4.6)     1/10/2024 Tumor Conference    Tumor Board   Re-sample enlarged, hypermetabolic axillary LN with repeat CT-guided biopsy versus excisional biopsy. If LN is negative for recurrent NSCLC, obtain EBUS of PET-avid mediastinal LN.         1/23/2024 Procedure    IR  Guided Biopsy, R Axillary LN (Second Biopsy)  - Negative for metastatic carcinoma     2/8/2024 Procedure    Bronch with EBUS  LN  Positive: Station 7 (Adenocarcinoma)  Negative: Station 11R     2/21/2024 Cancer Staged    Staging form: Lung, AJCC 8th Edition  - Clinical stage from 2/21/2024: Stage IIIA (cT1b, cN2, cM0)     2/21/2024 Tumor Conference    Thoracic Tumor Board  Stage IIIA right lower lobe adenocarcinoma with bulky subcarinal lymphadenopathy.  Proceed with NA chemo/I-O therapy. Return to [facility] for surgical evaluation. If patient is not a surgical candidate following 3 cycles of therapy, proceed with definitive chemo/RT.       2/22/2024 - 4/5/2024 Chemotherapy    Treatment Summary   Plan Name: OP NSCLC NIVOLUMAB 360 MG PLUS CARBOPLATIN (AUC5) PEMETREXED 500 MG/M2 Q3W x 3 CYCLES  Treatment Goal: Control  Status: Inactive  Start Date: 2/22/2024  End Date: 4/5/2024  Provider: Bridger Nichole IV, MD  Chemotherapy: CARBOplatin (PARAPLATIN) 415 mg in sodium chloride 0.9% 326.5 mL chemo infusion, 415 mg, Intravenous, Clinic/HOD 1 time, 3 of 3 cycles  Administration: 415 mg (2/22/2024), 465 mg (4/4/2024), 465 mg (3/14/2024)  PEMEtrexed disodium (ALIMTA) 750 mg in sodium chloride 0.9% SolP 100 mL chemo infusion, 500 mg/m2 = 750 mg, Intravenous, Clinic/HOD 1 time, 3 of 3 cycles  Administration: 750 mg (2/22/2024), 750 mg (4/4/2024), 750 mg (3/14/2024)     4/24/2024 Imaging Significant Findings    CT C/A/P  - 1.2 x 1.0 cm RLL malignancy, previously 2 x 1.2 cm  - Interval decrease in the previously seen nodules in the RLL  - 0.9 cm subcarinal node, previously 1.7 cm  - Interval decrease in right infrahilar soft tissue  - 2.1 cm LLL solid nodular opacity, unchanged  - 0.8 cm TRICIA ground-glass opacity, unchanged  - AVM again noted in RLL  - 0.6 cm right axillary lymph node, decreased in size from prior  - Left scapular lymph node decreased in size from prior, incompletely seen       5/6/2024 - 5/12/2024 Hospital Admission     Admitted to [facility] for RLLx.  Postoperative course complicated by hemothorax requiring return to the operating room for reexploration on postop day 1.  Chest tube removed on postop day 5.     5/6/2024 Surgery    Right Lower Lobectomy  Right Lower Lobe  - Adenocarcinoma with acinar pattern, 35% residual viable tumor, 1.0 cm in greatest dimension.  Pleural invasion absent, LVI absent, margins negative.  0/1 lymph node    Pericardial Excision  - Negative for tumor    LN  - Positive: Station 7  - Negative: Station 8R (0/1), 9 are (0/2), 2R (0/1), 4R (0/1), 11R (0/1), 12R (0/1), R posterior interlobar LN (0/1)    Path Staging: pT1a, pN2     7/2/2024 -  Chemotherapy    Treatment Summary   Plan Name: OP NIVOLUMAB 480 MG Q4W  Treatment Goal: Control  Status: Active  Start Date: 7/2/2024  End Date: 6/5/2025 (Planned)  Provider: Bridger Nichole IV, MD  Chemotherapy: [No matching medication found in this treatment plan]     7/24/2024 Cancer Staged    Staging form: Lung, AJCC 8th Edition  - Pathologic: Stage IIIA (pT1a, pN2, cM0)     8/7/2024 Imaging Significant Findings    PET CT  - New foci of FDG uptake in right lateral chest wall, likely represents postsurgical changes  - Development of moderate size right pleural effusion  - No evidence of hypermetabolic mediastinal or hilar lymph nodes        - Second opinion, [facility]: contralateral LLL lesion stable but plan for sampling of this as well as subcarinal LN and possible additional RLL nodule. Pending results, consider NA --> surgery vs CRT    Oncology History    HPI:     Keo Frias is a 73 y.o. female with pmh significant for HTN, migraine headaches, and Stage IIIA (pT1a, pN2, cM0) adenocarcinoma of the RLL (no targetable mutations, PD-L1 1%), initially dx'd 11/14/23, s/p neoadjuvant carboplatin/pemetrexed/nivolumab (2/22/24 - 4/4/24), right lower lobectomy (5/6/24), and currently on adjuvant nivolumab (7/2/24 - present) who presents to for follow up.     Of note, pt also has  "a history of Stage IA ( [id number], ER 95%, VT 10%, HER2 negative, Ki-67 20%) IDC of the R breast, initially dx'd 4/13/21, s/p lumpectomy and SLNB (5/12/21), XRT (7/6/21-7/27/21), and currently on letrozole (7/2021 - present).     Last clinic 8/28/24, tolerating nivolumab without significant issue.  Improving shortness of breath since surgery.  Proceed with cycle 3 postoperative nivolumab.    Interval History:  - 8/29/24: C3 nivolumab    The pt describes development of intermittent pain in her R upper arm. She says sometimes she doesn't have this at all. She says that she has a hard time reaching her back because of this. This first started around 3 months after completing her surgery, noting that this was around the time she started carrying heavier weights after her surgery. She has been working with physical therapy, which she feels is helping but "not 100%".     Otherwise, she says that she is doing well. Pt denies N/V, diarrhea, constipation (notes incomplete BMs throughout the day), rash, new/worsening fatigue, new/worsening SOB (better), or new/worsening cough (says this is a little better with benzonatate).    ROS:   As per HPI.     Physical Exam:       LMP  (LMP Unknown)                Physical Exam  Constitutional:       Appearance: Normal appearance.   HENT:      Head: Normocephalic and atraumatic.   Eyes:      Extraocular Movements: Extraocular movements intact.      Conjunctiva/sclera: Conjunctivae normal.      Pupils: Pupils are equal, round, and reactive to light.   Cardiovascular:      Rate and Rhythm: Normal rate and regular rhythm.      Heart sounds: No murmur heard.     No friction rub. No gallop.   Pulmonary:      Effort: Pulmonary effort is normal.      Breath sounds: No wheezing, rhonchi or rales.      Comments: Diminished breath sounds in posterior inferior right lung field  Musculoskeletal:         General: Normal range of motion.      Right lower leg: No edema.      Left lower leg: No edema. "   Skin:     General: Skin is warm and dry.   Neurological:      Mental Status: She is alert and oriented to person, place, and time.   Psychiatric:         Mood and Affect: Mood normal.         Thought Content: Thought content normal.         Judgment: Judgment normal.         Imaging:    See oncologic history above.     Path:  See oncologic history above.      Assessment and Plan:     Keo Frias is a 73 y.o. female with pmh significant for HTN, migraine headaches, and Stage IIIA (pT1a, pN2, cM0) adenocarcinoma of the RLL (no targetable mutations, PD-L1 1%), initially dx'd 11/14/23, s/p neoadjuvant carboplatin/pemetrexed/nivolumab (2/22/24 - 4/4/24), right lower lobectomy (5/6/24), and currently on adjuvant nivolumab (7/2/24 - present) who presents to for follow up.     Of note, pt also has a history of Stage IA ( [id number], ER 95%, WA 10%, HER2 negative, Ki-67 20%) IDC of the R breast, initially dx'd 4/13/21, s/p lumpectomy and SLNB (5/12/21), XRT (7/6/21-7/27/21), and currently on letrozole (7/2021 - present).     Stage IIIA Adenocarcinoma of RLL, PD-L1 1%, No Actionable Molecular Alterations  ECOG PS 1.  Patient is currently on adjuvant nivolumab (see note from 6/14/24), tolerating without significant issue. Her most recent imaging (PET-CT, 8/7/24, on adjuvant nivolumab) demonstrated new foci of FDG uptake in right lateral chest wall likely representative of postsurgical changes, development of moderate size right pleural effusion, and no evidence of hypermetabolic mediastinal or hilar lymph nodes, or left scapular hypermetabolic focus.  Given good tolerance and radiographic response, will plan to treat with nivolumab q4w x 1 year, w/ q3m imaging throughout (scheduled at [facility]).     PLAN:   -- Proceed w/ C4 nivolumab on 9/24/24, labs acceptable and treatment signed  -- Labs and RTC on 10/18/24, will utilize lab appointment already established with the patient's primary care provider  -- C5 on 10/22/24  -- Imaging  at [facility] on 11/13/24, and f/u w/ rad onc (Dr. Garcia), med onc (Dr. Don), and pulmonology (Dr. Harris) on 11/14/24  -- Plan for virtual visit on 11/18/24 for treatment clearance for 11/19/24 (as pt will be in [place] the Friday before C6)       Hypothyroidism  On 4/30/24, TSH was 41.25 and free T4 0.71, as compared to 4/2/24 when TSH was 0.014 and T4 was 1.92.  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  She has since been started on levothyroxine per Dr. Don.  Subsequent TFTs within normal limits.  -- Continue levothyroxine 75 mcg daily      Osteoporosis  DEXA from 7/11/24 with osteoporosis of the lumbar spine and hip.  -- Prolia shots per Dr. Ogden      Right IDC, ER+/WV+/HER2-  S/p lumpectomy with SLNB, radiation therapy, and currently on letrozole and denosumab, tolerating well. Concern for possible R axillary recurrence, workup as below.   -- Okay to continue letrozole  -- Okay to continue denosumab as above  -- Breast oncology team aware      The above information has been reviewed with the patient and all questions have been answered to their apparent satisfaction.  They understand that they can call the clinic with any questions.        Med Onc Chart Routing      Follow up with physician . -- Labs and RTC on 10/18/24, will utilize lab appointment already established with the patient's primary care provider -- C5 on 10/22/24. -- Labs and virtual visit on 11/18/24 (planning ahead)   Follow up with BRENDA    Infusion scheduling note    Injection scheduling note    Labs    Imaging    Pharmacy appointment    Other referrals

## 2024-09-24 ENCOUNTER — INFUSION (OUTPATIENT)
Dept: INFUSION THERAPY | Facility: HOSPITAL | Age: 74
End: 2024-09-24
Attending: FAMILY MEDICINE
Payer: MEDICARE

## 2024-09-24 VITALS
WEIGHT: 111.13 LBS | TEMPERATURE: 98 F | SYSTOLIC BLOOD PRESSURE: 128 MMHG | DIASTOLIC BLOOD PRESSURE: 62 MMHG | RESPIRATION RATE: 18 BRPM | HEART RATE: 73 BPM | HEIGHT: 62 IN | OXYGEN SATURATION: 97 % | BODY MASS INDEX: 20.45 KG/M2

## 2024-09-24 DIAGNOSIS — C34.31 ADENOCARCINOMA OF LOWER LOBE OF RIGHT LUNG: Primary | ICD-10-CM

## 2024-09-24 PROCEDURE — A4216 STERILE WATER/SALINE, 10 ML: HCPCS | Performed by: HOSPITALIST

## 2024-09-24 PROCEDURE — 25000003 PHARM REV CODE 250: Performed by: HOSPITALIST

## 2024-09-24 PROCEDURE — 96413 CHEMO IV INFUSION 1 HR: CPT

## 2024-09-24 PROCEDURE — 63600175 PHARM REV CODE 636 W HCPCS: Mod: JZ,JG | Performed by: HOSPITALIST

## 2024-09-24 RX ORDER — EPINEPHRINE 0.3 MG/.3ML
0.3 INJECTION SUBCUTANEOUS ONCE AS NEEDED
Status: DISCONTINUED | OUTPATIENT
Start: 2024-09-24 | End: 2024-09-24 | Stop reason: HOSPADM

## 2024-09-24 RX ORDER — DIPHENHYDRAMINE HYDROCHLORIDE 50 MG/ML
50 INJECTION INTRAMUSCULAR; INTRAVENOUS ONCE AS NEEDED
Status: DISCONTINUED | OUTPATIENT
Start: 2024-09-24 | End: 2024-09-24 | Stop reason: HOSPADM

## 2024-09-24 RX ORDER — SODIUM CHLORIDE 0.9 % (FLUSH) 0.9 %
10 SYRINGE (ML) INJECTION
Status: DISCONTINUED | OUTPATIENT
Start: 2024-09-24 | End: 2024-09-24 | Stop reason: HOSPADM

## 2024-09-24 RX ADMIN — Medication 10 ML: at 02:09

## 2024-09-24 RX ADMIN — SODIUM CHLORIDE 480 MG: 9 INJECTION, SOLUTION INTRAVENOUS at 02:09

## 2024-09-24 RX ADMIN — SODIUM CHLORIDE: 9 INJECTION, SOLUTION INTRAVENOUS at 01:09

## 2024-09-24 NOTE — NURSING
Opdivo given through PIV, noted for having positive blood return.  Dosage and BSA checked by two chemotherapy certified nurses.  Chemotherapeutic precautions in place throughout therapy. Pt tolerated it well, no adverse reactions noted.  PIV flushed removed per protocol, tip intact Next appointment scheduled.  AVS given. Discharged with no acute distress.

## 2024-09-26 ENCOUNTER — CLINICAL SUPPORT (OUTPATIENT)
Dept: REHABILITATION | Facility: HOSPITAL | Age: 74
End: 2024-09-26
Payer: MEDICARE

## 2024-09-26 ENCOUNTER — TELEPHONE (OUTPATIENT)
Dept: HEMATOLOGY/ONCOLOGY | Facility: CLINIC | Age: 74
End: 2024-09-26
Payer: MEDICARE

## 2024-09-26 DIAGNOSIS — M54.9 UPPER BACK PAIN: ICD-10-CM

## 2024-09-26 DIAGNOSIS — M54.2 NECK PAIN: Primary | ICD-10-CM

## 2024-09-26 PROCEDURE — 97112 NEUROMUSCULAR REEDUCATION: CPT | Mod: KX,PN

## 2024-09-26 PROCEDURE — 97140 MANUAL THERAPY 1/> REGIONS: CPT | Mod: KX,PN

## 2024-09-26 NOTE — PROGRESS NOTES
[facility]   Physical Therapy Treatment Note      Name: Keo Frias  Clinic Number: [id number]    Therapy Diagnosis:   Encounter Diagnoses   Name Primary?    Neck pain Yes    Upper back pain        Physician: Sharron Ogden MD    Visit Date: 9/26/2024       Physician Orders: PT Eval and Treat   Medical Diagnosis:   M54.9 (ICD-10-CM) - Upper back pain   M54.2 (ICD-10-CM) - Neck pain      Evaluation Date: 8/13/2024  Authorization Period: 11/10/2024  Plan of Care Certification Period: 8/13/2024 to 11/10/2024  Visit # / Visits authorized: 9/15 (+eval)  FOTO: at dc PTA Visit #: 0/5      Time In: 1000  Time Out: 1055  Total Billable Time: 30 minutes (1 NM, 1 MT)  Precautions: cancer    Subjective     Patient reports: reaching overhead improved with no pain. Most right shoulder pain with reaching behind her back.   She was compliant with home exercise program.  Response to previous treatment: no adverse effects.   Functional change: Ongoing    Pain: 1/10  Location: right lateral shoulder pain.     Objective      (+) internal shoulder impingement; improving.  Tight right lat dorsi  Functional IntR left T8, right T12 (+ pain)    Treatment     Keo received the treatments listed below:      Manual therapy techniques: were applied to the: neck for 15 minutes, including:  Grade III/IV right shoulder joint mobs posterior with shoulder IntR, inferior with hand bending back  Rotator interval stretch with therapist OP    Neuromuscular re-education activities to improve: Balance, Coordination, Kinesthetic, Sense, Proprioception, and Posture for 15 minutes 1:1. The following activities were included:  Seated cervical retractions 3x10  (VC for technique)  Standing cable rows single arm 2x10/each 7#  Lat pulls downs 3x10 10#  Wall slides with overhead shrugs 2x10 reps  Standing shoulder ExR yellow tB 2x10/each, IntR red Tb 2x10/each    Patient Education and Home Exercises       Education provided:   - PT  diagnosis and recommended treatment course   - home exercise program: wall slides, sidelying ExR, sidelying hip abd,     Written Home Exercises Provided: Yes. Exercises were reviewed and Keo was able to demonstrate them prior to the end of the session.  Keo demonstrated good  understanding of the education provided. See Electronic Medical Record under Patient Instructions for exercises provided during therapy sessions  . HEP updated 9/3  Assessment     Keo is a 74 y.o. female referred to outpatient Physical Therapy with a medical diagnosis of M54.9 (ICD-10-CM) - Upper back pain, M54.2 (ICD-10-CM) - Neck pain. Pt presents with upper thoracic pain. Primary complaint of today of right shoulder pain. Tight GhJ capsule, tight lat dorsi muscle right; improving. More focus today on functional internal rotation manual therapy.     Keo Is progressing well towards her goals.   Patient prognosis is Good.     Patient will continue to benefit from skilled outpatient physical therapy to address the deficits listed in the problem list box on initial evaluation, provide pt/family education and to maximize pt's level of independence in the home and community environment.   Patient's spiritual, cultural and educational needs considered and pt agreeable to plan of care and goals.     Anticipated barriers to physical therapy: co-morbidities including cancer tx/surgeries     Goals:   Short Term Goals: 3 weeks:  1. Patient will demonstrate understanding of and compliance with home exercise program. met  2. Patient will demonstrate normal cervical range of motion throughout without pain for improved tolerance to ADLs.   3. Patient will report <4/10  point improvement in daily average neck pain as measured on NPRS.      Long Term Goals: 8 weeks:  1. Patient will report <4/10  point improvement in daily average neck pain as measured on NPRS.   2. Patient will report >9% improvement in NDI.   3. Patient will report <31% limitation on  FOTO survey.     Plan   Continue plan of care.  Monitor response to interventions.  Adjust intensity accordingly.     Michael Pond, PT, DPT, OCS

## 2024-10-01 NOTE — PROGRESS NOTES
[facility]   Physical Therapy Treatment Note      Name: Keo Frias  Clinic Number: [id number]    Therapy Diagnosis:   Encounter Diagnoses   Name Primary?    Neck pain Yes    Upper back pain      Physician: Sharron Ogden MD    Visit Date: 10/2/2024    Physician Orders: PT Eval and Treat   Medical Diagnosis:   M54.9 (ICD-10-CM) - Upper back pain   M54.2 (ICD-10-CM) - Neck pain      Evaluation Date: 8/13/2024  Authorization Period: 11/10/2024  Plan of Care Certification Period: 8/13/2024 to 11/10/2024  Visit # / Visits authorized: 10/15 (+eval)  FOTO: at dc PTA Visit #: 0/5      Time In: 0900  Time Out: 0955  Total Billable Time: 30 minutes (1 NM, 1 MT)  Precautions: cancer    Subjective     Patient reports: reaching overhead improved with no pain. Most right shoulder pain with reaching behind her back. Still with some shoulder stiffness in the morning.   She was compliant with home exercise program.  Response to previous treatment: no adverse effects.   Functional change: Ongoing    Pain: 1/10  Location: right lateral shoulder pain.     Objective      (+) internal shoulder impingement; improving.  Tight right lat dorsi  Functional IntR left T8, right T12 (+ pain)    Treatment     Keo received the treatments listed below:      Manual therapy techniques: were applied to the: neck for 20 minutes, including:  Grade III/IV right shoulder joint mobs including caudal/posterior with shoulder Er at side, posterior with shoulder IntR, inferior with hand bending back  Rotator interval stretch with therapist OP    Neuromuscular re-education activities to improve: Balance, Coordination, Kinesthetic, Sense, Proprioception, and Posture for 10 minutes 1:1. The following activities were included:  Seated cervical retractions 3x10  (VC for technique)  Standing cable rows single arm 2x10/each 7# NP  Lat pulls downs 3x10 10#  Wall slides with overhead shrugs 2x10 reps  Standing shoulder ExR red Tb  2x10/each  Sidelying shoulder Er at side 2# 3x10    Patient Education and Home Exercises       Education provided:   - PT diagnosis and recommended treatment course   - home exercise program: wall slides, sidelying ExR, sidelying hip abd,     Written Home Exercises Provided: Yes. Exercises were reviewed and Keo was able to demonstrate them prior to the end of the session.  Keo demonstrated good  understanding of the education provided. See Electronic Medical Record under Patient Instructions for exercises provided during therapy sessions  . HEP updated 9/3  Assessment     Keo is a 74 y.o. female referred to outpatient Physical Therapy with a medical diagnosis of M54.9 (ICD-10-CM) - Upper back pain, M54.2 (ICD-10-CM) - Neck pain. Pt presents with upper thoracic pain. Primary complaint of today of right shoulder pain. Tight GhJ capsule, tight lat dorsi muscle right; improving. Potential secondary adhesive pattern. More focus today on shoulder manual therapy and range of motion today.      Keo Is progressing well towards her goals.   Patient prognosis is Good.     Patient will continue to benefit from skilled outpatient physical therapy to address the deficits listed in the problem list box on initial evaluation, provide pt/family education and to maximize pt's level of independence in the home and community environment.   Patient's spiritual, cultural and educational needs considered and pt agreeable to plan of care and goals.     Anticipated barriers to physical therapy: co-morbidities including cancer tx/surgeries     Goals:   Short Term Goals: 3 weeks:  1. Patient will demonstrate understanding of and compliance with home exercise program. met  2. Patient will demonstrate normal cervical range of motion throughout without pain for improved tolerance to ADLs.   3. Patient will report <4/10  point improvement in daily average neck pain as measured on NPRS.      Long Term Goals: 8 weeks:  1. Patient will report  <4/10  point improvement in daily average neck pain as measured on NPRS.   2. Patient will report >9% improvement in NDI.   3. Patient will report <31% limitation on FOTO survey.     Plan   Continue plan of care.  Monitor response to interventions.  Adjust intensity accordingly.     Michael Pond, PT, DPT, OCS

## 2024-10-02 ENCOUNTER — CLINICAL SUPPORT (OUTPATIENT)
Dept: REHABILITATION | Facility: HOSPITAL | Age: 74
End: 2024-10-02
Payer: MEDICARE

## 2024-10-02 DIAGNOSIS — M54.2 NECK PAIN: Primary | ICD-10-CM

## 2024-10-02 DIAGNOSIS — M54.9 UPPER BACK PAIN: ICD-10-CM

## 2024-10-02 PROCEDURE — 97140 MANUAL THERAPY 1/> REGIONS: CPT | Mod: KX,PN

## 2024-10-02 PROCEDURE — 97112 NEUROMUSCULAR REEDUCATION: CPT | Mod: KX,PN

## 2024-10-09 ENCOUNTER — CLINICAL SUPPORT (OUTPATIENT)
Dept: REHABILITATION | Facility: HOSPITAL | Age: 74
End: 2024-10-09
Payer: MEDICARE

## 2024-10-09 DIAGNOSIS — M54.2 NECK PAIN: Primary | ICD-10-CM

## 2024-10-09 DIAGNOSIS — M54.9 UPPER BACK PAIN: ICD-10-CM

## 2024-10-09 PROCEDURE — 97140 MANUAL THERAPY 1/> REGIONS: CPT | Mod: KX,PN

## 2024-10-09 PROCEDURE — 97112 NEUROMUSCULAR REEDUCATION: CPT | Mod: KX,PN

## 2024-10-09 NOTE — PROGRESS NOTES
[facility]   Physical Therapy Treatment Note / Discharge Note     Name: Keo Frias  Clinic Number: [id number]    Therapy Diagnosis:   Encounter Diagnoses   Name Primary?    Neck pain Yes    Upper back pain      Physician: Sharron Ogden MD    Visit Date: 10/9/2024    Physician Orders: PT Eval and Treat   Medical Diagnosis:   M54.9 (ICD-10-CM) - Upper back pain   M54.2 (ICD-10-CM) - Neck pain      Evaluation Date: 8/13/2024  Authorization Period: 11/10/2024  Plan of Care Certification Period: 8/13/2024 to 11/10/2024  Visit # / Visits authorized: 11/15 (+eval)  FOTO: at dc PTA Visit #: 0/5      Time In: 0900  Time Out: 0955  Total Billable Time: 30 minutes (1 NM, 1 MT)  Precautions: cancer    Subjective     Patient reports: reaching overhead improved with no pain. Most right shoulder pain with reaching behind her back. Still with some shoulder stiffness in the morning.   She was compliant with home exercise program.  Response to previous treatment: no adverse effects.   Functional change: Ongoing    Pain: 1/10  Location: right lateral shoulder pain.     Objective      (+) internal shoulder impingement; improving.  Tight right lat dorsi  Functional IntR left T8, right T12 (+ pain)    Treatment     Keo received the treatments listed below:      Manual therapy techniques: were applied to the: neck for 20 minutes, including:  Grade III/IV right shoulder joint mobs including caudal/posterior with shoulder Er at side, posterior with shoulder IntR, inferior with hand bending back  Rotator interval stretch with therapist OP    Neuromuscular re-education activities to improve: Balance, Coordination, Kinesthetic, Sense, Proprioception, and Posture for 10 minutes 1:1. The following activities were included:  Seated cervical retractions 3x10  (VC for technique)  Standing cable rows single arm 2x10/each 7# NP  Lat pulls downs 3x10 10#  Wall slides with overhead shrugs 2x10 reps  Standing shoulder  ExR red Tb 2x10/each  Sidelying shoulder Er at side 2# 3x10    Patient Education and Home Exercises       Education provided:   - PT diagnosis and recommended treatment course   - home exercise program: wall slides, sidelying ExR, sidelying hip abd,     Written Home Exercises Provided: Yes. Exercises were reviewed and Keo was able to demonstrate them prior to the end of the session.  Keo demonstrated good  understanding of the education provided. See Electronic Medical Record under Patient Instructions for exercises provided during therapy sessions  . HEP updated 9/3  Assessment     Keo is a 74 y.o. female referred to outpatient Physical Therapy with a medical diagnosis of M54.9 (ICD-10-CM) - Upper back pain, M54.2 (ICD-10-CM) - Neck pain. Pt presents with upper thoracic pain. Primary complaint of today of right shoulder pain. Tight GhJ capsule, tight lat dorsi muscle right; improving. Potential secondary adhesive pattern. Patient doing well overall. Recommended continue daily shoulder range of motion/stretching program with emphasis on long duration (30+ second) stretching daily.     Keo Is progressing well towards her goals.   Patient prognosis is Good.     Patient will continue to benefit from skilled outpatient physical therapy to address the deficits listed in the problem list box on initial evaluation, provide pt/family education and to maximize pt's level of independence in the home and community environment.   Patient's spiritual, cultural and educational needs considered and pt agreeable to plan of care and goals.     Anticipated barriers to physical therapy: co-morbidities including cancer tx/surgeries     Goals:   Short Term Goals: 3 weeks:  1. Patient will demonstrate understanding of and compliance with home exercise program. met  2. Patient will demonstrate normal cervical range of motion throughout without pain for improved tolerance to ADLs. met  3. Patient will report <4/10  point improvement in  daily average neck pain as measured on NPRS. met     Long Term Goals: 8 weeks:  1. Patient will report <4/10  point improvement in daily average neck pain as measured on NPRS. met  2. Patient will report >9% improvement in NDI. met  3. Patient will report <31% limitation on FOTO survey. met    Plan   Continue plan of care.  Monitor response to interventions.  Adjust intensity accordingly.     Michael Pond PT, DPT, OCS        [facility]  Discharge Note    Name: Keo Frias  [facility] Number: [id number]    Therapy Diagnosis:   Encounter Diagnoses   Name Primary?    Neck pain Yes    Upper back pain      Physician: Sharron Ogden MD    Physician Orders: PT Eval and Treat   Medical Diagnosis:   M54.9 (ICD-10-CM) - Upper back pain   M54.2 (ICD-10-CM) - Neck pain      Evaluation Date: 8/13/2024  Date of Last visit: 10/9/2024  Total Visits Received: 12    ASSESSMENT    Discharge reason: Patient has completed the physician's prescription, Patient has met all of his/her goals, and Patient has reached the maximum rehab potential for the present time  Discharge FOTO Score: see Media  Goals: see above    PLAN   This patient is discharged from Physical Therapy    Michael Pond PT, DPT, OCS

## 2024-10-18 ENCOUNTER — LAB VISIT (OUTPATIENT)
Dept: LAB | Facility: HOSPITAL | Age: 74
End: 2024-10-18
Attending: FAMILY MEDICINE
Payer: MEDICARE

## 2024-10-18 DIAGNOSIS — R73.03 PREDIABETES: ICD-10-CM

## 2024-10-18 DIAGNOSIS — Z79.899 MEDICATION MANAGEMENT: ICD-10-CM

## 2024-10-18 DIAGNOSIS — R53.83 FATIGUE, UNSPECIFIED TYPE: ICD-10-CM

## 2024-10-18 DIAGNOSIS — I10 BENIGN ESSENTIAL HYPERTENSION: ICD-10-CM

## 2024-10-18 DIAGNOSIS — C34.31 ADENOCARCINOMA OF LOWER LOBE OF RIGHT LUNG: ICD-10-CM

## 2024-10-18 LAB
ALBUMIN SERPL BCP-MCNC: 3.8 G/DL (ref 3.5–5.2)
ALP SERPL-CCNC: 53 U/L (ref 40–150)
ALT SERPL W/O P-5'-P-CCNC: 15 U/L (ref 10–44)
ANION GAP SERPL CALC-SCNC: 10 MMOL/L (ref 8–16)
AST SERPL-CCNC: 21 U/L (ref 10–40)
BILIRUB SERPL-MCNC: 0.6 MG/DL (ref 0.1–1)
BUN SERPL-MCNC: 11 MG/DL (ref 8–23)
CALCIUM SERPL-MCNC: 9.4 MG/DL (ref 8.7–10.5)
CHLORIDE SERPL-SCNC: 102 MMOL/L (ref 95–110)
CHOLEST SERPL-MCNC: 183 MG/DL (ref 120–199)
CHOLEST/HDLC SERPL: 3.7 {RATIO} (ref 2–5)
CO2 SERPL-SCNC: 26 MMOL/L (ref 23–29)
CREAT SERPL-MCNC: 0.7 MG/DL (ref 0.5–1.4)
ERYTHROCYTE [DISTWIDTH] IN BLOOD BY AUTOMATED COUNT: 13.2 % (ref 11.5–14.5)
EST. GFR  (NO RACE VARIABLE): >60 ML/MIN/1.73 M^2
ESTIMATED AVG GLUCOSE: 114 MG/DL (ref 68–131)
GLUCOSE SERPL-MCNC: 89 MG/DL (ref 70–110)
HBA1C MFR BLD: 5.6 % (ref 4–5.6)
HCT VFR BLD AUTO: 42.2 % (ref 37–48.5)
HDLC SERPL-MCNC: 49 MG/DL (ref 40–75)
HDLC SERPL: 26.8 % (ref 20–50)
HGB BLD-MCNC: 13.6 G/DL (ref 12–16)
IMM GRANULOCYTES # BLD AUTO: 0.01 K/UL (ref 0–0.04)
LDLC SERPL CALC-MCNC: 111.6 MG/DL (ref 63–159)
MCH RBC QN AUTO: 28.1 PG (ref 27–31)
MCHC RBC AUTO-ENTMCNC: 32.2 G/DL (ref 32–36)
MCV RBC AUTO: 87 FL (ref 82–98)
NEUTROPHILS # BLD AUTO: 2 K/UL (ref 1.8–7.7)
NONHDLC SERPL-MCNC: 134 MG/DL
PLATELET # BLD AUTO: 181 K/UL (ref 150–450)
PMV BLD AUTO: 12.6 FL (ref 9.2–12.9)
POTASSIUM SERPL-SCNC: 4.3 MMOL/L (ref 3.5–5.1)
PROT SERPL-MCNC: 7.4 G/DL (ref 6–8.4)
RBC # BLD AUTO: 4.84 M/UL (ref 4–5.4)
SODIUM SERPL-SCNC: 138 MMOL/L (ref 136–145)
T4 FREE SERPL-MCNC: 1.36 NG/DL (ref 0.71–1.51)
TRIGL SERPL-MCNC: 112 MG/DL (ref 30–150)
TSH SERPL DL<=0.005 MIU/L-ACNC: 4.11 UIU/ML (ref 0.4–4)
WBC # BLD AUTO: 4.98 K/UL (ref 3.9–12.7)

## 2024-10-18 PROCEDURE — 85027 COMPLETE CBC AUTOMATED: CPT | Performed by: HOSPITALIST

## 2024-10-18 PROCEDURE — 84439 ASSAY OF FREE THYROXINE: CPT | Performed by: HOSPITALIST

## 2024-10-18 PROCEDURE — 80061 LIPID PANEL: CPT | Performed by: FAMILY MEDICINE

## 2024-10-18 PROCEDURE — 36415 COLL VENOUS BLD VENIPUNCTURE: CPT | Performed by: HOSPITALIST

## 2024-10-18 PROCEDURE — 80053 COMPREHEN METABOLIC PANEL: CPT | Performed by: FAMILY MEDICINE

## 2024-10-18 PROCEDURE — 84443 ASSAY THYROID STIM HORMONE: CPT | Performed by: HOSPITALIST

## 2024-10-18 PROCEDURE — 83036 HEMOGLOBIN GLYCOSYLATED A1C: CPT | Performed by: FAMILY MEDICINE

## 2024-10-21 ENCOUNTER — OFFICE VISIT (OUTPATIENT)
Dept: HEMATOLOGY/ONCOLOGY | Facility: CLINIC | Age: 74
End: 2024-10-21
Payer: MEDICARE

## 2024-10-21 DIAGNOSIS — E03.9 HYPOTHYROIDISM, UNSPECIFIED TYPE: ICD-10-CM

## 2024-10-21 DIAGNOSIS — C77.1 SECONDARY MALIGNANT NEOPLASM OF INTRATHORACIC LYMPH NODES: ICD-10-CM

## 2024-10-21 DIAGNOSIS — C34.31 ADENOCARCINOMA OF LOWER LOBE OF RIGHT LUNG: Primary | ICD-10-CM

## 2024-10-21 DIAGNOSIS — M25.511 RIGHT SHOULDER PAIN, UNSPECIFIED CHRONICITY: ICD-10-CM

## 2024-10-21 PROCEDURE — 99215 OFFICE O/P EST HI 40 MIN: CPT | Mod: 95,,, | Performed by: HOSPITALIST

## 2024-10-21 PROCEDURE — G2211 COMPLEX E/M VISIT ADD ON: HCPCS | Mod: 95,,, | Performed by: HOSPITALIST

## 2024-10-21 PROCEDURE — 4010F ACE/ARB THERAPY RXD/TAKEN: CPT | Mod: CPTII,95,, | Performed by: HOSPITALIST

## 2024-10-21 PROCEDURE — 3044F HG A1C LEVEL LT 7.0%: CPT | Mod: CPTII,95,, | Performed by: HOSPITALIST

## 2024-10-21 NOTE — PROGRESS NOTES
[facility] MEDICAL ONCOLOGY - FOLLOW UP VISIT    Reason for visit: Follow up visit for adenocarcinoma of the lung    The patient location is: [place] - [place]    Visit type: Audiovisual    Each patient to who is provided medical services by telemedicine has been: (1) informed of the relationship between the physician and patient and the respective role of any other health care provider with respect to management of the patient; and (2) notified that he or she may decline to receive medical services by telemedicine and may withdraw from such care at any time.     Oncology History   Malignant neoplasm of upper-outer quadrant of right breast in female, estrogen receptor positive   4/13/2021 Biopsy    Breast, right, 10:00 5N, biopsy:  - Invasive ductal carcinoma with lobular features     4/13/2021 Breast Tumor Markers    Estrogen Receptor: Positive >90%  Progesterone Receptor: Positive 10-50%  HER2: Negative  Ki67: 10-30%     4/26/2021 Genetic Testing    MyRisk: negative     5/12/2021 Breast Surgery    Right partial mastectomy with SLNB     6/1/2021 Tumor Conference    Radiation options? Offer radiation, can discuss partial vs whole breast radiation.        6/2/2021 Cancer Staged    Staging form: Breast, AJCC 8th Edition  - Pathologic stage from 6/2/2021: Stage IA (pT1b, pN0(sn), cM0, G2, ER+, MA+, HER2-)     7/6/2021 - 7/27/2021 Radiation Therapy    Treatment Summary  Course: C1 Chest 2021  Treatment Site Energy Dose/Fx (Gy) #Fx Total Dose (Gy) Start Date End Date Elapsed Days   Breast_Rt 6X 2.65 16 / 16 42.4 7/6/2021 7/27/2021 21          7/2021 -  Hormone Therapy    Letrozole      Procedure    Bronchoscopy, Station 7 positive for malignancy     Adenocarcinoma of lower lobe of right lung   10/5/2023 Imaging Significant Findings    CT C (obtained after CXR, which was itself obtained for palpitations)  - 2.1 x 1.3 cm spiculated RLL nodule, some spiculation noted to extend to  the pleural margin  - Scattered pulmonary nodules centered mostly subpleural at the RLL (e.g. 0.9 cm)       10/10/2023 Imaging Significant Findings    PET CT  - 2.1 x 1.1 cm RLL nodule, SUV 3.8  - 0.9 cm R axillary LN, SUV 3.9  - 1.2 cm subcarinal LN, SUV 4.6  - 0.7 cm Ln along L posterior shoulder, SUV 1.7  - Multiple additional solid pulmonary nodules through R lung base, too small for uptake detection     11/14/2023 Procedure    Bronch with EBUS  RLL, FNA  - Adenocarcinoma (TTF1 positive, GATA3 negative)    RLL, TBBx  - Adenocarcinoma    Per pulmonology, station 7 node was very vascular without window for biopsy, plan to discuss at thoracic tumor board    [facility] NGS  - KRAS G12A, CHEK1, MLH1, CTNNB1, CIC, PTPRD, NOTCH3, EZH2, LATS1, KMT2D  - Negative: EGFR, ALK, BRAF, KRAS, ROS1, RET, MET, EBB2  - PD-L1 1%       11/22/2023 Tumor Conference    Tumor Board  - Plan for axillary LN biopsy to determine lung vs recurrent breast vs other etiology  - Repeat CT C to evaluate nodules in RLL  - Determine treatment plan based on above results     11/22/2023 Tumor Genotyping    [facility] Liquid Biopsy  - No reportable pathogenic variants found     11/29/2023 Imaging Significant Findings    CT C  - 2.1 x 1.2 cm RLL spiculated nodule, stable in size w/ new central cavitation. Spiculated margins extend towards adjacent pleura.   - Stable irregular 2.0 cm linar opacity in LLL  - Stable 0.3 cm GGO, TRICIA  - Stable R basal sub cm nodules, largest 0.9 cm  - Several stable solid nodules predominantly in RLL, largest 0.9 cm     12/18/2023 Imaging Significant Findings    MRI Brain  - JELENA     1/3/2024 Procedure    IR Guided Biopsy, R Axillary LN  - No metastatic carcinoma (0.3 x 0.3 x 0.1 cm tissue, positive and negative controls stained appropriately)     1/5/2024 Imaging Significant Findings    PET CT  - Stable 2.0 x 1.1 cm RLL nodule (previously 2.1 x 1.1 cm)  - Stable additional nodules w/o significant racer uptake  - 0.8 cm R axillary  node, SUV 4.2 (previously 0.9 cm, SUV 3.9)  - 0.5 cm L posterior shoulder node, SUV 2.3 (previously 0.7 cm, SUV 1.7)  - 1.3 cm subcarinal node, SUV 4.9 (previously 1.2 cm, SUV 4.6)     1/10/2024 Tumor Conference    Tumor Board   Re-sample enlarged, hypermetabolic axillary LN with repeat CT-guided biopsy versus excisional biopsy. If LN is negative for recurrent NSCLC, obtain EBUS of PET-avid mediastinal LN.         1/23/2024 Procedure    IR Guided Biopsy, R Axillary LN (Second Biopsy)  - Negative for metastatic carcinoma     2/8/2024 Procedure    Bronch with EBUS  LN  Positive: Station 7 (Adenocarcinoma)  Negative: Station 11R     2/21/2024 Cancer Staged    Staging form: Lung, AJCC 8th Edition  - Clinical stage from 2/21/2024: Stage IIIA (cT1b, cN2, cM0)     2/21/2024 Tumor Conference    Thoracic Tumor Board  Stage IIIA right lower lobe adenocarcinoma with bulky subcarinal lymphadenopathy.  Proceed with NA chemo/I-O therapy. Return to [facility] for surgical evaluation. If patient is not a surgical candidate following 3 cycles of therapy, proceed with definitive chemo/RT.       2/22/2024 - 4/5/2024 Chemotherapy    Treatment Summary   Plan Name: OP NSCLC NIVOLUMAB 360 MG PLUS CARBOPLATIN (AUC5) PEMETREXED 500 MG/M2 Q3W x 3 CYCLES  Treatment Goal: Control  Status: Inactive  Start Date: 2/22/2024  End Date: 4/5/2024  Provider: Bridger Nichole IV, MD  Chemotherapy: CARBOplatin (PARAPLATIN) 415 mg in sodium chloride 0.9% 326.5 mL chemo infusion, 415 mg, Intravenous, Clinic/HOD 1 time, 3 of 3 cycles  Administration: 415 mg (2/22/2024), 465 mg (4/4/2024), 465 mg (3/14/2024)  PEMEtrexed disodium (ALIMTA) 750 mg in sodium chloride 0.9% SolP 100 mL chemo infusion, 500 mg/m2 = 750 mg, Intravenous, Clinic/HOD 1 time, 3 of 3 cycles  Administration: 750 mg (2/22/2024), 750 mg (4/4/2024), 750 mg (3/14/2024)     4/24/2024 Imaging Significant Findings    CT C/A/P  - 1.2 x 1.0 cm RLL malignancy, previously 2 x 1.2 cm  - Interval decrease in  the previously seen nodules in the RLL  - 0.9 cm subcarinal node, previously 1.7 cm  - Interval decrease in right infrahilar soft tissue  - 2.1 cm LLL solid nodular opacity, unchanged  - 0.8 cm TRICIA ground-glass opacity, unchanged  - AVM again noted in RLL  - 0.6 cm right axillary lymph node, decreased in size from prior  - Left scapular lymph node decreased in size from prior, incompletely seen       5/6/2024 - 5/12/2024 Hospital Admission    Admitted to [facility] for RLLx.  Postoperative course complicated by hemothorax requiring return to the operating room for reexploration on postop day 1.  Chest tube removed on postop day 5.     5/6/2024 Surgery    Right Lower Lobectomy  Right Lower Lobe  - Adenocarcinoma with acinar pattern, 35% residual viable tumor, 1.0 cm in greatest dimension.  Pleural invasion absent, LVI absent, margins negative.  0/1 lymph node    Pericardial Excision  - Negative for tumor    LN  - Positive: Station 7  - Negative: Station 8R (0/1), 9 are (0/2), 2R (0/1), 4R (0/1), 11R (0/1), 12R (0/1), R posterior interlobar LN (0/1)    Path Staging: pT1a, pN2     7/2/2024 -  Chemotherapy    Treatment Summary   Plan Name: OP NIVOLUMAB 480 MG Q4W  Treatment Goal: Control  Status: Active  Start Date: 7/2/2024  End Date: 6/3/2025 (Planned)  Provider: Bridger Nichole IV, MD  Chemotherapy: [No matching medication found in this treatment plan]     7/24/2024 Cancer Staged    Staging form: Lung, AJCC 8th Edition  - Pathologic: Stage IIIA (pT1a, pN2, cM0)     8/7/2024 Imaging Significant Findings    PET CT  - New foci of FDG uptake in right lateral chest wall, likely represents postsurgical changes  - Development of moderate size right pleural effusion  - No evidence of hypermetabolic mediastinal or hilar lymph nodes        - Second opinion, [facility]: contralateral LLL lesion stable but plan for sampling of this as well as subcarinal LN and possible additional RLL nodule. Pending results, consider NA --> surgery vs  CRT    Oncology History    HPI:     Keo Frias is a 73 y.o. female with pmh significant for HTN, migraine headaches, and multiple malignancies, who presents for follow up of the below lung cancer    1) Stage IA ( [id number], ER 95%, WA 10%, HER2 negative, Ki-67 20%) IDC of the R breast, initially dx'd 4/13/21, s/p lumpectomy and SLNB (5/12/21), XRT (7/6/21-7/27/21), and currently on letrozole (7/2021 - present).     2) Stage IIIA (pT1a, pN2, cM0) adenocarcinoma of the RLL (no targetable mutations, PD-L1 1%), initially dx'd 11/14/23, s/p neoadjuvant carboplatin/pemetrexed/nivolumab (2/22/24 - 4/4/24), right lower lobectomy (5/6/24), and currently on adjuvant nivolumab (7/2/24 - present) who presents to for follow up.     Last clinic 9/20/24, intermittent right upper arm pain, otherwise tolerating well.  Proceed with C4 nivolumab.  Labs and RTC on 10/18 with C5 on 10/22.  Imaging and follow up already established at [facility].    Interval History:  9/24/24: C4 nivolumab    The pt states that she is doing well today.     She notes ongoing R shoulder pain as described last visit. She says some motions, including brachiation, make it hurt. She knows that she had this when she started physical therapy in August. She says this started a week or so after the last PET scan.     Pt denies N/V, diarrhea, constipation, rash, new/worsening fatigue, new/worsening SOB, or new/worsening cough.    ROS:   As per HPI.     Physical Exam:       LMP  (LMP Unknown)                Physical Exam  Constitutional:       Appearance: Normal appearance.   HENT:      Head: Normocephalic and atraumatic.   Eyes:      Extraocular Movements: Extraocular movements intact.      Pupils: Pupils are equal, round, and reactive to light.   Neurological:      Mental Status: She is alert and oriented to person, place, and time.   Psychiatric:         Mood and Affect: Mood normal.         Thought Content: Thought content normal.         Judgment: Judgment normal.          Imaging:    See oncologic history above.     Path:  See oncologic history above.      Assessment and Plan:     Keo Frias is a 73 y.o. female with pmh significant for HTN, migraine headaches, and multiple malignancies, who presents for follow up of the below lung cancer    1) Stage IA ( [id number], ER 95%, MT 10%, HER2 negative, Ki-67 20%) IDC of the R breast, initially dx'd 4/13/21, s/p lumpectomy and SLNB (5/12/21), XRT (7/6/21-7/27/21), and currently on letrozole (7/2021 - present).     2) Stage IIIA (pT1a, pN2, cM0) adenocarcinoma of the RLL (no targetable mutations, PD-L1 1%), initially dx'd 11/14/23, s/p neoadjuvant carboplatin/pemetrexed/nivolumab (2/22/24 - 4/4/24), right lower lobectomy (5/6/24), and currently on adjuvant nivolumab (7/2/24 - present) who presents to for follow up.       Stage IIIA Adenocarcinoma of RLL, PD-L1 1%, No Actionable Molecular Alterations  ECOG PS 1.  Patient is currently on adjuvant nivolumab (see note from 6/14/24), tolerating without significant issue. Her most recent imaging (PET-CT, 8/7/24, on adjuvant nivolumab) demonstrated new foci of FDG uptake in right lateral chest wall likely representative of postsurgical changes, development of moderate size right pleural effusion, and no evidence of hypermetabolic mediastinal or hilar lymph nodes, or left scapular hypermetabolic focus.  Given good tolerance and radiographic response, will plan to treat with nivolumab q4w x 1 year, w/ q3m imaging throughout (scheduled at [facility]).     PLAN:   Proceed w/ C5 nivolumab on 10/22/24, labs acceptable and treatment signed  Imaging at [facility] on 11/13/24, and f/u w/ rad onc (Dr. Garcia) and med onc (Dr. Don) on 11/14/24  Labs and f/u on 11/18/24 w/ Carina Nascimento, w/ C6 on 11/19/24 (as pt will be in [place] the Friday before C6)      R Shoulder Pain  Patient describes right lateral shoulder pain worse with overhead movements.  Currently working with physical therapy who has determine this is  most likely a partially frozen shoulder.  Her most recent imaging (PET-CT, 8/7/24) was without evidence of hypermetabolic activity this area.  Favor unlikely related to malignancy.  Message sent to Dr. Don regarding upcoming scans per pt request       Hypothyroidism  On 4/30/24, TSH was 41.25 and free T4 0.71, as compared to 4/2/24 when TSH was 0.014 and T4 was 1.92.  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  She has since been started on levothyroxine per Dr. Don.  Subsequent TFTs within normal limits (TSH 4.114 on 10/18/24, within goal of <4.5).  Continue levothyroxine 75 mcg daily      Osteoporosis  DEXA from 7/11/24 with osteoporosis of the lumbar spine and hip.  Prolia per Dr. Ogden      Right IDC, ER+/MT+/HER2-  S/p lumpectomy with SLNB, radiation therapy, and currently on letrozole and denosumab, tolerating well. Concern for possible R axillary recurrence, workup as below.   Okay to continue letrozole  Okay to continue denosumab as above  Breast oncology team aware      The above information has been reviewed with the patient and all questions have been answered to their apparent satisfaction.  They understand that they can call the clinic with any questions.        Med Onc Chart Routing      Follow up with physician . As scheduled.   Follow up with BRENDA    Infusion scheduling note    Injection scheduling note    Labs CBC, CMP, free T4 and TSH   Scheduling:  Preferred lab:  Lab interval:     Imaging    Pharmacy appointment    Other referrals

## 2024-10-22 ENCOUNTER — INFUSION (OUTPATIENT)
Dept: INFUSION THERAPY | Facility: HOSPITAL | Age: 74
End: 2024-10-22
Attending: FAMILY MEDICINE
Payer: MEDICARE

## 2024-10-22 VITALS
HEIGHT: 62 IN | WEIGHT: 113.19 LBS | TEMPERATURE: 99 F | RESPIRATION RATE: 18 BRPM | OXYGEN SATURATION: 98 % | SYSTOLIC BLOOD PRESSURE: 127 MMHG | HEART RATE: 66 BPM | DIASTOLIC BLOOD PRESSURE: 63 MMHG | BODY MASS INDEX: 20.83 KG/M2

## 2024-10-22 DIAGNOSIS — C34.31 ADENOCARCINOMA OF LOWER LOBE OF RIGHT LUNG: Primary | ICD-10-CM

## 2024-10-22 PROCEDURE — 96413 CHEMO IV INFUSION 1 HR: CPT

## 2024-10-22 PROCEDURE — 25000003 PHARM REV CODE 250: Performed by: HOSPITALIST

## 2024-10-22 PROCEDURE — A4216 STERILE WATER/SALINE, 10 ML: HCPCS | Performed by: HOSPITALIST

## 2024-10-22 PROCEDURE — 63600175 PHARM REV CODE 636 W HCPCS: Mod: JZ,JG | Performed by: HOSPITALIST

## 2024-10-22 RX ORDER — SODIUM CHLORIDE 0.9 % (FLUSH) 0.9 %
10 SYRINGE (ML) INJECTION
Status: DISCONTINUED | OUTPATIENT
Start: 2024-10-22 | End: 2024-10-22 | Stop reason: HOSPADM

## 2024-10-22 RX ORDER — DIPHENHYDRAMINE HYDROCHLORIDE 50 MG/ML
50 INJECTION INTRAMUSCULAR; INTRAVENOUS ONCE AS NEEDED
Status: DISCONTINUED | OUTPATIENT
Start: 2024-10-22 | End: 2024-10-22 | Stop reason: HOSPADM

## 2024-10-22 RX ORDER — HEPARIN 100 UNIT/ML
500 SYRINGE INTRAVENOUS
Status: CANCELLED | OUTPATIENT
Start: 2024-10-22

## 2024-10-22 RX ORDER — EPINEPHRINE 0.3 MG/.3ML
0.3 INJECTION SUBCUTANEOUS ONCE AS NEEDED
Status: CANCELLED | OUTPATIENT
Start: 2024-10-22

## 2024-10-22 RX ORDER — EPINEPHRINE 0.3 MG/.3ML
0.3 INJECTION SUBCUTANEOUS ONCE AS NEEDED
Status: DISCONTINUED | OUTPATIENT
Start: 2024-10-22 | End: 2024-10-22 | Stop reason: HOSPADM

## 2024-10-22 RX ORDER — DIPHENHYDRAMINE HYDROCHLORIDE 50 MG/ML
50 INJECTION INTRAMUSCULAR; INTRAVENOUS ONCE AS NEEDED
Status: CANCELLED | OUTPATIENT
Start: 2024-10-22

## 2024-10-22 RX ORDER — SODIUM CHLORIDE 0.9 % (FLUSH) 0.9 %
10 SYRINGE (ML) INJECTION
Status: CANCELLED | OUTPATIENT
Start: 2024-10-22

## 2024-10-22 RX ADMIN — Medication 10 ML: at 01:10

## 2024-10-22 RX ADMIN — SODIUM CHLORIDE 480 MG: 9 INJECTION, SOLUTION INTRAVENOUS at 12:10

## 2024-10-22 RX ADMIN — SODIUM CHLORIDE: 9 INJECTION, SOLUTION INTRAVENOUS at 11:10

## 2024-10-22 NOTE — PLAN OF CARE
Pt tolerated cycle 5 opdivo infusion well.  No adverse reaction noted. No complaints at this time. IV flushed with NS and D/C per protocol. AVS and treatment calendar printed and reviewed.  Patient left clinic in no acute distress.

## 2024-11-03 DIAGNOSIS — F41.1 GENERALIZED ANXIETY DISORDER: ICD-10-CM

## 2024-11-04 RX ORDER — ESCITALOPRAM OXALATE 5 MG/1
5 TABLET ORAL
Qty: 90 TABLET | Refills: 2 | Status: SHIPPED | OUTPATIENT
Start: 2024-11-04 | End: 2024-11-05 | Stop reason: ALTCHOICE

## 2024-11-04 NOTE — TELEPHONE ENCOUNTER
No care due was identified.  [facility] Embedded Care Due Messages. Reference number: [id number].   11/04/2024 12:39:06 PM CST

## 2024-11-04 NOTE — TELEPHONE ENCOUNTER
Refill Decision Note   Keo Frias  is requesting a refill authorization.  Brief Assessment and Rationale for Refill:  Approve     Medication Therapy Plan:         Comments:     Note composed:12:38 PM 11/04/2024

## 2024-11-05 ENCOUNTER — TELEPHONE (OUTPATIENT)
Dept: FAMILY MEDICINE | Facility: CLINIC | Age: 74
End: 2024-11-05

## 2024-11-05 ENCOUNTER — OFFICE VISIT (OUTPATIENT)
Dept: FAMILY MEDICINE | Facility: CLINIC | Age: 74
End: 2024-11-05
Payer: MEDICARE

## 2024-11-05 DIAGNOSIS — Z17.0 MALIGNANT NEOPLASM OF UPPER-OUTER QUADRANT OF RIGHT BREAST IN FEMALE, ESTROGEN RECEPTOR POSITIVE: ICD-10-CM

## 2024-11-05 DIAGNOSIS — E03.9 HYPOTHYROIDISM (ACQUIRED): ICD-10-CM

## 2024-11-05 DIAGNOSIS — C34.31 ADENOCARCINOMA OF LOWER LOBE OF RIGHT LUNG: ICD-10-CM

## 2024-11-05 DIAGNOSIS — M54.2 NECK PAIN: ICD-10-CM

## 2024-11-05 DIAGNOSIS — F41.1 GENERALIZED ANXIETY DISORDER: ICD-10-CM

## 2024-11-05 DIAGNOSIS — M81.0 AGE-RELATED OSTEOPOROSIS WITHOUT CURRENT PATHOLOGICAL FRACTURE: ICD-10-CM

## 2024-11-05 DIAGNOSIS — F41.8 SITUATIONAL ANXIETY: ICD-10-CM

## 2024-11-05 DIAGNOSIS — E55.9 VITAMIN D DEFICIENCY: ICD-10-CM

## 2024-11-05 DIAGNOSIS — M54.9 UPPER BACK PAIN: ICD-10-CM

## 2024-11-05 DIAGNOSIS — I10 BENIGN ESSENTIAL HYPERTENSION: Primary | ICD-10-CM

## 2024-11-05 DIAGNOSIS — E78.2 MIXED HYPERLIPIDEMIA: ICD-10-CM

## 2024-11-05 DIAGNOSIS — C50.411 MALIGNANT NEOPLASM OF UPPER-OUTER QUADRANT OF RIGHT BREAST IN FEMALE, ESTROGEN RECEPTOR POSITIVE: ICD-10-CM

## 2024-11-05 DIAGNOSIS — Z79.899 MEDICATION MANAGEMENT: ICD-10-CM

## 2024-11-05 PROCEDURE — 3044F HG A1C LEVEL LT 7.0%: CPT | Mod: CPTII,95,, | Performed by: FAMILY MEDICINE

## 2024-11-05 PROCEDURE — 1159F MED LIST DOCD IN RCRD: CPT | Mod: CPTII,95,, | Performed by: FAMILY MEDICINE

## 2024-11-05 PROCEDURE — 99215 OFFICE O/P EST HI 40 MIN: CPT | Mod: 95,,, | Performed by: FAMILY MEDICINE

## 2024-11-05 PROCEDURE — 1160F RVW MEDS BY RX/DR IN RCRD: CPT | Mod: CPTII,95,, | Performed by: FAMILY MEDICINE

## 2024-11-05 PROCEDURE — 4010F ACE/ARB THERAPY RXD/TAKEN: CPT | Mod: CPTII,95,, | Performed by: FAMILY MEDICINE

## 2024-11-05 NOTE — TELEPHONE ENCOUNTER
----- Message from Barney sent at 11/5/2024  8:57 AM CST -----  Contact: pt  Type: Requesting to speak with nurse        Who Called: PT  Regarding: pt is unable to connect to virtual visit   Would the patient rather a call back or a response via [facility]? Call back  Best Call Back Number: [phone]   Additional Information:

## 2024-11-05 NOTE — PROGRESS NOTES
Office Visit    Patient Name: Keo Frias    : 1950  MRN: [id number]    Subjective:  Keo is a 74 y.o. female who presents today for:    No chief complaint on file.    The patient location is: HER HOME   The chief complaint leading to consultation is: FOLLOW UP LABS, BP, ANXIETY, BACK/NECK PAIN     Visit type: audiovisual    Face to Face time with patient: START 905 END  40 minutes of total time spent on the encounter, which includes face to face time and non-face to face time preparing to see the patient (eg, review of tests), Obtaining and/or reviewing separately obtained history, Documenting clinical information in the electronic or other health record, Independently interpreting results (not separately reported) and communicating results to the patient/family/caregiver, or Care coordination (not separately reported).     Each patient to whom he or she provides medical services by telemedicine is:  (1) informed of the relationship between the physician and patient and the respective role of any other health care provider with respect to management of the patient; and (2) notified that he or she may decline to receive medical services by telemedicine and may withdraw from such care at any time.    Notes:     Most recently seen by me 24    STATUS POST LOBECTOMY OF THE RIGHT LOWER LOBE 2024-MD HUNT   POSTOP COMPLICATION-EVACUATION OF HEMATOMA 2024  LN clear except the one expected +,margins clear-- f/u Heme/Onc  most recently 10/21/24 wJosiane Llamas  ECOG PS 1.  Patient is currently on adjuvant nivolumab (see note from 24), tolerating without significant issue. Her most recent imaging (PET-CT, 24, on adjuvant nivolumab) demonstrated new foci of FDG uptake in right lateral chest wall likely representative of postsurgical changes, development of moderate size right pleural effusion, and no evidence of hypermetabolic mediastinal or hilar lymph nodes, or left scapular hypermetabolic  focus.  Given good tolerance and radiographic response, will plan to treat with nivolumab q4w x 1 year, w/ q3m imaging throughout (scheduled at [facility]).      PLAN:   Proceed w/ C5 nivolumab on 10/22/24, labs acceptable and treatment signed  Imaging at [facility] on 11/13/24, and f/u w/ rad onc (Dr. Garcia) and med onc (Dr. 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       Seen by me for physical 5/31/24 and advised as follows:   Currently overall doing well on Toprol-XL 50 mg 1/2 tablet twice a day with amlodipine 5 mg daily around noon.   This schedule is working well for her.   Blood pressures about 120-140/ 70s  No orthostatic symptoms.     No shortness of breath or significant chest pain. Intermittent tugging/pulling.      Mot recent PROLIA 8/12/24-- no issues. Next DEXA due 7/2026.      Mood table off Lexapro 5-- no anxiety or depression concerns and following with psychiatry for therapy.      Walking about 4-6 miles most days-- 1 hour in AM and another walk in the afternoon/evening.  No balance concerns.   Upper back, neck and shoulder pain have responded some to PT, though she has has some increased right shoulder pain, especially with overhead movements.   Intermittently has pain radiating down arm to hand.   Overall good functional status. Would like to wait until upcoming CT at [facility] to see what that shows before pursuing any further interventions or testing with her shoulder.      TSH 4.1 10/18/24 w/ Free T4 1.3 w/  normal CBC and CMP, A1c 5.6, lipid WNL w/ . On synthroid 75 mcg daily per Oncology at [facility].     PAST MEDICAL HISTORY, SURGICAL/SOCIAL/FAMILY HISTORY REVIEWED AS PER CHART, WITH PERTINENT FINDINGS INCLUDED IN HISTORY SECTION OF NOTE.     Current Medications    Medication List with Changes/Refills   Current Medications    AMLODIPINE (NORVASC) 5 MG TABLET    Take 1 tablet (5 mg total) by mouth once daily.    CALCIUM CARBONATE (CALCIUM 500 ORAL)    Take 1 tablet by mouth.    CHOLECALCIFEROL, VITAMIN D3, 125 MCG (5,000 UNIT) CAPSULE    Take 1 capsule (5,000 Units total) by mouth daily with breakfast.    CIPRODEX 0.3-0.1 % DRPS    PLACE 4 DROPS INTO THE RIGHT EAR 2 (TWO) TIMES DAILY. FOR 10 DAYS    FAMOTIDINE (PEPCID) 10 MG TABLET    Take 10 mg by mouth as needed.    LACTOBACILLUS RHAMNOSUS GG (CULTURELLE) 10 BILLION CELL CAPSULE    Take 1 capsule by mouth once daily. Pt takes Align probiotic    LETROZOLE (FEMARA) 2.5 MG TAB    TAKE 1 TABLET EVERY DAY    LEVOTHYROXINE (SYNTHROID) 88 MCG TABLET    Take 1 tablet (88 mcg total) by mouth before breakfast.    LORATADINE 10 MG CHEW    Take 10 mg by mouth daily as needed (allergy).    METOPROLOL SUCCINATE (TOPROL-XL) 50 MG 24 HR TABLET    Take 1 tablet (50 mg total) by mouth once daily.    MULTIVITAMIN-IRON-FOLIC ACID TAB    Take 1 tablet by mouth once daily.    PROLIA 60 MG/ML SYRG       Discontinued Medications    ESCITALOPRAM OXALATE (LEXAPRO) 5 MG TAB    TAKE 1 TABLET BY MOUTH EVERY DAY       Allergies   Review of patient's allergies indicates:   Allergen Reactions    Sinus allergy Other (See Comments)     Headaches, migranes    Buspar [buspirone] Other (See Comments)     Pt isnt allergic      Sulfa (sulfonamide antibiotics) Rash         Review of Systems (Pertinent positives)  Review of Systems   Respiratory:  Negative for shortness of breath.    Cardiovascular:  Negative for chest pain and palpitations.   Musculoskeletal:  Negative for neck pain.    Neurological:  Negative for headaches.       LMP  (LMP Unknown)     Physical Exam  Vitals reviewed.   Constitutional:       General: She is not in acute distress.     Appearance: Normal appearance. She is well-developed.   HENT:      Head: Normocephalic and atraumatic.   Eyes:      Conjunctiva/sclera: Conjunctivae normal.   Pulmonary:      Effort: Pulmonary effort is normal.   Neurological:      Mental Status: She is alert and oriented to person, place, and time.   Psychiatric:         Mood and Affect: Mood normal.         Behavior: Behavior normal.           Assessment/Plan:  Keo Frias is a 74 y.o. female who presents today for :        ICD-10-CM ICD-9-CM    1. Benign essential hypertension  I10 401.1       2. Generalized anxiety disorder  F41.1 300.02       3. Hypothyroidism (acquired)  E03.9 244.9       4. Upper back pain  M54.9 724.5       5. Neck pain  M54.2 723.1       6. Adenocarcinoma of lower lobe of right lung  C34.31 162.5       7. Malignant neoplasm of upper-outer quadrant of right breast in female, estrogen receptor positive  C50.411 174.4     Z17.0 V86.0       8. Vitamin D deficiency  E55.9 268.9       9. Age-related osteoporosis without current pathological fracture  M81.0 733.01       10. Situational anxiety  F41.8 300.09       11. Mixed hyperlipidemia  E78.2 272.2       12. Medication management  Z79.899 V58.69         ADENOCARCINOMA OF THE LUNG STATUS POST RIGHT LOWER LOBE LOBECTOMY:  Overall doing well, meeting postoperative milestones, energy level and activity level improving.  follow-up with heme/Onc.      PLAN PER HEME/ONC:   Proceed w/ C5 nivolumab on 10/22/24, labs acceptable and treatment signed  Imaging at [facility] on 11/13/24, and f/u w/ rad onc (Dr. Garcia) and med onc (Dr. Don) on 11/14/24  Labs and f/u on 11/18/24 w/ Carina Nascimento, w/ C6 on 11/19/24 (as pt will be in [place] the Friday before C6)       HISTORY OF RIGHT BREAST CANCER, CHRONIC AROMATASE USE:  Mammogram 7/11/24- Repeat 1  year, continue letrozole.     POSTMENOPAUSAL OSTEOPOROSIS, HISTORY OF VITAMIN-D DEFICIENCY: Q six-month Prolia- next due 2/2025-orders re-signed. Continue calcium, vitamin-D, exercise as tolerated, updated DEXA scan 7/11/24: stable moderate osteopenia of the hips and stable severe osteoporosis of the lumbar spine.      ABNORMAL TSH:  No history of hypothyroidism-- complication of chemotherapy.  She is now on Synthroid with improvement and labs followed by heme/onc.    on synthroid 75 mcg w/ normal CBC and CMP       HYPERTENSION WITH LABILE BLOOD PRESSURES:  Had some issue with this as a complication of her surgery.  Currently overall doing well on Toprol-XL 50 mg 1/2 tablet twice a day with amlodipine 5 mg daily around lunch--happy with this schedule. No orthostatic symptoms.  Continue current regimen with ongoing monitoring.      ANXIETY:  Stable OFF low-dose Lexapro 5-- off for about 2 months and following with psychiatry.   Sleeping well, dealing with the stress of her recent illness, good support from her daughter who presents with her to the office visit today.  no concerns     HLD/ PREDIABETES: off Lipitor back pretty much back to her regular diet and exercise routine. A1c/Lipids WNL-- recheck in 6 months prior to physical.      UPPER BACK/ NECK PAIN: Continue home PT regimen topical asper cream, heating pad, icy hot, etc prn. Now having more right shoulder pain-- has upcoming heme/onc imaging and will follow up re: shoulder after that. PT thinks partial frozen shoulder of R shoulder.     A total of 40  minutes was spent on this encounter that included explaining differentials, symptom counseling, review of recent results, and next steps of diagnosis and management plan, along with direct documentation of the encounter.      There are no Patient Instructions on file for this visit.      Follow up in about 6 months (around 5/5/2025) for to follow up on lab results, return as needed for new concerns.

## 2024-11-06 ENCOUNTER — OFFICE VISIT (OUTPATIENT)
Dept: PSYCHIATRY | Facility: CLINIC | Age: 74
End: 2024-11-06
Payer: MEDICARE

## 2024-11-06 ENCOUNTER — TELEPHONE (OUTPATIENT)
Dept: FAMILY MEDICINE | Facility: CLINIC | Age: 74
End: 2024-11-06
Payer: MEDICARE

## 2024-11-06 DIAGNOSIS — C50.411 MALIGNANT NEOPLASM OF UPPER-OUTER QUADRANT OF RIGHT BREAST IN FEMALE, ESTROGEN RECEPTOR POSITIVE: ICD-10-CM

## 2024-11-06 DIAGNOSIS — Z17.0 MALIGNANT NEOPLASM OF UPPER-OUTER QUADRANT OF RIGHT BREAST IN FEMALE, ESTROGEN RECEPTOR POSITIVE: ICD-10-CM

## 2024-11-06 DIAGNOSIS — F41.9 ANXIETY DISORDER, UNSPECIFIED TYPE: Primary | ICD-10-CM

## 2024-11-06 DIAGNOSIS — R91.1 NODULE OF UPPER LOBE OF RIGHT LUNG: ICD-10-CM

## 2024-11-06 DIAGNOSIS — I10 BENIGN ESSENTIAL HYPERTENSION: ICD-10-CM

## 2024-11-06 DIAGNOSIS — C34.31 ADENOCARCINOMA OF LOWER LOBE OF RIGHT LUNG: ICD-10-CM

## 2024-11-06 NOTE — PROGRESS NOTES
Telehealth Session Info    The patient location is: Patient's home in [place], [place] .  Patient reported that his/her location at the time of this visit was in the [facility]     Participants on call: pt herself     I also informed patient of the following:     Nikunj Green MD , an Employee of [employer] / [place] /   [facility]  / Nikko Nikole    Each patient to whom he or she provides medical services by telemedicine is: (1) informed of the relationship between the physician and patient and the respective role of any other health care provider with respect to management of the patient; and (2) notified that he or she may decline to receive medical services by telemedicine and may withdraw from such care at any time.    If technology issues arise during call,  pt informed that MD will attempt to call pt back / as well:  pt may call office phone: [phone] in attempt to reconnect.    If pt has questions related to privacy practices or records: pt instructed to contact [facility] Information Department:     Pt informed that IF acute concerns to: Dial 911 or go to nearest Emergency Room (ER)    Understanding Expressed    Time 30 min 11-6-24 including drafting and composing letter to her primary care MD / including contingency planroxanne Frias   1950 11/06/2024       Disclaimer: Evaluation and treatment is based on information presented to date. Any new information may affect assessment and findings.     S: Patient's Own Perception of Condition (& Side Effects) : no med complaint     O:      CURRENT PRESENTATION:      May 2024: R bottom lobe lung removed; lung cancer MD [place]  1 week in hospital     She saw me  November 20, 2023 for initial psyc MD evaluation    She did see her primary care who decreased her Lexapro from 10 mg 5 mg as agreed by patient herself    When I saw her Aug 2024: she had an interest in coming fully off Lexapro as doing well    We discussed pros and  con; mutually agreed to take her off / discontinue all Lexapro    11-4-24 She reiterates inerest to remain off Lexapro.    Says no depreson no anxiety    Has interest to terminate tho  with the understanding that she would re-contact if any anxiety depression  resurfaces.     She knows how to use pt portal / my chart and has phone contact info for psyc dept     Psyc Meds:  she remains off psyc medication / she does not desire follow up appt tho she will re-contact if any anxiety depression or other concerns      Self Ratings:         11/6/2024     1:20 PM 8/14/2024     9:25 AM 11/29/2023    12:38 AM   GAD7   1. Feeling nervous, anxious, or on edge? 0 0 1   2. Not being able to stop or control worrying? 0 0 1   3. Worrying too much about different things? 0 0 1   4. Trouble relaxing? 0 0 1   5. Being so restless that it is hard to sit still? 0 0 1   6. Becoming easily annoyed or irritable? 0 0 1   7. Feeling afraid as if something awful might happen? 0 0 1   8. If you checked off any problems, how difficult have these problems made it for you to do your work, take care of things at home, or get along with other people? 0 0 1   TERESA-7 Score 0 0 7            11/6/2024     1:21 PM 8/14/2024     9:26 AM 5/31/2024     1:12 PM 11/30/2023    12:39 AM 11/8/2023     8:41 AM 10/23/2023    10:21 AM 10/10/2023     1:27 PM   Depression Patient Health Questionnaire   Over the last two weeks how often have you been bothered by little interest or pleasure in doing things Not at all Not at all Not at all Not at all Not at all Not at all Not at all   Over the last two weeks how often have you been bothered by feeling down, depressed or hopeless Not at all Not at all Not at all Not at all Not at all Not at all Not at all   PHQ-2 Total Score 0 0 0 0 0 0 0   Over the last two weeks how often have you been bothered by trouble falling or staying asleep, or sleeping too much Not at all Not at all  Not at all      Over the last two weeks how  often have you been bothered by feeling tired or having little energy Not at all Not at all  Not at all      Over the last two weeks how often have you been bothered by a poor appetite or overeating Not at all Not at all  Not at all      Over the last two weeks how often have you been bothered by feeling bad about yourself - or that you are a failure or have let yourself or your family down Not at all Not at all  Not at all      Over the last two weeks how often have you been bothered by trouble concentrating on things, such as reading the newspaper or watching television Not at all Several days  Not at all      Over the last two weeks how often have you been bothered by moving or speaking so slowly that other people could have noticed. Or the opposite - being so fidgety or restless that you have been moving around a lot more than usual. Not at all Not at all  Not at all      Over the last two weeks how often have you been bothered by thoughts that you would be better off dead, or of hurting yourself Not at all Not at all  Not at all      If you checked off any problems, how difficult have these problems made it for you to do your work, take care of things at home or get along with other people? Not difficult at all Not difficult at all  Not difficult at all      PHQ-9 Score 0 1  0      PHQ-9 Interpretation Minimal or None Minimal or None  Minimal or None          Constitutional Health Concerns:  Had resection of right bottom lobe of lung MD [place] ; procedure went well and she is doing well in recovery      Wt Readings from Last 3 Encounters:   10/22/24 51.4 kg (113 lb 3.3 oz)   09/24/24 50.4 kg (111 lb 1.8 oz)   09/20/24 50.4 kg (111 lb 1.8 oz)        Laboratory Data  Lab Visit on 10/18/2024   Component Date Value Ref Range Status    WBC 10/18/2024 4.98  3.90 - 12.70 K/uL Final    RBC 10/18/2024 4.84  4.00 - 5.40 M/uL Final    Hemoglobin 10/18/2024 13.6  12.0 - 16.0 g/dL Final    Hematocrit 10/18/2024 42.2  37.0  - 48.5 % Final    MCV 10/18/2024 87  82 - 98 fL Final    MCH 10/18/2024 28.1  27.0 - 31.0 pg Final    MCHC 10/18/2024 32.2  32.0 - 36.0 g/dL Final    RDW 10/18/2024 13.2  11.5 - 14.5 % Final    Platelets 10/18/2024 181  150 - 450 K/uL Final    MPV 10/18/2024 12.6  9.2 - 12.9 fL Final    Gran # (ANC) 10/18/2024 2.0  1.8 - 7.7 K/uL Final    Immature Grans (Abs) 10/18/2024 0.01  0.00 - 0.04 K/uL Final    TSH 10/18/2024 4.114 (H)  0.400 - 4.000 uIU/mL Final    Free T4 10/18/2024 1.36  0.71 - 1.51 ng/dL Final    Sodium 10/18/2024 138  136 - 145 mmol/L Final    Potassium 10/18/2024 4.3  3.5 - 5.1 mmol/L Final    Chloride 10/18/2024 102  95 - 110 mmol/L Final    CO2 10/18/2024 26  23 - 29 mmol/L Final    Glucose 10/18/2024 89  70 - 110 mg/dL Final    BUN 10/18/2024 11  8 - 23 mg/dL Final    Creatinine 10/18/2024 0.7  0.5 - 1.4 mg/dL Final    Calcium 10/18/2024 9.4  8.7 - 10.5 mg/dL Final    Total Protein 10/18/2024 7.4  6.0 - 8.4 g/dL Final    Albumin 10/18/2024 3.8  3.5 - 5.2 g/dL Final    Total Bilirubin 10/18/2024 0.6  0.1 - 1.0 mg/dL Final    Alkaline Phosphatase 10/18/2024 53  40 - 150 U/L Final    AST 10/18/2024 21  10 - 40 U/L Final    ALT 10/18/2024 15  10 - 44 U/L Final    eGFR 10/18/2024 >60  >60 mL/min/1.73 m^2 Final    Anion Gap 10/18/2024 10  8 - 16 mmol/L Final    Hemoglobin A1C 10/18/2024 5.6  4.0 - 5.6 % Final    Estimated Avg Glucose 10/18/2024 114  68 - 131 mg/dL Final    Cholesterol 10/18/2024 183  120 - 199 mg/dL Final    Triglycerides 10/18/2024 112  30 - 150 mg/dL Final    HDL 10/18/2024 49  40 - 75 mg/dL Final    LDL Cholesterol 10/18/2024 111.6  63.0 - 159.0 mg/dL Final    HDL/Cholesterol Ratio 10/18/2024 26.8  20.0 - 50.0 % Final    Total Cholesterol/HDL Ratio 10/18/2024 3.7  2.0 - 5.0 Final    Non-HDL Cholesterol 10/18/2024 134  mg/dL Final      Mental Status Exam:   Appearance: casual   Oriented: x 3   Attitude: cooperative pleasant   Eye Contact: good   Behavior: calm here   Mood: says feels  good  Cognition: alert   Concentration: grossly intact   Affect: appropriate range   Anxiety: denies  Thought Process: goal directed   Speech: Volume : WNL   Quantity WNL   Quality: appears to openly answer questions   Eye Contact: good   Threats: no SI / no HI   Memory: intact (as relay information across time spans)   Psychosis: denies all   Estimate of Intellectual Function: average   Impulse Control: no history SI / nor HI ; calm here      3 wishes ?  Health  wealth my family   Musculoskeletal:  No tremor     Social:    is supportive    Past Surgical History:   Procedure Laterality Date    BREAST BIOPSY Right 04/13/2021    COLON SURGERY      COLONOSCOPY N/A 12/07/2018    Procedure: COLONOSCOPY;  Surgeon: Bhargav Gaston MD;  Location: [facility] (4TH FLR);  Service: Endoscopy;  Laterality: N/A;    DILATION AND CURETTAGE OF UTERUS      postmenopausal bleeding    ENDOBRONCHIAL ULTRASOUND N/A 11/14/2023    Procedure: ENDOBRONCHIAL ULTRASOUND (EBUS);  Surgeon: Kirt Gr MD;  Location: [facility] OR 2ND FLR;  Service: Pulmonary;  Laterality: N/A;    MASTECTOMY, PARTIAL Right 05/12/2021    Procedure: MASTECTOMY, PARTIAL-Right with radiological marker;  Surgeon: Vivian Cadena MD;  Location: [facility];  Service: General;  Laterality: Right;    ROBOTIC BRONCHOSCOPY N/A 11/14/2023    Procedure: ROBOTIC BRONCHOSCOPY;  Surgeon: Kirt Gr MD;  Location: [facility] 2ND FLR;  Service: Pulmonary;  Laterality: N/A;    SENTINEL LYMPH NODE BIOPSY Right 05/12/2021    Procedure: BIOPSY, LYMPH NODE, SENTINEL-Right;  Surgeon: Vivian Cadena MD;  Location: [facility];  Service: General;  Laterality: Right;    TUBAL LIGATION          Patient Active Problem List   Diagnosis    Age-related osteoporosis without current pathological fracture    Migraine headache with aura    Vitamin D deficiency    Vegetarian diet    Otalgia of both ears    Sensorineural hearing loss    Dysfunctions of sleep stages or arousal from  sleep    Benign essential hypertension    Situational anxiety    Right renal mass    Labile hypertension    Hyperlipidemia    At risk for lymphedema    Malignant neoplasm of upper-outer quadrant of right breast in female, estrogen receptor positive    Chronic right-sided low back pain without sciatica    Aromatase inhibitor use    Palpitations    Chest pain    Nodule of upper lobe of right lung    Abnormal chest CT    Medication management    Adenocarcinoma of lower lobe of right lung    Anxiety disorder    Lymphadenopathy, axillary    Hypothyroidism (acquired)    Allergic rhinitis    Neck pain    Upper back pain          Current Outpatient Medications:     amLODIPine (NORVASC) 5 MG tablet, Take 1 tablet (5 mg total) by mouth once daily., Disp: 90 tablet, Rfl: 3    calcium carbonate (CALCIUM 500 ORAL), Take 1 tablet by mouth., Disp: , Rfl:     cholecalciferol, vitamin D3, 125 mcg (5,000 unit) capsule, Take 1 capsule (5,000 Units total) by mouth daily with breakfast., Disp: 100 capsule, Rfl: 4    famotidine (PEPCID) 10 MG tablet, Take 10 mg by mouth as needed., Disp: , Rfl:     letrozole (FEMARA) 2.5 mg Tab, TAKE 1 TABLET EVERY DAY, Disp: 90 tablet, Rfl: 3    levothyroxine (SYNTHROID) 88 MCG tablet, Take 1 tablet (88 mcg total) by mouth before breakfast. (Patient taking differently: Take 88 mcg by mouth before breakfast. Dosage lowered to 75 mg.), Disp: 30 tablet, Rfl: 11    metoprolol succinate (TOPROL-XL) 50 MG 24 hr tablet, Take 1 tablet (50 mg total) by mouth once daily., Disp: 90 tablet, Rfl: 4    multivitamin-iron-folic acid Tab, Take 1 tablet by mouth once daily., Disp: , Rfl:     PROLIA 60 mg/mL Syrg, , Disp: , Rfl:      Social History     Tobacco Use   Smoking Status Never   Smokeless Tobacco Never        Review of patient's allergies indicates:   Allergen Reactions    Sinus allergy Other (See Comments)     Headaches, migranes    Buspar [buspirone] Other (See Comments)     Pt isnt allergic      Sulfa  (sulfonamide antibiotics) Rash        ASSESSMENT:   Encounter Diagnoses   Name Primary?    Anxiety disorder, unspecified type, historically mild; August 14 2024 ; now denies anxiety tapering off of Lexapro from 10mg to 5 mg to now off; mutually agreed that she will re-contact if any issues Yes    Adenocarcinoma of lower lobe of right lung     Nodule of upper lobe of right lung     History Malignant neoplasm of upper-outer quadrant of right breast in female, estrogen receptor positive     Benign essential hypertension          Patient Instructions     PLAN:   she remains off psyc medication     Doing well    she does not desire follow up appt tho she will re-contact if any anxiety depression or other concerns    She will continue t see her her primary care     Psyc Meds:  none    References:     Relaxation stress reduction workbook: GIL Lincoln PhD ( used: $7-10)    Feeling Good Website: Nikunj Chowdhury MD / [email or web address] website (free) / conrad. PODCASTS    Anxiety &  phobia workbook by RC Sullivan PhD  (web retailers: used: $ 7-10)    [place]: Path to Better Sleep : https://www.veterantraining.[place].gov/insomnia/ (free)     Pt expressed appreciation for the visit today and did not have further question at this time though pt  was still informed to:     Call  if problems.    Call / Report Side Effects to Psyc MD     Encouraged to follow up with primary care / Gen Med MD for continued monitoring of general health and wellness.    Understanding was expressed; and no further concerns nor questions were raised at this time.     Remember healthy self care:   eat right  attempt adequate rest   HANDWASHING / encourage such conrad. During this corona virus time   walk or light exercise within reason and as your general med team approves  read or explore any of reference materials / homework mentioned  reach out (I.e.,  connect with)  others who nuture and bring out best in you  avoid risky behaviors    Keep your appointments:    IF  you  cannot make your appt THEN please call [phone]  or go online (via My Chart daniel) to reschedule.    It is the responsibility of the patient to reschedule an appointment if an appointment has been canceled or missed.    Avoid  alcohol and illicit substances.  Look for the positive.  All is often relative-seek balance  Call sooner if needed : [phone]  Call 911 or go to Emergency Room  (ER)  if  any acute concerns     Excerpt from Initiual Psyc MD Vargas from Nov 2023:    Sources of Information:  Patient  Interview and chart review      Chief Complaint:  history of anxiety and more recently diagnosed with lung cancer     Referred by: (Whose idea to come see Psychiatry) : self referred      History of Present Illness     Keo Frias a 73 y.o.  female presents today as self referred      history of anxiety and more recently diagnosed with lung cancer     Worked in [employer] x over 30 years  Excerpt  of 11-22-23 Heme Oncology : Bridger Nichole MD [facility]     73 y.o. female with h/o right breast IDC (Stage 1A, T1BN0) s/p lumpectomy (05/2021) and adjuvant right breast radiation (currently treated with adjuvant letrozole), HTN, and newly diagnosed RLL NSCLC. Chest CT 10/05/23 showed RLL spiculate lesion measuring 2.1 cm. Follow up PET/CT 10/12/23 redemonstrated hypermetabolic RLL mass as well as PET avid lymph nodes in the right axilla, mediastinum and left posterior shoulder. Underwent robotic bronchoscopy with EBUS 11/14/23; pathology: RLL positive for NSCLC, adenocarcinoma (TTF positive, GATA3 negative) consistent with new primary.     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      Family Cancer History: Her mother had lung cancer (non-smoker), her brother had a cancer on his arm (uncertain type), otherwise she denies a family history of  cancer.      Adenocarcinoma of RLL  ECOG PS 1.  Patient presents with a new diagnosis of right lower lobe adenocarcinoma.  Staging scans are not yet complete (MRI brain pending), however PET-CT demonstrates a primary right lower lobe lesion (2.1 x 1.1 cm), an avid subcarinal node (no window for biopsy on recent bronchoscopy with EBUS), 0.9 cm FDG avid right axillary lymphadenopathy, and an FDG avid lymph node along the left posterior shoulder. Notably, the patient also has a history of right-sided invasive ductal carcinoma status post lumpectomy, radiation therapy, and currently on letrozole (lumpectomy on 5/12/21 radiation completed in 7/2021).  Staging is unclear at this time; based on review of imaging, favor that subcarinal node is positive for adenocarcinoma but the etiology of the right axillary lymphadenopathy is less clear given that this is an atypical location for lung cancer metastasis.  In the setting of her recent right-sided breast cancer, will obtain a biopsy of the right axillary lymph node.  If this is positive for lung cancer, this would represent non-regional disease and require systemic treatment.  If this is positive for breast cancer, then in addition to working with the breast oncology team, would need to further define the subcarinal lesion; this was discussed at tumor board where the opinion was that this would be difficult to access via mediastinoscopy and so may need to repeat either EBUS or to treat empirically likely with CRT.      Of note, CT imaging also demonstrates solid RLL nodules. After review with multidisciplinary team at tumor board, will plan for repeat imaging to assess stability of these nodules.   Past Psychiatric History:   Previous therapy: No  Previous psychiatric treatment and medication trials: Yes  Previous psychiatric hospitalizations: No  Previous diagnoses:  depression anxiety Dr Saab  Previous suicide attempts: No     Abuse History:   Physical Abuse: No  Sexual  Abuse: No  Other Abuse / Trauma :  n     Substance Abuse History:  Use of alcohol:  no  Tobacco use:  she NEVER ; father did smoke   Cannabis: no  Recreational drugs:  none     Family History Mental Health:   Suicide :  No  Other:  No     Psyc Meds: lexapro / buspirone (which she stopped after cardiology suggested she do so IF taknig lexapro)        Top 3 Stressors:  if disagree with someone: If they talk politics not like and ruminate  City [facility]: [facility]      Siblings (full or half)  Brothers: 3  Sisters:2     Parents :     Briefly Describe  your Mom: like me get good education;  good habits   Briefly Describe your Dad: same thing      Bio Mom: Occupation:  not twork  Bio Dad:  Occupation: lab work     Marital Status: once /  51 yrs   (Braxton Frias; alive)      Children   Girls  (ages): daughter 46 Renee and 50 Impal  Boys (ages): none     Education: B.S. [employer]     Restorationist: Religion     Legal Issues?  n  DWI ?n  shelter time?n     Employment:   Retired 2014  Longest Job? [employer]

## 2024-11-06 NOTE — TELEPHONE ENCOUNTER
----- Message from Sharron Ogden MD sent at 11/5/2024  9:34 AM CST -----  Regarding: VV follow up  Please schedule for labs in [place] [facility] prior to physical at [facility] in 6 months thanks

## 2024-11-06 NOTE — PATIENT INSTRUCTIONS
PLAN:   she remains off psyc medication     Doing well    she does not desire follow up appt tho she will re-contact if any anxiety depression or other concerns    She will continue to see her her primary care Sharron Ogden MD    Psyc Meds:  none    References:     Relaxation stress reduction workbook: GIL Lincoln PhD ( used: $7-10)    Feeling Good Website: Nikunj Chowdhury MD / www.[employer] website (free) / conrad. PODCASTS    Anxiety &  phobia workbook by RC Sullivan PhD  (web retailers: used: $ 7-10)    [place]: Path to Better Sleep : https://www.veterantraining.[place].gov/insomnia/ (free)     Pt expressed appreciation for the visit today and did not have further question at this time though pt  was still informed to:     Call  if problems.    Call / Report Side Effects to Psyc MD     Encouraged to follow up with primary care / Gen Med MD for continued monitoring of general health and wellness.    Understanding was expressed; and no further concerns nor questions were raised at this time.     Remember healthy self care:   eat right  attempt adequate rest   HANDWASHING / encourage such conrad. During this corona virus time   walk or light exercise within reason and as your general med team approves  read or explore any of reference materials / homework mentioned  reach out (I.e.,  connect with)  others who nuture and bring out best in you  avoid risky behaviors    Keep your appointments:    IF you  cannot make your appt THEN please call [phone]  or go online (via My Chart daniel) to reschedule.    It is the responsibility of the patient to reschedule an appointment if an appointment has been canceled or missed.    Avoid  alcohol and illicit substances.  Look for the positive.  All is often relative-seek balance  Call sooner if needed : [phone]  Call 131 or go to Emergency Room  (ER)  if  any acute concerns

## 2024-11-07 ENCOUNTER — TELEPHONE (OUTPATIENT)
Dept: HEMATOLOGY/ONCOLOGY | Facility: CLINIC | Age: 74
End: 2024-11-07
Payer: MEDICARE

## 2024-11-18 ENCOUNTER — OFFICE VISIT (OUTPATIENT)
Dept: HEMATOLOGY/ONCOLOGY | Facility: CLINIC | Age: 74
End: 2024-11-18
Payer: MEDICARE

## 2024-11-18 ENCOUNTER — LAB VISIT (OUTPATIENT)
Dept: LAB | Facility: HOSPITAL | Age: 74
End: 2024-11-18
Attending: HOSPITALIST
Payer: MEDICARE

## 2024-11-18 DIAGNOSIS — M81.0 AGE-RELATED OSTEOPOROSIS WITHOUT CURRENT PATHOLOGICAL FRACTURE: ICD-10-CM

## 2024-11-18 DIAGNOSIS — Z79.811 AROMATASE INHIBITOR USE: ICD-10-CM

## 2024-11-18 DIAGNOSIS — C77.1 SECONDARY MALIGNANT NEOPLASM OF INTRATHORACIC LYMPH NODES: ICD-10-CM

## 2024-11-18 DIAGNOSIS — E03.9 HYPOTHYROIDISM, UNSPECIFIED TYPE: ICD-10-CM

## 2024-11-18 DIAGNOSIS — C50.411 MALIGNANT NEOPLASM OF UPPER-OUTER QUADRANT OF RIGHT BREAST IN FEMALE, ESTROGEN RECEPTOR POSITIVE: ICD-10-CM

## 2024-11-18 DIAGNOSIS — Z17.0 MALIGNANT NEOPLASM OF UPPER-OUTER QUADRANT OF RIGHT BREAST IN FEMALE, ESTROGEN RECEPTOR POSITIVE: ICD-10-CM

## 2024-11-18 DIAGNOSIS — C34.31 ADENOCARCINOMA OF LOWER LOBE OF RIGHT LUNG: ICD-10-CM

## 2024-11-18 DIAGNOSIS — C34.31 ADENOCARCINOMA OF LOWER LOBE OF RIGHT LUNG: Primary | ICD-10-CM

## 2024-11-18 DIAGNOSIS — M25.511 RIGHT SHOULDER PAIN, UNSPECIFIED CHRONICITY: ICD-10-CM

## 2024-11-18 LAB
ALBUMIN SERPL BCP-MCNC: 3.6 G/DL (ref 3.5–5.2)
ALP SERPL-CCNC: 56 U/L (ref 40–150)
ALT SERPL W/O P-5'-P-CCNC: 13 U/L (ref 10–44)
ANION GAP SERPL CALC-SCNC: 10 MMOL/L (ref 8–16)
AST SERPL-CCNC: 18 U/L (ref 10–40)
BILIRUB SERPL-MCNC: 0.5 MG/DL (ref 0.1–1)
BUN SERPL-MCNC: 13 MG/DL (ref 8–23)
CALCIUM SERPL-MCNC: 9.1 MG/DL (ref 8.7–10.5)
CHLORIDE SERPL-SCNC: 103 MMOL/L (ref 95–110)
CO2 SERPL-SCNC: 25 MMOL/L (ref 23–29)
CREAT SERPL-MCNC: 0.7 MG/DL (ref 0.5–1.4)
ERYTHROCYTE [DISTWIDTH] IN BLOOD BY AUTOMATED COUNT: 13 % (ref 11.5–14.5)
EST. GFR  (NO RACE VARIABLE): >60 ML/MIN/1.73 M^2
GLUCOSE SERPL-MCNC: 91 MG/DL (ref 70–110)
HCT VFR BLD AUTO: 38.6 % (ref 37–48.5)
HGB BLD-MCNC: 12.5 G/DL (ref 12–16)
IMM GRANULOCYTES # BLD AUTO: 0.02 K/UL (ref 0–0.04)
MCH RBC QN AUTO: 28.1 PG (ref 27–31)
MCHC RBC AUTO-ENTMCNC: 32.4 G/DL (ref 32–36)
MCV RBC AUTO: 87 FL (ref 82–98)
NEUTROPHILS # BLD AUTO: 3.9 K/UL (ref 1.8–7.7)
PLATELET # BLD AUTO: 188 K/UL (ref 150–450)
PMV BLD AUTO: 12.1 FL (ref 9.2–12.9)
POTASSIUM SERPL-SCNC: 4.1 MMOL/L (ref 3.5–5.1)
PROT SERPL-MCNC: 7.1 G/DL (ref 6–8.4)
RBC # BLD AUTO: 4.45 M/UL (ref 4–5.4)
SODIUM SERPL-SCNC: 138 MMOL/L (ref 136–145)
T4 FREE SERPL-MCNC: 1.23 NG/DL (ref 0.71–1.51)
TSH SERPL DL<=0.005 MIU/L-ACNC: 6.91 UIU/ML (ref 0.4–4)
WBC # BLD AUTO: 7.14 K/UL (ref 3.9–12.7)

## 2024-11-18 PROCEDURE — 1160F RVW MEDS BY RX/DR IN RCRD: CPT | Mod: CPTII,95,, | Performed by: NURSE PRACTITIONER

## 2024-11-18 PROCEDURE — 84439 ASSAY OF FREE THYROXINE: CPT | Performed by: HOSPITALIST

## 2024-11-18 PROCEDURE — 3044F HG A1C LEVEL LT 7.0%: CPT | Mod: CPTII,95,, | Performed by: NURSE PRACTITIONER

## 2024-11-18 PROCEDURE — G2211 COMPLEX E/M VISIT ADD ON: HCPCS | Mod: 95,,, | Performed by: NURSE PRACTITIONER

## 2024-11-18 PROCEDURE — 36415 COLL VENOUS BLD VENIPUNCTURE: CPT | Performed by: HOSPITALIST

## 2024-11-18 PROCEDURE — 1159F MED LIST DOCD IN RCRD: CPT | Mod: CPTII,95,, | Performed by: NURSE PRACTITIONER

## 2024-11-18 PROCEDURE — 85027 COMPLETE CBC AUTOMATED: CPT | Performed by: HOSPITALIST

## 2024-11-18 PROCEDURE — 80053 COMPREHEN METABOLIC PANEL: CPT | Performed by: HOSPITALIST

## 2024-11-18 PROCEDURE — 4010F ACE/ARB THERAPY RXD/TAKEN: CPT | Mod: CPTII,95,, | Performed by: NURSE PRACTITIONER

## 2024-11-18 PROCEDURE — 99215 OFFICE O/P EST HI 40 MIN: CPT | Mod: 95,,, | Performed by: NURSE PRACTITIONER

## 2024-11-18 PROCEDURE — 84443 ASSAY THYROID STIM HORMONE: CPT | Performed by: HOSPITALIST

## 2024-11-18 RX ORDER — EPINEPHRINE 0.3 MG/.3ML
0.3 INJECTION SUBCUTANEOUS ONCE AS NEEDED
Status: CANCELLED | OUTPATIENT
Start: 2024-11-19

## 2024-11-18 RX ORDER — SODIUM CHLORIDE 0.9 % (FLUSH) 0.9 %
10 SYRINGE (ML) INJECTION
Status: CANCELLED | OUTPATIENT
Start: 2024-11-19

## 2024-11-18 RX ORDER — HEPARIN 100 UNIT/ML
500 SYRINGE INTRAVENOUS
Status: CANCELLED | OUTPATIENT
Start: 2024-11-19

## 2024-11-18 RX ORDER — DIPHENHYDRAMINE HYDROCHLORIDE 50 MG/ML
50 INJECTION INTRAMUSCULAR; INTRAVENOUS ONCE AS NEEDED
Status: CANCELLED | OUTPATIENT
Start: 2024-11-19

## 2024-11-18 NOTE — PROGRESS NOTES
[facility] MEDICAL ONCOLOGY - FOLLOW UP VISIT    Reason for visit: Follow up visit for adenocarcinoma of the lung    The patient location is: [place]  The chief complaint leading to consultation is: Toxicity check    Visit type: audiovisual    Face to Face time with patient: 15  30 minutes of total time spent on the encounter, which includes face to face time and non-face to face time preparing to see the patient (eg, review of tests), Obtaining and/or reviewing separately obtained history, Documenting clinical information in the electronic or other health record, Independently interpreting results (not separately reported) and communicating results to the patient/family/caregiver, or Care coordination (not separately reported).     Each patient to whom he or she provides medical services by telemedicine is:  (1) informed of the relationship between the physician and patient and the respective role of any other health care provider with respect to management of the patient; and (2) notified that he or she may decline to receive medical services by telemedicine and may withdraw from such care at any time.      Oncology History   Malignant neoplasm of upper-outer quadrant of right breast in female, estrogen receptor positive   4/13/2021 Biopsy    Breast, right, 10:00 5N, biopsy:  - Invasive ductal carcinoma with lobular features     4/13/2021 Breast Tumor Markers    Estrogen Receptor: Positive >90%  Progesterone Receptor: Positive 10-50%  HER2: Negative  Ki67: 10-30%     4/26/2021 Genetic Testing    MyRisk: negative     5/12/2021 Breast Surgery    Right partial mastectomy with SLNB     6/1/2021 Tumor Conference    Radiation options? Offer radiation, can discuss partial vs whole breast radiation.        6/2/2021 Cancer Staged    Staging form: Breast, AJCC 8th Edition  - Pathologic stage from 6/2/2021: Stage IA (pT1b, pN0(sn), cM0, G2, ER+, FL+, HER2-)     7/6/2021 -  7/27/2021 Radiation Therapy    Treatment Summary  Course: C1 Chest 2021  Treatment Site Energy Dose/Fx (Gy) #Fx Total Dose (Gy) Start Date End Date Elapsed Days   Breast_Rt 6X 2.65 16 / 16 42.4 7/6/2021 7/27/2021 21          7/2021 -  Hormone Therapy    Letrozole      Procedure    Bronchoscopy, Station 7 positive for malignancy     Adenocarcinoma of lower lobe of right lung   10/5/2023 Imaging Significant Findings    CT C (obtained after CXR, which was itself obtained for palpitations)  - 2.1 x 1.3 cm spiculated RLL nodule, some spiculation noted to extend to the pleural margin  - Scattered pulmonary nodules centered mostly subpleural at the RLL (e.g. 0.9 cm)       10/10/2023 Imaging Significant Findings    PET CT  - 2.1 x 1.1 cm RLL nodule, SUV 3.8  - 0.9 cm R axillary LN, SUV 3.9  - 1.2 cm subcarinal LN, SUV 4.6  - 0.7 cm Ln along L posterior shoulder, SUV 1.7  - Multiple additional solid pulmonary nodules through R lung base, too small for uptake detection     11/14/2023 Procedure    Bronch with EBUS  RLL, FNA  - Adenocarcinoma (TTF1 positive, GATA3 negative)    RLL, TBBx  - Adenocarcinoma    Per pulmonology, station 7 node was very vascular without window for biopsy, plan to discuss at thoracic tumor board    [facility] NGS  - KRAS G12A, CHEK1, MLH1, CTNNB1, CIC, PTPRD, NOTCH3, EZH2, LATS1, KMT2D  - Negative: EGFR, ALK, BRAF, KRAS, ROS1, RET, MET, EBB2  - PD-L1 1%       11/22/2023 Tumor Conference    Tumor Board  - Plan for axillary LN biopsy to determine lung vs recurrent breast vs other etiology  - Repeat CT C to evaluate nodules in RLL  - Determine treatment plan based on above results     11/22/2023 Tumor Genotyping    [facility] Liquid Biopsy  - No reportable pathogenic variants found     11/29/2023 Imaging Significant Findings    CT C  - 2.1 x 1.2 cm RLL spiculated nodule, stable in size w/ new central cavitation. Spiculated margins extend towards adjacent pleura.   - Stable irregular 2.0 cm linar opacity in  LLL  - Stable 0.3 cm GGO, TRICIA  - Stable R basal sub cm nodules, largest 0.9 cm  - Several stable solid nodules predominantly in RLL, largest 0.9 cm     12/18/2023 Imaging Significant Findings    MRI Brain  - JELENA     1/3/2024 Procedure    IR Guided Biopsy, R Axillary LN  - No metastatic carcinoma (0.3 x 0.3 x 0.1 cm tissue, positive and negative controls stained appropriately)     1/5/2024 Imaging Significant Findings    PET CT  - Stable 2.0 x 1.1 cm RLL nodule (previously 2.1 x 1.1 cm)  - Stable additional nodules w/o significant racer uptake  - 0.8 cm R axillary node, SUV 4.2 (previously 0.9 cm, SUV 3.9)  - 0.5 cm L posterior shoulder node, SUV 2.3 (previously 0.7 cm, SUV 1.7)  - 1.3 cm subcarinal node, SUV 4.9 (previously 1.2 cm, SUV 4.6)     1/10/2024 Tumor Conference    Tumor Board   Re-sample enlarged, hypermetabolic axillary LN with repeat CT-guided biopsy versus excisional biopsy. If LN is negative for recurrent NSCLC, obtain EBUS of PET-avid mediastinal LN.         1/23/2024 Procedure    IR Guided Biopsy, R Axillary LN (Second Biopsy)  - Negative for metastatic carcinoma     2/8/2024 Procedure    Bronch with EBUS  LN  Positive: Station 7 (Adenocarcinoma)  Negative: Station 11R     2/21/2024 Cancer Staged    Staging form: Lung, AJCC 8th Edition  - Clinical stage from 2/21/2024: Stage IIIA (cT1b, cN2, cM0)     2/21/2024 Tumor Conference    Thoracic Tumor Board  Stage IIIA right lower lobe adenocarcinoma with bulky subcarinal lymphadenopathy.  Proceed with NA chemo/I-O therapy. [facility]. If patient is not a surgical candidate following 3 cycles of therapy, proceed with definitive chemo/RT.       2/22/2024 - 4/5/2024 Chemotherapy    Treatment Summary   Plan Name: OP NSCLC NIVOLUMAB 360 MG PLUS CARBOPLATIN (AUC5) PEMETREXED 500 MG/M2 Q3W x 3 CYCLES  Treatment Goal: Control  Status: Inactive  Start Date: 2/22/2024  End Date: 4/5/2024  Provider: Bridger Nichole IV, MD  Chemotherapy:  CARBOplatin (PARAPLATIN) 415 mg in sodium chloride 0.9% 326.5 mL chemo infusion, 415 mg, Intravenous, Clinic/HOD 1 time, 3 of 3 cycles  Administration: 415 mg (2/22/2024), 465 mg (4/4/2024), 465 mg (3/14/2024)  PEMEtrexed disodium (ALIMTA) 750 mg in sodium chloride 0.9% SolP 100 mL chemo infusion, 500 mg/m2 = 750 mg, Intravenous, Clinic/HOD 1 time, 3 of 3 cycles  Administration: 750 mg (2/22/2024), 750 mg (4/4/2024), 750 mg (3/14/2024)     4/24/2024 Imaging Significant Findings    CT C/A/P  - 1.2 x 1.0 cm RLL malignancy, previously 2 x 1.2 cm  - Interval decrease in the previously seen nodules in the RLL  - 0.9 cm subcarinal node, previously 1.7 cm  - Interval decrease in right infrahilar soft tissue  - 2.1 cm LLL solid nodular opacity, unchanged  - 0.8 cm TRICIA ground-glass opacity, unchanged  - AVM again noted in RLL  - 0.6 cm right axillary lymph node, decreased in size from prior  - Left scapular lymph node decreased in size from prior, incompletely seen       5/6/2024 - 5/12/2024 Hospital Admission    Admitted to [facility] for RLLx.  Postoperative course complicated by hemothorax requiring return to the operating room for reexploration on postop day 1.  Chest tube removed on postop day 5.     5/6/2024 Surgery    Right Lower Lobectomy  Right Lower Lobe  - Adenocarcinoma with acinar pattern, 35% residual viable tumor, 1.0 cm in greatest dimension.  Pleural invasion absent, LVI absent, margins negative.  0/1 lymph node    Pericardial Excision  - Negative for tumor    LN  - Positive: Station 7  - Negative: Station 8R (0/1), 9 are (0/2), 2R (0/1), 4R (0/1), 11R (0/1), 12R (0/1), R posterior interlobar LN (0/1)    Path Staging: pT1a, pN2     7/2/2024 -  Chemotherapy    Treatment Summary   Plan Name: OP NIVOLUMAB 480 MG Q4W  Treatment Goal: Control  Status: Active  Start Date: 7/2/2024  End Date: 6/3/2025 (Planned)  Provider: Bridger Nichole IV, MD  Chemotherapy: [No matching medication found in this treatment plan]      7/24/2024 Cancer Staged    Staging form: Lung, AJCC 8th Edition  - Pathologic: Stage IIIA (pT1a, pN2, cM0)     8/7/2024 Imaging Significant Findings    PET CT  - New foci of FDG uptake in right lateral chest wall, likely represents postsurgical changes  - Development of moderate size right pleural effusion  - No evidence of hypermetabolic mediastinal or hilar lymph nodes        - Second opinion, [facility]: contralateral LLL lesion stable but plan for sampling of this as well as subcarinal LN and possible additional RLL nodule. Pending results, consider NA --> surgery vs CRT    Oncology History    HPI:     Keo Frias is a 73 y.o. female with pmh significant for HTN, migraine headaches, and multiple malignancies, who presents for follow up of the below lung cancer    1) Stage IA ( [id number], ER 95%, IA 10%, HER2 negative, Ki-67 20%) IDC of the R breast, initially dx'd 4/13/21, s/p lumpectomy and SLNB (5/12/21), XRT (7/6/21-7/27/21), and currently on letrozole (7/2021 - present).     2) Stage IIIA (pT1a, pN2, cM0) adenocarcinoma of the RLL (no targetable mutations, PD-L1 1%), initially dx'd 11/14/23, s/p neoadjuvant carboplatin/pemetrexed/nivolumab (2/22/24 - 4/4/24), right lower lobectomy (5/6/24), and currently on adjuvant nivolumab (7/2/24 - present) who presents to for follow up.     Last clinic 9/20/24, intermittent right upper arm pain, otherwise tolerating well.  Proceed with C4 nivolumab.  Labs and RTC on 10/18 with C5 on 10/22.  Imaging and follow up already established at [facility].    Interval History:  9/24/24: C4 nivolumab    The pt states that she is doing well today.     She notes ongoing R shoulder pain as described last visit. She says some motions, including brachiation, make it hurt. She knows that she had this when she started physical therapy in August. She says this started a week or so after the last PET scan.     Pt denies N/V, diarrhea, constipation, rash, new/worsening fatigue, new/worsening SOB, or  new/worsening cough.    ROS:   As per HPI.     Physical Exam:       LMP  (LMP Unknown)                Physical Exam  Constitutional:       Appearance: Normal appearance.   HENT:      Head: Normocephalic and atraumatic.   Eyes:      Extraocular Movements: Extraocular movements intact.      Pupils: Pupils are equal, round, and reactive to light.   Neurological:      Mental Status: She is alert and oriented to person, place, and time.   Psychiatric:         Mood and Affect: Mood normal.         Thought Content: Thought content normal.         Judgment: Judgment normal.         Imaging:    See oncologic history above.     Path:  See oncologic history above.      Assessment and Plan:     Keo Frias is a 73 y.o. female with pmh significant for HTN, migraine headaches, and multiple malignancies, who presents for follow up of the below lung cancer    1) Stage IA ( [id number], ER 95%, DE 10%, HER2 negative, Ki-67 20%) IDC of the R breast, initially dx'd 4/13/21, s/p lumpectomy and SLNB (5/12/21), XRT (7/6/21-7/27/21), and currently on letrozole (7/2021 - present).     2) Stage IIIA (pT1a, pN2, cM0) adenocarcinoma of the RLL (no targetable mutations, PD-L1 1%), initially dx'd 11/14/23, s/p neoadjuvant carboplatin/pemetrexed/nivolumab (2/22/24 - 4/4/24), right lower lobectomy (5/6/24), and currently on adjuvant nivolumab (7/2/24 - present) who presents to for follow up.       Stage IIIA Adenocarcinoma of RLL, PD-L1 1%, No Actionable Molecular Alterations  ECOG PS 1.  Patient is currently on adjuvant nivolumab (see note from 6/14/24), tolerating without significant issue. Her most recent imaging (PET-CT, 8/7/24, on adjuvant nivolumab) demonstrated new foci of FDG uptake in right lateral chest wall likely representative of postsurgical changes, development of moderate size right pleural effusion, and no evidence of hypermetabolic mediastinal or hilar lymph nodes, or left scapular hypermetabolic focus.  Given good tolerance and  radiographic response, will plan to treat with nivolumab q4w x 1 year, w/ q3m imaging throughout (scheduled at [facility]).     PLAN:   Proceed w/ C6 nivolumab, labs acceptable and treatment signed  Imaging at [facility] on 11/13/24 - CT chest with stable LLL nodule to be followed as above; no new/progressive lesions. PET in 4 months to evaluate previous indeterminate lesions including contralateral nodule and bilateral shoulders.      R Shoulder Pain  Patient describes right lateral shoulder pain worse with overhead movements.  Currently working with physical therapy who has determine this is most likely a partially frozen shoulder.  Her most recent imaging (PET-CT, 8/7/24) was without evidence of hypermetabolic activity this area.  Favor unlikely related to malignancy.  Message sent to Dr. Don regarding upcoming scans per pt request       Hypothyroidism  On 4/30/24, TSH was 41.25 and free T4 0.71, as compared to 4/2/24 when TSH was 0.014 and T4 was 1.92.  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  She has since been started on levothyroxine per Dr. Don.    TSH up slightly today, 6.9. Patient is asymptomatic. Continue levothyroxine 75 mcg daily. Discussed with patient that synthroid dose may need to be increased is TSH still trending up at next visit.       Osteoporosis  DEXA from 7/11/24 with osteoporosis of the lumbar spine and hip.  Prolia per Dr. Ogden      Right IDC, ER+/RI+/HER2-  S/p lumpectomy with SLNB, radiation therapy, and currently on letrozole and denosumab, tolerating well. Concern for possible R axillary recurrence, workup as below.   Okay to continue letrozole  Okay to continue denosumab as above  Breast oncology team aware      Patient is in agreement with the proposed treatment plan. All questions were answered to the patient's satisfaction. Pt knows to call clinic if anything is needed before the next clinic visit.    Carina Nascimento, MSN, APRN, FNP-C  Hematology and Medical  Oncology  Nurse Practitioner to Dr. Jose Martin Wright  Nurse Practitioner, [facility]            Med Onc Chart Routing      Follow up with physician 4 weeks. see Dr. Nichole prior to opdivo   Follow up with BRENDA    Infusion scheduling note    Injection scheduling note    Labs CBC, CMP, free T4 and TSH   Scheduling:  Preferred lab:  Lab interval:     Imaging    Pharmacy appointment    Other referrals

## 2024-11-19 ENCOUNTER — INFUSION (OUTPATIENT)
Dept: INFUSION THERAPY | Facility: HOSPITAL | Age: 74
End: 2024-11-19
Attending: FAMILY MEDICINE
Payer: MEDICARE

## 2024-11-19 VITALS
WEIGHT: 115.5 LBS | SYSTOLIC BLOOD PRESSURE: 137 MMHG | RESPIRATION RATE: 18 BRPM | OXYGEN SATURATION: 97 % | HEART RATE: 68 BPM | BODY MASS INDEX: 21.13 KG/M2 | DIASTOLIC BLOOD PRESSURE: 62 MMHG | TEMPERATURE: 98 F

## 2024-11-19 DIAGNOSIS — C34.31 ADENOCARCINOMA OF LOWER LOBE OF RIGHT LUNG: Primary | ICD-10-CM

## 2024-11-19 PROCEDURE — A4216 STERILE WATER/SALINE, 10 ML: HCPCS | Performed by: NURSE PRACTITIONER

## 2024-11-19 PROCEDURE — 25000003 PHARM REV CODE 250: Performed by: NURSE PRACTITIONER

## 2024-11-19 PROCEDURE — 63600175 PHARM REV CODE 636 W HCPCS: Mod: JZ,JG | Performed by: NURSE PRACTITIONER

## 2024-11-19 PROCEDURE — 96413 CHEMO IV INFUSION 1 HR: CPT

## 2024-11-19 RX ORDER — DIPHENHYDRAMINE HYDROCHLORIDE 50 MG/ML
50 INJECTION INTRAMUSCULAR; INTRAVENOUS ONCE AS NEEDED
Status: DISCONTINUED | OUTPATIENT
Start: 2024-11-19 | End: 2024-11-19 | Stop reason: HOSPADM

## 2024-11-19 RX ORDER — EPINEPHRINE 0.3 MG/.3ML
0.3 INJECTION SUBCUTANEOUS ONCE AS NEEDED
Status: DISCONTINUED | OUTPATIENT
Start: 2024-11-19 | End: 2024-11-19 | Stop reason: HOSPADM

## 2024-11-19 RX ORDER — SODIUM CHLORIDE 0.9 % (FLUSH) 0.9 %
10 SYRINGE (ML) INJECTION
Status: DISCONTINUED | OUTPATIENT
Start: 2024-11-19 | End: 2024-11-19 | Stop reason: HOSPADM

## 2024-11-19 RX ADMIN — SODIUM CHLORIDE: 9 INJECTION, SOLUTION INTRAVENOUS at 11:11

## 2024-11-19 RX ADMIN — SODIUM CHLORIDE 480 MG: 9 INJECTION, SOLUTION INTRAVENOUS at 11:11

## 2024-11-19 RX ADMIN — Medication 10 ML: at 12:11

## 2024-11-19 RX ADMIN — Medication 10 ML: at 11:11

## 2024-11-19 NOTE — PLAN OF CARE
Patient tolerated C6 of Opdivo infusion well. No s/s adverse reaction. AVS and calendar printed, PIV removed and patient ambulated out of clinic in NAD.    Yes

## 2024-11-20 ENCOUNTER — NURSE TRIAGE (OUTPATIENT)
Dept: ADMINISTRATIVE | Facility: CLINIC | Age: 74
End: 2024-11-20
Payer: MEDICARE

## 2024-11-20 NOTE — TELEPHONE ENCOUNTER
Pt has been having symptoms for 3 months that are getting worse. Pt has an ortho appt tomorrow cg would like her seen in [place] instead of [place].  Right Shoulder and hand pain radiating. Pain is 6/10 constant. Shoulder is swollen but not arm. Moderate pain. Weakness in right hand. Care advice recommends pt see MD today. Triage nurse told pt disposition then call was disconnected. Unable to get pt back on the line times two attempts. Please call and advise pt.   Reason for Disposition   Weakness (i.e., loss of strength) in hand or fingers  (Exception: Not truly weak; hand feels weak because of pain.)    Additional Information   Negative: Shock suspected (e.g., cold/pale/clammy skin, too weak to stand, low BP, rapid pulse)   Negative: Similar pain previously and it was from 'heart attack'   Negative: Similar pain previously and it was from 'angina' and not relieved by nitroglycerin   Negative: Sounds like a life-threatening emergency to the triager   Negative: Chest pain   Negative: Difficulty breathing or unusual sweating (e.g., sweating without exertion)   Negative: Pain lasting > 5 minutes and pain also present in chest  (Exception: Pain is clearly made worse by movement.)   Negative: Age > 40 and no obvious cause and pain even when not moving the arm  (Exception: Pain is clearly made worse by moving arm or bending neck.)   Negative: Red area or streak and fever   Negative: Swollen joint and fever   Negative: Entire arm is swollen   Negative: Patient sounds very sick or weak to the triager   Negative: SEVERE pain (e.g., excruciating, unable to do any normal activities)   Negative: Shoulder pains with exertion (e.g., walking) and pain goes away on resting and not present now   Negative: Painful rash with multiple small blisters grouped together (i.e., dermatomal distribution or 'band' or 'stripe')   Negative: Looks like a boil, infected sore, deep ulcer or other infected rash (spreading redness, pus)    Negative: Localized rash is very painful (no fever)    Protocols used: Shoulder Pain-A-OH

## 2024-11-21 ENCOUNTER — OFFICE VISIT (OUTPATIENT)
Dept: SPORTS MEDICINE | Facility: CLINIC | Age: 74
End: 2024-11-21
Payer: MEDICARE

## 2024-11-21 ENCOUNTER — OFFICE VISIT (OUTPATIENT)
Dept: CARDIOLOGY | Facility: CLINIC | Age: 74
End: 2024-11-21
Payer: MEDICARE

## 2024-11-21 ENCOUNTER — HOSPITAL ENCOUNTER (OUTPATIENT)
Dept: RADIOLOGY | Facility: HOSPITAL | Age: 74
Discharge: HOME | End: 2024-11-21
Attending: ORTHOPAEDIC SURGERY
Payer: MEDICARE

## 2024-11-21 VITALS
SYSTOLIC BLOOD PRESSURE: 108 MMHG | HEART RATE: 71 BPM | BODY MASS INDEX: 21.04 KG/M2 | HEIGHT: 62 IN | WEIGHT: 114.31 LBS | DIASTOLIC BLOOD PRESSURE: 71 MMHG

## 2024-11-21 VITALS
SYSTOLIC BLOOD PRESSURE: 152 MMHG | DIASTOLIC BLOOD PRESSURE: 88 MMHG | BODY MASS INDEX: 20.77 KG/M2 | HEART RATE: 72 BPM | WEIGHT: 113.56 LBS

## 2024-11-21 DIAGNOSIS — M25.511 RIGHT SHOULDER PAIN, UNSPECIFIED CHRONICITY: ICD-10-CM

## 2024-11-21 DIAGNOSIS — M75.01 ADHESIVE CAPSULITIS OF RIGHT SHOULDER: Primary | ICD-10-CM

## 2024-11-21 DIAGNOSIS — R09.89 LABILE HYPERTENSION: ICD-10-CM

## 2024-11-21 DIAGNOSIS — I10 BENIGN ESSENTIAL HYPERTENSION: ICD-10-CM

## 2024-11-21 DIAGNOSIS — Z76.89 ESTABLISHING CARE WITH NEW DOCTOR, ENCOUNTER FOR: Primary | ICD-10-CM

## 2024-11-21 PROBLEM — R07.9 CHEST PAIN: Status: RESOLVED | Noted: 2023-09-13 | Resolved: 2024-11-21

## 2024-11-21 PROCEDURE — 1101F PT FALLS ASSESS-DOCD LE1/YR: CPT | Mod: CPTII,S$GLB,, | Performed by: ORTHOPAEDIC SURGERY

## 2024-11-21 PROCEDURE — 1125F AMNT PAIN NOTED PAIN PRSNT: CPT | Mod: CPTII,S$GLB,, | Performed by: ORTHOPAEDIC SURGERY

## 2024-11-21 PROCEDURE — 1101F PT FALLS ASSESS-DOCD LE1/YR: CPT | Mod: CPTII,S$GLB,, | Performed by: INTERNAL MEDICINE

## 2024-11-21 PROCEDURE — 99999 PR PBB SHADOW E&M-EST. PATIENT-LVL III: CPT | Mod: PBBFAC,,, | Performed by: ORTHOPAEDIC SURGERY

## 2024-11-21 PROCEDURE — 3079F DIAST BP 80-89 MM HG: CPT | Mod: CPTII,S$GLB,, | Performed by: INTERNAL MEDICINE

## 2024-11-21 PROCEDURE — 99214 OFFICE O/P EST MOD 30 MIN: CPT | Mod: S$GLB,,, | Performed by: INTERNAL MEDICINE

## 2024-11-21 PROCEDURE — 73030 X-RAY EXAM OF SHOULDER: CPT | Mod: TC,RT

## 2024-11-21 PROCEDURE — 99203 OFFICE O/P NEW LOW 30 MIN: CPT | Mod: 25,S$GLB,, | Performed by: ORTHOPAEDIC SURGERY

## 2024-11-21 PROCEDURE — 73030 X-RAY EXAM OF SHOULDER: CPT | Mod: 26,RT,, | Performed by: RADIOLOGY

## 2024-11-21 PROCEDURE — 3077F SYST BP >= 140 MM HG: CPT | Mod: CPTII,S$GLB,, | Performed by: INTERNAL MEDICINE

## 2024-11-21 PROCEDURE — 99999 PR PBB SHADOW E&M-EST. PATIENT-LVL III: CPT | Mod: PBBFAC,,, | Performed by: INTERNAL MEDICINE

## 2024-11-21 PROCEDURE — 3078F DIAST BP <80 MM HG: CPT | Mod: CPTII,S$GLB,, | Performed by: ORTHOPAEDIC SURGERY

## 2024-11-21 PROCEDURE — 3044F HG A1C LEVEL LT 7.0%: CPT | Mod: CPTII,S$GLB,, | Performed by: INTERNAL MEDICINE

## 2024-11-21 PROCEDURE — 3074F SYST BP LT 130 MM HG: CPT | Mod: CPTII,S$GLB,, | Performed by: ORTHOPAEDIC SURGERY

## 2024-11-21 PROCEDURE — 1160F RVW MEDS BY RX/DR IN RCRD: CPT | Mod: CPTII,S$GLB,, | Performed by: INTERNAL MEDICINE

## 2024-11-21 PROCEDURE — 4010F ACE/ARB THERAPY RXD/TAKEN: CPT | Mod: CPTII,S$GLB,, | Performed by: ORTHOPAEDIC SURGERY

## 2024-11-21 PROCEDURE — 3044F HG A1C LEVEL LT 7.0%: CPT | Mod: CPTII,S$GLB,, | Performed by: ORTHOPAEDIC SURGERY

## 2024-11-21 PROCEDURE — 4010F ACE/ARB THERAPY RXD/TAKEN: CPT | Mod: CPTII,S$GLB,, | Performed by: INTERNAL MEDICINE

## 2024-11-21 PROCEDURE — 3288F FALL RISK ASSESSMENT DOCD: CPT | Mod: CPTII,S$GLB,, | Performed by: INTERNAL MEDICINE

## 2024-11-21 PROCEDURE — 1159F MED LIST DOCD IN RCRD: CPT | Mod: CPTII,S$GLB,, | Performed by: INTERNAL MEDICINE

## 2024-11-21 PROCEDURE — 3288F FALL RISK ASSESSMENT DOCD: CPT | Mod: CPTII,S$GLB,, | Performed by: ORTHOPAEDIC SURGERY

## 2024-11-21 PROCEDURE — 1125F AMNT PAIN NOTED PAIN PRSNT: CPT | Mod: CPTII,S$GLB,, | Performed by: INTERNAL MEDICINE

## 2024-11-21 NOTE — PROGRESS NOTES
.CC: RIGHT shoulder pain     74 y.o. Female with a 3 month  history of right shoulder atraumatic pain. She has a hx of right-sided breast and lung cancer and her most recent sx was on May 7th 2024. Following this surgery she was limited in her R shoulder motion for 3 months. She began PT at [place] Aug 13th and completed her 6 week post-op therapy. She reports onset of pain and stiffness beginning with the start of PT. She had a routine chest CT scan with her oncologist last week with unremarkable findings She additionally reports a hx of bilateral frozen shoulder over 15 years ago.       She states that the pain is severe and not responding to any conservative care.      She reports that the pain and weakness is worse with overhead activity. It also bothers her at night.    Is affecting ADLs.  Pain is 5/10 at it's worst.      Past Medical History:   Diagnosis Date    Anemia     Anxiety disorder 8/28/2019    Cataract     Chronic right-sided low back pain without sciatica 10/20/2021    Hypercholesteremia 9/17/2019    Invasive ductal carcinoma of breast, female, right 4/16/2021    Migraine with vision problems     Subclinical hypothyroidism     White coat syndrome with hypertension        Past Surgical History:   Procedure Laterality Date    BREAST BIOPSY Right 04/13/2021    COLON SURGERY      COLONOSCOPY N/A 12/07/2018    Procedure: COLONOSCOPY;  Surgeon: Bhargav Gaston MD;  Location: [facility] (4TH FLR);  Service: Endoscopy;  Laterality: N/A;    DILATION AND CURETTAGE OF UTERUS      postmenopausal bleeding    ENDOBRONCHIAL ULTRASOUND N/A 11/14/2023    Procedure: ENDOBRONCHIAL ULTRASOUND (EBUS);  Surgeon: Kirt Gr MD;  Location: [facility] 2ND FLR;  Service: Pulmonary;  Laterality: N/A;    MASTECTOMY, PARTIAL Right 05/12/2021    Procedure: MASTECTOMY, PARTIAL-Right with radiological marker;  Surgeon: Vivian Cadena MD;  Location: [facility];  Service: General;  Laterality: Right;    ROBOTIC BRONCHOSCOPY N/A  11/14/2023    Procedure: ROBOTIC BRONCHOSCOPY;  Surgeon: Kirt Gr MD;  [facility];  Service: Pulmonary;  Laterality: N/A;    SENTINEL LYMPH NODE BIOPSY Right 05/12/2021    Procedure: BIOPSY, LYMPH NODE, SENTINEL-Right;  Surgeon: Vivian Cadena MD;  Location: [facility] OR;  Service: General;  Laterality: Right;    TUBAL LIGATION         Family History   Problem Relation Name Age of Onset    Diabetes Brother      Hypertension Brother      Glaucoma Brother      Glaucoma Father      Cataracts Father      Retinal detachment Father      Lung cancer Mother      Uterine cancer Maternal Grandmother      Stroke Maternal Grandmother      Stroke Paternal Grandmother      Amblyopia Neg Hx      Blindness Neg Hx      Macular degeneration Neg Hx      Strabismus Neg Hx      Thyroid disease Neg Hx           Current Outpatient Medications:     amLODIPine (NORVASC) 5 MG tablet, Take 1 tablet (5 mg total) by mouth once daily., Disp: 90 tablet, Rfl: 3    calcium carbonate (CALCIUM 500 ORAL), Take 1 tablet by mouth., Disp: , Rfl:     cholecalciferol, vitamin D3, 125 mcg (5,000 unit) capsule, Take 1 capsule (5,000 Units total) by mouth daily with breakfast., Disp: 100 capsule, Rfl: 4    famotidine (PEPCID) 10 MG tablet, Take 10 mg by mouth as needed., Disp: , Rfl:     letrozole (FEMARA) 2.5 mg Tab, TAKE 1 TABLET EVERY DAY, Disp: 90 tablet, Rfl: 3    levothyroxine (SYNTHROID) 88 MCG tablet, Take 1 tablet (88 mcg total) by mouth before breakfast. (Patient taking differently: Take 88 mcg by mouth before breakfast. Dosage lowered to 75 mg.), Disp: 30 tablet, Rfl: 11    metoprolol succinate (TOPROL-XL) 50 MG 24 hr tablet, Take 1 tablet (50 mg total) by mouth once daily., Disp: 90 tablet, Rfl: 4    multivitamin-iron-folic acid Tab, Take 1 tablet by mouth once daily., Disp: , Rfl:     PROLIA 60 mg/mL Syrg, , Disp: , Rfl:     Review of patient's allergies indicates:   Allergen Reactions    Sinus allergy Other (See Comments)     " Headaches, migranes    Buspar [buspirone] Other (See Comments)     Pt isnt allergic      Sulfa (sulfonamide antibiotics) Rash          REVIEW OF SYSTEMS:  Constitution: Negative. Negative for chills, fever and night sweats.   HENT: Negative for congestion and headaches.    Eyes: Negative for blurred vision, left vision loss and right vision loss.   Cardiovascular: Negative for chest pain and syncope.   Respiratory: Negative for cough and shortness of breath.    Endocrine: Negative for polydipsia, polyphagia and polyuria.   Hematologic/Lymphatic: Negative for bleeding problem. Does not bruise/bleed easily.   Skin: Negative for dry skin, itching and rash.   Musculoskeletal: Negative for falls.  Positive for right shoulder pain and muscle weakness.   Gastrointestinal: Negative for abdominal pain and bowel incontinence.   Genitourinary: Negative for bladder incontinence and nocturia.   Neurological: Negative for disturbances in coordination, loss of balance and seizures.   Psychiatric/Behavioral: Negative for depression. The patient does not have insomnia.    Allergic/Immunologic: Negative for hives and persistent infections.      PHYSICAL EXAMINATION:  Vitals:  /71   Pulse 71   Ht 5' 2" (1.575 m)   Wt 51.9 kg (114 lb 4.9 oz)   LMP  (LMP Unknown)   BMI 20.91 kg/m²    General: The patient is alert and oriented x 3.  Mood is pleasant.  Observation of ears, eyes and nose reveal no gross abnormalities.  No labored breathing observed.  Gait is coordinated. Patient can toe walk and heel walk without difficulty.      RIGHT SHOULDER / UPPER EXTREMITY EXAM    OBSERVATION:     Swelling  none  Deformity  none   Discoloration  none   Scapular winging none   Scars   none  Atrophy  none    TENDERNESS / CREPITUS (T/C):          T/C      T/C   Clavicle   -/-  SUPRAspinatus    -/-     AC Jt.    +/-  INFRAspinatus  -/-    SC Jt.    -/-  Deltoid    -/-      G. Tuberosity  -/-  LH BICEP groove  +/-   Acromion:  -/-  Midline " Neck   -/-     Scapular Spine -/-  Trapezium   -/-   SMA Scapula  -/-  GH jt. line - post  -/-     Scapulothoracic  -/-         ROM: (* = with pain)  Left shoulder   Right shoulder        AROM (PROM)   AROM (PROM)   FE    170° (175°)     90° (90°)     ER at 0°    60°  (65°)    30°  (30°)   ER at 90° ABD  90°  (90°)    NT   IR at 90°  ABD   NA  (40°)     NT     IR (spine level)   T7     L5    STRENGTH: (* = with pain) Left shoulder   Right shoulder   SCAPTION   5/5    5-/5    IR    5/5    5-/5   ER    5/5    5-/5   BICEPS   5/5    5-/5   Deltoid    5/5    5-/5     SIGNS:  Painful side       NEER   +    ODEMIS  neg    MCCRACKEN   -    SPEEDS  neg     DROP ARM   -   BELLY PRESS neg   Superior escape none    LIFT-OFF  neg   X-Body ADD    neg    MOVING VALGUS neg        STABILITY TESTING    Left shoulder   Right shoulder    Translation     Anterior  up face    up face    Posterior  up face   up face    Sulcus   < 10mm   < 10 mm     Signs   Apprehension   neg      neg       Relocation   no change     no change      Jerk test  neg     neg    EXTREMITY NEURO-VASCULAR EXAM:    Sensation grossly intact to light touch all dermatomal regions.    DTR 2+ Biceps, Triceps, BR and Negative Brads sign   Grossly intact motor function at Elbow, Wrist and Hand   Distal pulses radial and ulnar 2+, brisk cap refill, symmetric.      NECK:  Painless FROM and spinous processes non-tender. Negative Spurlings sign.          XRAYS:  Xrays including AP, Outlet and Axillary Lateral of shoulder are ordered / images reviewed by me:   No fracture dislocation or other pathology   Acromion type 1   Proximal migration of humeral head: None   GH arthritis: None          ASSESSMENT:   Right shoulder pain:  Suspected adhesive capsulitis      PLAN:      1. Right shoulder MRI without contrast  2. Physical therapy  3. Return to clinic in 2 weeks for CSI pending MRI results    All questions were answered, patient will contact us for questions or  concerns in the interim.

## 2024-11-21 NOTE — PROGRESS NOTES
HISTORY:    74-year-old female with a history of hypertension, right breast cancer status post lumpectomy with radiation 2021 on letrozole, right lung adenocarcinoma status post neoadjuvant chemotherapy and lobectomy 2024 on chemotherapy, right sided frozen shoulder presenting for initial evaluation by me.      Previously followed by Dr. Rodríguez.    The patient denies any symptoms of chest pain, shortness of breath, or dyspnea on exertion.     Activity levels very good. Walks 4-5 miles/day for exercise.     The patient denies any previous history of myocardial infarction, coronary artery disease, peripheral arterial disease, stroke, congestive heart failure, or cardiomyopathy.    Tolerates aspirin 81 x 1, metoprolol 25 x 2, amlodipine  5 x 1, atorvastatin 40 x 1. Bps range /60-80s.     PHYSICAL EXAM:    Vitals:    11/21/24 1432   BP: (!) 152/88   Pulse: 72       NAD, A+Ox3.  No jvd, no bruit.  RRR nml s1,s2. No murmurs.  CTA B no wheezes or crackles.  No edema.    LABS/STUDIES (imaging reviewed during clinic visit):    November 2024 CBC and CMP normal.  /HDL 49//.  A1c normal.  TSH 6.9 with a normal free T4.Neg urine metanephrines 2019.   EKG November 2024 demonstrates sinus rhythm with no Q-waves or ST changes.    Event monitor October 2023 sinus rhythm with no evidence of clinical arrhythmia.  MALKA September 2023 7 minutes and 48 seconds on a medium ramp protocol.  No evidence of ischemia at 78% of age predicted maximum heart rate.  Baseline TTE with normal LV size and function and an EF of 65%.  Normal diastology.  CVP 3.   Renal us 2019 No e/o AKIN.     ASSESSMENT & PLAN:    1. Establishing care with new doctor, encounter for    2. Benign essential hypertension    3. Labile hypertension        Orders Placed This Encounter    IN OFFICE EKG 12-LEAD (to [facility])        Labile Bps with average in a normal range on metoprolol 25x2 and amlodipine 5x1 (adjusts dose based on morning Bps). Has not  tolerated a variety of other meds. Okay to stay here from my perspective. Gets symptomatic with low Bps, fatigue. Negative secondary iglesias over the years.    Follow-up as needed.       Andres Bird MD

## 2024-11-22 ENCOUNTER — PATIENT MESSAGE (OUTPATIENT)
Dept: SPORTS MEDICINE | Facility: CLINIC | Age: 74
End: 2024-11-22
Payer: MEDICARE

## 2024-11-22 ENCOUNTER — TELEPHONE (OUTPATIENT)
Dept: SPORTS MEDICINE | Facility: CLINIC | Age: 74
End: 2024-11-22
Payer: MEDICARE

## 2024-11-22 NOTE — TELEPHONE ENCOUNTER
Authorization date for the MRI referral changed to 11/27. Told patient to call insurance to make sure that insurance will approve this date.       ----- Message from Karl sent at 11/22/2024  2:36 PM CST -----  Regarding: MRI Order  Contact: [phone]  Pt is calling in ref to having MRI order re submitted so it can be scheduled sooner than 12/5. MRI has been approved. Patient Requesting Call Back @  [phone]

## 2024-11-27 ENCOUNTER — HOSPITAL ENCOUNTER (OUTPATIENT)
Dept: RADIOLOGY | Facility: HOSPITAL | Age: 74
Discharge: HOME | End: 2024-11-27
Attending: ORTHOPAEDIC SURGERY
Payer: MEDICARE

## 2024-11-27 DIAGNOSIS — M75.01 ADHESIVE CAPSULITIS OF RIGHT SHOULDER: ICD-10-CM

## 2024-11-27 PROCEDURE — 73221 MRI JOINT UPR EXTREM W/O DYE: CPT | Mod: TC,RT

## 2024-11-27 PROCEDURE — 73221 MRI JOINT UPR EXTREM W/O DYE: CPT | Mod: 26,RT,, | Performed by: RADIOLOGY

## 2024-12-01 ENCOUNTER — PATIENT MESSAGE (OUTPATIENT)
Dept: HEMATOLOGY/ONCOLOGY | Facility: CLINIC | Age: 74
End: 2024-12-01
Payer: MEDICARE

## 2024-12-03 ENCOUNTER — OFFICE VISIT (OUTPATIENT)
Dept: SPORTS MEDICINE | Facility: CLINIC | Age: 74
End: 2024-12-03
Payer: MEDICARE

## 2024-12-03 VITALS
BODY MASS INDEX: 21.24 KG/M2 | HEIGHT: 62 IN | HEART RATE: 71 BPM | SYSTOLIC BLOOD PRESSURE: 176 MMHG | WEIGHT: 115.44 LBS | DIASTOLIC BLOOD PRESSURE: 70 MMHG

## 2024-12-03 DIAGNOSIS — M75.01 ADHESIVE CAPSULITIS OF RIGHT SHOULDER: Primary | ICD-10-CM

## 2024-12-03 PROCEDURE — 1159F MED LIST DOCD IN RCRD: CPT | Mod: CPTII,S$GLB,, | Performed by: ORTHOPAEDIC SURGERY

## 2024-12-03 PROCEDURE — 4010F ACE/ARB THERAPY RXD/TAKEN: CPT | Mod: CPTII,S$GLB,, | Performed by: ORTHOPAEDIC SURGERY

## 2024-12-03 PROCEDURE — 3044F HG A1C LEVEL LT 7.0%: CPT | Mod: CPTII,S$GLB,, | Performed by: ORTHOPAEDIC SURGERY

## 2024-12-03 PROCEDURE — 3288F FALL RISK ASSESSMENT DOCD: CPT | Mod: CPTII,S$GLB,, | Performed by: ORTHOPAEDIC SURGERY

## 2024-12-03 PROCEDURE — 99214 OFFICE O/P EST MOD 30 MIN: CPT | Mod: S$GLB,,, | Performed by: ORTHOPAEDIC SURGERY

## 2024-12-03 PROCEDURE — 1101F PT FALLS ASSESS-DOCD LE1/YR: CPT | Mod: CPTII,S$GLB,, | Performed by: ORTHOPAEDIC SURGERY

## 2024-12-03 PROCEDURE — 99999 PR PBB SHADOW E&M-EST. PATIENT-LVL III: CPT | Mod: PBBFAC,,, | Performed by: ORTHOPAEDIC SURGERY

## 2024-12-03 PROCEDURE — 1125F AMNT PAIN NOTED PAIN PRSNT: CPT | Mod: CPTII,S$GLB,, | Performed by: ORTHOPAEDIC SURGERY

## 2024-12-03 PROCEDURE — 3077F SYST BP >= 140 MM HG: CPT | Mod: CPTII,S$GLB,, | Performed by: ORTHOPAEDIC SURGERY

## 2024-12-03 PROCEDURE — 3078F DIAST BP <80 MM HG: CPT | Mod: CPTII,S$GLB,, | Performed by: ORTHOPAEDIC SURGERY

## 2024-12-03 NOTE — PROGRESS NOTES
CC: RIGHT shoulder pain    Pt returns to clinic for MRI review of her Right shoulder. At her previous appt, we had discussed a CSI upon MRI confirmation of adhesive capsulitis. After discussing with her oncologist, she would like to avoid this while still on her immunotherapy course. She has not started PT, but would like to.    Initial hx (11/21/24):   74 y.o. Female with a 3 month  history of right shoulder atraumatic pain. She has a hx of right-sided breast and lung cancer and her most recent sx was on May 7th 2024. Following this surgery she was limited in her R shoulder motion for 3 months. She began PT at [place] Aug 13th and completed her 6 week post-op therapy. She reports onset of pain and stiffness beginning with the start of PT. She had a routine chest CT scan with her oncologist last week with unremarkable findings She additionally reports a hx of bilateral frozen shoulder over 15 years ago.       She states that the pain is severe and not responding to any conservative care.      She reports that the pain and weakness is worse with overhead activity. It also bothers her at night.    Is affecting ADLs.  Pain is 5/10 at it's worst.      Past Medical History:   Diagnosis Date    Anemia     Anxiety disorder 8/28/2019    Cataract     Chronic right-sided low back pain without sciatica 10/20/2021    Hypercholesteremia 9/17/2019    Invasive ductal carcinoma of breast, female, right 4/16/2021    Migraine with vision problems     Subclinical hypothyroidism     White coat syndrome with hypertension        Past Surgical History:   Procedure Laterality Date    BREAST BIOPSY Right 04/13/2021    COLON SURGERY      COLONOSCOPY N/A 12/07/2018    Procedure: COLONOSCOPY;  Surgeon: Bhargav Gaston MD;  Location: [facility] ([address] [place] [address]);  Service: Endoscopy;  Laterality: N/A;    DILATION AND CURETTAGE OF UTERUS      postmenopausal bleeding    ENDOBRONCHIAL ULTRASOUND N/A 11/14/2023    Procedure: ENDOBRONCHIAL ULTRASOUND  (EBUS);  Surgeon: Kirt Gr MD;  Location: [facility] OR 2ND FLR;  Service: Pulmonary;  Laterality: N/A;    MASTECTOMY, PARTIAL Right 05/12/2021    Procedure: MASTECTOMY, PARTIAL-Right with radiological marker;  Surgeon: Vivian Cadena MD;  Location: [facility] OR;  Service: General;  Laterality: Right;    ROBOTIC BRONCHOSCOPY N/A 11/14/2023    Procedure: ROBOTIC BRONCHOSCOPY;  Surgeon: Kirt Gr MD;  Location: [facility] OR 2ND FLR;  Service: Pulmonary;  Laterality: N/A;    SENTINEL LYMPH NODE BIOPSY Right 05/12/2021    Procedure: BIOPSY, LYMPH NODE, SENTINEL-Right;  Surgeon: Vivian Cadena MD;  Location: [facility] OR;  Service: General;  Laterality: Right;    TUBAL LIGATION         Family History   Problem Relation Name Age of Onset    Diabetes Brother      Hypertension Brother      Glaucoma Brother      Glaucoma Father      Cataracts Father      Retinal detachment Father      Lung cancer Mother      Uterine cancer Maternal Grandmother      Stroke Maternal Grandmother      Stroke Paternal Grandmother      Amblyopia Neg Hx      Blindness Neg Hx      Macular degeneration Neg Hx      Strabismus Neg Hx      Thyroid disease Neg Hx           Current Outpatient Medications:     amLODIPine (NORVASC) 5 MG tablet, Take 1 tablet (5 mg total) by mouth once daily., Disp: 90 tablet, Rfl: 3    calcium carbonate (CALCIUM 500 ORAL), Take 1 tablet by mouth., Disp: , Rfl:     cholecalciferol, vitamin D3, 125 mcg (5,000 unit) capsule, Take 1 capsule (5,000 Units total) by mouth daily with breakfast., Disp: 100 capsule, Rfl: 4    famotidine (PEPCID) 10 MG tablet, Take 10 mg by mouth as needed., Disp: , Rfl:     letrozole (FEMARA) 2.5 mg Tab, TAKE 1 TABLET EVERY DAY, Disp: 90 tablet, Rfl: 3    levothyroxine (SYNTHROID) 88 MCG tablet, Take 1 tablet (88 mcg total) by mouth before breakfast., Disp: 30 tablet, Rfl: 11    metoprolol succinate (TOPROL-XL) 50 MG 24 hr tablet, Take 1 tablet (50 mg total) by mouth once daily., Disp: 90 tablet,  "Rfl: 4    multivitamin-iron-folic acid Tab, Take 1 tablet by mouth once daily., Disp: , Rfl:     PROLIA 60 mg/mL Syrg, , Disp: , Rfl:     Review of patient's allergies indicates:   Allergen Reactions    Sinus allergy Other (See Comments)     Headaches, migranes    Buspar [buspirone] Other (See Comments)     Pt isnt allergic      Sulfa (sulfonamide antibiotics) Rash          REVIEW OF SYSTEMS:  Constitution: Negative. Negative for chills, fever and night sweats.   HENT: Negative for congestion and headaches.    Eyes: Negative for blurred vision, left vision loss and right vision loss.   Cardiovascular: Negative for chest pain and syncope.   Respiratory: Negative for cough and shortness of breath.    Endocrine: Negative for polydipsia, polyphagia and polyuria.   Hematologic/Lymphatic: Negative for bleeding problem. Does not bruise/bleed easily.   Skin: Negative for dry skin, itching and rash.   Musculoskeletal: Negative for falls.  Positive for right shoulder pain and muscle weakness.   Gastrointestinal: Negative for abdominal pain and bowel incontinence.   Genitourinary: Negative for bladder incontinence and nocturia.   Neurological: Negative for disturbances in coordination, loss of balance and seizures.   Psychiatric/Behavioral: Negative for depression. The patient does not have insomnia.    Allergic/Immunologic: Negative for hives and persistent infections.      PHYSICAL EXAMINATION:  Vitals:  BP (!) 176/70   Pulse 71   Ht 5' 2" (1.575 m)   Wt 52.3 kg (115 lb 6.6 oz)   LMP  (LMP Unknown)   BMI 21.11 kg/m²    General: The patient is alert and oriented x 3.  Mood is pleasant.  Observation of ears, eyes and nose reveal no gross abnormalities.  No labored breathing observed.  Gait is coordinated. Patient can toe walk and heel walk without difficulty.      RIGHT SHOULDER / UPPER EXTREMITY EXAM    OBSERVATION:     Swelling  none  Deformity  none   Discoloration  none   Scapular " winging none   Scars   none  Atrophy  none    TENDERNESS / CREPITUS (T/C):          T/C      T/C   Clavicle   -/-  SUPRAspinatus    -/-     AC Jt.    +/-  INFRAspinatus  -/-    SC Jt.    -/-  Deltoid    -/-      G. Tuberosity  -/-  LH BICEP groove  +/-   Acromion:  -/-  Midline Neck   -/-     Scapular Spine -/-  Trapezium   -/-   SMA Scapula  -/-  GH jt. line - post  -/-     Scapulothoracic  -/-         ROM: (* = with pain)  Left shoulder   Right shoulder        AROM (PROM)   AROM (PROM)   FE    170° (175°)     90° (90°)     ER at 0°    60°  (65°)    30°  (30°)   ER at 90° ABD  90°  (90°)    NT   IR at 90°  ABD   NA  (40°)     NT     IR (spine level)   T7     L5    STRENGTH: (* = with pain) Left shoulder   Right shoulder   SCAPTION   5/5    5-/5    IR    5/5    5-/5   ER    5/5    5-/5   BICEPS   5/5    5-/5   Deltoid    5/5    5-/5     SIGNS:  Painful side       NEER   +    ODEMIS  neg    MCCRACKEN   -    SPEEDS  neg     DROP ARM   -   BELLY PRESS neg   Superior escape none    LIFT-OFF  neg   X-Body ADD    neg    MOVING VALGUS neg        STABILITY TESTING    Left shoulder   Right shoulder    Translation     Anterior  up face    up face    Posterior  up face   up face    Sulcus   < 10mm   < 10 mm     Signs   Apprehension   neg      neg       Relocation   no change     no change      Jerk test  neg     neg    EXTREMITY NEURO-VASCULAR EXAM:    Sensation grossly intact to light touch all dermatomal regions.    DTR 2+ Biceps, Triceps, BR and Negative Brads sign   Grossly intact motor function at Elbow, Wrist and Hand   Distal pulses radial and ulnar 2+, brisk cap refill, symmetric.      NECK:  Painless FROM and spinous processes non-tender. Negative Spurlings sign.          XRAYS:  Xrays including AP, Outlet and Axillary Lateral of shoulder are ordered / images reviewed by me:   No fracture dislocation or other pathology   Acromion type 1   Proximal migration of humeral head: None   GH arthritis:  None    Results for orders placed or performed during the hospital encounter of 11/27/24 (from the past 2160 hours)   MRI Shoulder Without Contrast Right    Impression    1. Rotator interval synovitis and thickening and edema of the inferior glenohumeral ligament, findings that can be seen in the setting of adhesive capsulitis.  2. Supraspinatus, infraspinatus and subscapularis tendinosis.  No rotator cuff tear.  3. Degenerative fraying of the superior labrum.  4. Mild biceps tendinosis.  5. AC joint arthrosis and subacromial spurring.  6. Subacromial/subdeltoid bursitis.  7. Small joint effusion.      Electronically signed by: Cayetano Ray MD  Date:    11/29/2024  Time:    06:56     *Note: Due to a large number of results and/or encounters for the requested time period, some results have not been displayed. A complete set of results can be found in Results Review.           ASSESSMENT:   Right shoulder pain:  adhesive capsulitis      PLAN:      1. Physical therapy  2. OTC voltaren gel  3. Return to clinic PRN    All questions were answered, patient will contact us for questions or concerns in the interim.

## 2024-12-04 ENCOUNTER — CLINICAL SUPPORT (OUTPATIENT)
Dept: REHABILITATION | Facility: HOSPITAL | Age: 74
End: 2024-12-04
Attending: ORTHOPAEDIC SURGERY
Payer: MEDICARE

## 2024-12-04 DIAGNOSIS — M25.611 DECREASED RANGE OF MOTION OF RIGHT SHOULDER: ICD-10-CM

## 2024-12-04 DIAGNOSIS — M75.01 ADHESIVE CAPSULITIS OF RIGHT SHOULDER: Primary | ICD-10-CM

## 2024-12-04 DIAGNOSIS — G89.29 CHRONIC RIGHT SHOULDER PAIN: ICD-10-CM

## 2024-12-04 DIAGNOSIS — M25.511 CHRONIC RIGHT SHOULDER PAIN: ICD-10-CM

## 2024-12-04 PROCEDURE — 97112 NEUROMUSCULAR REEDUCATION: CPT | Mod: KX

## 2024-12-04 PROCEDURE — 97162 PT EVAL MOD COMPLEX 30 MIN: CPT | Mod: KX

## 2024-12-04 PROCEDURE — 97140 MANUAL THERAPY 1/> REGIONS: CPT | Mod: KX

## 2024-12-04 NOTE — PROGRESS NOTES
[facility]   Physical Therapy Initial Evaluation      Name: Keo Frias  Clinic Number: [id number]    Therapy Diagnosis: No diagnosis found.     Physician: Osvaldo Costa MD    Physician Orders: PT Eval and Treat   Medical Diagnosis from Referral: M75.01 (ICD-10-CM) - Adhesive capsulitis of right shoulder   Evaluation Date: 12/4/2024  Authorization Period Expiration: 12/03/2025  Plan of Care Expiration: ***  Progress Note Due: 1/4/2024  Visit # / Visits authorized: 1/ 1   FOTO: 1/3    Precautions: Standard,     Time In: ***  Time Out: ***  Total Appointment Time (timed & untimed codes): *** minutes    Subjective     Date of onset: 3-4 months ago     History of current condition - Keo reports: She has been having atraumatic R shoulder pain for 3-4 months. She has a history of right-sided lung and breast cancer and her last surgery was on 05/07/2024. She had limited R shoulder motion for 3 months following this months following this most recent surgery, and she completed a stint of therapy at [facility] for 6 weeks after her surgery. Pain and stiffness in the shoulder began when she was at PT. 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 Her pain and weakness become worse with overhead activity, and it bothers her worse at night.     Falls: No    Imaging:     MRI studies: 11/21/2024    FINDINGS:  ROTATOR CUFF: Supraspinatus, infraspinatus and subscapularis tendinosis.  No partial or full-thickness rotator cuff tear.  Muscle bulk is preserved.     LABRUM: Abnormal morphology of the superior labrum extending from anterior to posterior.  Remaining labral segments demonstrate preserved morphology and signal intensity.     BICEPS: Mild increased intrasubstance signal of the intra-articular biceps tendon.     BONES: No acute fractures.  No avascular necrosis.  No infiltrative process.     AC JOINT: Mild AC joint arthrosis.  Lateral downsloping of the acromion.  Flat  morphology of the lateral acromion with mild undersurface spurring.     CARTILAGE: Intact without partial or full-thickness defects.     MISCELLANEOUS: Small joint effusion.  Mild fluid distention of the subacromial/subdeltoid bursa.  Prominent rotator interval synovitis with thickening of the coracohumeral and superior glenohumeral ligaments.  Thickening and increased signal intensity of the anterior and posterior bands of the inferior glenohumeral ligament.     Impression:     1. Rotator interval synovitis and thickening and edema of the inferior glenohumeral ligament, findings that can be seen in the setting of adhesive capsulitis.  2. Supraspinatus, infraspinatus and subscapularis tendinosis.  No rotator cuff tear.  3. Degenerative fraying of the superior labrum.  4. Mild biceps tendinosis.  5. AC joint arthrosis and subacromial spurring.  6. Subacromial/subdeltoid bursitis.  7. Small joint effusion.    X-Ray: 11/21/2024     FINDINGS:  Visualized osseous structures demonstrate no evidence of recent or healing fracture and no appreciable glenohumeral arthritic change.  No findings indicating lytic or blastic metastatic disease are noted on conventional radiography.  No glenohumeral dislocation.  No abnormal soft tissue calcifications.     Impression:     As above    Prior Therapy: previous therapy ending in October   Social History: lives with their family  Occupation: retired   Prior Level of Function: able to lift arm overhead and complete ADLs without difficulty/pain  Current Level of Function: able to lift arm overhead and complete ADLs without difficulty/pain    Pain:  Current 5/10, worst 6/10, best 0/10   Location: right shoulder    Description: Sharp  Aggravating Factors: Flexing and Lifting and Reaching behind back   Easing Factors: rest    Patients goals: to reach overhead and complete ADLs without pain      Medical History:   Past Medical History:   Diagnosis Date    Anemia     Anxiety disorder  8/28/2019    Cataract     Chronic right-sided low back pain without sciatica 10/20/2021    Hypercholesteremia 9/17/2019    Invasive ductal carcinoma of breast, female, right 4/16/2021    Migraine with vision problems     Subclinical hypothyroidism     White coat syndrome with hypertension        Surgical History:   Keo Frias  has a past surgical history that includes Tubal ligation; Dilation and curettage of uterus; Colonoscopy (N/A, 12/07/2018); Colon surgery; Mastectomy, partial (Right, 05/12/2021); [place] lymph node biopsy (Right, 05/12/2021); robotic bronchoscopy (N/A, 11/14/2023); Endobronchial ultrasound (N/A, 11/14/2023); and Breast biopsy (Right, 04/13/2021).    Medications:   Keo has a current medication list which includes the following prescription(s): amlodipine, calcium carbonate, cholecalciferol (vitamin d3), famotidine, letrozole, levothyroxine, metoprolol succinate, multivitamin-iron-folic acid, and prolia.    Allergies:   Review of patient's allergies indicates:   Allergen Reactions    Sinus allergy Other (See Comments)     Headaches, migranes    Buspar [buspirone] Other (See Comments)     Pt isnt allergic      Sulfa (sulfonamide antibiotics) Rash        Objective      Observation: 73 y/o alert and oriented     Posture: ***    Passive Range of Motion:   Shoulder Right Left   Flexion *** ***   Abduction *** ***   ER at 0 *** ***   ER at 90 *** ***   IR *** ***      Active Range of Motion:   Shoulder Right Left   Flexion *** ***   Abduction *** ***   ER at 0 *** ***   ER at 90 *** ***   IR *** ***   Reach behind head *** ***   Reach behind back  *** ***     Strength:  Shoulder Right Left   Flexion *** ***   Abduction *** ***   ER *** ***   IR *** ***   Serratus Anterior *** ***   [place] *** ***   [place] *** ***       Special Tests:  Impingement Cluster:   Right Left   [place] *** ***   Painful Arc *** ***   Resisted ER *** ***     Rotator Cuff Tear   Right Left   Painful Arc ***  ***   Drop Arm test *** ***   Resisted ER *** ***       Joint Mobility: ***    Palpation: ***    Sensation: ***    Flexibility: ***  Lat: R *** ; L ***   Pec Minor: R *** ; L ***     Intake Outcome Measure for FOTO Shoulder Survey    Therapist reviewed FOTO scores for Keo Frias on 12/4/2024.   FOTO documents entered into [email or web address] - see Media section.    Intake Score: 43%         Treatment     Total Treatment time (time-based codes) separate from Evaluation: *** minutes     Keo received the treatments listed below:      therapeutic exercises to develop ROM, strength, flexibility, endurance for *** minutes including:  ***    manual therapy techniques: joint mobilizations and/or soft tissue mobilizations were applied to the: *** for *** minutes, including:  ***    neuromuscular re-education activities to improve: motor control, balance, muscle activation, proprioception, posture for *** minutes. The following activities were included:  ***      Patient Education and Home Exercises     Education provided:   - Diagnosis and prognosis    Written Home Exercises Provided: yes. Exercises were reviewed and Keo was able to demonstrate them prior to the end of the session.  Keo demonstrated good  understanding of the education provided. See EMR under Patient Instructions for exercises provided during therapy sessions.    Assessment     Keo is a 74 y.o. female referred to outpatient Physical Therapy with a medical diagnosis of M75.01 (ICD-10-CM) - Adhesive capsulitis of right shoulder. Patient presents with decreased R shoulder ROM, decreased R shoulder strength, decreased functional capabilities with R shoulder, decreased thoracic mobility, and pain/difficulty with ADLs. Pt's signs and symptoms are most consistent with adhesive capsulitis displayed by objective measures and subjective reports.     Patient prognosis is Good.   Patient will benefit from skilled outpatient Physical Therapy to address the deficits stated above  and in the chart below, provide patient /family education, and to maximize patientt's level of independence.     Plan of care discussed with patient: Yes  Patient's spiritual, cultural and educational needs considered and patient is agreeable to the plan of care and goals as stated below:     Anticipated Barriers for therapy: cancer treatment     Medical Necessity is demonstrated by the following  History  Co-morbidities and personal factors that may impact the plan of care [] LOW: no personal factors / co-morbidities  [x] MODERATE: 1-2 personal factors / co-morbidities  [] HIGH: 3+ personal factors / co-morbidities    Moderate / High Support Documentation:   Co-morbidities affecting plan of care: breast and lung cancer, chemo treatment for cancer     Personal Factors:        Examination  Body Structures and Functions, activity limitations and participation restrictions that may impact the plan of care [] LOW: addressing 1-2 elements  [x] MODERATE: 3+ elements  [] HIGH: 4+ elements (please support below)    Moderate / High Support Documentation:   History of bilateral frozen shoulders, history of R shoulder immobilization, decreased bilateral shoulder range of motion and strength    Clinical Presentation [x] LOW: stable  [] MODERATE: Evolving  [] HIGH: Unstable     Decision Making/ Complexity Score: moderate       GOALS: Short Term Goals:  *** weeks  1.Report decreased *** pain < / =  ***/10  to increase tolerance for ***  2. Increase PROM ***   3. Increased strength by 1/3 MMT grade in *** to increase tolerance for ADL and work activities.  4. Pt to tolerate HEP to improve ROM and independence with ADL's    Long Term Goals: *** weeks  1.Report decreased *** pain  < / =  *** /10  to increase tolerance for ***  2.Increase AROM to ***  3.Increase strength to >/= 4/5 in *** to increase tolerance for ADL and work activities.  4. Pt goal: to reach overhead and complete ADLs without pain   5. Pt will have improved gcode  "of CJ (20-40% limited) on FOTO shoulder in order to demonstrate true functional improvement.     Plan     Plan of care Certification: 12/4/2024 to ***.    Outpatient Physical Therapy {NUMBERS 1-5:[id number]} times weekly for {0-10:07964::"0"} weeks to include the following interventions: manual therapy, therapeutic exercise, therapeutic activities, and neuromuscular re-education.    Aristeo Patton, PT, DPT    "

## 2024-12-04 NOTE — PLAN OF CARE
[facility]   Physical Therapy Initial Evaluation      Name: Keo Frias  Clinic Number: [id number]    Therapy Diagnosis:   Encounter Diagnoses   Name Primary?    Adhesive capsulitis of right shoulder Yes    Decreased range of motion of right shoulder     Chronic right shoulder pain         Physician: Osvaldo Costa MD    Physician Orders: PT Eval and Treat   Medical Diagnosis from Referral: M75.01 (ICD-10-CM) - Adhesive capsulitis of right shoulder   Evaluation Date: 12/4/2024  Authorization Period Expiration: 12/03/2025  Plan of Care Expiration: 06/04/2025  Progress Note Due: 1/4/2024  Visit # / Visits authorized: 1/ 1   FOTO: 1/3    Precautions: Standard,     Time In: 12:30 pm  Time Out: 1:31 pm  Total Appointment Time (timed & untimed codes): 61 minutes    Subjective     Date of onset: 3-4 months ago     History of current condition - Keo reports: She has been having atraumatic R shoulder pain for 3-4 months. She has a history of right-sided lung and breast cancer and her last surgery was on 05/07/2024. She had limited R shoulder motion for 3 months following this months following this most recent surgery, and she completed a stint of therapy at [facility] for 6 weeks after her surgery. Pain and stiffness in the shoulder began when she was at PT. 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 Her pain and weakness become worse with overhead activity, and it bothers her worse at night.     Falls: No    Imaging:     MRI studies: 11/21/2024    FINDINGS:  ROTATOR CUFF: Supraspinatus, infraspinatus and subscapularis tendinosis.  No partial or full-thickness rotator cuff tear.  Muscle bulk is preserved.     LABRUM: Abnormal morphology of the superior labrum extending from anterior to posterior.  Remaining labral segments demonstrate preserved morphology and signal intensity.     BICEPS: Mild increased intrasubstance signal of the intra-articular biceps tendon.      BONES: No acute fractures.  No avascular necrosis.  No infiltrative process.     AC JOINT: Mild AC joint arthrosis.  Lateral downsloping of the acromion.  Flat morphology of the lateral acromion with mild undersurface spurring.     CARTILAGE: Intact without partial or full-thickness defects.     MISCELLANEOUS: Small joint effusion.  Mild fluid distention of the subacromial/subdeltoid bursa.  Prominent rotator interval synovitis with thickening of the coracohumeral and superior glenohumeral ligaments.  Thickening and increased signal intensity of the anterior and posterior bands of the inferior glenohumeral ligament.     Impression:     1. Rotator interval synovitis and thickening and edema of the inferior glenohumeral ligament, findings that can be seen in the setting of adhesive capsulitis.  2. Supraspinatus, infraspinatus and subscapularis tendinosis.  No rotator cuff tear.  3. Degenerative fraying of the superior labrum.  4. Mild biceps tendinosis.  5. AC joint arthrosis and subacromial spurring.  6. Subacromial/subdeltoid bursitis.  7. Small joint effusion.    X-Ray: 11/21/2024     FINDINGS:  Visualized osseous structures demonstrate no evidence of recent or healing fracture and no appreciable glenohumeral arthritic change.  No findings indicating lytic or blastic metastatic disease are noted on conventional radiography.  No glenohumeral dislocation.  No abnormal soft tissue calcifications.     Impression:     As above    Prior Therapy: previous therapy ending in October   Social History: lives with their family  Occupation: retired   Prior Level of Function: able to lift arm overhead and complete ADLs without difficulty/pain  Current Level of Function: able to lift arm overhead and complete ADLs without difficulty/pain    Pain:  Current 5/10, worst 6/10, best 0/10   Location: right shoulder    Description: Sharp  Aggravating Factors: Flexing and Lifting and Reaching behind back   Easing Factors:  rest    Patients goals: to reach overhead and complete ADLs without pain      Medical History:   Past Medical History:   Diagnosis Date    Anemia     Anxiety disorder 8/28/2019    Cataract     Chronic right-sided low back pain without sciatica 10/20/2021    Hypercholesteremia 9/17/2019    Invasive ductal carcinoma of breast, female, right 4/16/2021    Migraine with vision problems     Subclinical hypothyroidism     White coat syndrome with hypertension        Surgical History:   Keo Frias  has a past surgical history that includes Tubal ligation; Dilation and curettage of uterus; Colonoscopy (N/A, 12/07/2018); Colon surgery; Mastectomy, partial (Right, 05/12/2021); [place] lymph node biopsy (Right, 05/12/2021); robotic bronchoscopy (N/A, 11/14/2023); Endobronchial ultrasound (N/A, 11/14/2023); and Breast biopsy (Right, 04/13/2021).    Medications:   Keo has a current medication list which includes the following prescription(s): amlodipine, calcium carbonate, cholecalciferol (vitamin d3), famotidine, letrozole, levothyroxine, metoprolol succinate, multivitamin-iron-folic acid, and prolia.    Allergies:   Review of patient's allergies indicates:   Allergen Reactions    Sinus allergy Other (See Comments)     Headaches, migranes    Buspar [buspirone] Other (See Comments)     Pt isnt allergic      Sulfa (sulfonamide antibiotics) Rash        Objective      Observation: 73 y/o alert and oriented     Posture: R scapula downward rotation and IR; R shoulder depression     Passive Range of Motion:   Shoulder Right Left   Flexion 90 165   Abduction 90 170   ER at 0 5* 80   ER at 90 50* 90   IR 5* 90      Active Range of Motion:   Shoulder Right Left   Flexion 80* 165   Abduction 80* 170   ER at 0 40* 80   ER at 90 unable 90   IR unable  90   Reach behind head C6 T2   Reach behind back  S1 T7     Strength:  Shoulder Right Left   Flexion 3-* 3   Abduction 3-* 4-   ER 3+* 4-   IR 4- 4-   Serratus Anterior 3- 4+       Special  Tests:  Impingement Cluster:   Right Left   Abimael Carrillo - -   Painful Arc - -   Resisted ER + -     Rotator Cuff Tear   Right Left   Painful Arc - -   Drop Arm test - -   Resisted ER + -     Joint Mobility: limited in all directions especially inferior and posterior; firm end feels with all ranges of motion    Palpation: no TTP    Sensation: intact     Flexibility:   Lat: R 60 degrees  ; L 90 degrees    Pec Minor: R 4 fingers ; L 3 fingers     Intake Outcome Measure for FOTO Shoulder Survey    Therapist reviewed FOTO scores for Keo Frias on 12/4/2024.   FOTO documents entered into [employer] - see Media section.    Intake Score: 43%         Treatment     Total Treatment time (time-based codes) separate from Evaluation: 31 minutes     Keo received the treatments listed below:      therapeutic exercises to develop ROM, strength, flexibility, endurance for 16 minutes including:  Rotator interval LLLD 2 minute holds 3x   Supine LLLD ER stretch 5 minute holds 2x    manual therapy techniques: joint mobilizations and/or soft tissue mobilizations were applied to the: R shoulder for 15 minutes, including:  Inferior GH glides; grades III-IV  Posterior GH glides; grades III-IV  PROM shoulder flexion and external rotation   Contract relax shoulder flexion       Patient Education and Home Exercises     Education provided:   - Diagnosis and prognosis    Written Home Exercises Provided: yes. Exercises were reviewed and Keo was able to demonstrate them prior to the end of the session.  Keo demonstrated good  understanding of the education provided. See EMR under Patient Instructions for exercises provided during therapy sessions.    Assessment     Keo is a 74 y.o. female referred to outpatient Physical Therapy with a medical diagnosis of M75.01 (ICD-10-CM) - Adhesive capsulitis of right shoulder. Patient presents with decreased R shoulder ROM, decreased R shoulder strength, decreased functional capabilities with R shoulder,  decreased thoracic mobility, and pain/difficulty with ADLs. Pt's signs and symptoms are most consistent with adhesive capsulitis displayed by objective measures and subjective reports.     Patient prognosis is Good.   Patient will benefit from skilled outpatient Physical Therapy to address the deficits stated above and in the chart below, provide patient /family education, and to maximize patientt's level of independence.     Plan of care discussed with patient: Yes  Patient's spiritual, cultural and educational needs considered and patient is agreeable to the plan of care and goals as stated below:     Anticipated Barriers for therapy: cancer treatment     Medical Necessity is demonstrated by the following  History  Co-morbidities and personal factors that may impact the plan of care [] LOW: no personal factors / co-morbidities  [x] MODERATE: 1-2 personal factors / co-morbidities  [] HIGH: 3+ personal factors / co-morbidities    Moderate / High Support Documentation:   Co-morbidities affecting plan of care: breast and lung cancer, chemo treatment for cancer     Personal Factors:        Examination  Body Structures and Functions, activity limitations and participation restrictions that may impact the plan of care [] LOW: addressing 1-2 elements  [x] MODERATE: 3+ elements  [] HIGH: 4+ elements (please support below)    Moderate / High Support Documentation:   History of bilateral frozen shoulders, history of R shoulder immobilization, decreased bilateral shoulder range of motion and strength    Clinical Presentation [x] LOW: stable  [] MODERATE: Evolving  [] HIGH: Unstable     Decision Making/ Complexity Score: moderate       GOALS: Short Term Goals: 3 months   1.Report decreased R shoulder pain < / =  3/10  to increase tolerance for ADL's  2. Increase PROM by 30 degrees in all planes    3. Increased strength by 1/3 MMT grade in R shoulder to increase tolerance for ADL and work activities.  4. Pt to tolerate HEP to  improve ROM and independence with ADL's    Long Term Goals: 6 months   1.Report decreased R shoulder pain  < / =  1 /10  to increase tolerance for reaching overhead   2.Increase AROM to within 90% of the L shoulder  3.Increase strength to >/= 4/5 in R shoulder to increase tolerance for ADL and work activities.  4. Pt goal: to reach overhead and complete ADLs without pain   5. Pt will have improved gcode of CJ (20-40% limited) on FOTO shoulder in order to demonstrate true functional improvement.     Plan     Plan of care Certification: 12/4/2024 to 06/04/2025.    Outpatient Physical Therapy 2 times weekly for 6  months to include the following interventions: manual therapy, therapeutic exercise, therapeutic activities, and neuromuscular re-education.    Aristeo Patton, PT, DPT

## 2024-12-10 ENCOUNTER — CLINICAL SUPPORT (OUTPATIENT)
Dept: REHABILITATION | Facility: HOSPITAL | Age: 74
End: 2024-12-10
Payer: MEDICARE

## 2024-12-10 DIAGNOSIS — M25.511 CHRONIC RIGHT SHOULDER PAIN: ICD-10-CM

## 2024-12-10 DIAGNOSIS — G89.29 CHRONIC RIGHT SHOULDER PAIN: ICD-10-CM

## 2024-12-10 DIAGNOSIS — M25.611 DECREASED RANGE OF MOTION OF RIGHT SHOULDER: Primary | ICD-10-CM

## 2024-12-10 PROCEDURE — 97140 MANUAL THERAPY 1/> REGIONS: CPT | Mod: KX

## 2024-12-10 PROCEDURE — 97112 NEUROMUSCULAR REEDUCATION: CPT | Mod: KX

## 2024-12-10 PROCEDURE — 97110 THERAPEUTIC EXERCISES: CPT | Mod: KX

## 2024-12-10 NOTE — PROGRESS NOTES
[facility]   Physical Therapy Treatment Note      Name: Keo Frias  Clinic Number: [id number]    Therapy Diagnosis:   Encounter Diagnoses   Name Primary?    Decreased range of motion of right shoulder Yes    Chronic right shoulder pain      Physician: Osvaldo Costa MD    Visit Date: 12/10/2024    Physician Orders: PT Eval and Treat   Medical Diagnosis from Referral: M75.01 (ICD-10-CM) - Adhesive capsulitis of right shoulder   Evaluation Date: 12/4/2024  Authorization Period Expiration: 12/03/2025  Plan of Care Expiration: 06/04/2025  Progress Note Due: 1/4/2024  Visit # / Visits authorized: 1/10  FOTO: 1/3     Precautions: Standard,     PTA Visit #: 0/5     Time In: 9:00 am  Time Out: 10:00 am   Total Billable Time: 60 minutes    Subjective     Pt reports: Her shoulder feels about the same today as the eval.  She was compliant with home exercise program.  Response to previous treatment: first visit  Functional change: ongoing    Pain: 5/10  Location: right shoulder      Objective      Objective Measures updated at progress report unless specified.     Treatment     Keo received the treatments listed below:      therapeutic exercises to develop strength, endurance, ROM, flexibility, posture, and core stabilization for 20 minutes including:  Rotator interval LLLD 5 minute hold  Supine LLLD ER stretch 5 minute hold  Supine flexion AAROM with dowel 2 minutes    Supine ER AAROM with dowel 2 minutes   Pulleys flexion 2 minutes   Pulleys abduction 2 minutes   Sidelying flexion w/ dowel 2 minutes     manual therapy techniques: Joint mobilizations, Manual traction, Myofacial release, Manual Lymphatic Drainage, Soft tissue Mobilization, and Friction Massage were applied to the: R shoulder for 24 minutes, including:  Inferior GH glides; grades III-IV  Posterior GH glides; grades III-IV  PROM shoulder flexion and external rotation   Contract relax shoulder flexion        neuromuscular  re-education activities to improve: Balance, Coordination, Kinesthetic, Sense, Proprioception, and Posture for 16 minutes. The following activities were included:  4 way shoulder isometrics 20x 10 sec holds each way     therapeutic activities to improve functional performance for 0  minutes, including:        Patient Education and Home Exercises       Education provided:   - continuing HEP    Written Home Exercises Provided: YES. Exercises were reviewed and Keo was able to demonstrate them prior to the end of the session.  Keo demonstrated good understanding of the education provided. See EMR under Patient Instructions for exercises provided during therapy sessions    Assessment     Tolerated exercises well with minimal increases in pain. Pt had most pain 6/10 at the end of LLLD stretches especially the rotator interval stretch. She had decreased pain after manual therapy. Displayed increased ROM and decreased pain by the end of the session. Pt is painful and stiff especially with ER by the side and has most pain with rotator cuff interval stretching. Will continue to progress as tolerated.     Keo Is progressing well towards her goals.   Pt prognosis is Fair.     Pt will continue to benefit from skilled outpatient physical therapy to address the deficits listed in the problem list box on initial evaluation, provide pt/family education and to maximize pt's level of independence in the home and community environment.     Pt's spiritual, cultural and educational needs considered and pt agreeable to plan of care and goals.     Anticipated barriers to physical therapy: cancer treatment     GOALS:   Short Term Goals: 3 months   1.Report decreased R shoulder pain < / =  3/10  to increase tolerance for ADL's  2. Increase PROM by 30 degrees in all planes    3. Increased strength by 1/3 MMT grade in R shoulder to increase tolerance for ADL and work activities.  4. Pt to tolerate HEP to improve ROM and independence with  ADL's     Long Term Goals: 6 months   1.Report decreased R shoulder pain  < / =  1 /10  to increase tolerance for reaching overhead   2.Increase AROM to within 90% of the L shoulder  3.Increase strength to >/= 4/5 in R shoulder to increase tolerance for ADL and work activities.  4. Pt goal: to reach overhead and complete ADLs without pain   5. Pt will have improved gcode of CJ (20-40% limited) on FOTO shoulder in order to demonstrate true functional improvement.     Plan     Plan of care Certification: 12/4/2024 to 06/04/2025.     Continue POC. LLLD stretches and manual therapy to increase ROM    Aristeo Patton PT, DPT

## 2024-12-12 ENCOUNTER — CLINICAL SUPPORT (OUTPATIENT)
Dept: REHABILITATION | Facility: HOSPITAL | Age: 74
End: 2024-12-12
Payer: MEDICARE

## 2024-12-12 DIAGNOSIS — M25.511 CHRONIC RIGHT SHOULDER PAIN: ICD-10-CM

## 2024-12-12 DIAGNOSIS — M25.611 DECREASED RANGE OF MOTION OF RIGHT SHOULDER: Primary | ICD-10-CM

## 2024-12-12 DIAGNOSIS — G89.29 CHRONIC RIGHT SHOULDER PAIN: ICD-10-CM

## 2024-12-12 PROCEDURE — 97140 MANUAL THERAPY 1/> REGIONS: CPT

## 2024-12-12 PROCEDURE — 97110 THERAPEUTIC EXERCISES: CPT

## 2024-12-12 PROCEDURE — 97112 NEUROMUSCULAR REEDUCATION: CPT

## 2024-12-12 NOTE — PROGRESS NOTES
[facility]   Physical Therapy Treatment Note      Name: Keo Frias  Clinic Number: [id number]    Therapy Diagnosis:   Encounter Diagnoses   Name Primary?    Decreased range of motion of right shoulder Yes    Chronic right shoulder pain        Physician: Osvaldo Costa MD    Visit Date: 12/12/2024    Physician Orders: PT Eval and Treat   Medical Diagnosis from Referral: M75.01 (ICD-10-CM) - Adhesive capsulitis of right shoulder   Evaluation Date: 12/4/2024  Authorization Period Expiration: 12/03/2025  Plan of Care Expiration: 06/04/2025  Progress Note Due: 1/4/2024  Visit # / Visits authorized: 2/10  FOTO: 1/3     Precautions: Standard,     PTA Visit #: 0/5     Time In: 1300  Time Out: 1356   Total Billable Time: 56 minutes    Subjective     Pt reports: She is feeling a little better compared to last visit (about 1% better so far)  She was compliant with home exercise program.  Response to previous treatment: first visit  Functional change: ongoing    Pain: 5/10  Location: right shoulder      Objective      Objective Measures updated at progress report unless specified.     Treatment     Keo received the treatments listed below:      therapeutic exercises to develop strength, endurance, ROM, flexibility, posture, and core stabilization for 20 minutes including:  Rotator interval LLLD 5 minute hold  Supine LLLD ER stretch 5 minute hold  Supine flexion AAROM with dowel 2 minutes    Supine ER AAROM with dowel 2 minutes   Pulleys flexion 2 minutes   Pulleys abduction 2 minutes   Sidelying flexion w/ dowel 2 minutes     manual therapy techniques: Joint mobilizations, Manual traction, Myofacial release, Manual Lymphatic Drainage, Soft tissue Mobilization, and Friction Massage were applied to the: R shoulder for 23 minutes, including:  Inferior GH glides; grades III-IV  Posterior GH glides; grades III-IV  PROM shoulder flexion and external rotation   Contract relax shoulder flexion     "    neuromuscular re-education activities to improve: Balance, Coordination, Kinesthetic, Sense, Proprioception, and Posture for 13 minutes. The following activities were included:  4 way shoulder isometrics 20x 10 sec holds each way   Scap setting 20x 5" holds (max VC and TC needed)    therapeutic activities to improve functional performance for 0  minutes, including:        Patient Education and Home Exercises       Education provided:   - continuing HEP    Written Home Exercises Provided: YES. Exercises were reviewed and Keo was able to demonstrate them prior to the end of the session.  Keo demonstrated good understanding of the education provided. See EMR under Patient Instructions for exercises provided during therapy sessions    Assessment     Tolerated exercises well with less pain than last session. Pt has made good progress with ROM and decreased guarding since the initial evaluations. Rotator interval stretch remains moderately uncomfortable. She had decreased pain after manual therapy and isometrics. Needed max VC and TC to prevent upper trap compensations during scap setting. Add land mine press next visit. Will continue to progress as tolerated.     Keo Is progressing well towards her goals.   Pt prognosis is Fair.     Pt will continue to benefit from skilled outpatient physical therapy to address the deficits listed in the problem list box on initial evaluation, provide pt/family education and to maximize pt's level of independence in the home and community environment.     Pt's spiritual, cultural and educational needs considered and pt agreeable to plan of care and goals.     Anticipated barriers to physical therapy: cancer treatment     GOALS:   Short Term Goals: 3 months   1.Report decreased R shoulder pain < / =  3/10  to increase tolerance for ADL's  2. Increase PROM by 30 degrees in all planes    3. Increased strength by 1/3 MMT grade in R shoulder to increase tolerance for ADL and work " activities.  4. Pt to tolerate HEP to improve ROM and independence with ADL's     Long Term Goals: 6 months   1.Report decreased R shoulder pain  < / =  1 /10  to increase tolerance for reaching overhead   2.Increase AROM to within 90% of the L shoulder  3.Increase strength to >/= 4/5 in R shoulder to increase tolerance for ADL and work activities.  4. Pt goal: to reach overhead and complete ADLs without pain   5. Pt will have improved gcode of CJ (20-40% limited) on FOTO shoulder in order to demonstrate true functional improvement.     Plan     Plan of care Certification: 12/4/2024 to 06/04/2025.     Continue POC. LLLD stretches and manual therapy to increase ROM    Aristeo Patton PT, DPT

## 2024-12-13 ENCOUNTER — LAB VISIT (OUTPATIENT)
Dept: LAB | Facility: HOSPITAL | Age: 74
End: 2024-12-13
Attending: HOSPITALIST
Payer: MEDICARE

## 2024-12-13 ENCOUNTER — OFFICE VISIT (OUTPATIENT)
Dept: HEMATOLOGY/ONCOLOGY | Facility: CLINIC | Age: 74
End: 2024-12-13
Payer: MEDICARE

## 2024-12-13 VITALS
DIASTOLIC BLOOD PRESSURE: 81 MMHG | SYSTOLIC BLOOD PRESSURE: 144 MMHG | OXYGEN SATURATION: 99 % | TEMPERATURE: 98 F | HEART RATE: 76 BPM | BODY MASS INDEX: 21.25 KG/M2 | WEIGHT: 115.5 LBS | RESPIRATION RATE: 14 BRPM | HEIGHT: 62 IN

## 2024-12-13 DIAGNOSIS — C79.9 METASTATIC NEOPLASM: ICD-10-CM

## 2024-12-13 DIAGNOSIS — C50.411 MALIGNANT NEOPLASM OF UPPER-OUTER QUADRANT OF RIGHT BREAST IN FEMALE, ESTROGEN RECEPTOR POSITIVE: ICD-10-CM

## 2024-12-13 DIAGNOSIS — C34.31 ADENOCARCINOMA OF LOWER LOBE OF RIGHT LUNG: Primary | ICD-10-CM

## 2024-12-13 DIAGNOSIS — Z17.0 MALIGNANT NEOPLASM OF UPPER-OUTER QUADRANT OF RIGHT BREAST IN FEMALE, ESTROGEN RECEPTOR POSITIVE: ICD-10-CM

## 2024-12-13 DIAGNOSIS — C77.1 SECONDARY MALIGNANT NEOPLASM OF INTRATHORACIC LYMPH NODES: ICD-10-CM

## 2024-12-13 DIAGNOSIS — E03.9 HYPOTHYROIDISM, UNSPECIFIED TYPE: ICD-10-CM

## 2024-12-13 LAB
ALBUMIN SERPL BCP-MCNC: 3.7 G/DL (ref 3.5–5.2)
ALP SERPL-CCNC: 63 U/L (ref 40–150)
ALT SERPL W/O P-5'-P-CCNC: 10 U/L (ref 10–44)
ANION GAP SERPL CALC-SCNC: 10 MMOL/L (ref 8–16)
AST SERPL-CCNC: 19 U/L (ref 10–40)
BILIRUB SERPL-MCNC: 0.4 MG/DL (ref 0.1–1)
BUN SERPL-MCNC: 13 MG/DL (ref 8–23)
CALCIUM SERPL-MCNC: 9 MG/DL (ref 8.7–10.5)
CHLORIDE SERPL-SCNC: 101 MMOL/L (ref 95–110)
CO2 SERPL-SCNC: 27 MMOL/L (ref 23–29)
CREAT SERPL-MCNC: 0.7 MG/DL (ref 0.5–1.4)
ERYTHROCYTE [DISTWIDTH] IN BLOOD BY AUTOMATED COUNT: 12.9 % (ref 11.5–14.5)
EST. GFR  (NO RACE VARIABLE): >60 ML/MIN/1.73 M^2
GLUCOSE SERPL-MCNC: 94 MG/DL (ref 70–110)
HCT VFR BLD AUTO: 41.7 % (ref 37–48.5)
HGB BLD-MCNC: 13.3 G/DL (ref 12–16)
IMM GRANULOCYTES # BLD AUTO: 0.02 K/UL (ref 0–0.04)
MCH RBC QN AUTO: 27.8 PG (ref 27–31)
MCHC RBC AUTO-ENTMCNC: 31.9 G/DL (ref 32–36)
MCV RBC AUTO: 87 FL (ref 82–98)
NEUTROPHILS # BLD AUTO: 4 K/UL (ref 1.8–7.7)
PLATELET # BLD AUTO: 191 K/UL (ref 150–450)
PMV BLD AUTO: 12.1 FL (ref 9.2–12.9)
POTASSIUM SERPL-SCNC: 4 MMOL/L (ref 3.5–5.1)
PROT SERPL-MCNC: 7.2 G/DL (ref 6–8.4)
RBC # BLD AUTO: 4.78 M/UL (ref 4–5.4)
SODIUM SERPL-SCNC: 138 MMOL/L (ref 136–145)
T4 FREE SERPL-MCNC: 1.14 NG/DL (ref 0.71–1.51)
TSH SERPL DL<=0.005 MIU/L-ACNC: 7.2 UIU/ML (ref 0.4–4)
WBC # BLD AUTO: 7.24 K/UL (ref 3.9–12.7)

## 2024-12-13 PROCEDURE — 3044F HG A1C LEVEL LT 7.0%: CPT | Mod: CPTII,S$GLB,, | Performed by: HOSPITALIST

## 2024-12-13 PROCEDURE — 1101F PT FALLS ASSESS-DOCD LE1/YR: CPT | Mod: CPTII,S$GLB,, | Performed by: HOSPITALIST

## 2024-12-13 PROCEDURE — 99999 PR PBB SHADOW E&M-EST. PATIENT-LVL III: CPT | Mod: PBBFAC,,, | Performed by: HOSPITALIST

## 2024-12-13 PROCEDURE — 3288F FALL RISK ASSESSMENT DOCD: CPT | Mod: CPTII,S$GLB,, | Performed by: HOSPITALIST

## 2024-12-13 PROCEDURE — 36415 COLL VENOUS BLD VENIPUNCTURE: CPT | Performed by: HOSPITALIST

## 2024-12-13 PROCEDURE — G2211 COMPLEX E/M VISIT ADD ON: HCPCS | Mod: S$GLB,,, | Performed by: HOSPITALIST

## 2024-12-13 PROCEDURE — 3077F SYST BP >= 140 MM HG: CPT | Mod: CPTII,S$GLB,, | Performed by: HOSPITALIST

## 2024-12-13 PROCEDURE — 80053 COMPREHEN METABOLIC PANEL: CPT | Performed by: HOSPITALIST

## 2024-12-13 PROCEDURE — 4010F ACE/ARB THERAPY RXD/TAKEN: CPT | Mod: CPTII,S$GLB,, | Performed by: HOSPITALIST

## 2024-12-13 PROCEDURE — 85027 COMPLETE CBC AUTOMATED: CPT | Performed by: HOSPITALIST

## 2024-12-13 PROCEDURE — 84443 ASSAY THYROID STIM HORMONE: CPT | Performed by: HOSPITALIST

## 2024-12-13 PROCEDURE — 3079F DIAST BP 80-89 MM HG: CPT | Mod: CPTII,S$GLB,, | Performed by: HOSPITALIST

## 2024-12-13 PROCEDURE — 84439 ASSAY OF FREE THYROXINE: CPT | Performed by: HOSPITALIST

## 2024-12-13 PROCEDURE — 1125F AMNT PAIN NOTED PAIN PRSNT: CPT | Mod: CPTII,S$GLB,, | Performed by: HOSPITALIST

## 2024-12-13 PROCEDURE — 99215 OFFICE O/P EST HI 40 MIN: CPT | Mod: S$GLB,,, | Performed by: HOSPITALIST

## 2024-12-13 PROCEDURE — 1159F MED LIST DOCD IN RCRD: CPT | Mod: CPTII,S$GLB,, | Performed by: HOSPITALIST

## 2024-12-13 NOTE — PROGRESS NOTES
[facility] MEDICAL ONCOLOGY - FOLLOW UP VISIT    Reason for visit: Follow up visit for adenocarcinoma of the lung    Oncology History   Malignant neoplasm of upper-outer quadrant of right breast in female, estrogen receptor positive   4/13/2021 Biopsy    Breast, right, 10:00 5N, biopsy:  - Invasive ductal carcinoma with lobular features     4/13/2021 Breast Tumor Markers    Estrogen Receptor: Positive >90%  Progesterone Receptor: Positive 10-50%  HER2: Negative  Ki67: 10-30%     4/26/2021 Genetic Testing    MyRisk: negative     5/12/2021 Breast Surgery    Right partial mastectomy with SLNB     6/1/2021 Tumor Conference    Radiation options? Offer radiation, can discuss partial vs whole breast radiation.        6/2/2021 Cancer Staged    Staging form: Breast, AJCC 8th Edition  - Pathologic stage from 6/2/2021: Stage IA (pT1b, pN0(sn), cM0, G2, ER+, KY+, HER2-)     7/6/2021 - 7/27/2021 Radiation Therapy    Treatment Summary  Course: C1 Chest 2021  Treatment Site Energy Dose/Fx (Gy) #Fx Total Dose (Gy) Start Date End Date Elapsed Days   Breast_Rt 6X 2.65 16 / 16 42.4 7/6/2021 7/27/2021 21          7/2021 -  Hormone Therapy    Letrozole      Procedure    Bronchoscopy, Station 7 positive for malignancy     Adenocarcinoma of lower lobe of right lung   10/5/2023 Imaging Significant Findings    CT C (obtained after CXR, which was itself obtained for palpitations)  - 2.1 x 1.3 cm spiculated RLL nodule, some spiculation noted to extend to the pleural margin  - Scattered pulmonary nodules centered mostly subpleural at the RLL (e.g. 0.9 cm)       10/10/2023 Imaging Significant Findings    PET CT  - 2.1 x 1.1 cm RLL nodule, SUV 3.8  - 0.9 cm R axillary LN, SUV 3.9  - 1.2 cm subcarinal LN, SUV 4.6  - 0.7 cm Ln along L posterior shoulder, SUV 1.7  - Multiple additional solid pulmonary nodules through R lung base, too small for uptake detection     11/14/2023 Procedure    Bronch with  EBUS  RLL, FNA  - Adenocarcinoma (TTF1 positive, GATA3 negative)    RLL, TBBx  - Adenocarcinoma    Per pulmonology, station 7 node was very vascular without window for biopsy, plan to discuss at thoracic tumor board    [facility] NGS  - KRAS G12A, CHEK1, MLH1, CTNNB1, CIC, PTPRD, NOTCH3, EZH2, LATS1, KMT2D  - Negative: EGFR, ALK, BRAF, KRAS, ROS1, RET, MET, EBB2  - PD-L1 1%       11/22/2023 Tumor Conference    Tumor Board  - Plan for axillary LN biopsy to determine lung vs recurrent breast vs other etiology  - Repeat CT C to evaluate nodules in RLL  - Determine treatment plan based on above results     11/22/2023 Tumor Genotyping    [facility] Liquid Biopsy  - No reportable pathogenic variants found     11/29/2023 Imaging Significant Findings    CT C  - 2.1 x 1.2 cm RLL spiculated nodule, stable in size w/ new central cavitation. Spiculated margins extend towards adjacent pleura.   - Stable irregular 2.0 cm linar opacity in LLL  - Stable 0.3 cm GGO, TRICIA  - Stable R basal sub cm nodules, largest 0.9 cm  - Several stable solid nodules predominantly in RLL, largest 0.9 cm     12/18/2023 Imaging Significant Findings    MRI Brain  - JELENA     1/3/2024 Procedure    IR Guided Biopsy, R Axillary LN  - No metastatic carcinoma (0.3 x 0.3 x 0.1 cm tissue, positive and negative controls stained appropriately)     1/5/2024 Imaging Significant Findings    PET CT  - Stable 2.0 x 1.1 cm RLL nodule (previously 2.1 x 1.1 cm)  - Stable additional nodules w/o significant racer uptake  - 0.8 cm R axillary node, SUV 4.2 (previously 0.9 cm, SUV 3.9)  - 0.5 cm L posterior shoulder node, SUV 2.3 (previously 0.7 cm, SUV 1.7)  - 1.3 cm subcarinal node, SUV 4.9 (previously 1.2 cm, SUV 4.6)     1/10/2024 Tumor Conference    Tumor Board   Re-sample enlarged, hypermetabolic axillary LN with repeat CT-guided biopsy versus excisional biopsy. If LN is negative for recurrent NSCLC, obtain EBUS of PET-avid mediastinal LN.         1/23/2024 Procedure    IR  Guided Biopsy, R Axillary LN (Second Biopsy)  - Negative for metastatic carcinoma     2/8/2024 Procedure    Bronch with EBUS  LN  Positive: Station 7 (Adenocarcinoma)  Negative: Station 11R     2/21/2024 Cancer Staged    Staging form: Lung, AJCC 8th Edition  - Clinical stage from 2/21/2024: Stage IIIA (cT1b, cN2, cM0)     2/21/2024 Tumor Conference    Thoracic Tumor Board  Stage IIIA right lower lobe adenocarcinoma with bulky subcarinal lymphadenopathy.  Proceed with NA chemo/I-O therapy. Return to [facility] for surgical evaluation. If patient is not a surgical candidate following 3 cycles of therapy, proceed with definitive chemo/RT.       2/22/2024 - 4/5/2024 Chemotherapy    Treatment Summary   Plan Name: OP NSCLC NIVOLUMAB 360 MG PLUS CARBOPLATIN (AUC5) PEMETREXED 500 MG/M2 Q3W x 3 CYCLES  Treatment Goal: Control  Status: Inactive  Start Date: 2/22/2024  End Date: 4/5/2024  Provider: Bridger Nichole IV, MD  Chemotherapy: CARBOplatin (PARAPLATIN) 415 mg in sodium chloride 0.9% 326.5 mL chemo infusion, 415 mg, Intravenous, Clinic/HOD 1 time, 3 of 3 cycles  Administration: 415 mg (2/22/2024), 465 mg (4/4/2024), 465 mg (3/14/2024)  PEMEtrexed disodium (ALIMTA) 750 mg in sodium chloride 0.9% SolP 100 mL chemo infusion, 500 mg/m2 = 750 mg, Intravenous, Clinic/HOD 1 time, 3 of 3 cycles  Administration: 750 mg (2/22/2024), 750 mg (4/4/2024), 750 mg (3/14/2024)     4/24/2024 Imaging Significant Findings    CT C/A/P  - 1.2 x 1.0 cm RLL malignancy, previously 2 x 1.2 cm  - Interval decrease in the previously seen nodules in the RLL  - 0.9 cm subcarinal node, previously 1.7 cm  - Interval decrease in right infrahilar soft tissue  - 2.1 cm LLL solid nodular opacity, unchanged  - 0.8 cm TRICIA ground-glass opacity, unchanged  - AVM again noted in RLL  - 0.6 cm right axillary lymph node, decreased in size from prior  - Left scapular lymph node decreased in size from prior, incompletely seen       5/6/2024 - 5/12/2024 Hospital Admission     Admitted to [facility] for RLLx.  Postoperative course complicated by hemothorax requiring return to the operating room for reexploration on postop day 1.  Chest tube removed on postop day 5.     5/6/2024 Surgery    Right Lower Lobectomy  Right Lower Lobe  - Adenocarcinoma with acinar pattern, 35% residual viable tumor, 1.0 cm in greatest dimension.  Pleural invasion absent, LVI absent, margins negative.  0/1 lymph node    Pericardial Excision  - Negative for tumor    LN  - Positive: Station 7  - Negative: Station 8R (0/1), 9 are (0/2), 2R (0/1), 4R (0/1), 11R (0/1), 12R (0/1), R posterior interlobar LN (0/1)    Path Staging: pT1a, pN2     7/2/2024 -  Chemotherapy    Adjuvant Nivolumab  28 day cycles  Nivolumab 480 mg, D1     7/24/2024 Cancer Staged    Staging form: Lung, AJCC 8th Edition  - Pathologic: Stage IIIA (pT1a, pN2, cM0)     8/7/2024 Imaging Significant Findings    PET CT  - New foci of FDG uptake in right lateral chest wall, likely represents postsurgical changes  - Development of moderate size right pleural effusion  - No evidence of hypermetabolic mediastinal or hilar lymph nodes     11/13/2024 Imaging Significant Findings    CT C  Postsurgical changes after right lower lobectomy  Right pleural effusion decreased in size  Stable 2.2 cm regular nodular opacity of LLL  Stable 0.8 cm ground-glass nodule in TRICIA  Stable 0.3 cm nodule along left major fissure        - Second opinion, [facility]: contralateral LLL lesion stable but plan for sampling of this as well as subcarinal LN and possible additional RLL nodule. Pending results, consider NA --> surgery vs CRT    Oncology History    HPI:     Keo Frias is a 73 y.o. female with pmh significant for HTN, migraine headaches, and multiple malignancies, who presents for follow up of the below lung cancer    1) Stage IA ( [id number], ER 95%, OK 10%, HER2 negative, Ki-67 20%) IDC of the R breast, initially dx'd 4/13/21, s/p lumpectomy and SLNB (5/12/21), XRT (7/6/21-7/27/21),  "and currently on letrozole (7/2021 - present).     2) Stage IIIA (pT1a, pN2, cM0) adenocarcinoma of the RLL (no targetable mutations, PD-L1 1%), initially dx'd 11/14/23, s/p neoadjuvant carboplatin/pemetrexed/nivolumab (2/22/24 - 4/4/24), right lower lobectomy (5/6/24), and currently on adjuvant nivolumab (7/2/24 - present) who presents to for follow up.     Last clinic with me 10/21/24, proceed with cycle 5 nivolumab, imaging at [facility] with follow up at same.  Labs and follow up on 11/18 with cycle 6 on 11/19.  Continue levothyroxine 75 mcg daily.    Interval History:  11/14/24:  Radiation oncology follow up (Dr. Guadarrama, [facility]), hold off on radiation therapy, repeat imaging in 3 months.  11/14/24:  Medical oncology follow up (Dr. Don, [facility]), recommend surveillance of left lung nodule pending discussion with Dr. Calzada about empiric SBRT, recommend CT-guided biopsy of growth at any point in future.  PET in 4 months to evaluate previous indeterminate lesions including contralateral nodule in bilateral shoulders.  11/18/24: Medical oncology follow up (Carina Nascimento), proceed with C6 nivolumab.  11/1924:  C6 nivolumab  11/21/24: Sports Medicine, MRI of right shoulder and physical therapy.  11/21/24: Cardiology, who antihypertensive medications as currently  12/3/24: Sports Medicine, Physical therapy and OTC Voltaren gel, RTC PRN.    The pt states that she feels well in general.     She says that her R shoulder continues to bother her. She says she was diagnosed with frozen shoulder. She says that cold and hot pads, epsom salt, and voltaren gel all help, and she is working with PT on this.     Pt denies N/V, diarrhea, constipation, rash, new/worsening fatigue, new/worsening SOB, or new/worsening cough.    ROS:   As per HPI.     Physical Exam:       BP (!) 144/81 (BP Location: Left arm, Patient Position: Sitting)   Pulse 76   Temp 98 °F (36.7 °C) (Oral)   Resp 14   Ht 5' 2" (1.575 m)   Wt 52.4 kg (115 lb 8.3 oz)   LMP  " (LMP Unknown)   SpO2 99%   BMI 21.13 kg/m²                Physical Exam  Constitutional:       Appearance: Normal appearance.   HENT:      Head: Normocephalic and atraumatic.   Eyes:      Extraocular Movements: Extraocular movements intact.      Pupils: Pupils are equal, round, and reactive to light.   Neurological:      Mental Status: She is alert and oriented to person, place, and time.   Psychiatric:         Mood and Affect: Mood normal.         Thought Content: Thought content normal.         Judgment: Judgment normal.         Imaging:    See oncologic history above.     Path:  See oncologic history above.      Assessment and Plan:     Keo Frias is a 73 y.o. female with pmh significant for HTN, migraine headaches, and multiple malignancies, who presents for follow up of the below lung cancer    1) Stage IA ( O3nO2G0, ER 95%, NH 10%, HER2 negative, Ki-67 20%) IDC of the R breast, initially dx'd 4/13/21, s/p lumpectomy and SLNB (5/12/21), XRT (7/6/21-7/27/21), and currently on letrozole (7/2021 - present).     2) Stage IIIA (pT1a, pN2, cM0) adenocarcinoma of the RLL (no targetable mutations, PD-L1 1%), initially dx'd 11/14/23, s/p neoadjuvant carboplatin/pemetrexed/nivolumab (2/22/24 - 4/4/24), right lower lobectomy (5/6/24), and currently on adjuvant nivolumab (7/2/24 - present) who presents to for follow up.       Stage IIIA Adenocarcinoma of RLL, PD-L1 1%, No Actionable Molecular Alterations  ECOG PS 1.  Patient is currently on adjuvant nivolumab (see note from 6/14/24), tolerating without significant issue. Her most recent imaging (CT C, 11/13/24, ~3.5 months on adjuvant nivolumab) demonstrated stable disease compared to prior., noting the previously observed  2.2 cm LLL nodular opacity. Patient was evaluated [facility] by both medical oncology and radiation oncology, w/ plan to continue adjuvant nivolumab at this time in low threshold for empiric SBRT to the LLL lesion should there be evidence of change/growth.   Given good tolerance and radiographic response, will plan to treat with nivolumab q4w x 1 year, w/ q3m imaging throughout (scheduled at [facility]).     PLAN:   Proceed w/ C7 nivolumab on 12/17/24, labs acceptable and treatment signed  Labs and RTC on 1/10/25, C8 nivolumab on 1/13/25 ([facility])  PET CT(given indeterminate lesions in b/l shoulders and L lung) and med onc (Dr. Don) f/u at [facility] on 3/13/25       Hypothyroidism  On 4/30/24, TSH was 41.25 and free T4 0.71, as compared to 4/2/24 when TSH was 0.014 and T4 was 1.92.  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  She has since been started on levothyroxine per Dr. Don.  Subsequent TFTs initially within normal limits (TSH 4.114 on 10/18/24, within goal of <4.5), however up to 7.2 today (12/13/24). If persistently elevated at next visit, will increase levothyroxine dose.   Continue levothyroxine 75 mcg daily      Osteoporosis  DEXA from 7/11/24 with osteoporosis of the lumbar spine and hip.  Prolia per Dr. Ogden      Right IDC, ER+/IN+/HER2-  S/p lumpectomy with SLNB, radiation therapy, and currently on letrozole and denosumab, tolerating well. Concern for possible R axillary recurrence, workup as below.   Okay to continue letrozole  Okay to continue denosumab as above  Breast oncology team aware      The above information has been reviewed with the patient and all questions have been answered to their apparent satisfaction.  They understand that they can call the clinic with any questions.        Med Onc Chart Routing      Follow up with physician . Labs and RTC on 1/10/25, C8 nivolumab on 1/13/25 ([facility])   Follow up with BRENDA    Infusion scheduling note    Injection scheduling note    Labs    Imaging    Pharmacy appointment    Other referrals

## 2024-12-16 ENCOUNTER — CLINICAL SUPPORT (OUTPATIENT)
Dept: REHABILITATION | Facility: HOSPITAL | Age: 74
End: 2024-12-16
Payer: MEDICARE

## 2024-12-16 DIAGNOSIS — M25.611 DECREASED RANGE OF MOTION OF RIGHT SHOULDER: Primary | ICD-10-CM

## 2024-12-16 DIAGNOSIS — G89.29 CHRONIC RIGHT SHOULDER PAIN: ICD-10-CM

## 2024-12-16 DIAGNOSIS — M25.511 CHRONIC RIGHT SHOULDER PAIN: ICD-10-CM

## 2024-12-16 PROCEDURE — 97112 NEUROMUSCULAR REEDUCATION: CPT | Mod: KX

## 2024-12-16 PROCEDURE — 97110 THERAPEUTIC EXERCISES: CPT | Mod: KX

## 2024-12-16 RX ORDER — DIPHENHYDRAMINE HYDROCHLORIDE 50 MG/ML
50 INJECTION INTRAMUSCULAR; INTRAVENOUS ONCE AS NEEDED
Status: CANCELLED | OUTPATIENT
Start: 2024-12-17

## 2024-12-16 RX ORDER — EPINEPHRINE 0.3 MG/.3ML
0.3 INJECTION SUBCUTANEOUS ONCE AS NEEDED
Status: CANCELLED | OUTPATIENT
Start: 2024-12-17

## 2024-12-16 RX ORDER — HEPARIN 100 UNIT/ML
500 SYRINGE INTRAVENOUS
Status: CANCELLED | OUTPATIENT
Start: 2024-12-17

## 2024-12-16 RX ORDER — SODIUM CHLORIDE 0.9 % (FLUSH) 0.9 %
10 SYRINGE (ML) INJECTION
Status: CANCELLED | OUTPATIENT
Start: 2024-12-17

## 2024-12-16 NOTE — PROGRESS NOTES
[facility]   Physical Therapy Treatment Note      Name: Keo Frias  Clinic Number: [id number]    Therapy Diagnosis:   Encounter Diagnoses   Name Primary?    Decreased range of motion of right shoulder Yes    Chronic right shoulder pain      Physician: Osvaldo Costa MD    Visit Date: 12/16/2024    Physician Orders: PT Eval and Treat   Medical Diagnosis from Referral: M75.01 (ICD-10-CM) - Adhesive capsulitis of right shoulder   Evaluation Date: 12/4/2024  Authorization Period Expiration: 12/03/2025  Plan of Care Expiration: 06/04/2025  Progress Note Due: 1/4/2024  Visit # / Visits authorized: 3/10  FOTO: 1/3     Precautions: Standard,     PTA Visit #: 0/5     Time In: 2:30 pm  Time Out: 3;30 pm   Total Billable Time: 30 minutes    Subjective     Pt reports: She continues to feel a little better every visit  She was compliant with home exercise program.  Response to previous treatment: first visit  Functional change: ongoing    Pain: 5/10  Location: right shoulder      Objective      Objective Measures updated at progress report unless specified.     Treatment     Keo received the treatments listed below:      therapeutic exercises to develop strength, endurance, ROM, flexibility, posture, and core stabilization for 28 minutes including:  Rotator interval LLLD 10 minute hold  Supine LLLD ER stretch 8 minute hold  Supine flexion AAROM with dowel 2 minutes    Supine ER AAROM with dowel 2 minutes   Pulleys flexion 2 minutes   Pulleys abduction 2 minutes   Sidelying flexion w/ dowel 2 minutes     manual therapy techniques: Joint mobilizations, Manual traction, Myofacial release, Manual Lymphatic Drainage, Soft tissue Mobilization, and Friction Massage were applied to the: R shoulder for 18 minutes, including:  Inferior GH glides; grades III-IV  Posterior GH glides; grades III-IV  PROM shoulder flexion and external rotation   Contract relax shoulder flexion        neuromuscular  "re-education activities to improve: Balance, Coordination, Kinesthetic, Sense, Proprioception, and Posture for 14 minutes. The following activities were included:  ER/IR walkout 2x12 each side PTB  Scap squeezes seated 20x     NP  Scap setting 20x 5" holds (max VC and TC needed)    therapeutic activities to improve functional performance for 0  minutes, including:        Patient Education and Home Exercises       Education provided:   - continuing HEP    Written Home Exercises Provided: YES. Exercises were reviewed and Keo was able to demonstrate them prior to the end of the session.  Keo demonstrated good understanding of the education provided. See EMR under Patient Instructions for exercises provided during therapy sessions    Assessment     Tolerated exercises well today with minimal increases in pain. Progressed with thoracic mobility, scapular exercises, and RC isometrics. Increased time for LLLD stretches today which was tolerated well with no increases in pain. Rotator interval stretch remains moderately uncomfortable, but she is able to go further into the stretch today. Needed max VC and TC for IR/ER walk out. Will continue to progress as tolerated.       Keo Is progressing well towards her goals.   Pt prognosis is Fair.     Pt will continue to benefit from skilled outpatient physical therapy to address the deficits listed in the problem list box on initial evaluation, provide pt/family education and to maximize pt's level of independence in the home and community environment.     Pt's spiritual, cultural and educational needs considered and pt agreeable to plan of care and goals.     Anticipated barriers to physical therapy: cancer treatment     GOALS:   Short Term Goals: 3 months   1.Report decreased R shoulder pain < / =  3/10  to increase tolerance for ADL's  2. Increase PROM by 30 degrees in all planes    3. Increased strength by 1/3 MMT grade in R shoulder to increase tolerance for ADL and work " activities.  4. Pt to tolerate HEP to improve ROM and independence with ADL's     Long Term Goals: 6 months   1.Report decreased R shoulder pain  < / =  1 /10  to increase tolerance for reaching overhead   2.Increase AROM to within 90% of the L shoulder  3.Increase strength to >/= 4/5 in R shoulder to increase tolerance for ADL and work activities.  4. Pt goal: to reach overhead and complete ADLs without pain   5. Pt will have improved gcode of CJ (20-40% limited) on FOTO shoulder in order to demonstrate true functional improvement.     Plan     Plan of care Certification: 12/4/2024 to 06/04/2025.     Continue POC. LLLD stretches and manual therapy to increase ROM    Aristeo Patton PT, DPT

## 2024-12-17 ENCOUNTER — INFUSION (OUTPATIENT)
Dept: INFUSION THERAPY | Facility: HOSPITAL | Age: 74
End: 2024-12-17
Attending: FAMILY MEDICINE
Payer: MEDICARE

## 2024-12-17 VITALS
BODY MASS INDEX: 21.25 KG/M2 | RESPIRATION RATE: 16 BRPM | HEIGHT: 62 IN | TEMPERATURE: 98 F | DIASTOLIC BLOOD PRESSURE: 60 MMHG | WEIGHT: 115.5 LBS | OXYGEN SATURATION: 98 % | HEART RATE: 67 BPM | SYSTOLIC BLOOD PRESSURE: 122 MMHG

## 2024-12-17 DIAGNOSIS — C34.31 ADENOCARCINOMA OF LOWER LOBE OF RIGHT LUNG: Primary | ICD-10-CM

## 2024-12-17 PROCEDURE — 63600175 PHARM REV CODE 636 W HCPCS: Mod: JZ,JG | Performed by: HOSPITALIST

## 2024-12-17 PROCEDURE — 25000003 PHARM REV CODE 250: Performed by: HOSPITALIST

## 2024-12-17 PROCEDURE — A4216 STERILE WATER/SALINE, 10 ML: HCPCS | Performed by: HOSPITALIST

## 2024-12-17 PROCEDURE — 96413 CHEMO IV INFUSION 1 HR: CPT

## 2024-12-17 RX ORDER — SODIUM CHLORIDE 0.9 % (FLUSH) 0.9 %
10 SYRINGE (ML) INJECTION
Status: DISCONTINUED | OUTPATIENT
Start: 2024-12-17 | End: 2024-12-17 | Stop reason: HOSPADM

## 2024-12-17 RX ORDER — DIPHENHYDRAMINE HYDROCHLORIDE 50 MG/ML
50 INJECTION INTRAMUSCULAR; INTRAVENOUS ONCE AS NEEDED
Status: DISCONTINUED | OUTPATIENT
Start: 2024-12-17 | End: 2024-12-17 | Stop reason: HOSPADM

## 2024-12-17 RX ORDER — EPINEPHRINE 0.3 MG/.3ML
0.3 INJECTION SUBCUTANEOUS ONCE AS NEEDED
Status: DISCONTINUED | OUTPATIENT
Start: 2024-12-17 | End: 2024-12-17 | Stop reason: HOSPADM

## 2024-12-17 RX ADMIN — Medication 10 ML: at 11:12

## 2024-12-17 RX ADMIN — Medication 10 ML: at 12:12

## 2024-12-17 RX ADMIN — SODIUM CHLORIDE 480 MG: 9 INJECTION, SOLUTION INTRAVENOUS at 12:12

## 2024-12-17 RX ADMIN — SODIUM CHLORIDE: 9 INJECTION, SOLUTION INTRAVENOUS at 12:12

## 2024-12-17 NOTE — PLAN OF CARE
Patient tolerated Opdivo infusion well. No s/s adverse reaction. PIV removed, AVS given, and patient ambulated out of clinic in NAD.

## 2024-12-19 ENCOUNTER — CLINICAL SUPPORT (OUTPATIENT)
Dept: REHABILITATION | Facility: HOSPITAL | Age: 74
End: 2024-12-19
Payer: MEDICARE

## 2024-12-19 DIAGNOSIS — M25.611 DECREASED RANGE OF MOTION OF RIGHT SHOULDER: Primary | ICD-10-CM

## 2024-12-19 DIAGNOSIS — M25.511 CHRONIC RIGHT SHOULDER PAIN: ICD-10-CM

## 2024-12-19 DIAGNOSIS — G89.29 CHRONIC RIGHT SHOULDER PAIN: ICD-10-CM

## 2024-12-19 PROCEDURE — 97112 NEUROMUSCULAR REEDUCATION: CPT

## 2024-12-19 PROCEDURE — 97140 MANUAL THERAPY 1/> REGIONS: CPT

## 2024-12-19 PROCEDURE — 97110 THERAPEUTIC EXERCISES: CPT

## 2024-12-19 NOTE — PROGRESS NOTES
[facility]   Physical Therapy Treatment Note      Name: Keo Frias  Clinic Number: [id number]    Therapy Diagnosis:   Encounter Diagnoses   Name Primary?    Decreased range of motion of right shoulder Yes    Chronic right shoulder pain      Physician: Osvaldo Costa MD    Visit Date: 12/19/2024    Physician Orders: PT Eval and Treat   Medical Diagnosis from Referral: M75.01 (ICD-10-CM) - Adhesive capsulitis of right shoulder   Evaluation Date: 12/4/2024  Authorization Period Expiration: 12/03/2025  Plan of Care Expiration: 06/04/2025  Progress Note Due: 1/4/2024  Visit # / Visits authorized: 4/10  FOTO: 1/3     Precautions: Standard,     PTA Visit #: 0/5     Time In: 1:00 pm  Time Out: 2:00 pm   Total Billable Time: 60 minutes    Subjective     Pt reports: She continues to feel a little better after every visit   She was compliant with home exercise program.  Response to previous treatment: first visit  Functional change: ongoing    Pain: 5/10  Location: right shoulder      Objective      Objective Measures updated at progress report unless specified.     Treatment     Keo received the treatments listed below:      therapeutic exercises to develop strength, endurance, ROM, flexibility, posture, and core stabilization for 28 minutes including:  Rotator interval LLLD 10 minute hold  Supine LLLD ER stretch 10 minute hold  Supine flexion AAROM with dowel 2 minutes    Supine ER AAROM with dowel 2 minutes   Pulleys flexion 2 minutes   Pulleys abduction 2 minutes   Sidelying flexion w/ dowel 2 minutes     manual therapy techniques: Joint mobilizations, Manual traction, Myofacial release, Manual Lymphatic Drainage, Soft tissue Mobilization, and Friction Massage were applied to the: R shoulder for 16 minutes, including:  Inferior GH glides; grades III-IV  Posterior GH glides; grades III-IV  PROM shoulder flexion and external rotation   Contract relax shoulder flexion   Thoracic  "gapping    neuromuscular re-education activities to improve: Balance, Coordination, Kinesthetic, Sense, Proprioception, and Posture for 16 minutes. The following activities were included:  ER/IR walkout 2x12 each side PTB  Scap squeezes seated 20x   Sidelying slot machines 2x15     NP  Scap setting 20x 5" holds (max VC and TC needed)    therapeutic activities to improve functional performance for 0  minutes, including:        Patient Education and Home Exercises       Education provided:   - continuing HEP    Written Home Exercises Provided: YES. Exercises were reviewed and Keo was able to demonstrate them prior to the end of the session.  Keo demonstrated good understanding of the education provided. See EMR under Patient Instructions for exercises provided during therapy sessions    Assessment     Tolerated exercises well today with minimal increases in pain. Continue to progress with scapular strengthening which she tolerates with no increases in pain. Increased time for LLLD stretches again today, and she was able to tolerate those stretches well. Continues to need VC for exercises to perform with proper form. Will continue to progress as tolerated.     Keo Is progressing well towards her goals.   Pt prognosis is Fair.     Pt will continue to benefit from skilled outpatient physical therapy to address the deficits listed in the problem list box on initial evaluation, provide pt/family education and to maximize pt's level of independence in the home and community environment.     Pt's spiritual, cultural and educational needs considered and pt agreeable to plan of care and goals.     Anticipated barriers to physical therapy: cancer treatment     GOALS:   Short Term Goals: 3 months   1.Report decreased R shoulder pain < / =  3/10  to increase tolerance for ADL's  2. Increase PROM by 30 degrees in all planes    3. Increased strength by 1/3 MMT grade in R shoulder to increase tolerance for ADL and work " activities.  4. Pt to tolerate HEP to improve ROM and independence with ADL's     Long Term Goals: 6 months   1.Report decreased R shoulder pain  < / =  1 /10  to increase tolerance for reaching overhead   2.Increase AROM to within 90% of the L shoulder  3.Increase strength to >/= 4/5 in R shoulder to increase tolerance for ADL and work activities.  4. Pt goal: to reach overhead and complete ADLs without pain   5. Pt will have improved gcode of CJ (20-40% limited) on FOTO shoulder in order to demonstrate true functional improvement.     Plan     Plan of care Certification: 12/4/2024 to 06/04/2025.     Continue POC. LLLD stretches and manual therapy to increase ROM    Aristeo Patton PT, DPT

## 2024-12-20 ENCOUNTER — TELEPHONE (OUTPATIENT)
Dept: HEMATOLOGY/ONCOLOGY | Facility: CLINIC | Age: 74
End: 2024-12-20
Payer: MEDICARE

## 2024-12-24 ENCOUNTER — CLINICAL SUPPORT (OUTPATIENT)
Dept: REHABILITATION | Facility: HOSPITAL | Age: 74
End: 2024-12-24
Payer: MEDICARE

## 2024-12-24 DIAGNOSIS — M25.511 CHRONIC RIGHT SHOULDER PAIN: ICD-10-CM

## 2024-12-24 DIAGNOSIS — M25.611 DECREASED RANGE OF MOTION OF RIGHT SHOULDER: Primary | ICD-10-CM

## 2024-12-24 DIAGNOSIS — G89.29 CHRONIC RIGHT SHOULDER PAIN: ICD-10-CM

## 2024-12-24 PROCEDURE — 97140 MANUAL THERAPY 1/> REGIONS: CPT | Mod: KX

## 2024-12-24 PROCEDURE — 97110 THERAPEUTIC EXERCISES: CPT | Mod: KX

## 2024-12-24 NOTE — PROGRESS NOTES
[facility]   Physical Therapy Treatment Note      Name: Keo Frias  Clinic Number: [id number]    Therapy Diagnosis:   Encounter Diagnoses   Name Primary?    Decreased range of motion of right shoulder Yes    Chronic right shoulder pain        Physician: Osvaldo Costa MD    Visit Date: 12/24/2024    Physician Orders: PT Eval and Treat   Medical Diagnosis from Referral: M75.01 (ICD-10-CM) - Adhesive capsulitis of right shoulder   Evaluation Date: 12/4/2024  Authorization Period Expiration: 12/03/2025  Plan of Care Expiration: 06/04/2025  Progress Note Due: 1/4/2024  Visit # / Visits authorized: 5/10  FOTO: 1/3     Precautions: Standard,     PTA Visit #: 0/5     Time In: 9:07 am  Time Out: 10:01 am   Total Billable Time: 30 minutes    Subjective     Pt reports: Her arm is feeling a little bit better today  She was compliant with home exercise program.  Response to previous treatment: first visit  Functional change: ongoing    Pain: 5/10  Location: right shoulder      Objective      Objective Measures updated at progress report unless specified.     Treatment     Keo received the treatments listed below:      therapeutic exercises to develop strength, endurance, ROM, flexibility, posture, and core stabilization for 38 minutes including:  Rotator interval LLLD 10 minute hold  Supine LLLD ER stretch 10 minute hold  Supine ER AAROM with dowel 2 minutes   Pulleys flexion 2 minutes   Pulleys abduction 2 minutes   Sidelying flexion w/ dowel 2 minutes   UBE 5' fwd/5' back for ROM and cardiovascular endurance    Not today:  Supine flexion AAROM with dowel 2 minutes     manual therapy techniques: Joint mobilizations, Manual traction, Myofacial release, Manual Lymphatic Drainage, Soft tissue Mobilization, and Friction Massage were applied to the: R shoulder for 16 minutes, including:  Inferior GH glides; grades III-IV  Posterior GH glides; grades III-IV  PROM shoulder flexion and external  "rotation   Contract relax shoulder flexion   Thoracic gapping    neuromuscular re-education activities to improve: Balance, Coordination, Kinesthetic, Sense, Proprioception, and Posture for 0 minutes. The following activities were included:     NP  ER/IR walkout 2x12 each side PTB  Scap squeezes seated 20x   Scap setting 20x 5" holds (max VC and TC needed)  Sidelying slot machines 2x15    therapeutic activities to improve functional performance for 0  minutes, including:      Patient Education and Home Exercises       Education provided:   - continuing HEP    Written Home Exercises Provided: YES. Exercises were reviewed and Keo was able to demonstrate them prior to the end of the session.  Keo demonstrated good understanding of the education provided. See EMR under Patient Instructions for exercises provided during therapy sessions    Assessment     Continue to tolerate exercises well with minimal increases in pain. Pt does have discomfort at end of LLLD stretches, but she also has increased ROM after the stretches. Progressed with UBE today which was well tolerated with no increases in pain. Continues to need VC for exercises to perform with proper form. Will continue to progress as tolerated.     Keo Is progressing well towards her goals.   Pt prognosis is Fair.     Pt will continue to benefit from skilled outpatient physical therapy to address the deficits listed in the problem list box on initial evaluation, provide pt/family education and to maximize pt's level of independence in the home and community environment.     Pt's spiritual, cultural and educational needs considered and pt agreeable to plan of care and goals.     Anticipated barriers to physical therapy: cancer treatment     GOALS:   Short Term Goals: 3 months   1.Report decreased R shoulder pain < / =  3/10  to increase tolerance for ADL's  2. Increase PROM by 30 degrees in all planes    3. Increased strength by 1/3 MMT grade in R shoulder to " increase tolerance for ADL and work activities.  4. Pt to tolerate HEP to improve ROM and independence with ADL's     Long Term Goals: 6 months   1.Report decreased R shoulder pain  < / =  1 /10  to increase tolerance for reaching overhead   2.Increase AROM to within 90% of the L shoulder  3.Increase strength to >/= 4/5 in R shoulder to increase tolerance for ADL and work activities.  4. Pt goal: to reach overhead and complete ADLs without pain   5. Pt will have improved gcode of CJ (20-40% limited) on FOTO shoulder in order to demonstrate true functional improvement.     Plan     Plan of care Certification: 12/4/2024 to 06/04/2025.     Continue POC. LLLD stretches and manual therapy to increase ROM    Aristeo Patton PT, DPT

## 2024-12-26 ENCOUNTER — CLINICAL SUPPORT (OUTPATIENT)
Dept: REHABILITATION | Facility: HOSPITAL | Age: 74
End: 2024-12-26
Payer: MEDICARE

## 2024-12-26 DIAGNOSIS — M25.511 CHRONIC RIGHT SHOULDER PAIN: ICD-10-CM

## 2024-12-26 DIAGNOSIS — M25.611 DECREASED RANGE OF MOTION OF RIGHT SHOULDER: Primary | ICD-10-CM

## 2024-12-26 DIAGNOSIS — G89.29 CHRONIC RIGHT SHOULDER PAIN: ICD-10-CM

## 2024-12-26 PROCEDURE — 97112 NEUROMUSCULAR REEDUCATION: CPT

## 2024-12-26 PROCEDURE — 97140 MANUAL THERAPY 1/> REGIONS: CPT

## 2024-12-26 PROCEDURE — 97110 THERAPEUTIC EXERCISES: CPT

## 2024-12-26 NOTE — PROGRESS NOTES
[facility]   Physical Therapy Treatment Note      Name: Keo Frias  Clinic Number: [id number]    Therapy Diagnosis:   Encounter Diagnoses   Name Primary?    Decreased range of motion of right shoulder Yes    Chronic right shoulder pain        Physician: Osvaldo Costa MD    Visit Date: 12/26/2024    Physician Orders: PT Eval and Treat   Medical Diagnosis from Referral: M75.01 (ICD-10-CM) - Adhesive capsulitis of right shoulder   Evaluation Date: 12/4/2024  Authorization Period Expiration: 12/03/2025  Plan of Care Expiration: 06/04/2025  Progress Note Due: 1/4/2024  Visit # / Visits authorized: 6/10  FOTO: 1/3     Precautions: Standard,     PTA Visit #: 0/5     Time In: 1:00 pm  Time Out: 1:57 pm   Total Billable Time: 57 minutes    Subjective     Pt reports: She is feeling better, but she has been having some pain with the LLLD stretches.   She was compliant with home exercise program.  Response to previous treatment: first visit  Functional change: ongoing    Pain: 5/10  Location: right shoulder      Objective      Passive Range of Motion:   Shoulder Right Left   Flexion 110 165   Abduction 110 170   ER at 0 10* 80   ER at 90 50* 90   IR 10 90     Active Range of Motion:   Shoulder Right Left   Flexion 95 165   Abduction 95* 170   ER at 0 20* 80   ER at 90 40* 90   IR 10  90       Treatment     Keo received the treatments listed below:      therapeutic exercises to develop strength, endurance, ROM, flexibility, posture, and core stabilization for 18 minutes including:  Supine ER AAROM with dowel 20x  Pulleys flexion 2 minutes   Pulleys abduction 2 minutes   Sidelying flexion w/ dowel 2 minutes   Supine flexion AAROM with dowel 20x  UBE 5' fwd/5' back for ROM and cardiovascular endurance    Not today:  Rotator interval LLLD 10 minute hold  Supine LLLD ER stretch 10 minute hold    manual therapy techniques: Joint mobilizations, Manual traction, Myofacial release, Manual Lymphatic  Drainage, Soft tissue Mobilization, and Friction Massage were applied to the: R shoulder for 12 minutes, including:  Inferior GH glides; grades III-IV  Posterior GH glides; grades III-IV  PROM shoulder flexion and external rotation   Contract relax shoulder flexion   Thoracic gapping    neuromuscular re-education activities to improve: Balance, Coordination, Kinesthetic, Sense, Proprioception, and Posture for 27 minutes. The following activities were included:  ER/IR walkout 2x12 each side PTB  Scap squeezes seated 20x   Sidelying slot machines 2x15  Open books arms crossed 15x ea side     therapeutic activities to improve functional performance for 0  minutes, including:      Patient Education and Home Exercises       Education provided:   - continuing HEP    Written Home Exercises Provided: YES. Exercises were reviewed and Keo was able to demonstrate them prior to the end of the session.  Keo demonstrated good understanding of the education provided. See EMR under Patient Instructions for exercises provided during therapy sessions    Assessment     LLLD exercises were removed today due to patient complaints of pain during them. Will be revisited when irritability levels decreased. Progressed with thoracic mobility exercises which were well tolerated with no increases in pain. Pt has made good progression with range of motion since beginning therapy. Will continue to progress as tolerated.     Keo Is progressing well towards her goals.   Pt prognosis is Fair.     Pt will continue to benefit from skilled outpatient physical therapy to address the deficits listed in the problem list box on initial evaluation, provide pt/family education and to maximize pt's level of independence in the home and community environment.     Pt's spiritual, cultural and educational needs considered and pt agreeable to plan of care and goals.     Anticipated barriers to physical therapy: cancer treatment     GOALS:   Short Term Goals:  3 months   1.Report decreased R shoulder pain < / =  3/10  to increase tolerance for ADL's  2. Increase PROM by 30 degrees in all planes    3. Increased strength by 1/3 MMT grade in R shoulder to increase tolerance for ADL and work activities.  4. Pt to tolerate HEP to improve ROM and independence with ADL's     Long Term Goals: 6 months   1.Report decreased R shoulder pain  < / =  1 /10  to increase tolerance for reaching overhead   2.Increase AROM to within 90% of the L shoulder  3.Increase strength to >/= 4/5 in R shoulder to increase tolerance for ADL and work activities.  4. Pt goal: to reach overhead and complete ADLs without pain   5. Pt will have improved gcode of CJ (20-40% limited) on FOTO shoulder in order to demonstrate true functional improvement.     Plan     Plan of care Certification: 12/4/2024 to 06/04/2025.     Continue POC. LLLD stretches and manual therapy to increase ROM    Aristeo Patton PT, DPT

## 2024-12-30 ENCOUNTER — CLINICAL SUPPORT (OUTPATIENT)
Dept: REHABILITATION | Facility: HOSPITAL | Age: 74
End: 2024-12-30
Payer: MEDICARE

## 2024-12-30 DIAGNOSIS — G89.29 CHRONIC RIGHT SHOULDER PAIN: ICD-10-CM

## 2024-12-30 DIAGNOSIS — M25.611 DECREASED RANGE OF MOTION OF RIGHT SHOULDER: Primary | ICD-10-CM

## 2024-12-30 DIAGNOSIS — M25.511 CHRONIC RIGHT SHOULDER PAIN: ICD-10-CM

## 2024-12-30 PROCEDURE — 97110 THERAPEUTIC EXERCISES: CPT

## 2024-12-30 PROCEDURE — 97112 NEUROMUSCULAR REEDUCATION: CPT

## 2024-12-30 NOTE — PROGRESS NOTES
[facility]   Physical Therapy Treatment Note      Name: Keo Frias  Clinic Number: [id number]    Therapy Diagnosis:   Encounter Diagnoses   Name Primary?    Decreased range of motion of right shoulder Yes    Chronic right shoulder pain        Physician: Osvaldo Costa MD    Visit Date: 12/30/2024    Physician Orders: PT Eval and Treat   Medical Diagnosis from Referral: M75.01 (ICD-10-CM) - Adhesive capsulitis of right shoulder   Evaluation Date: 12/4/2024  Authorization Period Expiration: 12/03/2025  Plan of Care Expiration: 06/04/2025  Progress Note Due: 1/4/2024  Visit # / Visits authorized: 1/10  FOTO: 1/3     Precautions: Standard,     PTA Visit #: 0/5     Time In: 1435  Time Out: 1529  Total Billable Time: 30 minutes    Subjective     Pt reports: She reports she is feeling pretty good, but she still has some pain in the front of her arm    She was compliant with home exercise program.  Response to previous treatment: first visit  Functional change: ongoing    Pain: 5/10  Location: right shoulder      Objective      Objective measures updated     Treatment     Keo received the treatments listed below:      therapeutic exercises to develop strength, endurance, ROM, flexibility, posture, and core stabilization for 20 minutes including:  Supine ER AAROM with dowel 3 minutes  Pulleys flexion 2 minutes   Pulleys abduction 2 minutes   Sidelying flexion w/ dowel 3 minutes   UBE 5' fwd/5' back for ROM and cardiovascular endurance    Not today:  Supine flexion AAROM with dowel 20x  Rotator interval LLLD 10 minute hold  Supine LLLD ER stretch 10 minute hold    manual therapy techniques: Joint mobilizations, Manual traction, Myofacial release, Manual Lymphatic Drainage, Soft tissue Mobilization, and Friction Massage were applied to the: R shoulder for 11 minutes, including:  Inferior GH glides; grades III-IV  Posterior GH glides; grades III-IV  PROM shoulder flexion and external rotation    Contract relax shoulder flexion   Thoracic gapping    neuromuscular re-education activities to improve: Balance, Coordination, Kinesthetic, Sense, Proprioception, and Posture for 23 minutes. The following activities were included:  ER/IR walkout 2x12 each side PTB  Scap squeezes seated 20x   Standing land mine press 3x10   Open books arms crossed 15x ea side     therapeutic activities to improve functional performance for 0  minutes, including:      Patient Education and Home Exercises       Education provided:   - continuing HEP    Written Home Exercises Provided: YES. Exercises were reviewed and Keo was able to demonstrate them prior to the end of the session.  Keo demonstrated good understanding of the education provided. See EMR under Patient Instructions for exercises provided during therapy sessions    Assessment     LLLD exercises were not performed again today due to patient reporting they are painful and she did not want to do them. Time was increased for active assist range of motion exercises. Pt was progressed with cuff and scapular strengthening today which she tolerated with no increases in pain. Pt will continue to progress as tolerated.     Keo Is progressing well towards her goals.   Pt prognosis is Fair.     Pt will continue to benefit from skilled outpatient physical therapy to address the deficits listed in the problem list box on initial evaluation, provide pt/family education and to maximize pt's level of independence in the home and community environment.     Pt's spiritual, cultural and educational needs considered and pt agreeable to plan of care and goals.     Anticipated barriers to physical therapy: cancer treatment     GOALS:   Short Term Goals: 3 months   1.Report decreased R shoulder pain < / =  3/10  to increase tolerance for ADL's  2. Increase PROM by 30 degrees in all planes    3. Increased strength by 1/3 MMT grade in R shoulder to increase tolerance for ADL and work  activities.  4. Pt to tolerate HEP to improve ROM and independence with ADL's     Long Term Goals: 6 months   1.Report decreased R shoulder pain  < / =  1 /10  to increase tolerance for reaching overhead   2.Increase AROM to within 90% of the L shoulder  3.Increase strength to >/= 4/5 in R shoulder to increase tolerance for ADL and work activities.  4. Pt goal: to reach overhead and complete ADLs without pain   5. Pt will have improved gcode of CJ (20-40% limited) on FOTO shoulder in order to demonstrate true functional improvement.     Plan     Plan of care Certification: 12/4/2024 to 06/04/2025.     Continue POC. LLLD stretches and manual therapy to increase ROM    Aristeo Patton PT, DPT

## 2024-12-31 ENCOUNTER — CLINICAL SUPPORT (OUTPATIENT)
Dept: REHABILITATION | Facility: HOSPITAL | Age: 74
End: 2024-12-31
Payer: MEDICARE

## 2024-12-31 DIAGNOSIS — G89.29 CHRONIC RIGHT SHOULDER PAIN: ICD-10-CM

## 2024-12-31 DIAGNOSIS — M25.611 DECREASED RANGE OF MOTION OF RIGHT SHOULDER: Primary | ICD-10-CM

## 2024-12-31 DIAGNOSIS — M25.511 CHRONIC RIGHT SHOULDER PAIN: ICD-10-CM

## 2024-12-31 PROCEDURE — 97112 NEUROMUSCULAR REEDUCATION: CPT | Mod: KX

## 2024-12-31 PROCEDURE — 97110 THERAPEUTIC EXERCISES: CPT | Mod: KX

## 2024-12-31 PROCEDURE — 97140 MANUAL THERAPY 1/> REGIONS: CPT | Mod: KX

## 2024-12-31 NOTE — PROGRESS NOTES
[facility]   Physical Therapy Treatment Note      Name: Keo Frias  Clinic Number: [id number]    Therapy Diagnosis:   Encounter Diagnoses   Name Primary?    Decreased range of motion of right shoulder Yes    Chronic right shoulder pain      Physician: Osvaldo Costa MD    Visit Date: 12/31/2024    Physician Orders: PT Eval and Treat   Medical Diagnosis from Referral: M75.01 (ICD-10-CM) - Adhesive capsulitis of right shoulder   Evaluation Date: 12/4/2024  Authorization Period Expiration: 12/03/2025  Plan of Care Expiration: 06/04/2025  Progress Note Due: 1/4/2024  Visit # / Visits authorized: 7/10  FOTO: 1/3     Precautions: Standard,     PTA Visit #: 0/5     Time In: 1303   Time Out: 1357  Total Billable Time: 54 minutes    Subjective     Pt reports: She feels good and is not too sore from yesterday   She was compliant with home exercise program.  Response to previous treatment: first visit  Functional change: ongoing    Pain: 5/10  Location: right shoulder      Objective      Objective measures updated     Treatment     Keo received the treatments listed below:      therapeutic exercises to develop strength, endurance, ROM, flexibility, posture, and core stabilization for 20 minutes including:  Supine ER AAROM with dowel 3 minutes  Pulleys flexion 2 minutes   Pulleys abduction 2 minutes   Sidelying flexion w/ dowel 3 minutes   UBE 5' fwd/5' back for ROM and cardiovascular endurance    Not today:  Supine flexion AAROM with dowel 20x  Rotator interval LLLD 10 minute hold  Supine LLLD ER stretch 10 minute hold    manual therapy techniques: Joint mobilizations, Manual traction, Myofacial release, Manual Lymphatic Drainage, Soft tissue Mobilization, and Friction Massage were applied to the: R shoulder for 10 minutes, including:  Inferior GH glides; grades III-IV  Posterior GH glides; grades III-IV  PROM shoulder flexion and external rotation   Contract relax shoulder flexion   Thoracic  gapping    neuromuscular re-education activities to improve: Balance, Coordination, Kinesthetic, Sense, Proprioception, and Posture for 24 minutes. The following activities were included:  Standing ER/IR 2x12 each PTB  Scap squeezes seated 20x   Standing land mine press 3x10   Open books arms crossed 15x ea side   Wall slides 2x15     therapeutic activities to improve functional performance for 0  minutes, including:      Patient Education and Home Exercises       Education provided:   - continuing HEP    Written Home Exercises Provided: YES. Exercises were reviewed and Keo was able to demonstrate them prior to the end of the session.  Keo demonstrated good understanding of the education provided. See EMR under Patient Instructions for exercises provided during therapy sessions    Assessment     Pt continues to tolerate exercises better with less reports of pain. Progressed with wall slides today which she was able to tolerate with no increases in pain. Only able to achieve approximately 115 degrees of flexion with wall slides. LLLD stretches will return when patient's irritability levels decrease. Pt will continue to progress with mobility and strengthening exercises as tolerated.     Keo Is progressing well towards her goals.   Pt prognosis is Fair.     Pt will continue to benefit from skilled outpatient physical therapy to address the deficits listed in the problem list box on initial evaluation, provide pt/family education and to maximize pt's level of independence in the home and community environment.     Pt's spiritual, cultural and educational needs considered and pt agreeable to plan of care and goals.     Anticipated barriers to physical therapy: cancer treatment     GOALS:   Short Term Goals: 3 months   1.Report decreased R shoulder pain < / =  3/10  to increase tolerance for ADL's  2. Increase PROM by 30 degrees in all planes    3. Increased strength by 1/3 MMT grade in R shoulder to increase  tolerance for ADL and work activities.  4. Pt to tolerate HEP to improve ROM and independence with ADL's     Long Term Goals: 6 months   1.Report decreased R shoulder pain  < / =  1 /10  to increase tolerance for reaching overhead   2.Increase AROM to within 90% of the L shoulder  3.Increase strength to >/= 4/5 in R shoulder to increase tolerance for ADL and work activities.  4. Pt goal: to reach overhead and complete ADLs without pain   5. Pt will have improved gcode of CJ (20-40% limited) on FOTO shoulder in order to demonstrate true functional improvement.     Plan     Plan of care Certification: 12/4/2024 to 06/04/2025.     Continue POC. LLLD stretches and manual therapy to increase ROM    Aristeo Patton PT, DPT

## 2025-01-03 ENCOUNTER — TELEPHONE (OUTPATIENT)
Dept: HEMATOLOGY/ONCOLOGY | Facility: CLINIC | Age: 75
End: 2025-01-03
Payer: MEDICARE

## 2025-01-03 NOTE — TELEPHONE ENCOUNTER
Spoke to patient, provider okay'd her doing just labs and treatment but she would still like to see a provider. Carina jha and Dr. Nichole on service. She will see AKSHAT Mcconnell at  on 1/13. Day and time confirmed.

## 2025-01-06 ENCOUNTER — OFFICE VISIT (OUTPATIENT)
Dept: OPTOMETRY | Facility: CLINIC | Age: 75
End: 2025-01-06
Payer: MEDICARE

## 2025-01-06 DIAGNOSIS — Z13.5 GLAUCOMA SCREENING: ICD-10-CM

## 2025-01-06 DIAGNOSIS — H25.13 NUCLEAR SCLEROSIS, BILATERAL: Primary | ICD-10-CM

## 2025-01-06 DIAGNOSIS — H52.4 PRESBYOPIA: ICD-10-CM

## 2025-01-06 PROCEDURE — 92015 DETERMINE REFRACTIVE STATE: CPT | Mod: S$GLB,,, | Performed by: OPTOMETRIST

## 2025-01-06 PROCEDURE — 99999 PR PBB SHADOW E&M-EST. PATIENT-LVL II: CPT | Mod: PBBFAC,,, | Performed by: OPTOMETRIST

## 2025-01-06 PROCEDURE — 1101F PT FALLS ASSESS-DOCD LE1/YR: CPT | Mod: CPTII,S$GLB,, | Performed by: OPTOMETRIST

## 2025-01-06 PROCEDURE — 3288F FALL RISK ASSESSMENT DOCD: CPT | Mod: CPTII,S$GLB,, | Performed by: OPTOMETRIST

## 2025-01-06 PROCEDURE — 1126F AMNT PAIN NOTED NONE PRSNT: CPT | Mod: CPTII,S$GLB,, | Performed by: OPTOMETRIST

## 2025-01-06 PROCEDURE — 1160F RVW MEDS BY RX/DR IN RCRD: CPT | Mod: CPTII,S$GLB,, | Performed by: OPTOMETRIST

## 2025-01-06 PROCEDURE — 92004 COMPRE OPH EXAM NEW PT 1/>: CPT | Mod: S$GLB,,, | Performed by: OPTOMETRIST

## 2025-01-06 PROCEDURE — 1159F MED LIST DOCD IN RCRD: CPT | Mod: CPTII,S$GLB,, | Performed by: OPTOMETRIST

## 2025-01-06 NOTE — PROGRESS NOTES
HPI    75 Y/o female is here for routine eye exam with no C/o about ocular health  Pt denies pain and discomfort   No f/f    Eye med: no gtt   Last edited by Leona Liu MA on 1/6/2025 10:17 AM.            Assessment /Plan     For exam results, see Encounter Report.    Nuclear sclerosis, bilateral    Glaucoma screening    Presbyopia      Cat OU--pt happy w otc readers    PLAN:    Rtc 1 yr

## 2025-01-09 ENCOUNTER — CLINICAL SUPPORT (OUTPATIENT)
Dept: REHABILITATION | Facility: HOSPITAL | Age: 75
End: 2025-01-09
Payer: MEDICARE

## 2025-01-09 DIAGNOSIS — M25.611 DECREASED RANGE OF MOTION OF RIGHT SHOULDER: Primary | ICD-10-CM

## 2025-01-09 DIAGNOSIS — M25.511 CHRONIC RIGHT SHOULDER PAIN: ICD-10-CM

## 2025-01-09 DIAGNOSIS — G89.29 CHRONIC RIGHT SHOULDER PAIN: ICD-10-CM

## 2025-01-09 PROCEDURE — 97110 THERAPEUTIC EXERCISES: CPT

## 2025-01-09 PROCEDURE — 97140 MANUAL THERAPY 1/> REGIONS: CPT

## 2025-01-09 PROCEDURE — 97112 NEUROMUSCULAR REEDUCATION: CPT

## 2025-01-09 NOTE — PROGRESS NOTES
[facility]   Physical Therapy Treatment Note      Name: Keo Frias  Clinic Number: [id number]    Therapy Diagnosis:   Encounter Diagnoses   Name Primary?    Decreased range of motion of right shoulder Yes    Chronic right shoulder pain      Physician: Osvaldo Costa MD    Visit Date: 1/9/2025    Physician Orders: PT Eval and Treat   Medical Diagnosis from Referral: M75.01 (ICD-10-CM) - Adhesive capsulitis of right shoulder   Evaluation Date: 12/4/2024  Authorization Period Expiration: 12/03/2025  Plan of Care Expiration: 06/04/2025  Progress Note Due: 1/4/2024  Visit # / Visits authorized: 2/10  FOTO: 1/3     Precautions: Standard,     PTA Visit #: 0/5     Time In: 1301   Time Out: 1400  Total Billable Time: 59 minutes    Subjective     Pt reports: She continues to feel a little better   She was compliant with home exercise program.  Response to previous treatment: first visit  Functional change: ongoing    Pain: 5/10  Location: right shoulder      Objective      Objective measures updated     Treatment     Keo received the treatments listed below:      therapeutic exercises to develop strength, endurance, ROM, flexibility, posture, and core stabilization for 19 minutes including:  Supine ER AAROM with dowel 3 minutes  Pulleys flexion 2 minutes   Pulleys abduction 2 minutes   Sidelying flexion w/ dowel 3 minutes   UBE 4' fwd/4' back for ROM and cardiovascular endurance    Not today:  Supine flexion AAROM with dowel 20x  Rotator interval LLLD 10 minute hold  Supine LLLD ER stretch 10 minute hold    manual therapy techniques: Joint mobilizations, Manual traction, Myofacial release, Manual Lymphatic Drainage, Soft tissue Mobilization, and Friction Massage were applied to the: R shoulder for 12 minutes, including:  Inferior GH glides; grades III-IV  Posterior GH glides; grades III-IV  PROM shoulder flexion and external rotation   Contract relax shoulder flexion   Thoracic  gapping    neuromuscular re-education activities to improve: Balance, Coordination, Kinesthetic, Sense, Proprioception, and Posture for 28 minutes. The following activities were included:  Standing ER/IR 2x12 each PTB  Scap squeezes seated 2x15   Standing land mine press 3x10   Open books arms crossed 20x ea side   Wall slides 2x15     therapeutic activities to improve functional performance for 0  minutes, including:      Patient Education and Home Exercises       Education provided:   - continuing HEP    Written Home Exercises Provided: YES. Exercises were reviewed and Keo was able to demonstrate them prior to the end of the session.  Keo demonstrated good understanding of the education provided. See EMR under Patient Instructions for exercises provided during therapy sessions    Assessment     Need to continue strengthening patient's RC in her range. She continues to guard during manual therapy, and she is able to achieve more ROM after contract relax technique. She is able to complete all exercises with minimal increases in pain. Pt will continue to progress with mobility and strengthening exercises as tolerated.     Keo Is progressing well towards her goals.   Pt prognosis is Fair.     Pt will continue to benefit from skilled outpatient physical therapy to address the deficits listed in the problem list box on initial evaluation, provide pt/family education and to maximize pt's level of independence in the home and community environment.     Pt's spiritual, cultural and educational needs considered and pt agreeable to plan of care and goals.     Anticipated barriers to physical therapy: cancer treatment     GOALS:   Short Term Goals: 3 months   1.Report decreased R shoulder pain < / =  3/10  to increase tolerance for ADL's  2. Increase PROM by 30 degrees in all planes    3. Increased strength by 1/3 MMT grade in R shoulder to increase tolerance for ADL and work activities.  4. Pt to tolerate HEP to improve  ROM and independence with ADL's     Long Term Goals: 6 months   1.Report decreased R shoulder pain  < / =  1 /10  to increase tolerance for reaching overhead   2.Increase AROM to within 90% of the L shoulder  3.Increase strength to >/= 4/5 in R shoulder to increase tolerance for ADL and work activities.  4. Pt goal: to reach overhead and complete ADLs without pain   5. Pt will have improved gcode of CJ (20-40% limited) on FOTO shoulder in order to demonstrate true functional improvement.     Plan     Plan of care Certification: 12/4/2024 to 06/04/2025.     Continue POC. LLLD stretches and manual therapy to increase ROM    Aristeo Patton PT, DPT

## 2025-01-13 ENCOUNTER — OFFICE VISIT (OUTPATIENT)
Dept: HEMATOLOGY/ONCOLOGY | Facility: CLINIC | Age: 75
End: 2025-01-13
Payer: MEDICARE

## 2025-01-13 ENCOUNTER — LAB VISIT (OUTPATIENT)
Dept: LAB | Facility: HOSPITAL | Age: 75
End: 2025-01-13
Attending: HOSPITALIST
Payer: MEDICARE

## 2025-01-13 DIAGNOSIS — C77.1 SECONDARY MALIGNANT NEOPLASM OF INTRATHORACIC LYMPH NODES: ICD-10-CM

## 2025-01-13 DIAGNOSIS — C50.411 MALIGNANT NEOPLASM OF UPPER-OUTER QUADRANT OF RIGHT BREAST IN FEMALE, ESTROGEN RECEPTOR POSITIVE: ICD-10-CM

## 2025-01-13 DIAGNOSIS — E03.9 HYPOTHYROIDISM, UNSPECIFIED TYPE: ICD-10-CM

## 2025-01-13 DIAGNOSIS — Z17.0 MALIGNANT NEOPLASM OF UPPER-OUTER QUADRANT OF RIGHT BREAST IN FEMALE, ESTROGEN RECEPTOR POSITIVE: ICD-10-CM

## 2025-01-13 DIAGNOSIS — M81.0 AGE-RELATED OSTEOPOROSIS WITHOUT CURRENT PATHOLOGICAL FRACTURE: ICD-10-CM

## 2025-01-13 DIAGNOSIS — C34.31 ADENOCARCINOMA OF LOWER LOBE OF RIGHT LUNG: ICD-10-CM

## 2025-01-13 DIAGNOSIS — C34.31 ADENOCARCINOMA OF LOWER LOBE OF RIGHT LUNG: Primary | ICD-10-CM

## 2025-01-13 DIAGNOSIS — R53.83 FATIGUE, UNSPECIFIED TYPE: ICD-10-CM

## 2025-01-13 LAB
ALBUMIN SERPL BCP-MCNC: 3.8 G/DL (ref 3.5–5.2)
ALP SERPL-CCNC: 71 U/L (ref 40–150)
ALT SERPL W/O P-5'-P-CCNC: 16 U/L (ref 10–44)
ANION GAP SERPL CALC-SCNC: 11 MMOL/L (ref 8–16)
AST SERPL-CCNC: 22 U/L (ref 10–40)
BILIRUB SERPL-MCNC: 0.4 MG/DL (ref 0.1–1)
BUN SERPL-MCNC: 13 MG/DL (ref 8–23)
CALCIUM SERPL-MCNC: 9 MG/DL (ref 8.7–10.5)
CHLORIDE SERPL-SCNC: 103 MMOL/L (ref 95–110)
CO2 SERPL-SCNC: 23 MMOL/L (ref 23–29)
CREAT SERPL-MCNC: 0.7 MG/DL (ref 0.5–1.4)
ERYTHROCYTE [DISTWIDTH] IN BLOOD BY AUTOMATED COUNT: 13 % (ref 11.5–14.5)
EST. GFR  (NO RACE VARIABLE): >60 ML/MIN/1.73 M^2
GLUCOSE SERPL-MCNC: 96 MG/DL (ref 70–110)
HCT VFR BLD AUTO: 40.6 % (ref 37–48.5)
HGB BLD-MCNC: 12.8 G/DL (ref 12–16)
IMM GRANULOCYTES # BLD AUTO: 0.03 K/UL (ref 0–0.04)
MCH RBC QN AUTO: 28.1 PG (ref 27–31)
MCHC RBC AUTO-ENTMCNC: 31.5 G/DL (ref 32–36)
MCV RBC AUTO: 89 FL (ref 82–98)
NEUTROPHILS # BLD AUTO: 4.3 K/UL (ref 1.8–7.7)
PLATELET # BLD AUTO: 166 K/UL (ref 150–450)
PMV BLD AUTO: 12.8 FL (ref 9.2–12.9)
POTASSIUM SERPL-SCNC: 4 MMOL/L (ref 3.5–5.1)
PROT SERPL-MCNC: 7.5 G/DL (ref 6–8.4)
RBC # BLD AUTO: 4.56 M/UL (ref 4–5.4)
SODIUM SERPL-SCNC: 137 MMOL/L (ref 136–145)
T4 FREE SERPL-MCNC: 1.31 NG/DL (ref 0.71–1.51)
TSH SERPL DL<=0.005 MIU/L-ACNC: 9.68 UIU/ML (ref 0.4–4)
WBC # BLD AUTO: 7.74 K/UL (ref 3.9–12.7)

## 2025-01-13 PROCEDURE — 36415 COLL VENOUS BLD VENIPUNCTURE: CPT | Performed by: HOSPITALIST

## 2025-01-13 PROCEDURE — 84439 ASSAY OF FREE THYROXINE: CPT | Performed by: HOSPITALIST

## 2025-01-13 PROCEDURE — 80053 COMPREHEN METABOLIC PANEL: CPT | Performed by: HOSPITALIST

## 2025-01-13 PROCEDURE — 85027 COMPLETE CBC AUTOMATED: CPT | Performed by: HOSPITALIST

## 2025-01-13 PROCEDURE — 84443 ASSAY THYROID STIM HORMONE: CPT | Performed by: HOSPITALIST

## 2025-01-13 RX ORDER — DIPHENHYDRAMINE HYDROCHLORIDE 50 MG/ML
50 INJECTION INTRAMUSCULAR; INTRAVENOUS ONCE AS NEEDED
Status: CANCELLED | OUTPATIENT
Start: 2025-01-13

## 2025-01-13 RX ORDER — SODIUM CHLORIDE 0.9 % (FLUSH) 0.9 %
10 SYRINGE (ML) INJECTION
Status: CANCELLED | OUTPATIENT
Start: 2025-01-13

## 2025-01-13 RX ORDER — HEPARIN 100 UNIT/ML
500 SYRINGE INTRAVENOUS
Status: CANCELLED | OUTPATIENT
Start: 2025-01-13

## 2025-01-13 RX ORDER — EPINEPHRINE 0.3 MG/.3ML
0.3 INJECTION SUBCUTANEOUS ONCE AS NEEDED
Status: CANCELLED | OUTPATIENT
Start: 2025-01-13

## 2025-01-13 NOTE — PROGRESS NOTES
The patient location is: [place]  The chief complaint leading to consultation is: Lung cancer    Visit type: audiovisual    Face to Face time with patient: 20 minutes of total time spent on the encounter, which includes face to face time and non-face to face time preparing to see the patient (eg, review of tests), Obtaining and/or reviewing separately obtained history, Documenting clinical information in the electronic or other health record, Independently interpreting results (not separately reported) and communicating results to the patient/family/caregiver, or Care coordination (not separately reported).         Each patient to whom he or she provides medical services by telemedicine is:  (1) informed of the relationship between the physician and patient and the respective role of any other health care provider with respect to management of the patient; and (2) notified that he or she may decline to receive medical services by telemedicine and may withdraw from such care at any time.    Notes:         [facility] MEDICAL ONCOLOGY - FOLLOW UP VISIT    Reason for visit: Follow up visit for adenocarcinoma of the lung    Oncology History   Malignant neoplasm of upper-outer quadrant of right breast in female, estrogen receptor positive   4/13/2021 Biopsy    Breast, right, 10:00 5N, biopsy:  - Invasive ductal carcinoma with lobular features     4/13/2021 Breast Tumor Markers    Estrogen Receptor: Positive >90%  Progesterone Receptor: Positive 10-50%  HER2: Negative  Ki67: 10-30%     4/26/2021 Genetic Testing    MyRisk: negative     5/12/2021 Breast Surgery    Right partial mastectomy with SLNB     6/1/2021 Tumor Conference    Radiation options? Offer radiation, can discuss partial vs whole breast radiation.        6/2/2021 Cancer Staged    Staging form: Breast, AJCC 8th Edition  - Pathologic stage from 6/2/2021: Stage IA (pT1b, pN0(sn), cM0, G2, ER+, KS+, HER2-)     7/6/2021 -  7/27/2021 Radiation Therapy    Treatment Summary  Course: C1 Chest 2021  Treatment Site Energy Dose/Fx (Gy) #Fx Total Dose (Gy) Start Date End Date Elapsed Days   Breast_Rt 6X 2.65 16 / 16 42.4 7/6/2021 7/27/2021 21          7/2021 -  Hormone Therapy    Letrozole      Procedure    Bronchoscopy, Station 7 positive for malignancy     Adenocarcinoma of lower lobe of right lung   10/5/2023 Imaging Significant Findings    CT C (obtained after CXR, which was itself obtained for palpitations)  - 2.1 x 1.3 cm spiculated RLL nodule, some spiculation noted to extend to the pleural margin  - Scattered pulmonary nodules centered mostly subpleural at the RLL (e.g. 0.9 cm)       10/10/2023 Imaging Significant Findings    PET CT  - 2.1 x 1.1 cm RLL nodule, SUV 3.8  - 0.9 cm R axillary LN, SUV 3.9  - 1.2 cm subcarinal LN, SUV 4.6  - 0.7 cm Ln along L posterior shoulder, SUV 1.7  - Multiple additional solid pulmonary nodules through R lung base, too small for uptake detection     11/14/2023 Procedure    Bronch with EBUS  RLL, FNA  - Adenocarcinoma (TTF1 positive, GATA3 negative)    RLL, TBBx  - Adenocarcinoma    Per pulmonology, station 7 node was very vascular without window for biopsy, plan to discuss at thoracic tumor board    [facility] NGS  - KRAS G12A, CHEK1, MLH1, CTNNB1, CIC, PTPRD, NOTCH3, EZH2, LATS1, KMT2D  - Negative: EGFR, ALK, BRAF, KRAS, ROS1, RET, MET, EBB2  - PD-L1 1%       11/22/2023 Tumor Conference    Tumor Board  - Plan for axillary LN biopsy to determine lung vs recurrent breast vs other etiology  - Repeat CT C to evaluate nodules in RLL  - Determine treatment plan based on above results     11/22/2023 Tumor Genotyping    [facility] Liquid Biopsy  - No reportable pathogenic variants found     11/29/2023 Imaging Significant Findings    CT C  - 2.1 x 1.2 cm RLL spiculated nodule, stable in size w/ new central cavitation. Spiculated margins extend towards adjacent pleura.   - Stable irregular 2.0 cm linar opacity in  LLL  - Stable 0.3 cm GGO, TRICIA  - Stable R basal sub cm nodules, largest 0.9 cm  - Several stable solid nodules predominantly in RLL, largest 0.9 cm     12/18/2023 Imaging Significant Findings    MRI Brain  - JELENA     1/3/2024 Procedure    IR Guided Biopsy, R Axillary LN  - No metastatic carcinoma (0.3 x 0.3 x 0.1 cm tissue, positive and negative controls stained appropriately)     1/5/2024 Imaging Significant Findings    PET CT  - Stable 2.0 x 1.1 cm RLL nodule (previously 2.1 x 1.1 cm)  - Stable additional nodules w/o significant racer uptake  - 0.8 cm R axillary node, SUV 4.2 (previously 0.9 cm, SUV 3.9)  - 0.5 cm L posterior shoulder node, SUV 2.3 (previously 0.7 cm, SUV 1.7)  - 1.3 cm subcarinal node, SUV 4.9 (previously 1.2 cm, SUV 4.6)     1/10/2024 Tumor Conference    Tumor Board   Re-sample enlarged, hypermetabolic axillary LN with repeat CT-guided biopsy versus excisional biopsy. If LN is negative for recurrent NSCLC, obtain EBUS of PET-avid mediastinal LN.         1/23/2024 Procedure    IR Guided Biopsy, R Axillary LN (Second Biopsy)  - Negative for metastatic carcinoma     2/8/2024 Procedure    Bronch with EBUS  LN  Positive: Station 7 (Adenocarcinoma)  Negative: Station 11R     2/21/2024 Cancer Staged    Staging form: Lung, AJCC 8th Edition  - Clinical stage from 2/21/2024: Stage IIIA (cT1b, cN2, cM0)     2/21/2024 Tumor Conference    Thoracic Tumor Board  Stage IIIA right lower lobe adenocarcinoma with bulky subcarinal lymphadenopathy.  Proceed with NA chemo/I-O therapy. Return to [facility] for surgical evaluation. If patient is not a surgical candidate following 3 cycles of therapy, proceed with definitive chemo/RT.       2/22/2024 - 4/5/2024 Chemotherapy    Treatment Summary   Plan Name: OP NSCLC NIVOLUMAB 360 MG PLUS CARBOPLATIN (AUC5) PEMETREXED 500 MG/M2 Q3W x 3 CYCLES  Treatment Goal: Control  Status: Inactive  Start Date: 2/22/2024  End Date: 4/5/2024  Provider: Bridger Nichole IV, MD  Chemotherapy:  CARBOplatin (PARAPLATIN) 415 mg in sodium chloride 0.9% 326.5 mL chemo infusion, 415 mg, Intravenous, Clinic/HOD 1 time, 3 of 3 cycles  Administration: 415 mg (2/22/2024), 465 mg (4/4/2024), 465 mg (3/14/2024)  PEMEtrexed disodium (ALIMTA) 750 mg in sodium chloride 0.9% SolP 100 mL chemo infusion, 500 mg/m2 = 750 mg, Intravenous, Clinic/HOD 1 time, 3 of 3 cycles  Administration: 750 mg (2/22/2024), 750 mg (4/4/2024), 750 mg (3/14/2024)     4/24/2024 Imaging Significant Findings    CT C/A/P  - 1.2 x 1.0 cm RLL malignancy, previously 2 x 1.2 cm  - Interval decrease in the previously seen nodules in the RLL  - 0.9 cm subcarinal node, previously 1.7 cm  - Interval decrease in right infrahilar soft tissue  - 2.1 cm LLL solid nodular opacity, unchanged  - 0.8 cm TRICIA ground-glass opacity, unchanged  - AVM again noted in RLL  - 0.6 cm right axillary lymph node, decreased in size from prior  - Left scapular lymph node decreased in size from prior, incompletely seen       5/6/2024 - 5/12/2024 Hospital Admission    Admitted to [facility] for RLLx.  Postoperative course complicated by hemothorax requiring return to the operating room for reexploration on postop day 1.  Chest tube removed on postop day 5.     5/6/2024 Surgery    Right Lower Lobectomy  Right Lower Lobe  - Adenocarcinoma with acinar pattern, 35% residual viable tumor, 1.0 cm in greatest dimension.  Pleural invasion absent, LVI absent, margins negative.  0/1 lymph node    Pericardial Excision  - Negative for tumor    LN  - Positive: Station 7  - Negative: Station 8R (0/1), 9 are (0/2), 2R (0/1), 4R (0/1), 11R (0/1), 12R (0/1), R posterior interlobar LN (0/1)    Path Staging: pT1a, pN2     7/2/2024 -  Chemotherapy    Adjuvant Nivolumab  28 day cycles  Nivolumab 480 mg, D1     7/24/2024 Cancer Staged    Staging form: Lung, AJCC 8th Edition  - Pathologic: Stage IIIA (pT1a, pN2, cM0)     8/7/2024 Imaging Significant Findings    PET CT  - New foci of FDG uptake in right  lateral chest wall, likely represents postsurgical changes  - Development of moderate size right pleural effusion  - No evidence of hypermetabolic mediastinal or hilar lymph nodes     11/13/2024 Imaging Significant Findings    CT C  Postsurgical changes after right lower lobectomy  Right pleural effusion decreased in size  Stable 2.2 cm regular nodular opacity of LLL  Stable 0.8 cm ground-glass nodule in TRICIA  Stable 0.3 cm nodule along left major fissure        - Second opinion, [facility]: contralateral LLL lesion stable but plan for sampling of this as well as subcarinal LN and possible additional RLL nodule. Pending results, consider NA --> surgery vs CRT    Oncology History    HPI:     Keo Frias is a 73 y.o. female with pmh significant for HTN, migraine headaches, and multiple malignancies, who presents for follow up of the below lung cancer    1) Stage IA ( [id number], ER 95%, NH 10%, HER2 negative, Ki-67 20%) IDC of the R breast, initially dx'd 4/13/21, s/p lumpectomy and SLNB (5/12/21), XRT (7/6/21-7/27/21), and currently on letrozole (7/2021 - present).     2) Stage IIIA (pT1a, pN2, cM0) adenocarcinoma of the RLL (no targetable mutations, PD-L1 1%), initially dx'd 11/14/23, s/p neoadjuvant carboplatin/pemetrexed/nivolumab (2/22/24 - 4/4/24), right lower lobectomy (5/6/24), and currently on adjuvant nivolumab (7/2/24 - present) who presents to for follow up.     Last clinic with me 10/21/24, proceed with cycle 5 nivolumab, imaging at [facility] with follow up at same.  Labs and follow up on 11/18 with cycle 6 on 11/19.  Continue levothyroxine 75 mcg daily.    Interval History:  11/14/24:  Radiation oncology follow up (Dr. Guadarrama, [facility]), hold off on radiation therapy, repeat imaging in 3 months.  11/14/24:  Medical oncology follow up (Dr. Don, [facility]), recommend surveillance of left lung nodule pending discussion with Dr. Calzada about empiric SBRT, recommend CT-guided biopsy of growth at any point in future.  PET in 4 months to  evaluate previous indeterminate lesions including contralateral nodule in bilateral shoulders.  11/18/24: Medical oncology follow up (Carina Nascimento), proceed with C6 nivolumab.  11/1924:  C6 nivolumab  11/21/24: Sports Medicine, MRI of right shoulder and physical therapy.  11/21/24: Cardiology, who antihypertensive medications as currently  12/3/24: Sports Medicine, Physical therapy and OTC Voltaren gel, RTC PRN.    The pt states that she feels well in general. Pt denies N/V, diarrhea, constipation, rash, new/worsening fatigue, new/worsening SOB, or new/worsening cough. Overall, doing well with treatment. No other concerns or complaints today    ROS:   As per HPI.     Physical Exam:       LMP  (LMP Unknown)                Physical Exam  Constitutional:       Appearance: Normal appearance.   HENT:      Head: Normocephalic and atraumatic.   Eyes:      Extraocular Movements: Extraocular movements intact.      Pupils: Pupils are equal, round, and reactive to light.   Neurological:      Mental Status: She is alert and oriented to person, place, and time.   Psychiatric:         Mood and Affect: Mood normal.         Thought Content: Thought content normal.         Judgment: Judgment normal.         Imaging:    See oncologic history above.     Path:  See oncologic history above.      Assessment and Plan:     Keo Frias is a 73 y.o. female with pmh significant for HTN, migraine headaches, and multiple malignancies, who presents for follow up of the below lung cancer    1) Stage IA ( H1aH2D4, ER 95%, SD 10%, HER2 negative, Ki-67 20%) IDC of the R breast, initially dx'd 4/13/21, s/p lumpectomy and SLNB (5/12/21), XRT (7/6/21-7/27/21), and currently on letrozole (7/2021 - present).     2) Stage IIIA (pT1a, pN2, cM0) adenocarcinoma of the RLL (no targetable mutations, PD-L1 1%), initially dx'd 11/14/23, s/p neoadjuvant carboplatin/pemetrexed/nivolumab (2/22/24 - 4/4/24), right lower lobectomy (5/6/24), and currently on adjuvant  nivolumab (7/2/24 - present) who presents to for follow up.       Stage IIIA Adenocarcinoma of RLL, PD-L1 1%, No Actionable Molecular Alterations  ECOG PS 1.  Patient is currently on adjuvant nivolumab (see note from 6/14/24), tolerating without significant issue. Her most recent imaging (CT C, 11/13/24, ~3.5 months on adjuvant nivolumab) demonstrated stable disease compared to prior., noting the previously observed  2.2 cm LLL nodular opacity. Patient was evaluated [facility] by both medical oncology and radiation oncology, w/ plan to continue adjuvant nivolumab at this time in low threshold for empiric SBRT to the LLL lesion should there be evidence of change/growth.  Given good tolerance and radiographic response, will plan to treat with nivolumab q4w x 1 year, w/ q3m imaging throughout (scheduled at [facility]).     PLAN:   Proceed w/ C8 nivolumab on 1/14/2025 at [facility], labs acceptable and treatment signed  Labs and RTC in   PET CT(given indeterminate lesions in b/l shoulders and L lung) and med onc (Dr. Don) f/u at [facility] on 3/13/25       Hypothyroidism  On 4/30/24, TSH was 41.25 and free T4 0.71, as compared to 4/2/24 when TSH was 0.014 and T4 was 1.92.  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  She has since been started on levothyroxine per Dr. Don.  Subsequent TFTs initially within normal limits (TSH 4.114 on 10/18/24, within goal of <4.5), however up to 7.2 today (12/13/24). If persistently elevated at next visit, will increase levothyroxine dose.   Continue levothyroxine 75 mcg daily      Osteoporosis  DEXA from 7/11/24 with osteoporosis of the lumbar spine and hip.  Prolia per Dr. Ogden      Right IDC, ER+/IL+/HER2-  S/p lumpectomy with SLNB, radiation therapy, and currently on letrozole and denosumab, tolerating well. Concern for possible R axillary recurrence, workup as below.   Okay to continue letrozole  Okay to continue denosumab as above  Breast oncology team aware      Pte and family  members displayed understanding of the above encounter and treatment plan. All thoughtful questions were answered to their satisfaction. Pte was advised to notify the care team or proceed to the ER if signs and symptoms worsen.     20 minutes were spent today on this encounter including face to face time with the patient, data gathering/interpretation and documentation. Greater than 50% of this time involved counseling or coordination of care. I have provided the patient with an opportunity to ask questions and have all questions answered to patient's satisfaction.     Visit today included increased complexity associated with the care of the episodic problem chemotherapy  addressed and managing the longitudinal care of the patient due to the serious and/or complex managed problem(s) malignancies/cancer      LAURIE Riojas, PA-C  [facility]  Dept of Hematology/Oncology  PAYudiC to GI Oncology team           Med Onc Chart Routing      Follow up with physician 1 month and 3 months. See Dr Nichole or Carina monthly for Nivo   Follow up with BRENDA 2 months and 4 months.   Infusion scheduling note    Injection scheduling note    Labs CBC, CMP, free T4 and TSH   Scheduling:  Preferred lab:  Lab interval: every 4 weeks     Imaging    Pharmacy appointment    Other referrals

## 2025-01-14 ENCOUNTER — INFUSION (OUTPATIENT)
Dept: INFUSION THERAPY | Facility: HOSPITAL | Age: 75
End: 2025-01-14
Attending: FAMILY MEDICINE
Payer: MEDICARE

## 2025-01-14 VITALS
WEIGHT: 115.06 LBS | BODY MASS INDEX: 21.17 KG/M2 | DIASTOLIC BLOOD PRESSURE: 67 MMHG | TEMPERATURE: 98 F | RESPIRATION RATE: 16 BRPM | HEIGHT: 62 IN | SYSTOLIC BLOOD PRESSURE: 143 MMHG | OXYGEN SATURATION: 100 % | HEART RATE: 68 BPM

## 2025-01-14 DIAGNOSIS — C34.31 ADENOCARCINOMA OF LOWER LOBE OF RIGHT LUNG: Primary | ICD-10-CM

## 2025-01-14 PROCEDURE — 63600175 PHARM REV CODE 636 W HCPCS: Mod: JZ,TB | Performed by: PHYSICIAN ASSISTANT

## 2025-01-14 PROCEDURE — A4216 STERILE WATER/SALINE, 10 ML: HCPCS | Performed by: PHYSICIAN ASSISTANT

## 2025-01-14 PROCEDURE — 25000003 PHARM REV CODE 250: Performed by: PHYSICIAN ASSISTANT

## 2025-01-14 PROCEDURE — 96413 CHEMO IV INFUSION 1 HR: CPT

## 2025-01-14 RX ORDER — SODIUM CHLORIDE 0.9 % (FLUSH) 0.9 %
10 SYRINGE (ML) INJECTION
Status: DISCONTINUED | OUTPATIENT
Start: 2025-01-14 | End: 2025-01-14 | Stop reason: HOSPADM

## 2025-01-14 RX ORDER — EPINEPHRINE 0.3 MG/.3ML
0.3 INJECTION SUBCUTANEOUS ONCE AS NEEDED
Status: DISCONTINUED | OUTPATIENT
Start: 2025-01-14 | End: 2025-01-14 | Stop reason: HOSPADM

## 2025-01-14 RX ORDER — DIPHENHYDRAMINE HYDROCHLORIDE 50 MG/ML
50 INJECTION INTRAMUSCULAR; INTRAVENOUS ONCE AS NEEDED
Status: DISCONTINUED | OUTPATIENT
Start: 2025-01-14 | End: 2025-01-14 | Stop reason: HOSPADM

## 2025-01-14 RX ADMIN — Medication 10 ML: at 11:01

## 2025-01-14 RX ADMIN — SODIUM CHLORIDE: 9 INJECTION, SOLUTION INTRAVENOUS at 10:01

## 2025-01-14 RX ADMIN — SODIUM CHLORIDE 480 MG: 9 INJECTION, SOLUTION INTRAVENOUS at 10:01

## 2025-01-15 ENCOUNTER — PATIENT MESSAGE (OUTPATIENT)
Dept: HEMATOLOGY/ONCOLOGY | Facility: CLINIC | Age: 75
End: 2025-01-15
Payer: MEDICARE

## 2025-01-16 ENCOUNTER — CLINICAL SUPPORT (OUTPATIENT)
Dept: REHABILITATION | Facility: HOSPITAL | Age: 75
End: 2025-01-16
Payer: MEDICARE

## 2025-01-16 DIAGNOSIS — M25.611 DECREASED RANGE OF MOTION OF RIGHT SHOULDER: Primary | ICD-10-CM

## 2025-01-16 DIAGNOSIS — G89.29 CHRONIC RIGHT SHOULDER PAIN: ICD-10-CM

## 2025-01-16 DIAGNOSIS — M25.511 CHRONIC RIGHT SHOULDER PAIN: ICD-10-CM

## 2025-01-16 PROCEDURE — 97140 MANUAL THERAPY 1/> REGIONS: CPT

## 2025-01-16 PROCEDURE — 97110 THERAPEUTIC EXERCISES: CPT

## 2025-01-16 PROCEDURE — 97112 NEUROMUSCULAR REEDUCATION: CPT

## 2025-01-16 NOTE — PROGRESS NOTES
[facility]   Physical Therapy Treatment Note      Name: Keo Frias  Clinic Number: [id number]    Therapy Diagnosis:   Encounter Diagnoses   Name Primary?    Decreased range of motion of right shoulder Yes    Chronic right shoulder pain      Physician: Osvaldo Costa MD    Visit Date: 1/16/2025    Physician Orders: PT Eval and Treat   Medical Diagnosis from Referral: M75.01 (ICD-10-CM) - Adhesive capsulitis of right shoulder   Evaluation Date: 12/4/2024  Authorization Period Expiration: 12/03/2025  Plan of Care Expiration: 06/04/2025  Progress Note Due: 1/4/2024  Visit # / Visits authorized: 3/10  FOTO: 1/3     Precautions: Standard,     PTA Visit #: 0/5     Time In: 1300   Time Out: 1402  Total Billable Time: 62 minutes    Subjective     Pt reports: She keeps feeling better every time   She was compliant with home exercise program.  Response to previous treatment: feeling better  Functional change: ongoing    Pain: 5/10  Location: right shoulder      Objective      Objective measures updated     Treatment     Keo received the treatments listed below:      therapeutic exercises to develop strength, endurance, ROM, flexibility, posture, and core stabilization for 17 minutes including:  Supine ER AAROM with dowel 3 minutes  Sidelying flexion w/ dowel 3 minutes   Supine flexion AAROM with dowel 3 minutes  UBE 4' fwd/4' back for ROM and cardiovascular endurance    Not today:  Pulleys flexion 2 minutes   Pulleys abduction 2 minutes   Rotator interval LLLD 10 minute hold  Supine LLLD ER stretch 10 minute hold    manual therapy techniques: Joint mobilizations, Manual traction, Myofacial release, Manual Lymphatic Drainage, Soft tissue Mobilization, and Friction Massage were applied to the: R shoulder for 15 minutes, including:  Inferior GH glides; grades III-IV  Posterior GH glides; grades III-IV  PROM shoulder flexion and external rotation   Contract relax shoulder flexion   Thoracic  "gapping  Cervical side glides; gr III-IV    neuromuscular re-education activities to improve: Balance, Coordination, Kinesthetic, Sense, Proprioception, and Posture for 30 minutes. The following activities were included:  Scap squeezes seated 2x15   Standing land mine press 3x10   Wall slides 2x15   Chin tuck 20x 10" holds   Standing ER/IR walkouts 2x12 each PTB    Not today:  Open books arms crossed 20x ea side   therapeutic activities to improve functional performance for 0  minutes, including:      Patient Education and Home Exercises       Education provided:   - continuing HEP    Written Home Exercises Provided: YES. Exercises were reviewed and Keo was able to demonstrate them prior to the end of the session.  Keo demonstrated good understanding of the education provided. See EMR under Patient Instructions for exercises provided during therapy sessions    Assessment     Continue to focus on decreasing pain and increasing pain free ROM. Displays muscle guarding during manual, but she responds very well to manual therapy. Cervical spine assessment today; closing restriction on C2-3 and 3-4. Increased range with side glides and followed with chin tucks which she needed VC to perform without accessory muscles. Pt will continue to progress with mobility and strengthening exercises as tolerated.     Keo Is progressing well towards her goals.   Pt prognosis is Fair.     Pt will continue to benefit from skilled outpatient physical therapy to address the deficits listed in the problem list box on initial evaluation, provide pt/family education and to maximize pt's level of independence in the home and community environment.     Pt's spiritual, cultural and educational needs considered and pt agreeable to plan of care and goals.     Anticipated barriers to physical therapy: cancer treatment     GOALS:   Short Term Goals: 3 months   1.Report decreased R shoulder pain < / =  3/10  to increase tolerance for ADL's  2. " Increase PROM by 30 degrees in all planes    3. Increased strength by 1/3 MMT grade in R shoulder to increase tolerance for ADL and work activities.  4. Pt to tolerate HEP to improve ROM and independence with ADL's     Long Term Goals: 6 months   1.Report decreased R shoulder pain  < / =  1 /10  to increase tolerance for reaching overhead   2.Increase AROM to within 90% of the L shoulder  3.Increase strength to >/= 4/5 in R shoulder to increase tolerance for ADL and work activities.  4. Pt goal: to reach overhead and complete ADLs without pain   5. Pt will have improved gcode of CJ (20-40% limited) on FOTO shoulder in order to demonstrate true functional improvement.     Plan     Plan of care Certification: 12/4/2024 to 06/04/2025.     Continue POC. LLLD stretches and manual therapy to increase ROM    Aristeo Patton PT, DPT

## 2025-01-26 DIAGNOSIS — Z17.0 MALIGNANT NEOPLASM OF UPPER-OUTER QUADRANT OF RIGHT BREAST IN FEMALE, ESTROGEN RECEPTOR POSITIVE: ICD-10-CM

## 2025-01-26 DIAGNOSIS — C50.411 MALIGNANT NEOPLASM OF UPPER-OUTER QUADRANT OF RIGHT BREAST IN FEMALE, ESTROGEN RECEPTOR POSITIVE: ICD-10-CM

## 2025-01-27 ENCOUNTER — PATIENT MESSAGE (OUTPATIENT)
Dept: INTERNAL MEDICINE | Facility: CLINIC | Age: 75
End: 2025-01-27
Payer: MEDICARE

## 2025-01-27 RX ORDER — LETROZOLE 2.5 MG/1
2.5 TABLET, FILM COATED ORAL
Qty: 90 TABLET | Refills: 3 | Status: SHIPPED | OUTPATIENT
Start: 2025-01-27

## 2025-01-28 ENCOUNTER — CLINICAL SUPPORT (OUTPATIENT)
Dept: REHABILITATION | Facility: HOSPITAL | Age: 75
End: 2025-01-28
Payer: MEDICARE

## 2025-01-28 DIAGNOSIS — G89.29 CHRONIC RIGHT SHOULDER PAIN: ICD-10-CM

## 2025-01-28 DIAGNOSIS — M25.611 DECREASED RANGE OF MOTION OF RIGHT SHOULDER: Primary | ICD-10-CM

## 2025-01-28 DIAGNOSIS — M25.511 CHRONIC RIGHT SHOULDER PAIN: ICD-10-CM

## 2025-01-28 PROCEDURE — 97110 THERAPEUTIC EXERCISES: CPT

## 2025-01-28 PROCEDURE — 97112 NEUROMUSCULAR REEDUCATION: CPT

## 2025-01-28 PROCEDURE — 97140 MANUAL THERAPY 1/> REGIONS: CPT

## 2025-01-28 NOTE — PROGRESS NOTES
[facility]   Physical Therapy Treatment Note      Name: Keo Frias  Clinic Number: [id number]    Therapy Diagnosis:   Encounter Diagnoses   Name Primary?    Decreased range of motion of right shoulder Yes    Chronic right shoulder pain      Physician: Osvaldo Costa MD    Visit Date: 1/28/2025    Physician Orders: PT Eval and Treat   Medical Diagnosis from Referral: M75.01 (ICD-10-CM) - Adhesive capsulitis of right shoulder   Evaluation Date: 12/4/2024  Authorization Period Expiration: 12/03/2025  Plan of Care Expiration: 06/04/2025  Progress Note Due: 1/4/2024  Visit # / Visits authorized: 4/10  FOTO: 1/3     Precautions: Standard,     PTA Visit #: 0/5     Time In: 1500   Time Out: 1600  Total Billable Time: 60 minutes    Subjective     Pt reports: She is feeling a little bit better  She was compliant with home exercise program.  Response to previous treatment: improved range of motion  Functional change: ongoing    Pain: 5/10  Location: right shoulder      Objective      Objective measures updated     Treatment     Keo received the treatments listed below:      therapeutic exercises to develop strength, endurance, ROM, flexibility, posture, and core stabilization for 16 minutes including:  Supine ER AAROM with dowel 3x10   Sidelying flexion w/ dowel 3x10   Supine flexion AAROM with dowel 3x10   UBE 5' fwd/5' back for ROM and cardiovascular endurance    Not today:  Pulleys flexion 2 minutes   Pulleys abduction 2 minutes   Rotator interval LLLD 10 minute hold  Supine LLLD ER stretch 10 minute hold    manual therapy techniques: Joint mobilizations, Manual traction, Myofacial release, Manual Lymphatic Drainage, Soft tissue Mobilization, and Friction Massage were applied to the: R shoulder for 13 minutes, including:  Inferior GH glides; grades III-IV  Posterior GH glides; grades III-IV  PROM shoulder flexion and external rotation   Contract relax shoulder flexion   Thoracic  "gapping  Cervical side glides; gr III-IV    neuromuscular re-education activities to improve: Balance, Coordination, Kinesthetic, Sense, Proprioception, and Posture for 31 minutes. The following activities were included:  Scap squeezes seated 2x15   Shoulder extension for low trap activation OTB 2x15   Standing land mine press 3x10   Chin tuck 20x 10" holds   Standing ER/IR walkouts 2x12 each OTB  Standing thoracic rotations 2x15 each side     Not today:  Wall slides 2x15   Open books arms crossed 20x ea side     therapeutic activities to improve functional performance for 0  minutes, including:      Patient Education and Home Exercises       Education provided:   - continuing HEP    Written Home Exercises Provided: YES. Exercises were reviewed and Keo was able to demonstrate them prior to the end of the session.  Keo demonstrated good understanding of the education provided. See EMR under Patient Instructions for exercises provided during therapy sessions    Assessment     Range of motion showed good improvement today. Pt able to achieve 110-115 passively today, and she was reny to achieve 10 degrees ER today. Pt continues to respond well to manual therapy with increased shoulder ROM. Progressed with scapular and cuff activation today to help decrease pain with movement. Pt will continue to progress with mobility and strengthening exercises as tolerated.     Keo Is progressing well towards her goals.   Pt prognosis is Fair.     Pt will continue to benefit from skilled outpatient physical therapy to address the deficits listed in the problem list box on initial evaluation, provide pt/family education and to maximize pt's level of independence in the home and community environment.     Pt's spiritual, cultural and educational needs considered and pt agreeable to plan of care and goals.     Anticipated barriers to physical therapy: cancer treatment     GOALS:   Short Term Goals: 3 months   1.Report decreased R " shoulder pain < / =  3/10  to increase tolerance for ADL's  2. Increase PROM by 30 degrees in all planes    3. Increased strength by 1/3 MMT grade in R shoulder to increase tolerance for ADL and work activities.  4. Pt to tolerate HEP to improve ROM and independence with ADL's     Long Term Goals: 6 months   1.Report decreased R shoulder pain  < / =  1 /10  to increase tolerance for reaching overhead   2.Increase AROM to within 90% of the L shoulder  3.Increase strength to >/= 4/5 in R shoulder to increase tolerance for ADL and work activities.  4. Pt goal: to reach overhead and complete ADLs without pain   5. Pt will have improved gcode of CJ (20-40% limited) on FOTO shoulder in order to demonstrate true functional improvement.     Plan     Plan of care Certification: 12/4/2024 to 06/04/2025.     Continue POC. LLLD stretches and manual therapy to increase ROM    Aristeo Patton PT, DPT

## 2025-02-04 ENCOUNTER — CLINICAL SUPPORT (OUTPATIENT)
Dept: REHABILITATION | Facility: HOSPITAL | Age: 75
End: 2025-02-04
Payer: MEDICARE

## 2025-02-04 DIAGNOSIS — M25.611 DECREASED RANGE OF MOTION OF RIGHT SHOULDER: Primary | ICD-10-CM

## 2025-02-04 DIAGNOSIS — M25.511 CHRONIC RIGHT SHOULDER PAIN: ICD-10-CM

## 2025-02-04 DIAGNOSIS — G89.29 CHRONIC RIGHT SHOULDER PAIN: ICD-10-CM

## 2025-02-04 PROCEDURE — 97112 NEUROMUSCULAR REEDUCATION: CPT

## 2025-02-04 PROCEDURE — 97110 THERAPEUTIC EXERCISES: CPT

## 2025-02-04 NOTE — PROGRESS NOTES
[facility]   Physical Therapy Treatment Note      Name: Keo Frias  Clinic Number: [id number]    Therapy Diagnosis:   Encounter Diagnoses   Name Primary?    Decreased range of motion of right shoulder Yes    Chronic right shoulder pain        Physician: Osvaldo Costa MD    Visit Date: 2/4/2025    Physician Orders: PT Eval and Treat   Medical Diagnosis from Referral: M75.01 (ICD-10-CM) - Adhesive capsulitis of right shoulder   Evaluation Date: 12/4/2024  Authorization Period Expiration: 12/03/2025  Plan of Care Expiration: 06/04/2025  Progress Note Due: 1/4/2024  Visit # / Visits authorized: 4/10  FOTO: 1/3     Precautions: Standard,     PTA Visit #: 0/5     Time In: 1402   Time Out: 1458  Total Billable Time: 30 minutes    Subjective     Pt reports: She continues to feel a little bit better   She was compliant with home exercise program.  Response to previous treatment: improved range of motion  Functional change: ongoing    Pain: 5/10  Location: right shoulder      Objective      Objective measures updated     Treatment     Keo received the treatments listed below:      therapeutic exercises to develop strength, endurance, ROM, flexibility, posture, and core stabilization for 14 minutes including:  Supine ER AAROM with dowel 3x10   Sidelying flexion w/ dowel 3x10   Supine flexion AAROM with dowel 3x10   UBE 4' fwd/4' back for ROM and cardiovascular endurance    Not today:  Pulleys flexion 2 minutes   Pulleys abduction 2 minutes   Rotator interval LLLD 10 minute hold  Supine LLLD ER stretch 10 minute hold    manual therapy techniques: Joint mobilizations, Manual traction, Myofacial release, Manual Lymphatic Drainage, Soft tissue Mobilization, and Friction Massage were applied to the: R shoulder for 12 minutes, including:  Inferior GH glides; grades III-IV  Posterior GH glides; grades III-IV  PROM shoulder flexion and external rotation   Contract relax shoulder flexion   Thoracic  "gapping  Cervical side glides; gr III-IV  AC joint mobilizations inferior; gr III-IV    neuromuscular re-education activities to improve: Balance, Coordination, Kinesthetic, Sense, Proprioception, and Posture for 30 minutes. The following activities were included:  Sidelying dowel rows 2x15   Shoulder extension for low trap activation OTB 2x15   Standing land mine press 3x10   Chin tuck 20x 10" holds   Open books 20x each side     Not today:  Standing ER/IR walkouts 2x12 each OTB  Wall slides 2x15   Open books arms crossed 20x ea side     therapeutic activities to improve functional performance for 0  minutes, including:      Patient Education and Home Exercises       Education provided:   - continuing HEP    Written Home Exercises Provided: YES. Exercises were reviewed and Keo was able to demonstrate them prior to the end of the session.  Keo demonstrated good understanding of the education provided. See EMR under Patient Instructions for exercises provided during therapy sessions    Assessment     Continues to improve ROM; pt was stiffer in ER range of motion today which improved after manual therapy and mobility exercises. Will continue to progress pt as she is able to tolerate more. Reported with pain in her ac joint which was determined to be hypermobile. Pt reported less pain and displayed more mobility in the AC joint after manual therapy.     Keo Is progressing well towards her goals.   Pt prognosis is Fair.     Pt will continue to benefit from skilled outpatient physical therapy to address the deficits listed in the problem list box on initial evaluation, provide pt/family education and to maximize pt's level of independence in the home and community environment.     Pt's spiritual, cultural and educational needs considered and pt agreeable to plan of care and goals.     Anticipated barriers to physical therapy: cancer treatment     GOALS:   Short Term Goals: 3 months   1.Report decreased R shoulder pain " < / =  3/10  to increase tolerance for ADL's  2. Increase PROM by 30 degrees in all planes    3. Increased strength by 1/3 MMT grade in R shoulder to increase tolerance for ADL and work activities.  4. Pt to tolerate HEP to improve ROM and independence with ADL's     Long Term Goals: 6 months   1.Report decreased R shoulder pain  < / =  1 /10  to increase tolerance for reaching overhead   2.Increase AROM to within 90% of the L shoulder  3.Increase strength to >/= 4/5 in R shoulder to increase tolerance for ADL and work activities.  4. Pt goal: to reach overhead and complete ADLs without pain   5. Pt will have improved gcode of CJ (20-40% limited) on FOTO shoulder in order to demonstrate true functional improvement.     Plan     Plan of care Certification: 12/4/2024 to 06/04/2025.     Continue POC. LLLD stretches and manual therapy to increase ROM    Aristeo Patton PT, DPT

## 2025-02-06 ENCOUNTER — CLINICAL SUPPORT (OUTPATIENT)
Dept: REHABILITATION | Facility: HOSPITAL | Age: 75
End: 2025-02-06
Payer: MEDICARE

## 2025-02-06 DIAGNOSIS — M25.611 DECREASED RANGE OF MOTION OF RIGHT SHOULDER: Primary | ICD-10-CM

## 2025-02-06 DIAGNOSIS — G89.29 CHRONIC RIGHT SHOULDER PAIN: ICD-10-CM

## 2025-02-06 DIAGNOSIS — M25.511 CHRONIC RIGHT SHOULDER PAIN: ICD-10-CM

## 2025-02-06 PROCEDURE — 97140 MANUAL THERAPY 1/> REGIONS: CPT

## 2025-02-06 PROCEDURE — 97110 THERAPEUTIC EXERCISES: CPT

## 2025-02-06 PROCEDURE — 97112 NEUROMUSCULAR REEDUCATION: CPT

## 2025-02-06 NOTE — PROGRESS NOTES
[facility]   Physical Therapy Treatment Note      Name: Keo Frias  Clinic Number: [id number]    Therapy Diagnosis:   Encounter Diagnoses   Name Primary?    Decreased range of motion of right shoulder Yes    Chronic right shoulder pain      Physician: Osvaldo Costa MD    Visit Date: 2/6/2025    Physician Orders: PT Eval and Treat   Medical Diagnosis from Referral: M75.01 (ICD-10-CM) - Adhesive capsulitis of right shoulder   Evaluation Date: 12/4/2024  Authorization Period Expiration: 12/03/2025  Plan of Care Expiration: 06/04/2025  Progress Note Due: 1/4/2024  Visit # / Visits authorized: 6/10  FOTO: 1/3     Precautions: Standard,     PTA Visit #: 0/5     Time In: 1304   Time Out: 1359  Total Billable Time: 55 minutes    Subjective     Pt reports: She is feeling better overall since starting therapy and feels like she can move her am a little more   She was compliant with home exercise program.  Response to previous treatment: improved range of motion  Functional change: ongoing    Pain: 5/10  Location: right shoulder      Objective      Observation: 73 y/o alert and oriented      Posture: R scapula downward rotation and IR; R shoulder depression      Passive Range of Motion:   Shoulder Right Left   Flexion 120 165   Abduction 100 170   ER at 0 10* 80   ER at 90 50* 90   IR 10* 90      Active Range of Motion:   Shoulder Right Left   Flexion 105 165   Abduction 100 170   ER at 0 10 80   ER at 90 unable 90   IR at 90 unable  90   Reach behind head T1 T2   Reach behind back  L4 T7      Strength:  Shoulder Right Left   Flexion 4-/5 4/5   Abduction 3+/5 4/5   ER 3+/5 4/5   IR 4/5 4/5   Serratus Anterior 3/5 4/5        Joint Mobility: limited in all directions especially inferior and posterior; firm end feels with all ranges of motion     Palpation: no TTP    Sensation: intact        Intake Outcome Measure for FOTO Shoulder Survey     Therapist reviewed FOTO scores for Keo Frias on  "12/4/2024.   FOTO documents entered into [employer] - see Media section.     Intake Score: 50%          Treatment     Keo received the treatments listed below:      therapeutic exercises to develop strength, endurance, ROM, flexibility, posture, and core stabilization for 26 minutes including:  Supine ER AAROM with dowel 3x10   Sidelying flexion w/ dowel 3x10   Supine flexion AAROM with dowel 3x10   UBE 5' fwd/5' back for ROM and cardiovascular endurance  ROM and strength assessment     Not today:  Pulleys flexion 2 minutes   Pulleys abduction 2 minutes   Rotator interval LLLD 10 minute hold  Supine LLLD ER stretch 10 minute hold    manual therapy techniques: Joint mobilizations, Manual traction, Myofacial release, Manual Lymphatic Drainage, Soft tissue Mobilization, and Friction Massage were applied to the: R shoulder for 10 minutes, including:  Inferior GH glides; grades III-IV  Posterior GH glides; grades III-IV  PROM shoulder flexion and external rotation   Contract relax shoulder flexion   Thoracic gapping  Cervical side glides; gr III-IV  AC joint mobilizations inferior; gr III-IV    neuromuscular re-education activities to improve: Balance, Coordination, Kinesthetic, Sense, Proprioception, and Posture for 19 minutes. The following activities were included:  Sidelying dowel rows 2x15   Standing land mine press 3x10   Chin tuck 20x 10" holds   Open books 20x each side     Not today:  Shoulder extension for low trap activation OTB 2x15   Standing ER/IR walkouts 2x12 each OTB  Wall slides 2x15   Open books arms crossed 20x ea side     therapeutic activities to improve functional performance for 0  minutes, including:      Patient Education and Home Exercises       Education provided:   - continuing HEP    Written Home Exercises Provided: YES. Exercises were reviewed and Keo was able to demonstrate them prior to the end of the session.  Keo demonstrated good understanding of the education provided. See EMR under " Patient Instructions for exercises provided during therapy sessions    Assessment     Keo was reassessed today and showed improvements in ROM and strength since beginning therapy. Pt was educated again on timelines of adhesive capsulitis which will be marked with small progress throughout. Continue to emphasize ROM and scapular exercises. Will continue to progress as tolerated.     Keo Is progressing well towards her goals.   Pt prognosis is Fair.     Pt will continue to benefit from skilled outpatient physical therapy to address the deficits listed in the problem list box on initial evaluation, provide pt/family education and to maximize pt's level of independence in the home and community environment.     Pt's spiritual, cultural and educational needs considered and pt agreeable to plan of care and goals.     Anticipated barriers to physical therapy: cancer treatment     GOALS:   Short Term Goals: 3 months (progressing not met)  1.Report decreased R shoulder pain < / =  3/10  to increase tolerance for ADL's  2. Increase PROM by 30 degrees in all planes    3. Increased strength by 1/3 MMT grade in R shoulder to increase tolerance for ADL and work activities.  4. Pt to tolerate HEP to improve ROM and independence with ADL's     Long Term Goals: 6 months (progressing, not met)  1.Report decreased R shoulder pain  < / =  1 /10  to increase tolerance for reaching overhead   2.Increase AROM to within 90% of the L shoulder  3.Increase strength to >/= 4/5 in R shoulder to increase tolerance for ADL and work activities.  4. Pt goal: to reach overhead and complete ADLs without pain   5. Pt will have improved gcode of CJ (20-40% limited) on FOTO shoulder in order to demonstrate true functional improvement.     Plan     Plan of care Certification: 12/4/2024 to 06/04/2025.     Continue POC. LLLD stretches and manual therapy to increase ROM    Aristeo Patton PT, DPT

## 2025-02-07 ENCOUNTER — LAB VISIT (OUTPATIENT)
Dept: LAB | Facility: HOSPITAL | Age: 75
End: 2025-02-07
Attending: HOSPITALIST
Payer: MEDICARE

## 2025-02-07 ENCOUNTER — OFFICE VISIT (OUTPATIENT)
Dept: HEMATOLOGY/ONCOLOGY | Facility: CLINIC | Age: 75
End: 2025-02-07
Payer: MEDICARE

## 2025-02-07 VITALS
HEIGHT: 62 IN | OXYGEN SATURATION: 98 % | TEMPERATURE: 98 F | BODY MASS INDEX: 21.14 KG/M2 | RESPIRATION RATE: 16 BRPM | DIASTOLIC BLOOD PRESSURE: 68 MMHG | WEIGHT: 114.88 LBS | SYSTOLIC BLOOD PRESSURE: 129 MMHG | HEART RATE: 66 BPM

## 2025-02-07 DIAGNOSIS — C34.31 ADENOCARCINOMA OF LOWER LOBE OF RIGHT LUNG: ICD-10-CM

## 2025-02-07 DIAGNOSIS — M81.0 AGE-RELATED OSTEOPOROSIS WITHOUT CURRENT PATHOLOGICAL FRACTURE: ICD-10-CM

## 2025-02-07 DIAGNOSIS — C34.31 ADENOCARCINOMA OF LOWER LOBE OF RIGHT LUNG: Primary | ICD-10-CM

## 2025-02-07 DIAGNOSIS — R79.89 ABNORMAL TSH: ICD-10-CM

## 2025-02-07 DIAGNOSIS — R53.83 FATIGUE, UNSPECIFIED TYPE: ICD-10-CM

## 2025-02-07 DIAGNOSIS — E03.9 HYPOTHYROIDISM, UNSPECIFIED TYPE: ICD-10-CM

## 2025-02-07 DIAGNOSIS — C77.1 SECONDARY MALIGNANT NEOPLASM OF INTRATHORACIC LYMPH NODES: ICD-10-CM

## 2025-02-07 LAB
ALBUMIN SERPL BCP-MCNC: 3.8 G/DL (ref 3.5–5.2)
ALP SERPL-CCNC: 63 U/L (ref 40–150)
ALT SERPL W/O P-5'-P-CCNC: 12 U/L (ref 10–44)
ANION GAP SERPL CALC-SCNC: 6 MMOL/L (ref 8–16)
AST SERPL-CCNC: 20 U/L (ref 10–40)
BASOPHILS # BLD AUTO: 0.06 K/UL (ref 0–0.2)
BASOPHILS NFR BLD: 0.9 % (ref 0–1.9)
BILIRUB SERPL-MCNC: 0.4 MG/DL (ref 0.1–1)
BUN SERPL-MCNC: 9 MG/DL (ref 8–23)
CALCIUM SERPL-MCNC: 9.1 MG/DL (ref 8.7–10.5)
CHLORIDE SERPL-SCNC: 103 MMOL/L (ref 95–110)
CO2 SERPL-SCNC: 29 MMOL/L (ref 23–29)
CREAT SERPL-MCNC: 0.7 MG/DL (ref 0.5–1.4)
DIFFERENTIAL METHOD BLD: ABNORMAL
EOSINOPHIL # BLD AUTO: 0.5 K/UL (ref 0–0.5)
EOSINOPHIL NFR BLD: 8 % (ref 0–8)
ERYTHROCYTE [DISTWIDTH] IN BLOOD BY AUTOMATED COUNT: 13.2 % (ref 11.5–14.5)
EST. GFR  (NO RACE VARIABLE): >60 ML/MIN/1.73 M^2
GLUCOSE SERPL-MCNC: 95 MG/DL (ref 70–110)
HCT VFR BLD AUTO: 41.8 % (ref 37–48.5)
HGB BLD-MCNC: 13.3 G/DL (ref 12–16)
IMM GRANULOCYTES # BLD AUTO: 0.04 K/UL (ref 0–0.04)
IMM GRANULOCYTES NFR BLD AUTO: 0.6 % (ref 0–0.5)
LYMPHOCYTES # BLD AUTO: 1.8 K/UL (ref 1–4.8)
LYMPHOCYTES NFR BLD: 26.7 % (ref 18–48)
MCH RBC QN AUTO: 27.8 PG (ref 27–31)
MCHC RBC AUTO-ENTMCNC: 31.8 G/DL (ref 32–36)
MCV RBC AUTO: 87 FL (ref 82–98)
MONOCYTES # BLD AUTO: 0.6 K/UL (ref 0.3–1)
MONOCYTES NFR BLD: 8.4 % (ref 4–15)
NEUTROPHILS # BLD AUTO: 3.8 K/UL (ref 1.8–7.7)
NEUTROPHILS NFR BLD: 55.4 % (ref 38–73)
NRBC BLD-RTO: 0 /100 WBC
PLATELET # BLD AUTO: 198 K/UL (ref 150–450)
PMV BLD AUTO: 12 FL (ref 9.2–12.9)
POTASSIUM SERPL-SCNC: 4.2 MMOL/L (ref 3.5–5.1)
PROT SERPL-MCNC: 7.3 G/DL (ref 6–8.4)
RBC # BLD AUTO: 4.78 M/UL (ref 4–5.4)
SODIUM SERPL-SCNC: 138 MMOL/L (ref 136–145)
T4 FREE SERPL-MCNC: 1.23 NG/DL (ref 0.71–1.51)
TSH SERPL DL<=0.005 MIU/L-ACNC: 6.68 UIU/ML (ref 0.4–4)
WBC # BLD AUTO: 6.77 K/UL (ref 3.9–12.7)

## 2025-02-07 PROCEDURE — 99215 OFFICE O/P EST HI 40 MIN: CPT | Mod: S$GLB,,, | Performed by: HOSPITALIST

## 2025-02-07 PROCEDURE — 85025 COMPLETE CBC W/AUTO DIFF WBC: CPT | Performed by: PHYSICIAN ASSISTANT

## 2025-02-07 PROCEDURE — 36415 COLL VENOUS BLD VENIPUNCTURE: CPT | Performed by: PHYSICIAN ASSISTANT

## 2025-02-07 PROCEDURE — 99999 PR PBB SHADOW E&M-EST. PATIENT-LVL III: CPT | Mod: PBBFAC,,, | Performed by: HOSPITALIST

## 2025-02-07 PROCEDURE — G2211 COMPLEX E/M VISIT ADD ON: HCPCS | Mod: S$GLB,,, | Performed by: HOSPITALIST

## 2025-02-07 PROCEDURE — 80053 COMPREHEN METABOLIC PANEL: CPT | Performed by: PHYSICIAN ASSISTANT

## 2025-02-07 PROCEDURE — 3078F DIAST BP <80 MM HG: CPT | Mod: CPTII,S$GLB,, | Performed by: HOSPITALIST

## 2025-02-07 PROCEDURE — 1159F MED LIST DOCD IN RCRD: CPT | Mod: CPTII,S$GLB,, | Performed by: HOSPITALIST

## 2025-02-07 PROCEDURE — 3074F SYST BP LT 130 MM HG: CPT | Mod: CPTII,S$GLB,, | Performed by: HOSPITALIST

## 2025-02-07 PROCEDURE — 84443 ASSAY THYROID STIM HORMONE: CPT | Performed by: PHYSICIAN ASSISTANT

## 2025-02-07 PROCEDURE — 3288F FALL RISK ASSESSMENT DOCD: CPT | Mod: CPTII,S$GLB,, | Performed by: HOSPITALIST

## 2025-02-07 PROCEDURE — 1126F AMNT PAIN NOTED NONE PRSNT: CPT | Mod: CPTII,S$GLB,, | Performed by: HOSPITALIST

## 2025-02-07 PROCEDURE — 1101F PT FALLS ASSESS-DOCD LE1/YR: CPT | Mod: CPTII,S$GLB,, | Performed by: HOSPITALIST

## 2025-02-07 PROCEDURE — 84439 ASSAY OF FREE THYROXINE: CPT | Performed by: PHYSICIAN ASSISTANT

## 2025-02-07 RX ORDER — LEVOTHYROXINE SODIUM 88 UG/1
88 TABLET ORAL
Qty: 30 TABLET | Refills: 11 | Status: SHIPPED | OUTPATIENT
Start: 2025-02-07

## 2025-02-07 RX ORDER — SODIUM CHLORIDE 0.9 % (FLUSH) 0.9 %
10 SYRINGE (ML) INJECTION
Status: CANCELLED | OUTPATIENT
Start: 2025-02-11

## 2025-02-07 RX ORDER — HEPARIN 100 UNIT/ML
500 SYRINGE INTRAVENOUS
Status: CANCELLED | OUTPATIENT
Start: 2025-02-11

## 2025-02-07 RX ORDER — DIPHENHYDRAMINE HYDROCHLORIDE 50 MG/ML
50 INJECTION INTRAMUSCULAR; INTRAVENOUS ONCE AS NEEDED
Status: CANCELLED | OUTPATIENT
Start: 2025-02-11

## 2025-02-07 RX ORDER — EPINEPHRINE 0.3 MG/.3ML
0.3 INJECTION SUBCUTANEOUS ONCE AS NEEDED
Status: CANCELLED | OUTPATIENT
Start: 2025-02-11

## 2025-02-07 NOTE — PROGRESS NOTES
[facility] MEDICAL ONCOLOGY - FOLLOW UP VISIT    Reason for visit: Follow up visit for adenocarcinoma of the lung    Oncology History   Malignant neoplasm of upper-outer quadrant of right breast in female, estrogen receptor positive   4/13/2021 Biopsy    Breast, right, 10:00 5N, biopsy:  - Invasive ductal carcinoma with lobular features     4/13/2021 Breast Tumor Markers    Estrogen Receptor: Positive >90%  Progesterone Receptor: Positive 10-50%  HER2: Negative  Ki67: 10-30%     4/26/2021 Genetic Testing    MyRisk: negative     5/12/2021 Breast Surgery    Right partial mastectomy with SLNB     6/1/2021 Tumor Conference    Radiation options? Offer radiation, can discuss partial vs whole breast radiation.        6/2/2021 Cancer Staged    Staging form: Breast, AJCC 8th Edition  - Pathologic stage from 6/2/2021: Stage IA (pT1b, pN0(sn), cM0, G2, ER+, SC+, HER2-)     7/6/2021 - 7/27/2021 Radiation Therapy    Treatment Summary  Course: C1 Chest 2021  Treatment Site Energy Dose/Fx (Gy) #Fx Total Dose (Gy) Start Date End Date Elapsed Days   Breast_Rt 6X 2.65 16 / 16 42.4 7/6/2021 7/27/2021 21          7/2021 -  Hormone Therapy    Letrozole      Procedure    Bronchoscopy, Station 7 positive for malignancy     Adenocarcinoma of lower lobe of right lung   10/5/2023 Imaging Significant Findings    CT C (obtained after CXR, which was itself obtained for palpitations)  - 2.1 x 1.3 cm spiculated RLL nodule, some spiculation noted to extend to the pleural margin  - Scattered pulmonary nodules centered mostly subpleural at the RLL (e.g. 0.9 cm)       10/10/2023 Imaging Significant Findings    PET CT  - 2.1 x 1.1 cm RLL nodule, SUV 3.8  - 0.9 cm R axillary LN, SUV 3.9  - 1.2 cm subcarinal LN, SUV 4.6  - 0.7 cm Ln along L posterior shoulder, SUV 1.7  - Multiple additional solid pulmonary nodules through R lung base, too small for uptake detection     11/14/2023 Procedure    Bronch with  EBUS  RLL, FNA  - Adenocarcinoma (TTF1 positive, GATA3 negative)    RLL, TBBx  - Adenocarcinoma    Per pulmonology, station 7 node was very vascular without window for biopsy, plan to discuss at thoracic tumor board    [facility] NGS  - KRAS G12A, CHEK1, MLH1, CTNNB1, CIC, PTPRD, NOTCH3, EZH2, LATS1, KMT2D  - Negative: EGFR, ALK, BRAF, KRAS, ROS1, RET, MET, EBB2  - PD-L1 1%       11/22/2023 Tumor Conference    Tumor Board  - Plan for axillary LN biopsy to determine lung vs recurrent breast vs other etiology  - Repeat CT C to evaluate nodules in RLL  - Determine treatment plan based on above results     11/22/2023 Tumor Genotyping    [facility] Liquid Biopsy  - No reportable pathogenic variants found     11/29/2023 Imaging Significant Findings    CT C  - 2.1 x 1.2 cm RLL spiculated nodule, stable in size w/ new central cavitation. Spiculated margins extend towards adjacent pleura.   - Stable irregular 2.0 cm linar opacity in LLL  - Stable 0.3 cm GGO, TRICIA  - Stable R basal sub cm nodules, largest 0.9 cm  - Several stable solid nodules predominantly in RLL, largest 0.9 cm     12/18/2023 Imaging Significant Findings    MRI Brain  - JELENA     1/3/2024 Procedure    IR Guided Biopsy, R Axillary LN  - No metastatic carcinoma (0.3 x 0.3 x 0.1 cm tissue, positive and negative controls stained appropriately)     1/5/2024 Imaging Significant Findings    PET CT  - Stable 2.0 x 1.1 cm RLL nodule (previously 2.1 x 1.1 cm)  - Stable additional nodules w/o significant racer uptake  - 0.8 cm R axillary node, SUV 4.2 (previously 0.9 cm, SUV 3.9)  - 0.5 cm L posterior shoulder node, SUV 2.3 (previously 0.7 cm, SUV 1.7)  - 1.3 cm subcarinal node, SUV 4.9 (previously 1.2 cm, SUV 4.6)     1/10/2024 Tumor Conference    Tumor Board   Re-sample enlarged, hypermetabolic axillary LN with repeat CT-guided biopsy versus excisional biopsy. If LN is negative for recurrent NSCLC, obtain EBUS of PET-avid mediastinal LN.         1/23/2024 Procedure    IR  Guided Biopsy, R Axillary LN (Second Biopsy)  - Negative for metastatic carcinoma     2/8/2024 Procedure    Bronch with EBUS  LN  Positive: Station 7 (Adenocarcinoma)  Negative: Station 11R     2/21/2024 Cancer Staged    Staging form: Lung, AJCC 8th Edition  - Clinical stage from 2/21/2024: Stage IIIA (cT1b, cN2, cM0)     2/21/2024 Tumor Conference    Thoracic Tumor Board  Stage IIIA right lower lobe adenocarcinoma with bulky subcarinal lymphadenopathy.  Proceed with NA chemo/I-O therapy. Return to [facility] for surgical evaluation. If patient is not a surgical candidate following 3 cycles of therapy, proceed with definitive chemo/RT.       2/22/2024 - 4/5/2024 Chemotherapy    Treatment Summary   Plan Name: OP NSCLC NIVOLUMAB 360 MG PLUS CARBOPLATIN (AUC5) PEMETREXED 500 MG/M2 Q3W x 3 CYCLES  Treatment Goal: Control  Status: Inactive  Start Date: 2/22/2024  End Date: 4/5/2024  Provider: Bridger Nichole IV, MD  Chemotherapy: CARBOplatin (PARAPLATIN) 415 mg in sodium chloride 0.9% 326.5 mL chemo infusion, 415 mg, Intravenous, Clinic/HOD 1 time, 3 of 3 cycles  Administration: 415 mg (2/22/2024), 465 mg (4/4/2024), 465 mg (3/14/2024)  PEMEtrexed disodium (ALIMTA) 750 mg in sodium chloride 0.9% SolP 100 mL chemo infusion, 500 mg/m2 = 750 mg, Intravenous, Clinic/HOD 1 time, 3 of 3 cycles  Administration: 750 mg (2/22/2024), 750 mg (4/4/2024), 750 mg (3/14/2024)     4/24/2024 Imaging Significant Findings    CT C/A/P  - 1.2 x 1.0 cm RLL malignancy, previously 2 x 1.2 cm  - Interval decrease in the previously seen nodules in the RLL  - 0.9 cm subcarinal node, previously 1.7 cm  - Interval decrease in right infrahilar soft tissue  - 2.1 cm LLL solid nodular opacity, unchanged  - 0.8 cm TRICIA ground-glass opacity, unchanged  - AVM again noted in RLL  - 0.6 cm right axillary lymph node, decreased in size from prior  - Left scapular lymph node decreased in size from prior, incompletely seen       5/6/2024 - 5/12/2024 Hospital Admission     Admitted to [facility] for RLLx.  Postoperative course complicated by hemothorax requiring return to the operating room for reexploration on postop day 1.  Chest tube removed on postop day 5.     5/6/2024 Surgery    Right Lower Lobectomy  Right Lower Lobe  - Adenocarcinoma with acinar pattern, 35% residual viable tumor, 1.0 cm in greatest dimension.  Pleural invasion absent, LVI absent, margins negative.  0/1 lymph node    Pericardial Excision  - Negative for tumor    LN  - Positive: Station 7  - Negative: Station 8R (0/1), 9 are (0/2), 2R (0/1), 4R (0/1), 11R (0/1), 12R (0/1), R posterior interlobar LN (0/1)    Path Staging: pT1a, pN2     7/2/2024 -  Chemotherapy    Adjuvant Nivolumab  28 day cycles  Nivolumab 480 mg, D1     7/24/2024 Cancer Staged    Staging form: Lung, AJCC 8th Edition  - Pathologic: Stage IIIA (pT1a, pN2, cM0)     8/7/2024 Imaging Significant Findings    PET CT  - New foci of FDG uptake in right lateral chest wall, likely represents postsurgical changes  - Development of moderate size right pleural effusion  - No evidence of hypermetabolic mediastinal or hilar lymph nodes     11/13/2024 Imaging Significant Findings    CT C  Postsurgical changes after right lower lobectomy  Right pleural effusion decreased in size  Stable 2.2 cm regular nodular opacity of LLL  Stable 0.8 cm ground-glass nodule in TRICIA  Stable 0.3 cm nodule along left major fissure        - Second opinion, [facility]: contralateral LLL lesion stable but plan for sampling of this as well as subcarinal LN and possible additional RLL nodule. Pending results, consider NA --> surgery vs CRT    Oncology History    HPI:     Keo Frias is a 73 y.o. female with pmh significant for HTN, migraine headaches, and multiple malignancies, who presents for follow up of the below lung cancer    1) Stage IA ( [id number], ER 95%, RI 10%, HER2 negative, Ki-67 20%) IDC of the R breast, initially dx'd 4/13/21, s/p lumpectomy and SLNB (5/12/21), XRT (7/6/21-7/27/21),  "and currently on letrozole (7/2021 - present).     2) Stage IIIA (pT1a, pN2, cM0) adenocarcinoma of the RLL (no targetable mutations, PD-L1 1%), initially dx'd 11/14/23, s/p neoadjuvant carboplatin/pemetrexed/nivolumab (2/22/24 - 4/4/24), right lower lobectomy (5/6/24), and currently on adjuvant nivolumab (7/2/24 - present) who presents to for follow up.     Last clinic with me 12/13/24, proceed with C7 nivolumab, C8 on 01/13.  PET-CT in med Onc on 3/13/25.  Continue levothyroxine 75 mcg daily.    Interval History:  12/17/24: C7 nivolumab  1/13/25: Medical oncology (Enedina Win), proceed with C8 nivolumab on 1/14/25.    1/14/25: C8 nivolumab    The pt states that she is feeling generally well.     She continues to endorse pain in her R shoulder. She continues to work with physical therapy and feels that is improving gradually. She notes that it will still awaken her from her sleeping, but "it's not like pain pain".      She describes a single episode of vomiting, which she attributes to laying down after a vitamin.      Pt denies nausea, diarrhea, constipation, rash, new/worsening fatigue, new/worsening SOB, or new/worsening cough.     Of note, she received one root canal last week.       ROS:   As per HPI.     Physical Exam:       /68 (BP Location: Left arm, Patient Position: Sitting)   Pulse 66   Temp 98.1 °F (36.7 °C) (Oral)   Resp 16   Ht 5' 2" (1.575 m)   Wt 52.1 kg (114 lb 13.8 oz)   LMP  (LMP Unknown)   SpO2 98%   BMI 21.01 kg/m²                Physical Exam  Constitutional:       Appearance: Normal appearance.   HENT:      Head: Normocephalic and atraumatic.   Eyes:      Extraocular Movements: Extraocular movements intact.      Conjunctiva/sclera: Conjunctivae normal.      Pupils: Pupils are equal, round, and reactive to light.   Cardiovascular:      Rate and Rhythm: Normal rate and regular rhythm.      Heart sounds: No murmur heard.     No friction rub. No gallop.   Pulmonary:      Effort: " Pulmonary effort is normal.      Breath sounds: No wheezing, rhonchi or rales.   Musculoskeletal:         General: Normal range of motion.      Right lower leg: No edema.      Left lower leg: No edema.   Skin:     General: Skin is warm and dry.   Neurological:      Mental Status: She is alert and oriented to person, place, and time.   Psychiatric:         Mood and Affect: Mood normal.         Thought Content: Thought content normal.         Judgment: Judgment normal.         Imaging:    See oncologic history above.     Path:  See oncologic history above.      Assessment and Plan:     Keo Frias is a 73 y.o. female with pmh significant for HTN, migraine headaches, and multiple malignancies, who presents for follow up of the below lung cancer    1) Stage IA ( [id number], ER 95%, NJ 10%, HER2 negative, Ki-67 20%) IDC of the R breast, initially dx'd 4/13/21, s/p lumpectomy and SLNB (5/12/21), XRT (7/6/21-7/27/21), and currently on letrozole (7/2021 - present).     2) Stage IIIA (pT1a, pN2, cM0) adenocarcinoma of the RLL (no targetable mutations, PD-L1 1%), initially dx'd 11/14/23, s/p neoadjuvant carboplatin/pemetrexed/nivolumab (2/22/24 - 4/4/24), right lower lobectomy (5/6/24), and currently on adjuvant nivolumab (7/2/24 - present) who presents to for follow up.     Stage IIIA Adenocarcinoma of RLL, PD-L1 1%, No Actionable Molecular Alterations  ECOG PS 1.  Patient is currently on adjuvant nivolumab (see note from 6/14/24), tolerating without significant issue. Her most recent imaging (CT C, 11/13/24, ~3.5 months on adjuvant nivolumab) demonstrated stable disease compared to prior, noting stability of the previously observed  2.2 cm LLL nodular opacity. Patient was evaluated at [facility] by both medical oncology and radiation oncology w/ plan to continue adjuvant nivolumab at this time and low threshold for empiric SBRT to the LLL lesion should there be evidence of change/growth.  Given good tolerance and radiographic  "response, will plan to treat with nivolumab q4w x 1 year, w/ imaging throughout (scheduled at [facility]).     PLAN:   Proceed w/ C9 nivolumab on 2/11/24 at [place], labs acceptable and treatment signed  Labs and RTC with Annamarie Kerr on 3/10/25, nivolumab C10 on 3/11/25 as scheduled  PET CT (given indeterminate lesions in b/l shoulders and L lung) and med onc (Dr. Don) f/u at [facility] on 3/20/25      Hypothyroidism  On 4/30/24, TSH was 41.25 and free T4 0.71, as compared to 4/2/24 when TSH was 0.014 and T4 was 1.92.  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  She has since been started on levothyroxine per Dr. Don.  Subsequent TFTs initially within normal limits (goal of <4.5), however persistently elevated >6.6 as of 11/18/24.   Increase to levothyroxine 88 mcg daily      Osteoporosis  DEXA from 7/11/24 with osteoporosis of the lumbar spine and hip. Discussed to hold on dental procedures until at least 2 months after prolia injections based on "American Association of Oral and Maxillofacial Surgeons Position Paper on Medication-Related Osteonecrosis of the Jaw - 2014 Update". Pt endorsed her understanding.   Prolia per Dr. Ogden, message sent noting pt considering two more root canals      Right IDC, ER+/WV+/HER2-  S/p lumpectomy with SLNB, radiation therapy, and currently on letrozole and denosumab, tolerating well. Concern for possible R axillary recurrence, workup as below.   Okay to continue letrozole  Okay to continue denosumab as above  Breast oncology team aware      The above information has been reviewed with the patient and all questions have been answered to their apparent satisfaction.  They understand that they can call the clinic with any questions.        Med Onc Chart Routing      Follow up with physician . As scheduled   Follow up with BRENDA    Infusion scheduling note    Injection scheduling note    Labs    Imaging    Pharmacy appointment    Other referrals               "

## 2025-02-07 NOTE — Clinical Note
Good afternoon!  Wanted to send an FYI that this patient underwent a root canal last week. Her last dose of prolia was ~6 months ago. She is considering undergoing two more root canals, but this hasn't been decided. I told her to reach out to you about it, but wanted to let you know here as well in case you would like to push her next denosumab injection a bit.   Best,  Bo

## 2025-02-07 NOTE — PROGRESS NOTES
"Interval History:  12/17/24: C7 nivolumab  1/13/25: Medical oncology (Enedina Win), proceed with C8 nivolumab on 1/14/25.    1/14/25: C8 nivolumab    The pt states that she is feeling generally well.    She continues to endorse pain in her R shoulder. She continues to work with physical therapy and feels that is improving gradually. She notes that it will still awaken her from her sleeping, but "it's not like pain pain".     She describes a single episode of vomiting, which she attributes to laying down after a vitamin.     Pt denies nausea, diarrhea, constipation, rash, new/worsening fatigue, new/worsening SOB, or new/worsening cough.    Of note, she received one root canal last week.          PLAN:   Studies  Today  Imaging  PET CT on 3/20/25 at [facility]  Treatment  C9 nivolumab on 2/11/25  RTC   As scheduled ([facility] on 3/10/25, nivolumab C10 on 3/11/25)  88 mcg - from 75, [facility]  TSH 6.67        Limited data. 2 months before and after is probably safest. However, depends what she is actually getting done (I.e. how invasive is this procedure). She is on the 6 mo. prolia and looks like she is currently due. I would say do the procedure before redosing. Depending if its invasive or not can depend how soon you give it after. To be the most safe, recommend giving it 2 months after it.   CB  2 month recs come from "American Association of Oral and Maxillofacial Surgeons Position Paper on Medication-Related Osteonecrosis of the Jaw-2014 Update"      "

## 2025-02-11 ENCOUNTER — PATIENT MESSAGE (OUTPATIENT)
Dept: HEMATOLOGY/ONCOLOGY | Facility: CLINIC | Age: 75
End: 2025-02-11
Payer: MEDICARE

## 2025-02-11 ENCOUNTER — TELEPHONE (OUTPATIENT)
Dept: INTERNAL MEDICINE | Facility: CLINIC | Age: 75
End: 2025-02-11
Payer: MEDICARE

## 2025-02-11 ENCOUNTER — INFUSION (OUTPATIENT)
Dept: INFUSION THERAPY | Facility: HOSPITAL | Age: 75
End: 2025-02-11
Attending: FAMILY MEDICINE
Payer: MEDICARE

## 2025-02-11 VITALS
SYSTOLIC BLOOD PRESSURE: 101 MMHG | OXYGEN SATURATION: 98 % | BODY MASS INDEX: 21.14 KG/M2 | TEMPERATURE: 98 F | DIASTOLIC BLOOD PRESSURE: 53 MMHG | WEIGHT: 114.88 LBS | HEART RATE: 69 BPM | HEIGHT: 62 IN | RESPIRATION RATE: 16 BRPM

## 2025-02-11 DIAGNOSIS — C34.31 ADENOCARCINOMA OF LOWER LOBE OF RIGHT LUNG: Primary | ICD-10-CM

## 2025-02-11 PROCEDURE — 63600175 PHARM REV CODE 636 W HCPCS: Mod: JZ,TB | Performed by: HOSPITALIST

## 2025-02-11 PROCEDURE — 25000003 PHARM REV CODE 250: Performed by: HOSPITALIST

## 2025-02-11 PROCEDURE — 96413 CHEMO IV INFUSION 1 HR: CPT

## 2025-02-11 PROCEDURE — A4216 STERILE WATER/SALINE, 10 ML: HCPCS | Performed by: HOSPITALIST

## 2025-02-11 RX ORDER — DIPHENHYDRAMINE HYDROCHLORIDE 50 MG/ML
50 INJECTION INTRAMUSCULAR; INTRAVENOUS ONCE AS NEEDED
Status: DISCONTINUED | OUTPATIENT
Start: 2025-02-11 | End: 2025-02-11 | Stop reason: HOSPADM

## 2025-02-11 RX ORDER — SODIUM CHLORIDE 0.9 % (FLUSH) 0.9 %
10 SYRINGE (ML) INJECTION
Status: DISCONTINUED | OUTPATIENT
Start: 2025-02-11 | End: 2025-02-11 | Stop reason: HOSPADM

## 2025-02-11 RX ORDER — EPINEPHRINE 0.3 MG/.3ML
0.3 INJECTION SUBCUTANEOUS ONCE AS NEEDED
Status: DISCONTINUED | OUTPATIENT
Start: 2025-02-11 | End: 2025-02-11 | Stop reason: HOSPADM

## 2025-02-11 RX ADMIN — SODIUM CHLORIDE 480 MG: 9 INJECTION, SOLUTION INTRAVENOUS at 11:02

## 2025-02-11 RX ADMIN — SODIUM CHLORIDE: 9 INJECTION, SOLUTION INTRAVENOUS at 11:02

## 2025-02-11 RX ADMIN — Medication 10 ML: at 12:02

## 2025-02-11 NOTE — NURSING
Patient tolerated Opdivo well today. PIV removed, catheter tip intact. Plan to rtc 3/11 for next infusion. Patient had root canal on 1/30th with possibility of more upcoming dental work. Moved Prolia injection to 4/30th per Dr. Watson. Patient will keep clinic notified if she has more dental work. Calendar given and reviewed with patient. Discharged home, ambulated independently.

## 2025-02-13 ENCOUNTER — CLINICAL SUPPORT (OUTPATIENT)
Dept: REHABILITATION | Facility: HOSPITAL | Age: 75
End: 2025-02-13
Payer: MEDICARE

## 2025-02-13 DIAGNOSIS — G89.29 CHRONIC RIGHT SHOULDER PAIN: ICD-10-CM

## 2025-02-13 DIAGNOSIS — M25.511 CHRONIC RIGHT SHOULDER PAIN: ICD-10-CM

## 2025-02-13 DIAGNOSIS — M25.611 DECREASED RANGE OF MOTION OF RIGHT SHOULDER: Primary | ICD-10-CM

## 2025-02-13 PROCEDURE — 97112 NEUROMUSCULAR REEDUCATION: CPT

## 2025-02-13 PROCEDURE — 97110 THERAPEUTIC EXERCISES: CPT

## 2025-02-13 NOTE — PROGRESS NOTES
[facility]   Physical Therapy Treatment Note      Name: Keo Frias  Clinic Number: [id number]    Therapy Diagnosis:   Encounter Diagnoses   Name Primary?    Decreased range of motion of right shoulder Yes    Chronic right shoulder pain      Physician: Osvaldo Costa MD    Visit Date: 2/13/2025    Physician Orders: PT Eval and Treat   Medical Diagnosis from Referral: M75.01 (ICD-10-CM) - Adhesive capsulitis of right shoulder   Evaluation Date: 12/4/2024  Authorization Period Expiration: 12/03/2025  Plan of Care Expiration: 06/04/2025  Progress Note Due: 1/4/2024  Visit # / Visits authorized: 6/10  FOTO: 1/3     Precautions: Standard,     PTA Visit #: 0/5     Time In: 1401   Time Out: 1459  Total Billable Time: 58 minutes    Subjective     Pt reports: Her shoulder is feeling better   She was compliant with home exercise program.  Response to previous treatment: improved range of motion  Functional change: ongoing    Pain: 5/10  Location: right shoulder      Objective      Objective measures will be taken at progress report unless otherwise noted    Treatment     Keo received the treatments listed below:      therapeutic exercises to develop strength, endurance, ROM, flexibility, posture, and core stabilization for 23 minutes including:  Supine ER AAROM with dowel 3x10   Supine flexion AAROM with dowel 3x10   UBE 5' fwd/5' back for ROM and cardiovascular endurance  Pulleys flexion 2 minutes   Pulleys abduction 2 minutes    Not today:  Sidelying flexion w/ dowel 3x10    Rotator interval LLLD 10 minute hold  Supine LLLD ER stretch 10 minute hold    manual therapy techniques: Joint mobilizations, Manual traction, Myofacial release, Manual Lymphatic Drainage, Soft tissue Mobilization, and Friction Massage were applied to the: R shoulder for 15 minutes, including:  Inferior GH glides; grades III-IV  Posterior GH glides; grades III-IV  PROM shoulder flexion and external rotation   Contract  "relax shoulder flexion   Thoracic gapping  Cervical side glides; gr III-IV  AC joint mobilizations inferior; gr III-IV    neuromuscular re-education activities to improve: Balance, Coordination, Kinesthetic, Sense, Proprioception, and Posture for 20 minutes. The following activities were included:  Open books 20x each side   No money isometrics 0 degrees YTB 3x10 3" holds   Wall slides 2x15   Standing ER/IR walkouts 2x12 each OTB    Not today:  Standing land mine press 3x10   Sidelying dowel rows 2x15  Chin tuck 20x 10" holds   Shoulder extension for low trap activation OTB 2x15   Open books arms crossed 20x ea side     therapeutic activities to improve functional performance for 0  minutes, including:      Patient Education and Home Exercises       Education provided:   - continuing HEP    Written Home Exercises Provided: YES. Exercises were reviewed and Keo was able to demonstrate them prior to the end of the session.  Keo demonstrated good understanding of the education provided. See EMR under Patient Instructions for exercises provided during therapy sessions    Assessment     Keo continues to improve ROM in session after contract relax techniques to decrease muscle guarding. Progressed with no money isometrics to promote scapular stability and cuff activation in ER; tolerated treatment well with no increase in pain. Will continue to target increases in ROM and scapular strengthening.       Keo Is progressing well towards her goals.   Pt prognosis is Fair.     Pt will continue to benefit from skilled outpatient physical therapy to address the deficits listed in the problem list box on initial evaluation, provide pt/family education and to maximize pt's level of independence in the home and community environment.     Pt's spiritual, cultural and educational needs considered and pt agreeable to plan of care and goals.     Anticipated barriers to physical therapy: cancer treatment     GOALS:   Short Term Goals: " 3 months (progressing not met)  1.Report decreased R shoulder pain < / =  3/10  to increase tolerance for ADL's  2. Increase PROM by 30 degrees in all planes    3. Increased strength by 1/3 MMT grade in R shoulder to increase tolerance for ADL and work activities.  4. Pt to tolerate HEP to improve ROM and independence with ADL's     Long Term Goals: 6 months (progressing, not met)  1.Report decreased R shoulder pain  < / =  1 /10  to increase tolerance for reaching overhead   2.Increase AROM to within 90% of the L shoulder  3.Increase strength to >/= 4/5 in R shoulder to increase tolerance for ADL and work activities.  4. Pt goal: to reach overhead and complete ADLs without pain   5. Pt will have improved gcode of CJ (20-40% limited) on FOTO shoulder in order to demonstrate true functional improvement.     Plan     Plan of care Certification: 12/4/2024 to 06/04/2025.     Continue POC. LLLD stretches and manual therapy to increase ROM    Aristeo Patton PT, DPT

## 2025-02-18 ENCOUNTER — CLINICAL SUPPORT (OUTPATIENT)
Dept: REHABILITATION | Facility: HOSPITAL | Age: 75
End: 2025-02-18
Payer: MEDICARE

## 2025-02-18 DIAGNOSIS — M25.611 DECREASED RANGE OF MOTION OF RIGHT SHOULDER: Primary | ICD-10-CM

## 2025-02-18 DIAGNOSIS — G89.29 CHRONIC RIGHT SHOULDER PAIN: ICD-10-CM

## 2025-02-18 DIAGNOSIS — M25.511 CHRONIC RIGHT SHOULDER PAIN: ICD-10-CM

## 2025-02-18 PROCEDURE — 97112 NEUROMUSCULAR REEDUCATION: CPT

## 2025-02-18 PROCEDURE — 97140 MANUAL THERAPY 1/> REGIONS: CPT

## 2025-02-18 PROCEDURE — 97110 THERAPEUTIC EXERCISES: CPT

## 2025-02-18 NOTE — PROGRESS NOTES
[facility]   Physical Therapy Treatment Note      Name: Keo Frias  Clinic Number: [id number]    Therapy Diagnosis:   Encounter Diagnoses   Name Primary?    Decreased range of motion of right shoulder Yes    Chronic right shoulder pain        Physician: Osvaldo Costa MD    Visit Date: 2/18/2025    Physician Orders: PT Eval and Treat   Medical Diagnosis from Referral: M75.01 (ICD-10-CM) - Adhesive capsulitis of right shoulder   Evaluation Date: 12/4/2024  Authorization Period Expiration: 12/03/2025  Plan of Care Expiration: 06/04/2025  Progress Note Due: 1/4/2024  Visit # / Visits authorized: 7/10  FOTO: 1/3     Precautions: Standard,     PTA Visit #: 0/5     Time In: 1405   Time Out: 1500  Total Billable Time: 55 minutes    Subjective     Pt reports:  Her shoulder continues to slowly get better   She was compliant with home exercise program.  Response to previous treatment: improved range of motion  Functional change: ongoing    Pain: 5/10  Location: right shoulder      Objective      Objective measures will be taken at progress report unless otherwise noted    Treatment     Keo received the treatments listed below:      therapeutic exercises to develop strength, endurance, ROM, flexibility, posture, and core stabilization for 20 minutes including:  Supine ER AAROM with dowel 3x10   Supine flexion AAROM with dowel 3x10   UBE 4' fwd/4' back for ROM and cardiovascular endurance  Pulleys flexion 2 minutes   Pulleys abduction 2 minutes    Not today:  Sidelying flexion w/ dowel 3x10    Rotator interval LLLD 10 minute hold  Supine LLLD ER stretch 10 minute hold    manual therapy techniques: Joint mobilizations, Manual traction, Myofacial release, Manual Lymphatic Drainage, Soft tissue Mobilization, and Friction Massage were applied to the: R shoulder for 10 minutes, including:  Inferior GH glides; grades III-IV  Posterior GH glides; grades III-IV  PROM shoulder flexion and external  "rotation   Contract relax shoulder flexion   Thoracic gapping  Cervical side glides; gr III-IV  AC joint mobilizations inferior; gr III-IV    neuromuscular re-education activities to improve: Balance, Coordination, Kinesthetic, Sense, Proprioception, and Posture for 25 minutes. The following activities were included:  Open books 20x each side   No money isometrics 0 degrees YTB 3x10 3" holds   Wall slides 2x15   Standing ER/IR walkouts 2x12 each OTB  Standing land mine press 3x10     Not today:  Sidelying dowel rows 2x15  Chin tuck 20x 10" holds   Shoulder extension for low trap activation OTB 2x15   Open books arms crossed 20x ea side     therapeutic activities to improve functional performance for 0  minutes, including:      Patient Education and Home Exercises       Education provided:   - continuing HEP    Written Home Exercises Provided: YES. Exercises were reviewed and Keo was able to demonstrate them prior to the end of the session.  Keo demonstrated good understanding of the education provided. See EMR under Patient Instructions for exercises provided during therapy sessions    Assessment     Keo continues to improve ROM in session after contract relax techniques to decrease muscle guarding. Progressed with more scapular strengthening and ROM exercises today which she tolerated well with no increases in pain. Will continue to target increases in ROM and scapular strengthening.       Keo Is progressing well towards her goals.   Pt prognosis is Fair.     Pt will continue to benefit from skilled outpatient physical therapy to address the deficits listed in the problem list box on initial evaluation, provide pt/family education and to maximize pt's level of independence in the home and community environment.     Pt's spiritual, cultural and educational needs considered and pt agreeable to plan of care and goals.     Anticipated barriers to physical therapy: cancer treatment     GOALS:   Short Term Goals: 3 " months (progressing not met)  1.Report decreased R shoulder pain < / =  3/10  to increase tolerance for ADL's  2. Increase PROM by 30 degrees in all planes    3. Increased strength by 1/3 MMT grade in R shoulder to increase tolerance for ADL and work activities.  4. Pt to tolerate HEP to improve ROM and independence with ADL's     Long Term Goals: 6 months (progressing, not met)  1.Report decreased R shoulder pain  < / =  1 /10  to increase tolerance for reaching overhead   2.Increase AROM to within 90% of the L shoulder  3.Increase strength to >/= 4/5 in R shoulder to increase tolerance for ADL and work activities.  4. Pt goal: to reach overhead and complete ADLs without pain   5. Pt will have improved gcode of CJ (20-40% limited) on FOTO shoulder in order to demonstrate true functional improvement.     Plan     Plan of care Certification: 12/4/2024 to 06/04/2025.     Continue POC. LLLD stretches and manual therapy to increase ROM    Aristeo Patton PT, DPT

## 2025-02-25 ENCOUNTER — CLINICAL SUPPORT (OUTPATIENT)
Dept: REHABILITATION | Facility: HOSPITAL | Age: 75
End: 2025-02-25
Payer: MEDICARE

## 2025-02-25 DIAGNOSIS — M25.611 DECREASED RANGE OF MOTION OF RIGHT SHOULDER: Primary | ICD-10-CM

## 2025-02-25 DIAGNOSIS — M25.511 CHRONIC RIGHT SHOULDER PAIN: ICD-10-CM

## 2025-02-25 DIAGNOSIS — G89.29 CHRONIC RIGHT SHOULDER PAIN: ICD-10-CM

## 2025-02-25 NOTE — PROGRESS NOTES
[facility]   Physical Therapy Treatment Note      Name: Keo Frias  Clinic Number: [id number]    Therapy Diagnosis:   Encounter Diagnoses   Name Primary?    Decreased range of motion of right shoulder Yes    Chronic right shoulder pain          Physician: Osvaldo Costa MD    Visit Date: 2/25/2025    Physician Orders: PT Eval and Treat   Medical Diagnosis from Referral: M75.01 (ICD-10-CM) - Adhesive capsulitis of right shoulder   Evaluation Date: 12/4/2024  Authorization Period Expiration: 12/03/2025  Plan of Care Expiration: 06/04/2025  Progress Note Due: 1/4/2024  Visit # / Visits authorized: 8/10  FOTO: 1/3     Precautions: Standard,     PTA Visit #: 0/5     Time In: 1500   Time Out: 1558  Total Billable Time: 58 minutes    Subjective     Pt reports: no new reports today; continued steady improvement  She was compliant with home exercise program.  Response to previous treatment: improved range of motion  Functional change: ongoing    Pain: 4/10  Location: right shoulder      Objective      Objective measures will be taken at progress report unless otherwise noted    Treatment     Keo received the treatments listed below:      therapeutic exercises to develop strength, endurance, ROM, flexibility, posture, and core stabilization for 18 minutes including:  Supine ER AAROM with dowel 3x10   Supine flexion AAROM with dowel 3x10   UBE 5' fwd/5' back for ROM and cardiovascular endurance  Pulleys flexion 2 minutes   Pulleys abduction 2 minutes    Not today:  Sidelying flexion w/ dowel 3x10    Rotator interval LLLD 10 minute hold  Supine LLLD ER stretch 10 minute hold    manual therapy techniques: Joint mobilizations, Manual traction, Myofacial release, Manual Lymphatic Drainage, Soft tissue Mobilization, and Friction Massage were applied to the: R shoulder for 10 minutes, including:  Inferior GH glides; grades III-IV  Posterior GH glides; grades III-IV  PROM shoulder flexion and  "external rotation   Contract relax shoulder flexion   Thoracic gapping  Cervical side glides; gr III-IV  AC joint mobilizations inferior; gr III-IV    neuromuscular re-education activities to improve: Balance, Coordination, Kinesthetic, Sense, Proprioception, and Posture for 30 minutes. The following activities were included:  Standing thoracic rotations 20x each side   No money isometrics 0 degrees YTB 3x10 3" holds   Wall slides 2x15   Standing ER/IR walkouts 2x12 each OTB  Standing land mine press 2x15     Not today:  Sidelying dowel rows 2x15  Chin tuck 20x 10" holds   Shoulder extension for low trap activation OTB 2x15   Open books arms crossed 20x ea side     therapeutic activities to improve functional performance for 0  minutes, including:      Patient Education and Home Exercises       Education provided:   - continuing HEP    Written Home Exercises Provided: YES. Exercises were reviewed and Keo was able to demonstrate them prior to the end of the session.  Keo demonstrated good understanding of the education provided. See EMR under Patient Instructions for exercises provided during therapy sessions    Assessment     Continues to progress with ROM and strength; having less pain with exercises. Will attempt low load long duration stretches again next session especially to improve ER since she is still lacking ER by side. Will continue to progress as tolerated.       Keo Is progressing well towards her goals.   Pt prognosis is Fair.     Pt will continue to benefit from skilled outpatient physical therapy to address the deficits listed in the problem list box on initial evaluation, provide pt/family education and to maximize pt's level of independence in the home and community environment.     Pt's spiritual, cultural and educational needs considered and pt agreeable to plan of care and goals.     Anticipated barriers to physical therapy: cancer treatment     GOALS:   Short Term Goals: 3 months (progressing " not met)  1.Report decreased R shoulder pain < / =  3/10  to increase tolerance for ADL's  2. Increase PROM by 30 degrees in all planes    3. Increased strength by 1/3 MMT grade in R shoulder to increase tolerance for ADL and work activities.  4. Pt to tolerate HEP to improve ROM and independence with ADL's     Long Term Goals: 6 months (progressing, not met)  1.Report decreased R shoulder pain  < / =  1 /10  to increase tolerance for reaching overhead   2.Increase AROM to within 90% of the L shoulder  3.Increase strength to >/= 4/5 in R shoulder to increase tolerance for ADL and work activities.  4. Pt goal: to reach overhead and complete ADLs without pain   5. Pt will have improved gcode of CJ (20-40% limited) on FOTO shoulder in order to demonstrate true functional improvement.     Plan     Plan of care Certification: 12/4/2024 to 06/04/2025.     Continue POC. LLLD stretches and manual therapy to increase ROM    Aristeo Patton PT, DPT

## 2025-03-03 ENCOUNTER — CLINICAL SUPPORT (OUTPATIENT)
Dept: REHABILITATION | Facility: HOSPITAL | Age: 75
End: 2025-03-03
Payer: MEDICARE

## 2025-03-03 DIAGNOSIS — M25.611 DECREASED RANGE OF MOTION OF RIGHT SHOULDER: Primary | ICD-10-CM

## 2025-03-03 DIAGNOSIS — M25.511 CHRONIC RIGHT SHOULDER PAIN: ICD-10-CM

## 2025-03-03 DIAGNOSIS — G89.29 CHRONIC RIGHT SHOULDER PAIN: ICD-10-CM

## 2025-03-03 PROCEDURE — 97140 MANUAL THERAPY 1/> REGIONS: CPT

## 2025-03-03 PROCEDURE — 97112 NEUROMUSCULAR REEDUCATION: CPT

## 2025-03-03 PROCEDURE — 97110 THERAPEUTIC EXERCISES: CPT

## 2025-03-03 NOTE — PROGRESS NOTES
"[facility]   Physical Therapy Treatment Note      Name: Keo Frias  Clinic Number: [id number]    Therapy Diagnosis:   Encounter Diagnoses   Name Primary?    Decreased range of motion of right shoulder Yes    Chronic right shoulder pain      Physician: Osvaldo Costa MD    Visit Date: 3/3/2025    Physician Orders: PT Eval and Treat   Medical Diagnosis from Referral: M75.01 (ICD-10-CM) - Adhesive capsulitis of right shoulder   Evaluation Date: 12/4/2024  Authorization Period Expiration: 12/03/2025  Plan of Care Expiration: 06/04/2025  Progress Note Due: 1/4/2024  Visit # / Visits authorized: 9/28  FOTO: 1/3     Precautions: Standard,     PTA Visit #: 0/5     Time In: 1528  Time Out: 1629  Total Billable Time: 61 minutes    Subjective     Pt reports: continues to get a little better every visit  She was compliant with home exercise program.  Response to previous treatment: improved range of motion  Functional change: ongoing    Pain: 2/10  Location: right shoulder      Objective      Objective measures will be taken at progress report unless otherwise noted    Treatment     Keo received the treatments listed below:      therapeutic exercises to develop strength, endurance, ROM, flexibility, posture, and core stabilization for 28 minutes including:  Supine ER AAROM with dowel 3x10   Supine flexion AAROM with dowel 3x10   UBE 5' fwd/5' back for ROM and cardiovascular endurance  Pulleys flexion 2 minutes   Pulleys abduction 2 minutes  CC pulldowns seated 3# 2x15   Seated ER by side with dowel 30x5" holds     Not today:  Sidelying flexion w/ dowel 3x10    Rotator interval LLLD 10 minute hold  Supine LLLD ER stretch 10 minute hold    manual therapy techniques: Joint mobilizations, Manual traction, Myofacial release, Manual Lymphatic Drainage, Soft tissue Mobilization, and Friction Massage were applied to the: R shoulder for 9 minutes, including:  Inferior GH glides; grades " "III-IV  Posterior GH glides; grades III-IV  PROM shoulder flexion and external rotation   Contract relax shoulder flexion   Thoracic gapping  Cervical side glides; gr III-IV  AC joint mobilizations inferior; gr III-IV    neuromuscular re-education activities to improve: Balance, Coordination, Kinesthetic, Sense, Proprioception, and Posture for 24 minutes. The following activities were included:  Standing thoracic rotations 20x each side   No money isometrics 0 degrees YTB 3x10 3" holds   Wall slides 2x15   Standing ER/IR 2x12 each OTB  Standing land mine press 2x15     Not today:  Chin tuck 20x 10" holds   Shoulder extension for low trap activation OTB 2x15   Open books arms crossed 20x ea side     therapeutic activities to improve functional performance for 0  minutes, including:      Patient Education and Home Exercises       Education provided:   - continuing HEP    Written Home Exercises Provided: YES. Exercises were reviewed and Keo was able to demonstrate them prior to the end of the session.  Keo demonstrated good understanding of the education provided. See EMR under Patient Instructions for exercises provided during therapy sessions    Assessment     Progressed with ER and flexion AAROM exercises today to help continue progression of both ranges of motion. Continue to make progressions in session with ER and flexion range. Will continue to progress as tolerated.     Keo Is progressing well towards her goals.   Pt prognosis is Fair.     Pt will continue to benefit from skilled outpatient physical therapy to address the deficits listed in the problem list box on initial evaluation, provide pt/family education and to maximize pt's level of independence in the home and community environment.     Pt's spiritual, cultural and educational needs considered and pt agreeable to plan of care and goals.     Anticipated barriers to physical therapy: cancer treatment     GOALS:   Short Term Goals: 3 months " (progressing not met)  1.Report decreased R shoulder pain < / =  3/10  to increase tolerance for ADL's  2. Increase PROM by 30 degrees in all planes    3. Increased strength by 1/3 MMT grade in R shoulder to increase tolerance for ADL and work activities.  4. Pt to tolerate HEP to improve ROM and independence with ADL's     Long Term Goals: 6 months (progressing, not met)  1.Report decreased R shoulder pain  < / =  1 /10  to increase tolerance for reaching overhead   2.Increase AROM to within 90% of the L shoulder  3.Increase strength to >/= 4/5 in R shoulder to increase tolerance for ADL and work activities.  4. Pt goal: to reach overhead and complete ADLs without pain   5. Pt will have improved gcode of CJ (20-40% limited) on FOTO shoulder in order to demonstrate true functional improvement.     Plan     Plan of care Certification: 12/4/2024 to 06/04/2025.     Continue POC. Increase ROM    Aristeo Patton PT, DPT

## 2025-03-10 ENCOUNTER — OFFICE VISIT (OUTPATIENT)
Dept: HEMATOLOGY/ONCOLOGY | Facility: CLINIC | Age: 75
End: 2025-03-10
Payer: MEDICARE

## 2025-03-10 ENCOUNTER — LAB VISIT (OUTPATIENT)
Dept: LAB | Facility: HOSPITAL | Age: 75
End: 2025-03-10
Attending: HOSPITALIST
Payer: MEDICARE

## 2025-03-10 DIAGNOSIS — C50.411 MALIGNANT NEOPLASM OF UPPER-OUTER QUADRANT OF RIGHT BREAST IN FEMALE, ESTROGEN RECEPTOR POSITIVE: ICD-10-CM

## 2025-03-10 DIAGNOSIS — L29.9 PRURITUS: ICD-10-CM

## 2025-03-10 DIAGNOSIS — E03.9 HYPOTHYROIDISM, UNSPECIFIED TYPE: ICD-10-CM

## 2025-03-10 DIAGNOSIS — C77.1 SECONDARY MALIGNANT NEOPLASM OF INTRATHORACIC LYMPH NODES: ICD-10-CM

## 2025-03-10 DIAGNOSIS — C34.31 ADENOCARCINOMA OF LOWER LOBE OF RIGHT LUNG: ICD-10-CM

## 2025-03-10 DIAGNOSIS — M81.0 AGE-RELATED OSTEOPOROSIS WITHOUT CURRENT PATHOLOGICAL FRACTURE: ICD-10-CM

## 2025-03-10 DIAGNOSIS — Z17.0 MALIGNANT NEOPLASM OF UPPER-OUTER QUADRANT OF RIGHT BREAST IN FEMALE, ESTROGEN RECEPTOR POSITIVE: ICD-10-CM

## 2025-03-10 DIAGNOSIS — C34.31 ADENOCARCINOMA OF LOWER LOBE OF RIGHT LUNG: Primary | ICD-10-CM

## 2025-03-10 DIAGNOSIS — Z79.811 AROMATASE INHIBITOR USE: ICD-10-CM

## 2025-03-10 DIAGNOSIS — R53.83 FATIGUE, UNSPECIFIED TYPE: ICD-10-CM

## 2025-03-10 LAB
ALBUMIN SERPL BCP-MCNC: 3.9 G/DL (ref 3.5–5.2)
ALP SERPL-CCNC: 52 U/L (ref 40–150)
ALT SERPL W/O P-5'-P-CCNC: 14 U/L (ref 10–44)
ANION GAP SERPL CALC-SCNC: 11 MMOL/L (ref 8–16)
AST SERPL-CCNC: 20 U/L (ref 10–40)
BILIRUB SERPL-MCNC: 0.5 MG/DL (ref 0.1–1)
BUN SERPL-MCNC: 10 MG/DL (ref 8–23)
CALCIUM SERPL-MCNC: 9.5 MG/DL (ref 8.7–10.5)
CHLORIDE SERPL-SCNC: 102 MMOL/L (ref 95–110)
CO2 SERPL-SCNC: 25 MMOL/L (ref 23–29)
CREAT SERPL-MCNC: 0.6 MG/DL (ref 0.5–1.4)
ERYTHROCYTE [DISTWIDTH] IN BLOOD BY AUTOMATED COUNT: 12.8 % (ref 11.5–14.5)
EST. GFR  (NO RACE VARIABLE): >60 ML/MIN/1.73 M^2
GLUCOSE SERPL-MCNC: 89 MG/DL (ref 70–110)
HCT VFR BLD AUTO: 41.9 % (ref 37–48.5)
HGB BLD-MCNC: 13.4 G/DL (ref 12–16)
IMM GRANULOCYTES # BLD AUTO: 0.02 K/UL (ref 0–0.04)
MCH RBC QN AUTO: 28.3 PG (ref 27–31)
MCHC RBC AUTO-ENTMCNC: 32 G/DL (ref 32–36)
MCV RBC AUTO: 88 FL (ref 82–98)
NEUTROPHILS # BLD AUTO: 2.9 K/UL (ref 1.8–7.7)
PLATELET # BLD AUTO: 179 K/UL (ref 150–450)
PMV BLD AUTO: 12.5 FL (ref 9.2–12.9)
POTASSIUM SERPL-SCNC: 4 MMOL/L (ref 3.5–5.1)
PROT SERPL-MCNC: 7.1 G/DL (ref 6–8.4)
RBC # BLD AUTO: 4.74 M/UL (ref 4–5.4)
SODIUM SERPL-SCNC: 138 MMOL/L (ref 136–145)
T4 FREE SERPL-MCNC: 1.24 NG/DL (ref 0.71–1.51)
TSH SERPL DL<=0.005 MIU/L-ACNC: 7.5 UIU/ML (ref 0.4–4)
WBC # BLD AUTO: 5.84 K/UL (ref 3.9–12.7)

## 2025-03-10 PROCEDURE — 84443 ASSAY THYROID STIM HORMONE: CPT | Performed by: HOSPITALIST

## 2025-03-10 PROCEDURE — 85027 COMPLETE CBC AUTOMATED: CPT | Performed by: HOSPITALIST

## 2025-03-10 PROCEDURE — 84439 ASSAY OF FREE THYROXINE: CPT | Performed by: HOSPITALIST

## 2025-03-10 PROCEDURE — 36415 COLL VENOUS BLD VENIPUNCTURE: CPT | Performed by: HOSPITALIST

## 2025-03-10 PROCEDURE — 80053 COMPREHEN METABOLIC PANEL: CPT | Performed by: HOSPITALIST

## 2025-03-10 RX ORDER — EPINEPHRINE 0.3 MG/.3ML
0.3 INJECTION SUBCUTANEOUS ONCE AS NEEDED
Status: CANCELLED | OUTPATIENT
Start: 2025-03-11

## 2025-03-10 RX ORDER — HEPARIN 100 UNIT/ML
500 SYRINGE INTRAVENOUS
Status: CANCELLED | OUTPATIENT
Start: 2025-03-11

## 2025-03-10 RX ORDER — DIPHENHYDRAMINE HYDROCHLORIDE 50 MG/ML
50 INJECTION, SOLUTION INTRAMUSCULAR; INTRAVENOUS ONCE AS NEEDED
Status: CANCELLED | OUTPATIENT
Start: 2025-03-11

## 2025-03-10 RX ORDER — SODIUM CHLORIDE 0.9 % (FLUSH) 0.9 %
10 SYRINGE (ML) INJECTION
Status: CANCELLED | OUTPATIENT
Start: 2025-03-11

## 2025-03-10 NOTE — PROGRESS NOTES
[facility] MEDICAL ONCOLOGY - FOLLOW UP VISIT    Reason for visit: Follow up visit for adenocarcinoma of the lung    The patient location is: [place]  The chief complaint leading to consultation is: Immunotherapy toxicity check    Visit type: audiovisual    Face to Face time with patient: 15  30 minutes of total time spent on the encounter, which includes face to face time and non-face to face time preparing to see the patient (eg, review of tests), Obtaining and/or reviewing separately obtained history, Documenting clinical information in the electronic or other health record, Independently interpreting results (not separately reported) and communicating results to the patient/family/caregiver, or Care coordination (not separately reported).     Each patient to whom he or she provides medical services by telemedicine is:  (1) informed of the relationship between the physician and patient and the respective role of any other health care provider with respect to management of the patient; and (2) notified that he or she may decline to receive medical services by telemedicine and may withdraw from such care at any time.      Oncology History   Malignant neoplasm of upper-outer quadrant of right breast in female, estrogen receptor positive   4/13/2021 Biopsy    Breast, right, 10:00 5N, biopsy:  - Invasive ductal carcinoma with lobular features     4/13/2021 Breast Tumor Markers    Estrogen Receptor: Positive >90%  Progesterone Receptor: Positive 10-50%  HER2: Negative  Ki67: 10-30%     4/26/2021 Genetic Testing    MyRisk: negative     5/12/2021 Breast Surgery    Right partial mastectomy with SLNB     6/1/2021 Tumor Conference    Radiation options? Offer radiation, can discuss partial vs whole breast radiation.        6/2/2021 Cancer Staged    Staging form: Breast, AJCC 8th Edition  - Pathologic stage from 6/2/2021: Stage IA (pT1b, pN0(sn), cM0, G2, ER+, NC+, HER2-)      7/6/2021 - 7/27/2021 Radiation Therapy    Treatment Summary  Course: C1 Chest 2021  Treatment Site Energy Dose/Fx (Gy) #Fx Total Dose (Gy) Start Date End Date Elapsed Days   Breast_Rt 6X 2.65 16 / 16 42.4 7/6/2021 7/27/2021 21          7/2021 -  Hormone Therapy    Letrozole      Procedure    Bronchoscopy, Station 7 positive for malignancy     Adenocarcinoma of lower lobe of right lung   10/5/2023 Imaging Significant Findings    CT C (obtained after CXR, which was itself obtained for palpitations)  - 2.1 x 1.3 cm spiculated RLL nodule, some spiculation noted to extend to the pleural margin  - Scattered pulmonary nodules centered mostly subpleural at the RLL (e.g. 0.9 cm)       10/10/2023 Imaging Significant Findings    PET CT  - 2.1 x 1.1 cm RLL nodule, SUV 3.8  - 0.9 cm R axillary LN, SUV 3.9  - 1.2 cm subcarinal LN, SUV 4.6  - 0.7 cm Ln along L posterior shoulder, SUV 1.7  - Multiple additional solid pulmonary nodules through R lung base, too small for uptake detection     11/14/2023 Procedure    Bronch with EBUS  RLL, FNA  - Adenocarcinoma (TTF1 positive, GATA3 negative)    RLL, TBBx  - Adenocarcinoma    Per pulmonology, station 7 node was very vascular without window for biopsy, plan to discuss at thoracic tumor board    [facility] NGS  - KRAS G12A, CHEK1, MLH1, CTNNB1, CIC, PTPRD, NOTCH3, EZH2, LATS1, KMT2D  - Negative: EGFR, ALK, BRAF, KRAS, ROS1, RET, MET, EBB2  - PD-L1 1%       11/22/2023 Tumor Conference    Tumor Board  - Plan for axillary LN biopsy to determine lung vs recurrent breast vs other etiology  - Repeat CT C to evaluate nodules in RLL  - Determine treatment plan based on above results     11/22/2023 Tumor Genotyping    [facility] Liquid Biopsy  - No reportable pathogenic variants found     11/29/2023 Imaging Significant Findings    CT C  - 2.1 x 1.2 cm RLL spiculated nodule, stable in size w/ new central cavitation. Spiculated margins extend towards adjacent pleura.   - Stable irregular 2.0 cm linar  opacity in LLL  - Stable 0.3 cm GGO, TRICIA  - Stable R basal sub cm nodules, largest 0.9 cm  - Several stable solid nodules predominantly in RLL, largest 0.9 cm     12/18/2023 Imaging Significant Findings    MRI Brain  - JELENA     1/3/2024 Procedure    IR Guided Biopsy, R Axillary LN  - No metastatic carcinoma (0.3 x 0.3 x 0.1 cm tissue, positive and negative controls stained appropriately)     1/5/2024 Imaging Significant Findings    PET CT  - Stable 2.0 x 1.1 cm RLL nodule (previously 2.1 x 1.1 cm)  - Stable additional nodules w/o significant racer uptake  - 0.8 cm R axillary node, SUV 4.2 (previously 0.9 cm, SUV 3.9)  - 0.5 cm L posterior shoulder node, SUV 2.3 (previously 0.7 cm, SUV 1.7)  - 1.3 cm subcarinal node, SUV 4.9 (previously 1.2 cm, SUV 4.6)     1/10/2024 Tumor Conference    Tumor Board   Re-sample enlarged, hypermetabolic axillary LN with repeat CT-guided biopsy versus excisional biopsy. If LN is negative for recurrent NSCLC, obtain EBUS of PET-avid mediastinal LN.         1/23/2024 Procedure    IR Guided Biopsy, R Axillary LN (Second Biopsy)  - Negative for metastatic carcinoma     2/8/2024 Procedure    Bronch with EBUS  LN  Positive: Station 7 (Adenocarcinoma)  Negative: Station 11R     2/21/2024 Cancer Staged    Staging form: Lung, AJCC 8th Edition  - Clinical stage from 2/21/2024: Stage IIIA (cT1b, cN2, cM0)     2/21/2024 Tumor Conference    Thoracic Tumor Board  Stage IIIA right lower lobe adenocarcinoma with bulky subcarinal lymphadenopathy.  Proceed with NA chemo/I-O therapy. [facility] to [facility] for surgical evaluation. If patient is not a surgical candidate following 3 cycles of therapy, proceed with definitive chemo/RT.       2/22/2024 - 4/5/2024 Chemotherapy    Treatment Summary   Plan Name: OP NSCLC NIVOLUMAB 360 MG PLUS CARBOPLATIN (AUC5) PEMETREXED 500 MG/M2 Q3W x 3 CYCLES  Treatment Goal: Control  Status: Inactive  Start Date: 2/22/2024  End Date: 4/5/2024  Provider: Bridger Nichole IV,  MD  Chemotherapy: CARBOplatin (PARAPLATIN) 415 mg in sodium chloride 0.9% 326.5 mL chemo infusion, 415 mg, Intravenous, Clinic/HOD 1 time, 3 of 3 cycles  Administration: 415 mg (2/22/2024), 465 mg (4/4/2024), 465 mg (3/14/2024)  PEMEtrexed disodium (ALIMTA) 750 mg in sodium chloride 0.9% SolP 100 mL chemo infusion, 500 mg/m2 = 750 mg, Intravenous, Clinic/HOD 1 time, 3 of 3 cycles  Administration: 750 mg (2/22/2024), 750 mg (4/4/2024), 750 mg (3/14/2024)     4/24/2024 Imaging Significant Findings    CT C/A/P  - 1.2 x 1.0 cm RLL malignancy, previously 2 x 1.2 cm  - Interval decrease in the previously seen nodules in the RLL  - 0.9 cm subcarinal node, previously 1.7 cm  - Interval decrease in right infrahilar soft tissue  - 2.1 cm LLL solid nodular opacity, unchanged  - 0.8 cm TRICIA ground-glass opacity, unchanged  - AVM again noted in RLL  - 0.6 cm right axillary lymph node, decreased in size from prior  - Left scapular lymph node decreased in size from prior, incompletely seen       5/6/2024 - 5/12/2024 Hospital Admission    Admitted to [facility] for RLLx.  Postoperative course complicated by hemothorax requiring return to the operating room for reexploration on postop day 1.  Chest tube removed on postop day 5.     5/6/2024 Surgery    Right Lower Lobectomy  Right Lower Lobe  - Adenocarcinoma with acinar pattern, 35% residual viable tumor, 1.0 cm in greatest dimension.  Pleural invasion absent, LVI absent, margins negative.  0/1 lymph node    Pericardial Excision  - Negative for tumor    LN  - Positive: Station 7  - Negative: Station 8R (0/1), 9 are (0/2), 2R (0/1), 4R (0/1), 11R (0/1), 12R (0/1), R posterior interlobar LN (0/1)    Path Staging: pT1a, pN2     7/2/2024 -  Chemotherapy    Adjuvant Nivolumab  28 day cycles  Nivolumab 480 mg, D1     7/24/2024 Cancer Staged    Staging form: Lung, AJCC 8th Edition  - Pathologic: Stage IIIA (pT1a, pN2, cM0)     8/7/2024 Imaging Significant Findings    PET CT  - New foci of FDG  uptake in right lateral chest wall, likely represents postsurgical changes  - Development of moderate size right pleural effusion  - No evidence of hypermetabolic mediastinal or hilar lymph nodes     11/13/2024 Imaging Significant Findings    CT C  Postsurgical changes after right lower lobectomy  Right pleural effusion decreased in size  Stable 2.2 cm regular nodular opacity of LLL  Stable 0.8 cm ground-glass nodule in TRICIA  Stable 0.3 cm nodule along left major fissure        - Second opinion, MDA: contralateral LLL lesion stable but plan for sampling of this as well as subcarinal LN and possible additional RLL nodule. Pending results, consider NA --> surgery vs CRT    Oncology History    HPI:     Keo Frias is a 73 y.o. female with pmh significant for HTN, migraine headaches, and multiple malignancies, who presents for follow up of the below lung cancer    1) Stage IA ( [id number], ER 95%, [place] 10%, HER2 negative, Ki-67 20%) IDC of the R breast, initially dx'd 4/13/21, s/p lumpectomy and SLNB (5/12/21), XRT (7/6/21-7/27/21), and currently on letrozole (7/2021 - present).     2) Stage IIIA (pT1a, pN2, cM0) adenocarcinoma of the RLL (no targetable mutations, PD-L1 1%), initially dx'd 11/14/23, s/p neoadjuvant carboplatin/pemetrexed/nivolumab (2/22/24 - 4/4/24), right lower lobectomy (5/6/24), and currently on adjuvant nivolumab (7/2/24 - present) who presents to for follow up.     Last clinic with me 12/13/24, proceed with C7 nivolumab, C8 on 01/13.  PET-CT in med Onc on 3/13/25.  Continue levothyroxine 75 mcg daily.    Interval History:  12/17/24: C7 nivolumab  1/13/25: Medical oncology (Enedina Win), proceed with C8 nivolumab on 1/14/25.    1/14/25: C8 nivolumab    The pt states that she is feeling generally well.     She continues to endorse pain in her R shoulder. She continues to work with physical therapy and feels it has greatly improved.     Has decided to hold off on taking higher dose synthroid until she  sees Dr. Collins next week.     Mild fatigue.     Notes itching to her back without a rash. Applies moisturizers and hydrocortisone with relief of symptoms.       Pt denies nausea, diarrhea, constipation, rash, new/worsening fatigue, new/worsening SOB, or new/worsening cough.     Of note, she received one root canal last week.       ROS:   As per HPI.     Physical Exam:       LMP  (LMP Unknown)                Physical Exam    Imaging:    See oncologic history above.     Path:  See oncologic history above.      Assessment and Plan:     Keo Frias is a 73 y.o. female with pmh significant for HTN, migraine headaches, and multiple malignancies, who presents for follow up of the below lung cancer    1) Stage IA ( [id number], ER 95%, AR 10%, HER2 negative, Ki-67 20%) IDC of the R breast, initially dx'd 4/13/21, s/p lumpectomy and SLNB (5/12/21), XRT (7/6/21-7/27/21), and currently on letrozole (7/2021 - present).     2) Stage IIIA (pT1a, pN2, cM0) adenocarcinoma of the RLL (no targetable mutations, PD-L1 1%), initially dx'd 11/14/23, s/p neoadjuvant carboplatin/pemetrexed/nivolumab (2/22/24 - 4/4/24), right lower lobectomy (5/6/24), and currently on adjuvant nivolumab (7/2/24 - present) who presents to for follow up.     Stage IIIA Adenocarcinoma of RLL, PD-L1 1%, No Actionable Molecular Alterations  ECOG PS 1.  Patient is currently on adjuvant nivolumab (see note from 6/14/24), tolerating without significant issue. Her most recent imaging (CT C, 11/13/24, ~3.5 months on adjuvant nivolumab) demonstrated stable disease compared to prior, noting stability of the previously observed  2.2 cm LLL nodular opacity. Patient was evaluated at [facility] by both medical oncology and radiation oncology w/ plan to continue adjuvant nivolumab at this time and low threshold for empiric SBRT to the LLL lesion should there be evidence of change/growth.  Given good tolerance and radiographic response, will plan to treat with nivolumab q4w x 1 year, w/  "imaging throughout (scheduled at [facility]).     PLAN:   Proceed w/ C10 nivolumab on 3/11/24 at [place], labs acceptable and treatment signed  PET CT (given indeterminate lesions in b/l shoulders and L lung) and med onc (Dr. Don) f/u at [facility] on 3/20/25      Hypothyroidism  On 4/30/24, TSH was 41.25 and free T4 0.71, as compared to 4/2/24 when TSH was 0.014 and T4 was 1.92.  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  She has since been started on levothyroxine per Dr. Don.  Subsequent TFTs initially within normal limits (goal of <4.5), however persistently elevated >7.5 as of 3/10/25.   Increase to levothyroxine 88 mcg daily - patient has chosen to remain at 75 mcg dose until she consults with [facility] provider. Fatigue is currently mild.       Osteoporosis  DEXA from 7/11/24 with osteoporosis of the lumbar spine and hip. Discussed to hold on dental procedures until at least 2 months after prolia injections based on "American Association of Oral [employer]ill[employer] Position Paper on Medication-Related Osteonecrosis of the Jaw - 2014 Update". Pt endorsed her understanding.   Prolia per Dr. Ogden, message sent noting pt considering two more root canals      Right IDC, ER+/MD+/HER2-  S/p lumpectomy with SLNB, radiation therapy, and currently on letrozole and denosumab, tolerating well. Concern for possible R axillary recurrence, workup as below.   Okay to continue letrozole  Okay to continue denosumab as above  Breast oncology team aware    Pruritus  Continue current symptomatic measures: lotion + hydrocortisone.   Patient will report worsening of this issue and prescribe stronger steroid cream and/or antihistamines at that time.     Patient is in agreement with the proposed treatment plan. All questions were answered to the patient's satisfaction. Pt knows to call clinic if anything is needed before the next clinic visit.    Carina Kerr, MSN, APRN, FNP-C  Hematology and Medical " Oncology  Nurse Practitioner to Dr. Jose Martin Wright  Nurse Practitioner, [facility]            Med Onc Chart Routing      Follow up with physician 4 weeks. RTC as scheduled   Follow up with BRENDA    Infusion scheduling note    Injection scheduling note    Labs    Imaging    Pharmacy appointment    Other referrals

## 2025-03-11 ENCOUNTER — INFUSION (OUTPATIENT)
Dept: INFUSION THERAPY | Facility: HOSPITAL | Age: 75
End: 2025-03-11
Attending: INTERNAL MEDICINE
Payer: MEDICARE

## 2025-03-11 VITALS
TEMPERATURE: 98 F | RESPIRATION RATE: 16 BRPM | HEIGHT: 62 IN | OXYGEN SATURATION: 99 % | DIASTOLIC BLOOD PRESSURE: 68 MMHG | SYSTOLIC BLOOD PRESSURE: 152 MMHG | HEART RATE: 64 BPM | BODY MASS INDEX: 21.4 KG/M2 | WEIGHT: 116.31 LBS

## 2025-03-11 DIAGNOSIS — C34.31 ADENOCARCINOMA OF LOWER LOBE OF RIGHT LUNG: Primary | ICD-10-CM

## 2025-03-11 PROCEDURE — 96413 CHEMO IV INFUSION 1 HR: CPT

## 2025-03-11 PROCEDURE — 63600175 PHARM REV CODE 636 W HCPCS: Mod: JZ,TB | Performed by: NURSE PRACTITIONER

## 2025-03-11 PROCEDURE — A4216 STERILE WATER/SALINE, 10 ML: HCPCS | Performed by: NURSE PRACTITIONER

## 2025-03-11 PROCEDURE — 25000003 PHARM REV CODE 250: Performed by: NURSE PRACTITIONER

## 2025-03-11 RX ORDER — EPINEPHRINE 0.3 MG/.3ML
0.3 INJECTION SUBCUTANEOUS ONCE AS NEEDED
Status: DISCONTINUED | OUTPATIENT
Start: 2025-03-11 | End: 2025-03-11 | Stop reason: HOSPADM

## 2025-03-11 RX ORDER — SODIUM CHLORIDE 0.9 % (FLUSH) 0.9 %
10 SYRINGE (ML) INJECTION
Status: DISCONTINUED | OUTPATIENT
Start: 2025-03-11 | End: 2025-03-11 | Stop reason: HOSPADM

## 2025-03-11 RX ORDER — DIPHENHYDRAMINE HYDROCHLORIDE 50 MG/ML
50 INJECTION, SOLUTION INTRAMUSCULAR; INTRAVENOUS ONCE AS NEEDED
Status: DISCONTINUED | OUTPATIENT
Start: 2025-03-11 | End: 2025-03-11 | Stop reason: HOSPADM

## 2025-03-11 RX ADMIN — Medication 10 ML: at 11:03

## 2025-03-11 RX ADMIN — SODIUM CHLORIDE 480 MG: 9 INJECTION, SOLUTION INTRAVENOUS at 12:03

## 2025-03-11 RX ADMIN — Medication 10 ML: at 01:03

## 2025-03-11 RX ADMIN — SODIUM CHLORIDE: 9 INJECTION, SOLUTION INTRAVENOUS at 12:03

## 2025-03-11 NOTE — PLAN OF CARE
Patient tolerated Opdivo infusion well. No s/s adverse reaction. PIV removed, calendar given and patient ambulated out of clinic in NAD.

## 2025-03-13 ENCOUNTER — CLINICAL SUPPORT (OUTPATIENT)
Dept: REHABILITATION | Facility: HOSPITAL | Age: 75
End: 2025-03-13
Payer: MEDICARE

## 2025-03-13 DIAGNOSIS — M25.611 DECREASED RANGE OF MOTION OF RIGHT SHOULDER: Primary | ICD-10-CM

## 2025-03-13 DIAGNOSIS — M25.511 CHRONIC RIGHT SHOULDER PAIN: ICD-10-CM

## 2025-03-13 DIAGNOSIS — G89.29 CHRONIC RIGHT SHOULDER PAIN: ICD-10-CM

## 2025-03-13 PROCEDURE — 97112 NEUROMUSCULAR REEDUCATION: CPT

## 2025-03-13 PROCEDURE — 97140 MANUAL THERAPY 1/> REGIONS: CPT

## 2025-03-13 PROCEDURE — 97110 THERAPEUTIC EXERCISES: CPT

## 2025-03-13 NOTE — PROGRESS NOTES
[facility]   Physical Therapy Treatment/Progress Note      Name: Keo Frias  Clinic Number: [id number]    Therapy Diagnosis:   Encounter Diagnoses   Name Primary?    Decreased range of motion of right shoulder Yes    Chronic right shoulder pain        Physician: Osvaldo Costa MD    Visit Date: 3/13/2025    Physician Orders: PT Eval and Treat   Medical Diagnosis from Referral: M75.01 (ICD-10-CM) - Adhesive capsulitis of right shoulder   Evaluation Date: 12/4/2024  Authorization Period Expiration: 12/03/2025  Plan of Care Expiration: 06/04/2025  Progress Note Due: 4/13/2025  Visit # / Visits authorized: 10/28  FOTO: 1/3     Precautions: Standard,     PTA Visit #: 0/5     Time In: 1400  Time Out: 1500  Total Billable Time: 60 minutes    Subjective     Pt reports: Her shoulder continues to make little improvements; still limited compared to the other shoulder. Better than when she started therapy  She was compliant with home exercise program.  Response to previous treatment: improved range of motion  Functional change: ongoing    Pain: 2/10  Location: right shoulder      Objective      Observation: 75 y/o alert and oriented      Posture: Bilateral anterior tipping      Passive Range of Motion:   Shoulder Right Left   Flexion 135 165   Abduction 115 170   ER at 0 30 80   ER at 90 60 90   IR 20 90      Active Range of Motion:   Shoulder Right Left   Flexion 125 165   Abduction 115 170   ER at 0 30 80   ER at 90 40 90   IR at 90 30  90   Reach behind head T2 T2   Reach behind back  L3 T7      Strength:  Shoulder Right Left   Flexion 4-/5 4/5   Abduction 4-/5 4+/5   ER 3+/5 4+/5   IR 5/5 5/5   Serratus Anterior 3+/5 4/5         Joint Mobility: limited less posteriorly; still limited inferiorly     Palpation: no TTP     Sensation: intact         Intake Outcome Measure for FOTO Shoulder Survey     Therapist reviewed FOTO scores for Keo Frias on 3/13/2025  FOTO documents entered into EPIC  "- see Media section.     Intake Score: 53%          Treatment     Keo received the treatments listed below:      therapeutic exercises to develop strength, endurance, ROM, flexibility, posture, and core stabilization for 35 minutes including:  Supine ER AAROM with dowel 3x10   Supine flexion AAROM with dowel 3x10   UBE 5' fwd/5' back for ROM and cardiovascular endurance  CC pulldowns seated 3# 2x15   Standing ER/IR 2x12 each RTB  ROM and strength assessment     Not today:  Seated ER by side with dowel 30x5" holds   Pulleys flexion 2 minutes   Pulleys abduction 2 minutes  Sidelying flexion w/ dowel 3x10    Rotator interval LLLD 10 minute hold  Supine LLLD ER stretch 10 minute hold    manual therapy techniques: Joint mobilizations, Manual traction, Myofacial release, Manual Lymphatic Drainage, Soft tissue Mobilization, and Friction Massage were applied to the: R shoulder for 12 minutes, including:  Inferior GH glides; grades III-IV  Posterior GH glides; grades III-IV  PROM shoulder flexion and external rotation   Contract relax shoulder flexion   Thoracic gapping  Cervical side glides; gr III-IV  AC joint mobilizations inferior; gr III-IV    neuromuscular re-education activities to improve: Balance, Coordination, Kinesthetic, Sense, Proprioception, and Posture for 13 minutes. The following activities were included:  Standing thoracic rotations 20x each side   No money isometrics 0 degrees YTB 3x10 3" holds   Wall slides 2x15     Not today:  Standing land mine press 2x15   Chin tuck 20x 10" holds   Shoulder extension for low trap activation OTB 2x15   Open books arms crossed 20x ea side     therapeutic activities to improve functional performance for 0  minutes, including:      Patient Education and Home Exercises       Education provided:   - continuing HEP    Written Home Exercises Provided: YES. Exercises were reviewed and Keo was able to demonstrate them prior to the end of the session.  Keo demonstrated good " understanding of the education provided. See EMR under Patient Instructions for exercises provided during therapy sessions    Assessment     Pt was reassessed today and continues to show steady progress with ROM, strength, and pain. Pt has made great progress since beginning therapy and is getting out of the painful stage of adhesive capsulitis. Pt still remains a good candidate for physical therapy to help increase ROM and strength while maintaining good cervical and thoracic ROM/strength. Pt educated to decreasing frequency to once per week and eventually once every two to three weeks to allow time for the adhesive capsulitis to go through all the stages. Pt was agreeable to new schedule. Will continue to progress pt as tolerated.     eKo Is progressing well towards her goals.   Pt prognosis is Fair.     Pt will continue to benefit from skilled outpatient physical therapy to address the deficits listed in the problem list box on initial evaluation, provide pt/family education and to maximize pt's level of independence in the home and community environment.     Pt's spiritual, cultural and educational needs considered and pt agreeable to plan of care and goals.     Anticipated barriers to physical therapy: cancer treatment     GOALS:   Short Term Goals: 3 months MET as of 3/13/2025  1.Report decreased R shoulder pain < / =  3/10  to increase tolerance for ADL's  2. Increase PROM by 30 degrees in all planes    3. Increased strength by 1/3 MMT grade in R shoulder to increase tolerance for ADL and work activities.  4. Pt to tolerate HEP to improve ROM and independence with ADL's     Long Term Goals: 6 months (progressing, not met)  1.Report decreased R shoulder pain  < / =  1 /10  to increase tolerance for reaching overhead   2.Increase AROM to within 90% of the L shoulder  3.Increase strength to >/= 4/5 in R shoulder to increase tolerance for ADL and work activities.  4. Pt goal: to reach overhead and complete ADLs  without pain   5. Pt will have improved gcode of CJ (20-40% limited) on FOTO shoulder in order to demonstrate true functional improvement.     Plan     Plan of care Certification: 12/4/2024 to 06/04/2025.     Continue POC. Increase ROM    Aristeo Sculthorp PT, DPT

## 2025-03-21 ENCOUNTER — TELEPHONE (OUTPATIENT)
Dept: INTERNAL MEDICINE | Facility: CLINIC | Age: 75
End: 2025-03-21
Payer: MEDICARE

## 2025-03-27 ENCOUNTER — CLINICAL SUPPORT (OUTPATIENT)
Dept: REHABILITATION | Facility: HOSPITAL | Age: 75
End: 2025-03-27
Payer: MEDICARE

## 2025-03-27 DIAGNOSIS — G89.29 CHRONIC RIGHT SHOULDER PAIN: ICD-10-CM

## 2025-03-27 DIAGNOSIS — M25.511 CHRONIC RIGHT SHOULDER PAIN: ICD-10-CM

## 2025-03-27 DIAGNOSIS — M25.611 DECREASED RANGE OF MOTION OF RIGHT SHOULDER: Primary | ICD-10-CM

## 2025-03-27 PROCEDURE — 97140 MANUAL THERAPY 1/> REGIONS: CPT

## 2025-03-27 PROCEDURE — 97110 THERAPEUTIC EXERCISES: CPT

## 2025-03-27 PROCEDURE — 97112 NEUROMUSCULAR REEDUCATION: CPT

## 2025-03-27 NOTE — PROGRESS NOTES
"[facility]   Physical Therapy Treatment/Progress Note      Name: Keo Frias  Clinic Number: [id number]    Therapy Diagnosis:   Encounter Diagnoses   Name Primary?    Decreased range of motion of right shoulder Yes    Chronic right shoulder pain      Physician: Osvaldo Costa MD    Visit Date: 3/27/2025    Physician Orders: PT Eval and Treat   Medical Diagnosis from Referral: M75.01 (ICD-10-CM) - Adhesive capsulitis of right shoulder   Evaluation Date: 12/4/2024  Authorization Period Expiration: 12/03/2025  Plan of Care Expiration: 06/04/2025  Progress Note Due: 4/13/2025  Visit # / Visits authorized: 11/28  FOTO: 1/3     Precautions: Standard,     PTA Visit #: 0/5     Time In: 1307  Time Out: 1402  Total Billable Time: 55 minutes    Subjective     Pt reports: she continues to feel better and she feels like she is able to move her arm more. PET scan still found some inflammation in her shoulder   She was compliant with home exercise program.  Response to previous treatment: improved range of motion  Functional change: ongoing    Pain: 2/10  Location: right shoulder      Objective      Objective measures will be updated at progress report unless specified     Treatment     Keo received the treatments listed below:      therapeutic exercises to develop strength, endurance, ROM, flexibility, posture, and core stabilization for 32 minutes including:  Supine ER AAROM with dowel 3x10   Supine flexion AAROM with dowel 3x10   UBE 5' fwd/5' back for ROM and cardiovascular endurance  CC pulldowns seated 25x  Standing ER/IR 2x12 each RTB  Sidelying ER 3x10 R only     Not today:  Seated ER by side with dowel 30x5" holds   Pulleys flexion 2 minutes   Pulleys abduction 2 minutes  Sidelying flexion w/ dowel 3x10    Rotator interval LLLD 10 minute hold  Supine LLLD ER stretch 10 minute hold    manual therapy techniques: Joint mobilizations, Manual traction, Myofacial release, Manual Lymphatic " "Drainage, Soft tissue Mobilization, and Friction Massage were applied to the: R shoulder for 13 minutes, including:  Inferior GH glides; grades III-IV  Posterior GH glides; grades III-IV  PROM shoulder flexion and external rotation   Contract relax shoulder flexion   Thoracic gapping  Cervical side glides; gr III-IV    Not today:  AC joint mobilizations inferior; gr III-IV    neuromuscular re-education activities to improve: Balance, Coordination, Kinesthetic, Sense, Proprioception, and Posture for 10 minutes. The following activities were included:  Standing thoracic rotations 20x each side   No money isometrics 0 degrees YTB 3x10 3" holds   Wall slides 2x15     Not today:  Standing land mine press 2x15   Chin tuck 20x 10" holds   Shoulder extension for low trap activation OTB 2x15   Open books arms crossed 20x ea side     therapeutic activities to improve functional performance for 0  minutes, including:      Patient Education and Home Exercises       Education provided:   - continuing HEP    Written Home Exercises Provided: YES. Exercises were reviewed and Keo was able to demonstrate them prior to the end of the session.  Keo demonstrated good understanding of the education provided. See EMR under Patient Instructions for exercises provided during therapy sessions    Assessment     Pt is progressing well with strength and is achieving more PROM and AROM compared to previous session. Continues to progress with mobility exercises with no pain which is also better than previous sessions when she had minimal increases in pain with some exercises. Attempted to progress with LLLD stretches again, but she displayed pain with exercise still. Pt continues to be educated on allowing time and therapy to work and appreciate gradual progressions. Will continue to progress pt as tolerated.     Keo Is progressing well towards her goals.   Pt prognosis is Fair.     Pt will continue to benefit from skilled outpatient physical " therapy to address the deficits listed in the problem list box on initial evaluation, provide pt/family education and to maximize pt's level of independence in the home and community environment.     Pt's spiritual, cultural and educational needs considered and pt agreeable to plan of care and goals.     Anticipated barriers to physical therapy: cancer treatment     GOALS:   Short Term Goals: 3 months MET as of 3/13/2025  1.Report decreased R shoulder pain < / =  3/10  to increase tolerance for ADL's  2. Increase PROM by 30 degrees in all planes    3. Increased strength by 1/3 MMT grade in R shoulder to increase tolerance for ADL and work activities.  4. Pt to tolerate HEP to improve ROM and independence with ADL's     Long Term Goals: 6 months (progressing, not met)  1.Report decreased R shoulder pain  < / =  1 /10  to increase tolerance for reaching overhead   2.Increase AROM to within 90% of the L shoulder  3.Increase strength to >/= 4/5 in R shoulder to increase tolerance for ADL and work activities.  4. Pt goal: to reach overhead and complete ADLs without pain   5. Pt will have improved gcode of CJ (20-40% limited) on FOTO shoulder in order to demonstrate true functional improvement.     Plan     Plan of care Certification: 12/4/2024 to 06/04/2025.     Continue POC. Increase ROM    Aristeo Patton PT, DPT

## 2025-03-28 ENCOUNTER — TELEPHONE (OUTPATIENT)
Dept: HEMATOLOGY/ONCOLOGY | Facility: CLINIC | Age: 75
End: 2025-03-28
Payer: MEDICARE

## 2025-03-28 NOTE — TELEPHONE ENCOUNTER
Spoke with pt to offer 1pm virtual with Dr. Nichole in [place] on 4/7. Pt agreeable to apt date / time. Informed pt I would reach out to BR team to assist in scheduling. Labs r/s for 4/7 at 7 am per pt request.

## 2025-03-28 NOTE — TELEPHONE ENCOUNTER
Spoke with pt. She will not be able to have a f/u on 4/4 due to personal conflict. She is requesting 4/7. Informed her that Dr. Nichole does not have any openings. Will reach out to MD to see other options for scheduling and call pt back

## 2025-03-28 NOTE — TELEPHONE ENCOUNTER
----- Message from Jake sent at 3/28/2025 10:19 AM CDT -----  Regarding: Scheduling Request  Contact: [phone]  Scheduling Request   Appt Type:  Ep Date/Time Preference: 4/7 Caller Name: pt  Contact Preference:  [phone]: personal conflict. In person or virtual please call to advise thank you

## 2025-04-07 ENCOUNTER — LAB VISIT (OUTPATIENT)
Dept: LAB | Facility: HOSPITAL | Age: 75
End: 2025-04-07
Attending: HOSPITALIST
Payer: MEDICARE

## 2025-04-07 ENCOUNTER — OFFICE VISIT (OUTPATIENT)
Dept: HEMATOLOGY/ONCOLOGY | Facility: CLINIC | Age: 75
End: 2025-04-07
Payer: MEDICARE

## 2025-04-07 DIAGNOSIS — E03.9 HYPOTHYROIDISM, UNSPECIFIED TYPE: ICD-10-CM

## 2025-04-07 DIAGNOSIS — C77.1 SECONDARY MALIGNANT NEOPLASM OF INTRATHORACIC LYMPH NODES: ICD-10-CM

## 2025-04-07 DIAGNOSIS — C34.31 ADENOCARCINOMA OF LOWER LOBE OF RIGHT LUNG: ICD-10-CM

## 2025-04-07 DIAGNOSIS — C34.31 ADENOCARCINOMA OF LOWER LOBE OF RIGHT LUNG: Primary | ICD-10-CM

## 2025-04-07 DIAGNOSIS — R79.89 ABNORMAL TSH: ICD-10-CM

## 2025-04-07 LAB
ABSOLUTE NEUTROPHIL COUNT (OHS): 2.74 K/UL (ref 1.8–7.7)
ALBUMIN SERPL BCP-MCNC: 3.9 G/DL (ref 3.5–5.2)
ALP SERPL-CCNC: 67 UNIT/L (ref 40–150)
ALT SERPL W/O P-5'-P-CCNC: 23 UNIT/L (ref 10–44)
ANION GAP (OHS): 9 MMOL/L (ref 8–16)
AST SERPL-CCNC: 22 UNIT/L (ref 11–45)
BILIRUB SERPL-MCNC: 0.4 MG/DL (ref 0.1–1)
BUN SERPL-MCNC: 9 MG/DL (ref 8–23)
CALCIUM SERPL-MCNC: 9.4 MG/DL (ref 8.7–10.5)
CHLORIDE SERPL-SCNC: 103 MMOL/L (ref 95–110)
CO2 SERPL-SCNC: 25 MMOL/L (ref 23–29)
CREAT SERPL-MCNC: 0.6 MG/DL (ref 0.5–1.4)
ERYTHROCYTE [DISTWIDTH] IN BLOOD BY AUTOMATED COUNT: 12.8 % (ref 11.5–14.5)
GFR SERPLBLD CREATININE-BSD FMLA CKD-EPI: >60 ML/MIN/1.73/M2
GLUCOSE SERPL-MCNC: 89 MG/DL (ref 70–110)
HCT VFR BLD AUTO: 42.3 % (ref 37–48.5)
HGB BLD-MCNC: 13.4 GM/DL (ref 12–16)
IMM GRANULOCYTES # BLD AUTO: 0.02 K/UL (ref 0–0.04)
MCH RBC QN AUTO: 27.9 PG (ref 27–31)
MCHC RBC AUTO-ENTMCNC: 31.7 G/DL (ref 32–36)
MCV RBC AUTO: 88 FL (ref 82–98)
PLATELET # BLD AUTO: 161 K/UL (ref 150–450)
PMV BLD AUTO: 12.4 FL (ref 9.2–12.9)
POTASSIUM SERPL-SCNC: 3.8 MMOL/L (ref 3.5–5.1)
PROT SERPL-MCNC: 7.8 GM/DL (ref 6–8.4)
RBC # BLD AUTO: 4.81 M/UL (ref 4–5.4)
SODIUM SERPL-SCNC: 137 MMOL/L (ref 136–145)
T4 FREE SERPL-MCNC: 1.28 NG/DL (ref 0.71–1.51)
T4 FREE SERPL-MCNC: 1.36 NG/DL (ref 0.71–1.51)
TSH SERPL-ACNC: 4.57 UIU/ML (ref 0.4–4)
WBC # BLD AUTO: 5.59 K/UL (ref 3.9–12.7)

## 2025-04-07 PROCEDURE — 98007 SYNCH AUDIO-VIDEO EST HI 40: CPT | Mod: 95,,, | Performed by: HOSPITALIST

## 2025-04-07 PROCEDURE — G2211 COMPLEX E/M VISIT ADD ON: HCPCS | Mod: 95,,, | Performed by: HOSPITALIST

## 2025-04-07 PROCEDURE — 82040 ASSAY OF SERUM ALBUMIN: CPT

## 2025-04-07 PROCEDURE — 36415 COLL VENOUS BLD VENIPUNCTURE: CPT

## 2025-04-07 PROCEDURE — 85027 COMPLETE CBC AUTOMATED: CPT

## 2025-04-07 PROCEDURE — 84443 ASSAY THYROID STIM HORMONE: CPT

## 2025-04-07 RX ORDER — HEPARIN 100 UNIT/ML
500 SYRINGE INTRAVENOUS
Status: CANCELLED | OUTPATIENT
Start: 2025-04-08

## 2025-04-07 RX ORDER — LEVOTHYROXINE SODIUM 75 UG/1
88 TABLET ORAL
Qty: 30 TABLET | Refills: 6 | Status: SHIPPED | OUTPATIENT
Start: 2025-04-07

## 2025-04-07 RX ORDER — SODIUM CHLORIDE 0.9 % (FLUSH) 0.9 %
10 SYRINGE (ML) INJECTION
Status: CANCELLED | OUTPATIENT
Start: 2025-04-08

## 2025-04-07 RX ORDER — EPINEPHRINE 0.3 MG/.3ML
0.3 INJECTION SUBCUTANEOUS ONCE AS NEEDED
Status: CANCELLED | OUTPATIENT
Start: 2025-04-08

## 2025-04-07 RX ORDER — DIPHENHYDRAMINE HYDROCHLORIDE 50 MG/ML
50 INJECTION, SOLUTION INTRAMUSCULAR; INTRAVENOUS ONCE AS NEEDED
Status: CANCELLED | OUTPATIENT
Start: 2025-04-08

## 2025-04-07 NOTE — PROGRESS NOTES
[facility] MEDICAL ONCOLOGY - FOLLOW UP VISIT    Reason for visit: Follow up visit for adenocarcinoma of the lung    The patient location is: [place] - [place]    Visit type: Audiovisual    Each patient to who is provided medical services by telemedicine has been: (1) informed of the relationship between the physician and patient and the respective role of any other health care provider with respect to management of the patient; and (2) notified that he or she may decline to receive medical services by telemedicine and may withdraw from such care at any time.      Oncology History   Malignant neoplasm of upper-outer quadrant of right breast in female, estrogen receptor positive   4/13/2021 Biopsy    Breast, right, 10:00 5N, biopsy:  - Invasive ductal carcinoma with lobular features     4/13/2021 Breast Tumor Markers    Estrogen Receptor: Positive >90%  Progesterone Receptor: Positive 10-50%  HER2: Negative  Ki67: 10-30%     4/26/2021 Genetic Testing    MyRisk: negative     5/12/2021 Breast Surgery    Right partial mastectomy with SLNB     6/1/2021 Tumor Conference    Radiation options? Offer radiation, can discuss partial vs whole breast radiation.        6/2/2021 Cancer Staged    Staging form: Breast, AJCC 8th Edition  - Pathologic stage from 6/2/2021: Stage IA (pT1b, pN0(sn), cM0, G2, ER+, GA+, HER2-)     7/6/2021 - 7/27/2021 Radiation Therapy    Treatment Summary  Course: C1 Chest 2021  Treatment Site Energy Dose/Fx (Gy) #Fx Total Dose (Gy) Start Date End Date Elapsed Days   Breast_Rt 6X 2.65 16 / 16 42.4 7/6/2021 7/27/2021 21          7/2021 -  Hormone Therapy    Letrozole      Procedure    Bronchoscopy, Station 7 positive for malignancy     Adenocarcinoma of lower lobe of right lung   10/5/2023 Imaging Significant Findings    CT C (obtained after CXR, which was itself obtained for palpitations)  - 2.1 x 1.3 cm spiculated RLL nodule, some spiculation noted to extend  to the pleural margin  - Scattered pulmonary nodules centered mostly subpleural at the RLL (e.g. 0.9 cm)       10/10/2023 Imaging Significant Findings    PET CT  - 2.1 x 1.1 cm RLL nodule, SUV 3.8  - 0.9 cm R axillary LN, SUV 3.9  - 1.2 cm subcarinal LN, SUV 4.6  - 0.7 cm Ln along L posterior shoulder, SUV 1.7  - Multiple additional solid pulmonary nodules through R lung base, too small for uptake detection     11/14/2023 Procedure    Bronch with EBUS  RLL, FNA  - Adenocarcinoma (TTF1 positive, GATA3 negative)    RLL, TBBx  - Adenocarcinoma    Per pulmonology, station 7 node was very vascular without window for biopsy, plan to discuss at thoracic tumor board    [facility] NGS  - KRAS G12A, CHEK1, MLH1, CTNNB1, CIC, PTPRD, NOTCH3, EZH2, LATS1, KMT2D  - Negative: EGFR, ALK, BRAF, KRAS, ROS1, RET, MET, EBB2  - PD-L1 1%       11/22/2023 Tumor Conference    Tumor Board  - Plan for axillary LN biopsy to determine lung vs recurrent breast vs other etiology  - Repeat CT C to evaluate nodules in RLL  - Determine treatment plan based on above results     11/22/2023 Tumor Genotyping    [facility] Liquid Biopsy  - No reportable pathogenic variants found     11/29/2023 Imaging Significant Findings    CT C  - 2.1 x 1.2 cm RLL spiculated nodule, stable in size w/ new central cavitation. Spiculated margins extend towards adjacent pleura.   - Stable irregular 2.0 cm linar opacity in LLL  - Stable 0.3 cm GGO, TRICIA  - Stable R basal sub cm nodules, largest 0.9 cm  - Several stable solid nodules predominantly in RLL, largest 0.9 cm     12/18/2023 Imaging Significant Findings    MRI Brain  - JELENA     1/3/2024 Procedure    IR Guided Biopsy, R Axillary LN  - No metastatic carcinoma (0.3 x 0.3 x 0.1 cm tissue, positive and negative controls stained appropriately)     1/5/2024 Imaging Significant Findings    PET CT  - Stable 2.0 x 1.1 cm RLL nodule (previously 2.1 x 1.1 cm)  - Stable additional nodules w/o significant racer uptake  - 0.8 cm R  axillary node, SUV 4.2 (previously 0.9 cm, SUV 3.9)  - 0.5 cm L posterior shoulder node, SUV 2.3 (previously 0.7 cm, SUV 1.7)  - 1.3 cm subcarinal node, SUV 4.9 (previously 1.2 cm, SUV 4.6)     1/10/2024 Tumor Conference    Tumor Board   Re-sample enlarged, hypermetabolic axillary LN with repeat CT-guided biopsy versus excisional biopsy. If LN is negative for recurrent NSCLC, obtain EBUS of PET-avid mediastinal LN.         1/23/2024 Procedure    IR Guided Biopsy, R Axillary LN (Second Biopsy)  - Negative for metastatic carcinoma     2/8/2024 Procedure    Bronch with EBUS  LN  Positive: Station 7 (Adenocarcinoma)  Negative: Station 11R     2/21/2024 Cancer Staged    Staging form: Lung, AJCC 8th Edition  - Clinical stage from 2/21/2024: Stage IIIA (cT1b, cN2, cM0)     2/21/2024 Tumor Conference    Thoracic Tumor Board  Stage IIIA right lower lobe adenocarcinoma with bulky subcarinal lymphadenopathy.  Proceed with NA chemo/I-O therapy. Return to [facility] for surgical evaluation. If patient is not a surgical candidate following 3 cycles of therapy, proceed with definitive chemo/RT.       2/22/2024 - 4/5/2024 Chemotherapy    Treatment Summary   Plan Name: OP NSCLC NIVOLUMAB 360 MG PLUS CARBOPLATIN (AUC5) PEMETREXED 500 MG/M2 Q3W x 3 CYCLES  Treatment Goal: Control  Status: Inactive  Start Date: 2/22/2024  End Date: 4/5/2024  Provider: Bridger Nichole IV, MD  Chemotherapy: CARBOplatin (PARAPLATIN) 415 mg in sodium chloride 0.9% 326.5 mL chemo infusion, 415 mg, Intravenous, Clinic/HOD 1 time, 3 of 3 cycles  Administration: 415 mg (2/22/2024), 465 mg (4/4/2024), 465 mg (3/14/2024)  PEMEtrexed disodium (ALIMTA) 750 mg in sodium chloride 0.9% SolP 100 mL chemo infusion, 500 mg/m2 = 750 mg, Intravenous, Clinic/HOD 1 time, 3 of 3 cycles  Administration: 750 mg (2/22/2024), 750 mg (4/4/2024), 750 mg (3/14/2024)     4/24/2024 Imaging Significant Findings    CT C/A/P  - 1.2 x 1.0 cm RLL malignancy, previously 2 x 1.2 cm  - Interval  decrease in the previously seen nodules in the RLL  - 0.9 cm subcarinal node, previously 1.7 cm  - Interval decrease in right infrahilar soft tissue  - 2.1 cm LLL solid nodular opacity, unchanged  - 0.8 cm TRICIA ground-glass opacity, unchanged  - AVM again noted in RLL  - 0.6 cm right axillary lymph node, decreased in size from prior  - Left scapular lymph node decreased in size from prior, incompletely seen       5/6/2024 - 5/12/2024 Hospital Admission    Admitted to [facility] for RLLx.  Postoperative course complicated by hemothorax requiring return to the operating room for reexploration on postop day 1.  Chest tube removed on postop day 5.     5/6/2024 Surgery    Right Lower Lobectomy  Right Lower Lobe  - Adenocarcinoma with acinar pattern, 35% residual viable tumor, 1.0 cm in greatest dimension.  Pleural invasion absent, LVI absent, margins negative.  0/1 lymph node    Pericardial Excision  - Negative for tumor    LN  - Positive: Station 7  - Negative: Station 8R (0/1), 9 are (0/2), 2R (0/1), 4R (0/1), 11R (0/1), 12R (0/1), R posterior interlobar LN (0/1)    Path Staging: pT1a, pN2     7/2/2024 -  Chemotherapy    Adjuvant Nivolumab  28 day cycles  Nivolumab 480 mg, D1     7/24/2024 Cancer Staged    Staging form: Lung, AJCC 8th Edition  - Pathologic: Stage IIIA (pT1a, pN2, cM0)     8/7/2024 Imaging Significant Findings    PET CT  - New foci of FDG uptake in right lateral chest wall, likely represents postsurgical changes  - Development of moderate size right pleural effusion  - No evidence of hypermetabolic mediastinal or hilar lymph nodes     11/13/2024 Imaging Significant Findings    CT C  Postsurgical changes after right lower lobectomy  Right pleural effusion decreased in size  Stable 2.2 cm regular nodular opacity of LLL  Stable 0.8 cm ground-glass nodule in TRICIA  Stable 0.3 cm nodule along left major fissure     3/20/2025 Imaging Significant Findings    PET CT  No evidence of local recurrence at resection site  No  discrete lung nodules suspicious for metastases  0.7 cm right supraclavicular lymph node, increased from 0.6 m, SUV 3.6, of uncertain etiology  Stable 2.2 cm LLL part solid nodule, not FDG avid  Stable 0.8 cm ground-glass nodular opacity in TRICIA  Stable small loculated right pleural effusion  No FDG avid evidence of disease elsewhere        - Second opinion, MDA: contralateral LLL lesion stable but plan for sampling of this as well as subcarinal LN and possible additional RLL nodule. Pending results, consider NA --> surgery vs CRT    Oncology History    HPI:     Keo Frias is a 73 y.o. female with pmh significant for HTN, migraine headaches, and multiple malignancies, who presents for follow up of the below lung cancer    1) Stage IA ( [id number], ER 95%, MT 10%, HER2 negative, Ki-67 20%) IDC of the R breast, initially dx'd 4/13/21, s/p lumpectomy and SLNB (5/12/21), XRT (7/6/21-7/27/21), and currently on letrozole (7/2021 - present).     2) Stage IIIA (pT1a, pN2, cM0) adenocarcinoma of the RLL (no targetable mutations, PD-L1 1%), initially dx'd 11/14/23, s/p neoadjuvant carboplatin/pemetrexed/nivolumab (2/22/24 - 4/4/24), right lower lobectomy (5/6/24), and currently on adjuvant nivolumab (7/2/24 - present) who presents to for follow up.     Last clinic with me 2/7/25, proceed with C9 nivolumab on 2/11/24.  RTC on 3/10/25 with Carina Kerr and mahnaz BAIG on 03/11.  PET-CT and med Onc at [facility] on 03/2025.  Increase levothyroxine to 88 mcg daily.    Interval History:  2/11/25: C9 nivolumab  3/10/25:  Medical oncology follow up (Carina Kerr), proceed with C10 durvalumab on 3/11/24 [place].  PET-CT and follow up at [facility] pending on 03/2025.  Patient has chosen to remain at 75 mcg daily for levothyroxine.  3/11/25: C10 nivolumab  03/20/25: PET-CT, per Onc history  03/20/25: Medical oncology f/u (Dr. Don, [facility]), continue for 1 year total adjuvant nivolumab.  CT C/A/P in 3-4 months, noting subcentimeter  "right supraclavicular lymph node.    The pt states "I'm feeling great". She notes that she recently traveled to [place] to see a college friend which was a good trip.     Pt denies N/V, diarrhea, constipation, rash, new/worsening fatigue, new/worsening SOB, or new/worsening cough.        ROS:   As per HPI.     Physical Exam:       LMP  (LMP Unknown)                Physical Exam  Constitutional:       Appearance: Normal appearance.   HENT:      Head: Normocephalic and atraumatic.   Eyes:      Extraocular Movements: Extraocular movements intact.      Pupils: Pupils are equal, round, and reactive to light.   Neurological:      Mental Status: She is alert and oriented to person, place, and time.   Psychiatric:         Mood and Affect: Mood normal.         Thought Content: Thought content normal.         Judgment: Judgment normal.         Imaging:    See oncologic history above.     Path:  See oncologic history above.      Assessment and Plan:     Keo Frias is a 73 y.o. female with pmh significant for HTN, migraine headaches, and multiple malignancies, who presents for follow up of the below lung cancer    1) Stage IA ( [id number], ER 95%, AK 10%, HER2 negative, Ki-67 20%) IDC of the R breast, initially dx'd 4/13/21, s/p lumpectomy and SLNB (5/12/21), XRT (7/6/21-7/27/21), and currently on letrozole (7/2021 - present).     2) Stage IIIA (pT1a, pN2, cM0) adenocarcinoma of the RLL (no targetable mutations, PD-L1 1%), initially dx'd 11/14/23, s/p neoadjuvant carboplatin/pemetrexed/nivolumab (2/22/24 - 4/4/24), right lower lobectomy (5/6/24), and currently on adjuvant nivolumab (7/2/24 - present) who presents to for follow up.     Stage IIIA Adenocarcinoma of RLL, PD-L1 1%, No Actionable Molecular Alterations  ECOG PS 1.  Patient is currently on adjuvant nivolumab (see note from 6/14/24), tolerating without significant issue. Her most recent imaging (PET CT, 3/20/25 , ~8.5 months on adjuvant nivolumab) is without " "evidence of local recurrence of the resection site, new lung nodules, or change in the 2.2 cm LLL non FDG avid nodule.  Of note is a 0.7 cm right supraclavicular lymph node with SUV of 3.6 of uncertain etiology; on discussion with Dr. Don, favor continued monitoring. Low threshold for empiric SBRT to the LLL lesion should there be evidence of change/growth in the future.  Given good tolerance and radiographic response, will plan to treat with nivolumab q4w x 1 year, w/ imaging throughout (scheduled at [facility]).     PLAN:   Proceed w/ C11 nivolumab on 4/8/25 at [place], labs acceptable and treatment signed  Labs and in-person f/u on 5/2/25, C12 nivolumab at [place] on 5/6/25  CT C/A/P, rad onc, and med onc f/u at [facility] on 7/17/25      Hypothyroidism  Continue levothyroxine 75 mcg daily, refills sent      Osteoporosis  DEXA from 7/11/24 with osteoporosis of the lumbar spine and hip. Discussed to hold on dental procedures until at least 2 months after prolia injections based on "American Association of Oral and Maxill[employer] Position Paper on Medication-Related Osteonecrosis of the Jaw - 2014 Update". Pt endorsed her understanding.   Prolia per Dr. Ogden      Right IDC, ER+/ME+/HER2-  S/p lumpectomy with SLNB, radiation therapy, and currently on letrozole and denosumab, tolerating well. Concern for possible R axillary recurrence, workup as below.   Okay to continue letrozole  Okay to continue denosumab as above  Breast oncology team aware      Pruritus  Continue current symptomatic measures: lotion + hydrocortisone.   Patient will report worsening of this issue and prescribe stronger steroid cream and/or antihistamines at that time.     Patient is in agreement with the proposed treatment plan. All questions were answered to the patient's satisfaction. Pt knows to call clinic if anything is needed before the next clinic visit.          Med Onc Chart Routing      Follow up with physician . Labs (CBC, CMP, TSH, T4) " and in-person f/u on 5/2/25, C12 nivolumab at [place] on 5/6/25   Follow up with BRENDA    Infusion scheduling note    Injection scheduling note    Labs    Imaging    Pharmacy appointment    Other referrals

## 2025-04-08 ENCOUNTER — INFUSION (OUTPATIENT)
Dept: INFUSION THERAPY | Facility: HOSPITAL | Age: 75
End: 2025-04-08
Attending: HOSPITALIST
Payer: MEDICARE

## 2025-04-08 ENCOUNTER — TELEPHONE (OUTPATIENT)
Dept: INTERNAL MEDICINE | Facility: CLINIC | Age: 75
End: 2025-04-08
Payer: MEDICARE

## 2025-04-08 ENCOUNTER — TELEPHONE (OUTPATIENT)
Dept: HEMATOLOGY/ONCOLOGY | Facility: CLINIC | Age: 75
End: 2025-04-08
Payer: MEDICARE

## 2025-04-08 VITALS
DIASTOLIC BLOOD PRESSURE: 61 MMHG | RESPIRATION RATE: 16 BRPM | OXYGEN SATURATION: 98 % | BODY MASS INDEX: 21.65 KG/M2 | TEMPERATURE: 98 F | SYSTOLIC BLOOD PRESSURE: 134 MMHG | HEIGHT: 62 IN | HEART RATE: 68 BPM | WEIGHT: 117.63 LBS

## 2025-04-08 DIAGNOSIS — Z51.81 ENCOUNTER FOR MONITORING DENOSUMAB THERAPY: Primary | ICD-10-CM

## 2025-04-08 DIAGNOSIS — C34.31 ADENOCARCINOMA OF LOWER LOBE OF RIGHT LUNG: Primary | ICD-10-CM

## 2025-04-08 DIAGNOSIS — Z79.620 ENCOUNTER FOR MONITORING DENOSUMAB THERAPY: Primary | ICD-10-CM

## 2025-04-08 PROCEDURE — 25000003 PHARM REV CODE 250: Performed by: HOSPITALIST

## 2025-04-08 PROCEDURE — 63600175 PHARM REV CODE 636 W HCPCS: Mod: JZ,TB | Performed by: HOSPITALIST

## 2025-04-08 PROCEDURE — A4216 STERILE WATER/SALINE, 10 ML: HCPCS | Performed by: HOSPITALIST

## 2025-04-08 PROCEDURE — 96413 CHEMO IV INFUSION 1 HR: CPT

## 2025-04-08 RX ORDER — HEPARIN 100 UNIT/ML
500 SYRINGE INTRAVENOUS
Status: DISCONTINUED | OUTPATIENT
Start: 2025-04-08 | End: 2025-04-08 | Stop reason: HOSPADM

## 2025-04-08 RX ORDER — SODIUM CHLORIDE 0.9 % (FLUSH) 0.9 %
10 SYRINGE (ML) INJECTION
Status: DISCONTINUED | OUTPATIENT
Start: 2025-04-08 | End: 2025-04-08 | Stop reason: HOSPADM

## 2025-04-08 RX ORDER — DIPHENHYDRAMINE HYDROCHLORIDE 50 MG/ML
50 INJECTION, SOLUTION INTRAMUSCULAR; INTRAVENOUS ONCE AS NEEDED
Status: DISCONTINUED | OUTPATIENT
Start: 2025-04-08 | End: 2025-04-08 | Stop reason: HOSPADM

## 2025-04-08 RX ORDER — EPINEPHRINE 0.3 MG/.3ML
0.3 INJECTION SUBCUTANEOUS ONCE AS NEEDED
Status: DISCONTINUED | OUTPATIENT
Start: 2025-04-08 | End: 2025-04-08 | Stop reason: HOSPADM

## 2025-04-08 RX ADMIN — SODIUM CHLORIDE 480 MG: 9 INJECTION, SOLUTION INTRAVENOUS at 12:04

## 2025-04-08 RX ADMIN — Medication 10 ML: at 01:04

## 2025-04-08 NOTE — NURSING
Opdivo given through PIV, noted for having positive blood return.  Dosage and BSA checked by two chemotherapy certified nurses.  Chemotherapeutic precautions in place throughout therapy. Pt tolerated it well, no adverse reactions noted.  PIV flushed and removed per protocol. Next appointment scheduled.  AVS given. Discharged with no acute distress.

## 2025-04-10 ENCOUNTER — CLINICAL SUPPORT (OUTPATIENT)
Dept: REHABILITATION | Facility: HOSPITAL | Age: 75
End: 2025-04-10
Payer: MEDICARE

## 2025-04-10 DIAGNOSIS — M25.511 CHRONIC RIGHT SHOULDER PAIN: ICD-10-CM

## 2025-04-10 DIAGNOSIS — M25.611 DECREASED RANGE OF MOTION OF RIGHT SHOULDER: Primary | ICD-10-CM

## 2025-04-10 DIAGNOSIS — G89.29 CHRONIC RIGHT SHOULDER PAIN: ICD-10-CM

## 2025-04-10 PROCEDURE — 97110 THERAPEUTIC EXERCISES: CPT

## 2025-04-10 PROCEDURE — 97112 NEUROMUSCULAR REEDUCATION: CPT

## 2025-04-10 NOTE — PROGRESS NOTES
"[facility]   Physical Therapy Treatment/Progress Note      Name: Keo Frias  Clinic Number: [id number]    Therapy Diagnosis:   Encounter Diagnoses   Name Primary?    Decreased range of motion of right shoulder Yes    Chronic right shoulder pain      Physician: Osvaldo Costa MD    Visit Date: 4/10/2025    Physician Orders: PT Eval and Treat   Medical Diagnosis from Referral: M75.01 (ICD-10-CM) - Adhesive capsulitis of right shoulder   Evaluation Date: 12/4/2024  Authorization Period Expiration: 12/03/2025  Plan of Care Expiration: 06/04/2025  Progress Note Due: 4/13/2025  Visit # / Visits authorized: 12/28  FOTO: 1/3     Precautions: Standard,     PTA Visit #: 0/5     Time In: 1401  Time Out: 1500  Total Billable Time: 30 minutes    Subjective     Pt reports: her arm continues to feel like it is moving better and it is easier for her to lift her arm now   She was compliant with home exercise program.  Response to previous treatment: improved range of motion  Functional change: ongoing    Pain: 2/10  Location: right shoulder      Objective      Objective measures will be updated at progress report unless specified     Treatment     Keo received the treatments listed below:      therapeutic exercises to develop strength, endurance, ROM, flexibility, posture, and core stabilization for 35 minutes including:  Supine ER AAROM with dowel 3x10   Supine flexion AAROM with dowel 3x10   UBE 5' fwd/5' back for ROM and cardiovascular endurance  CC pulldowns seated 25x  Standing ER/IR isometric holds to 90 RTB 2x10 each   Sidelying ER 3x10 1# R only     Not today:  Seated ER by side with dowel 30x5" holds   Pulleys flexion 2 minutes   Pulleys abduction 2 minutes  Sidelying flexion w/ dowel 3x10    Rotator interval LLLD 10 minute hold  Supine LLLD ER stretch 10 minute hold    manual therapy techniques: Joint mobilizations, Manual traction, Myofacial release, Manual Lymphatic Drainage, Soft " "tissue Mobilization, and Friction Massage were applied to the: R shoulder for 14 minutes, including:  Inferior GH glides; grades III-IV  Posterior GH glides; grades III-IV  PROM shoulder flexion and external rotation   Contract relax shoulder flexion   Thoracic gapping  Cervical side glides; gr III-IV    Not today:  AC joint mobilizations inferior; gr III-IV    neuromuscular re-education activities to improve: Balance, Coordination, Kinesthetic, Sense, Proprioception, and Posture for 10 minutes. The following activities were included:  Standing thoracic rotations w/ YTB 20x each side   No money isometrics 0 degrees YTB 3x10 3" holds   Wall slides w/ YTB 2x15     Not today:  Standing land mine press 2x15   Chin tuck 20x 10" holds   Shoulder extension for low trap activation OTB 2x15   Open books arms crossed 20x ea side     therapeutic activities to improve functional performance for 0  minutes, including:      Patient Education and Home Exercises       Education provided:   - continuing HEP    Written Home Exercises Provided: YES. Exercises were reviewed and Keo was able to demonstrate them prior to the end of the session.  Keo demonstrated good understanding of the education provided. See EMR under Patient Instructions for exercises provided during therapy sessions    Assessment     Pt reported today with increased PROM in both flexion (155) and ER by side (30); she still lacks active ROM in both flexion and ER by side. Pt progressed today with rotator cuff activation with overhead movements to help improve active ROM overhead. She tolerated all progressions well without increased pain. Her pain free AAROM has shown much improvement over the last three sessions. Pt educated on the progress she has made to this point and expectations for therapy moving forward. Will continue to progress as tolerated.     Keo Is progressing well towards her goals.   Pt prognosis is Fair.     Pt will continue to benefit from " skilled outpatient physical therapy to address the deficits listed in the problem list box on initial evaluation, provide pt/family education and to maximize pt's level of independence in the home and community environment.     Pt's spiritual, cultural and educational needs considered and pt agreeable to plan of care and goals.     Anticipated barriers to physical therapy: cancer treatment     GOALS:   Short Term Goals: 3 months MET as of 3/13/2025  1.Report decreased R shoulder pain < / =  3/10  to increase tolerance for ADL's  2. Increase PROM by 30 degrees in all planes    3. Increased strength by 1/3 MMT grade in R shoulder to increase tolerance for ADL and work activities.  4. Pt to tolerate HEP to improve ROM and independence with ADL's     Long Term Goals: 6 months (progressing, not met)  1.Report decreased R shoulder pain  < / =  1 /10  to increase tolerance for reaching overhead   2.Increase AROM to within 90% of the L shoulder  3.Increase strength to >/= 4/5 in R shoulder to increase tolerance for ADL and work activities.  4. Pt goal: to reach overhead and complete ADLs without pain   5. Pt will have improved gcode of CJ (20-40% limited) on FOTO shoulder in order to demonstrate true functional improvement.     Plan     Plan of care Certification: 12/4/2024 to 06/04/2025.     Continue POC. Increase ROM    Aristeo Patton PT, DPT

## 2025-04-14 ENCOUNTER — TELEPHONE (OUTPATIENT)
Dept: INTERNAL MEDICINE | Facility: CLINIC | Age: 75
End: 2025-04-14
Payer: MEDICARE

## 2025-04-14 NOTE — TELEPHONE ENCOUNTER
----- Message from Lucille sent at 4/11/2025  4:13 PM CDT -----  Type:  Apt Request Who Called: Pt Does the patient know what this is regarding?: pt requesting to reschedule her apt on 05/06, to 05/02 around 2:30pm since she will already be at [facility] Would the patient rather a call back or a response via [facility]? Call / Portal Best Call Back Number:[phone] Additional Information: Type: Orders Who Called:Pt Does the patient know what this is regarding?: pt requesting any additional orders provider needs to check Would the patient rather a call back or a response via [facility]? Call / Portal Best Call Back Number:[phone] Information:

## 2025-04-17 ENCOUNTER — CLINICAL SUPPORT (OUTPATIENT)
Dept: REHABILITATION | Facility: HOSPITAL | Age: 75
End: 2025-04-17
Payer: MEDICARE

## 2025-04-17 DIAGNOSIS — M25.511 CHRONIC RIGHT SHOULDER PAIN: ICD-10-CM

## 2025-04-17 DIAGNOSIS — G89.29 CHRONIC RIGHT SHOULDER PAIN: ICD-10-CM

## 2025-04-17 DIAGNOSIS — M25.611 DECREASED RANGE OF MOTION OF RIGHT SHOULDER: Primary | ICD-10-CM

## 2025-04-17 PROCEDURE — 97140 MANUAL THERAPY 1/> REGIONS: CPT

## 2025-04-17 PROCEDURE — 97112 NEUROMUSCULAR REEDUCATION: CPT

## 2025-04-17 PROCEDURE — 97110 THERAPEUTIC EXERCISES: CPT

## 2025-04-17 NOTE — PROGRESS NOTES
[facility]   Physical Therapy Treatment/Progress Note      Name: Keo Frias  Clinic Number: [id number]    Therapy Diagnosis:   Encounter Diagnoses   Name Primary?    Decreased range of motion of right shoulder Yes    Chronic right shoulder pain      Physician: Osvaldo Costa MD    Visit Date: 4/17/2025    Physician Orders: PT Eval and Treat   Medical Diagnosis from Referral: M75.01 (ICD-10-CM) - Adhesive capsulitis of right shoulder   Evaluation Date: 12/4/2024  Authorization Period Expiration: 12/03/2025  Plan of Care Expiration: 06/04/2025  Progress Note Due: 4/13/2025  Visit # / Visits authorized: 13/28  FOTO: 1/3     Precautions: Standard,     PTA Visit #: 0/5     Time In: 1403  Time Out: 1500  Total Billable Time: 57 minutes    Subjective     Pt reports: She tripped and fell over uneven concrete while walking; she landed on her R side and is having some rib pain after the fall. She also had a slight increase in pain in her lateral shoulder, but she has no TTP and pain is not constant.   She was compliant with home exercise program.  Response to previous treatment: improved range of motion  Functional change: ongoing    Pain: 2/10  Location: right shoulder      Objective      Objective measures will be updated at progress report unless specified     Treatment     Keo received the treatments listed below:      therapeutic exercises to develop strength, endurance, ROM, flexibility, posture, and core stabilization for 14 minutes including:  Supine ER AAROM with dowel 3x10   Supine flexion AAROM with dowel 3x10   CC pulldowns seated 3x10     Not today:  UBE 5' fwd/5' back for ROM and cardiovascular endurance  Standing ER/IR isometric holds to 90 RTB 2x10 each   Sidelying ER 3x10 1# R only    manual therapy techniques: Joint mobilizations, Manual traction, Myofacial release, Manual Lymphatic Drainage, Soft tissue Mobilization, and Friction Massage were applied to the: R shoulder for  "24 minutes, including:  Inferior GH glides; grades III-IV  Posterior GH glides; grades III-IV  PROM shoulder flexion and external rotation   Shoulder flexion MET  Shoulder extension MET  Latera shoulder distraction     Not today:   CT junction gapping      neuromuscular re-education activities to improve: Balance, Coordination, Kinesthetic, Sense, Proprioception, and Posture for 19 minutes. The following activities were included:  Seated thoracic rotations 20x each side   No money isometrics 30 degrees YTB 3x10 3" holds   Wall slides w/ YTB 2x15   Seated thoracic extension 30x 2" holds  Shoulder flex/ext perturbations 5x30" holds at 90 and end range    therapeutic activities to improve functional performance for 0  minutes, including:      Patient Education and Home Exercises       Education provided:   - continuing HEP    Written Home Exercises Provided: YES. Exercises were reviewed and Keo was able to demonstrate them prior to the end of the session.  Keo demonstrated good understanding of the education provided. See EMR under Patient Instructions for exercises provided during therapy sessions    Assessment     Pt reported today with increased PROM with a slight decrease in PROM compared to last session : flexion (130) and ER (10); she still lacks active ROM in both flexion and ER by side. She was able to increase flexion and ER by side after manual techniques especially METs and perturbations. Will begin to focus more on closed chain exercises to increase ROM for better proprioception of the GH joint. She was progressed with thoracic mobility exercises especially thoracic extension which she tolerated with no increase in pain. Continued cuff activation overhead today to help improve AROM. Pt most likely does not have fracture after her fall based on her clinical presentation: no pain with thoracic movements, no significant loss of shoulder motion, and no TTP near bony prominences. Updated HEP and educated " patient on frequency of exercises and importance of completing HEP. Pt will continue to progress as tolerated.    Keo Is progressing well towards her goals.   Pt prognosis is Fair.     Pt will continue to benefit from skilled outpatient physical therapy to address the deficits listed in the problem list box on initial evaluation, provide pt/family education and to maximize pt's level of independence in the home and community environment.     Pt's spiritual, cultural and educational needs considered and pt agreeable to plan of care and goals.     Anticipated barriers to physical therapy: cancer treatment     GOALS:   Short Term Goals: 3 months MET as of 3/13/2025  1.Report decreased R shoulder pain < / =  3/10  to increase tolerance for ADL's  2. Increase PROM by 30 degrees in all planes    3. Increased strength by 1/3 MMT grade in R shoulder to increase tolerance for ADL and work activities.  4. Pt to tolerate HEP to improve ROM and independence with ADL's     Long Term Goals: 6 months (progressing, not met)  1.Report decreased R shoulder pain  < / =  1 /10  to increase tolerance for reaching overhead   2.Increase AROM to within 90% of the L shoulder  3.Increase strength to >/= 4/5 in R shoulder to increase tolerance for ADL and work activities.  4. Pt goal: to reach overhead and complete ADLs without pain   5. Pt will have improved gcode of CJ (20-40% limited) on FOTO shoulder in order to demonstrate true functional improvement.     Plan     Plan of care Certification: 12/4/2024 to 06/04/2025.     Continue POC. Increase ROM    Aristeo Patton PT, DPT  Co-treat with Iván Bell PT, DPT, OCS, FAAOMPT

## 2025-04-21 ENCOUNTER — TELEPHONE (OUTPATIENT)
Dept: HEMATOLOGY/ONCOLOGY | Facility: CLINIC | Age: 75
End: 2025-04-21
Payer: MEDICARE

## 2025-04-21 NOTE — TELEPHONE ENCOUNTER
----- Message from Es sent at 4/21/2025 10:29 AM CDT -----  Regarding: r/s appt  Contact: pt @[phone]  Pt is calling to speak to someone in the office to r/s her appt that she is currently scheduled for; no available appts in Epic. Please call to advise. Thanks.

## 2025-04-21 NOTE — TELEPHONE ENCOUNTER
Spoke with pt to reschedule appointment to Monday 5/5 Virtual with Dr. Nichole. Pt agreeable to new apt times / dates

## 2025-04-24 ENCOUNTER — CLINICAL SUPPORT (OUTPATIENT)
Dept: REHABILITATION | Facility: HOSPITAL | Age: 75
End: 2025-04-24
Payer: MEDICARE

## 2025-04-24 DIAGNOSIS — M25.611 DECREASED RANGE OF MOTION OF RIGHT SHOULDER: Primary | ICD-10-CM

## 2025-04-24 DIAGNOSIS — M25.511 CHRONIC RIGHT SHOULDER PAIN: ICD-10-CM

## 2025-04-24 DIAGNOSIS — G89.29 CHRONIC RIGHT SHOULDER PAIN: ICD-10-CM

## 2025-04-24 PROCEDURE — 97112 NEUROMUSCULAR REEDUCATION: CPT

## 2025-04-24 PROCEDURE — 97110 THERAPEUTIC EXERCISES: CPT

## 2025-04-24 NOTE — PROGRESS NOTES
"[facility]   Physical Therapy Treatment/Progress Note      Name: Keo Frias  Clinic Number: [id number]    Therapy Diagnosis:   Encounter Diagnoses   Name Primary?    Decreased range of motion of right shoulder Yes    Chronic right shoulder pain      Physician: Osvaldo Costa MD    Visit Date: 4/24/2025    Physician Orders: PT Eval and Treat   Medical Diagnosis from Referral: M75.01 (ICD-10-CM) - Adhesive capsulitis of right shoulder   Evaluation Date: 12/4/2024  Authorization Period Expiration: 12/03/2025  Plan of Care Expiration: 06/04/2025  Progress Note Due: 5/13/2025  Visit # / Visits authorized: 14/28  FOTO: 1/3     Precautions: Standard,     PTA Visit #: 0/5     Time In: 1300  Time Out: 1356  Total Billable Time: 56 minutes    Subjective     Pt reports: She feels much better today compared to last visit and feels like she can move her arm more with less pain  She was compliant with home exercise program.  Response to previous treatment: improved range of motion  Functional change: ongoing    Pain: 2/10  Location: right shoulder      Objective      Objective measures will be updated at progress report unless specified     Treatment     Keo received the treatments listed below:      therapeutic exercises to develop strength, endurance, ROM, flexibility, posture, and core stabilization for 25 minutes including:  CC pulldowns seated 3x10   UBE 5' fwd/5' back for ROM and cardiovascular endurance  Standing ER/IR isometric holds to 90 RTB 2x10 each   Sidelying ER 3x10 1# R only  ER ROM on wall 3x12 2" holds    manual therapy techniques: Joint mobilizations, Manual traction, Myofacial release, Manual Lymphatic Drainage, Soft tissue Mobilization, and Friction Massage were applied to the: R shoulder for 14 minutes, including:  Inferior GH glides; grades III-IV  Posterior GH glides; grades III-IV  PROM shoulder flexion and external rotation   Shoulder flexion MET  Shoulder ER " "MET  Lateral shoulder distraction    CT junction gapping  PA thoracic mobilizations     neuromuscular re-education activities to improve: Balance, Coordination, Kinesthetic, Sense, Proprioception, and Posture for 17 minutes. The following activities were included:  Open books 20x each side   No money isometrics 30 degrees YTB 3x10 3" holds   Wall slides w/ YTB 2x15   Seated thoracic extension 30x 2" holds  Shoulder flex/ext perturbations 5x30" holds at 90 and end range    therapeutic activities to improve functional performance for 0  minutes, including:      Patient Education and Home Exercises       Education provided:   - continuing HEP    Written Home Exercises Provided: YES. Exercises were reviewed and Keo was able to demonstrate them prior to the end of the session.  Keo demonstrated good understanding of the education provided. See EMR under Patient Instructions for exercises provided during therapy sessions    Assessment     Reported today with much better PROM compared to last session in flexion, abduction, and ER. All planes of motion improved after manual therapy especially after METs and perturbations. Placed focus on more close chain exercises and cuff activation to 90 and overhead which was well tolerated with no increased pain. She will continue to progress as tolerated.     Keo Is progressing well towards her goals.   Pt prognosis is Fair.     Pt will continue to benefit from skilled outpatient physical therapy to address the deficits listed in the problem list box on initial evaluation, provide pt/family education and to maximize pt's level of independence in the home and community environment.     Pt's spiritual, cultural and educational needs considered and pt agreeable to plan of care and goals.     Anticipated barriers to physical therapy: cancer treatment     GOALS:   Short Term Goals: 3 months MET as of 3/13/2025  1.Report decreased R shoulder pain < / =  3/10  to increase tolerance for " ADL's  2. Increase PROM by 30 degrees in all planes    3. Increased strength by 1/3 MMT grade in R shoulder to increase tolerance for ADL and work activities.  4. Pt to tolerate HEP to improve ROM and independence with ADL's     Long Term Goals: 6 months (progressing, not met)  1.Report decreased R shoulder pain  < / =  1 /10  to increase tolerance for reaching overhead   2.Increase AROM to within 90% of the L shoulder  3.Increase strength to >/= 4/5 in R shoulder to increase tolerance for ADL and work activities.  4. Pt goal: to reach overhead and complete ADLs without pain   5. Pt will have improved gcode of CJ (20-40% limited) on FOTO shoulder in order to demonstrate true functional improvement.     Plan     Plan of care Certification: 12/4/2024 to 06/04/2025.     Continue POC. Increase ROM    Aristeo Bourneulthokathy PT, DPT

## 2025-05-03 ENCOUNTER — LAB VISIT (OUTPATIENT)
Dept: LAB | Facility: HOSPITAL | Age: 75
End: 2025-05-03
Attending: HOSPITALIST
Payer: MEDICARE

## 2025-05-03 DIAGNOSIS — Z79.620 ENCOUNTER FOR MONITORING DENOSUMAB THERAPY: ICD-10-CM

## 2025-05-03 DIAGNOSIS — E78.2 MIXED HYPERLIPIDEMIA: ICD-10-CM

## 2025-05-03 DIAGNOSIS — E03.9 HYPOTHYROIDISM, UNSPECIFIED TYPE: ICD-10-CM

## 2025-05-03 DIAGNOSIS — Z79.899 MEDICATION MANAGEMENT: ICD-10-CM

## 2025-05-03 DIAGNOSIS — E03.9 HYPOTHYROIDISM (ACQUIRED): ICD-10-CM

## 2025-05-03 DIAGNOSIS — Z51.81 ENCOUNTER FOR MONITORING DENOSUMAB THERAPY: ICD-10-CM

## 2025-05-03 DIAGNOSIS — M81.0 AGE-RELATED OSTEOPOROSIS WITHOUT CURRENT PATHOLOGICAL FRACTURE: ICD-10-CM

## 2025-05-03 DIAGNOSIS — E55.9 VITAMIN D DEFICIENCY: ICD-10-CM

## 2025-05-03 DIAGNOSIS — C34.31 ADENOCARCINOMA OF LOWER LOBE OF RIGHT LUNG: ICD-10-CM

## 2025-05-03 DIAGNOSIS — I10 BENIGN ESSENTIAL HYPERTENSION: ICD-10-CM

## 2025-05-03 LAB
25(OH)D3+25(OH)D2 SERPL-MCNC: 77 NG/ML (ref 30–96)
ABSOLUTE EOSINOPHIL (OHS): 0.48 K/UL
ABSOLUTE MONOCYTE (OHS): 0.42 K/UL (ref 0.3–1)
ABSOLUTE NEUTROPHIL COUNT (OHS): 2.27 K/UL (ref 1.8–7.7)
ABSOLUTE NEUTROPHIL COUNT (OHS): 2.27 K/UL (ref 1.8–7.7)
ALBUMIN SERPL BCP-MCNC: 3.8 G/DL (ref 3.5–5.2)
ALBUMIN SERPL BCP-MCNC: 3.9 G/DL (ref 3.5–5.2)
ALP SERPL-CCNC: 83 UNIT/L (ref 40–150)
ALP SERPL-CCNC: 84 UNIT/L (ref 40–150)
ALT SERPL W/O P-5'-P-CCNC: 18 UNIT/L (ref 10–44)
ALT SERPL W/O P-5'-P-CCNC: 19 UNIT/L (ref 10–44)
ANION GAP (OHS): 10 MMOL/L (ref 8–16)
ANION GAP (OHS): 11 MMOL/L (ref 8–16)
AST SERPL-CCNC: 21 UNIT/L (ref 11–45)
AST SERPL-CCNC: 21 UNIT/L (ref 11–45)
BASOPHILS # BLD AUTO: 0.06 K/UL
BASOPHILS NFR BLD AUTO: 1.3 %
BILIRUB SERPL-MCNC: 0.5 MG/DL (ref 0.1–1)
BILIRUB SERPL-MCNC: 0.5 MG/DL (ref 0.1–1)
BUN SERPL-MCNC: 9 MG/DL (ref 8–23)
BUN SERPL-MCNC: 9 MG/DL (ref 8–23)
CALCIUM SERPL-MCNC: 9.4 MG/DL (ref 8.7–10.5)
CALCIUM SERPL-MCNC: 9.5 MG/DL (ref 8.7–10.5)
CHLORIDE SERPL-SCNC: 106 MMOL/L (ref 95–110)
CHLORIDE SERPL-SCNC: 106 MMOL/L (ref 95–110)
CHOLEST SERPL-MCNC: 178 MG/DL (ref 120–199)
CHOLEST/HDLC SERPL: 3.4 {RATIO} (ref 2–5)
CO2 SERPL-SCNC: 22 MMOL/L (ref 23–29)
CO2 SERPL-SCNC: 22 MMOL/L (ref 23–29)
CREAT SERPL-MCNC: 0.6 MG/DL (ref 0.5–1.4)
CREAT SERPL-MCNC: 0.6 MG/DL (ref 0.5–1.4)
EAG (OHS): 123 MG/DL (ref 68–131)
ERYTHROCYTE [DISTWIDTH] IN BLOOD BY AUTOMATED COUNT: 12.9 % (ref 11.5–14.5)
ERYTHROCYTE [DISTWIDTH] IN BLOOD BY AUTOMATED COUNT: 12.9 % (ref 11.5–14.5)
GFR SERPLBLD CREATININE-BSD FMLA CKD-EPI: >60 ML/MIN/1.73/M2
GFR SERPLBLD CREATININE-BSD FMLA CKD-EPI: >60 ML/MIN/1.73/M2
GLUCOSE SERPL-MCNC: 92 MG/DL (ref 70–110)
GLUCOSE SERPL-MCNC: 94 MG/DL (ref 70–110)
HBA1C MFR BLD: 5.9 % (ref 4–5.6)
HCT VFR BLD AUTO: 39.6 % (ref 37–48.5)
HCT VFR BLD AUTO: 39.6 % (ref 37–48.5)
HDLC SERPL-MCNC: 53 MG/DL (ref 40–75)
HDLC SERPL: 29.8 % (ref 20–50)
HGB BLD-MCNC: 12.7 GM/DL (ref 12–16)
HGB BLD-MCNC: 12.7 GM/DL (ref 12–16)
IMM GRANULOCYTES # BLD AUTO: 0 K/UL (ref 0–0.04)
IMM GRANULOCYTES # BLD AUTO: 0 K/UL (ref 0–0.04)
IMM GRANULOCYTES NFR BLD AUTO: 0 % (ref 0–0.5)
LDLC SERPL CALC-MCNC: 111.8 MG/DL (ref 63–159)
LYMPHOCYTES # BLD AUTO: 1.56 K/UL (ref 1–4.8)
MAGNESIUM SERPL-MCNC: 1.8 MG/DL (ref 1.6–2.6)
MCH RBC QN AUTO: 28 PG (ref 27–31)
MCH RBC QN AUTO: 28 PG (ref 27–31)
MCHC RBC AUTO-ENTMCNC: 32.1 G/DL (ref 32–36)
MCHC RBC AUTO-ENTMCNC: 32.1 G/DL (ref 32–36)
MCV RBC AUTO: 87 FL (ref 82–98)
MCV RBC AUTO: 87 FL (ref 82–98)
NONHDLC SERPL-MCNC: 125 MG/DL
NUCLEATED RBC (/100WBC) (OHS): 0 /100 WBC
PLATELET # BLD AUTO: 174 K/UL (ref 150–450)
PLATELET # BLD AUTO: 174 K/UL (ref 150–450)
PMV BLD AUTO: 13 FL (ref 9.2–12.9)
PMV BLD AUTO: 13 FL (ref 9.2–12.9)
POTASSIUM SERPL-SCNC: 3.7 MMOL/L (ref 3.5–5.1)
POTASSIUM SERPL-SCNC: 3.8 MMOL/L (ref 3.5–5.1)
PROT SERPL-MCNC: 7.6 GM/DL (ref 6–8.4)
PROT SERPL-MCNC: 7.6 GM/DL (ref 6–8.4)
RBC # BLD AUTO: 4.53 M/UL (ref 4–5.4)
RBC # BLD AUTO: 4.53 M/UL (ref 4–5.4)
RELATIVE EOSINOPHIL (OHS): 10 %
RELATIVE LYMPHOCYTE (OHS): 32.6 % (ref 18–48)
RELATIVE MONOCYTE (OHS): 8.8 % (ref 4–15)
RELATIVE NEUTROPHIL (OHS): 47.3 % (ref 38–73)
SODIUM SERPL-SCNC: 138 MMOL/L (ref 136–145)
SODIUM SERPL-SCNC: 139 MMOL/L (ref 136–145)
T4 FREE SERPL-MCNC: 1.22 NG/DL (ref 0.71–1.51)
T4 FREE SERPL-MCNC: 1.34 NG/DL (ref 0.71–1.51)
T4 FREE SERPL-MCNC: 1.4 NG/DL (ref 0.71–1.51)
TRIGL SERPL-MCNC: 66 MG/DL (ref 30–150)
TSH SERPL-ACNC: 4.6 UIU/ML (ref 0.4–4)
TSH SERPL-ACNC: 4.73 UIU/ML (ref 0.4–4)
VIT B12 SERPL-MCNC: 533 PG/ML (ref 210–950)
WBC # BLD AUTO: 4.79 K/UL (ref 3.9–12.7)
WBC # BLD AUTO: 4.79 K/UL (ref 3.9–12.7)

## 2025-05-03 PROCEDURE — 82607 VITAMIN B-12: CPT

## 2025-05-03 PROCEDURE — 80053 COMPREHEN METABOLIC PANEL: CPT

## 2025-05-03 PROCEDURE — 80053 COMPREHEN METABOLIC PANEL: CPT | Mod: 91

## 2025-05-03 PROCEDURE — 84443 ASSAY THYROID STIM HORMONE: CPT

## 2025-05-03 PROCEDURE — 80061 LIPID PANEL: CPT

## 2025-05-03 PROCEDURE — 83036 HEMOGLOBIN GLYCOSYLATED A1C: CPT

## 2025-05-03 PROCEDURE — 85025 COMPLETE CBC W/AUTO DIFF WBC: CPT

## 2025-05-03 PROCEDURE — 36415 COLL VENOUS BLD VENIPUNCTURE: CPT

## 2025-05-03 PROCEDURE — 82306 VITAMIN D 25 HYDROXY: CPT

## 2025-05-03 PROCEDURE — 85027 COMPLETE CBC AUTOMATED: CPT

## 2025-05-03 PROCEDURE — 83735 ASSAY OF MAGNESIUM: CPT

## 2025-05-04 ENCOUNTER — RESULTS FOLLOW-UP (OUTPATIENT)
Dept: INTERNAL MEDICINE | Facility: CLINIC | Age: 75
End: 2025-05-04

## 2025-05-05 ENCOUNTER — OFFICE VISIT (OUTPATIENT)
Dept: HEMATOLOGY/ONCOLOGY | Facility: CLINIC | Age: 75
End: 2025-05-05
Payer: MEDICARE

## 2025-05-05 DIAGNOSIS — E03.9 HYPOTHYROIDISM, UNSPECIFIED TYPE: ICD-10-CM

## 2025-05-05 DIAGNOSIS — C77.1 SECONDARY MALIGNANT NEOPLASM OF INTRATHORACIC LYMPH NODES: ICD-10-CM

## 2025-05-05 DIAGNOSIS — C34.31 ADENOCARCINOMA OF LOWER LOBE OF RIGHT LUNG: Primary | ICD-10-CM

## 2025-05-05 PROCEDURE — 3044F HG A1C LEVEL LT 7.0%: CPT | Mod: CPTII,95,, | Performed by: HOSPITALIST

## 2025-05-05 PROCEDURE — G2211 COMPLEX E/M VISIT ADD ON: HCPCS | Mod: 95,,, | Performed by: HOSPITALIST

## 2025-05-05 PROCEDURE — 98007 SYNCH AUDIO-VIDEO EST HI 40: CPT | Mod: 95,,, | Performed by: HOSPITALIST

## 2025-05-05 RX ORDER — DIPHENHYDRAMINE HYDROCHLORIDE 50 MG/ML
50 INJECTION, SOLUTION INTRAMUSCULAR; INTRAVENOUS ONCE AS NEEDED
Status: CANCELLED | OUTPATIENT
Start: 2025-05-06

## 2025-05-05 RX ORDER — SODIUM CHLORIDE 0.9 % (FLUSH) 0.9 %
10 SYRINGE (ML) INJECTION
Status: CANCELLED | OUTPATIENT
Start: 2025-05-06

## 2025-05-05 RX ORDER — EPINEPHRINE 0.3 MG/.3ML
0.3 INJECTION SUBCUTANEOUS ONCE AS NEEDED
Status: CANCELLED | OUTPATIENT
Start: 2025-05-06

## 2025-05-05 RX ORDER — HEPARIN 100 UNIT/ML
500 SYRINGE INTRAVENOUS
Status: CANCELLED | OUTPATIENT
Start: 2025-05-06

## 2025-05-05 NOTE — PROGRESS NOTES
[facility] MEDICAL ONCOLOGY - FOLLOW UP VISIT    Reason for visit: Follow up visit for adenocarcinoma of the lung    The patient location is: [place] - [place]    Visit type: Audiovisual    Each patient to who is provided medical services by telemedicine has been: (1) informed of the relationship between the physician and patient and the respective role of any other health care provider with respect to management of the patient; and (2) notified that he or she may decline to receive medical services by telemedicine and may withdraw from such care at any time.      Oncology History   Malignant neoplasm of upper-outer quadrant of right breast in female, estrogen receptor positive   4/13/2021 Biopsy    Breast, right, 10:00 5N, biopsy:  - Invasive ductal carcinoma with lobular features     4/13/2021 Breast Tumor Markers    Estrogen Receptor: Positive >90%  Progesterone Receptor: Positive 10-50%  HER2: Negative  Ki67: 10-30%     4/26/2021 Genetic Testing    MyRisk: negative     5/12/2021 Breast Surgery    Right partial mastectomy with SLNB     6/1/2021 Tumor Conference    Radiation options? Offer radiation, can discuss partial vs whole breast radiation.        6/2/2021 Cancer Staged    Staging form: Breast, AJCC 8th Edition  - Pathologic stage from 6/2/2021: Stage IA (pT1b, pN0(sn), cM0, G2, ER+, AL+, HER2-)     7/6/2021 - 7/27/2021 Radiation Therapy    Treatment Summary  Course: C1 Chest 2021  Treatment Site Energy Dose/Fx (Gy) #Fx Total Dose (Gy) Start Date End Date Elapsed Days   Breast_Rt 6X 2.65 16 / 16 42.4 7/6/2021 7/27/2021 21          7/2021 -  Hormone Therapy    Letrozole      Procedure    Bronchoscopy, Station 7 positive for malignancy     Adenocarcinoma of lower lobe of right lung   10/5/2023 Imaging Significant Findings    CT C (obtained after CXR, which was itself obtained for palpitations)  - 2.1 x 1.3 cm spiculated RLL nodule, some spiculation noted to extend  to the pleural margin  - Scattered pulmonary nodules centered mostly subpleural at the RLL (e.g. 0.9 cm)       10/10/2023 Imaging Significant Findings    PET CT  - 2.1 x 1.1 cm RLL nodule, SUV 3.8  - 0.9 cm R axillary LN, SUV 3.9  - 1.2 cm subcarinal LN, SUV 4.6  - 0.7 cm Ln along L posterior shoulder, SUV 1.7  - Multiple additional solid pulmonary nodules through R lung base, too small for uptake detection     11/14/2023 Procedure    Bronch with EBUS  RLL, FNA  - Adenocarcinoma (TTF1 positive, GATA3 negative)    RLL, TBBx  - Adenocarcinoma    Per pulmonology, station 7 node was very vascular without window for biopsy, plan to discuss at thoracic tumor board    [facility] NGS  - KRAS G12A, CHEK1, MLH1, CTNNB1, CIC, PTPRD, NOTCH3, EZH2, LATS1, KMT2D  - Negative: EGFR, ALK, BRAF, KRAS, ROS1, RET, MET, EBB2  - PD-L1 1%       11/22/2023 Tumor Conference    Tumor Board  - Plan for axillary LN biopsy to determine lung vs recurrent breast vs other etiology  - Repeat CT C to evaluate nodules in RLL  - Determine treatment plan based on above results     11/22/2023 Tumor Genotyping    [facility] Liquid Biopsy  - No reportable pathogenic variants found     11/29/2023 Imaging Significant Findings    CT C  - 2.1 x 1.2 cm RLL spiculated nodule, stable in size w/ new central cavitation. Spiculated margins extend towards adjacent pleura.   - Stable irregular 2.0 cm linar opacity in LLL  - Stable 0.3 cm GGO, TRICIA  - Stable R basal sub cm nodules, largest 0.9 cm  - Several stable solid nodules predominantly in RLL, largest 0.9 cm     12/18/2023 Imaging Significant Findings    MRI Brain  - JELENA     1/3/2024 Procedure    IR Guided Biopsy, R Axillary LN  - No metastatic carcinoma (0.3 x 0.3 x 0.1 cm tissue, positive and negative controls stained appropriately)     1/5/2024 Imaging Significant Findings    PET CT  - Stable 2.0 x 1.1 cm RLL nodule (previously 2.1 x 1.1 cm)  - Stable additional nodules w/o significant racer uptake  - 0.8 cm R  axillary node, SUV 4.2 (previously 0.9 cm, SUV 3.9)  - 0.5 cm L posterior shoulder node, SUV 2.3 (previously 0.7 cm, SUV 1.7)  - 1.3 cm subcarinal node, SUV 4.9 (previously 1.2 cm, SUV 4.6)     1/10/2024 Tumor Conference    Tumor Board   Re-sample enlarged, hypermetabolic axillary LN with repeat CT-guided biopsy versus excisional biopsy. If LN is negative for recurrent NSCLC, obtain EBUS of PET-avid mediastinal LN.         1/23/2024 Procedure    IR Guided Biopsy, R Axillary LN (Second Biopsy)  - Negative for metastatic carcinoma     2/8/2024 Procedure    Bronch with EBUS  LN  Positive: Station 7 (Adenocarcinoma)  Negative: Station 11R     2/21/2024 Cancer Staged    Staging form: Lung, AJCC 8th Edition  - Clinical stage from 2/21/2024: Stage IIIA (cT1b, cN2, cM0)     2/21/2024 Tumor Conference    Thoracic Tumor Board  Stage IIIA right lower lobe adenocarcinoma with bulky subcarinal lymphadenopathy.  Proceed with NA chemo/I-O therapy. Return to [facility] for surgical evaluation. If patient is not a surgical candidate following 3 cycles of therapy, proceed with definitive chemo/RT.       2/22/2024 - 4/5/2024 Chemotherapy    Treatment Summary   Plan Name: OP NSCLC NIVOLUMAB 360 MG PLUS CARBOPLATIN (AUC5) PEMETREXED 500 MG/M2 Q3W x 3 CYCLES  Treatment Goal: Control  Status: Inactive  Start Date: 2/22/2024  End Date: 4/5/2024  Provider: Bridger Nichole IV, MD  Chemotherapy: CARBOplatin (PARAPLATIN) 415 mg in sodium chloride 0.9% 326.5 mL chemo infusion, 415 mg, Intravenous, Clinic/HOD 1 time, 3 of 3 cycles  Administration: 415 mg (2/22/2024), 465 mg (4/4/2024), 465 mg (3/14/2024)  PEMEtrexed disodium (ALIMTA) 750 mg in sodium chloride 0.9% SolP 100 mL chemo infusion, 500 mg/m2 = 750 mg, Intravenous, Clinic/HOD 1 time, 3 of 3 cycles  Administration: 750 mg (2/22/2024), 750 mg (4/4/2024), 750 mg (3/14/2024)     4/24/2024 Imaging Significant Findings    CT C/A/P  - 1.2 x 1.0 cm RLL malignancy, previously 2 x 1.2 cm  - Interval  decrease in the previously seen nodules in the RLL  - 0.9 cm subcarinal node, previously 1.7 cm  - Interval decrease in right infrahilar soft tissue  - 2.1 cm LLL solid nodular opacity, unchanged  - 0.8 cm TRICIA ground-glass opacity, unchanged  - AVM again noted in RLL  - 0.6 cm right axillary lymph node, decreased in size from prior  - Left scapular lymph node decreased in size from prior, incompletely seen       5/6/2024 - 5/12/2024 Hospital Admission    Admitted to [facility] for RLLx.  Postoperative course complicated by hemothorax requiring return to the operating room for reexploration on postop day 1.  Chest tube removed on postop day 5.     5/6/2024 Surgery    Right Lower Lobectomy  Right Lower Lobe  - Adenocarcinoma with acinar pattern, 35% residual viable tumor, 1.0 cm in greatest dimension.  Pleural invasion absent, LVI absent, margins negative.  0/1 lymph node    Pericardial Excision  - Negative for tumor    LN  - Positive: Station 7  - Negative: Station 8R (0/1), 9 are (0/2), 2R (0/1), 4R (0/1), 11R (0/1), 12R (0/1), R posterior interlobar LN (0/1)    Path Staging: pT1a, pN2     7/2/2024 -  Chemotherapy    Adjuvant Nivolumab  28 day cycles  Nivolumab 480 mg, D1     7/24/2024 Cancer Staged    Staging form: Lung, AJCC 8th Edition  - Pathologic: Stage IIIA (pT1a, pN2, cM0)     8/7/2024 Imaging Significant Findings    PET CT  - New foci of FDG uptake in right lateral chest wall, likely represents postsurgical changes  - Development of moderate size right pleural effusion  - No evidence of hypermetabolic mediastinal or hilar lymph nodes     11/13/2024 Imaging Significant Findings    CT C  Postsurgical changes after right lower lobectomy  Right pleural effusion decreased in size  Stable 2.2 cm regular nodular opacity of LLL  Stable 0.8 cm ground-glass nodule in TRICIA  Stable 0.3 cm nodule along left major fissure     3/20/2025 Imaging Significant Findings    PET CT  No evidence of local recurrence at resection site  No  discrete lung nodules suspicious for metastases  0.7 cm right supraclavicular lymph node, increased from 0.6 m, SUV 3.6, of uncertain etiology  Stable 2.2 cm LLL part solid nodule, not FDG avid  Stable 0.8 cm ground-glass nodular opacity in TRICIA  Stable small loculated right pleural effusion  No FDG avid evidence of disease elsewhere        - Second opinion, MDA: contralateral LLL lesion stable but plan for sampling of this as well as subcarinal LN and possible additional RLL nodule. Pending results, consider NA --> surgery vs CRT    Oncology History    HPI:     Keo Frias is a 73 y.o. female with pmh significant for HTN, migraine headaches, and multiple malignancies, who presents for follow up of the below lung cancer    1) Stage IA ( [id number], ER 95%, NH 10%, HER2 negative, Ki-67 20%) IDC of the R breast, initially dx'd 4/13/21, s/p lumpectomy and SLNB (5/12/21), XRT (7/6/21-7/27/21), and currently on letrozole (7/2021 - present).     2) Stage IIIA (pT1a, pN2, cM0) adenocarcinoma of the RLL (no targetable mutations, PD-L1 1%), initially dx'd 11/14/23, s/p neoadjuvant carboplatin/pemetrexed/nivolumab (2/22/24 - 4/4/24), right lower lobectomy (5/6/24), and currently on adjuvant nivolumab (7/2/24 - present) who presents to for follow up.     Last clinic 4/7/25, proceed with C11 nivolumab on 4/8/25.  Labs and in-person visit on 5/2/25, C12 nivolumab on 5/6/25.  Follow up at [facility] on 7/17/25.    Interval History:  4/8/25: C11 adjuvant nivolumab    The pt states that she is doing well today.     She notes that she fell in her neighborhood, stating that she stumbled on uneven pavement. She cough herself with her hands, and says that she also bruised her forehead. She said that she for several days, but has since resolved.     Otherwise, she says that she is feeling great.     She describes some pruritus at the nape of her neck, as well as sometimes around her elbow. She denies any obvious rash over this area, and  says that it is improved with lotions.     Pt denies N/V, diarrhea, constipation, new/worsening fatigue, new/worsening SOB, or new/worsening cough.       ROS:   As per HPI.     Physical Exam:       LMP  (LMP Unknown)                Physical Exam  Constitutional:       Appearance: Normal appearance.   HENT:      Head: Normocephalic and atraumatic.   Eyes:      Extraocular Movements: Extraocular movements intact.      Pupils: Pupils are equal, round, and reactive to light.   Neurological:      Mental Status: She is alert and oriented to person, place, and time.   Psychiatric:         Mood and Affect: Mood normal.         Thought Content: Thought content normal.         Judgment: Judgment normal.         Imaging:    See oncologic history above.     Path:  See oncologic history above.      Assessment and Plan:     Keo Frias is a 73 y.o. female with pmh significant for HTN, migraine headaches, and multiple malignancies, who presents for follow up of the below lung cancer    1) Stage IA ( Q3nH7V7, ER 95%, NM 10%, HER2 negative, Ki-67 20%) IDC of the R breast, initially dx'd 4/13/21, s/p lumpectomy and SLNB (5/12/21), XRT (7/6/21-7/27/21), and currently on letrozole (7/2021 - present).     2) Stage IIIA (pT1a, pN2, cM0) adenocarcinoma of the RLL (no targetable mutations, PD-L1 1%), initially dx'd 11/14/23, s/p neoadjuvant carboplatin/pemetrexed/nivolumab (2/22/24 - 4/4/24), right lower lobectomy (5/6/24), and currently on adjuvant nivolumab (7/2/24 - present) who presents to for follow up.     Stage IIIA Adenocarcinoma of RLL, PD-L1 1%, No Actionable Molecular Alterations  ECOG PS 1.  Patient is currently on adjuvant nivolumab (see note from 6/14/24), tolerating without significant issue. Her most recent imaging (PET CT, 3/20/25 , ~8.5 months on adjuvant nivolumab) is without evidence of local recurrence of the resection site, new lung nodules, or change in the 2.2 cm LLL non FDG avid nodule.  Of note is a 0.7 cm right  "supraclavicular lymph node with SUV of 3.6 of uncertain etiology; on discussion with Dr. Don, favor continued monitoring. Low threshold for empiric SBRT to the LLL lesion should there be evidence of change/growth in the future.  Given good tolerance and radiographic response, will plan to treat with nivolumab q4w x 1 year, w/ imaging throughout (scheduled at [facility]).     PLAN:   Proceed w/ C12 nivolumab on 5/6/25 at [place], labs acceptable and treatment signed  Labs and in-person follow up on 5/30/25 (must be in person), C13 nivolumab on 6/3/25  CT C/A/P, labs, and med onc f/u (Dr. Don) at [facility] on 7/31/25      Hypothyroidism  Continue levothyroxine 75 mcg daily, refills sent      Osteoporosis  DEXA from 7/11/24 with osteoporosis of the lumbar spine and hip. Discussed to hold on dental procedures until at least 2 months after prolia injections based on "American Association of Oral [employer]ill[employer] Position Paper on Medication-Related Osteonecrosis of the Jaw - 2014 Update". Pt endorsed her understanding.   Prolia per Dr. Ogden      Right IDC, ER+/PA+/HER2-  S/p lumpectomy with SLNB, radiation therapy, and currently on letrozole and denosumab, tolerating well. Concern for possible R axillary recurrence, workup as below.   Okay to continue letrozole  Okay to continue denosumab as above  Breast oncology team aware      Pruritus  Continue current symptomatic measures: lotion + hydrocortisone.   Patient will report worsening of this issue and prescribe stronger steroid cream and/or antihistamines at that time.     Patient is in agreement with the proposed treatment plan. All questions were answered to the patient's satisfaction. Pt knows to call clinic if anything is needed before the next clinic visit.          Med Onc Chart Routing      Follow up with physician . Labs and in-person follow up on 5/30/25 (must be in person), C13 nivolumab on 6/3/25   Follow up with [facility]    Infusion scheduling note    Injection " scheduling note    Labs    Imaging    Pharmacy appointment    Other referrals

## 2025-05-06 ENCOUNTER — INFUSION (OUTPATIENT)
Dept: INFUSION THERAPY | Facility: HOSPITAL | Age: 75
End: 2025-05-06
Attending: HOSPITALIST
Payer: MEDICARE

## 2025-05-06 ENCOUNTER — TELEPHONE (OUTPATIENT)
Dept: HEMATOLOGY/ONCOLOGY | Facility: CLINIC | Age: 75
End: 2025-05-06
Payer: MEDICARE

## 2025-05-06 VITALS
WEIGHT: 119.06 LBS | BODY MASS INDEX: 21.91 KG/M2 | RESPIRATION RATE: 18 BRPM | TEMPERATURE: 98 F | OXYGEN SATURATION: 97 % | DIASTOLIC BLOOD PRESSURE: 64 MMHG | HEIGHT: 62 IN | HEART RATE: 73 BPM | SYSTOLIC BLOOD PRESSURE: 129 MMHG

## 2025-05-06 DIAGNOSIS — C34.31 ADENOCARCINOMA OF LOWER LOBE OF RIGHT LUNG: Primary | ICD-10-CM

## 2025-05-06 PROCEDURE — 63600175 PHARM REV CODE 636 W HCPCS: Mod: JZ,TB | Performed by: HOSPITALIST

## 2025-05-06 PROCEDURE — 25000003 PHARM REV CODE 250: Performed by: HOSPITALIST

## 2025-05-06 PROCEDURE — 96413 CHEMO IV INFUSION 1 HR: CPT

## 2025-05-06 PROCEDURE — A4216 STERILE WATER/SALINE, 10 ML: HCPCS | Performed by: HOSPITALIST

## 2025-05-06 RX ORDER — DIPHENHYDRAMINE HYDROCHLORIDE 50 MG/ML
50 INJECTION, SOLUTION INTRAMUSCULAR; INTRAVENOUS ONCE AS NEEDED
Status: DISCONTINUED | OUTPATIENT
Start: 2025-05-06 | End: 2025-05-06 | Stop reason: HOSPADM

## 2025-05-06 RX ORDER — SODIUM CHLORIDE 0.9 % (FLUSH) 0.9 %
10 SYRINGE (ML) INJECTION
Status: DISCONTINUED | OUTPATIENT
Start: 2025-05-06 | End: 2025-05-06 | Stop reason: HOSPADM

## 2025-05-06 RX ORDER — EPINEPHRINE 0.3 MG/.3ML
0.3 INJECTION SUBCUTANEOUS ONCE AS NEEDED
Status: DISCONTINUED | OUTPATIENT
Start: 2025-05-06 | End: 2025-05-06 | Stop reason: HOSPADM

## 2025-05-06 RX ADMIN — Medication 10 ML: at 12:05

## 2025-05-06 RX ADMIN — SODIUM CHLORIDE 480 MG: 9 INJECTION, SOLUTION INTRAVENOUS at 12:05

## 2025-05-06 NOTE — TELEPHONE ENCOUNTER
Spoke with pt and scheduled in person appt on 5/30/25 per provider with labs prior. Patient agreed to appt dates/times.

## 2025-05-08 ENCOUNTER — PATIENT OUTREACH (OUTPATIENT)
Dept: INTERNAL MEDICINE | Facility: CLINIC | Age: 75
End: 2025-05-08
Payer: MEDICARE

## 2025-05-08 ENCOUNTER — CLINICAL SUPPORT (OUTPATIENT)
Dept: REHABILITATION | Facility: HOSPITAL | Age: 75
End: 2025-05-08
Payer: MEDICARE

## 2025-05-08 VITALS — SYSTOLIC BLOOD PRESSURE: 129 MMHG | DIASTOLIC BLOOD PRESSURE: 64 MMHG

## 2025-05-08 DIAGNOSIS — G89.29 CHRONIC RIGHT SHOULDER PAIN: ICD-10-CM

## 2025-05-08 DIAGNOSIS — M25.611 DECREASED RANGE OF MOTION OF RIGHT SHOULDER: Primary | ICD-10-CM

## 2025-05-08 DIAGNOSIS — M25.511 CHRONIC RIGHT SHOULDER PAIN: ICD-10-CM

## 2025-05-08 PROCEDURE — 97112 NEUROMUSCULAR REEDUCATION: CPT

## 2025-05-08 PROCEDURE — 97140 MANUAL THERAPY 1/> REGIONS: CPT

## 2025-05-08 PROCEDURE — 97110 THERAPEUTIC EXERCISES: CPT

## 2025-05-08 NOTE — PROGRESS NOTES
"[facility]   Physical Therapy Treatment/Progress Note      Name: Keo Frias  Clinic Number: [id number]    Therapy Diagnosis:   Encounter Diagnoses   Name Primary?    Decreased range of motion of right shoulder Yes    Chronic right shoulder pain      Physician: Osvaldo Costa MD    Visit Date: 5/8/2025    Physician Orders: PT Eval and Treat   Medical Diagnosis from Referral: M75.01 (ICD-10-CM) - Adhesive capsulitis of right shoulder   Evaluation Date: 12/4/2024  Authorization Period Expiration: 12/03/2025  Plan of Care Expiration: 06/04/2025  Progress Note Due: 5/13/2025  Visit # / Visits authorized: 15/28  FOTO: 1/3     Precautions: Standard,     PTA Visit #: 0/5     Time In: 1000  Time Out: 1059  Total Billable Time: 59 minutes    Subjective     Pt reports: She feels like she can move her shoulder a little better than last visit   She was compliant with home exercise program.  Response to previous treatment: improved range of motion  Functional change: ongoing    Pain: 2/10  Location: right shoulder      Objective      Objective measures will be updated at progress report unless specified     Treatment     Keo received the treatments listed below:      therapeutic exercises to develop strength, endurance, ROM, flexibility, posture, and core stabilization for 26 minutes including:  CC pulldowns seated 3x10   UBE 5' fwd/5' back for ROM and cardiovascular endurance  Sidelying ER 3x10 2# R only  ER ROM on wall 3x12 2" holds  Standing ER/IR isometric holds to 90 RTB 2x10 each     manual therapy techniques: Joint mobilizations, Manual traction, Myofacial release, Manual Lymphatic Drainage, Soft tissue Mobilization, and Friction Massage were applied to the: R shoulder for 16 minutes, including:  PROM shoulder flexion and external rotation   Shoulder flexion MET  Shoulder ER MET  Lateral shoulder distraction    Cervical side glides (C5-6)    Not today:   Inferior GH glides; grades " "III-IV  Posterior GH glides; grades III-IV  CT junction gapping  PA thoracic mobilizations     neuromuscular re-education activities to improve: Balance, Coordination, Kinesthetic, Sense, Proprioception, and Posture for 17 minutes. The following activities were included:  Open books 20x each side   No money isometrics 30 degrees YTB 3x10 3" holds   Wall slides w/ YTB 2x15   Shoulder flex/ext perturbations 5x30" holds at 90 and end range  Shoulder ER/IR by side perturbations 5x30" holds     Not today:   Seated thoracic extension 30x 2" holds    therapeutic activities to improve functional performance for 0  minutes, including:      Patient Education and Home Exercises       Education provided:   - continuing HEP    Written Home Exercises Provided: YES. Exercises were reviewed and Keo was able to demonstrate them prior to the end of the session.  Keo demonstrated good understanding of the education provided. See EMR under Patient Instructions for exercises provided during therapy sessions    Assessment     Reported today with better PROM compared to last session in flexion (140), abduction(140), and ER (10). All planes of motion improved after manual therapy especially after METs and perturbations: flexion/abduction (150) and ER (20). Continued to focus on more closed chain strengthening and cuff activation overhead. Will add more rotator cuff strengthening exercises in closed chain as well. Reinforced importance of completing HEP since we are moving to once every two weeks. She is continuing to improve functionally as well. Will continue to progress as tolerated.     Keo Is progressing well towards her goals.   Pt prognosis is Fair.     Pt will continue to benefit from skilled outpatient physical therapy to address the deficits listed in the problem list box on initial evaluation, provide pt/family education and to maximize pt's level of independence in the home and community environment.     Pt's spiritual, " cultural and educational needs considered and pt agreeable to plan of care and goals.     Anticipated barriers to physical therapy: cancer treatment     GOALS:   Short Term Goals: 3 months MET as of 3/13/2025  1.Report decreased R shoulder pain < / =  3/10  to increase tolerance for ADL's  2. Increase PROM by 30 degrees in all planes    3. Increased strength by 1/3 MMT grade in R shoulder to increase tolerance for ADL and work activities.  4. Pt to tolerate HEP to improve ROM and independence with ADL's     Long Term Goals: 6 months (progressing, not met)  1.Report decreased R shoulder pain  < / =  1 /10  to increase tolerance for reaching overhead   2.Increase AROM to within 90% of the L shoulder  3.Increase strength to >/= 4/5 in R shoulder to increase tolerance for ADL and work activities.  4. Pt goal: to reach overhead and complete ADLs without pain   5. Pt will have improved gcode of CJ (20-40% limited) on FOTO shoulder in order to demonstrate true functional improvement.     Plan     Plan of care Certification: 12/4/2024 to 06/04/2025.     Continue POC. Increase ROM    Aristeo Patton PT, DPT

## 2025-05-11 PROBLEM — R73.03 PREDIABETES: Status: ACTIVE | Noted: 2025-05-11

## 2025-05-11 NOTE — PROGRESS NOTES
Office Visit    Patient Name: Keo Frias    : 1950  MRN: [id number]    Subjective:  Keo is a 74 y.o. female who presents today for:    Follow-up (6m)      Most recently seen by me 24 for VV for follow up and prior annual physical 24.     3/20/25 PET CT:   There are no PET/CT findings of local recurrence of resected right lower lobe lung malignancy. There are no lung nodules suspicious for metastases. There is a right supraclavicular lymph node that has increased slightly in size in the interval and has low-grade increased FDG uptake and is of uncertain etiology. There are no distant-extrathoracic systemic metastases.   There is a part-solid nodule in the left lower lobes suspicious for a primary lung malignancy that is unchanged in size. A small ground glass nodular opacity in the left upper lobe with the differential including atypical adenomatous hyperplasia and adenocarcinoma in situ is unchanged.     Heme/Onc Dr Llamas Most recently 25 :   Proceed w/ C12 nivolumab on 25 at [facility], labs acceptable and treatment signed  Labs and in-person follow up on 25 (must be in person), C13 nivolumab on 6/3/25  CT C/A/P, labs, and med onc f/u (Dr. Don) at [facility] on 25    Sports Med Dr Costa 12/3/24 for R shoulder Adhesive Capsulitis: PT, OTC Voltaren gel and RTC prn.     Cardiology Dr Bird 24: continue Toprol XL 50(1/2 tab BID) & Norvasc 5 around noon.     24 Psych Dr Green- doing well of meds    74-year-old female patient of mine with longstanding history of hypertension and difficulties with labile blood pressure readings, osteoporosis, generalized anxiety, Tx for R breast cancer--s/p lumpectomy & currently on Letrozole, currently receiving Chemotherapy [place] for adenocarcinoma of the Lower Lobe of the R lung(s/p RLL lobectomy 24 at [facility] w/ evacuation of post op hematoma 24) who presents today for annual physical with lab review and monitoring of chronic  conditions.  Has a history of right ear cholesteatoma that she needs to follow-up with ENT for.     Labs drawn prior to visit on 5/3/25 with normal vitamin levels including Vit D 77 and electrolytes/Liver/ kidney function.  TSH 4.7/ Free T4 1.2 on Synthroid 75 mcg daily.   Essentially normal CBC except for slight %Eosinophil elevation to 10%  / normal lipid panel  A1c 5.6-->5.9    Blood pressures about 120-140/ 70s  No orthostatic symptoms.   No shortness of breath or significant chest pain. Intermittent tugging/pulling.      Mot recent PROLIA 8/12/24-- no issues.  Is 3 months overdue for her next PROLIA--delayed due to dental work    Mood stable off Lexapro 5-- no anxiety or depression concerns.      ACUTE COMPLAINTS? None     GENERAL LIFESTYLE HABITS:   DIET: eats a healthy well balanced diet but doesn't limit carbs and does eat fried foods-- German fried snacks regularly. Drinks plenty of water and eats yogurt daily. No sugary beverages  EXERCISE: Walking about 4-6 miles most days-- 1 hour in AM and another walk in the afternoon/evening. Physical therapy for R shoulder pain  SLEEP: no concerns and feels rested  WEIGHT:  Recently stable at BMI of 21    HEALTH MAINTENANCE:   Next DEXA due 7/2026.   Next  MAMMOGRAM DUE 7/11/2025- ordered  Colonoscopy 12/7/18- repeat for screening purposes not advised  Immunizations all completed/ UTD except for seasonal COVID and TDaP last given 3/17/17        Eye/Dental Exams: Eye Exam Dr Hernadez 1/6/25- repeat 1 year (monitoring bilateral cataracts), dental work recently completed    PAST MEDICAL HISTORY, SURGICAL/SOCIAL/FAMILY HISTORY REVIEWED AS PER CHART, WITH PERTINENT FINDINGS INCLUDED IN HISTORY SECTION OF NOTE.     Current Medications    Medication List with Changes/Refills   New Medications    METOPROLOL SUCCINATE (TOPROL-XL) 25 MG 24 HR TABLET    Take 1 tablet (25 mg total) by mouth 2 (two) times daily.   Current Medications    CALCIUM CARBONATE (CALCIUM 500 ORAL)     Take 1 tablet by mouth.    CHOLECALCIFEROL, VITAMIN D3, 125 MCG (5,000 UNIT) CAPSULE    Take 1 capsule (5,000 Units total) by mouth daily with breakfast.    FAMOTIDINE (PEPCID) 10 MG TABLET    Take 10 mg by mouth as needed.    LETROZOLE (FEMARA) 2.5 MG TAB    TAKE 1 TABLET BY MOUTH EVERY DAY    LEVOTHYROXINE (SYNTHROID) 75 MCG TABLET    Take 1 tablet (75 mcg total) by mouth before breakfast.    MULTIVITAMIN-IRON-FOLIC ACID TAB    Take 1 tablet by mouth once daily.    PROLIA 60 MG/ML SYRG       Changed and/or Refilled Medications    Modified Medication Previous Medication    AMLODIPINE (NORVASC) 5 MG TABLET amLODIPine (NORVASC) 5 MG tablet       Take 1 tablet (5 mg total) by mouth once daily.    Take 1 tablet (5 mg total) by mouth once daily.   Discontinued Medications    METOPROLOL SUCCINATE (TOPROL-XL) 50 MG 24 HR TABLET    Take 1 tablet (50 mg total) by mouth once daily.       Allergies   Review of patient's allergies indicates:   Allergen Reactions    Sinus allergy Other (See Comments)     Headaches, migranes    Buspar [buspirone] Other (See Comments)     Pt isnt allergic      Sulfa (sulfonamide antibiotics) Rash         Review of Systems (Pertinent positives)  Review of Systems   Constitutional:  Negative for unexpected weight change.   HENT:  Positive for congestion. Negative for ear discharge, ear pain and trouble swallowing.    Eyes:  Negative for visual disturbance.   Respiratory:  Negative for shortness of breath.    Cardiovascular:  Negative for chest pain and palpitations.   Gastrointestinal:  Negative for constipation and diarrhea.   Genitourinary:  Negative for difficulty urinating.   Musculoskeletal:  Positive for arthralgias (R shoulder). Negative for neck pain.   Skin:  Negative for rash.   Allergic/Immunologic: Positive for environmental allergies.   Neurological:  Negative for headaches.   Psychiatric/Behavioral:  Negative for dysphoric mood and sleep disturbance. The patient is not  "nervous/anxious.        /76 (BP Location: Left arm, Patient Position: Sitting)   Pulse 73   Temp 97.8 °F (36.6 °C)   Ht 5' 2" (1.575 m)   Wt 54.1 kg (119 lb 4.3 oz)   LMP  (LMP Unknown)   SpO2 98%   BMI 21.81 kg/m²     Physical Exam  Vitals reviewed.   Constitutional:       General: She is not in acute distress.     Appearance: Normal appearance. She is well-developed.   HENT:      Head: Normocephalic and atraumatic.      Right Ear: Tympanic membrane is perforated. Tympanic membrane is not erythematous or bulging.      Left Ear: Tympanic membrane and ear canal normal. Tympanic membrane is not erythematous or bulging.      Nose: Congestion and rhinorrhea (clear) present.      Mouth/Throat:      Mouth: Mucous membranes are moist.      Pharynx: No oropharyngeal exudate or posterior oropharyngeal erythema.   Eyes:      Extraocular Movements: Extraocular movements intact.      Conjunctiva/sclera: Conjunctivae normal.   Neck:      Thyroid: No thyroid mass or thyromegaly.      Vascular: No carotid bruit.   Cardiovascular:      Rate and Rhythm: Normal rate and regular rhythm.      Pulses:           Dorsalis pedis pulses are 2+ on the right side and 2+ on the left side.      Heart sounds: Normal heart sounds. No murmur heard.  Pulmonary:      Effort: Pulmonary effort is normal. No respiratory distress.      Breath sounds: Normal breath sounds.   Abdominal:      General: Bowel sounds are normal. There is no distension.      Palpations: Abdomen is soft. There is no mass.      Tenderness: There is no abdominal tenderness.   Musculoskeletal:         General: Normal range of motion.      Right lower leg: No edema.      Left lower leg: No edema.   Lymphadenopathy:      Cervical: No cervical adenopathy.   Skin:     General: Skin is warm and dry.      Findings: No rash.   Neurological:      General: No focal deficit present.      Mental Status: She is alert and oriented to person, place, and time.   Psychiatric:         " Mood and Affect: Mood normal.         Behavior: Behavior normal.           Assessment/Plan:  Keo Frias is a 74 y.o. female who presents today for :        ICD-10-CM ICD-9-CM    1. Routine general medical examination at a health care facility  Z00.00 V70.0       2. BMI 21.0-21.9, adult  Z68.21 V85.1       3. Prediabetes  R73.03 790.29 Hemoglobin A1C      Comprehensive Metabolic Panel      Lipid Panel      TSH      4. Benign essential hypertension  I10 401.1 Hemoglobin A1C      Comprehensive Metabolic Panel      Lipid Panel      TSH      metoprolol succinate (TOPROL-XL) 25 MG 24 hr tablet      amLODIPine (NORVASC) 5 MG tablet      5. Hypothyroidism (acquired)  E03.9 244.9 TSH      6. Age-related osteoporosis without current pathological fracture  M81.0 733.01       7. Vitamin D deficiency  E55.9 268.9       8. Adenocarcinoma of lower lobe of right lung  C34.31 162.5       9. Status post lobectomy of lung  Z90.2 V45.89       10. Malignant neoplasm of upper-outer quadrant of right breast in female, estrogen receptor positive  C50.411 174.4     Z17.0 V86.0       11. Aromatase inhibitor use  Z79.811 V07.52       12. Screening mammogram for breast cancer  Z12.31 V76.12 Mammo Digital Screening Bilat w/ Jose (XPD)      13. Chronic right shoulder pain  M25.511 719.41     G89.29 338.29       14. Cholesteatoma of right ear  H71.91 385.30 Ambulatory referral/consult to ENT        ADVISED ON DIET/EXERCISE/SLEEP, ROUTINE EYE/DENTAL EXAMS, AND THE IMPORTANCE OF KEEPING UP WITH APPROPRIATE SCREENING TESTS BASED ON AGE AND RISK FACTORS.  HEALTH MAINTENANCE REVIEWED: UTD except for seasonal COVID Vaccine.  Next Mammogram ordered for 7/2025 & DEXA will be due 7/2026    ADENOCARCINOMA OF THE LUNG STATUS POST RIGHT LOWER LOBE LOBECTOMY:  following with local Heme/Onc and ricci/ MD Barreto    PLAN PER HEME/ONC:   Proceed w/ C12 nivolumab on 5/6/25 at [place], labs acceptable and treatment signed  Labs and in-person follow up on 5/30/25  (must be in person), C13 nivolumab on 6/3/25  CT C/A/P, labs, and med onc f/u (Dr. Don) at [facility] on 7/31/25     RIGHT BREAST CANCER, CHRONIC AROMATASE USE:  Mammogram 7/11/24- Repeat 1 year, continue letrozole.     POSTMENOPAUSAL OSTEOPOROSIS, HISTORY OF VITAMIN-D DEFICIENCY: Q six-month Prolia- WAS due 2/2025-orders re-signed and now rescheduled for later this month  Vitamin D WNL on supplementation.   Continue calcium, vitamin-D, exercise as tolerated, updated DEXA scan 7/11/24: stable moderate osteopenia of the hips and stable severe osteoporosis of the lumbar spine.      HYPOTHYROIDISM:  No prior history of hypothyroidism-- complication of chemotherapy.  She is now on Synthroid with improvement and labs followed by heme/onc. On synthroid 75 mcg w/ TSH 4+ range/ normal Free T4- ongoing monitoring    HYPERTENSION: has had some issue with labile BPs.  Currently overall doing well on Toprol-XL 50 mg 1/2 tablet twice a day with amlodipine 5 mg daily around lunch--happy with this schedule. No orthostatic symptoms.  Continue current regimen with ongoing monitoring.      PREDIABETES: A1c w/ recent increase from 5.6-->5.9. Advised low carb diet, increased strength training and recheck in 6 months    CHRONIC RIGHT SHOULDER PAIN: Adhesive Capsulitis, improving with regular PT      There are no Patient Instructions on file for this visit.      Follow up in about 6 months (around 11/13/2025) for to follow up on lab results, return as needed for new concerns.

## 2025-05-12 ENCOUNTER — TELEPHONE (OUTPATIENT)
Dept: INTERNAL MEDICINE | Facility: CLINIC | Age: 75
End: 2025-05-12
Payer: MEDICARE

## 2025-05-13 ENCOUNTER — TELEPHONE (OUTPATIENT)
Dept: INFUSION THERAPY | Facility: HOSPITAL | Age: 75
End: 2025-05-13
Payer: MEDICARE

## 2025-05-13 ENCOUNTER — OFFICE VISIT (OUTPATIENT)
Dept: INTERNAL MEDICINE | Facility: CLINIC | Age: 75
End: 2025-05-13
Payer: MEDICARE

## 2025-05-13 VITALS
TEMPERATURE: 98 F | OXYGEN SATURATION: 98 % | HEART RATE: 73 BPM | HEIGHT: 62 IN | DIASTOLIC BLOOD PRESSURE: 76 MMHG | WEIGHT: 119.25 LBS | SYSTOLIC BLOOD PRESSURE: 126 MMHG | BODY MASS INDEX: 21.94 KG/M2

## 2025-05-13 DIAGNOSIS — Z79.811 AROMATASE INHIBITOR USE: ICD-10-CM

## 2025-05-13 DIAGNOSIS — I10 BENIGN ESSENTIAL HYPERTENSION: ICD-10-CM

## 2025-05-13 DIAGNOSIS — M25.511 CHRONIC RIGHT SHOULDER PAIN: ICD-10-CM

## 2025-05-13 DIAGNOSIS — C34.31 ADENOCARCINOMA OF LOWER LOBE OF RIGHT LUNG: ICD-10-CM

## 2025-05-13 DIAGNOSIS — Z17.0 MALIGNANT NEOPLASM OF UPPER-OUTER QUADRANT OF RIGHT BREAST IN FEMALE, ESTROGEN RECEPTOR POSITIVE: ICD-10-CM

## 2025-05-13 DIAGNOSIS — M81.0 AGE-RELATED OSTEOPOROSIS WITHOUT CURRENT PATHOLOGICAL FRACTURE: ICD-10-CM

## 2025-05-13 DIAGNOSIS — E03.9 HYPOTHYROIDISM (ACQUIRED): ICD-10-CM

## 2025-05-13 DIAGNOSIS — C50.411 MALIGNANT NEOPLASM OF UPPER-OUTER QUADRANT OF RIGHT BREAST IN FEMALE, ESTROGEN RECEPTOR POSITIVE: ICD-10-CM

## 2025-05-13 DIAGNOSIS — Z00.00 ROUTINE GENERAL MEDICAL EXAMINATION AT A HEALTH CARE FACILITY: Primary | ICD-10-CM

## 2025-05-13 DIAGNOSIS — Z90.2 STATUS POST LOBECTOMY OF LUNG: ICD-10-CM

## 2025-05-13 DIAGNOSIS — H71.91 CHOLESTEATOMA OF RIGHT EAR: ICD-10-CM

## 2025-05-13 DIAGNOSIS — R73.03 PREDIABETES: ICD-10-CM

## 2025-05-13 DIAGNOSIS — G89.29 CHRONIC RIGHT SHOULDER PAIN: ICD-10-CM

## 2025-05-13 DIAGNOSIS — Z12.31 SCREENING MAMMOGRAM FOR BREAST CANCER: ICD-10-CM

## 2025-05-13 DIAGNOSIS — E55.9 VITAMIN D DEFICIENCY: ICD-10-CM

## 2025-05-13 PROCEDURE — 99999 PR PBB SHADOW E&M-EST. PATIENT-LVL IV: CPT | Mod: PBBFAC,,, | Performed by: FAMILY MEDICINE

## 2025-05-13 RX ORDER — METOPROLOL SUCCINATE 25 MG/1
25 TABLET, EXTENDED RELEASE ORAL 2 TIMES DAILY
Qty: 180 TABLET | Refills: 4 | Status: SHIPPED | OUTPATIENT
Start: 2025-05-13

## 2025-05-13 RX ORDER — AMLODIPINE BESYLATE 5 MG/1
5 TABLET ORAL DAILY
Qty: 90 TABLET | Refills: 3 | Status: SHIPPED | OUTPATIENT
Start: 2025-05-13

## 2025-05-19 ENCOUNTER — TELEPHONE (OUTPATIENT)
Dept: OTOLARYNGOLOGY | Facility: CLINIC | Age: 75
End: 2025-05-19
Payer: MEDICARE

## 2025-05-20 ENCOUNTER — TELEPHONE (OUTPATIENT)
Dept: OTOLARYNGOLOGY | Facility: CLINIC | Age: 75
End: 2025-05-20
Payer: MEDICARE

## 2025-05-20 NOTE — TELEPHONE ENCOUNTER
Called patient to reschedule appointment, appointment scheduled, patient ok with new date and time.

## 2025-05-22 ENCOUNTER — CLINICAL SUPPORT (OUTPATIENT)
Dept: REHABILITATION | Facility: HOSPITAL | Age: 75
End: 2025-05-22
Payer: MEDICARE

## 2025-05-22 DIAGNOSIS — M25.611 DECREASED RANGE OF MOTION OF RIGHT SHOULDER: Primary | ICD-10-CM

## 2025-05-22 DIAGNOSIS — M25.511 CHRONIC RIGHT SHOULDER PAIN: ICD-10-CM

## 2025-05-22 DIAGNOSIS — G89.29 CHRONIC RIGHT SHOULDER PAIN: ICD-10-CM

## 2025-05-22 PROCEDURE — 97112 NEUROMUSCULAR REEDUCATION: CPT

## 2025-05-22 PROCEDURE — 97164 PT RE-EVAL EST PLAN CARE: CPT

## 2025-05-22 NOTE — PROGRESS NOTES
[facility]   Physical Therapy Treatment/Progress Note      Name: Keo Frias  Clinic Number: [id number]    Therapy Diagnosis:   Encounter Diagnoses   Name Primary?    Decreased range of motion of right shoulder Yes    Chronic right shoulder pain        Physician: Osvaldo Costa MD    Visit Date: 5/22/2025    Physician Orders: PT Eval and Treat   Medical Diagnosis from Referral: M75.01 (ICD-10-CM) - Adhesive capsulitis of right shoulder   Evaluation Date: 12/4/2024  Authorization Period Expiration: 12/03/2025  Plan of Care Expiration: 11/22/2025  Progress Note Due: 06/222/20225  Visit # / Visits authorized: 16/28  FOTO: 1/3     Precautions: Standard,     PTA Visit #: 0/5     Time In: 1500  Time Out: 1600  Total Billable Time: 30 minutes    Subjective     Pt reports: She no longer has pain at night, and she only has pain at the end ranges of motion now. She is able to lift her arm higher than before, but she wants to be able to lift her arm a little higher.   She was compliant with home exercise program.  Response to previous treatment: improved range of motion  Functional change: ongoing    Pain: 1/10  Location: right shoulder      Objective      Observation: 73 y/o alert and oriented      Posture: forward shoulder rounding      Passive Range of Motion:   Shoulder Right Left   Flexion 145 165   Abduction 140 170   ER at 0 40 80   ER at 90 60 90   IR 20 90      Active Range of Motion:   Shoulder Right Left   Flexion 145 165   Abduction 140 170   ER at 0 35 80   ER at 90 30 90   IR at 90 40  90   Reach behind head T2 T2   Reach behind back  L3 T7      Strength:  Shoulder Right Left   Flexion 4/5 5/5   Abduction 4-/5 5/5   ER 4-/5 5/5   IR 5/5 5/5   Serratus Anterior 4-/5 4+/5         Joint Mobility: inferior capsule still limited      Palpation: no TTP     Sensation: intact         Intake Outcome Measure for FOTO Shoulder Survey     Therapist reviewed FOTO scores for Keo Frias on  "3/13/2025  FOTO documents entered into [facility] - see Media section.     Intake Score: 53%           Treatment     Keo received the treatments listed below:      therapeutic exercises to develop strength, endurance, ROM, flexibility, posture, and core stabilization for 30 minutes including:  CC pulldowns seated 3x10   UBE 4' fwd/4' back for ROM and cardiovascular endurance  Sidelying ER 3x10 2# R only  ER ROM on wall 3x12 2" holds  Egyptians ER/IR RTB 2x10 each   ROM and strength testing     manual therapy techniques: Joint mobilizations, Manual traction, Myofacial release, Manual Lymphatic Drainage, Soft tissue Mobilization, and Friction Massage were applied to the: R shoulder for 12 minutes, including:  PROM shoulder flexion and external rotation   Shoulder flexion MET  Shoulder ER MET  Lateral shoulder distraction        Not today:   Cervical side glides (C5-6)  Inferior GH glides; grades III-IV  Posterior GH glides; grades III-IV  CT junction gapping  PA thoracic mobilizations     neuromuscular re-education activities to improve: Balance, Coordination, Kinesthetic, Sense, Proprioception, and Posture for 18 minutes. The following activities were included:  No money isometrics 30 degrees RTB 3x10 3" holds   Wall slides w/ YTB 2x15   Shoulder flex/ext perturbations 5x30" holds at 90 and end range  Shoulder ER/IR by side perturbations 5x30" holds   Seated thoracic extension 30x 2" holds    Not today:   Open books 20x each side     therapeutic activities to improve functional performance for 0  minutes, including:      Patient Education and Home Exercises       Education provided:   - continuing HEP    Written Home Exercises Provided: YES. Exercises were reviewed and Keo was able to demonstrate them prior to the end of the session.  Keo demonstrated good understanding of the education provided. See EMR under Patient Instructions for exercises provided during therapy sessions    Assessment     The patient was " reassessed today for an updated plan of care, and she has shown an increase in strength, PROM, and AROM since beginning therapy. She continues to respond well especially to METs and perturbations reinforced with closed chain exercises. She continues to respond well to skilled services once every 2 weeks, and she will continue to benefit from therapy as she progresses through the last two stages of adhesive capsulitis. She needs to allow the disease to heal over time while using skilled services to continue increasing strength and ROM as she decreases with pain. She will continue to progress as tolerated.     Keo Is progressing well towards her goals.   Pt prognosis is Fair.     Pt will continue to benefit from skilled outpatient physical therapy to address the deficits listed in the problem list box on initial evaluation, provide pt/family education and to maximize pt's level of independence in the home and community environment.     Pt's spiritual, cultural and educational needs considered and pt agreeable to plan of care and goals.     Anticipated barriers to physical therapy: cancer treatment     GOALS:   Short Term Goals: 3 months MET as of 3/13/2025  1.Report decreased R shoulder pain < / =  3/10  to increase tolerance for ADL's  2. Increase PROM by 30 degrees in all planes    3. Increased strength by 1/3 MMT grade in R shoulder to increase tolerance for ADL and work activities.  4. Pt to tolerate HEP to improve ROM and independence with ADL's    Active       Long Term Goals       1.Report decreased R shoulder pain  < / =  1 /10  to increase tolerance for reaching overhead         Start:  05/25/25    Expected End:  11/22/25            2.Increase AROM to within 90% of the L shoulder        Start:  05/25/25    Expected End:  11/22/25            3.Increase strength to >/= 4/5 in R shoulder to increase tolerance for ADL and work activities.        Start:  05/25/25    Expected End:  11/22/25            4. Pt goal:  to reach overhead and complete ADLs without pain         Start:  05/25/25    Expected End:  11/22/25            5. Pt will have improved gcode of CJ (20-40% limited) on FOTO shoulder in order to demonstrate true functional improvement.         Start:  05/25/25    Expected End:  11/22/25                  Plan     Plan of care Certification: 5/22/2025-11/22/2025.     Outpatient Physical Therapy 1 times biweekly for 6 months to include the following interventions: manual therapy, therapeutic exercise, therapeutic activities, and neuromuscular re-education.    Aristeo Patton PT, DPT

## 2025-05-27 ENCOUNTER — OFFICE VISIT (OUTPATIENT)
Dept: SPORTS MEDICINE | Facility: CLINIC | Age: 75
End: 2025-05-27
Payer: MEDICARE

## 2025-05-27 ENCOUNTER — INFUSION (OUTPATIENT)
Dept: INFUSION THERAPY | Facility: HOSPITAL | Age: 75
End: 2025-05-27
Attending: FAMILY MEDICINE
Payer: MEDICARE

## 2025-05-27 VITALS
HEIGHT: 62 IN | WEIGHT: 118.25 LBS | DIASTOLIC BLOOD PRESSURE: 89 MMHG | SYSTOLIC BLOOD PRESSURE: 150 MMHG | BODY MASS INDEX: 21.76 KG/M2 | HEART RATE: 142 BPM

## 2025-05-27 VITALS
HEART RATE: 69 BPM | DIASTOLIC BLOOD PRESSURE: 58 MMHG | TEMPERATURE: 97 F | OXYGEN SATURATION: 99 % | RESPIRATION RATE: 16 BRPM | SYSTOLIC BLOOD PRESSURE: 124 MMHG

## 2025-05-27 DIAGNOSIS — M81.0 AGE-RELATED OSTEOPOROSIS WITHOUT CURRENT PATHOLOGICAL FRACTURE: Primary | ICD-10-CM

## 2025-05-27 DIAGNOSIS — M75.01 ADHESIVE CAPSULITIS OF RIGHT SHOULDER: Primary | ICD-10-CM

## 2025-05-27 PROCEDURE — 3044F HG A1C LEVEL LT 7.0%: CPT | Mod: CPTII,S$GLB,, | Performed by: ORTHOPAEDIC SURGERY

## 2025-05-27 PROCEDURE — 99999 PR PBB SHADOW E&M-EST. PATIENT-LVL III: CPT | Mod: PBBFAC,,, | Performed by: ORTHOPAEDIC SURGERY

## 2025-05-27 PROCEDURE — 99214 OFFICE O/P EST MOD 30 MIN: CPT | Mod: S$GLB,,, | Performed by: ORTHOPAEDIC SURGERY

## 2025-05-27 PROCEDURE — 3288F FALL RISK ASSESSMENT DOCD: CPT | Mod: CPTII,S$GLB,, | Performed by: ORTHOPAEDIC SURGERY

## 2025-05-27 PROCEDURE — 1101F PT FALLS ASSESS-DOCD LE1/YR: CPT | Mod: CPTII,S$GLB,, | Performed by: ORTHOPAEDIC SURGERY

## 2025-05-27 PROCEDURE — 3079F DIAST BP 80-89 MM HG: CPT | Mod: CPTII,S$GLB,, | Performed by: ORTHOPAEDIC SURGERY

## 2025-05-27 PROCEDURE — 1126F AMNT PAIN NOTED NONE PRSNT: CPT | Mod: CPTII,S$GLB,, | Performed by: ORTHOPAEDIC SURGERY

## 2025-05-27 PROCEDURE — 3077F SYST BP >= 140 MM HG: CPT | Mod: CPTII,S$GLB,, | Performed by: ORTHOPAEDIC SURGERY

## 2025-05-27 PROCEDURE — 96372 THER/PROPH/DIAG INJ SC/IM: CPT

## 2025-05-27 PROCEDURE — 63600175 PHARM REV CODE 636 W HCPCS: Mod: JZ,TB | Performed by: FAMILY MEDICINE

## 2025-05-27 PROCEDURE — 1159F MED LIST DOCD IN RCRD: CPT | Mod: CPTII,S$GLB,, | Performed by: ORTHOPAEDIC SURGERY

## 2025-05-27 RX ADMIN — DENOSUMAB 60 MG: 60 INJECTION SUBCUTANEOUS at 09:05

## 2025-05-27 NOTE — NURSING
Patient tolerated Prolia injection to left arm well today. Denies any upcoming dental work/jaw pain. States taking Calcium and Vit D supplements. Plan to rtc 6/3rd for opdivo infusion. Calendar given and reviewed with patient. NAD noted upon discharge. Discharged home, ambulated independently.

## 2025-05-27 NOTE — PROGRESS NOTES
CC: RIGHT shoulder pain      74 y.o. female returns for follow up evaluation of her right shoulder. She has been participating in PT. However, she has only been attending once every other week. She reports an mild-moderate improvement in her pain. She states she is still on her immunotherapy but this may be ending soon. She states her oncologist would like for her to avoid a CSI until after immunotherapy        HPI (12/3/2025): Pt returns to clinic for MRI review of her Right shoulder. At her previous appt, we had discussed a CSI upon MRI confirmation of adhesive capsulitis. After discussing with her oncologist, she would like to avoid this while still on her immunotherapy course. She has not started PT, but would like to.    Initial hx (11/21/24):   74 y.o. Female with a 3 month  history of right shoulder atraumatic pain. She has a hx of right-sided breast and lung cancer and her most recent sx was on May 7th 2024. Following this surgery she was limited in her R shoulder motion for 3 months. She began PT at [facility] Aug 13th and completed her 6 week post-op therapy. She reports onset of pain and stiffness beginning with the start of PT. She had a routine chest CT scan with her oncologist last week with unremarkable findings She additionally reports a hx of bilateral frozen shoulder over 15 years ago.       She states that the pain is severe and not responding to any conservative care.      She reports that the pain and weakness is worse with overhead activity. It also bothers her at night.    Is affecting ADLs.  Pain is 5/10 at it's worst.      Past Medical History:   Diagnosis Date    Anemia     Anxiety disorder 8/28/2019    Cataract     Chronic right-sided low back pain without sciatica 10/20/2021    Hypercholesteremia 9/17/2019    Invasive ductal carcinoma of breast, female, right 4/16/2021    Migraine with vision problems     Subclinical hypothyroidism     White coat syndrome with hypertension        Past  Surgical History:   Procedure Laterality Date    BREAST BIOPSY Right 04/13/2021    COLON SURGERY      COLONOSCOPY N/A 12/07/2018    Procedure: COLONOSCOPY;  Surgeon: Bhargav Gaston MD;  Location: [facility] ENDO (4TH FLR);  Service: Endoscopy;  Laterality: N/A;    DILATION AND CURETTAGE OF UTERUS      postmenopausal bleeding    ENDOBRONCHIAL ULTRASOUND N/A 11/14/2023    Procedure: ENDOBRONCHIAL ULTRASOUND (EBUS);  Surgeon: Kirt Gr MD;  Location: [facility] OR 2ND FLR;  Service: Pulmonary;  Laterality: N/A;    MASTECTOMY, PARTIAL Right 05/12/2021    Procedure: MASTECTOMY, PARTIAL-Right with radiological marker;  Surgeon: Vivian Cadena MD;  Location: [facility] [place] OR;  Service: General;  Laterality: Right;    ROBOTIC BRONCHOSCOPY N/A 11/14/2023    Procedure: ROBOTIC BRONCHOSCOPY;  Surgeon: Kirt Gr MD;  Location: [facility] OR 2ND FLR;  Service: Pulmonary;  Laterality: N/A;    SENTINEL LYMPH NODE BIOPSY Right 05/12/2021    Procedure: BIOPSY, LYMPH NODE, SENTINEL-Right;  Surgeon: Vivian Cadena MD;  Location: [facility] [place] OR;  Service: General;  Laterality: Right;    TUBAL LIGATION         Family History   Problem Relation Name Age of Onset    Diabetes Brother      Hypertension Brother      Glaucoma Brother      Glaucoma Father      Cataracts Father      Retinal detachment Father      Lung cancer Mother      Uterine cancer Maternal Grandmother      Stroke Maternal Grandmother      Stroke Paternal Grandmother      Amblyopia Neg Hx      Blindness Neg Hx      Macular degeneration Neg Hx      Strabismus Neg Hx      Thyroid disease Neg Hx           Current Outpatient Medications:     amLODIPine (NORVASC) 5 MG tablet, Take 1 tablet (5 mg total) by mouth once daily., Disp: 90 tablet, Rfl: 3    calcium carbonate (CALCIUM 500 ORAL), Take 1 tablet by mouth., Disp: , Rfl:     cholecalciferol, vitamin D3, 125 mcg (5,000 unit) capsule, Take 1 capsule (5,000 Units total) by mouth daily with breakfast., Disp: 100 capsule, Rfl: 4    letrozole  (FEMARA) 2.5 mg Tab, TAKE 1 TABLET BY MOUTH EVERY DAY, Disp: 90 tablet, Rfl: 3    levothyroxine (SYNTHROID) 75 MCG tablet, Take 1 tablet (75 mcg total) by mouth before breakfast., Disp: 30 tablet, Rfl: 6    metoprolol succinate (TOPROL-XL) 25 MG 24 hr tablet, Take 1 tablet (25 mg total) by mouth 2 (two) times daily., Disp: 180 tablet, Rfl: 4    multivitamin-iron-folic acid Tab, Take 1 tablet by mouth once daily., Disp: , Rfl:     PROLIA 60 mg/mL Syrg, , Disp: , Rfl:     famotidine (PEPCID) 10 MG tablet, Take 10 mg by mouth as needed. (Patient not taking: Reported on 5/13/2025), Disp: , Rfl:   No current facility-administered medications for this visit.    Review of patient's allergies indicates:   Allergen Reactions    Sinus allergy Other (See Comments)     Headaches, migranes    Buspar [buspirone] Other (See Comments)     Pt isnt allergic      Sulfa (sulfonamide antibiotics) Rash          REVIEW OF SYSTEMS:  Constitution: Negative. Negative for chills, fever and night sweats.   HENT: Negative for congestion and headaches.    Eyes: Negative for blurred vision, left vision loss and right vision loss.   Cardiovascular: Negative for chest pain and syncope.   Respiratory: Negative for cough and shortness of breath.    Endocrine: Negative for polydipsia, polyphagia and polyuria.   Hematologic/Lymphatic: Negative for bleeding problem. Does not bruise/bleed easily.   Skin: Negative for dry skin, itching and rash.   Musculoskeletal: Negative for falls.  Positive for right shoulder pain and muscle weakness.   Gastrointestinal: Negative for abdominal pain and bowel incontinence.   Genitourinary: Negative for bladder incontinence and nocturia.   Neurological: Negative for disturbances in coordination, loss of balance and seizures.   Psychiatric/Behavioral: Negative for depression. The patient does not have insomnia.    Allergic/Immunologic: Negative for hives and persistent infections.      PHYSICAL EXAMINATION:  Vitals:  BP  "(!) 150/89   Pulse (!) 142   Ht 5' 2" (1.575 m)   Wt 53.7 kg (118 lb 4.4 oz)   LMP  (LMP Unknown)   BMI 21.63 kg/m²    General: The patient is alert and oriented x 3.  Mood is pleasant.  Observation of ears, eyes and nose reveal no gross abnormalities.  No labored breathing observed.  Gait is coordinated. Patient can toe walk and heel walk without difficulty.      RIGHT SHOULDER / UPPER EXTREMITY EXAM    OBSERVATION:     Swelling  none  Deformity  none   Discoloration  none   Scapular winging none   Scars   none  Atrophy  none    TENDERNESS / CREPITUS (T/C):          T/C      T/C   Clavicle   -/-  SUPRAspinatus    -/-     AC Jt.    -/-  INFRAspinatus  -/-    SC Jt.    -/-  Deltoid    -/-      G. Tuberosity  -/-  LH BICEP groove  -/-   Acromion:  -/-  Midline Neck   -/-     Scapular Spine -/-  Trapezium   -/-   SMA Scapula  -/-  GH jt. line - post  -/-     Scapulothoracic  -/-         ROM: (* = with pain)  Left shoulder   Right shoulder        AROM (PROM)   AROM (PROM)   FE    170° (175°)     90° (100°)     ER at 0°    60°  (65°)    10°  (10°)   ER at 90° ABD  90°  (90°)    45°     IR at 90°  ABD   40°  (40°)     10°       IR (spine level)   T8     L5    STRENGTH: (* = with pain) Left shoulder   Right shoulder   SCAPTION   5/5    4/5    IR    5/5    5-/5   ER    5/5    5-/5   BICEPS   5/5    5/5   Deltoid    5/5    5/5     SIGNS:  Painful side       NEER   +    ODEMIS  +    MCCRACKEN   -    SPEEDS  + for weakness     DROP ARM   -   BELLY PRESS 4/5   Superior escape none    LIFT-OFF  neg   X-Body ADD    neg    MOVING VALGUS neg        STABILITY TESTING    Left shoulder   Right shoulder    Translation     Anterior  up face    up face    Posterior  up face   up face    Sulcus   < 10mm   < 10 mm     Signs   Apprehension   neg      neg       Relocation   no change     no change      Jerk test  neg     neg    EXTREMITY NEURO-VASCULAR EXAM:    Sensation grossly intact to light touch all dermatomal regions.    DTR " 2+ Biceps, Triceps, BR and Negative Brads sign   Grossly intact motor function at Elbow, Wrist and Hand   Distal pulses radial and ulnar 2+, brisk cap refill, symmetric.      NECK:  Painless FROM and spinous processes non-tender. Negative Spurlings sign.          XRAYS:  Xrays including AP, Outlet and Axillary Lateral of shoulder are ordered / images reviewed by me:   No fracture dislocation or other pathology   Acromion type 1   Proximal migration of humeral head: None   GH arthritis: None    MRI SHOULDER WITHOUT CONTRAST RIGHT (11/27/2024)     CLINICAL HISTORY:  Shoulder pain, adhesive capsulitis suspected, xray done;     TECHNIQUE:  Routine MRI evaluation of the right shoulder performed without contrast.     COMPARISON:  Radiographs 11/21/2024.     FINDINGS:  ROTATOR CUFF: Supraspinatus, infraspinatus and subscapularis tendinosis.  No partial or full-thickness rotator cuff tear.  Muscle bulk is preserved.     LABRUM: Abnormal morphology of the superior labrum extending from anterior to posterior.  Remaining labral segments demonstrate preserved morphology and signal intensity.     BICEPS: Mild increased intrasubstance signal of the intra-articular biceps tendon.     BONES: No acute fractures.  No avascular necrosis.  No infiltrative process.     AC JOINT: Mild AC joint arthrosis.  Lateral downsloping of the acromion.  Flat morphology of the lateral acromion with mild undersurface spurring.     CARTILAGE: Intact without partial or full-thickness defects.     MISCELLANEOUS: Small joint effusion.  Mild fluid distention of the subacromial/subdeltoid bursa.  Prominent rotator interval synovitis with thickening of the coracohumeral and superior glenohumeral ligaments.  Thickening and increased signal intensity of the anterior and posterior bands of the inferior glenohumeral ligament.     Impression:     1. Rotator interval synovitis and thickening and edema of the inferior glenohumeral ligament, findings that can  be seen in the setting of adhesive capsulitis.  2. Supraspinatus, infraspinatus and subscapularis tendinosis.  No rotator cuff tear.  3. Degenerative fraying of the superior labrum.  4. Mild biceps tendinosis.  5. AC joint arthrosis and subacromial spurring.  6. Subacromial/subdeltoid bursitis.  7. Small joint effusion.         ASSESSMENT:   Right shoulder pain:  1. Adhesive capsulitis of right shoulder            PLAN:      1. Continue Physical therapy, recommend increasing frequency and performing HEP   2. OTC voltaren gel  3. Return to clinic PRN  4. Possible future corticosteroid injection once off of immunotherapy.    All questions were answered, patient will contact us for questions or concerns in the interim.

## 2025-05-30 ENCOUNTER — LAB VISIT (OUTPATIENT)
Dept: LAB | Facility: HOSPITAL | Age: 75
End: 2025-05-30
Attending: HOSPITALIST
Payer: MEDICARE

## 2025-05-30 ENCOUNTER — OFFICE VISIT (OUTPATIENT)
Dept: HEMATOLOGY/ONCOLOGY | Facility: CLINIC | Age: 75
End: 2025-05-30
Payer: MEDICARE

## 2025-05-30 VITALS
WEIGHT: 120.38 LBS | HEIGHT: 62 IN | HEART RATE: 73 BPM | TEMPERATURE: 98 F | SYSTOLIC BLOOD PRESSURE: 139 MMHG | BODY MASS INDEX: 22.15 KG/M2 | OXYGEN SATURATION: 99 % | DIASTOLIC BLOOD PRESSURE: 81 MMHG | RESPIRATION RATE: 17 BRPM

## 2025-05-30 DIAGNOSIS — E03.9 HYPOTHYROIDISM, UNSPECIFIED TYPE: ICD-10-CM

## 2025-05-30 DIAGNOSIS — C34.31 ADENOCARCINOMA OF LOWER LOBE OF RIGHT LUNG: ICD-10-CM

## 2025-05-30 DIAGNOSIS — C77.1 SECONDARY MALIGNANT NEOPLASM OF INTRATHORACIC LYMPH NODES: ICD-10-CM

## 2025-05-30 DIAGNOSIS — C34.31 ADENOCARCINOMA OF LOWER LOBE OF RIGHT LUNG: Primary | ICD-10-CM

## 2025-05-30 LAB
ABSOLUTE NEUTROPHIL COUNT (OHS): 1.97 K/UL (ref 1.8–7.7)
ALBUMIN SERPL BCP-MCNC: 3.8 G/DL (ref 3.5–5.2)
ALP SERPL-CCNC: 74 UNIT/L (ref 40–150)
ALT SERPL W/O P-5'-P-CCNC: 12 UNIT/L (ref 10–44)
ANION GAP (OHS): 7 MMOL/L (ref 8–16)
AST SERPL-CCNC: 17 UNIT/L (ref 11–45)
BILIRUB SERPL-MCNC: 0.5 MG/DL (ref 0.1–1)
BUN SERPL-MCNC: 9 MG/DL (ref 8–23)
CALCIUM SERPL-MCNC: 8.6 MG/DL (ref 8.7–10.5)
CHLORIDE SERPL-SCNC: 107 MMOL/L (ref 95–110)
CO2 SERPL-SCNC: 23 MMOL/L (ref 23–29)
CREAT SERPL-MCNC: 0.6 MG/DL (ref 0.5–1.4)
ERYTHROCYTE [DISTWIDTH] IN BLOOD BY AUTOMATED COUNT: 12.7 % (ref 11.5–14.5)
GFR SERPLBLD CREATININE-BSD FMLA CKD-EPI: >60 ML/MIN/1.73/M2
GLUCOSE SERPL-MCNC: 86 MG/DL (ref 70–110)
HCT VFR BLD AUTO: 39.1 % (ref 37–48.5)
HGB BLD-MCNC: 12.6 GM/DL (ref 12–16)
IMM GRANULOCYTES # BLD AUTO: 0.01 K/UL (ref 0–0.04)
MCH RBC QN AUTO: 28.4 PG (ref 27–31)
MCHC RBC AUTO-ENTMCNC: 32.2 G/DL (ref 32–36)
MCV RBC AUTO: 88 FL (ref 82–98)
PLATELET # BLD AUTO: 158 K/UL (ref 150–450)
PMV BLD AUTO: 12.9 FL (ref 9.2–12.9)
POTASSIUM SERPL-SCNC: 4 MMOL/L (ref 3.5–5.1)
PROT SERPL-MCNC: 7.3 GM/DL (ref 6–8.4)
RBC # BLD AUTO: 4.43 M/UL (ref 4–5.4)
SODIUM SERPL-SCNC: 137 MMOL/L (ref 136–145)
T4 FREE SERPL-MCNC: 1.3 NG/DL (ref 0.71–1.51)
T4 FREE SERPL-MCNC: 1.36 NG/DL (ref 0.71–1.51)
TSH SERPL-ACNC: 4.35 UIU/ML (ref 0.4–4)
WBC # BLD AUTO: 4.99 K/UL (ref 3.9–12.7)

## 2025-05-30 PROCEDURE — 80053 COMPREHEN METABOLIC PANEL: CPT

## 2025-05-30 PROCEDURE — 36415 COLL VENOUS BLD VENIPUNCTURE: CPT

## 2025-05-30 PROCEDURE — 85027 COMPLETE CBC AUTOMATED: CPT

## 2025-05-30 PROCEDURE — 84439 ASSAY OF FREE THYROXINE: CPT

## 2025-05-30 PROCEDURE — 99999 PR PBB SHADOW E&M-EST. PATIENT-LVL III: CPT | Mod: PBBFAC,,, | Performed by: HOSPITALIST

## 2025-05-30 RX ORDER — SODIUM CHLORIDE 0.9 % (FLUSH) 0.9 %
10 SYRINGE (ML) INJECTION
OUTPATIENT
Start: 2025-06-03

## 2025-05-30 RX ORDER — EPINEPHRINE 0.3 MG/.3ML
0.3 INJECTION SUBCUTANEOUS ONCE AS NEEDED
OUTPATIENT
Start: 2025-06-03

## 2025-05-30 RX ORDER — HEPARIN 100 UNIT/ML
500 SYRINGE INTRAVENOUS
OUTPATIENT
Start: 2025-06-03

## 2025-05-30 RX ORDER — DIPHENHYDRAMINE HYDROCHLORIDE 50 MG/ML
50 INJECTION, SOLUTION INTRAMUSCULAR; INTRAVENOUS ONCE AS NEEDED
OUTPATIENT
Start: 2025-06-03

## 2025-05-30 NOTE — PROGRESS NOTES
[facility] MEDICAL ONCOLOGY - FOLLOW UP VISIT    Reason for visit: Follow up visit for adenocarcinoma of the lung    Oncology History   Malignant neoplasm of upper-outer quadrant of right breast in female, estrogen receptor positive   4/13/2021 Biopsy    Breast, right, 10:00 5N, biopsy:  - Invasive ductal carcinoma with lobular features     4/13/2021 Breast Tumor Markers    Estrogen Receptor: Positive >90%  Progesterone Receptor: Positive 10-50%  HER2: Negative  Ki67: 10-30%     4/26/2021 Genetic Testing    MyRisk: negative     5/12/2021 Breast Surgery    Right partial mastectomy with SLNB     6/1/2021 Tumor Conference    Radiation options? Offer radiation, can discuss partial vs whole breast radiation.        6/2/2021 Cancer Staged    Staging form: Breast, AJCC 8th Edition  - Pathologic stage from 6/2/2021: Stage IA (pT1b, pN0(sn), cM0, G2, ER+, ID+, HER2-)     7/6/2021 - 7/27/2021 Radiation Therapy    Treatment Summary  Course: C1 Chest 2021  Treatment Site Energy Dose/Fx (Gy) #Fx Total Dose (Gy) Start Date End Date Elapsed Days   Breast_Rt 6X 2.65 16 / 16 42.4 7/6/2021 7/27/2021 21          7/2021 -  Hormone Therapy    Letrozole      Procedure    Bronchoscopy, Station 7 positive for malignancy     Adenocarcinoma of lower lobe of right lung   10/5/2023 Imaging Significant Findings    CT C (obtained after CXR, which was itself obtained for palpitations)  - 2.1 x 1.3 cm spiculated RLL nodule, some spiculation noted to extend to the pleural margin  - Scattered pulmonary nodules centered mostly subpleural at the RLL (e.g. 0.9 cm)       10/10/2023 Imaging Significant Findings    PET CT  - 2.1 x 1.1 cm RLL nodule, SUV 3.8  - 0.9 cm R axillary LN, SUV 3.9  - 1.2 cm subcarinal LN, SUV 4.6  - 0.7 cm Ln along L posterior shoulder, SUV 1.7  - Multiple additional solid pulmonary nodules through R lung base, too small for uptake detection     11/14/2023 Procedure    Bronch with  EBUS  RLL, FNA  - Adenocarcinoma (TTF1 positive, GATA3 negative)    RLL, TBBx  - Adenocarcinoma    Per pulmonology, station 7 node was very vascular without window for biopsy, plan to discuss at thoracic tumor board    [facility] NGS  - KRAS G12A, CHEK1, MLH1, CTNNB1, CIC, PTPRD, NOTCH3, EZH2, LATS1, KMT2D  - Negative: EGFR, ALK, BRAF, KRAS, ROS1, RET, MET, EBB2  - PD-L1 1%       11/22/2023 Tumor Conference    Tumor Board  - Plan for axillary LN biopsy to determine lung vs recurrent breast vs other etiology  - Repeat CT C to evaluate nodules in RLL  - Determine treatment plan based on above results     11/22/2023 Tumor Genotyping    [facility] Liquid Biopsy  - No reportable pathogenic variants found     11/29/2023 Imaging Significant Findings    CT C  - 2.1 x 1.2 cm RLL spiculated nodule, stable in size w/ new central cavitation. Spiculated margins extend towards adjacent pleura.   - Stable irregular 2.0 cm linar opacity in LLL  - Stable 0.3 cm GGO, TRICIA  - Stable R basal sub cm nodules, largest 0.9 cm  - Several stable solid nodules predominantly in RLL, largest 0.9 cm     12/18/2023 Imaging Significant Findings    MRI Brain  - JELENA     1/3/2024 Procedure    IR Guided Biopsy, R Axillary LN  - No metastatic carcinoma (0.3 x 0.3 x 0.1 cm tissue, positive and negative controls stained appropriately)     1/5/2024 Imaging Significant Findings    PET CT  - Stable 2.0 x 1.1 cm RLL nodule (previously 2.1 x 1.1 cm)  - Stable additional nodules w/o significant racer uptake  - 0.8 cm R axillary node, SUV 4.2 (previously 0.9 cm, SUV 3.9)  - 0.5 cm L posterior shoulder node, SUV 2.3 (previously 0.7 cm, SUV 1.7)  - 1.3 cm subcarinal node, SUV 4.9 (previously 1.2 cm, SUV 4.6)     1/10/2024 Tumor Conference    Tumor Board   Re-sample enlarged, hypermetabolic axillary LN with repeat CT-guided biopsy versus excisional biopsy. If LN is negative for recurrent NSCLC, obtain EBUS of PET-avid mediastinal LN.         1/23/2024 Procedure    IR  Guided Biopsy, R Axillary LN (Second Biopsy)  - Negative for metastatic carcinoma     2/8/2024 Procedure    Bronch with EBUS  LN  Positive: Station 7 (Adenocarcinoma)  Negative: Station 11R     2/21/2024 Cancer Staged    Staging form: Lung, AJCC 8th Edition  - Clinical stage from 2/21/2024: Stage IIIA (cT1b, cN2, cM0)     2/21/2024 Tumor Conference    Thoracic Tumor Board  Stage IIIA right lower lobe adenocarcinoma with bulky subcarinal lymphadenopathy.  Proceed with NA chemo/I-O therapy. [facility] for [facility] evaluation. If patient is not a surgical candidate following 3 cycles of therapy, proceed with definitive chemo/RT.       2/22/2024 - 4/5/2024 Chemotherapy    Treatment Summary   Plan Name: OP NSCLC NIVOLUMAB 360 MG PLUS CARBOPLATIN (AUC5) PEMETREXED 500 MG/M2 Q3W x 3 CYCLES  Treatment Goal: Control  Status: Inactive  Start Date: 2/22/2024  End Date: 4/5/2024  Provider: Bridger Nichole IV, MD  Chemotherapy: CARBOplatin (PARAPLATIN) 415 mg in sodium chloride 0.9% 326.5 mL chemo infusion, 415 mg, Intravenous, Clinic/HOD 1 time, 3 of 3 cycles  Administration: 415 mg (2/22/2024), 465 mg (4/4/2024), 465 mg (3/14/2024)  PEMEtrexed disodium (ALIMTA) 750 mg in sodium chloride 0.9% SolP 100 mL chemo infusion, 500 mg/m2 = 750 mg, Intravenous, Clinic/HOD 1 time, 3 of 3 cycles  Administration: 750 mg (2/22/2024), 750 mg (4/4/2024), 750 mg (3/14/2024)     4/24/2024 Imaging Significant Findings    CT C/A/P  - 1.2 x 1.0 cm RLL malignancy, previously 2 x 1.2 cm  - Interval decrease in the previously seen nodules in the RLL  - 0.9 cm subcarinal node, previously 1.7 cm  - Interval decrease in right infrahilar soft tissue  - 2.1 cm LLL solid nodular opacity, unchanged  - 0.8 cm TRICIA ground-glass opacity, unchanged  - AVM again noted in RLL  - 0.6 cm right axillary lymph node, decreased in size from prior  - Left scapular lymph node decreased in size from prior, incompletely seen       5/6/2024 - 5/12/2024 Hospital Admission     Admitted to [facility] for RLLx.  Postoperative course complicated by hemothorax requiring return to the operating room for reexploration on postop day 1.  Chest tube removed on postop day 5.     5/6/2024 Surgery    Right Lower Lobectomy  Right Lower Lobe  - Adenocarcinoma with acinar pattern, 35% residual viable tumor, 1.0 cm in greatest dimension.  Pleural invasion absent, LVI absent, margins negative.  0/1 lymph node    Pericardial Excision  - Negative for tumor    LN  - Positive: Station 7  - Negative: Station 8R (0/1), 9 are (0/2), 2R (0/1), 4R (0/1), 11R (0/1), 12R (0/1), R posterior interlobar LN (0/1)    Path Staging: pT1a, pN2     7/2/2024 -  Chemotherapy    Adjuvant Nivolumab  28 day cycles  Nivolumab 480 mg, D1     7/24/2024 Cancer Staged    Staging form: Lung, AJCC 8th Edition  - Pathologic: Stage IIIA (pT1a, pN2, cM0)     8/7/2024 Imaging Significant Findings    PET CT  - New foci of FDG uptake in right lateral chest wall, likely represents postsurgical changes  - Development of moderate size right pleural effusion  - No evidence of hypermetabolic mediastinal or hilar lymph nodes     11/13/2024 Imaging Significant Findings    CT C  Postsurgical changes after right lower lobectomy  Right pleural effusion decreased in size  Stable 2.2 cm regular nodular opacity of LLL  Stable 0.8 cm ground-glass nodule in TRICIA  Stable 0.3 cm nodule along left major fissure     3/20/2025 Imaging Significant Findings    PET CT  No evidence of local recurrence at resection site  No discrete lung nodules suspicious for metastases  0.7 cm right supraclavicular lymph node, increased from 0.6 m, SUV 3.6, of uncertain etiology  Stable 2.2 cm LLL part solid nodule, not FDG avid  Stable 0.8 cm ground-glass nodular opacity in TRICIA  Stable small loculated right pleural effusion  No FDG avid evidence of disease elsewhere        - Second opinion, [facility]: contralateral LLL lesion stable but plan for sampling of this as well as subcarinal LN and  "possible additional RLL nodule. Pending results, consider NA --> surgery vs CRT    Oncology History    HPI:     Keo Frias is a 73 y.o. female with pmh significant for HTN, migraine headaches, and multiple malignancies, who presents for follow up of the below lung cancer    1) Stage IA ( [id number], ER 95%, KS 10%, HER2 negative, Ki-67 20%) IDC of the R breast, initially dx'd 4/13/21, s/p lumpectomy and SLNB (5/12/21), XRT (7/6/21-7/27/21), and currently on letrozole (7/2021 - present).     2) Stage IIIA (pT1a, pN2, cM0) adenocarcinoma of the RLL (no targetable mutations, PD-L1 1%), initially dx'd 11/14/23, s/p neoadjuvant carboplatin/pemetrexed/nivolumab (2/22/24 - 4/4/24), right lower lobectomy (5/6/24), and currently on adjuvant nivolumab (7/2/24 - present) who presents to for follow up.     Last clinic 5/5/25, proceed with C12 nivolumab.  C13 on 6/3/25. CT C/A/P, labs, and med onc f/u (Dr. Don) at [facility] on 7/31/25     Interval History:  5/6/25: C12 nivolumab    The pt states that she is "feeling great". She denies any issue with her last infusion.     She says that her R shoulder continues to feel better, noting that she continues to work with physical therapy.     Pt denies N/V, diarrhea, constipation, rash, new/worsening fatigue, new/worsening SOB, or new/worsening cough.    ROS:   As per HPI.     Physical Exam:       /81 (BP Location: Left arm, Patient Position: Sitting)   Pulse 73   Temp 98.3 °F (36.8 °C) (Oral)   Resp 17   Ht 5' 2" (1.575 m)   Wt 54.6 kg (120 lb 5.9 oz)   LMP  (LMP Unknown)   SpO2 99%   BMI 22.02 kg/m²                Physical Exam  Constitutional:       Appearance: Normal appearance.   HENT:      Head: Normocephalic and atraumatic.   Eyes:      Extraocular Movements: Extraocular movements intact.      Conjunctiva/sclera: Conjunctivae normal.      Pupils: Pupils are equal, round, and reactive to light.   Cardiovascular:      Rate and Rhythm: Normal rate and regular rhythm.      " Heart sounds: No murmur heard.     No friction rub. No gallop.   Pulmonary:      Effort: Pulmonary effort is normal.      Breath sounds: No wheezing, rhonchi or rales.   Musculoskeletal:         General: Normal range of motion.      Right lower leg: No edema.      Left lower leg: No edema.   Skin:     General: Skin is warm and dry.   Neurological:      Mental Status: She is alert and oriented to person, place, and time.   Psychiatric:         Mood and Affect: Mood normal.         Thought Content: Thought content normal.         Judgment: Judgment normal.         Imaging:    See oncologic history above.     Path:  See oncologic history above.      Assessment and Plan:     Keo Frias is a 73 y.o. female with pmh significant for HTN, migraine headaches, and multiple malignancies, who presents for follow up of the below lung cancer    1) Stage IA ( [id number], ER 95%, MS 10%, HER2 negative, Ki-67 20%) IDC of the R breast, initially dx'd 4/13/21, s/p lumpectomy and SLNB (5/12/21), XRT (7/6/21-7/27/21), and currently on letrozole (7/2021 - present).     2) Stage IIIA (pT1a, pN2, cM0) adenocarcinoma of the RLL (no targetable mutations, PD-L1 1%), initially dx'd 11/14/23, s/p neoadjuvant carboplatin/pemetrexed/nivolumab (2/22/24 - 4/4/24), right lower lobectomy (5/6/24), and currently on adjuvant nivolumab (7/2/24 - present) who presents to for follow up.     Stage IIIA Adenocarcinoma of RLL, PD-L1 1%, No Actionable Molecular Alterations  ECOG PS 1.  Patient is currently on adjuvant nivolumab (see note from 6/14/24), tolerating without significant issue. Her most recent imaging (PET CT, 3/20/25 , ~8.5 months on adjuvant nivolumab) is without evidence of local recurrence of the resection site, new lung nodules, or change in the 2.2 cm LLL non FDG avid nodule.  Of note is a 0.7 cm right supraclavicular lymph node with SUV of 3.6 of uncertain etiology; on discussion with Dr. Don, favor continued monitoring. Low threshold for  "empiric SBRT to the LLL lesion should there be evidence of change/growth in the future.  Given good tolerance and radiographic response, will plan to treat with nivolumab q4w x 1 year, w/ imaging throughout (scheduled at [facility]).     PLAN:   Proceed w/ C13 nivolumab (final dose) on 6/3/25 at [place], labs acceptable and treatment signed  CT C/A/P, labs, and med onc f/u (Dr. Don) at [facility] on 7/31/25  Virtual visit in mid August to discuss the above and determine plan for surveillance imaging      Hypothyroidism  Continue levothyroxine 75 mcg daily, refills sent      Osteoporosis  DEXA from 7/11/24 with osteoporosis of the lumbar spine and hip. Discussed to hold on dental procedures until at least 2 months after prolia injections based on "American Association of Oral [employer]ill[employer] Position Paper on Medication-Related Osteonecrosis of the Jaw - 2014 Update". Pt endorsed her understanding.   Prolia per Dr. Ogden      Right IDC, ER+/TX+/HER2-  S/p lumpectomy with SLNB, radiation therapy, and currently on letrozole and denosumab, tolerating well. Concern for possible R axillary recurrence, workup as below.   Okay to continue letrozole  Okay to continue denosumab as above  Breast oncology team aware      Patient is in agreement with the proposed treatment plan. All questions were answered to the patient's satisfaction. Pt knows to call clinic if anything is needed before the next clinic visit.          Med Onc Chart Routing      Follow up with physician . Virtual visit with me in mid August   Follow up with BRENDA    Infusion scheduling note    Injection scheduling note    Labs    Imaging    Pharmacy appointment    Other referrals                                         "

## 2025-06-02 ENCOUNTER — CLINICAL SUPPORT (OUTPATIENT)
Dept: AUDIOLOGY | Facility: CLINIC | Age: 75
End: 2025-06-02
Payer: MEDICARE

## 2025-06-02 ENCOUNTER — OFFICE VISIT (OUTPATIENT)
Dept: OTOLARYNGOLOGY | Facility: CLINIC | Age: 75
End: 2025-06-02
Payer: MEDICARE

## 2025-06-02 DIAGNOSIS — H71.91 CHOLESTEATOMA OF RIGHT EAR: ICD-10-CM

## 2025-06-02 DIAGNOSIS — H90.A31 MIXED CONDUCTIVE AND SENSORINEURAL HEARING LOSS OF RIGHT EAR WITH RESTRICTED HEARING OF LEFT EAR: Primary | ICD-10-CM

## 2025-06-02 DIAGNOSIS — H66.11 CHRONIC TUBOTYMPANIC SUPPURATIVE OTITIS MEDIA OF RIGHT EAR: Primary | ICD-10-CM

## 2025-06-02 PROCEDURE — 1159F MED LIST DOCD IN RCRD: CPT | Mod: CPTII,S$GLB,, | Performed by: OTOLARYNGOLOGY

## 2025-06-02 PROCEDURE — 1101F PT FALLS ASSESS-DOCD LE1/YR: CPT | Mod: CPTII,S$GLB,, | Performed by: OTOLARYNGOLOGY

## 2025-06-02 PROCEDURE — 92557 COMPREHENSIVE HEARING TEST: CPT | Mod: S$GLB,,,

## 2025-06-02 PROCEDURE — 92567 TYMPANOMETRY: CPT | Mod: S$GLB,,,

## 2025-06-02 PROCEDURE — 3288F FALL RISK ASSESSMENT DOCD: CPT | Mod: CPTII,S$GLB,, | Performed by: OTOLARYNGOLOGY

## 2025-06-02 PROCEDURE — 92504 EAR MICROSCOPY EXAMINATION: CPT | Mod: S$GLB,,, | Performed by: OTOLARYNGOLOGY

## 2025-06-02 PROCEDURE — 99999 PR PBB SHADOW E&M-EST. PATIENT-LVL II: CPT | Mod: PBBFAC,,, | Performed by: OTOLARYNGOLOGY

## 2025-06-02 PROCEDURE — 99214 OFFICE O/P EST MOD 30 MIN: CPT | Mod: S$GLB,,, | Performed by: OTOLARYNGOLOGY

## 2025-06-02 PROCEDURE — 3044F HG A1C LEVEL LT 7.0%: CPT | Mod: CPTII,S$GLB,, | Performed by: OTOLARYNGOLOGY

## 2025-06-03 ENCOUNTER — INFUSION (OUTPATIENT)
Dept: INFUSION THERAPY | Facility: HOSPITAL | Age: 75
End: 2025-06-03
Attending: HOSPITALIST
Payer: MEDICARE

## 2025-06-03 VITALS
OXYGEN SATURATION: 99 % | DIASTOLIC BLOOD PRESSURE: 63 MMHG | HEIGHT: 62 IN | HEART RATE: 67 BPM | BODY MASS INDEX: 22.11 KG/M2 | TEMPERATURE: 98 F | RESPIRATION RATE: 16 BRPM | SYSTOLIC BLOOD PRESSURE: 137 MMHG | WEIGHT: 120.13 LBS

## 2025-06-03 DIAGNOSIS — C34.31 ADENOCARCINOMA OF LOWER LOBE OF RIGHT LUNG: Primary | ICD-10-CM

## 2025-06-03 PROCEDURE — A4216 STERILE WATER/SALINE, 10 ML: HCPCS | Performed by: HOSPITALIST

## 2025-06-03 PROCEDURE — 96413 CHEMO IV INFUSION 1 HR: CPT

## 2025-06-03 PROCEDURE — 25000003 PHARM REV CODE 250: Performed by: HOSPITALIST

## 2025-06-03 PROCEDURE — 63600175 PHARM REV CODE 636 W HCPCS: Mod: JZ,TB | Performed by: HOSPITALIST

## 2025-06-03 RX ORDER — DIPHENHYDRAMINE HYDROCHLORIDE 50 MG/ML
50 INJECTION, SOLUTION INTRAMUSCULAR; INTRAVENOUS ONCE AS NEEDED
Status: DISCONTINUED | OUTPATIENT
Start: 2025-06-03 | End: 2025-06-03 | Stop reason: HOSPADM

## 2025-06-03 RX ORDER — EPINEPHRINE 0.3 MG/.3ML
0.3 INJECTION SUBCUTANEOUS ONCE AS NEEDED
Status: DISCONTINUED | OUTPATIENT
Start: 2025-06-03 | End: 2025-06-03 | Stop reason: HOSPADM

## 2025-06-03 RX ORDER — SODIUM CHLORIDE 0.9 % (FLUSH) 0.9 %
10 SYRINGE (ML) INJECTION
Status: DISCONTINUED | OUTPATIENT
Start: 2025-06-03 | End: 2025-06-03 | Stop reason: HOSPADM

## 2025-06-03 RX ADMIN — SODIUM CHLORIDE 480 MG: 9 INJECTION, SOLUTION INTRAVENOUS at 11:06

## 2025-06-03 RX ADMIN — Medication 10 ML: at 12:06

## 2025-06-03 RX ADMIN — SODIUM CHLORIDE: 9 INJECTION, SOLUTION INTRAVENOUS at 11:06

## 2025-06-04 ENCOUNTER — TELEPHONE (OUTPATIENT)
Dept: HEMATOLOGY/ONCOLOGY | Facility: CLINIC | Age: 75
End: 2025-06-04
Payer: MEDICARE

## 2025-06-05 ENCOUNTER — CLINICAL SUPPORT (OUTPATIENT)
Dept: REHABILITATION | Facility: HOSPITAL | Age: 75
End: 2025-06-05
Payer: MEDICARE

## 2025-06-05 DIAGNOSIS — M25.511 CHRONIC RIGHT SHOULDER PAIN: ICD-10-CM

## 2025-06-05 DIAGNOSIS — M25.611 DECREASED RANGE OF MOTION OF RIGHT SHOULDER: Primary | ICD-10-CM

## 2025-06-05 DIAGNOSIS — G89.29 CHRONIC RIGHT SHOULDER PAIN: ICD-10-CM

## 2025-06-05 PROCEDURE — 97110 THERAPEUTIC EXERCISES: CPT

## 2025-06-05 PROCEDURE — 97112 NEUROMUSCULAR REEDUCATION: CPT

## 2025-06-30 ENCOUNTER — CLINICAL SUPPORT (OUTPATIENT)
Dept: REHABILITATION | Facility: HOSPITAL | Age: 75
End: 2025-06-30
Payer: MEDICARE

## 2025-06-30 DIAGNOSIS — M25.511 CHRONIC RIGHT SHOULDER PAIN: ICD-10-CM

## 2025-06-30 DIAGNOSIS — M25.611 DECREASED RANGE OF MOTION OF RIGHT SHOULDER: Primary | ICD-10-CM

## 2025-06-30 DIAGNOSIS — G89.29 CHRONIC RIGHT SHOULDER PAIN: ICD-10-CM

## 2025-06-30 PROCEDURE — 97110 THERAPEUTIC EXERCISES: CPT

## 2025-06-30 PROCEDURE — 97112 NEUROMUSCULAR REEDUCATION: CPT

## 2025-06-30 NOTE — PROGRESS NOTES
[facility]   Physical Therapy Treatment/Progress Note      Name: Keo Frias  Clinic Number: [id number]    Therapy Diagnosis:   Encounter Diagnoses   Name Primary?    Decreased range of motion of right shoulder Yes    Chronic right shoulder pain        Physician: Osvaldo Costa MD    Visit Date: 6/30/2025    Physician Orders: PT Eval and Treat   Medical Diagnosis from Referral: M75.01 (ICD-10-CM) - Adhesive capsulitis of right shoulder   Evaluation Date: 12/4/2024  Authorization Period Expiration: 12/03/2025  Plan of Care Expiration: 11/22/2025  Progress Note Due: 07/30/2025  Visit # / Visits authorized: 18/28  FOTO: 1/3     Precautions: Standard,     PTA Visit #: 0/5     Time In: 0905  Time Out: 1000  Total Billable Time: 30 minutes    Subjective     Pt reports: She continues to improve, and she thinks she has made great improvements since starting therapy.   She was compliant with home exercise program.  Response to previous treatment: improved range of motion  Functional change: ongoing    Pain: 1/10  Location: right shoulder      Objective       Observation: 75 y/o alert and oriented      Posture: forward shoulder rounding      Passive Range of Motion:   Shoulder Right Left   Flexion 155 165   Abduction 155 170   ER at 0 45 80   ER at 90 60 90   IR 20 90      Active Range of Motion:   Shoulder Right Left   Flexion 150 165   Abduction 150 170   ER at 0 35 80   ER at 90 30 90   IR at 90 40  90   Reach behind head T2 T2   Reach behind back  L3 T7      Strength:  Shoulder Right Left   Flexion 4/5 5/5   Abduction 4/5 5/5   ER 4/5 5/5   IR 5/5 5/5   Serratus Anterior 4/5 4/5         Joint Mobility: inferior capsule still limited      Palpation: no TTP     Sensation: intact      Treatment     Keo received the treatments listed below:      therapeutic exercises to develop strength, endurance, ROM, flexibility, posture, and core stabilization for 25 minutes including:  CC pulldowns  "seated 3x10   UBE 5' fwd/5' back for ROM and cardiovascular endurance  Sidelying ER 3x10 2# R only  ER ROM on wall 3x12 2" holds  Egyptians ER with push up RTB 3x8      manual therapy techniques: Joint mobilizations, Manual traction, Myofacial release, Manual Lymphatic Drainage, Soft tissue Mobilization, and Friction Massage were applied to the: R shoulder for 10 minutes, including:  PROM shoulder flexion and external rotation   Shoulder flexion MET  Shoulder ER MET  Lateral shoulder distraction    PA thoracic mobilizations     Not today:   Cervical side glides (C5-6)  Inferior GH glides; grades III-IV  Posterior GH glides; grades III-IV  CT junction gapping    neuromuscular re-education activities to improve: Balance, Coordination, Kinesthetic, Sense, Proprioception, and Posture for 25 minutes. The following activities were included:  No money isometrics 30 degrees RTB 3x10 3" holds   Wall slides w/ RTB and lift off 2x15   Shoulder flex/ext perturbations 5x30" holds at 90 and end range  Shoulder ER/IR by side perturbations 5x30" holds   Standing T RTB 2x15   Standing shoulder extension GTB 2x15 (low trap activation)    Not today:   Standing shoulder flexion with YTB 2x15   Seated thoracic extension 20x 2" holds  Open books 20x each side     therapeutic activities to improve functional performance for 0  minutes, including:      Patient Education and Home Exercises       Education provided:   - continuing HEP    Written Home Exercises Provided: YES. Exercises were reviewed and Keo was able to demonstrate them prior to the end of the session.  Keo demonstrated good understanding of the education provided. See EMR under Patient Instructions for exercises provided during therapy sessions    Assessment     Pt was reassessed today and she has shown some improvement in range of motion and strength over the last month. She continues to display scapular hypermobility. She remains a good candidate for physical therapy as " she continues to progress through the stages of frozen shoulder. She was progressed with scapular strengthening exercises today, and she will continue to progress as tolerated.     Keo Is progressing well towards her goals.   Pt prognosis is Fair.     Pt will continue to benefit from skilled outpatient physical therapy to address the deficits listed in the problem list box on initial evaluation, provide pt/family education and to maximize pt's level of independence in the home and community environment.     Pt's spiritual, cultural and educational needs considered and pt agreeable to plan of care and goals.     Anticipated barriers to physical therapy: cancer treatment     GOALS:   Short Term Goals: 3 months MET as of 3/13/2025  1.Report decreased R shoulder pain < / =  3/10  to increase tolerance for ADL's  2. Increase PROM by 30 degrees in all planes    3. Increased strength by 1/3 MMT grade in R shoulder to increase tolerance for ADL and work activities.  4. Pt to tolerate HEP to improve ROM and independence with ADL's    Active       Long Term Goals       1.Report decreased R shoulder pain  < / =  1 /10  to increase tolerance for reaching overhead         Start:  05/25/25    Expected End:  11/22/25            2.Increase AROM to within 90% of the L shoulder        Start:  05/25/25    Expected End:  11/22/25            3.Increase strength to >/= 4/5 in R shoulder to increase tolerance for ADL and work activities.        Start:  05/25/25    Expected End:  11/22/25            4. Pt goal: to reach overhead and complete ADLs without pain         Start:  05/25/25    Expected End:  11/22/25            5. Pt will have improved gcode of CJ (20-40% limited) on FOTO shoulder in order to demonstrate true functional improvement.         Start:  05/25/25    Expected End:  11/22/25                  Plan     Plan of care Certification: 5/22/2025-11/22/2025.     Outpatient Physical Therapy 1 times biweekly for 6 months to  include the following interventions: manual therapy, therapeutic exercise, therapeutic activities, and neuromuscular re-education.    Aristeo Patton PT, DPT

## 2025-07-01 ENCOUNTER — TELEPHONE (OUTPATIENT)
Dept: HEMATOLOGY/ONCOLOGY | Facility: CLINIC | Age: 75
End: 2025-07-01
Payer: MEDICARE

## 2025-07-01 NOTE — TELEPHONE ENCOUNTER
Copied from CRM #[id number]. Topic: General Inquiry - Patient Advice  >> Jul 1, 2025  2:56 PM Kyra wrote:  Type:  Needs Medical Advice    Who Called: Key calling from [facility]  Following on a fax that was sent on 6/16 & 6/20 to see if it had been received   [phone]  fax [phone]  Would the patient rather a call back or a response via [employer]? Call back  Best Call Back Number: [phone]    Additional Information:

## 2025-07-09 ENCOUNTER — TELEPHONE (OUTPATIENT)
Dept: HEMATOLOGY/ONCOLOGY | Facility: CLINIC | Age: 75
End: 2025-07-09
Payer: MEDICARE

## 2025-07-09 NOTE — TELEPHONE ENCOUNTER
"Copied from CRM #[id number]. Topic: General Inquiry - Patient Advice  >> Jul 9, 2025  2:32 PM Demetrius wrote:  Consult/Advisory    Name Of Caller:  Key [Pharmacy Tech - [employer]]    Contact Preference?: [phone]    Provider Name: Dionisio    What is the nature of the call?: Following up about home infusion request form that was faxed over on 6/16 and 6/20    Additional Notes:  "Thank you for all that you do for our patients"  "

## 2025-07-15 ENCOUNTER — PATIENT MESSAGE (OUTPATIENT)
Dept: HEMATOLOGY/ONCOLOGY | Facility: CLINIC | Age: 75
End: 2025-07-15
Payer: MEDICARE

## 2025-07-15 ENCOUNTER — PATIENT MESSAGE (OUTPATIENT)
Dept: INTERNAL MEDICINE | Facility: CLINIC | Age: 75
End: 2025-07-15
Payer: MEDICARE

## 2025-07-21 RX ORDER — CIPROFLOXACIN AND DEXAMETHASONE 3; 1 MG/ML; MG/ML
4 SUSPENSION/ DROPS AURICULAR (OTIC) 2 TIMES DAILY
Qty: 10 ML | Refills: 2 | Status: SHIPPED | OUTPATIENT
Start: 2025-07-21 | End: 2025-07-21

## 2025-07-28 ENCOUNTER — CLINICAL SUPPORT (OUTPATIENT)
Dept: REHABILITATION | Facility: HOSPITAL | Age: 75
End: 2025-07-28
Payer: MEDICARE

## 2025-07-28 DIAGNOSIS — G89.29 CHRONIC RIGHT SHOULDER PAIN: ICD-10-CM

## 2025-07-28 DIAGNOSIS — M25.511 CHRONIC RIGHT SHOULDER PAIN: ICD-10-CM

## 2025-07-28 DIAGNOSIS — M25.611 DECREASED RANGE OF MOTION OF RIGHT SHOULDER: Primary | ICD-10-CM

## 2025-07-28 PROCEDURE — 97140 MANUAL THERAPY 1/> REGIONS: CPT

## 2025-07-28 PROCEDURE — 97110 THERAPEUTIC EXERCISES: CPT

## 2025-07-28 PROCEDURE — 97112 NEUROMUSCULAR REEDUCATION: CPT

## 2025-07-28 NOTE — PROGRESS NOTES
[facility]   Physical Therapy Treatment/Progress Note      Name: Keo Frias  Clinic Number: [id number]    Therapy Diagnosis:   Encounter Diagnoses   Name Primary?    Decreased range of motion of right shoulder Yes    Chronic right shoulder pain          Physician: Osvaldo Costa MD    Visit Date: 7/28/2025    Physician Orders: PT Eval and Treat   Medical Diagnosis from Referral: M75.01 (ICD-10-CM) - Adhesive capsulitis of right shoulder   Evaluation Date: 12/4/2024  Authorization Period Expiration: 12/03/2025  Plan of Care Expiration: 11/22/2025  Progress Note Due: 07/30/2025  Visit # / Visits authorized: 19/28  FOTO: 1/3     Precautions: Standard,     PTA Visit #: 0/5     Time In: 0902  Time Out: 1000  Total Billable Time: 58 minutes    Subjective     Pt reports: She feels like she has gained more range of motion over her shoulder in this past month   She was compliant with home exercise program.  Response to previous treatment: improved range of motion  Functional change: ongoing    Pain: 1/10  Location: right shoulder      Objective       Observation: 75 y/o alert and oriented      Posture: forward shoulder rounding      Passive Range of Motion:   Shoulder Right Left   Flexion 160 165   Abduction 165 170   ER at 0 45 50   ER at 90 42 90   IR 65 85      Active Range of Motion:   Shoulder Right Left   Flexion 160 165   Abduction 165 170   ER at 0 45 50   ER at 90 42 90   IR at 90 65 90   Reach behind head T2 T2   Reach behind back  L1 T7      Strength:  Shoulder Right Left   Flexion 4/5 5/5   Abduction 4/5 5/5   ER 4/5 5/5   IR 5/5 5/5   Serratus Anterior 4/5 4/5   Middle Trap 3/5 3/5   Lower Trap 3/5 3/5         Joint Mobility: inferior capsule still limited      Palpation: no TTP     Sensation: intact      Treatment     Keo received the treatments listed below:      therapeutic exercises to develop strength, endurance, ROM, flexibility, posture, and core stabilization for 26  "minutes including:  UBE 3' fwd/3' back for ROM and cardiovascular endurance  ER at 90 with cane 3x12 2" holds  E[place]ians ER with push up RTB 3x8    Strength and ROM assessments     Not today:  Sidelying ER 3x10 2# R only  CC pulldowns seated 3x10     manual therapy techniques: Joint mobilizations, Manual traction, Myofacial release, Manual Lymphatic Drainage, Soft tissue Mobilization, and Friction Massage were applied to the: R shoulder for 8 minutes, including:  PROM shoulder flexion and external rotation   Shoulder flexion MET  Shoulder ER MET  Lateral shoulder distraction    PA thoracic mobilizations     Not today:   Cervical side glides (C5-6)  Inferior GH glides; grades III-IV  Posterior GH glides; grades III-IV  CT junction gapping    neuromuscular re-education activities to improve: Balance, Coordination, Kinesthetic, Sense, Proprioception, and Posture for 24 minutes. The following activities were included:  No moneys RTB 3x10 3" holds   Wall slides w/ RTB and lift off 2x15   Shoulder flex/ext perturbations 5x30" holds at 90 and end range  Standing T RTB 2x15   Standing shoulder extension GTB 2x15 (low trap activation)  Ball on wall at 90 degrees abduction and comfortable ER 3x30    Not today:   Shoulder ER/IR by side perturbations 5x30" holds   Standing shoulder flexion with YTB 2x15   Seated thoracic extension 20x 2" holds  Open books 20x each side     therapeutic activities to improve functional performance for 0  minutes, including:      Patient Education and Home Exercises       Education provided:   - continuing HEP    Written Home Exercises Provided: YES. Exercises were reviewed and Keo was able to demonstrate them prior to the end of the session.  Keo demonstrated good understanding of the education provided. See EMR under Patient Instructions for exercises provided during therapy sessions    Assessment     Pt was reassessed today and she has shown great improvement in range of motion and strength " over the last month. She is nearly symmetrical with flexion and ER by side, but she is still limited mainly with ER at 90 degrees as she shows a near 50% deficit still. She continues to be a good candidate for skilled physical therapy services as she can still improve scapular strength and ROM of her shoulder. She was progressed with scapular strengthening exercises and ER at 90 ROM and cuff activation which she tolerated with no increase in pain. She will continue to progress as tolerated.     She continues to display scapular hypermobility. She remains a good candidate for physical therapy as she continues to progress through the stages of frozen shoulder. She was progressed with scapular strengthening exercises today, and she will continue to progress as tolerated.     Keo Is progressing well towards her goals.   Pt prognosis is Fair.     Pt will continue to benefit from skilled outpatient physical therapy to address the deficits listed in the problem list box on initial evaluation, provide pt/family education and to maximize pt's level of independence in the home and community environment.     Pt's spiritual, cultural and educational needs considered and pt agreeable to plan of care and goals.     Anticipated barriers to physical therapy: cancer treatment     GOALS:   Short Term Goals: 3 months MET as of 3/13/2025  1.Report decreased R shoulder pain < / =  3/10  to increase tolerance for ADL's  2. Increase PROM by 30 degrees in all planes    3. Increased strength by 1/3 MMT grade in R shoulder to increase tolerance for ADL and work activities.  4. Pt to tolerate HEP to improve ROM and independence with ADL's    Active       Long Term Goals       1.Report decreased R shoulder pain  < / =  1 /10  to increase tolerance for reaching overhead         Start:  05/25/25    Expected End:  11/22/25            2.Increase AROM to within 90% of the L shoulder        Start:  05/25/25    Expected End:  11/22/25             3.Increase strength to >/= 4/5 in R shoulder to increase tolerance for ADL and work activities.        Start:  05/25/25    Expected End:  11/22/25            4. Pt goal: to reach overhead and complete ADLs without pain         Start:  05/25/25    Expected End:  11/22/25            5. Pt will have improved gcode of CJ (20-40% limited) on FOTO shoulder in order to demonstrate true functional improvement.         Start:  05/25/25    Expected End:  11/22/25                  Plan     Plan of care Certification: 5/22/2025-11/22/2025.     Outpatient Physical Therapy 1 times biweekly for 6 months to include the following interventions: manual therapy, therapeutic exercise, therapeutic activities, and neuromuscular re-education.    Aristeo Patton PT, DPT

## 2025-08-06 ENCOUNTER — CLINICAL SUPPORT (OUTPATIENT)
Dept: REHABILITATION | Facility: HOSPITAL | Age: 75
End: 2025-08-06
Payer: MEDICARE

## 2025-08-06 DIAGNOSIS — M25.611 DECREASED RANGE OF MOTION OF RIGHT SHOULDER: Primary | ICD-10-CM

## 2025-08-06 DIAGNOSIS — G89.29 CHRONIC RIGHT SHOULDER PAIN: ICD-10-CM

## 2025-08-06 DIAGNOSIS — M25.511 CHRONIC RIGHT SHOULDER PAIN: ICD-10-CM

## 2025-08-06 PROCEDURE — 97112 NEUROMUSCULAR REEDUCATION: CPT

## 2025-08-06 PROCEDURE — 97110 THERAPEUTIC EXERCISES: CPT

## 2025-08-06 PROCEDURE — 97140 MANUAL THERAPY 1/> REGIONS: CPT

## 2025-08-06 NOTE — PROGRESS NOTES
"[facility]   Physical Therapy Treatment Note      Name: Keo Frias  Clinic Number: [id number]    Therapy Diagnosis:   Encounter Diagnoses   Name Primary?    Decreased range of motion of right shoulder Yes    Chronic right shoulder pain        Physician: Osvaldo Costa MD    Visit Date: 8/6/2025    Physician Orders: PT Eval and Treat   Medical Diagnosis from Referral: M75.01 (ICD-10-CM) - Adhesive capsulitis of right shoulder   Evaluation Date: 12/4/2024  Authorization Period Expiration: 12/03/2025  Plan of Care Expiration: 11/22/2025  Progress Note Due: 07/30/2025  Visit # / Visits authorized: 20/28  FOTO: 1/3     Precautions: Standard,     PTA Visit #: 0/5     Time In: 1400  Time Out: 1500  Total Billable Time: 58 minutes    Subjective     Pt reports: She continues to feel better.   She was compliant with home exercise program.  Response to previous treatment: improved range of motion  Functional change: ongoing    Pain: 1/10  Location: right shoulder      Objective       Objective measures will be updated at progress report unless specified      Treatment     Keo received the treatments listed below:      therapeutic exercises to develop strength, endurance, ROM, flexibility, posture, and core stabilization for 15 minutes including:  UBE 3' fwd/3' back for ROM and cardiovascular endurance  ER at 90 on wall 3x12 2" holds  E[place]ians ER with push up RTB 3x12   IR at 90 RTB 3x12    Not today:  Sidelying ER 3x10 2# R only  CC pulldowns seated 3x10     manual therapy techniques: Joint mobilizations, Manual traction, Myofacial release, Manual Lymphatic Drainage, Soft tissue Mobilization, and Friction Massage were applied to the: R shoulder for 12 minutes, including:  PROM shoulder flexion and external rotation   Shoulder flexion MET  Shoulder ER MET  Lateral shoulder distraction    PA thoracic mobilizations     Not today:   Cervical side glides (C5-6)  Inferior GH glides; grades " "III-IV  Posterior GH glides; grades III-IV  CT junction gapping    neuromuscular re-education activities to improve: Balance, Coordination, Kinesthetic, Sense, Proprioception, and Posture for 31 minutes. The following activities were included:  No moneys RTB 3x10 3" holds   Wall slides w/ RTB and lift off 2x15   Shoulder flex/ext perturbations 5x30" holds at 90 and end range  Standing T RTB 2x15   Standing shoulder extension GTB 2x15 (low trap activation)  Ball on wall at 90 degrees abduction and comfortable ER 3x30    Not today:   Shoulder ER/IR by side perturbations 5x30" holds   Standing shoulder flexion with YTB 2x15   Seated thoracic extension 20x 2" holds  Open books 20x each side     therapeutic activities to improve functional performance for 0  minutes, including:      Patient Education and Home Exercises       Education provided:   - continuing HEP    Written Home Exercises Provided: YES. Exercises were reviewed and Keo was able to demonstrate them prior to the end of the session.  Keo demonstrated good understanding of the education provided. See EMR under Patient Instructions for exercises provided during therapy sessions    Assessment     Pt continues to progress today with overhead rotator cuff strengthening and ER range at 90 degrees abduction.  She continues to tolerate all exercises without an increase in pain. She still struggles with hypermobility of her scapula, and she has been performing more scapular strengthening and motor control exercises to prevent scapular hypermobility. Will continue to progress as tolerated.     Keo Is progressing well towards her goals.   Pt prognosis is Fair.     Pt will continue to benefit from skilled outpatient physical therapy to address the deficits listed in the problem list box on initial evaluation, provide pt/family education and to maximize pt's level of independence in the home and community environment.     Pt's spiritual, cultural and educational needs " considered and pt agreeable to plan of care and goals.     Anticipated barriers to physical therapy: cancer treatment     GOALS:   Short Term Goals: 3 months MET as of 3/13/2025  1.Report decreased R shoulder pain < / =  3/10  to increase tolerance for ADL's  2. Increase PROM by 30 degrees in all planes    3. Increased strength by 1/3 MMT grade in R shoulder to increase tolerance for ADL and work activities.  4. Pt to tolerate HEP to improve ROM and independence with ADL's    Active       Long Term Goals       1.Report decreased R shoulder pain  < / =  1 /10  to increase tolerance for reaching overhead         Start:  05/25/25    Expected End:  11/22/25            2.Increase AROM to within 90% of the L shoulder        Start:  05/25/25    Expected End:  11/22/25            3.Increase strength to >/= 4/5 in R shoulder to increase tolerance for ADL and work activities.        Start:  05/25/25    Expected End:  11/22/25            4. Pt goal: to reach overhead and complete ADLs without pain         Start:  05/25/25    Expected End:  11/22/25            5. Pt will have improved gcode of CJ (20-40% limited) on FOTO shoulder in order to demonstrate true functional improvement.         Start:  05/25/25    Expected End:  11/22/25                  Plan     Plan of care Certification: 5/22/2025-11/22/2025.     Outpatient Physical Therapy 1 times biweekly for 6 months to include the following interventions: manual therapy, therapeutic exercise, therapeutic activities, and neuromuscular re-education.    Aristeo Patton PT, DPT

## 2025-08-08 ENCOUNTER — HOSPITAL ENCOUNTER (OUTPATIENT)
Dept: RADIOLOGY | Facility: HOSPITAL | Age: 75
Discharge: HOME | End: 2025-08-08
Attending: FAMILY MEDICINE
Payer: MEDICARE

## 2025-08-08 DIAGNOSIS — Z12.31 SCREENING MAMMOGRAM FOR BREAST CANCER: ICD-10-CM

## 2025-08-08 PROCEDURE — 77067 SCR MAMMO BI INCL CAD: CPT | Mod: TC

## 2025-08-08 PROCEDURE — 77067 SCR MAMMO BI INCL CAD: CPT | Mod: 26,,, | Performed by: RADIOLOGY

## 2025-08-08 PROCEDURE — 77063 BREAST TOMOSYNTHESIS BI: CPT | Mod: 26,,, | Performed by: RADIOLOGY

## 2025-08-15 ENCOUNTER — OFFICE VISIT (OUTPATIENT)
Dept: HEMATOLOGY/ONCOLOGY | Facility: CLINIC | Age: 75
End: 2025-08-15
Payer: MEDICARE

## 2025-08-15 DIAGNOSIS — C77.1 SECONDARY MALIGNANT NEOPLASM OF INTRATHORACIC LYMPH NODES: ICD-10-CM

## 2025-08-15 DIAGNOSIS — E03.9 HYPOTHYROIDISM, UNSPECIFIED TYPE: ICD-10-CM

## 2025-08-15 DIAGNOSIS — C34.31 ADENOCARCINOMA OF LOWER LOBE OF RIGHT LUNG: Primary | ICD-10-CM

## 2025-09-03 ENCOUNTER — CLINICAL SUPPORT (OUTPATIENT)
Dept: REHABILITATION | Facility: HOSPITAL | Age: 75
End: 2025-09-03
Payer: MEDICARE

## 2025-09-03 DIAGNOSIS — G89.29 CHRONIC RIGHT SHOULDER PAIN: ICD-10-CM

## 2025-09-03 DIAGNOSIS — M25.611 DECREASED RANGE OF MOTION OF RIGHT SHOULDER: Primary | ICD-10-CM

## 2025-09-03 DIAGNOSIS — M25.511 CHRONIC RIGHT SHOULDER PAIN: ICD-10-CM

## 2025-09-03 PROCEDURE — 97112 NEUROMUSCULAR REEDUCATION: CPT

## 2025-09-03 PROCEDURE — 97110 THERAPEUTIC EXERCISES: CPT

## 2025-09-03 PROCEDURE — 97140 MANUAL THERAPY 1/> REGIONS: CPT

## (undated) DEVICE — SUT MCRYL PLUS 4-0 PS2 27IN

## (undated) DEVICE — DRAPE STERI INSTRUMENT 1018

## (undated) DEVICE — CATH BRONCHOSCOPE F/BF

## (undated) DEVICE — SUT VICRYL 3-0 27 SH

## (undated) DEVICE — SYS LABEL CORRECT MED

## (undated) DEVICE — GOWN POLY REINF BRTH SLV XL

## (undated) DEVICE — PENCIL GOLF STERILE

## (undated) DEVICE — SEE MEDLINE ITEM 157131

## (undated) DEVICE — ALCOHOL BLEND 95%

## (undated) DEVICE — NDL FLEXISION BIOPSY 21G

## (undated) DEVICE — ADAPTER VISION PROBE & SUCTION

## (undated) DEVICE — Device

## (undated) DEVICE — SEE MEDLINE ITEM 152622

## (undated) DEVICE — SOL IRRI STRL WATER 1000ML

## (undated) DEVICE — DRAPE THREE-QTR REINF 53X77IN

## (undated) DEVICE — ELECTRODE REM PLYHSV RETURN 9

## (undated) DEVICE — SPONGE SUPER KERLIX 6X6.75IN

## (undated) DEVICE — LUBRICANT SURGILUBE 2 OZ

## (undated) DEVICE — APPLICATOR CHLORAPREP ORN 26ML

## (undated) DEVICE — SYR 10CC LUER LOCK

## (undated) DEVICE — CONNECTOR SWIVEL

## (undated) DEVICE — NDL HYPO REG 25G X 1 1/2

## (undated) DEVICE — ELECTRODE BLD EXT INSUL 1

## (undated) DEVICE — POSITIONER IV ARMBOARD FOAM

## (undated) DEVICE — CYTOSPIN COLLECTION FLUID BLT

## (undated) DEVICE — MARKER SKIN STND TIP BLUE BARR

## (undated) DEVICE — ADAPTER SWIVEL

## (undated) DEVICE — KIT ANTIFOG W/SPONG & FLUID

## (undated) DEVICE — PACK ECLIPSE SET-UP W/O DRAPE

## (undated) DEVICE — SPONGE COTTON TRAY 4X4IN

## (undated) DEVICE — SYR SLIP TIP 20CC

## (undated) DEVICE — BAG ION VISION PROBE

## (undated) DEVICE — ADHESIVE DERMABOND ADVANCED

## (undated) DEVICE — CONTAINER SPECIMEN STRL 4OZ

## (undated) DEVICE — BOWL STERILE LARGE 32OZ